# Patient Record
Sex: MALE | Race: WHITE | NOT HISPANIC OR LATINO | Employment: OTHER | URBAN - METROPOLITAN AREA
[De-identification: names, ages, dates, MRNs, and addresses within clinical notes are randomized per-mention and may not be internally consistent; named-entity substitution may affect disease eponyms.]

---

## 2017-01-03 ENCOUNTER — OFFICE VISIT (OUTPATIENT)
Dept: NEPHROLOGY | Facility: CLINIC | Age: 57
End: 2017-01-03
Payer: MEDICAID

## 2017-01-03 ENCOUNTER — OFFICE VISIT (OUTPATIENT)
Dept: DIABETES | Facility: CLINIC | Age: 57
End: 2017-01-03
Payer: MEDICAID

## 2017-01-03 VITALS
WEIGHT: 252.19 LBS | SYSTOLIC BLOOD PRESSURE: 110 MMHG | DIASTOLIC BLOOD PRESSURE: 86 MMHG | HEIGHT: 71 IN | BODY MASS INDEX: 35.31 KG/M2

## 2017-01-03 VITALS
HEART RATE: 71 BPM | SYSTOLIC BLOOD PRESSURE: 100 MMHG | BODY MASS INDEX: 35.52 KG/M2 | HEIGHT: 71 IN | WEIGHT: 253.75 LBS | DIASTOLIC BLOOD PRESSURE: 70 MMHG

## 2017-01-03 DIAGNOSIS — N18.30 CHRONIC KIDNEY DISEASE (CKD), STAGE III (MODERATE): Primary | ICD-10-CM

## 2017-01-03 DIAGNOSIS — E11.22 TYPE 2 DIABETES MELLITUS WITH STAGE 3 CHRONIC KIDNEY DISEASE, WITH LONG-TERM CURRENT USE OF INSULIN: ICD-10-CM

## 2017-01-03 DIAGNOSIS — I15.2 HYPERTENSION ASSOCIATED WITH DIABETES: ICD-10-CM

## 2017-01-03 DIAGNOSIS — E66.9 OBESITY (BMI 30-39.9): ICD-10-CM

## 2017-01-03 DIAGNOSIS — E11.65 TYPE 2 DIABETES MELLITUS WITH HYPERGLYCEMIA, WITH LONG-TERM CURRENT USE OF INSULIN: ICD-10-CM

## 2017-01-03 DIAGNOSIS — E78.5 HYPERLIPIDEMIA ASSOCIATED WITH TYPE 2 DIABETES MELLITUS: ICD-10-CM

## 2017-01-03 DIAGNOSIS — Z79.4 TYPE 2 DIABETES MELLITUS WITH STAGE 3 CHRONIC KIDNEY DISEASE, WITH LONG-TERM CURRENT USE OF INSULIN: ICD-10-CM

## 2017-01-03 DIAGNOSIS — Z79.4 TYPE 2 DIABETES MELLITUS WITH DIABETIC NEUROPATHY, WITH LONG-TERM CURRENT USE OF INSULIN: Primary | ICD-10-CM

## 2017-01-03 DIAGNOSIS — Z79.4 TYPE 2 DIABETES MELLITUS WITH HYPERGLYCEMIA, WITH LONG-TERM CURRENT USE OF INSULIN: ICD-10-CM

## 2017-01-03 DIAGNOSIS — N18.30 TYPE 2 DIABETES MELLITUS WITH STAGE 3 CHRONIC KIDNEY DISEASE, WITH LONG-TERM CURRENT USE OF INSULIN: ICD-10-CM

## 2017-01-03 DIAGNOSIS — E11.69 HYPERLIPIDEMIA ASSOCIATED WITH TYPE 2 DIABETES MELLITUS: ICD-10-CM

## 2017-01-03 DIAGNOSIS — E11.40 TYPE 2 DIABETES MELLITUS WITH DIABETIC NEUROPATHY, WITH LONG-TERM CURRENT USE OF INSULIN: Primary | ICD-10-CM

## 2017-01-03 DIAGNOSIS — E11.59 HYPERTENSION ASSOCIATED WITH DIABETES: ICD-10-CM

## 2017-01-03 LAB — GLUCOSE SERPL-MCNC: 102 MG/DL (ref 70–110)

## 2017-01-03 PROCEDURE — 99214 OFFICE O/P EST MOD 30 MIN: CPT | Mod: S$PBB,,, | Performed by: NURSE PRACTITIONER

## 2017-01-03 PROCEDURE — 99999 PR PBB SHADOW E&M-EST. PATIENT-LVL II: CPT | Mod: PBBFAC,,, | Performed by: NURSE PRACTITIONER

## 2017-01-03 PROCEDURE — 99214 OFFICE O/P EST MOD 30 MIN: CPT | Mod: S$PBB,,, | Performed by: INTERNAL MEDICINE

## 2017-01-03 PROCEDURE — 99999 PR PBB SHADOW E&M-EST. PATIENT-LVL III: CPT | Mod: PBBFAC,,, | Performed by: INTERNAL MEDICINE

## 2017-01-03 PROCEDURE — 99212 OFFICE O/P EST SF 10 MIN: CPT | Mod: PBBFAC,27,PO | Performed by: NURSE PRACTITIONER

## 2017-01-03 PROCEDURE — 82948 REAGENT STRIP/BLOOD GLUCOSE: CPT | Mod: PBBFAC,PO | Performed by: NURSE PRACTITIONER

## 2017-01-03 RX ORDER — METFORMIN HYDROCHLORIDE 500 MG/1
500 TABLET ORAL 2 TIMES DAILY WITH MEALS
Qty: 60 TABLET | Refills: 6 | Status: SHIPPED | OUTPATIENT
Start: 2017-01-03 | End: 2017-09-27 | Stop reason: SDUPTHER

## 2017-01-03 NOTE — PROGRESS NOTES
"PCP: Farhana Burnham MD    Subjective:     HISTORY OF PRESENT ILLNESS: 56 year old  male patient is in clinic today for diabetes.  Patient has had Type II diabetes since 2009.  He has been seen by dietitian, TONY Shin, in the past.  Most recent A1C is 7.7, ADA recommends less than 7.0.  He reports prior use of glimepiride but was told to discontinue.  Blood glucose testing is performed 1 - 2 x a day. No records today.  Per recall, fasting blood glucoses are mostly 90 - 120. Not monitoring other times.  Since his last visit, he has lost 2 pounds.     Patient denies polyuria, polyphagia, or blurred vision.  He complains of polydipsia, cotton mouth.  Also denies nausea, vomiting, diarrhea or hypoglycemic symptoms.     Height: 5 ' 9.5", Weight: 252 pounds, BMI 35.18  Blood Glucose reading this AM: Not Taken  Blood Glucose reading in clinic: 102 mg/dl at 1:15 pm    DM MEDICATIONS:  Metformin 500 mg BID  Lantus 45 units daily  Novolog 20 units TID ac  Trulicity 0.75 mg weekly    Labs Reviewed.    REVIEW OF SYSTEMS:  General: Denies fever, chills, change in appetite.  HEENT: Denies impaired hearing, dysphagia.  Respiratory: Denies shortness of breath, cough or wheezing.  Cardiovascular: Denies chest pain, palpitations.  GI: Denies hematochezia.  : Denies hematuria, dysuria or frequency.  MS:  Normal gait. Denies difficulty with mobility, muscle or joint pain.  SKIN: Denies rashes and lesions.  Neuro: Has numbness or tingling in the hands or feet.   PSYCH: Denies depression or anxiety. No suicidal ideations.  ENDO: See HPI.    STANDARDS OF CARE:  Eye doctor: Dr. Caruso, Last exam 7 / 2016  Dental exam: Recommend regular exams; denies gums bleeding.  Podiatry doctor: Dr. Sampson, last exam 3 / 2015    ACTIVITY LEVEL: No routine exercise.   MEAL PLANNING: Number of meals per day - 3. Number of snacks per day - 2.  Per dietary recall, patient is not limiting carbohydrates, saturated fats and sodium.     BLOOD " GLUCOSE TESTING: Self-monitoring with, ONE TOUCH verio  SOCIAL HISTORY: Disabled. Smokes 1/2 pack per day.     Objective:     PHYSICAL EXAMINATION:  GENERAL: WDWN  male in no acute distress, ambulatory, responds appropriately. AAO X 3.   NECK: Supple, no thyromegaly, no cervical or supraclavicular lymphadenopathy, no carotid bruit.  HEART: Regular rate and rhythm. No rubs, murmurs or gallops.  LUNGS: CTA bilaterally. Unlabored breathing, no use of accessory muscles.  MUSCULOSKELETAL: Normal gait and muscle strength.  ABDOMEN: Active bowel sounds X 4, no masses or tenderness.   SKIN: Warm, dry skin. No lesions or abrasions. Clean, dry well-healed injection sites.   NEUROLOGIC: Cranial nerves II-XII grossly intact.   FOOT EXAMINATION: Protective Sensation (w/ 10 gram monofilament): Right: Decreased.  Left: Intact.  Visual Inspection: Normal -  Bilateral, except onychomycosis to toenails, significant calluses to lateral sides of great toes.   Pedal Pulses:   Right: Diminshed  Left: Diminshed    Assessment:     EDUCATIONAL ASSESSMENT:  1. Impediments in learning class environment - NONE.  2. Needs improvement in self-care management skills.    (1.) Diabetes Type II, stage III CKD, neuropathy (on gabapentin) -  improving control on metformin, Lantus, novolog, Trulicity, A1C 7.7  (2.) Hypertension - controlled on norvasc, catapres, hydralazine, lisinopril-HCT  (3.) Obesity, BMI 35.18  (4.) Hyperlipidemia - controlled on Pravastatin    Plan:     1.) Patient was instructed to monitor blood glucose 3 x daily, fasting and ac dinner and at bedtime. Discussed ADA goal for fasting blood sugar, 80 - 130 mg/dL; pp blood sugars below 180 mg/dl. Also, discussed prevention of hypoglycemia and the need to adjust goals to higher levels if persistent hypoglycemia.  R Reminded to bring blood glucose records or meter to each visit for review.  2.) Reviewed pathophysiology of Type 2 diabetes, complications related to the disease,  importance of annual dilated eye exam and daily foot examination.  3.) Continue metformin 500 mg BID.  Continue Lantus 45 units daily. Continue Novolog 20 units TID ac.  Increase Trulicity 1.5 mg weekly.  He voices that Trulicity helped to cut appetite and decrease weight.  Continue neuropathy cream as needed.   4.) Reviewed carb counting, portion control, importance of spacing meals throughout the day to prevent post prandial elevations. Recommended low saturated fat, low sodium diet to aid in control of hypertension and cholesterol.  5.) Discussed activity, benefits, methods, and precautions. Recommended patient start some form of exercise and increase as tolerated to 30 minutes per day to facilitate weight loss and aid in control of BGs.  6.) He is scheduled for repeat labs tomorrow: A1C, micral.    7.) Return to clinic in 3 months for follow up.  Advised patient to call clinic with any questions or concerns.    Ladan Dowd, NP-C, CDE
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
## 2017-01-03 NOTE — MR AVS SNAPSHOT
[facility]  [address] [facility] [address]  Phone: [phone]  Fax: [phone]                  Shukri Rosales   1/3/2017 10:30 AM   Office Visit    Description:  Male : 1960   Provider:  Maxi Oh MD   Department:  [facility]           Diagnoses this Visit        Comments    Chronic kidney disease (CKD), stage III (moderate)    -  Primary            To Do List           Future Appointments        Provider Department Dept Phone    1/3/2017 1:30 PM Ladan Dowd, SHAWNA-C, CDE [facility] [phone]    2017 8:30 AM [facility], [facility]     2017 10:00 AM Farhana Burnham MD [facility] [phone]    2017 10:40 AM [facility], [facility]     2017 10:45 AM LAURENCE Caruso OD [facility] - Ophthalmology [phone]      Goals (5 Years of Data)     None      Follow-Up and Disposition     Return in about 6 months (around 7/3/2017).    Follow-up and Disposition History      [facility]     [facility] -  Assistance  Registered nurses in the [facility] provide clinical advisement, health education, appointment booking, and other advisory services.  Call for this free service at [phone].             Medications           Message regarding Medications     Verify the changes and/or additions to your medication regime listed below are the same as discussed with your clinician today.  If any of these changes or additions are incorrect, please notify your healthcare provider.             Verify that the below list of medications is an accurate representation of the medications you are currently taking.  If none reported, the list may be blank. If incorrect, please contact your healthcare provider. Carry this list with you in case of emergency.           Current Medications     amlodipine  "(NORVASC) 10 MG tablet Take 1 tablet (10 mg total) by mouth once daily.    blood sugar diagnostic Strp 1 each by Misc.(Non-Drug; Combo Route) route 3 (three) times daily before meals. For One Touch Verio    blood-glucose meter kit One Touch Verio Meter    clonazePAM (KLONOPIN) 0.5 MG tablet TAKE ONE TABLET BY MOUTH TWICE DAILY AS NEEDED MUST LAST 30 DAYS    cloNIDine (CATAPRES) 0.3 MG tablet Take 1 tablet (0.3 mg total) by mouth 3 (three) times daily.    cyclobenzaprine (FLEXERIL) 10 MG tablet Take 1 tablet (10 mg total) by mouth 3 (three) times daily as needed (Muscle pain/soreness).    dulaglutide (TRULICITY) 0.75 mg/0.5 mL PnIj Inject 0.75 mg into the skin once a week.    gabapentin (NEURONTIN) 300 MG capsule Take 1 capsule (300 mg total) by mouth 3 (three) times daily.    insulin aspart (NOVOLOG) 100 unit/mL injection Inject 20 Units into the skin 3 (three) times daily before meals.    insulin glargine (LANTUS) 100 unit/mL injection Inject 45 Units into the skin every evening.    insulin syringe-needle U-100 1 mL 31 gauge x 5/16 Syrg 1 Syringe by Misc.(Non-Drug; Combo Route) route once daily.    isosorbide mononitrate (IMDUR) 60 MG 24 hr tablet TAKE 1 TABLET(60 MG) BY MOUTH EVERY DAY    lancets (ONETOUCH DELICA LANCETS) 33 gauge Misc 1 lancet by Misc.(Non-Drug; Combo Route) route 3 (three) times daily.    lancets 33 gauge Misc 1 lancet by Misc.(Non-Drug; Combo Route) route 2 (two) times daily. Freestyle freedom lite    lisinopril-hydrochlorothiazide (PRINZIDE,ZESTORETIC) 20-25 mg Tab TAKE 1 TABLET BY MOUTH EVERY MORNING    lurasidone (LATUDA) 60 mg Tab tablet Take 60 mg by mouth once daily.    metformin (GLUCOPHAGE) 500 MG tablet Take 1 tablet (500 mg total) by mouth 2 (two) times daily with meals.    metoprolol succinate (TOPROL-XL) 50 MG 24 hr tablet TAKE 1 TABLET(50 MG) BY MOUTH EVERY DAY    pen needle, diabetic 32 gauge x 5/32" Ndle 1 each by Misc.(Non-Drug; Combo Route) route once daily.    pravastatin " "(PRAVACHOL) 20 MG tablet Take 1 tablet (20 mg total) by mouth every evening.    quetiapine (SEROQUEL) 200 MG Tab Take 200 mg by mouth once daily.     quetiapine (SEROQUEL) 300 MG Tab Take 1 tablet (300 mg total) by mouth once daily.    sertraline (ZOLOFT) 100 MG tablet Take 2 tablets (200 mg total) by mouth once daily.    trazodone (DESYREL) 150 MG tablet Take 2 tabs po every bedtime    trazodone (DESYREL) 300 MG tablet TK 1 T PO  QHS    aspirin 81 MG Chew Take 1 tablet (81 mg total) by mouth once daily.    buPROPion (WELLBUTRIN SR) 150 MG TBSR 12 hr tablet TK 1 T PO  BID    triamcinolone acetonide 0.1% (KENALOG) 0.1 % cream Apply topically 2 (two) times daily.           Clinical Reference Information           Vital Signs - Last Recorded  Most recent update: 1/3/2017 11:34 AM by Maxi Oh MD    BP Pulse Ht Wt BMI    100/70 71 5' 11" (1.803 m) 115.1 kg (253 lb 12 oz) 35.39 kg/m2      Blood Pressure          Most Recent Value    BP  100/70      Allergies as of 1/3/2017     No Known Allergies      Immunizations Administered on Date of Encounter - 1/3/2017     None      Orders Placed During Today's Visit     Future Labs/Procedures Expected by Expires    CBC auto differential  1/3/2017 3/4/2018    PTH, intact  1/3/2017 3/4/2018    Renal function panel  1/3/2017 3/4/2018    Renal function panel  1/3/2017 3/4/2018    Uric acid  1/3/2017 3/4/2018    Vitamin D  1/3/2017 3/4/2018      Instructions    Avoid NSAID pain medications such as advil, aleve, motrin, ibuprofen, naprosyn, meloxicam, diclofenac, mobic.     Reduce Norvasc to 5 mg daily              Smoking Cessation     If you would like to quit smoking:   You may be eligible for free services if you are a [place] resident and started smoking cigarettes before September 1, 1988.  Call the [employer]oking [employer]essation [employer] (S[employer]) toll free at ([phone] [phone] or ([phone] 526-0748.   Call 1-800-QUIT-NOW if you do not meet the above criteria.            "

## 2017-01-03 NOTE — PATIENT INSTRUCTIONS
Avoid NSAID pain medications such as advil, aleve, motrin, ibuprofen, naprosyn, meloxicam, diclofenac, mobic.     Reduce Norvasc to 5 mg daily

## 2017-01-04 ENCOUNTER — OFFICE VISIT (OUTPATIENT)
Dept: FAMILY MEDICINE | Facility: CLINIC | Age: 57
End: 2017-01-04
Payer: MEDICAID

## 2017-01-04 ENCOUNTER — LAB VISIT (OUTPATIENT)
Dept: LAB | Facility: HOSPITAL | Age: 57
End: 2017-01-04
Attending: INTERNAL MEDICINE
Payer: MEDICAID

## 2017-01-04 VITALS
SYSTOLIC BLOOD PRESSURE: 138 MMHG | HEIGHT: 71 IN | OXYGEN SATURATION: 96 % | BODY MASS INDEX: 35.32 KG/M2 | RESPIRATION RATE: 14 BRPM | DIASTOLIC BLOOD PRESSURE: 82 MMHG | TEMPERATURE: 98 F | WEIGHT: 252.31 LBS | HEART RATE: 73 BPM

## 2017-01-04 DIAGNOSIS — E66.01 SEVERE OBESITY (BMI 35.0-35.9 WITH COMORBIDITY): ICD-10-CM

## 2017-01-04 DIAGNOSIS — K59.00 CONSTIPATION, UNSPECIFIED CONSTIPATION TYPE: ICD-10-CM

## 2017-01-04 DIAGNOSIS — N18.30 CHRONIC KIDNEY DISEASE (CKD), STAGE III (MODERATE): ICD-10-CM

## 2017-01-04 DIAGNOSIS — Z72.0 TOBACCO ABUSE: ICD-10-CM

## 2017-01-04 DIAGNOSIS — E55.9 VITAMIN D DEFICIENCY: ICD-10-CM

## 2017-01-04 DIAGNOSIS — Z23 NEED FOR TDAP VACCINATION: ICD-10-CM

## 2017-01-04 DIAGNOSIS — E11.9 TYPE 2 DIABETES MELLITUS WITHOUT COMPLICATION: ICD-10-CM

## 2017-01-04 DIAGNOSIS — E78.5 HYPERLIPIDEMIA, UNSPECIFIED HYPERLIPIDEMIA TYPE: ICD-10-CM

## 2017-01-04 DIAGNOSIS — G47.33 OSA ON CPAP: ICD-10-CM

## 2017-01-04 DIAGNOSIS — R63.5 ABNORMAL WEIGHT GAIN: ICD-10-CM

## 2017-01-04 DIAGNOSIS — I10 ESSENTIAL HYPERTENSION: Primary | ICD-10-CM

## 2017-01-04 LAB
25(OH)D3+25(OH)D2 SERPL-MCNC: 19 NG/ML
ALBUMIN SERPL BCP-MCNC: 3.3 G/DL
ANION GAP SERPL CALC-SCNC: 12 MMOL/L
BASOPHILS # BLD AUTO: 0.06 K/UL
BASOPHILS NFR BLD: 0.8 %
BUN SERPL-MCNC: 16 MG/DL
CALCIUM SERPL-MCNC: 9.7 MG/DL
CHLORIDE SERPL-SCNC: 96 MMOL/L
CO2 SERPL-SCNC: 27 MMOL/L
CREAT SERPL-MCNC: 1.3 MG/DL
DIFFERENTIAL METHOD: NORMAL
EOSINOPHIL # BLD AUTO: 0.2 K/UL
EOSINOPHIL NFR BLD: 2.6 %
ERYTHROCYTE [DISTWIDTH] IN BLOOD BY AUTOMATED COUNT: 12.8 %
EST. GFR  (AFRICAN AMERICAN): >60 ML/MIN/1.73 M^2
EST. GFR  (NON AFRICAN AMERICAN): >60 ML/MIN/1.73 M^2
GLUCOSE SERPL-MCNC: 157 MG/DL
HCT VFR BLD AUTO: 41.7 %
HGB BLD-MCNC: 14.6 G/DL
LYMPHOCYTES # BLD AUTO: 2.5 K/UL
LYMPHOCYTES NFR BLD: 31.8 %
MCH RBC QN AUTO: 30.6 PG
MCHC RBC AUTO-ENTMCNC: 35 %
MCV RBC AUTO: 87 FL
MONOCYTES # BLD AUTO: 0.6 K/UL
MONOCYTES NFR BLD: 7.1 %
NEUTROPHILS # BLD AUTO: 4.3 K/UL
NEUTROPHILS NFR BLD: 57.7 %
PHOSPHATE SERPL-MCNC: 3.3 MG/DL
PLATELET # BLD AUTO: 319 K/UL
PMV BLD AUTO: 9.5 FL
POTASSIUM SERPL-SCNC: 4.2 MMOL/L
RBC # BLD AUTO: 4.77 M/UL
SODIUM SERPL-SCNC: 135 MMOL/L
URATE SERPL-MCNC: 5.5 MG/DL
WBC # BLD AUTO: 7.7 K/UL

## 2017-01-04 PROCEDURE — 90472 IMMUNIZATION ADMIN EACH ADD: CPT | Mod: PBBFAC,PO | Performed by: FAMILY MEDICINE

## 2017-01-04 PROCEDURE — 99999 PR PBB SHADOW E&M-EST. PATIENT-LVL III: CPT | Mod: PBBFAC,,, | Performed by: FAMILY MEDICINE

## 2017-01-04 PROCEDURE — 90686 IIV4 VACC NO PRSV 0.5 ML IM: CPT | Mod: PBBFAC,PO | Performed by: FAMILY MEDICINE

## 2017-01-04 PROCEDURE — 99213 OFFICE O/P EST LOW 20 MIN: CPT | Mod: PBBFAC,PO | Performed by: FAMILY MEDICINE

## 2017-01-04 PROCEDURE — 99214 OFFICE O/P EST MOD 30 MIN: CPT | Mod: S$PBB,,, | Performed by: FAMILY MEDICINE

## 2017-01-04 RX ORDER — AMLODIPINE BESYLATE 10 MG/1
10 TABLET ORAL DAILY
Qty: 30 TABLET | Refills: 5 | Status: CANCELLED | OUTPATIENT
Start: 2017-01-04

## 2017-01-04 RX ORDER — PRAVASTATIN SODIUM 20 MG/1
20 TABLET ORAL NIGHTLY
Qty: 30 TABLET | Refills: 3 | Status: SHIPPED | OUTPATIENT
Start: 2017-01-04 | End: 2017-07-06 | Stop reason: SDUPTHER

## 2017-01-04 RX ORDER — LISINOPRIL AND HYDROCHLOROTHIAZIDE 20; 25 MG/1; MG/1
TABLET ORAL
Qty: 30 TABLET | Refills: 5 | Status: SHIPPED | OUTPATIENT
Start: 2017-01-04 | End: 2017-09-05 | Stop reason: SDUPTHER

## 2017-01-04 RX ORDER — AMLODIPINE BESYLATE 5 MG/1
5 TABLET ORAL DAILY
Qty: 30 TABLET | Refills: 5 | Status: SHIPPED | OUTPATIENT
Start: 2017-01-04 | End: 2017-08-02 | Stop reason: SDUPTHER

## 2017-01-04 RX ORDER — POLYETHYLENE GLYCOL 3350 17 G/17G
17 POWDER, FOR SOLUTION ORAL DAILY
Qty: 1 BOTTLE | Refills: 1 | Status: SHIPPED | OUTPATIENT
Start: 2017-01-04 | End: 2020-12-03 | Stop reason: SDUPTHER

## 2017-01-04 RX ORDER — CLONIDINE HYDROCHLORIDE 0.3 MG/1
0.3 TABLET ORAL 3 TIMES DAILY
Qty: 90 TABLET | Refills: 5 | Status: SHIPPED | OUTPATIENT
Start: 2017-01-04 | End: 2017-10-04 | Stop reason: SDUPTHER

## 2017-01-04 NOTE — MR AVS SNAPSHOT
[facility]  [address]  [facility] [address]  Phone: [phone]  Fax: [phone]                  Shukri Rosales   2017 10:00 AM   Office Visit    Description:  Male : 1960   Provider:  Farhana Burnham MD   Department:  [facility]           Reason for Visit     Chronic Care           Diagnoses this Visit        Comments    Essential hypertension    -  Primary     Hyperlipidemia, unspecified hyperlipidemia type         EMMANUEL on CPAP         Abnormal weight gain         Constipation, unspecified constipation type         Tobacco abuse         Severe obesity (BMI 35.0-35.9 with comorbidity)         Need for Tdap vaccination                To Do List           Future Appointments        Provider Department Dept Phone    2017 10:30 AM SPE[facility]IMEN, [facility] [phone]    2017 10:40 AM LABORATORY, [facility]     2017 1:30 PM CHINA Ulloa, [place] - Diabetes [phone]    2017 8:00 AM LABORATORY, [facility]     2017 10:45 AM [place]atalya Caruso,  [place] - Ophthalmology [phone]      Goals (5 Years of Data)     None       These Medications        Disp Refills Start End    pravastatin (PRAVACHOL) 20 MG tablet 30 tablet 3 2017    Take 1 tablet (20 mg total) by mouth every evening. - Oral    Pharmacy: [facility] [address] [place] [address] [address] AT [facility] [email or web address] Ph #: [phone]       Notes to Pharmacy: Medically necessary 272.4    lisinopril-hydrochlorothiazide (PRINZIDE,ZESTORETIC) 20-25 mg Tab 30 tablet 5 2017     TAKE 1 TABLET BY MOUTH EVERY MORNING    Pharmacy: [facility] [place] [address] [place], [place] - [address] AT [facility] [address] Ph #: [phone]       amlodipine (NORVASC) 5 MG tablet 30 tablet 5  1/4/2017 1/4/2018    Take 1 tablet (5 mg total) by mouth once daily. - Oral    Pharmacy: [facility] [address] [place] [address] [address] AT [facility] [address] Ph #: [phone]       cloNIDine (CATAPRES) 0.3 MG tablet 90 tablet 5 1/4/2017     Take 1 tablet (0.3 mg total) by mouth 3 (three) times daily. - Oral    Pharmacy: [facility] [address] [place] [address] [address] AT [facility] [address] Ph #: [phone]       polyethylene glycol (GLYCOLAX) 17 gram/dose powder 1 Bottle 1 1/4/2017     Take 17 g by mouth once daily. - Oral    Pharmacy: [facility] [address] AT [facility] [address] Ph #: [phone]         [facility]     [facility] - 24/7 Assistance  Registered nurses in the [facility] provide clinical advisement, health education, appointment booking, and other advisory services.  Call for this free service at [phone].             Medications           Message regarding Medications     Verify the changes and/or additions to your medication regime listed below are the same as discussed with your clinician today.  If any of these changes or additions are incorrect, please notify your healthcare provider.        START taking these NEW medications        Refills    amlodipine (NORVASC) 5 MG tablet 5    Sig: Take 1 tablet (5 mg total) by mouth once daily.    Class: Normal    Route: Oral    polyethylene glycol (GLYCOLAX) 17 gram/dose powder 1    Sig: Take 17 g by mouth once daily.    Class: Normal    Route: Oral           Verify that the below list of medications is an accurate representation of the medications you are currently taking.  If none reported, the list may be blank. If incorrect, please contact your healthcare provider. Carry this list with you in case of emergency.           Current Medications     blood sugar diagnostic Strp 1 each by  "Misc.(Non-Drug; Combo Route) route 3 (three) times daily before meals. For One Touch Verio    blood-glucose meter kit One Touch Verio Meter    buPROPion (WELLBUTRIN SR) 150 MG TBSR 12 hr tablet TK 1 T PO  BID    clonazePAM (KLONOPIN) 0.5 MG tablet TAKE ONE TABLET BY MOUTH TWICE DAILY AS NEEDED MUST LAST 30 DAYS    cloNIDine (CATAPRES) 0.3 MG tablet Take 1 tablet (0.3 mg total) by mouth 3 (three) times daily.    cyclobenzaprine (FLEXERIL) 10 MG tablet Take 1 tablet (10 mg total) by mouth 3 (three) times daily as needed (Muscle pain/soreness).    dulaglutide (TRULICITY) 1.5 mg/0.5 mL PnIj Inject 1.5 mg into the skin every 7 days.    gabapentin (NEURONTIN) 300 MG capsule Take 1 capsule (300 mg total) by mouth 3 (three) times daily.    insulin aspart (NOVOLOG) 100 unit/mL injection Inject 20 Units into the skin 3 (three) times daily before meals.    insulin glargine (LANTUS) 100 unit/mL injection Inject 45 Units into the skin every evening.    insulin syringe-needle U-100 1 mL 31 gauge x 5/16 Syrg 1 Syringe by Misc.(Non-Drug; Combo Route) route once daily.    isosorbide mononitrate (IMDUR) 60 MG 24 hr tablet TAKE 1 TABLET(60 MG) BY MOUTH EVERY DAY    lancets (ONETOUCH DELICA LANCETS) 33 gauge Misc 1 lancet by Misc.(Non-Drug; Combo Route) route 3 (three) times daily.    lancets 33 gauge Misc 1 lancet by Misc.(Non-Drug; Combo Route) route 2 (two) times daily. Freestyle freedom lite    lisinopril-hydrochlorothiazide (PRINZIDE,ZESTORETIC) 20-25 mg Tab TAKE 1 TABLET BY MOUTH EVERY MORNING    lurasidone (LATUDA) 60 mg Tab tablet Take 60 mg by mouth once daily.    metformin (GLUCOPHAGE) 500 MG tablet Take 1 tablet (500 mg total) by mouth 2 (two) times daily with meals.    metoprolol succinate (TOPROL-XL) 50 MG 24 hr tablet TAKE 1 TABLET(50 MG) BY MOUTH EVERY DAY    pen needle, diabetic 32 gauge x 5/32" Ndle 1 each by Misc.(Non-Drug; Combo Route) route once daily.    pravastatin (PRAVACHOL) 20 MG tablet Take 1 tablet (20 mg " "total) by mouth every evening.    quetiapine (SEROQUEL) 200 MG Tab Take 200 mg by mouth once daily.     quetiapine (SEROQUEL) 300 MG Tab Take 1 tablet (300 mg total) by mouth once daily.    sertraline (ZOLOFT) 100 MG tablet Take 2 tablets (200 mg total) by mouth once daily.    trazodone (DESYREL) 150 MG tablet Take 2 tabs po every bedtime    trazodone (DESYREL) 300 MG tablet TK 1 T PO  QHS    amlodipine (NORVASC) 5 MG tablet Take 1 tablet (5 mg total) by mouth once daily.    aspirin 81 MG Chew Take 1 tablet (81 mg total) by mouth once daily.    polyethylene glycol (GLYCOLAX) 17 gram/dose powder Take 17 g by mouth once daily.    triamcinolone acetonide 0.1% (KENALOG) 0.1 % cream Apply topically 2 (two) times daily.           Clinical Reference Information           Vital Signs - Last Recorded  Most recent update: 1/4/2017  9:35 AM by Kyra Miner MA    BP Pulse Temp Resp Ht Wt    138/82 73 97.7 °F (36.5 °C) (Temporal) 14 5' 11" (1.803 m) 114.4 kg (252 lb 5.1 oz)    SpO2 BMI             96% 35.19 kg/m2         Blood Pressure          Most Recent Value    BP  138/82      Allergies as of 1/4/2017     No Known Allergies      Immunizations Administered on Date of Encounter - 1/4/2017     Name Date Dose VIS Date Route    TDAP  Incomplete 0.5 mL 2/24/2015 Intramuscular      Orders Placed During Today's Visit      Normal Orders This Visit    Tdap Vaccine (Adult)     Future Labs/Procedures Expected by Expires    Lipid panel  1/4/2017 3/5/2018      Smoking Cessation     If you would like to quit smoking:   You may be eligible for free services if you are a [place] resident and started smoking cigarettes before September 1, 1988.  Call the [employer]oking [employer]essation [employer] ([employer]) toll free at [phone] or [phone].   Call 3-367-QUIT-NOW if you do not meet the above criteria.            "
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
## 2017-01-05 LAB
ESTIMATED AVG GLUCOSE: 206 MG/DL
HBA1C MFR BLD HPLC: 8.8 %

## 2017-01-16 ENCOUNTER — TELEPHONE (OUTPATIENT)
Dept: FAMILY MEDICINE | Facility: CLINIC | Age: 57
End: 2017-01-16

## 2017-01-16 RX ORDER — ERGOCALCIFEROL 1.25 MG/1
50000 CAPSULE ORAL WEEKLY
Qty: 8 CAPSULE | Refills: 0 | Status: SHIPPED | OUTPATIENT
Start: 2017-01-16 | End: 2017-02-15

## 2017-01-16 NOTE — TELEPHONE ENCOUNTER
----- Message from Shanon Tee MA sent at 1/16/2017  2:16 PM CST -----  Spoke with patient and informed him that his vitamin d level is lower, pt stated that he has not taken the replacement dosage. Please re-escribe the medication to [place].

## 2017-01-30 DIAGNOSIS — R06.09 DOE (DYSPNEA ON EXERTION): Chronic | ICD-10-CM

## 2017-01-30 DIAGNOSIS — I10 ESSENTIAL HYPERTENSION: ICD-10-CM

## 2017-01-30 RX ORDER — ISOSORBIDE MONONITRATE 60 MG/1
TABLET, EXTENDED RELEASE ORAL
Qty: 30 TABLET | Refills: 0 | Status: SHIPPED | OUTPATIENT
Start: 2017-01-30 | End: 2017-03-08 | Stop reason: SDUPTHER

## 2017-02-03 DIAGNOSIS — E11.40 DIABETIC NEUROPATHY, TYPE II DIABETES MELLITUS: ICD-10-CM

## 2017-02-03 RX ORDER — GABAPENTIN 300 MG/1
CAPSULE ORAL
Qty: 90 CAPSULE | Refills: 0 | Status: SHIPPED | OUTPATIENT
Start: 2017-02-03 | End: 2017-02-27 | Stop reason: SDUPTHER

## 2017-02-21 ENCOUNTER — TELEPHONE (OUTPATIENT)
Dept: PULMONOLOGY | Facility: CLINIC | Age: 57
End: 2017-02-21

## 2017-02-27 DIAGNOSIS — E11.40 DIABETIC NEUROPATHY, TYPE II DIABETES MELLITUS: ICD-10-CM

## 2017-02-27 RX ORDER — GABAPENTIN 300 MG/1
CAPSULE ORAL
Qty: 90 CAPSULE | Refills: 0 | Status: SHIPPED | OUTPATIENT
Start: 2017-02-27 | End: 2017-04-02 | Stop reason: SDUPTHER

## 2017-03-08 DIAGNOSIS — I10 ESSENTIAL HYPERTENSION: ICD-10-CM

## 2017-03-08 DIAGNOSIS — R06.09 DOE (DYSPNEA ON EXERTION): Chronic | ICD-10-CM

## 2017-03-09 RX ORDER — ISOSORBIDE MONONITRATE 60 MG/1
TABLET, EXTENDED RELEASE ORAL
Qty: 30 TABLET | Refills: 0 | Status: SHIPPED | OUTPATIENT
Start: 2017-03-09 | End: 2017-04-09 | Stop reason: SDUPTHER

## 2017-03-23 ENCOUNTER — HOSPITAL ENCOUNTER (EMERGENCY)
Facility: HOSPITAL | Age: 57
Discharge: HOME | End: 2017-03-24
Attending: EMERGENCY MEDICINE
Payer: MEDICAID

## 2017-03-23 VITALS
SYSTOLIC BLOOD PRESSURE: 192 MMHG | TEMPERATURE: 97 F | OXYGEN SATURATION: 96 % | BODY MASS INDEX: 34.58 KG/M2 | HEIGHT: 71 IN | WEIGHT: 247 LBS | HEART RATE: 90 BPM | DIASTOLIC BLOOD PRESSURE: 126 MMHG | RESPIRATION RATE: 20 BRPM

## 2017-03-23 DIAGNOSIS — S59.901A ELBOW INJURY, RIGHT, INITIAL ENCOUNTER: ICD-10-CM

## 2017-03-23 DIAGNOSIS — S51.031A PUNCTURE WOUND OF RIGHT ELBOW, INITIAL ENCOUNTER: Primary | ICD-10-CM

## 2017-03-23 PROCEDURE — 99283 EMERGENCY DEPT VISIT LOW MDM: CPT

## 2017-03-23 RX ORDER — CIPROFLOXACIN 500 MG/1
500 TABLET ORAL 2 TIMES DAILY
Qty: 14 TABLET | Refills: 0 | Status: SHIPPED | OUTPATIENT
Start: 2017-03-23 | End: 2017-05-10

## 2017-03-23 RX ORDER — HYDROCODONE BITARTRATE AND ACETAMINOPHEN 5; 325 MG/1; MG/1
1 TABLET ORAL EVERY 4 HOURS PRN
Qty: 12 TABLET | Refills: 0 | Status: SHIPPED | OUTPATIENT
Start: 2017-03-23 | End: 2017-04-02

## 2017-03-23 NOTE — ED AVS SNAPSHOT
[facility] -   [address] [facility] [address]               Shukri Rosales   3/23/2017 11:28 PM   ED    Description:  Male : 1960   Department:  [facility] -            Your Care was Coordinated By:     Provider Role From To    Ryan Lino MD Attending Provider [id number] --      Reason for Visit     Puncture Wound           Diagnoses this Visit        Comments    Puncture wound of right elbow, initial encounter    -  Primary     Elbow injury, right, initial encounter           ED Disposition     ED Disposition Condition Comment    Discharge             To Do List            These Medications        Disp Refills Start End    ciprofloxacin HCl (CIPRO) 500 MG tablet 14 tablet 0 3/23/2017     Take 1 tablet (500 mg total) by mouth 2 (two) times daily. - Oral    Pharmacy: [facility] [id number] [facility] [address] AT [facility] OF [employer] [address] [facility]IG[facility] [employer] [facility]  Ph #: [phone]       hydrocodone-acetaminophen 5-325mg (NORCO) 5-325 mg per tablet 12 tablet 0 3/23/2017 2017    Take 1 tablet by mouth every 4 (four) hours as needed for Pain. - Oral    [facility] [id number] [facility] [address] AT [facility] OF [employer] [address] Ph #: [phone]         [facility]     [facility] -  Assistance  Registered nurses in the [facility] provide clinical advisement, health education, appointment booking, and other advisory services.  Call for this free service at [phone].             Medications           Message regarding Medications     Verify the changes and/or additions to your medication regime listed below are the same as discussed with your clinician today.  If any of these changes or additions are incorrect, please notify your healthcare provider.        START taking these NEW medications        Refills    ciprofloxacin HCl (CIPRO) 500 MG  tablet 0    Sig: Take 1 tablet (500 mg total) by mouth 2 (two) times daily.    Class: Print    Route: Oral    hydrocodone-acetaminophen 5-325mg (NORCO) 5-325 mg per tablet 0    Sig: Take 1 tablet by mouth every 4 (four) hours as needed for Pain.    Class: Print    Route: Oral           Verify that the below list of medications is an accurate representation of the medications you are currently taking.  If none reported, the list may be blank. If incorrect, please contact your healthcare provider. Carry this list with you in case of emergency.           Current Medications     amlodipine (NORVASC) 5 MG tablet Take 1 tablet (5 mg total) by mouth once daily.    aspirin 81 MG Chew Take 1 tablet (81 mg total) by mouth once daily.    blood sugar diagnostic Strp 1 each by Misc.(Non-Drug; Combo Route) route 3 (three) times daily before meals. For One Touch Verio    blood-glucose meter kit One Touch Verio Meter    buPROPion (WELLBUTRIN SR) 150 MG TBSR 12 hr tablet TK 1 T PO  BID    ciprofloxacin HCl (CIPRO) 500 MG tablet Take 1 tablet (500 mg total) by mouth 2 (two) times daily.    clonazePAM (KLONOPIN) 0.5 MG tablet TAKE ONE TABLET BY MOUTH TWICE DAILY AS NEEDED MUST LAST 30 DAYS    cloNIDine (CATAPRES) 0.3 MG tablet Take 1 tablet (0.3 mg total) by mouth 3 (three) times daily.    cyclobenzaprine (FLEXERIL) 10 MG tablet Take 1 tablet (10 mg total) by mouth 3 (three) times daily as needed (Muscle pain/soreness).    dulaglutide (TRULICITY) 1.5 mg/0.5 mL PnIj Inject 1.5 mg into the skin every 7 days.    gabapentin (NEURONTIN) 300 MG capsule TAKE 1 CAPSULE BY MOUTH THREE TIMES DAILY    hydrocodone-acetaminophen 5-325mg (NORCO) 5-325 mg per tablet Take 1 tablet by mouth every 4 (four) hours as needed for Pain.    insulin aspart (NOVOLOG) 100 unit/mL injection Inject 20 Units into the skin 3 (three) times daily before meals.    insulin glargine (LANTUS) 100 unit/mL injection Inject 45 Units into the skin every evening.    insulin  "syringe-needle U-100 1 mL 31 gauge x 5/16 Syrg 1 Syringe by Misc.(Non-Drug; Combo Route) route once daily.    isosorbide mononitrate (IMDUR) 60 MG 24 hr tablet TAKE 1 TABLET BY MOUTH EVERY DAY    lancets (ONETOUCH DELICA LANCETS) 33 gauge Misc 1 lancet by Misc.(Non-Drug; Combo Route) route 3 (three) times daily.    lancets 33 gauge Misc 1 lancet by Misc.(Non-Drug; Combo Route) route 2 (two) times daily. Freestyle freedom lite    lisinopril-hydrochlorothiazide (PRINZIDE,ZESTORETIC) 20-25 mg Tab TAKE 1 TABLET BY MOUTH EVERY MORNING    lurasidone (LATUDA) 60 mg Tab tablet Take 60 mg by mouth once daily.    metformin (GLUCOPHAGE) 500 MG tablet Take 1 tablet (500 mg total) by mouth 2 (two) times daily with meals.    metoprolol succinate (TOPROL-XL) 50 MG 24 hr tablet TAKE 1 TABLET(50 MG) BY MOUTH EVERY DAY    pen needle, diabetic 32 gauge x 5/32" Ndle 1 each by Misc.(Non-Drug; Combo Route) route once daily.    polyethylene glycol (GLYCOLAX) 17 gram/dose powder Take 17 g by mouth once daily.    pravastatin (PRAVACHOL) 20 MG tablet Take 1 tablet (20 mg total) by mouth every evening.    quetiapine (SEROQUEL) 200 MG Tab Take 200 mg by mouth once daily.     quetiapine (SEROQUEL) 300 MG Tab Take 1 tablet (300 mg total) by mouth once daily.    sertraline (ZOLOFT) 100 MG tablet Take 2 tablets (200 mg total) by mouth once daily.    trazodone (DESYREL) 150 MG tablet Take 2 tabs po every bedtime    trazodone (DESYREL) 300 MG tablet TK 1 T PO  QHS    triamcinolone acetonide 0.1% (KENALOG) 0.1 % cream Apply topically 2 (two) times daily.           Clinical Reference Information           Your Vitals Were     BP Pulse Temp Resp Height Weight    192/126 (BP Location: Right arm, Patient Position: Sitting) 90 97.2 °F (36.2 °C) (Tympanic) 20 5' 11" (1.803 m) 112 kg (247 lb)    SpO2 BMI             96% 34.45 kg/m2         Allergies as of 3/24/2017     No Known Allergies      Immunizations Administered on Date of Encounter - 3/24/2017     " None      ED Micro, Lab, POCT     None      ED Imaging Orders     Start Ordered       Status Ordering Provider    03/23/17 2252 03/23/17 2251  X-Ray Elbow Complete Right  1 time imaging      Final result         Discharge Instructions         Puncture Wound       A puncture wound occurs when a pointed object pushes into the skin. It may go into the tissues below the skin, including fat and muscle. This type of wound is narrow and deep and can be hard to clean. Because of this, puncture wounds are at high risk for becoming infected.  X-rays may be done to check the wound for objects stuck under the skin. Your may also need a tetanus shot. This is given if you are not up to date on this vaccination and the object that caused the wound may lead to tetanus.  Home care  · Your healthcare provider may prescribe an antibiotic. This is to help prevent infection. Follow all instructions for taking this medicine. Take the medicine every day until it is gone or you are told to stop. You should not have any left over.  · The healthcare provider may prescribe medicines for pain. Follow instructions for taking them.  · You can take acetaminophen or ibuprofen for pain, unless you were given a different pain medicine to use.   · Follow the healthcare providers instructions on how to care for the wound.  · Keep the wound clean and dry. Do not get the wound wet until you are told it is okay to do so. If the area gets wet, gently pat it dry with a clean cloth. Replace the wet bandage with a dry one.  · If a bandage was applied and it becomes wet or dirty, replace it. Otherwise, leave it in place for the first 24 hours.  · Once you can get the wound wet, you may shower as usual but do not soak the wound in water (no tub baths or swimming)  · Even with proper treatment, a puncture wound may become infected. Check the wound daily for signs of infection listed below.  Follow-up care  Follow up with your healthcare provider as  advised.   When to seek medical advice  Call your healthcare provider right away if any of these occur:  · Signs of infection, including:  ¨ Increasing redness or swelling around the wound  ¨ Increased warmth of the wound  ¨ Worsening pain  ¨ Red streaking lines away from the wound  ¨ Draining pus  · Fever of 100.4°F (38.ºC) or higher or as directed by your healthcare provider  · Wound changes colors  · Numbness around the wound  · Decreased movement around the injured area  Date Last Reviewed: 8/24/2015 © 2000-2016 [employer]. [address] [place] [address]. All rights reserved. This information is not intended as a substitute for professional medical care. Always follow your healthcare professional's instructions.          Your Scheduled Appointments     Apr 04, 2017  1:30 PM CDT   Established Patient Visit with CHINA Ulloa, AUDREY   [facility]    [address] [facility] [id number] [phone]            Apr 18, 2017  8:00 AM CDT   Fasting Lab with [facility]ORATORY, [facility]    [address] [facility] [address]               Aug 04, 2017 10:45 AM CDT   Established Patient Visit with HUSSEIN Farmer - Ophthalmology ([facility])    [address] [place] [address] [phone]              Smoking Cessation     If you would like to quit smoking:   You may be eligible for free services if you are a [place] resident and started smoking cigarettes before September 1, 1988.  Call the Smoking Cessation Trust (SCT) toll free at (809) 840-1637 or (827) 395-3081.   Call 4-800-QUIT-NOW if you do not meet the above criteria.             [facility] complies with applicable Federal civil rights laws and does not discriminate on the basis of race, color, national origin, age, disability, or sex.        Language  Assistance Services     ATTENTION: Language assistance services are available, free of charge. Please call [phone].      ATENCIÓN: Si habla español, tiene a brown disposición servicios gratuitos de asistencia lingüística. Llame al [phone].     CHÚ Ý: N?u b?n nói Ti?ng Vi?t, có các d?ch v? h? tr? ngôn ng? mi?n phí dành cho b?n. G?i s? [phone].

## 2017-03-24 NOTE — ED PROVIDER NOTES
SCRIBE #1 NOTE: I, Saba Bloom, am scribing for, and in the presence of, Ryan Lino MD. I have scribed the entire note.      History      Chief Complaint   Patient presents with    Puncture Wound     Pt reports nail in R elbow approx 1200. Pt reports pain to area and decreased mobility.       Review of patient's allergies indicates:  No Known Allergies     HPI   HPI    3/23/2017, 11:39 PM   History obtained from the patient      History of Present Illness: Shukri Rosales is a 56 y.o. male patient who presents to the Emergency Department for pain in R elbow which onset suddenly today around 12pm. Symptoms are constant and moderate in severity. Pt reports falling on nail which punctured his elbow. No mitigating or exacerbating factors reported. No associated sxs at this time. Patient denies any CP, SOB, N/V/D, fever, chills, and all other sxs at this time. No prior Tx. No further complaints or concerns at this time.         Arrival mode: Personal vehicle      PCP: Farhana Burnham MD       Past Medical History:  Past Medical History:   Diagnosis Date    Adrenal cortical adenoma     Anxiety     Arthritis     CKD (chronic kidney disease) stage 3, GFR 30-59 ml/min     Depression     Diabetes mellitus type II 2011    does not take    GERD (gastroesophageal reflux disease) 7/9/2013    Hypertension     Migraine headache 7/22/2013    Severe obesity with body mass index of 36.0 to 36.9        Past Surgical History:  Past Surgical History:   Procedure Laterality Date    BACK SURGERY      left arm exfix      NASAL SEPTUM SURGERY Bilateral     TONSILLECTOMY      URETEROSCOPY           Family History:  Family History   Problem Relation Age of Onset    Hypertension Mother     Diabetes Mother     Hypertension Sister     Diabetes Sister     Hypertension Brother     Diabetes Brother     Cancer Paternal Grandmother     Cancer Paternal Grandfather        Social History:  Social History      Social History Main Topics    Smoking status: Current Every Day Smoker     Packs/day: 1.00     Years: 48.00     Types: Cigarettes    Smokeless tobacco: Never Used      Comment: instructed not to smoke after midnight after midnight prior to surgery    Alcohol use No      Comment: Has cut back smoking    Drug use: No    Sexual activity: No       ROS   Review of Systems   Constitutional: Negative for chills and fever.   HENT: Negative for sore throat.    Respiratory: Negative for shortness of breath.    Cardiovascular: Negative for chest pain.   Gastrointestinal: Negative for diarrhea, nausea and vomiting.   Genitourinary: Negative for dysuria.   Musculoskeletal: Negative for back pain.        (+) R elbow pain   Skin: Negative for rash.        (+) R elbow puncture wound   Neurological: Negative for weakness.   Hematological: Does not bruise/bleed easily.   All other systems reviewed and are negative.      Physical Exam    Initial Vitals   BP Pulse Resp Temp SpO2   03/23/17 2250 03/23/17 2250 03/23/17 2250 03/23/17 2250 03/23/17 2250   192/126 90 20 97.2 °F (36.2 °C) 96 %      Physical Exam  Nursing Notes and Vital Signs Reviewed.  Constitutional: Patient is in no acute distress. Awake and alert. Well-developed and well-nourished.  Head: Atraumatic. Normocephalic.  Eyes: PERRL. EOM intact. Conjunctivae are not pale. No scleral icterus.  ENT: Mucous membranes are moist. Oropharynx is clear and symmetric.    Neck: Supple. Full ROM. No lymphadenopathy.  Cardiovascular: Regular rate. Regular rhythm. No murmurs, rubs, or gallops. Distal pulses are 2+ and symmetric.  Pulmonary/Chest: No respiratory distress. Clear to auscultation bilaterally. No wheezing, rales, or rhonchi.  Abdominal: Soft and non-distended.  There is no tenderness.  No rebound, guarding, or rigidity. Good bowel sounds.  Genitourinary: No CVA tenderness  RUE: has no evident deformity. negative for swelling. Positive for tenderness. ROM is normal.  "Cap refill distally is <2 seconds. Radial pulses are equal and 2+ bilaterally. No motor deficit. No distal sensory deficit.  Skin: Warm and dry. Wound to R elbow.  Neurological:  Alert, awake, and appropriate.  Normal speech.  No acute focal neurological deficits are appreciated.  Psychiatric: Normal affect. Good eye contact. Appropriate in content.    ED Course    Procedures  ED Vital Signs:  Vitals:    03/23/17 2250   BP: (!) 192/126   Pulse: 90   Resp: 20   Temp: 97.2 °F (36.2 °C)   TempSrc: Tympanic   SpO2: 96%   Weight: 112 kg (247 lb)   Height: 5' 11" (1.803 m)       Imaging Results:  Imaging Results         X-Ray Elbow Complete Right (Final result) Result time:  03/23/17 23:48:17    Final result by Herbert Huang MD (03/23/17 23:48:17)    Impression:         Negative.        Electronically signed by: HERBERT HUANG MD  Date:     03/23/17  Time:    23:48     Narrative:    Exam: XR ELBOW COMPLETE 3 VIEW RIGHT    Clinical History:    Acute right elbow pain. Initial encounter.    Findings:      No osseous, articular, or soft tissue abnormality demonstrated.                    The Emergency Provider reviewed the vital signs and test results, which are outlined above.    ED Discussion     12:03 AM: Reassessed pt at this time. Discussed with pt all pertinent ED information and results. Discussed pt dx and plan of tx. Gave pt all f/u and return to the ED instructions. All questions and concerns were addressed at this time. Pt expresses understanding of information and instructions, and is comfortable with plan to discharge. Pt is stable for discharge.    I discussed wound care precautions with patient and/or family/caretaker; specifically that all wounds have risk of infection despite efforts to cleanse and debride the wound; and there is a risk of an occult foreign body (and thus increased risk of infection) despite a negative examination.  I discussed with patient need to return for any signs of infection, " specifically redness, increased pain, fever, drainage of pus, or any concern, immediately.      ED Medication(s):  Medications - No data to display    Discharge Medication List as of 3/24/2017 12:00 AM      START taking these medications    Details   ciprofloxacin HCl (CIPRO) 500 MG tablet Take 1 tablet (500 mg total) by mouth 2 (two) times daily., Starting 3/23/2017, Until Discontinued, Print      hydrocodone-acetaminophen 5-325mg (NORCO) 5-325 mg per tablet Take 1 tablet by mouth every 4 (four) hours as needed for Pain., Starting 3/23/2017, Until Sun 4/2/17, Print                   Medical Decision Making    Medical Decision Making:   Clinical Tests:   Radiological Study: Ordered and Reviewed           Scribe Attestation:   Scribe #1: I performed the above scribed service and the documentation accurately describes the services I performed. I attest to the accuracy of the note.    Attending:   Physician Attestation Statement for Scribe #1: I, Ryan Lino MD, personally performed the services described in this documentation, as scribed by Saba Bloom, in my presence, and it is both accurate and complete.          Clinical Impression       ICD-10-CM ICD-9-CM   1. Puncture wound of right elbow, initial encounter S51.031A 881.01   2. Elbow injury, right, initial encounter S59.901A 959.3       Disposition:   Disposition: Discharged  Condition: Stable         Ryan Lino MD  03/24/17 0558

## 2017-03-24 NOTE — DISCHARGE INSTRUCTIONS
Puncture Wound       A puncture wound occurs when a pointed object pushes into the skin. It may go into the tissues below the skin, including fat and muscle. This type of wound is narrow and deep and can be hard to clean. Because of this, puncture wounds are at high risk for becoming infected.  X-rays may be done to check the wound for objects stuck under the skin. Your may also need a tetanus shot. This is given if you are not up to date on this vaccination and the object that caused the wound may lead to tetanus.  Home care  · Your healthcare provider may prescribe an antibiotic. This is to help prevent infection. Follow all instructions for taking this medicine. Take the medicine every day until it is gone or you are told to stop. You should not have any left over.  · The healthcare provider may prescribe medicines for pain. Follow instructions for taking them.  · You can take acetaminophen or ibuprofen for pain, unless you were given a different pain medicine to use.   · Follow the healthcare providers instructions on how to care for the wound.  · Keep the wound clean and dry. Do not get the wound wet until you are told it is okay to do so. If the area gets wet, gently pat it dry with a clean cloth. Replace the wet bandage with a dry one.  · If a bandage was applied and it becomes wet or dirty, replace it. Otherwise, leave it in place for the first 24 hours.  · Once you can get the wound wet, you may shower as usual but do not soak the wound in water (no tub baths or swimming)  · Even with proper treatment, a puncture wound may become infected. Check the wound daily for signs of infection listed below.  Follow-up care  Follow up with your healthcare provider as advised.   When to seek medical advice  Call your healthcare provider right away if any of these occur:  · Signs of infection, including:  ¨ Increasing redness or swelling around the wound  ¨ Increased warmth of the wound  ¨ Worsening pain  ¨ Red  streaking lines away from the wound  ¨ Draining pus  · Fever of 100.4°F (38.ºC) or higher or as directed by your healthcare provider  · Wound changes colors  · Numbness around the wound  · Decreased movement around the injured area  Date Last Reviewed: 8/24/2015  © 4647-9777 [employer]. [address] [place] [address], [place] [address]. All rights reserved. This information is not intended as a substitute for professional medical care. Always follow your healthcare professional's instructions.

## 2017-04-02 DIAGNOSIS — E11.40 DIABETIC NEUROPATHY, TYPE II DIABETES MELLITUS: ICD-10-CM

## 2017-04-03 RX ORDER — GABAPENTIN 300 MG/1
CAPSULE ORAL
Qty: 90 CAPSULE | Refills: 0 | Status: SHIPPED | OUTPATIENT
Start: 2017-04-03 | End: 2017-05-04 | Stop reason: SDUPTHER

## 2017-04-09 DIAGNOSIS — R06.09 DOE (DYSPNEA ON EXERTION): Chronic | ICD-10-CM

## 2017-04-09 DIAGNOSIS — I10 ESSENTIAL HYPERTENSION: ICD-10-CM

## 2017-04-09 RX ORDER — ISOSORBIDE MONONITRATE 60 MG/1
TABLET, EXTENDED RELEASE ORAL
Qty: 30 TABLET | Refills: 0 | Status: SHIPPED | OUTPATIENT
Start: 2017-04-09 | End: 2017-05-07 | Stop reason: SDUPTHER

## 2017-04-27 ENCOUNTER — TELEPHONE (OUTPATIENT)
Dept: INTERNAL MEDICINE | Facility: CLINIC | Age: 57
End: 2017-04-27

## 2017-04-27 NOTE — TELEPHONE ENCOUNTER
----- Message from Ruth Juarez sent at 4/27/2017  4:50 PM CDT -----  Contact: pt  Call pt regarding getting a referral to see a gastro doctor.   ..[phone]

## 2017-05-01 ENCOUNTER — TELEPHONE (OUTPATIENT)
Dept: FAMILY MEDICINE | Facility: CLINIC | Age: 57
End: 2017-05-01

## 2017-05-01 NOTE — TELEPHONE ENCOUNTER
----- Message from Ladan Tobin sent at 5/1/2017 10:33 AM CDT -----  Contact: pt  Pt returning nurse's call.

## 2017-05-01 NOTE — TELEPHONE ENCOUNTER
Pt would like to see a gastro dr for heartburn, pt stated he has tried otc medication as well as taking baking soda 3 times a day.

## 2017-05-04 ENCOUNTER — OFFICE VISIT (OUTPATIENT)
Dept: FAMILY MEDICINE | Facility: CLINIC | Age: 57
End: 2017-05-04
Payer: MEDICAID

## 2017-05-04 ENCOUNTER — HOSPITAL ENCOUNTER (OUTPATIENT)
Dept: RADIOLOGY | Facility: HOSPITAL | Age: 57
Discharge: HOME | End: 2017-05-04
Attending: FAMILY MEDICINE
Payer: MEDICAID

## 2017-05-04 VITALS
RESPIRATION RATE: 14 BRPM | SYSTOLIC BLOOD PRESSURE: 130 MMHG | DIASTOLIC BLOOD PRESSURE: 80 MMHG | BODY MASS INDEX: 33.98 KG/M2 | OXYGEN SATURATION: 96 % | HEIGHT: 71 IN | HEART RATE: 64 BPM | WEIGHT: 242.75 LBS | TEMPERATURE: 98 F

## 2017-05-04 DIAGNOSIS — R19.5 LOOSE STOOLS: ICD-10-CM

## 2017-05-04 DIAGNOSIS — E66.9 OBESITY (BMI 30-39.9): ICD-10-CM

## 2017-05-04 DIAGNOSIS — Z72.0 TOBACCO ABUSE: ICD-10-CM

## 2017-05-04 DIAGNOSIS — K21.9 GASTROESOPHAGEAL REFLUX DISEASE, ESOPHAGITIS PRESENCE NOT SPECIFIED: Primary | ICD-10-CM

## 2017-05-04 DIAGNOSIS — R14.0 ABDOMINAL DISTENTION: ICD-10-CM

## 2017-05-04 DIAGNOSIS — K57.30 DIVERTICULOSIS OF LARGE INTESTINE WITHOUT HEMORRHAGE: ICD-10-CM

## 2017-05-04 DIAGNOSIS — E11.40 TYPE 2 DIABETES MELLITUS WITH DIABETIC NEUROPATHY, UNSPECIFIED LONG TERM INSULIN USE STATUS: ICD-10-CM

## 2017-05-04 PROCEDURE — 99999 PR PBB SHADOW E&M-EST. PATIENT-LVL III: CPT | Mod: PBBFAC,,, | Performed by: FAMILY MEDICINE

## 2017-05-04 PROCEDURE — 74000 XR ABDOMEN AP 1 VIEW: CPT | Mod: 26,,, | Performed by: RADIOLOGY

## 2017-05-04 PROCEDURE — 99213 OFFICE O/P EST LOW 20 MIN: CPT | Mod: PBBFAC,25,PO | Performed by: FAMILY MEDICINE

## 2017-05-04 PROCEDURE — 74000 XR ABDOMEN AP 1 VIEW: CPT | Mod: TC,PO

## 2017-05-04 PROCEDURE — 99214 OFFICE O/P EST MOD 30 MIN: CPT | Mod: S$PBB,,, | Performed by: FAMILY MEDICINE

## 2017-05-04 RX ORDER — PANTOPRAZOLE SODIUM 40 MG/1
40 TABLET, DELAYED RELEASE ORAL DAILY
Qty: 30 TABLET | Refills: 0 | Status: SHIPPED | OUTPATIENT
Start: 2017-05-04 | End: 2017-05-24 | Stop reason: SDUPTHER

## 2017-05-04 RX ORDER — GABAPENTIN 300 MG/1
300 CAPSULE ORAL 3 TIMES DAILY
Qty: 90 CAPSULE | Refills: 2 | Status: SHIPPED | OUTPATIENT
Start: 2017-05-04 | End: 2017-08-07 | Stop reason: SDUPTHER

## 2017-05-04 NOTE — MR AVS SNAPSHOT
[facility]  [address]  [facility] [address]  Phone: [phone]  Fax: [phone]                  Shukri Rosales   2017 10:20 AM   Office Visit    Description:  Male : 1960   Provider:  Farhana Burnham MD   Department:  [facility]           Reason for Visit     Heartburn     Diabetes           Diagnoses this Visit        Comments    Gastroesophageal reflux disease, esophagitis presence not specified    -  Primary     Loose stools         Abdominal distention         Type 2 diabetes mellitus with diabetic neuropathy, unspecified long term insulin use status         Obesity (BMI 30-39.9)         Tobacco abuse                To Do List           Future Appointments        Provider Department Dept Phone    2017 11:00 AM DSSH XR1 [facility] [phone]    2017 11:20 AM LABORATORY, [facility] [facility]     2017 10:45 AM MNatalya Caruso, HUSSEIN [facility] - Ophthalmology [phone]      Goals (5 Years of Data)     None       These Medications        Disp Refills Start End    gabapentin (NEURONTIN) 300 MG capsule 90 capsule 2 2017     Take 1 capsule (300 mg total) by mouth 3 (three) times daily. - Oral    [facility] [id number] [facility] [address] AT [facility] [address] Ph #: [phone]       pantoprazole (PROTONIX) 40 MG tablet 30 tablet 0 2017    Take 1 tablet (40 mg total) by mouth once daily. - Oral    [facility] [id number] [place], [place] - [address] AT [facility] OF [employer] [address] Ph #: [phone]         [facility]     [facility] -  Assistance  Unless otherwise directed by your provider, please contact [facility], our nurse care line that is available for / assistance.     Registered nurses in the [facility] provide: appointment  scheduling, clinical advisement, health education, and other advisory services.  Call: [phone] (toll free)               Medications           Message regarding Medications     Verify the changes and/or additions to your medication regime listed below are the same as discussed with your clinician today.  If any of these changes or additions are incorrect, please notify your healthcare provider.        START taking these NEW medications        Refills    pantoprazole (PROTONIX) 40 MG tablet 0    Sig: Take 1 tablet (40 mg total) by mouth once daily.    Class: Normal    Route: Oral      CHANGE how you are taking these medications     Start Taking Instead of    gabapentin (NEURONTIN) 300 MG capsule gabapentin (NEURONTIN) 300 MG capsule    Dosage:  Take 1 capsule (300 mg total) by mouth 3 (three) times daily. Dosage:  TAKE 1 CAPSULE BY MOUTH THREE TIMES DAILY    Reason for Change:  Reorder            Verify that the below list of medications is an accurate representation of the medications you are currently taking.  If none reported, the list may be blank. If incorrect, please contact your healthcare provider. Carry this list with you in case of emergency.           Current Medications     amlodipine (NORVASC) 5 MG tablet Take 1 tablet (5 mg total) by mouth once daily.    blood sugar diagnostic Strp 1 each by Misc.(Non-Drug; Combo Route) route 3 (three) times daily before meals. For One Touch Verio    blood-glucose meter kit One Touch Verio Meter    clonazePAM (KLONOPIN) 0.5 MG tablet TAKE ONE TABLET BY MOUTH TWICE DAILY AS NEEDED MUST LAST 30 DAYS    cloNIDine (CATAPRES) 0.3 MG tablet Take 1 tablet (0.3 mg total) by mouth 3 (three) times daily.    cyclobenzaprine (FLEXERIL) 10 MG tablet Take 1 tablet (10 mg total) by mouth 3 (three) times daily as needed (Muscle pain/soreness).    dulaglutide (TRULICITY) 1.5 mg/0.5 mL PnIj Inject 1.5 mg into the skin every 7 days.    gabapentin (NEURONTIN) 300 MG capsule Take 1  "capsule (300 mg total) by mouth 3 (three) times daily.    insulin glargine (LANTUS) 100 unit/mL injection Inject 45 Units into the skin every evening.    insulin syringe-needle U-100 1 mL 31 gauge x 5/16 Syrg 1 Syringe by Misc.(Non-Drug; Combo Route) route once daily.    isosorbide mononitrate (IMDUR) 60 MG 24 hr tablet TAKE 1 TABLET BY MOUTH EVERY DAY    lancets (ONETOUCH DELICA LANCETS) 33 gauge Misc 1 lancet by Misc.(Non-Drug; Combo Route) route 3 (three) times daily.    lancets 33 gauge Misc 1 lancet by Misc.(Non-Drug; Combo Route) route 2 (two) times daily. Freestyle freedom lite    lisinopril-hydrochlorothiazide (PRINZIDE,ZESTORETIC) 20-25 mg Tab TAKE 1 TABLET BY MOUTH EVERY MORNING    lurasidone (LATUDA) 60 mg Tab tablet Take 60 mg by mouth once daily.    metformin (GLUCOPHAGE) 500 MG tablet Take 1 tablet (500 mg total) by mouth 2 (two) times daily with meals.    metoprolol succinate (TOPROL-XL) 50 MG 24 hr tablet TAKE 1 TABLET(50 MG) BY MOUTH EVERY DAY    pen needle, diabetic 32 gauge x 5/32" Ndle 1 each by Misc.(Non-Drug; Combo Route) route once daily.    polyethylene glycol (GLYCOLAX) 17 gram/dose powder Take 17 g by mouth once daily.    pravastatin (PRAVACHOL) 20 MG tablet Take 1 tablet (20 mg total) by mouth every evening.    quetiapine (SEROQUEL) 200 MG Tab Take 200 mg by mouth once daily.     quetiapine (SEROQUEL) 300 MG Tab Take 1 tablet (300 mg total) by mouth once daily.    sertraline (ZOLOFT) 100 MG tablet Take 2 tablets (200 mg total) by mouth once daily.    trazodone (DESYREL) 150 MG tablet Take 2 tabs po every bedtime    trazodone (DESYREL) 300 MG tablet TK 1 T PO  QHS    aspirin 81 MG Chew Take 1 tablet (81 mg total) by mouth once daily.    buPROPion (WELLBUTRIN SR) 150 MG TBSR 12 hr tablet TK 1 T PO  BID    ciprofloxacin HCl (CIPRO) 500 MG tablet Take 1 tablet (500 mg total) by mouth 2 (two) times daily.    insulin aspart (NOVOLOG) 100 unit/mL injection Inject 20 Units into the skin 3 (three) " "times daily before meals.    pantoprazole (PROTONIX) 40 MG tablet Take 1 tablet (40 mg total) by mouth once daily.    triamcinolone acetonide 0.1% (KENALOG) 0.1 % cream Apply topically 2 (two) times daily.           Clinical Reference Information           Your Vitals Were     BP Pulse Temp Resp Height Weight    130/80 64 97.8 °F (36.6 °C) (Temporal) 14 5' 11" (1.803 m) 110.1 kg (242 lb 12.3 oz)    SpO2 BMI             96% 33.86 kg/m2         Blood Pressure          Most Recent Value    BP  130/80      Allergies as of 5/4/2017     No Known Allergies      Immunizations Administered on Date of Encounter - 5/4/2017     None      Orders Placed During Today's Visit     Future Labs/Procedures Expected by Expires    Amylase  5/4/2017 7/3/2018    Basic metabolic panel  5/4/2017 7/3/2018    Comprehensive metabolic panel  5/4/2017 7/3/2018    H. PYLORI ANTIBODY, IGG  5/4/2017 7/3/2018    Hemoglobin A1c  5/4/2017 7/3/2018    Lipase  5/4/2017 7/3/2018    X-Ray Abdomen AP 1 View  5/4/2017 5/4/2018      Smoking Cessation     If you would like to quit smoking:   You may be eligible for free services if you are a [place] resident and started smoking cigarettes before September 1, 1988.  Call the Smoking Cessation Trust ([facility]) toll free at ([phone] or [phone] [phone].   Call 1-800-QUIT-NOW if you do not meet the above criteria.   Contact us via email: [email or web address]   View our website for more information: [email or web address]        Language Assistance Services     ATTENTION: Language assistance services are available, free of charge. Please call [phone].      ATENCIÓN: Si habla español, tiene a brown disposición servicios gratuitos de asistencia lingüística. Llame al [phone].     KALI Ý: N?u b?n nói Ti?ng Vi?t, có các d?ch v? h? tr? ngôn ng? mi?n phí dành cho b?n. G?i s? [phone].         [facility] complies with applicable Federal civil rights laws and does not " discriminate on the basis of race, color, national origin, age, disability, or sex.

## 2017-05-04 NOTE — PROGRESS NOTES
Subjective:       Patient ID: Shukri Rosales is a 56 y.o. male.    Chief Complaint: Heartburn and Diabetes    Heartburn   He complains of abdominal pain, belching and heartburn. He reports no choking, no dysphagia, no hoarse voice, no nausea or no sore throat. This is a chronic problem. The current episode started more than 1 year ago. The problem occurs constantly. The problem has been gradually worsening. The symptoms are aggravated by certain foods and smoking (overeating). Pertinent negatives include no fatigue. Risk factors include smoking/tobacco exposure and obesity. He has tried a PPI for the symptoms. Past procedures do not include an EGD.   Patient notes increased symptoms since his PPI was discontinued. He is taking baking soda 4 times daily on average for relief. No NSAID use is reported. He has appreciated watery stools of late. No rectal bleeding or melena.    Diabetes  Patient has been followed by diabetes management with poor glycemic control. Most recent A1c monitoring in January 2017 was measured at 8.8. He admits to dietary indiscretion. Current regimen consists of Metformin 500mg BID, Lantus 45 units daily and Novolog 20 units TID. No home glucose log is available for review at this time. AM fasting glucose is reported from 100-115. He often checks glucose levels following lunch time (within 30 minutes) and levels are typically 250. He does check his glucose prior to bedtime on occasion which has ranged anywhere from 120-200. No symptoms of hyper or hypoglycemia are reported. He has never seen Endocrinology.     Review of Systems   Constitutional: Negative for appetite change, chills, fatigue and fever.   HENT: Negative for hoarse voice, sore throat and trouble swallowing.    Eyes: Negative for visual disturbance.   Respiratory: Negative for choking and shortness of breath.    Cardiovascular: Negative for palpitations and leg swelling.   Gastrointestinal: Positive for abdominal pain,  "diarrhea and heartburn. Negative for blood in stool, constipation, dysphagia, nausea and vomiting.   Endocrine: Negative for polydipsia, polyphagia and polyuria.   Genitourinary: Negative for decreased urine volume and difficulty urinating.   Musculoskeletal: Positive for arthralgias. Negative for myalgias.   Skin: Negative for rash.   Neurological: Negative for dizziness, weakness and headaches.   Psychiatric/Behavioral: Negative for sleep disturbance. The patient is not nervous/anxious.        Objective:   /80  Pulse 64  Temp 97.8 °F (36.6 °C) (Temporal)   Resp 14  Ht 5' 11" (1.803 m)  Wt 110.1 kg (242 lb 12.3 oz)  SpO2 96%  BMI 33.86 kg/m2  Physical Exam   Constitutional: He is oriented to person, place, and time. He appears well-developed. No distress.   Obese, non-toxic   HENT:   Head: Normocephalic and atraumatic.   Right Ear: External ear normal.   Left Ear: External ear normal.   Nose: Nose normal.   Mouth/Throat: Oropharynx is clear and moist.   Eyes: Conjunctivae and EOM are normal. Pupils are equal, round, and reactive to light.   Neck: Normal range of motion. Neck supple.   Cardiovascular: Normal rate, regular rhythm and normal heart sounds.    Pulmonary/Chest: Effort normal and breath sounds normal.   Abdominal: Bowel sounds are normal. He exhibits distension. He exhibits no fluid wave. There is generalized tenderness.   Musculoskeletal: He exhibits no edema.   Neurological: He is alert and oriented to person, place, and time.   Skin: Skin is warm and dry. No rash noted. He is not diaphoretic. No pallor.   Psychiatric: He has a normal mood and affect. His behavior is normal.       Assessment:       1. Gastroesophageal reflux disease, esophagitis presence not specified    2. Loose stools    3. Abdominal distention    4. Diverticulosis of large intestine without hemorrhage    5. Type 2 diabetes mellitus with diabetic neuropathy, unspecified long term insulin use status    6. Obesity (BMI " 30-39.9)    7. Tobacco abuse        Plan:   Gastroesophageal reflux disease, esophagitis presence not specified  -     H. PYLORI ANTIBODY, IGG; Future; Expected date: 5/4/17  -     Comprehensive metabolic panel; Future; Expected date: 5/4/17  -     Amylase; Future; Expected date: 5/4/17  -     Lipase; Future; Expected date: 5/4/17  -     pantoprazole (PROTONIX) 40 MG tablet; Take 1 tablet (40 mg total) by mouth once daily.  Dispense: 30 tablet; Refill: 0    Loose stools  -     X-Ray Abdomen AP 1 View; Future; Expected date: 5/4/17    Abdominal distention  -     X-Ray Abdomen AP 1 View; Future; Expected date: 5/4/17    Diverticulosis of large intestine without hemorrhage  -     CBC auto differential; Future; Expected date: 5/5/17    Type 2 diabetes mellitus with diabetic neuropathy, unspecified long term insulin use status  -     gabapentin (NEURONTIN) 300 MG capsule; Take 1 capsule (300 mg total) by mouth 3 (three) times daily.  Dispense: 90 capsule; Refill: 2  -     Hemoglobin A1c; Future; Expected date: 5/4/17  -     Basic metabolic panel; Future; Expected date: 5/4/17    Obesity (BMI 30-39.9)  Weight loss efforts remain encouraged.    Tobacco abuse  Smoking cessation is advised.    Will assess labs and imaging as above. Low threshold for Endocrine evaluation due to poor glycemic control and issues with compliance both to medications and with regard to diet.  Proceed to ER for acute worsening of abdominal symptoms.

## 2017-05-05 DIAGNOSIS — E11.40 TYPE 2 DIABETES MELLITUS WITH DIABETIC NEUROPATHY, UNSPECIFIED LONG TERM INSULIN USE STATUS: ICD-10-CM

## 2017-05-05 DIAGNOSIS — K57.32 DIVERTICULITIS OF LARGE INTESTINE WITHOUT PERFORATION OR ABSCESS WITHOUT BLEEDING: Primary | ICD-10-CM

## 2017-05-05 RX ORDER — AMOXICILLIN AND CLAVULANATE POTASSIUM 875; 125 MG/1; MG/1
1 TABLET, FILM COATED ORAL EVERY 12 HOURS
Qty: 20 TABLET | Refills: 0 | Status: SHIPPED | OUTPATIENT
Start: 2017-05-05 | End: 2017-05-24 | Stop reason: ALTCHOICE

## 2017-05-07 DIAGNOSIS — I10 ESSENTIAL HYPERTENSION: ICD-10-CM

## 2017-05-07 DIAGNOSIS — R06.09 DOE (DYSPNEA ON EXERTION): Chronic | ICD-10-CM

## 2017-05-08 ENCOUNTER — HOSPITAL ENCOUNTER (EMERGENCY)
Facility: HOSPITAL | Age: 57
Discharge: HOME | End: 2017-05-09
Payer: MEDICAID

## 2017-05-08 DIAGNOSIS — M25.569 KNEE PAIN, ACUTE: ICD-10-CM

## 2017-05-08 DIAGNOSIS — S89.91XA KNEE INJURY, RIGHT, INITIAL ENCOUNTER: ICD-10-CM

## 2017-05-08 DIAGNOSIS — S83.91XA SPRAIN OF RIGHT KNEE, UNSPECIFIED LIGAMENT, INITIAL ENCOUNTER: Primary | ICD-10-CM

## 2017-05-08 PROCEDURE — 99283 EMERGENCY DEPT VISIT LOW MDM: CPT

## 2017-05-08 RX ORDER — HYDROCODONE BITARTRATE AND ACETAMINOPHEN 10; 325 MG/1; MG/1
1 TABLET ORAL
Status: COMPLETED | OUTPATIENT
Start: 2017-05-09 | End: 2017-05-09

## 2017-05-08 RX ORDER — ISOSORBIDE MONONITRATE 60 MG/1
TABLET, EXTENDED RELEASE ORAL
Qty: 30 TABLET | Refills: 0 | Status: SHIPPED | OUTPATIENT
Start: 2017-05-08 | End: 2017-07-12 | Stop reason: SDUPTHER

## 2017-05-08 NOTE — ED AVS SNAPSHOT
[facility]  [address] [facility] [address]               Shukri Rosales   2017 11:48 PM   ED    Description:  Male : 1960   Department:  [facility]            Your Care was Coordinated By:     Provider Role From To    VITALY Marks Physician Assistant 17 2251 --      Reason for Visit     Fall           Diagnoses this Visit        Comments    Sprain of right knee, unspecified ligament, initial encounter    -  Primary     Knee pain, acute         Knee injury, right, initial encounter           ED Disposition     None           To Do List           Follow-up Information     Follow up with Farhana Burnham MD In 2 days.    Specialty:  Family Medicine    Why:  As needed, If symptoms worsen return to ED     Contact information:    [address]  [facility] [address] [phone]         These Medications        Disp Refills Start End    tramadol (ULTRAM) 50 mg tablet 15 tablet 0 2017    Take 1 tablet (50 mg total) by mouth every 6 (six) hours as needed. - Oral    Pharmacy: [id number] - [facility] [address] AT [facility] [address] Ph #: [phone]         [facility]     [facility] - 24 Assistance  Unless otherwise directed by your provider, please contact [facility], our nurse care line that is available for / assistance.     Registered nurses in the [facility] provide: appointment scheduling, clinical advisement, health education, and other advisory services.  Call: [phone] (toll free)               Medications           Message regarding Medications     Verify the changes and/or additions to your medication regime listed below are the same as discussed with your clinician today.  If any of these changes or additions are incorrect, please notify your healthcare provider.        START  taking these NEW medications        Refills    tramadol (ULTRAM) 50 mg tablet 0    Sig: Take 1 tablet (50 mg total) by mouth every 6 (six) hours as needed.    Class: Print    Route: Oral      These medications were administered today        Dose Freq    hydrocodone-acetaminophen 10-325mg per tablet 1 tablet 1 tablet ED 1 Time    Starting on: 5/9/2017    Sig: Take 1 tablet by mouth ED 1 Time.    Class: Normal    Route: Oral    Cosign for Ordering: Required by Rakan Pham Jr., MD           Verify that the below list of medications is an accurate representation of the medications you are currently taking.  If none reported, the list may be blank. If incorrect, please contact your healthcare provider. Carry this list with you in case of emergency.           Current Medications     amlodipine (NORVASC) 5 MG tablet Take 1 tablet (5 mg total) by mouth once daily.    amoxicillin-clavulanate 875-125mg (AUGMENTIN) 875-125 mg per tablet Take 1 tablet by mouth every 12 (twelve) hours.    aspirin 81 MG Chew Take 1 tablet (81 mg total) by mouth once daily.    blood sugar diagnostic Strp 1 each by Misc.(Non-Drug; Combo Route) route 3 (three) times daily before meals. For One Touch Verio    blood-glucose meter kit One Touch Verio Meter    buPROPion (WELLBUTRIN SR) 150 MG TBSR 12 hr tablet TK 1 T PO  BID    ciprofloxacin HCl (CIPRO) 500 MG tablet Take 1 tablet (500 mg total) by mouth 2 (two) times daily.    clonazePAM (KLONOPIN) 0.5 MG tablet TAKE ONE TABLET BY MOUTH TWICE DAILY AS NEEDED MUST LAST 30 DAYS    cloNIDine (CATAPRES) 0.3 MG tablet Take 1 tablet (0.3 mg total) by mouth 3 (three) times daily.    cyclobenzaprine (FLEXERIL) 10 MG tablet Take 1 tablet (10 mg total) by mouth 3 (three) times daily as needed (Muscle pain/soreness).    dulaglutide (TRULICITY) 1.5 mg/0.5 mL PnIj Inject 1.5 mg into the skin every 7 days.    gabapentin (NEURONTIN) 300 MG capsule Take 1 capsule (300 mg total) by mouth 3 (three) times daily.     "hydrocodone-acetaminophen 10-325mg per tablet 1 tablet Take 1 tablet by mouth ED 1 Time.    insulin aspart (NOVOLOG) 100 unit/mL injection Inject 20 Units into the skin 3 (three) times daily before meals.    insulin glargine (LANTUS) 100 unit/mL injection Inject 45 Units into the skin every evening.    insulin syringe-needle U-100 1 mL 31 gauge x 5/16 Syrg 1 Syringe by Misc.(Non-Drug; Combo Route) route once daily.    isosorbide mononitrate (IMDUR) 60 MG 24 hr tablet TAKE 1 TABLET BY MOUTH EVERY DAY    lancets (ONETOUCH DELICA LANCETS) 33 gauge Misc 1 lancet by Misc.(Non-Drug; Combo Route) route 3 (three) times daily.    lancets 33 gauge Misc 1 lancet by Misc.(Non-Drug; Combo Route) route 2 (two) times daily. Freestyle freedom lite    lisinopril-hydrochlorothiazide (PRINZIDE,ZESTORETIC) 20-25 mg Tab TAKE 1 TABLET BY MOUTH EVERY MORNING    lurasidone (LATUDA) 60 mg Tab tablet Take 60 mg by mouth once daily.    metformin (GLUCOPHAGE) 500 MG tablet Take 1 tablet (500 mg total) by mouth 2 (two) times daily with meals.    metoprolol succinate (TOPROL-XL) 50 MG 24 hr tablet TAKE 1 TABLET(50 MG) BY MOUTH EVERY DAY    pantoprazole (PROTONIX) 40 MG tablet Take 1 tablet (40 mg total) by mouth once daily.    pen needle, diabetic 32 gauge x 5/32" Ndle 1 each by Misc.(Non-Drug; Combo Route) route once daily.    polyethylene glycol (GLYCOLAX) 17 gram/dose powder Take 17 g by mouth once daily.    pravastatin (PRAVACHOL) 20 MG tablet Take 1 tablet (20 mg total) by mouth every evening.    quetiapine (SEROQUEL) 200 MG Tab Take 200 mg by mouth once daily.     quetiapine (SEROQUEL) 300 MG Tab Take 1 tablet (300 mg total) by mouth once daily.    sertraline (ZOLOFT) 100 MG tablet Take 2 tablets (200 mg total) by mouth once daily.    tramadol (ULTRAM) 50 mg tablet Take 1 tablet (50 mg total) by mouth every 6 (six) hours as needed.    trazodone (DESYREL) 150 MG tablet Take 2 tabs po every bedtime    trazodone (DESYREL) 300 MG tablet TK 1 T " "PO  QHS    triamcinolone acetonide 0.1% (KENALOG) 0.1 % cream Apply topically 2 (two) times daily.           Clinical Reference Information           Your Vitals Were     BP Pulse Temp Resp Height Weight    171/99 (BP Location: Right arm, Patient Position: Sitting) 99 99.5 °F (37.5 °C) (Oral) 20 5' 11" (1.803 m) 111.6 kg (246 lb)    SpO2 BMI             94% 34.31 kg/m2         Allergies as of 5/9/2017     No Known Allergies      Immunizations Administered on Date of Encounter - 5/9/2017     None      ED Micro, Lab, POCT     None      ED Imaging Orders     Start Ordered       Status Ordering Provider    05/08/17 5742 05/08/17 2471  X-Ray Knee 3 View Right  1 time imaging      In process         Discharge Instructions         Knee Sprain    A sprain is an injury to the ligaments or capsule that holds a joint together. There are no broken bones. Most sprains take 3 to 6 weeks to heal. If it a severe sprain where the ligament is completely torn, it can take months to recover.  Most knee sprains are treated with a splint, knee immobilizer brace, or elastic wrap for support. Severe sprains may require surgery.  Home care  · Stay off the injured leg as much as possible until you can walk on it without pain. If you have a lot of pain with walking, crutches or a walker may be prescribed. (These can be rented or purchased at many pharmacies and surgical or orthopedic supply stores). Follow your healthcare provider's advice about when to begin putting weight on that leg.  · Keep your leg elevated to reduce pain and swelling. When sleeping, place a pillow under the injured leg. When sitting, support the injured leg so it is level with your waist. This is very important during the first 48 hours.  · Apply an ice pack over the injured area for 15 to 20 minutes every 3 to 6 hours. You should do this for the first 24 to 48 hours. You can make an ice pack by filling a plastic bag that seals at the top with ice cubes and then " wrapping it with a thin towel. Continue to use ice packs for relief of pain and swelling as needed. As the ice melts, be careful to avoid getting your wrap, splint, or cast wet. After 48 hours, apply heat (warm shower or warm bath) for 15 to 20 minutes several times a day, or alternate ice and heat. You can place the ice pack directly over the splint. If you have to wear a hook-and-loop knee brace, you can open it to apply the ice pack, or heat, directly to the knee. Never put ice directly on the skin. Always wrap the ice in a towel or other type of cloth.  · You may use over-the-counter pain medicine to control pain, unless another pain medicine was prescribed.If you have chronic liver or kidney disease or ever had a stomach ulcer or GI bleeding, talk with your healthcare provider before using these medicines.  · If you were given a splint, keep it completely dry at all times. Bathe with your splint out of the water, protected with 2 large plastic bags, rubber-banded at the top end. If a fiberglass splint gets wet, you can dry it with a hair dryer. If you have a hook-and-loop knee brace, you can remove this to bathe, unless told otherwise.  Follow-up care  Follow up with your doctor as advised. Any X-rays you had today dont show any broken bones, breaks, or fractures. Sometimes fractures dont show up on the first X-ray. Bruises and sprains can sometimes hurt as much as a fracture. These injuries can take time to heal completely. If your symptoms dont improve or they get worse, talk with your doctor. You may need a repeat X-ray. If X-rays were taken, you will be told of any new findings that may affect your care.  Call 911  Call 911 if you have:  ·  Shortness of breath  ·  Chest pain  When to seek medical advice  Call your healthcare provider right away if any of these occur:  · The splint or knee immobilizer brace becomes wet or soft  · The fiberglass cast or splint remains wet for more than 24 hours  · Pain or  swelling increases  · The injured leg or toes become cold, blue, numb, or tingly  Date Last Reviewed: 11/20/2015  © 6366-4288 [employer]. [address] [place] [address], [place] [address]. All rights reserved. This information is not intended as a substitute for professional medical care. Always follow your healthcare professional's instructions.          Your Scheduled Appointments     May 10, 2017  8:00 AM CDT   Established Patient Visit with Farhana Burnham MD   [facility] ([facility])    [address] [facility] [address] [phone]            May 10, 2017  9:30 AM CDT   Non-Fasting Lab with [facility], [facility] ([facility])    [address] [facility] [address]               May 10, 2017 10:00 AM CDT   CONSULT with Tasha Zuniga MD   [facility] - Endocrinology ([facility])    [address] [place] [address].  4th Floor  [facility] [address] [phone]            Aug 04, 2017 10:45 AM CDT   Established Patient Visit with HUSSEIN Farmer - Ophthalmology ([facility])    [address] [facility] [address] [phone]              Smoking Cessation     If you would like to quit smoking:   You may be eligible for free services if you are a [place] resident and started smoking cigarettes before September 1, 1988.  Call the Smoking Cessation Trust (SCT) toll free at (918) 701-7932 or (577) 678-1823.   Call 2-800-QUIT-NOW if you do not meet the above criteria.   Contact us via email: [email or web address]   View our website for more information: [email or web address]         [facility] complies with applicable Federal civil rights laws and does not discriminate on the basis of race, color, national origin, age, disability, or sex.        Language Assistance Services     ATTENTION: Language assistance services are available,  free of charge. Please call [phone].      ATENCIÓN: Si habla español, tiene a brown disposición servicios gratuitos de asistencia lingüística. Llame al [phone].     CHÚ Ý: N?u b?n nói Ti?ng Vi?t, có các d?ch v? h? tr? ngôn ng? mi?n phí dành cho b?n. G?i s? [phone].

## 2017-05-09 VITALS
WEIGHT: 246 LBS | TEMPERATURE: 100 F | SYSTOLIC BLOOD PRESSURE: 149 MMHG | OXYGEN SATURATION: 100 % | DIASTOLIC BLOOD PRESSURE: 95 MMHG | HEIGHT: 71 IN | RESPIRATION RATE: 20 BRPM | BODY MASS INDEX: 34.44 KG/M2 | HEART RATE: 73 BPM

## 2017-05-09 PROCEDURE — 25000003 PHARM REV CODE 250: Performed by: PHYSICIAN ASSISTANT

## 2017-05-09 RX ORDER — TRAMADOL HYDROCHLORIDE 50 MG/1
50 TABLET ORAL EVERY 6 HOURS PRN
Qty: 15 TABLET | Refills: 0 | Status: SHIPPED | OUTPATIENT
Start: 2017-05-09 | End: 2017-05-19

## 2017-05-09 RX ADMIN — HYDROCODONE BITARTRATE AND ACETAMINOPHEN 1 TABLET: 10; 325 TABLET ORAL at 01:05

## 2017-05-09 NOTE — ED PROVIDER NOTES
Encounter Date: 5/8/2017       History     Chief Complaint   Patient presents with    Fall     pt reports he fall off a ladder yesterday and now R leg is swollen and hurting     Review of patient's allergies indicates:  No Known Allergies  HPI Comments: Mr. Rosales is a 56 year old male that reports to the ED for right knee pain after falling off a ladder yesterday. Pt says ladder was a 6 foot ladder and he was only half way up, but lost his balance causing him to twist his knee. Pt reports having knee surgery for an ACL repair to the affected knee one year ago.  Pt said he had pain immediately following, but sxs quickly improved. Pt said he had no pain the rest of the evening and all of today until about 6 pm.  Pt says pain has been constant since 6 pm with no alleviating factors including ibuprofen. Pain is exacerbated with palpation and movement of affected knee.  Pt denies any head injuries, LOC, color change, decrease sensation, or any other associated sxs.     The history is provided by the patient.     Past Medical History:   Diagnosis Date    Adrenal cortical adenoma     Anxiety     Arthritis     CKD (chronic kidney disease) stage 3, GFR 30-59 ml/min     Depression     Diabetes mellitus type II 2011    does not take    GERD (gastroesophageal reflux disease) 7/9/2013    Hypertension     Migraine headache 7/22/2013    Severe obesity with body mass index of 36.0 to 36.9      Past Surgical History:   Procedure Laterality Date    BACK SURGERY      left arm exfix      NASAL SEPTUM SURGERY Bilateral     TONSILLECTOMY      URETEROSCOPY       Family History   Problem Relation Age of Onset    Hypertension Mother     Diabetes Mother     Hypertension Sister     Diabetes Sister     Hypertension Brother     Diabetes Brother     Cancer Paternal Grandmother     Cancer Paternal Grandfather      Social History   Substance Use Topics    Smoking status: Current Every Day Smoker     Packs/day: 1.00      Years: 48.00     Types: Cigarettes    Smokeless tobacco: Never Used      Comment: instructed not to smoke after midnight after midnight prior to surgery    Alcohol use No      Comment: Has cut back smoking     Review of Systems   Constitutional: Negative for fever.   HENT: Negative for sore throat.    Respiratory: Negative for shortness of breath.    Cardiovascular: Negative for chest pain.   Gastrointestinal: Negative for nausea.   Genitourinary: Negative for dysuria.   Musculoskeletal: Positive for arthralgias (right knee pain) and gait problem. Negative for back pain.   Skin: Negative for color change and rash.   Neurological: Negative for weakness.   Hematological: Does not bruise/bleed easily.   All other systems reviewed and are negative.      Physical Exam   Initial Vitals   BP Pulse Resp Temp SpO2   05/08/17 2227 05/08/17 2227 05/08/17 2227 05/08/17 2227 05/08/17 2227   171/99 99 20 99.5 °F (37.5 °C) 94 %     Physical Exam    Nursing note and vitals reviewed.  Constitutional: He appears well-developed and well-nourished.   HENT:   Head: Normocephalic and atraumatic.   Eyes: Conjunctivae and EOM are normal. Pupils are equal, round, and reactive to light.   Neck: Normal range of motion. Neck supple.   Cardiovascular: Normal rate and regular rhythm.   Pulmonary/Chest: Breath sounds normal.   Abdominal: Soft. Bowel sounds are normal.   Musculoskeletal: Normal range of motion. He exhibits edema and tenderness (to right knee).   Neurological: He is alert and oriented to person, place, and time. He has normal strength and normal reflexes. No sensory deficit.         ED Course   Procedures  Labs Reviewed - No data to display           Imaging Results         X-Ray Knee 3 View Right (Final result) Result time:  05/09/17 07:34:02    Final result by Shukri Lambert MD (05/09/17 07:34:02)    Impression:     No acute findings.  No change from 1/14/16.      Electronically signed by: SHUKRI LAMBERT MD  Date:      05/09/17  Time:    07:34     Narrative:    History: Right knee pain    Postoperative changes of prior ACL repair.  The knee joint is intact.  No bony abnormalities seen.            Imaging Results         X-Ray Knee 3 View Right (Final result) Result time:  05/09/17 07:34:02    Final result by Shukri Lambert MD (05/09/17 07:34:02)    Impression:     No acute findings.  No change from 1/14/16.      Electronically signed by: SHUKRI LAMBERT MD  Date:     05/09/17  Time:    07:34     Narrative:    History: Right knee pain    Postoperative changes of prior ACL repair.  The knee joint is intact.  No bony abnormalities seen.                                  ED Course     Clinical Impression:   The primary encounter diagnosis was Sprain of right knee, unspecified ligament, initial encounter. Diagnoses of Knee pain, acute and Knee injury, right, initial encounter were also pertinent to this visit.          VITALY Marks  05/09/17 1526

## 2017-05-09 NOTE — DISCHARGE INSTRUCTIONS
Knee Sprain    A sprain is an injury to the ligaments or capsule that holds a joint together. There are no broken bones. Most sprains take 3 to 6 weeks to heal. If it a severe sprain where the ligament is completely torn, it can take months to recover.  Most knee sprains are treated with a splint, knee immobilizer brace, or elastic wrap for support. Severe sprains may require surgery.  Home care  · Stay off the injured leg as much as possible until you can walk on it without pain. If you have a lot of pain with walking, crutches or a walker may be prescribed. (These can be rented or purchased at many pharmacies and surgical or orthopedic supply stores). Follow your healthcare provider's advice about when to begin putting weight on that leg.  · Keep your leg elevated to reduce pain and swelling. When sleeping, place a pillow under the injured leg. When sitting, support the injured leg so it is level with your waist. This is very important during the first 48 hours.  · Apply an ice pack over the injured area for 15 to 20 minutes every 3 to 6 hours. You should do this for the first 24 to 48 hours. You can make an ice pack by filling a plastic bag that seals at the top with ice cubes and then wrapping it with a thin towel. Continue to use ice packs for relief of pain and swelling as needed. As the ice melts, be careful to avoid getting your wrap, splint, or cast wet. After 48 hours, apply heat (warm shower or warm bath) for 15 to 20 minutes several times a day, or alternate ice and heat. You can place the ice pack directly over the splint. If you have to wear a hook-and-loop knee brace, you can open it to apply the ice pack, or heat, directly to the knee. Never put ice directly on the skin. Always wrap the ice in a towel or other type of cloth.  · You may use over-the-counter pain medicine to control pain, unless another pain medicine was prescribed.If you have chronic liver or kidney disease or ever had a stomach  ulcer or GI bleeding, talk with your healthcare provider before using these medicines.  · If you were given a splint, keep it completely dry at all times. Bathe with your splint out of the water, protected with 2 large plastic bags, rubber-banded at the top end. If a fiberglass splint gets wet, you can dry it with a hair dryer. If you have a hook-and-loop knee brace, you can remove this to bathe, unless told otherwise.  Follow-up care  Follow up with your doctor as advised. Any X-rays you had today dont show any broken bones, breaks, or fractures. Sometimes fractures dont show up on the first X-ray. Bruises and sprains can sometimes hurt as much as a fracture. These injuries can take time to heal completely. If your symptoms dont improve or they get worse, talk with your doctor. You may need a repeat X-ray. If X-rays were taken, you will be told of any new findings that may affect your care.  Call 911  Call 911 if you have:  ·  Shortness of breath  ·  Chest pain  When to seek medical advice  Call your healthcare provider right away if any of these occur:  · The splint or knee immobilizer brace becomes wet or soft  · The fiberglass cast or splint remains wet for more than 24 hours  · Pain or swelling increases  · The injured leg or toes become cold, blue, numb, or tingly  Date Last Reviewed: 11/20/2015  © 8931-7430 "[employer] [address] [place] [address], [place] [address]. All rights reserved. This information is not intended as a substitute for professional medical care. Always follow your healthcare professional's instructions.

## 2017-05-10 ENCOUNTER — OFFICE VISIT (OUTPATIENT)
Dept: FAMILY MEDICINE | Facility: CLINIC | Age: 57
End: 2017-05-10
Payer: MEDICAID

## 2017-05-10 VITALS
SYSTOLIC BLOOD PRESSURE: 106 MMHG | WEIGHT: 246 LBS | HEART RATE: 99 BPM | BODY MASS INDEX: 35.22 KG/M2 | HEIGHT: 70 IN | OXYGEN SATURATION: 96 % | DIASTOLIC BLOOD PRESSURE: 62 MMHG | RESPIRATION RATE: 14 BRPM | TEMPERATURE: 98 F

## 2017-05-10 DIAGNOSIS — Z72.0 TOBACCO ABUSE: ICD-10-CM

## 2017-05-10 DIAGNOSIS — E66.01 SEVERE OBESITY (BMI 35.0-39.9) WITH COMORBIDITY: ICD-10-CM

## 2017-05-10 DIAGNOSIS — K57.32 DIVERTICULITIS OF LARGE INTESTINE WITHOUT PERFORATION OR ABSCESS WITHOUT BLEEDING: ICD-10-CM

## 2017-05-10 DIAGNOSIS — S83.91XA SPRAIN OF RIGHT KNEE/LEG, INITIAL ENCOUNTER: ICD-10-CM

## 2017-05-10 DIAGNOSIS — R19.8 ALTERNATING CONSTIPATION AND DIARRHEA: Primary | ICD-10-CM

## 2017-05-10 DIAGNOSIS — R14.0 ABDOMINAL DISTENTION: ICD-10-CM

## 2017-05-10 DIAGNOSIS — K21.9 GASTROESOPHAGEAL REFLUX DISEASE, ESOPHAGITIS PRESENCE NOT SPECIFIED: ICD-10-CM

## 2017-05-10 PROCEDURE — 99213 OFFICE O/P EST LOW 20 MIN: CPT | Mod: PBBFAC,PO | Performed by: FAMILY MEDICINE

## 2017-05-10 PROCEDURE — 99999 PR PBB SHADOW E&M-EST. PATIENT-LVL III: CPT | Mod: PBBFAC,,, | Performed by: FAMILY MEDICINE

## 2017-05-10 PROCEDURE — 99214 OFFICE O/P EST MOD 30 MIN: CPT | Mod: S$PBB,,, | Performed by: FAMILY MEDICINE

## 2017-05-10 NOTE — PROGRESS NOTES
Subjective:       Patient ID: Shukri Rosales is a 56 y.o. male.    Chief Complaint: Follow-up    HPI   Follow-up  57yo male presents today for follow-up. He was seen last week on 5/4/17 with report of heartburn and loose stools. His abdomen was significantly distended and he notes that he has not has a bowel movement within the past week. He is taking Augmentin given concerns for diverticulitis. He has not obtained recommended CBC and no further imaging has been obtained beyond KUB. He notes minimal passage of flatus. No melena or rectal bleeding is reported. Heartburn has significantly improved with PPI use. He is no longer consuming baking soda for relief. He will see Endocrinology later today for assessment of diabetes. Glycemic control has continued to worsen with A1c levels now increased to 9.1. He did not check fasting glucose this AM. Yesterday he was evaluated in the ER after a fall from a 6 foot ladder. He notes landing with his knee turned inward. Imaging was negative for acute findings. He is able to bear weight. He notes a diagnosis of a right knee sprain. Pain is rated at 5/10 presently. He has been taking Tramadol for pain relief.    Review of Systems   Constitutional: Negative for chills, fatigue and fever.        One episode of night sweats two nights ago     HENT: Negative for sore throat and trouble swallowing.    Respiratory: Negative for cough and shortness of breath.    Cardiovascular: Negative for chest pain, palpitations and leg swelling.   Gastrointestinal: Positive for abdominal distention and constipation. Negative for abdominal pain, blood in stool, diarrhea, nausea and vomiting.   Genitourinary: Negative for decreased urine volume and difficulty urinating.   Musculoskeletal: Positive for arthralgias and back pain.   Skin: Negative for color change and rash.   Neurological: Positive for numbness. Negative for weakness.   Psychiatric/Behavioral: Negative for sleep disturbance. The  "patient is not nervous/anxious.        Objective:   /62  Pulse 99  Temp 98.3 °F (36.8 °C) (Temporal)   Resp 14  Ht 5' 10" (1.778 m)  Wt 111.6 kg (246 lb)  SpO2 96%  BMI 35.3 kg/m2  Physical Exam   Constitutional: He is oriented to person, place, and time. He appears well-developed. No distress.   Obese, non-toxic   HENT:   Head: Normocephalic and atraumatic.   Right Ear: External ear normal.   Left Ear: External ear normal.   Nose: Nose normal.   Mouth/Throat: Mucous membranes are dry. No posterior oropharyngeal erythema.   Eyes: Conjunctivae and EOM are normal. Pupils are equal, round, and reactive to light.   Neck: Normal range of motion. Neck supple.   Cardiovascular: Normal rate, regular rhythm and normal heart sounds.    Pulmonary/Chest: Effort normal and breath sounds normal.   Abdominal: Soft. Bowel sounds are normal. He exhibits distension. There is no tenderness. There is no guarding.   Musculoskeletal: He exhibits no edema.        Right knee: He exhibits normal range of motion. Tenderness found. No medial joint line and no lateral joint line tenderness noted.   Neurological: He is alert and oriented to person, place, and time.   Skin: Skin is warm and dry. He is not diaphoretic.   Psychiatric: He has a normal mood and affect. His behavior is normal.       Assessment:       1. Alternating constipation and diarrhea    2. Gastroesophageal reflux disease, esophagitis presence not specified    3. Diverticulitis of large intestine without perforation or abscess without bleeding    4. Abdominal distention    5. Uncontrolled type 2 diabetes with neuropathy    6. Sprain of right knee/leg, initial encounter    7. Severe obesity (BMI 35.0-39.9) with comorbidity    8. Tobacco abuse        Plan:   Alternating constipation and diarrhea  Recommend proceeding with repeat KUB today and anticipate CT of the abdomen/pelvis pending results. Will also obtain CBC and BMP. Patient will complete prescribed course of " Augmentin. Additional recommendations pending imaging.  -     X-Ray Abdomen AP 1 View; Future; Expected date: 5/10/17  -     Basic metabolic panel; Future; Expected date: 5/10/17    Gastroesophageal reflux disease, esophagitis presence not specified  Improvement in reflux symptoms with PPI use. Will continue at present though he may need GI evaluation in the near future.     Diverticulitis of large intestine without perforation or abscess without bleeding  Suspected. As per #1 anticipate additional imaging pending updated labs and KUB as above.    Abdominal distention  As per #1.    Uncontrolled type 2 diabetes with neuropathy  Current regimen consists of Lantus 45 units, Novolog 20 units TID and Metformin 500mg BID but patient admits readily to dietary non-compliance. We have reviewed current recommendations. Emphasized consistent timing of meals and snacks, eliminating ice cream and candy bars, etc. Proceed with Endocrine evaluation later today as scheduled.    Sprain of right knee/leg, initial encounter  ER records reviewed. Knee films on 5/8/17 show no acute findings but are consistent with previous ACL repair.     Severe obesity (BMI 35.0-39.9) with comorbidity  Weight loss efforts remain encouraged through diet and lifestyle measures.    Tobacco abuse  Smoking cessation is advised.

## 2017-05-10 NOTE — MR AVS SNAPSHOT
[facility]  [address]  [facility] [address]  Phone: [phone]  Fax: [phone]                  Shukri Rosales   5/10/2017 8:00 AM   Office Visit    Description:  Male : 1960   Provider:  Farhana Burnham MD   Department:  [facility]           Reason for Visit     Follow-up           Diagnoses this Visit        Comments    Alternating constipation and diarrhea    -  Primary     Gastroesophageal reflux disease, esophagitis presence not specified         Diverticulitis of large intestine without perforation or abscess without bleeding         Abdominal distention         Uncontrolled type 2 diabetes with neuropathy         Sprain of right knee/leg, initial encounter         Severe obesity (BMI 35.0-39.9) with comorbidity         Tobacco abuse                To Do List           Future Appointments        Provider Department Dept Phone    5/10/2017 9:30 AM LABORATORY, [place] [facility]     5/10/2017 10:00 AM Tasha Zuniga MD [facility] Endocrinology [phone]    5/10/2017 11:15 AM [facility] [phone]    5/10/2017 11:55 AM LABORATORY, [facility] [facility] [phone]    2017 10:45 AM LAURENCE Caruso OD [facility] - Ophthalmology [phone]      Goals (5 Years of Data)     None      [facility]     [facility] Nurse Care Line - 24/7 Assistance  Unless otherwise directed by your provider, please contact [facility], our nurse care line that is available for 24/7 assistance.     Registered nurses in the [facility] provide: appointment scheduling, clinical advisement, health education, and other advisory services.  Call: [phone] (toll free)               Medications           Message regarding Medications     Verify the changes and/or additions to your medication regime listed below are the same as discussed with your clinician today.   If any of these changes or additions are incorrect, please notify your healthcare provider.        STOP taking these medications     buPROPion (WELLBUTRIN SR) 150 MG TBSR 12 hr tablet TK 1 T PO  BID    ciprofloxacin HCl (CIPRO) 500 MG tablet Take 1 tablet (500 mg total) by mouth 2 (two) times daily.           Verify that the below list of medications is an accurate representation of the medications you are currently taking.  If none reported, the list may be blank. If incorrect, please contact your healthcare provider. Carry this list with you in case of emergency.           Current Medications     amlodipine (NORVASC) 5 MG tablet Take 1 tablet (5 mg total) by mouth once daily.    amoxicillin-clavulanate 875-125mg (AUGMENTIN) 875-125 mg per tablet Take 1 tablet by mouth every 12 (twelve) hours.    aspirin 81 MG Chew Take 1 tablet (81 mg total) by mouth once daily.    blood sugar diagnostic Strp 1 each by Misc.(Non-Drug; Combo Route) route 3 (three) times daily before meals. For One Touch Verio    blood-glucose meter kit One Touch Verio Meter    clonazePAM (KLONOPIN) 0.5 MG tablet TAKE ONE TABLET BY MOUTH TWICE DAILY AS NEEDED MUST LAST 30 DAYS    cloNIDine (CATAPRES) 0.3 MG tablet Take 1 tablet (0.3 mg total) by mouth 3 (three) times daily.    cyclobenzaprine (FLEXERIL) 10 MG tablet Take 1 tablet (10 mg total) by mouth 3 (three) times daily as needed (Muscle pain/soreness).    dulaglutide (TRULICITY) 1.5 mg/0.5 mL PnIj Inject 1.5 mg into the skin every 7 days.    gabapentin (NEURONTIN) 300 MG capsule Take 1 capsule (300 mg total) by mouth 3 (three) times daily.    insulin aspart (NOVOLOG) 100 unit/mL injection Inject 20 Units into the skin 3 (three) times daily before meals.    insulin glargine (LANTUS) 100 unit/mL injection Inject 45 Units into the skin every evening.    insulin syringe-needle U-100 1 mL 31 gauge x 5/16 Syrg 1 Syringe by Misc.(Non-Drug; Combo Route) route once daily.    isosorbide mononitrate  "(IMDUR) 60 MG 24 hr tablet TAKE 1 TABLET BY MOUTH EVERY DAY    lancets (ONETOUCH DELICA LANCETS) 33 gauge Misc 1 lancet by Misc.(Non-Drug; Combo Route) route 3 (three) times daily.    lancets 33 gauge Misc 1 lancet by Misc.(Non-Drug; Combo Route) route 2 (two) times daily. Freestyle freedom lite    lisinopril-hydrochlorothiazide (PRINZIDE,ZESTORETIC) 20-25 mg Tab TAKE 1 TABLET BY MOUTH EVERY MORNING    lurasidone (LATUDA) 60 mg Tab tablet Take 60 mg by mouth once daily.    metformin (GLUCOPHAGE) 500 MG tablet Take 1 tablet (500 mg total) by mouth 2 (two) times daily with meals.    metoprolol succinate (TOPROL-XL) 50 MG 24 hr tablet TAKE 1 TABLET(50 MG) BY MOUTH EVERY DAY    pantoprazole (PROTONIX) 40 MG tablet Take 1 tablet (40 mg total) by mouth once daily.    pen needle, diabetic 32 gauge x 5/32" Ndle 1 each by Misc.(Non-Drug; Combo Route) route once daily.    polyethylene glycol (GLYCOLAX) 17 gram/dose powder Take 17 g by mouth once daily.    pravastatin (PRAVACHOL) 20 MG tablet Take 1 tablet (20 mg total) by mouth every evening.    quetiapine (SEROQUEL) 200 MG Tab Take 200 mg by mouth once daily.     quetiapine (SEROQUEL) 300 MG Tab Take 1 tablet (300 mg total) by mouth once daily.    sertraline (ZOLOFT) 100 MG tablet Take 2 tablets (200 mg total) by mouth once daily.    tramadol (ULTRAM) 50 mg tablet Take 1 tablet (50 mg total) by mouth every 6 (six) hours as needed.    trazodone (DESYREL) 150 MG tablet Take 2 tabs po every bedtime    trazodone (DESYREL) 300 MG tablet TK 1 T PO  QHS    triamcinolone acetonide 0.1% (KENALOG) 0.1 % cream Apply topically 2 (two) times daily.           Clinical Reference Information           Your Vitals Were     BP Pulse Temp Resp Height Weight    106/62 99 98.3 °F (36.8 °C) (Temporal) 14 5' 10" (1.778 m) 111.6 kg (246 lb)    SpO2 BMI             96% 35.3 kg/m2         Blood Pressure          Most Recent Value    BP  106/62      Allergies as of 5/10/2017     No Known Allergies    "   Immunizations Administered on Date of Encounter - 5/10/2017     None      Orders Placed During Today's Visit     Future Labs/Procedures Expected by Expires    Basic metabolic panel  5/10/2017 7/9/2018    X-Ray Abdomen AP 1 View  5/10/2017 5/10/2018      Smoking Cessation     If you would like to quit smoking:   You may be eligible for free services if you are a [place] resident and started smoking cigarettes before September 1, 1988.  Call the [employer]oking [employer]ess[employer] ([facility]) toll free at [phone] or [phone].   Call 1-800-QUIT-NOW if you do not meet the above criteria.   Contact us via email: [email or web address]   View our website for more information: [email or web address]        Language Assistance Services     ATTENTION: Language assistance services are available, free of charge. Please call [phone].      ATENCIÓN: Si habla español, tiene a brown disposición servicios gratuitos de asistencia lingüística. Llame al [phone].     CHÚ Ý: N?u b?n nói Ti?ng Vi?t, có các d?ch v? h? tr? ngôn ng? mi?n phí dành cho b?n. G?i s? [phone].         [facility] complies with applicable Federal civil rights laws and does not discriminate on the basis of race, color, national origin, age, disability, or sex.

## 2017-05-12 ENCOUNTER — TELEPHONE (OUTPATIENT)
Dept: RESEARCH | Facility: HOSPITAL | Age: 57
End: 2017-05-12

## 2017-05-15 ENCOUNTER — HOSPITAL ENCOUNTER (OUTPATIENT)
Dept: RADIOLOGY | Facility: HOSPITAL | Age: 57
Discharge: HOME | End: 2017-05-15
Attending: FAMILY MEDICINE
Payer: MEDICAID

## 2017-05-15 DIAGNOSIS — R19.8 ALTERNATING CONSTIPATION AND DIARRHEA: ICD-10-CM

## 2017-05-15 PROCEDURE — 74000 XR ABDOMEN AP 1 VIEW: CPT | Mod: 26,,, | Performed by: RADIOLOGY

## 2017-05-16 ENCOUNTER — HOSPITAL ENCOUNTER (EMERGENCY)
Facility: HOSPITAL | Age: 57
Discharge: HOME | End: 2017-05-16
Attending: EMERGENCY MEDICINE
Payer: MEDICAID

## 2017-05-16 VITALS
HEIGHT: 71 IN | SYSTOLIC BLOOD PRESSURE: 191 MMHG | HEART RATE: 86 BPM | RESPIRATION RATE: 20 BRPM | BODY MASS INDEX: 34.44 KG/M2 | DIASTOLIC BLOOD PRESSURE: 117 MMHG | OXYGEN SATURATION: 95 % | TEMPERATURE: 99 F | WEIGHT: 246 LBS

## 2017-05-16 DIAGNOSIS — S81.811A LACERATION OF LEG, RIGHT, INITIAL ENCOUNTER: ICD-10-CM
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
## 2017-05-16 DIAGNOSIS — S81.801A AVULSION OF SKIN OF RIGHT LOWER LEG, INITIAL ENCOUNTER: ICD-10-CM

## 2017-05-16 PROCEDURE — 25000003 PHARM REV CODE 250: Performed by: NURSE PRACTITIONER

## 2017-05-16 PROCEDURE — 99283 EMERGENCY DEPT VISIT LOW MDM: CPT

## 2017-05-16 RX ORDER — CEPHALEXIN 500 MG/1
500 CAPSULE ORAL
Status: COMPLETED | OUTPATIENT
Start: 2017-05-16 | End: 2017-05-16

## 2017-05-16 RX ORDER — MUPIROCIN 20 MG/G
OINTMENT TOPICAL 3 TIMES DAILY
Qty: 1 TUBE | Refills: 0 | Status: SHIPPED | OUTPATIENT
Start: 2017-05-16 | End: 2017-05-26

## 2017-05-16 RX ORDER — HYDROCODONE BITARTRATE AND ACETAMINOPHEN 5; 325 MG/1; MG/1
1 TABLET ORAL
Status: COMPLETED | OUTPATIENT
Start: 2017-05-16 | End: 2017-05-16

## 2017-05-16 RX ORDER — CEPHALEXIN 500 MG/1
500 CAPSULE ORAL 4 TIMES DAILY
Qty: 20 CAPSULE | Refills: 0 | Status: SHIPPED | OUTPATIENT
Start: 2017-05-16 | End: 2017-05-21

## 2017-05-16 RX ADMIN — HYDROCODONE BITARTRATE AND ACETAMINOPHEN 1 TABLET: 5; 325 TABLET ORAL at 08:05

## 2017-05-16 RX ADMIN — CEPHALEXIN 500 MG: 500 CAPSULE ORAL at 08:05

## 2017-05-16 RX ADMIN — BACITRACIN, NEOMYCIN, POLYMYXIN B 1 EACH: 400; 3.5; 5 OINTMENT TOPICAL at 08:05

## 2017-05-16 NOTE — ED AVS SNAPSHOT
[facility] -   [address] [address] [facility] [address]               Shukri Rosales   2017  7:35 PM   ED    Description:  Male : 1960   Department:  [facility] -            Your Care was Coordinated By:     Provider Role From To    Henrique Morocho NP Nurse Practitioner 17 --      Reason for Visit     Laceration           Diagnoses this Visit        Comments    Fall at home, initial encounter    -  Primary     Laceration of leg, right, initial encounter         Avulsion of skin of right lower leg, initial encounter           ED Disposition     None           To Do List           Follow-up Information     Follow up with Farhana Burnham MD In 2 days.    Specialty:  Family Medicine    Why:  For wound re-check    Contact information:    [address] [address] [address]  [phone]         These Medications        Disp Refills Start End    cephALEXin (KEFLEX) 500 MG capsule 20 capsule 0 2017    Take 1 capsule (500 mg total) by mouth 4 (four) times daily. - Oral    Pharmacy: [facility] [employer] [address] [facility] [address] AT [facility] [address] Ph #: [phone]       mupirocin (BACTROBAN) 2 % ointment 1 Tube 0 2017    Apply topically 3 (three) times daily. - Topical (Top)    Pharmacy: [facility] [employer] [address] [place], [place] - [address] AT [facility] [address] Ph #: [phone]         [facility]     [facility] Nurse Care [facility] -  Assistance  Unless otherwise directed by your provider, please contact [facility], our nurse care line that is available for  assistance.     Registered nurses in the [facility] provide: appointment scheduling, clinical advisement, health education, and other advisory services.  Call: [phone] (toll free)               Medications           Message regarding  Medications     Verify the changes and/or additions to your medication regime listed below are the same as discussed with your clinician today.  If any of these changes or additions are incorrect, please notify your healthcare provider.        START taking these NEW medications        Refills    cephALEXin (KEFLEX) 500 MG capsule 0    Sig: Take 1 capsule (500 mg total) by mouth 4 (four) times daily.    Class: Print    Route: Oral    mupirocin (BACTROBAN) 2 % ointment 0    Sig: Apply topically 3 (three) times daily.    Class: Print    Route: Topical (Top)      These medications were administered today        Dose Freq    neomycin-bacitracnZn-polymyxnB packet  ED 1 Time    Sig: Apply topically ED 1 Time.    Class: Normal    Route: Topical (Top)    hydrocodone-acetaminophen 5-325mg per tablet 1 tablet 1 tablet ED 1 Time    Sig: Take 1 tablet by mouth ED 1 Time.    Class: Normal    Route: Oral    Cosign for Ordering: Required by Rakan Pham Jr., MD    cephALEXin capsule 500 mg 500 mg ED 1 Time    Sig: Take 1 capsule (500 mg total) by mouth ED 1 Time.    Class: Normal    Route: Oral           Verify that the below list of medications is an accurate representation of the medications you are currently taking.  If none reported, the list may be blank. If incorrect, please contact your healthcare provider. Carry this list with you in case of emergency.           Current Medications     amlodipine (NORVASC) 5 MG tablet Take 1 tablet (5 mg total) by mouth once daily.    amoxicillin-clavulanate 875-125mg (AUGMENTIN) 875-125 mg per tablet Take 1 tablet by mouth every 12 (twelve) hours.    aspirin 81 MG Chew Take 1 tablet (81 mg total) by mouth once daily.    blood sugar diagnostic Strp 1 each by Misc.(Non-Drug; Combo Route) route 3 (three) times daily before meals. For One Touch Verio    blood-glucose meter kit One Touch Verio Meter    cephALEXin (KEFLEX) 500 MG capsule Take 1 capsule (500 mg total) by mouth 4 (four) times  daily.    cephALEXin capsule 500 mg Take 1 capsule (500 mg total) by mouth ED 1 Time.    clonazePAM (KLONOPIN) 0.5 MG tablet TAKE ONE TABLET BY MOUTH TWICE DAILY AS NEEDED MUST LAST 30 DAYS    cloNIDine (CATAPRES) 0.3 MG tablet Take 1 tablet (0.3 mg total) by mouth 3 (three) times daily.    cyclobenzaprine (FLEXERIL) 10 MG tablet Take 1 tablet (10 mg total) by mouth 3 (three) times daily as needed (Muscle pain/soreness).    dulaglutide (TRULICITY) 1.5 mg/0.5 mL PnIj Inject 1.5 mg into the skin every 7 days.    gabapentin (NEURONTIN) 300 MG capsule Take 1 capsule (300 mg total) by mouth 3 (three) times daily.    hydrocodone-acetaminophen 5-325mg per tablet 1 tablet Take 1 tablet by mouth ED 1 Time.    hylan g-f 20 48 mg/6 mL injection 48 mg Inject 48 mg into the articular space one time.    hylan g-f 20 48 mg/6 mL injection 48 mg Inject 48 mg into the articular space one time.    hylan g-f 20 48 mg/6 mL injection 48 mg Inject 48 mg into the articular space one time.    insulin aspart (NOVOLOG) 100 unit/mL injection Inject 20 Units into the skin 3 (three) times daily before meals.    insulin syringe-needle U-100 1 mL 31 gauge x 5/16 Syrg 1 Syringe by Misc.(Non-Drug; Combo Route) route once daily.    isosorbide mononitrate (IMDUR) 60 MG 24 hr tablet TAKE 1 TABLET BY MOUTH EVERY DAY    lancets (ONETOUCH DELICA LANCETS) 33 gauge Misc 1 lancet by Misc.(Non-Drug; Combo Route) route 3 (three) times daily.    lancets 33 gauge Misc 1 lancet by Misc.(Non-Drug; Combo Route) route 2 (two) times daily. Freestyle freedom lite    lisinopril-hydrochlorothiazide (PRINZIDE,ZESTORETIC) 20-25 mg Tab TAKE 1 TABLET BY MOUTH EVERY MORNING    lurasidone (LATUDA) 60 mg Tab tablet Take 60 mg by mouth once daily.    metformin (GLUCOPHAGE) 500 MG tablet Take 1 tablet (500 mg total) by mouth 2 (two) times daily with meals.    metoprolol succinate (TOPROL-XL) 50 MG 24 hr tablet TAKE 1 TABLET(50 MG) BY MOUTH EVERY DAY    mupirocin (BACTROBAN) 2 %  "ointment Apply topically 3 (three) times daily.    neomycin-bacitracnZn-polymyxnB packet Apply topically ED 1 Time.    pantoprazole (PROTONIX) 40 MG tablet Take 1 tablet (40 mg total) by mouth once daily.    pen needle, diabetic 32 gauge x 5/32" Ndle 1 each by Misc.(Non-Drug; Combo Route) route once daily.    polyethylene glycol (GLYCOLAX) 17 gram/dose powder Take 17 g by mouth once daily.    pravastatin (PRAVACHOL) 20 MG tablet Take 1 tablet (20 mg total) by mouth every evening.    quetiapine (SEROQUEL) 200 MG Tab Take 200 mg by mouth once daily.     quetiapine (SEROQUEL) 300 MG Tab Take 1 tablet (300 mg total) by mouth once daily.    sertraline (ZOLOFT) 100 MG tablet Take 2 tablets (200 mg total) by mouth once daily.    tramadol (ULTRAM) 50 mg tablet Take 1 tablet (50 mg total) by mouth every 6 (six) hours as needed.    trazodone (DESYREL) 300 MG tablet TK 1 T PO  QHS    triamcinolone acetonide 0.1% (KENALOG) 0.1 % cream Apply topically 2 (two) times daily.    TRULICITY 0.75 mg/0.5 mL PnIj INJECT CONTENTS OF 1 SYRINGE UNDER THE SKIN ONCE WEEKLY           Clinical Reference Information           Your Vitals Were     BP Pulse Temp Resp Height Weight    191/117 (BP Location: Right arm, Patient Position: Sitting) 86 98.5 °F (36.9 °C) (Oral) 20 5' 11" (1.803 m) 111.6 kg (246 lb)    SpO2 BMI             95% 34.31 kg/m2         Allergies as of 5/16/2017     No Known Allergies      Immunizations Administered on Date of Encounter - 5/16/2017     None      ED Micro, Lab, POCT     None      ED Imaging Orders     None        Discharge Instructions         Small or Superficial Laceration: Not Sutured  A laceration is a cut through the skin. A laceration requires stitches or staples if it is deep or spread open. A small laceration often doesn't require stitches.   You may need a tetanus shot. This may be given if you have no record of this vaccination and the object that caused the cut may lead to tetanus  Home care  · Your " healthcare provider may prescribe an antibiotic. This is to help prevent infection. Follow all instructions for taking this medicine. Take the medicine every day until it is gone or you are told to stop. You should not have any left over.  · The healthcare provider may prescribe medicines for pain. Follow instructions for taking them.  · Follow the healthcare providers instructions on how to care for the cut.  · Wash your hands with soap and warm water before and after caring for cut. This helps prevent infection.  · Keep the wound clean and dry. If a bandage was applied and it becomes wet or dirty, replace it. Otherwise, leave it in place for the first 24 hours, then change it once a day or as directed.  · Clean the wound daily:  ¨ After removing any bandage, wash the area with soap and water. Use a wet cotton swab to loosen and remove any blood or crust that forms.  ¨ After cleaning, keep the wound clean and dry. Talk with your doctor before applying any antibiotic ointment to the wound. Reapply a fresh bandage.  · You may remove the bandage to shower as usual after the first 24 hours, but do not soak the area in water (no tub baths or swimming) for the next 5 days.  · If the area gets wet, gently pat it dry with a clean cloth. Replace the wet bandage with a dry one.  · Avoid activities that may reinjure your wound.  · Do not scratch, rub, or pick at the area.  · Check the wound daily for signs of infection listed below.  Follow-up care  Follow up with your healthcare provider as advised.  When to seek medical advice  Call your healthcare provider right away if any of these occur:  · Wound bleeding not controlled by direct pressure  · Signs of infection, including increasing pain in the wound, increasing wound redness or swelling, or pus or bad odor coming from the wound  · Fever of 100.4°F (38ºC) or higher or as directed by your healthcare provider  · Stitches or staples come apart or fall out or surgical tape  falls off before 7 days  · Wound edges re-open  · Wound changes colors  · Numbness around the wound   · Decreased movement around the injured area  Date Last Reviewed: 6/14/2015 © 2000-2016 [employer]. [address] [place] [address], [place] [address]. All rights reserved. This information is not intended as a substitute for professional medical care. Always follow your healthcare professional's instructions.          Skin Tear (Skin Avulsion)  A skin avulsion is a tearing of the top layer of skin. This commonly happens after a fall or other injury. It also tends to be more common in older people, or those taking blood thinners or steroids for long periods of time.  Home care  These guidelines will help you care for your wound at home:  · Keep the wound clean and dry for the first 24 to 48 hours, or as your healthcare provider advises.  · If there is a dressing or bandage, change it when it gets wet or dirty. Otherwise, leave it on for the first 24 hours, then change it once a day or as often as the doctor says.  · If stitches or staples were used, check the wound every day.  · After taking off the dressing, wash the area gently with soap and water. Clean as close to the stitches as you can. Avoid washing or rubbing the stitches directly.  · After 3 days you can keep the bandages off the wound, unless told otherwise, or there is continued drainage.  Allow the wound to be open to the air.  · Keep a thin layer of antibiotic ointment on the cut. This will keep the wound clean, make it easier to remove the stitches, and reduce scarring.  · If your wound is oozing, you can put a nonstick dressing over it. Then, reapply the bandage or dressing as you were told.  · You can shower as usual after the first 24 hours, but don't soak the area in water (no baths or swimming) until the stitches or staples are taken out.  · If surgical tape was used, keep the area clean and dry. If it becomes wet, blot it dry with a clean  towel.  · If skin glue was used, don't put any creams, lotions, or antibiotic ointments on it. These can dissolve the glue. Usually the glue will flake off in about 5 to 10 days by itself. Try to resist picking it off before that so the wound doesn't open up. When it gets wet, pat it dry.  Here is some information about medicine:  · You may use over-the-counter medicine such as acetaminophen or ibuprofen to control pain, unless another pain medicine was given. If you have chronic liver or kidney disease or ever had a stomach ulcer or gastrointestinal bleeding, talk with your doctor before using these medicines.  · If you were given antibiotics, take them until they are all used up. It is important to finish the antibiotics even if the wound looks better. This will ensure that the infection has cleared.  Follow-up care  Follow up with your healthcare provider, or as advised.  · Watch for any signs of infection, such as increasing redness, swelling, or pus coming out. If this happens, don't wait for your scheduled visit. Instead, see a doctor sooner.  · Stitches or staples are usually taken out within 5 to 14 days. This varies depending on what part of your body they are on, and the type of wound. The doctor will tell you how long stitches should be left in.   · If surgical tape was used, it is usually left on for 7 to 10 days. You can remove surgical tape after that unless you were told otherwise. If you try to remove it, and it is too hard, soaking can help. Surgical tape strips will eventually fall off on their own. If the edges of the cut pull apart, stop removing the tape or strips and follow up with your doctor  · As mentioned above, skin glue will flake off by itself in 5 to 10 days, so you don't need to pull it off.  If any X-rays were done, you will be notified of any changes that may affect your care.  When to seek medical advice  Call your healthcare provider right away if any of these occur:  · Increasing  pain in the wound  · Redness, swelling, or pus coming from the wound  · Fever of 100.4ºF (38ºC) or higher, or as directed by your healthcare provider  · Sutures or staples come apart or fall out before your next appointment and the wound edges look as if they will re-open  · Surgical tape closures fall off before 7 days, and the wound edges look as if they will re-open  · Bleeding not controlled by direct pressure  Date Last Reviewed: 9/1/2016  © 5210-8490 [employer]. [address] [place] [address]. All rights reserved. This information is not intended as a substitute for professional medical care. Always follow your healthcare professional's instructions.          Your Scheduled Appointments     Aug 04, 2017 10:45 AM CDT   Established Patient Visit with HUSSEIN Farmer - Ophthalmology ([facility])    [address] [place] [address] [phone]              Smoking Cessation     If you would like to quit smoking:   You may be eligible for free services if you are a [place] resident and started smoking cigarettes before September 1, 1988.  Call the Smoking Cessation Trust (SCT) toll free at (865) 189-1996 or (780) 006-5873.   Call 1-800-QUIT-NOW if you do not meet the above criteria.   Contact us via email: [email or web address]   View our website for more information: [email or web address]         [facility] -  complies with applicable Federal civil rights laws and does not discriminate on the basis of race, color, national origin, age, disability, or sex.        Language Assistance Services     ATTENTION: Language assistance services are available, free of charge. Please call [phone].      ATENCIÓN: Si habla español, tiene a brown disposición servicios gratuitos de asistencia lingüística. Llame al [phone].     CHÚ Ý: N?u b?n nói Ti?ng Vi?t, có các d?ch v? h? tr? ngôn ng? mi?n phí dành cho b?n. G?i s?  [phone].

## 2017-05-17 NOTE — ED PROVIDER NOTES
SCRIBE #1 NOTE: I, Blanca Pham, am scribing for, and in the presence of, Henrique Morocho NP. I have scribed the entire note.      History      Chief Complaint   Patient presents with    Laceration     pt reports he tripped and fell on a weed eater; laceration to R lower extremity       Review of patient's allergies indicates:  No Known Allergies     HPI   HPI    5/16/2017, 7:47 PM   History obtained from the patient      History of Present Illness: Shukri Rosales is a 56 y.o. male patient who presents to the Emergency Department for a RLE laceration which onset suddenly PTA after he tripped over construction material and fell. He states that he was trying to rush and feed his dogs before the dogs would try to enter his home. Symptoms are constant and moderate in severity. No modifying factors noted. No associated sxs reported. Patient denies any possibility of foreign bodies, decreased ROM, extremity weakness/numbness, paresthesias, uncontrollable bleeding, gait problem, and all other sxs at this time. Tetanus is UTD. No further complaints or concerns at this time.       Arrival mode: Personal vehicle    PCP: Farhana Burnham MD       Past Medical History:  Past Medical History:   Diagnosis Date    Adrenal cortical adenoma     Anxiety     Arthritis     CKD (chronic kidney disease) stage 3, GFR 30-59 ml/min     Depression     Diabetes mellitus type II 2011    does not take    GERD (gastroesophageal reflux disease) 7/9/2013    Hypertension     Migraine headache 7/22/2013    Severe obesity with body mass index of 36.0 to 36.9        Past Surgical History:  Past Surgical History:   Procedure Laterality Date    BACK SURGERY      left arm exfix      NASAL SEPTUM SURGERY Bilateral     TONSILLECTOMY      URETEROSCOPY           Family History:  Family History   Problem Relation Age of Onset    Hypertension Mother     Diabetes Mother     Hypertension Sister     Diabetes Sister      Hypertension Brother     Diabetes Brother     Cancer Paternal Grandmother     Cancer Paternal Grandfather        Social History:  Social History     Social History Main Topics    Smoking status: Current Every Day Smoker     Packs/day: 1.00     Years: 48.00     Types: Cigarettes    Smokeless tobacco: Never Used      Comment: instructed not to smoke after midnight after midnight prior to surgery    Alcohol use No      Comment: Has cut back smoking    Drug use: No    Sexual activity: No       ROS   Review of Systems   Constitutional: Negative for fever.   HENT: Negative for sore throat.    Respiratory: Negative for shortness of breath.    Cardiovascular: Negative for chest pain.   Gastrointestinal: Negative for nausea.   Genitourinary: Negative for dysuria.   Musculoskeletal: Negative for back pain, gait problem and joint swelling.        (+) LLE pain, (-) decreased ROM   Skin: Positive for wound. Negative for rash.        (-) uncontrollable bleeding from wound, (-) foreign bodies   Neurological: Negative for weakness and numbness.        (-) paresthesias   Hematological: Does not bruise/bleed easily.   All other systems reviewed and are negative.      Physical Exam    Initial Vitals   BP Pulse Resp Temp SpO2   05/16/17 1905 05/16/17 1905 05/16/17 1905 05/16/17 1905 05/16/17 1905   191/117 86 20 98.5 °F (36.9 °C) 95 %      Physical Exam  Nursing Notes and Vital Signs Reviewed.  Constitutional: Patient is in no acute distress. Awake and alert. Well-developed and well-nourished.  Head: Atraumatic. Normocephalic.  Eyes: PERRL. EOM intact. Conjunctivae are not pale. No scleral icterus.  ENT: Mucous membranes are moist. Oropharynx is clear and symmetric.    Neck: Supple. Full ROM.   Cardiovascular: Regular rate. Regular rhythm. No murmurs, rubs, or gallops. Distal pulses are 2+ and symmetric.  Pulmonary/Chest: No respiratory distress. Clear to auscultation bilaterally. No wheezing, rales, or rhonchi.  Abdominal:   "Non-distended.  Musculoskeletal: Moves all extremities. No obvious deformities. No edema. Full ROM of RLE.  Skin: Warm and dry. Superficial lac to R thigh. 5 cm superificial lac to RLE. Triangular shaped skin avulsion to RLE. Bleeding is controlled. No foreign bodies, bruising, or swelling.  Neurological:  Alert, awake, and appropriate.  Normal speech.  No acute focal neurological deficits are appreciated.  Psychiatric: Normal affect. Good eye contact. Appropriate in content.    ED Course    Procedures  ED Vital Signs:  Vitals:    05/16/17 1905   BP: (!) 191/117   Pulse: 86   Resp: 20   Temp: 98.5 °F (36.9 °C)   TempSrc: Oral   SpO2: 95%   Weight: 111.6 kg (246 lb)   Height: 5' 11" (1.803 m)     The Emergency Provider reviewed the vital signs and test results, which are outlined above.    ED Discussion     7:58 PM:Discussed pt dx and plan of tx. Informed pt to follow up with PCP. All questions and concerns were addressed at this time. Pt expresses understanding of information and instructions, and is comfortable with plan to discharge. Pt is stable for discharge.    I discussed wound care precautions with patient and/or family/caretaker; specifically that all wounds have risk of infection despite efforts to cleanse and debride the wound; and there is a risk of an occult foreign body (and thus increased risk of infection) despite a negative examination.  I discussed with patient need to return for any signs of infection, specifically redness, increased pain, fever, drainage of pus, or any concern, immediately.    ED Medication(s):  Medications   neomycin-bacitracnZn-polymyxnB packet (1 each Topical (Top) Given 5/16/17 2025)   hydrocodone-acetaminophen 5-325mg per tablet 1 tablet (1 tablet Oral Given 5/16/17 2025)   cephALEXin capsule 500 mg (500 mg Oral Given 5/16/17 2025)       Discharge Medication List as of 5/16/2017  7:57 PM      START taking these medications    Details   cephALEXin (KEFLEX) 500 MG capsule Take " 1 capsule (500 mg total) by mouth 4 (four) times daily., Starting 5/16/2017, Until Sun 5/21/17, Print      mupirocin (BACTROBAN) 2 % ointment Apply topically 3 (three) times daily., Starting 5/16/2017, Until Fri 5/26/17, Print           Pre-hypertension/Hypertension: The pt has been informed that they may have pre-hypertension or hypertension based on a blood pressure reading in the ED. I recommend that the pt call the PCP listed on their discharge instructions or a physician of their choice this week to arrange f/u for further evaluation of possible pre-hypertension or hypertension.     Follow-up Information     Follow up with Farhana Burnham MD In 2 days.    Specialty:  Family Medicine    Why:  For wound re-check    Contact information:    [address] [address] [address]  [phone]             Medical Decision Making              Scribe Attestation:   Scribe #1: I performed the above scribed service and the documentation accurately describes the services I performed. I attest to the accuracy of the note.    Attending:   Physician Attestation Statement for Scribe #1: I, Henrique Morocho NP, personally performed the services described in this documentation, as scribed by Blanca Pham, in my presence, and it is both accurate and complete.          Clinical Impression       ICD-10-CM ICD-9-CM   1. Fall at home, initial encounter W19.XXXA E888.9    Y92.099 E849.0   2. Laceration of leg, right, initial encounter S81.811A 894.0   3. Avulsion of skin of right lower leg, initial encounter S81.801A 891.0       Disposition:   Disposition: Discharged  Condition: Stable           Henrique Morocho NP  05/17/17 0101

## 2017-05-17 NOTE — DISCHARGE INSTRUCTIONS
Small or Superficial Laceration: Not Sutured  A laceration is a cut through the skin. A laceration requires stitches or staples if it is deep or spread open. A small laceration often doesn't require stitches.   You may need a tetanus shot. This may be given if you have no record of this vaccination and the object that caused the cut may lead to tetanus  Home care  · Your healthcare provider may prescribe an antibiotic. This is to help prevent infection. Follow all instructions for taking this medicine. Take the medicine every day until it is gone or you are told to stop. You should not have any left over.  · The healthcare provider may prescribe medicines for pain. Follow instructions for taking them.  · Follow the healthcare providers instructions on how to care for the cut.  · Wash your hands with soap and warm water before and after caring for cut. This helps prevent infection.  · Keep the wound clean and dry. If a bandage was applied and it becomes wet or dirty, replace it. Otherwise, leave it in place for the first 24 hours, then change it once a day or as directed.  · Clean the wound daily:  ¨ After removing any bandage, wash the area with soap and water. Use a wet cotton swab to loosen and remove any blood or crust that forms.  ¨ After cleaning, keep the wound clean and dry. Talk with your doctor before applying any antibiotic ointment to the wound. Reapply a fresh bandage.  · You may remove the bandage to shower as usual after the first 24 hours, but do not soak the area in water (no tub baths or swimming) for the next 5 days.  · If the area gets wet, gently pat it dry with a clean cloth. Replace the wet bandage with a dry one.  · Avoid activities that may reinjure your wound.  · Do not scratch, rub, or pick at the area.  · Check the wound daily for signs of infection listed below.  Follow-up care  Follow up with your healthcare provider as advised.  When to seek medical advice  Call your healthcare  provider right away if any of these occur:  · Wound bleeding not controlled by direct pressure  · Signs of infection, including increasing pain in the wound, increasing wound redness or swelling, or pus or bad odor coming from the wound  · Fever of 100.4°F (38ºC) or higher or as directed by your healthcare provider  · Stitches or staples come apart or fall out or surgical tape falls off before 7 days  · Wound edges re-open  · Wound changes colors  · Numbness around the wound   · Decreased movement around the injured area  Date Last Reviewed: 6/14/2015 © 2000-2016 [employer]. [address] [place] [address]. All rights reserved. This information is not intended as a substitute for professional medical care. Always follow your healthcare professional's instructions.          Skin Tear (Skin Avulsion)  A skin avulsion is a tearing of the top layer of skin. This commonly happens after a fall or other injury. It also tends to be more common in older people, or those taking blood thinners or steroids for long periods of time.  Home care  These guidelines will help you care for your wound at home:  · Keep the wound clean and dry for the first 24 to 48 hours, or as your healthcare provider advises.  · If there is a dressing or bandage, change it when it gets wet or dirty. Otherwise, leave it on for the first 24 hours, then change it once a day or as often as the doctor says.  · If stitches or staples were used, check the wound every day.  · After taking off the dressing, wash the area gently with soap and water. Clean as close to the stitches as you can. Avoid washing or rubbing the stitches directly.  · After 3 days you can keep the bandages off the wound, unless told otherwise, or there is continued drainage.  Allow the wound to be open to the air.  · Keep a thin layer of antibiotic ointment on the cut. This will keep the wound clean, make it easier to remove the stitches, and reduce  scarring.  · If your wound is oozing, you can put a nonstick dressing over it. Then, reapply the bandage or dressing as you were told.  · You can shower as usual after the first 24 hours, but don't soak the area in water (no baths or swimming) until the stitches or staples are taken out.  · If surgical tape was used, keep the area clean and dry. If it becomes wet, blot it dry with a clean towel.  · If skin glue was used, don't put any creams, lotions, or antibiotic ointments on it. These can dissolve the glue. Usually the glue will flake off in about 5 to 10 days by itself. Try to resist picking it off before that so the wound doesn't open up. When it gets wet, pat it dry.  Here is some information about medicine:  · You may use over-the-counter medicine such as acetaminophen or ibuprofen to control pain, unless another pain medicine was given. If you have chronic liver or kidney disease or ever had a stomach ulcer or gastrointestinal bleeding, talk with your doctor before using these medicines.  · If you were given antibiotics, take them until they are all used up. It is important to finish the antibiotics even if the wound looks better. This will ensure that the infection has cleared.  Follow-up care  Follow up with your healthcare provider, or as advised.  · Watch for any signs of infection, such as increasing redness, swelling, or pus coming out. If this happens, don't wait for your scheduled visit. Instead, see a doctor sooner.  · Stitches or staples are usually taken out within 5 to 14 days. This varies depending on what part of your body they are on, and the type of wound. The doctor will tell you how long stitches should be left in.   · If surgical tape was used, it is usually left on for 7 to 10 days. You can remove surgical tape after that unless you were told otherwise. If you try to remove it, and it is too hard, soaking can help. Surgical tape strips will eventually fall off on their own. If the edges  of the cut pull apart, stop removing the tape or strips and follow up with your doctor  · As mentioned above, skin glue will flake off by itself in 5 to 10 days, so you don't need to pull it off.  If any X-rays were done, you will be notified of any changes that may affect your care.  When to seek medical advice  Call your healthcare provider right away if any of these occur:  · Increasing pain in the wound  · Redness, swelling, or pus coming from the wound  · Fever of 100.4ºF (38ºC) or higher, or as directed by your healthcare provider  · Sutures or staples come apart or fall out before your next appointment and the wound edges look as if they will re-open  · Surgical tape closures fall off before 7 days, and the wound edges look as if they will re-open  · Bleeding not controlled by direct pressure  Date Last Reviewed: 9/1/2016  © 4964-0146 [employer]. [address] [place] [address], [place] [address]. All rights reserved. This information is not intended as a substitute for professional medical care. Always follow your healthcare professional's instructions.

## 2017-05-24 ENCOUNTER — OFFICE VISIT (OUTPATIENT)
Dept: FAMILY MEDICINE | Facility: CLINIC | Age: 57
End: 2017-05-24
Payer: MEDICAID

## 2017-05-24 VITALS
BODY MASS INDEX: 36.88 KG/M2 | HEART RATE: 80 BPM | DIASTOLIC BLOOD PRESSURE: 90 MMHG | WEIGHT: 249 LBS | SYSTOLIC BLOOD PRESSURE: 128 MMHG | HEIGHT: 69 IN | TEMPERATURE: 99 F | OXYGEN SATURATION: 95 %

## 2017-05-24 DIAGNOSIS — E66.01 SEVERE OBESITY (BMI 35.0-39.9) WITH COMORBIDITY: ICD-10-CM

## 2017-05-24 DIAGNOSIS — K21.9 GASTROESOPHAGEAL REFLUX DISEASE, ESOPHAGITIS PRESENCE NOT SPECIFIED: ICD-10-CM

## 2017-05-24 DIAGNOSIS — K59.00 CONSTIPATION, UNSPECIFIED CONSTIPATION TYPE: Primary | ICD-10-CM

## 2017-05-24 DIAGNOSIS — Z72.0 TOBACCO ABUSE: ICD-10-CM

## 2017-05-24 DIAGNOSIS — R19.4 CHANGE IN BOWEL HABIT: ICD-10-CM
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
## 2017-05-24 DIAGNOSIS — S81.811A LACERATION OF LEG, RIGHT, INITIAL ENCOUNTER: ICD-10-CM

## 2017-05-24 DIAGNOSIS — A04.8 POSITIVE H. PYLORI TEST: ICD-10-CM

## 2017-05-24 DIAGNOSIS — R79.89 ABNORMAL CBC: ICD-10-CM

## 2017-05-24 DIAGNOSIS — M25.561 RIGHT KNEE PAIN, UNSPECIFIED CHRONICITY: ICD-10-CM

## 2017-05-24 PROCEDURE — 99999 PR PBB SHADOW E&M-EST. PATIENT-LVL III: CPT | Mod: PBBFAC,,, | Performed by: FAMILY MEDICINE

## 2017-05-24 PROCEDURE — 99213 OFFICE O/P EST LOW 20 MIN: CPT | Mod: PBBFAC,PO | Performed by: FAMILY MEDICINE

## 2017-05-24 PROCEDURE — 99214 OFFICE O/P EST MOD 30 MIN: CPT | Mod: S$PBB,,, | Performed by: FAMILY MEDICINE

## 2017-05-24 RX ORDER — PANTOPRAZOLE SODIUM 40 MG/1
40 TABLET, DELAYED RELEASE ORAL DAILY
Qty: 30 TABLET | Refills: 0 | Status: SHIPPED | OUTPATIENT
Start: 2017-05-24 | End: 2017-07-01 | Stop reason: SDUPTHER

## 2017-05-24 NOTE — PROGRESS NOTES
Subjective:       Patient ID: Shukri Rosales is a 56 y.o. male.    Chief Complaint: Follow-up    HPI   Follow-up  55yo male presents today for follow-up. He continues to report issues with constipation and states that he has not been taking Miralax that was recommended. KUB on 5/15 demonstrated findings of moderate air and feces through the colon and rectum. He has not had a bowel movement in 1 week. Passage of flatus is reported. No significant abdominal discomfort is reported but he notes distention. He continues to report reflux and states that even with PPI use he has intermittent breakthrough symptoms. He does admit to poor diet intake. Sometimes he consumes baking soda for relief. Lab testing is positive for H pylori. He recalls a past EGD but is uncertain as to whether he has been treated in the past for infection. He was evaluated in the ER again since his last visit for a fall. He tripped and sustained two lacerations to the right lower extremity in the process. He is taking Keflex presently and is reporting considerable pain to the right knee- this is the knee he previously underwent ACL repair. No limping is reported. No weakness. His pain is worse with ROM activity. He has been assessed by Ortho post-operatively with reported findings of arthritis.     Review of Systems   Constitutional: Negative for appetite change, fatigue and unexpected weight change.   HENT: Negative for sore throat and trouble swallowing.    Eyes: Negative for pain and visual disturbance.   Respiratory: Negative for cough and shortness of breath.    Cardiovascular: Negative for chest pain and palpitations.   Gastrointestinal: Positive for constipation. Negative for abdominal pain, diarrhea, nausea and vomiting.        +reflux   Endocrine: Negative for polydipsia, polyphagia and polyuria.   Genitourinary: Negative for decreased urine volume and difficulty urinating.   Musculoskeletal: Positive for arthralgias and joint  "swelling. Negative for gait problem.   Skin: Positive for wound. Negative for color change and rash.   Neurological: Positive for numbness. Negative for dizziness, weakness and light-headedness.   Psychiatric/Behavioral: Negative for sleep disturbance. The patient is not nervous/anxious.        Objective:   BP (!) 128/90   Pulse 80   Temp 98.9 °F (37.2 °C) (Oral)   Ht 5' 9" (1.753 m)   Wt 112.9 kg (249 lb)   SpO2 95%   BMI 36.77 kg/m²   Physical Exam   Constitutional: He is oriented to person, place, and time. He appears well-developed. No distress.   Obese, non-toxic   HENT:   Head: Normocephalic and atraumatic.   Right Ear: External ear normal.   Left Ear: External ear normal.   Nose: Nose normal.   Mouth/Throat: Oropharynx is clear and moist.   Eyes: Conjunctivae and EOM are normal. Pupils are equal, round, and reactive to light.   Neck: Normal range of motion. Neck supple.   Cardiovascular: Normal rate, regular rhythm and normal heart sounds.    Pulmonary/Chest: Effort normal and breath sounds normal.   Abdominal: Soft. Bowel sounds are normal. He exhibits no distension.   Musculoskeletal: He exhibits no edema.        Right knee: He exhibits normal range of motion. Tenderness found. Medial joint line tenderness noted. No lateral joint line tenderness noted.   Neurological: He is alert and oriented to person, place, and time.   Skin: Skin is warm and dry. He is not diaphoretic.        Psychiatric: He has a normal mood and affect. His behavior is normal.       Assessment:       1. Constipation, unspecified constipation type    2. Change in bowel habit    3. Positive H. pylori test    4. Gastroesophageal reflux disease, esophagitis presence not specified    5. Uncontrolled type 2 diabetes with neuropathy    6. Abnormal CBC    7. Right knee pain, unspecified chronicity    8. Laceration of leg, right, initial encounter    9. Severe obesity (BMI 35.0-39.9) with comorbidity    10. Fall, initial encounter    11. " Tobacco abuse        Plan:   Constipation, unspecified constipation type  Reviewed use of Miralax and have advised dietary fiber and water intake. Recommend further assessment per GI.  -     Ambulatory Referral to Gastroenterology    Change in bowel habit  As per #1.  -     Ambulatory Referral to Gastroenterology    Positive H. pylori test  Recommend continued use of PPI. Reviewed testing for H pylori. Will defer quadruple therapy at present and have advised proceeding with GI consultation.  -     Ambulatory Referral to Gastroenterology  -     pantoprazole (PROTONIX) 40 MG tablet; Take 1 tablet (40 mg total) by mouth once daily.  Dispense: 30 tablet; Refill: 0    Gastroesophageal reflux disease, esophagitis presence not specified  -     pantoprazole (PROTONIX) 40 MG tablet; Take 1 tablet (40 mg total) by mouth once daily.  Dispense: 30 tablet; Refill: 0    Uncontrolled type 2 diabetes with neuropathy  Patient reports waiting 2 hours for his Endocrine appointment and then leaving without rescheduling. Recommend returning to see Dr. Zuniga in light of poor diabetic control with his current regimen. Will plan on updated labs in August 2017.  -     Hemoglobin A1c; Future; Expected date: 05/24/2017    Abnormal CBC  -     CBC auto differential; Future; Expected date: 05/24/2017    Right knee pain, unspecified chronicity  Recommend a visit with Ortho for reassessment.    Laceration of leg, right, initial encounter  Healing. Reviewed skin care and use of Bactroban TID rather than once daily. Tetanus immunization is UTD. Completion of Keflex use is advised.    Severe obesity (BMI 35.0-39.9) with comorbidity  Weight loss efforts remain encouraged through diet and lifestyle measures.    Fall, initial encounter  As per #8.    Tobacco abuse  Smoking cessation remains advised.    RTC in August with labs prior to the visit, sooner if needed.

## 2017-05-27 DIAGNOSIS — E11.65 TYPE 2 DIABETES MELLITUS WITH HYPERGLYCEMIA: ICD-10-CM

## 2017-05-27 DIAGNOSIS — E11.40 TYPE 2 DIABETES MELLITUS WITH DIABETIC NEUROPATHY: ICD-10-CM

## 2017-05-29 ENCOUNTER — INITIAL CONSULT (OUTPATIENT)
Dept: GASTROENTEROLOGY | Facility: CLINIC | Age: 57
End: 2017-05-29
Payer: MEDICAID

## 2017-05-29 VITALS
HEIGHT: 70 IN | BODY MASS INDEX: 35.92 KG/M2 | SYSTOLIC BLOOD PRESSURE: 140 MMHG | HEART RATE: 76 BPM | WEIGHT: 250.88 LBS | DIASTOLIC BLOOD PRESSURE: 90 MMHG

## 2017-05-29 DIAGNOSIS — R10.13 EPIGASTRIC PAIN: ICD-10-CM

## 2017-05-29 DIAGNOSIS — K21.9 GASTROESOPHAGEAL REFLUX DISEASE, ESOPHAGITIS PRESENCE NOT SPECIFIED: Primary | ICD-10-CM

## 2017-05-29 DIAGNOSIS — K59.00 CONSTIPATION, UNSPECIFIED CONSTIPATION TYPE: ICD-10-CM

## 2017-05-29 DIAGNOSIS — A04.8 POSITIVE H. PYLORI TEST: ICD-10-CM

## 2017-05-29 PROCEDURE — 99999 PR PBB SHADOW E&M-EST. PATIENT-LVL III: CPT | Mod: PBBFAC,,, | Performed by: NURSE PRACTITIONER

## 2017-05-29 PROCEDURE — 99213 OFFICE O/P EST LOW 20 MIN: CPT | Mod: PBBFAC | Performed by: NURSE PRACTITIONER

## 2017-05-29 PROCEDURE — 99204 OFFICE O/P NEW MOD 45 MIN: CPT | Mod: S$PBB,,, | Performed by: NURSE PRACTITIONER

## 2017-05-29 RX ORDER — INSULIN GLARGINE 100 [IU]/ML
INJECTION, SOLUTION SUBCUTANEOUS
Qty: 20 ML | Refills: 0 | Status: SHIPPED | OUTPATIENT
Start: 2017-05-29 | End: 2017-06-07 | Stop reason: ALTCHOICE

## 2017-05-29 RX ORDER — INSULIN ASPART 100 [IU]/ML
INJECTION, SOLUTION INTRAVENOUS; SUBCUTANEOUS
Qty: 20 ML | Refills: 0 | Status: SHIPPED | OUTPATIENT
Start: 2017-05-29 | End: 2017-07-12 | Stop reason: ALTCHOICE

## 2017-05-29 NOTE — LETTER
May 29, 2017      Farhana Burnham MD  [address] [address] [address]           [facility]  [address] [place] [id number]  Phone: [phone]  Fax: [phone]          Patient: Shukri Rosales   MR Number: [id number]   YOB: 1960   Date of Visit: 5/29/2017       Dear Dr. Farhana Burnham:    Thank you for referring Shukri Rosales to me for evaluation. Attached you will find relevant portions of my assessment and plan of care.    If you have questions, please do not hesitate to call me. I look forward to following Shukri Rosales along with you.    Sincerely,    Leeann Farias, NP    Enclosure  CC:  No Recipients    If you would like to receive this communication electronically, please contact [email or web address] or [phone] to request more information on [employer] access.    For providers and/or their staff who would like to refer a patient to [facility], please contact us through our one-stop-shop provider referral line, [facility], at [phone].    If you feel you have received this communication in error or would no longer like to receive these types of communications, please e-mail [email or web address]

## 2017-05-29 NOTE — PROGRESS NOTES
Clinic Consult:  [facility] Consultation Note    Reason for Consult:  The primary encounter diagnosis was Gastroesophageal reflux disease, esophagitis presence not specified. Diagnoses of Epigastric pain, Positive H. pylori test, and Constipation, unspecified constipation type were also pertinent to this visit.    PCP: Farhana Burnham   [address] [address] / [facility] [address]    HPI:  This is a 56 y.o. male here for evaluation of the above. Patient was referred to me by Dr. Burnham. Patient reports having increase in GERD, epigastric pain, and occasional nausea/vomiting. symptoms worsen after eating certain foods, rajendra red sauces. Patient has a history of PUD and has been on Nexium in the past. Patient was started on Protonix about 1 month ago. Patient reports his symptoms have improved some but is still having breakthrough episodes about every 3 days. He recently had a positive H. Pylori IgG test but is unsure of past infection with PUD. He does admit to recent Naproxen use. He takes Naproxen about every 3-4 days. He denies any hematochezia or melena. He also reports constipation. Onset of this was over 5 years ago. He admits to sometimes going a couple of weeks in between bowel movements.    Review of Systems   Constitutional: Negative for fever, malaise/fatigue and weight loss.   HENT: Negative for sore throat.    Respiratory: Negative for cough and wheezing.    Cardiovascular: Negative for chest pain and palpitations.   Gastrointestinal: Positive for abdominal pain (epigastric), constipation, heartburn, nausea and vomiting. Negative for blood in stool, diarrhea and melena.   Genitourinary: Negative for dysuria and frequency.   Musculoskeletal: Negative for back pain, joint pain, myalgias and neck pain.   Skin: Negative for itching and rash.   Neurological: Negative for dizziness, speech change, seizures, loss of consciousness and headaches.   Psychiatric/Behavioral: Negative for  depression and substance abuse. The patient is not nervous/anxious.        Medical History:  has a past medical history of Adrenal cortical adenoma; Anxiety; Arthritis; CKD (chronic kidney disease) stage 3, GFR 30-59 ml/min; Depression; Diabetes mellitus type II (2011); GERD (gastroesophageal reflux disease) (7/9/2013); Hypertension; Migraine headache (7/22/2013); and Severe obesity with body mass index of 36.0 to 36.9.    Surgical History:  has a past surgical history that includes Back surgery; Tonsillectomy; Nasal septum surgery (Bilateral); left arm exfix; and Ureteroscopy.    Family History: family history includes Cancer in his paternal grandfather and paternal grandmother; Diabetes in his brother, mother, and sister; Hypertension in his brother, mother, and sister..     Social History:  reports that he has been smoking Cigarettes.  He has a 48.00 pack-year smoking history. He has never used smokeless tobacco. He reports that he does not drink alcohol or use drugs.    Allergies: Reviewed    Home Medications:   Current Outpatient Prescriptions on File Prior to Visit   Medication Sig Dispense Refill    amlodipine (NORVASC) 5 MG tablet Take 1 tablet (5 mg total) by mouth once daily. 30 tablet 5    blood sugar diagnostic Strp 1 each by Misc.(Non-Drug; Combo Route) route 3 (three) times daily before meals. For One Touch Verio 100 each 11    blood-glucose meter kit One Touch Verio Meter 1 each 0    clonazePAM (KLONOPIN) 0.5 MG tablet TAKE ONE TABLET BY MOUTH TWICE DAILY AS NEEDED MUST LAST 30 DAYS 30 tablet 0    cloNIDine (CATAPRES) 0.3 MG tablet Take 1 tablet (0.3 mg total) by mouth 3 (three) times daily. 90 tablet 5    dulaglutide (TRULICITY) 1.5 mg/0.5 mL PnIj Inject 1.5 mg into the skin every 7 days. 4 Syringe 6    gabapentin (NEURONTIN) 300 MG capsule Take 1 capsule (300 mg total) by mouth 3 (three) times daily. 90 capsule 2    insulin syringe-needle U-100 1 mL 31 gauge x 5/16 Syrg 1 Syringe by  "Misc.(Non-Drug; Combo Route) route once daily. 200 each 11    isosorbide mononitrate (IMDUR) 60 MG 24 hr tablet TAKE 1 TABLET BY MOUTH EVERY DAY 30 tablet 0    lancets (ONETOUCH DELICA LANCETS) 33 gauge Misc 1 lancet by Misc.(Non-Drug; Combo Route) route 3 (three) times daily. 100 each 11    lancets 33 gauge Misc 1 lancet by Misc.(Non-Drug; Combo Route) route 2 (two) times daily. Freestyle freedom lite 100 each 11    LANTUS 100 unit/mL injection ADMINISTER 45 UNITS UNDER THE SKIN EVERY EVENING 20 mL 0    lisinopril-hydrochlorothiazide (PRINZIDE,ZESTORETIC) 20-25 mg Tab TAKE 1 TABLET BY MOUTH EVERY MORNING 30 tablet 5    lurasidone (LATUDA) 60 mg Tab tablet Take 60 mg by mouth once daily.      metformin (GLUCOPHAGE) 500 MG tablet Take 1 tablet (500 mg total) by mouth 2 (two) times daily with meals. 60 tablet 6    metoprolol succinate (TOPROL-XL) 50 MG 24 hr tablet TAKE 1 TABLET(50 MG) BY MOUTH EVERY DAY 30 tablet 11    NOVOLOG 100 unit/mL injection INJECT 20 UNITS INTO THE SKIN THREE TIMES DAILY BEFORE MEALS 20 mL 0    pantoprazole (PROTONIX) 40 MG tablet Take 1 tablet (40 mg total) by mouth once daily. 30 tablet 0    pen needle, diabetic 32 gauge x 5/32" Ndle 1 each by Misc.(Non-Drug; Combo Route) route once daily. 100 each 11    polyethylene glycol (GLYCOLAX) 17 gram/dose powder Take 17 g by mouth once daily. 1 Bottle 1    pravastatin (PRAVACHOL) 20 MG tablet Take 1 tablet (20 mg total) by mouth every evening. 30 tablet 3    quetiapine (SEROQUEL) 200 MG Tab Take 200 mg by mouth once daily.   1    quetiapine (SEROQUEL) 300 MG Tab Take 1 tablet (300 mg total) by mouth once daily. (Patient taking differently: Take 200 mg by mouth once daily. ) 30 tablet 0    sertraline (ZOLOFT) 100 MG tablet Take 2 tablets (200 mg total) by mouth once daily. 30 tablet 0    trazodone (DESYREL) 300 MG tablet TK 1 T PO  QHS  3    TRULICITY 0.75 mg/0.5 mL PnIj INJECT CONTENTS OF 1 SYRINGE UNDER THE SKIN ONCE WEEKLY  5 " "   aspirin 81 MG Chew Take 1 tablet (81 mg total) by mouth once daily. 30 tablet 0    cyclobenzaprine (FLEXERIL) 10 MG tablet Take 1 tablet (10 mg total) by mouth 3 (three) times daily as needed (Muscle pain/soreness). 12 tablet 0    triamcinolone acetonide 0.1% (KENALOG) 0.1 % cream Apply topically 2 (two) times daily. 45 g 0     Current Facility-Administered Medications on File Prior to Visit   Medication Dose Route Frequency Provider Last Rate Last Dose    hylan g-f 20 48 mg/6 mL injection 48 mg  48 mg Intra-articular [facility]        hylan g-f 20 48 mg/6 mL injection 48 mg  48 mg Intra-articular 1 [facility]        hylan g-f 20 48 mg/6 mL injection 48 mg  48 mg Intra-articular 1 time in Clinic/H[facility] Maldonado MD           Physical Exam:  Vital Signs:  BP (!) 140/90   Pulse 76   Ht 5' 10" (1.778 m)   Wt 113.8 kg (250 lb 14.1 oz)   BMI 36.00 kg/m²   Body mass index is 36 kg/m².  Physical Exam   Constitutional: He is oriented to person, place, and time and well-developed, well-nourished, and in no distress. No distress.   HENT:   Head: Normocephalic.   Eyes: Conjunctivae are normal. Pupils are equal, round, and reactive to light.   Cardiovascular: Normal rate, regular rhythm and normal heart sounds.    Pulmonary/Chest: Effort normal and breath sounds normal. No respiratory distress.   Abdominal: Soft. Bowel sounds are normal. He exhibits no distension. There is no tenderness.   Neurological: He is alert and oriented to person, place, and time. No cranial nerve deficit.   Skin: Skin is warm and dry. No rash noted.   Psychiatric: Mood and affect normal.   Vitals reviewed.      Labs: Pertinent labs reviewed.    Endoscopy:  EGD about 15-20 yrs ago. Unknown results.     CRC Screening: Up to date    Assessment:  1. Gastroesophageal reflux disease, esophagitis presence not specified    2. Epigastric pain    3. Positive H. pylori test    4. " Constipation, unspecified constipation type           Recommendations:  Gastroesophageal reflux disease, esophagitis presence not specified  Epigastric pain  Positive H. pylori test  - Patient has several risk factors for PUD with possible infection with h. Pylori, NSAID use, smoker.   - Will schedule EGD for further evaluation  - With patient's history of diabetes, if EGD unrevealing with order GES.   -     Case request GI: ESOPHAGOGASTRODUODENOSCOPY (EGD)    Constipation, unspecified constipation type  - Start Miralax once to twice a day.     Return in about 6 weeks (around 7/10/2017).      Thank you so much for allowing me to participate in the care of Shukri MitchellMOISES Rhoades

## 2017-05-31 ENCOUNTER — OFFICE VISIT (OUTPATIENT)
Dept: ENDOCRINOLOGY | Facility: CLINIC | Age: 57
End: 2017-05-31
Payer: MEDICAID

## 2017-05-31 VITALS
BODY MASS INDEX: 36.1 KG/M2 | HEART RATE: 88 BPM | HEIGHT: 70 IN | DIASTOLIC BLOOD PRESSURE: 90 MMHG | SYSTOLIC BLOOD PRESSURE: 130 MMHG | WEIGHT: 252.19 LBS

## 2017-05-31 DIAGNOSIS — R06.09 DYSPNEA ON EXERTION: ICD-10-CM

## 2017-05-31 DIAGNOSIS — E11.59 HYPERTENSION ASSOCIATED WITH DIABETES: ICD-10-CM

## 2017-05-31 DIAGNOSIS — E11.22 TYPE 2 DIABETES MELLITUS WITH STAGE 4 CHRONIC KIDNEY DISEASE, WITH LONG-TERM CURRENT USE OF INSULIN: Primary | ICD-10-CM

## 2017-05-31 DIAGNOSIS — Z79.4 TYPE 2 DIABETES MELLITUS WITH STAGE 4 CHRONIC KIDNEY DISEASE, WITH LONG-TERM CURRENT USE OF INSULIN: Primary | ICD-10-CM

## 2017-05-31 DIAGNOSIS — R41.0 EPISODE OF CONFUSION: ICD-10-CM

## 2017-05-31 DIAGNOSIS — N18.4 TYPE 2 DIABETES MELLITUS WITH STAGE 4 CHRONIC KIDNEY DISEASE, WITH LONG-TERM CURRENT USE OF INSULIN: Primary | ICD-10-CM

## 2017-05-31 DIAGNOSIS — E78.2 MIXED HYPERLIPIDEMIA: ICD-10-CM

## 2017-05-31 DIAGNOSIS — I15.2 HYPERTENSION ASSOCIATED WITH DIABETES: ICD-10-CM

## 2017-05-31 PROCEDURE — 99999 PR PBB SHADOW E&M-EST. PATIENT-LVL III: CPT | Mod: PBBFAC,,, | Performed by: INTERNAL MEDICINE

## 2017-05-31 PROCEDURE — 99204 OFFICE O/P NEW MOD 45 MIN: CPT | Mod: S$PBB,,, | Performed by: INTERNAL MEDICINE

## 2017-05-31 PROCEDURE — 99213 OFFICE O/P EST LOW 20 MIN: CPT | Mod: PBBFAC,PN | Performed by: INTERNAL MEDICINE

## 2017-05-31 NOTE — LETTER
May 31, 2017      Farhana Burnham MD  [address] [address] [address]           [facility]  [address]  [address]  [facility] [address]  Phone: [phone]  Fax: [phone]          Patient: Shukri Rosales   MR Number: [id number]   YOB: 1960   Date of Visit: 5/31/2017       Dear Dr. Farhana Burnham:    Thank you for referring Shukri Rosales to me for evaluation. Attached you will find relevant portions of my assessment and plan of care.    If you have questions, please do not hesitate to call me. I look forward to following Shukri Rosales along with you.    Sincerely,    Tasha Zuniga MD    Enclosure  CC:  No Recipients    If you would like to receive this communication electronically, please contact [email or web address] or [phone] to request more information on [employer] Link access.    For providers and/or their staff who would like to refer a patient to [facility], please contact us through our one-stop-shop provider referral line, [facility], at [phone].    If you feel you have received this communication in error or would no longer like to receive these types of communications, please e-mail [email or web address]

## 2017-05-31 NOTE — PROGRESS NOTES
"Subjective:       Patient ID: Shukri Rosales is a 56 y.o. male.      Chief Complaint: Diabetes Mellitus    HPI   Shukri Rosales is here for initial evaluation of type 2 Diabetes Mellitus    Consultation requested by Dr. Farhana Glasgow*    PCP: Farhana Burnham MD      Diagnosed: 2009    At the time of diagnosis patient reported initially on oral meds    Has seen Ladan Dowd NP  Lab Results   Component Value Date    HGBA1C 9.1 (H) 05/04/2017    HGBA1C 8.8 (H) 01/04/2017    HGBA1C 7.7 (H) 07/25/2016       Diabetes medications include: Lantus  45  Novolog 20 TID AC   Metformin 500mg BID Trulicity 1.5mg weekly    Admits he "just started back on insulin"  Lantus takes at 8:30 Pm and was taking last Novolog dose at bedtime instead of dinner- didn't take any novolog yesterday    Concerned about gaining weight    Yesterday at 2pm had episode of confusion where he couldn't speak for about an hour- did not eat- symptoms improved spontaneously- did not eat during episode; did not check sugar      Diabetes Complications:nephropathy- sees Dr Natalya Tellez His GFR has worsened from 50 to 60 range to 40 range on recent tests    .car    Side effects of meds: NO  Hypoglycemia: NO    Current symptoms: see ROS      Meal Planning: BF coffee with sugar, lunch is sandwich sometimes two  Dinner is blancas meal-    Diabetes education: YES: 2015       SMBG: Tests BG 2 times a day    Log or meter available: no    Home values if available:  FBG:  Pre-lunch:  Pre-dinner:  Bedtime:  Post Breakfast:  Post Lunch  Post Dinner  Ranges:    Exercise: No walks 100ft and gets back pain, also left knee pain and ESPINAL, seen by Dr Reeves in cardiology last year- never followed up, takes Imdur which was started for ESPINAL  Chemical stress test in 2016 showed no ischemia but he had low EF at 39%     STANDARDS of CARE:        ACE inhibitor or angiotensin II receptor blocker:  Yes        Statin drug:  Yes        Eye exam within " last year: Yes        Influenza vaccine up to date: No        Pneumonia vaccine: Yes - 2012              Review of Systems   Constitutional: Positive for fatigue. Negative for appetite change, diaphoresis, fever and unexpected weight change.   HENT: Negative for sore throat and trouble swallowing.    Eyes: Positive for visual disturbance.   Respiratory: Positive for shortness of breath. Negative for wheezing.         ESPINAL   Cardiovascular: Positive for leg swelling. Negative for chest pain and palpitations.   Gastrointestinal: Negative for abdominal pain, diarrhea, nausea and vomiting.   Endocrine: Positive for polydipsia and polyphagia. Negative for cold intolerance, heat intolerance and polyuria.   Genitourinary: Negative for difficulty urinating and dysuria.   Musculoskeletal: Positive for arthralgias and back pain. Negative for joint swelling.   Skin: Negative for color change and rash.   Neurological: Positive for numbness. Negative for dizziness, tremors and headaches.        Right leg   Hematological: Negative for adenopathy.   Psychiatric/Behavioral: Negative for confusion, dysphoric mood and sleep disturbance. The patient is not nervous/anxious.        Objective:      Physical Exam   Constitutional: He is oriented to person, place, and time. No distress.   HENT:   Head: Normocephalic and atraumatic.   Mouth/Throat: No oropharyngeal exudate.   Eyes: Conjunctivae and EOM are normal. Pupils are equal, round, and reactive to light. No scleral icterus.   Neck: No tracheal deviation present. No thyromegaly present.   Cardiovascular: Normal rate, regular rhythm, normal heart sounds and intact distal pulses.  Exam reveals no gallop and no friction rub.    No murmur heard.  Pulmonary/Chest: Effort normal and breath sounds normal. No respiratory distress. He has no wheezes. He has no rales.   Abdominal: Soft. Bowel sounds are normal. He exhibits no distension and no mass. There is no tenderness. There is no rebound  and no guarding. No hernia.   Musculoskeletal: He exhibits no edema or deformity.   Lymphadenopathy:     He has no cervical adenopathy.   Neurological: He is alert and oriented to person, place, and time. He has normal reflexes. No cranial nerve deficit.   Skin: Skin is warm and dry. No rash noted. He is not diaphoretic. No erythema.   Healing skin lesions right lower leg with scab   Psychiatric: He has a normal mood and affect. His behavior is normal.   Vitals reviewed.    Protective Sensation (w/ 10 gram monofilament):  Right: Intact  Left: Intact    Visual Inspection:  Callus -  Bilateral, Dry Skin -  Bilateral and Onychomycosis -  Bilateral    Pedal Pulses:   Right: Present  Left: Present    Posterior tibialis:   Right:Present  Left: Present        Lab Review:     No components found for: AGBA1C  Lab Results   Component Value Date    CHOL 137 05/04/2017    TRIG 164 (H) 05/04/2017    HDL 29 (L) 05/04/2017    CHOLHDL 21.2 05/04/2017    TOTALCHOLEST 4.7 05/04/2017    NONHDLCHOL 108 05/04/2017     BMP  Lab Results   Component Value Date     (L) 05/04/2017     (L) 05/04/2017    K 4.0 05/04/2017    K 4.0 05/04/2017    CL 98 05/04/2017    CL 98 05/04/2017    CO2 27 05/04/2017    CO2 27 05/04/2017    BUN 19 05/04/2017    BUN 19 05/04/2017    CREATININE 1.6 (H) 05/04/2017    CREATININE 1.6 (H) 05/04/2017    CALCIUM 8.7 05/04/2017    CALCIUM 8.7 05/04/2017    ANIONGAP 10 05/04/2017    ANIONGAP 10 05/04/2017    ESTGFRAFRICA 54.1 (A) 05/04/2017    ESTGFRAFRICA 54.1 (A) 05/04/2017    EGFRNONAA 46.8 (A) 05/04/2017    EGFRNONAA 46.8 (A) 05/04/2017     Lab Results   Component Value Date    WBC 9.69 05/15/2017    HGB 14.2 05/15/2017    HCT 42.2 05/15/2017    MCV 88 05/15/2017     (H) 05/15/2017     Lab Results   Component Value Date    CREATRANDUR 178.0 01/04/2017    MICALBCREAT 18.0 01/04/2017           Assessment:     1. Type 2 diabetes mellitus with stage 4 chronic kidney disease, with long-term current use  of insulin      likely worsened due to noncompliance with insulin- encouraged compliance, discussed mechanism of insulin, Novolog in AC, work on portions, balanced meals   2. Hypertension associated with diabetes      on ARB and other meds that have been managed by nephrology and PCP   3. Mixed hyperlipidemia     4. Episode of confusion      unclear etiology- recommended seeking emergency treatment if reoccurs as may be stoke symtom, on aspirin, multiple risk factors, check sugar during episode also   5. Dyspnea on exertion      On imdur started by cardiology, had EF 39%, overdue for f/u with cardiology        Discussed the pathophysiology, complications, and management of diabetes and the importance of adhering to treatment goals and plans  Plan:   Type 2 diabetes mellitus with stage 4 chronic kidney disease, with long-term current use of insulin  Comments:  likely worsened due to noncompliance with insulin- encouraged compliance, discussed mechanism of insulin, Novolog in AC, work on portions, balanced meals    Hypertension associated with diabetes  Comments:  on ARB and other meds that have been managed by nephrology and PCP    Mixed hyperlipidemia    Episode of confusion  Comments:  unclear etiology- recommended seeking emergency treatment if reoccurs as may be stoke symtom, on aspirin, multiple risk factors, check sugar during episode also    Dyspnea on exertion  Comments:  On imdur started by cardiology, had EF 39%, overdue for f/u with cardiology        1.  Rx changes: none  See above- pt was noncompliant with insulin doses  2.  Education: Reviewed ABCs of diabetes management (respective goals in parentheses):  A1C (<7), blood pressure (<130/80), and cholesterol (LDL <100).  3.  Compliance at present is estimated to be inadequate. Efforts to improve compliance (if necessary) will be directed at dietary modifications: more balanced meals, watch portions, plate method and regular blood sugar monitorin  times daily.    Return in about 4 weeks (around 6/28/2017). Bring log and meter  This note is unavailable for viewing online. Certain specialties, including Behavioral Health and Pain Management, are currently not included in the open progress note program. For notes unavailable online, you can request a copy of your medical record.         Thank you to Dr. Farhana Echeverria for this consultation

## 2017-06-01 ENCOUNTER — ANESTHESIA EVENT (OUTPATIENT)
Dept: ENDOSCOPY | Facility: HOSPITAL | Age: 57
End: 2017-06-01
Payer: MEDICAID

## 2017-06-01 ENCOUNTER — SURGERY (OUTPATIENT)
Age: 57
End: 2017-06-01

## 2017-06-01 ENCOUNTER — ANESTHESIA (OUTPATIENT)
Dept: ENDOSCOPY | Facility: HOSPITAL | Age: 57
End: 2017-06-01
Payer: MEDICAID

## 2017-06-01 ENCOUNTER — HOSPITAL ENCOUNTER (OUTPATIENT)
Facility: HOSPITAL | Age: 57
Discharge: HOME | End: 2017-06-01
Attending: INTERNAL MEDICINE | Admitting: INTERNAL MEDICINE
Payer: MEDICAID

## 2017-06-01 VITALS
OXYGEN SATURATION: 99 % | HEART RATE: 68 BPM | SYSTOLIC BLOOD PRESSURE: 120 MMHG | DIASTOLIC BLOOD PRESSURE: 82 MMHG | RESPIRATION RATE: 18 BRPM | TEMPERATURE: 97 F | HEIGHT: 70 IN | WEIGHT: 250 LBS | BODY MASS INDEX: 35.79 KG/M2

## 2017-06-01 DIAGNOSIS — R10.13 EPIGASTRIC PAIN: ICD-10-CM

## 2017-06-01 LAB — POCT GLUCOSE: 147 MG/DL (ref 70–110)

## 2017-06-01 PROCEDURE — 43239 EGD BIOPSY SINGLE/MULTIPLE: CPT | Mod: ,,, | Performed by: INTERNAL MEDICINE

## 2017-06-01 PROCEDURE — 88305 TISSUE EXAM BY PATHOLOGIST: CPT | Performed by: PATHOLOGY

## 2017-06-01 PROCEDURE — 25000003 PHARM REV CODE 250: Performed by: NURSE ANESTHETIST, CERTIFIED REGISTERED

## 2017-06-01 PROCEDURE — 43239 EGD BIOPSY SINGLE/MULTIPLE: CPT | Performed by: INTERNAL MEDICINE

## 2017-06-01 PROCEDURE — 82962 GLUCOSE BLOOD TEST: CPT | Performed by: INTERNAL MEDICINE

## 2017-06-01 PROCEDURE — 37000009 HC ANESTHESIA EA ADD 15 MINS: Performed by: INTERNAL MEDICINE

## 2017-06-01 PROCEDURE — 63600175 PHARM REV CODE 636 W HCPCS: Performed by: NURSE ANESTHETIST, CERTIFIED REGISTERED

## 2017-06-01 PROCEDURE — 25000003 PHARM REV CODE 250: Performed by: INTERNAL MEDICINE

## 2017-06-01 PROCEDURE — 37000008 HC ANESTHESIA 1ST 15 MINUTES: Performed by: INTERNAL MEDICINE

## 2017-06-01 PROCEDURE — 88305 TISSUE EXAM BY PATHOLOGIST: CPT | Mod: 26,,, | Performed by: PATHOLOGY

## 2017-06-01 PROCEDURE — 27201012 HC FORCEPS, HOT/COLD, DISP: Performed by: INTERNAL MEDICINE

## 2017-06-01 RX ORDER — LIDOCAINE HYDROCHLORIDE 20 MG/ML
INJECTION, SOLUTION EPIDURAL; INFILTRATION; INTRACAUDAL; PERINEURAL
Status: DISCONTINUED | OUTPATIENT
Start: 2017-06-01 | End: 2017-06-01

## 2017-06-01 RX ORDER — PROPOFOL 10 MG/ML
VIAL (ML) INTRAVENOUS
Status: DISCONTINUED | OUTPATIENT
Start: 2017-06-01 | End: 2017-06-01

## 2017-06-01 RX ORDER — GLYCOPYRROLATE 0.2 MG/ML
INJECTION INTRAMUSCULAR; INTRAVENOUS
Status: DISCONTINUED | OUTPATIENT
Start: 2017-06-01 | End: 2017-06-01

## 2017-06-01 RX ORDER — SODIUM CHLORIDE, SODIUM LACTATE, POTASSIUM CHLORIDE, CALCIUM CHLORIDE 600; 310; 30; 20 MG/100ML; MG/100ML; MG/100ML; MG/100ML
INJECTION, SOLUTION INTRAVENOUS CONTINUOUS
Status: DISCONTINUED | OUTPATIENT
Start: 2017-06-01 | End: 2017-06-01 | Stop reason: HOSPADM

## 2017-06-01 RX ADMIN — GLYCOPYRROLATE 0.2 MG: 0.2 INJECTION INTRAMUSCULAR; INTRAVENOUS at 07:06

## 2017-06-01 RX ADMIN — PROPOFOL 20 MG: 10 INJECTION, EMULSION INTRAVENOUS at 07:06

## 2017-06-01 RX ADMIN — PROPOFOL 50 MG: 10 INJECTION, EMULSION INTRAVENOUS at 07:06

## 2017-06-01 RX ADMIN — LIDOCAINE HYDROCHLORIDE 100 MG: 20 INJECTION, SOLUTION EPIDURAL; INFILTRATION; INTRACAUDAL; PERINEURAL at 07:06

## 2017-06-01 RX ADMIN — PROPOFOL 100 MG: 10 INJECTION, EMULSION INTRAVENOUS at 07:06

## 2017-06-01 NOTE — TRANSFER OF CARE
"Anesthesia Transfer of Care Note    Patient: Shukri Rosales    Procedure(s) Performed: Procedure(s) (LRB):  ESOPHAGOGASTRODUODENOSCOPY (EGD) (N/A)    Patient location: PACU    Anesthesia Type: MAC    Transport from OR: Transported from OR on room air with adequate spontaneous ventilation    Post pain: adequate analgesia    Post assessment: no apparent anesthetic complications and tolerated procedure well    Post vital signs: stable    Level of consciousness: awake    Nausea/Vomiting: no nausea/vomiting    Complications: none    Transfer of care protocol was followed      Last vitals:   Visit Vitals  BP (!) 134/98   Pulse 63   Temp 36 °C (96.8 °F)   Resp 14   Ht 5' 10" (1.778 m)   Wt 113.4 kg (250 lb)   SpO2 (!) 93%   BMI 35.87 kg/m²     "

## 2017-06-01 NOTE — ANESTHESIA POSTPROCEDURE EVALUATION
"Anesthesia Post Evaluation    Patient: Shukri Rosales    Procedure(s) Performed: Procedure(s) (LRB):  ESOPHAGOGASTRODUODENOSCOPY (EGD) (N/A)    Final Anesthesia Type: MAC  Patient location during evaluation: PACU  Patient participation: Yes- Able to Participate  Level of consciousness: awake and alert and oriented  Post-procedure vital signs: reviewed and stable  Pain management: adequate  Airway patency: patent  PONV status at discharge: No PONV  Anesthetic complications: no      Cardiovascular status: blood pressure returned to baseline, hemodynamically stable and stable  Respiratory status: unassisted, spontaneous ventilation and room air  Hydration status: euvolemic  Follow-up not needed.        Visit Vitals  BP (!) 134/98   Pulse 63   Temp 36 °C (96.8 °F)   Resp 14   Ht 5' 10" (1.778 m)   Wt 113.4 kg (250 lb)   SpO2 (!) 93%   BMI 35.87 kg/m²       Pain/Reginald Score: Pain Assessment Performed: Yes (6/1/2017  6:43 AM)  Presence of Pain: complains of pain/discomfort (6/1/2017  6:43 AM)  Reginald Score: 9 (6/1/2017  7:20 AM)      "

## 2017-06-01 NOTE — H&P (VIEW-ONLY)
Clinic Consult:  [facility] Consultation Note    Reason for Consult:  The primary encounter diagnosis was Gastroesophageal reflux disease, esophagitis presence not specified. Diagnoses of Epigastric pain, Positive H. pylori test, and Constipation, unspecified constipation type were also pertinent to this visit.    PCP: Farhana Burnham   [id number] [id number] / [facility] [id number]    HPI:  This is a 56 y.o. male here for evaluation of the above. Patient was referred to me by Dr. Burnham. Patient reports having increase in GERD, epigastric pain, and occasional nausea/vomiting. symptoms worsen after eating certain foods, rajendra red sauces. Patient has a history of PUD and has been on Nexium in the past. Patient was started on Protonix about 1 month ago. Patient reports his symptoms have improved some but is still having breakthrough episodes about every 3 days. He recently had a positive H. Pylori IgG test but is unsure of past infection with PUD. He does admit to recent Naproxen use. He takes Naproxen about every 3-4 days. He denies any hematochezia or melena. He also reports constipation. Onset of this was over 5 years ago. He admits to sometimes going a couple of weeks in between bowel movements.    Review of Systems   Constitutional: Negative for fever, malaise/fatigue and weight loss.   HENT: Negative for sore throat.    Respiratory: Negative for cough and wheezing.    Cardiovascular: Negative for chest pain and palpitations.   Gastrointestinal: Positive for abdominal pain (epigastric), constipation, heartburn, nausea and vomiting. Negative for blood in stool, diarrhea and melena.   Genitourinary: Negative for dysuria and frequency.   Musculoskeletal: Negative for back pain, joint pain, myalgias and neck pain.   Skin: Negative for itching and rash.   Neurological: Negative for dizziness, speech change, seizures, loss of consciousness and headaches.   Psychiatric/Behavioral: Negative for  depression and substance abuse. The patient is not nervous/anxious.        Medical History:  has a past medical history of Adrenal cortical adenoma; Anxiety; Arthritis; CKD (chronic kidney disease) stage 3, GFR 30-59 ml/min; Depression; Diabetes mellitus type II (2011); GERD (gastroesophageal reflux disease) (7/9/2013); Hypertension; Migraine headache (7/22/2013); and Severe obesity with body mass index of 36.0 to 36.9.    Surgical History:  has a past surgical history that includes Back surgery; Tonsillectomy; Nasal septum surgery (Bilateral); left arm exfix; and Ureteroscopy.    Family History: family history includes Cancer in his paternal grandfather and paternal grandmother; Diabetes in his brother, mother, and sister; Hypertension in his brother, mother, and sister..     Social History:  reports that he has been smoking Cigarettes.  He has a 48.00 pack-year smoking history. He has never used smokeless tobacco. He reports that he does not drink alcohol or use drugs.    Allergies: Reviewed    Home Medications:   Current Outpatient Prescriptions on File Prior to Visit   Medication Sig Dispense Refill    amlodipine (NORVASC) 5 MG tablet Take 1 tablet (5 mg total) by mouth once daily. 30 tablet 5    blood sugar diagnostic Strp 1 each by Misc.(Non-Drug; Combo Route) route 3 (three) times daily before meals. For One Touch Verio 100 each 11    blood-glucose meter kit One Touch Verio Meter 1 each 0    clonazePAM (KLONOPIN) 0.5 MG tablet TAKE ONE TABLET BY MOUTH TWICE DAILY AS NEEDED MUST LAST 30 DAYS 30 tablet 0    cloNIDine (CATAPRES) 0.3 MG tablet Take 1 tablet (0.3 mg total) by mouth 3 (three) times daily. 90 tablet 5    dulaglutide (TRULICITY) 1.5 mg/0.5 mL PnIj Inject 1.5 mg into the skin every 7 days. 4 Syringe 6    gabapentin (NEURONTIN) 300 MG capsule Take 1 capsule (300 mg total) by mouth 3 (three) times daily. 90 capsule 2    insulin syringe-needle U-100 1 mL 31 gauge x 5/16 Syrg 1 Syringe by  "Misc.(Non-Drug; Combo Route) route once daily. 200 each 11    isosorbide mononitrate (IMDUR) 60 MG 24 hr tablet TAKE 1 TABLET BY MOUTH EVERY DAY 30 tablet 0    lancets (ONETOUCH DELICA LANCETS) 33 gauge Misc 1 lancet by Misc.(Non-Drug; Combo Route) route 3 (three) times daily. 100 each 11    lancets 33 gauge Misc 1 lancet by Misc.(Non-Drug; Combo Route) route 2 (two) times daily. Freestyle freedom lite 100 each 11    LANTUS 100 unit/mL injection ADMINISTER 45 UNITS UNDER THE SKIN EVERY EVENING 20 mL 0    lisinopril-hydrochlorothiazide (PRINZIDE,ZESTORETIC) 20-25 mg Tab TAKE 1 TABLET BY MOUTH EVERY MORNING 30 tablet 5    lurasidone (LATUDA) 60 mg Tab tablet Take 60 mg by mouth once daily.      metformin (GLUCOPHAGE) 500 MG tablet Take 1 tablet (500 mg total) by mouth 2 (two) times daily with meals. 60 tablet 6    metoprolol succinate (TOPROL-XL) 50 MG 24 hr tablet TAKE 1 TABLET(50 MG) BY MOUTH EVERY DAY 30 tablet 11    NOVOLOG 100 unit/mL injection INJECT 20 UNITS INTO THE SKIN THREE TIMES DAILY BEFORE MEALS 20 mL 0    pantoprazole (PROTONIX) 40 MG tablet Take 1 tablet (40 mg total) by mouth once daily. 30 tablet 0    pen needle, diabetic 32 gauge x 5/32" Ndle 1 each by Misc.(Non-Drug; Combo Route) route once daily. 100 each 11    polyethylene glycol (GLYCOLAX) 17 gram/dose powder Take 17 g by mouth once daily. 1 Bottle 1    pravastatin (PRAVACHOL) 20 MG tablet Take 1 tablet (20 mg total) by mouth every evening. 30 tablet 3    quetiapine (SEROQUEL) 200 MG Tab Take 200 mg by mouth once daily.   1    quetiapine (SEROQUEL) 300 MG Tab Take 1 tablet (300 mg total) by mouth once daily. (Patient taking differently: Take 200 mg by mouth once daily. ) 30 tablet 0    sertraline (ZOLOFT) 100 MG tablet Take 2 tablets (200 mg total) by mouth once daily. 30 tablet 0    trazodone (DESYREL) 300 MG tablet TK 1 T PO  QHS  3    TRULICITY 0.75 mg/0.5 mL PnIj INJECT CONTENTS OF 1 SYRINGE UNDER THE SKIN ONCE WEEKLY  5 " "   aspirin 81 MG Chew Take 1 tablet (81 mg total) by mouth once daily. 30 tablet 0    cyclobenzaprine (FLEXERIL) 10 MG tablet Take 1 tablet (10 mg total) by mouth 3 (three) times daily as needed (Muscle pain/soreness). 12 tablet 0    triamcinolone acetonide 0.1% (KENALOG) 0.1 % cream Apply topically 2 (two) times daily. 45 g 0     Current Facility-Administered Medications on File Prior to Visit   Medication Dose Route Frequency Provider Last Rate Last Dose    hylan g-f 20 48 mg/6 mL injection 48 mg  48 mg Intra-articular [facility]        hylan g-f 20 48 mg/6 mL injection 48 mg  48 mg Intra-articular 1 [facility]        hylan g-f 20 48 mg/6 mL injection 48 mg  48 mg Intra-articular 1 time in Clinic/HOD [facility] Maldonado MD           Physical Exam:  Vital Signs:  BP (!) 140/90   Pulse 76   Ht 5' 10" (1.778 m)   Wt 113.8 kg (250 lb 14.1 oz)   BMI 36.00 kg/m²   Body mass index is 36 kg/m².  Physical Exam   Constitutional: He is oriented to person, place, and time and well-developed, well-nourished, and in no distress. No distress.   HENT:   Head: Normocephalic.   Eyes: Conjunctivae are normal. Pupils are equal, round, and reactive to light.   Cardiovascular: Normal rate, regular rhythm and normal heart sounds.    Pulmonary/Chest: Effort normal and breath sounds normal. No respiratory distress.   Abdominal: Soft. Bowel sounds are normal. He exhibits no distension. There is no tenderness.   Neurological: He is alert and oriented to person, place, and time. No cranial nerve deficit.   Skin: Skin is warm and dry. No rash noted.   Psychiatric: Mood and affect normal.   Vitals reviewed.      Labs: Pertinent labs reviewed.    Endoscopy:  EGD about 15-20 yrs ago. Unknown results.     CRC Screening: Up to date    Assessment:  1. Gastroesophageal reflux disease, esophagitis presence not specified    2. Epigastric pain    3. Positive H. pylori test    4. " Constipation, unspecified constipation type           Recommendations:  Gastroesophageal reflux disease, esophagitis presence not specified  Epigastric pain  Positive H. pylori test  - Patient has several risk factors for PUD with possible infection with h. Pylori, NSAID use, smoker.   - Will schedule EGD for further evaluation  - With patient's history of diabetes, if EGD unrevealing with order GES.   -     Case request GI: ESOPHAGOGASTRODUODENOSCOPY (EGD)    Constipation, unspecified constipation type  - Start Miralax once to twice a day.     Return in about 6 weeks (around 7/10/2017).      Thank you so much for allowing me to participate in the care of Shukri MitchellMOISES Rhoades

## 2017-06-01 NOTE — ANESTHESIA RELEASE NOTE
"Anesthesia Release from PACU Note    Patient: Shukri Rosales    Procedure(s) Performed: Procedure(s) (LRB):  ESOPHAGOGASTRODUODENOSCOPY (EGD) (N/A)    Anesthesia type: MAC    Post pain: Adequate analgesia    Post assessment: no apparent anesthetic complications, tolerated procedure well and no evidence of recall    Last Vitals:   Visit Vitals  BP (!) 134/98   Pulse 63   Temp 36 °C (96.8 °F)   Resp 14   Ht 5' 10" (1.778 m)   Wt 113.4 kg (250 lb)   SpO2 (!) 93%   BMI 35.87 kg/m²       Post vital signs: stable    Level of consciousness: awake, alert  and oriented    Nausea/Vomiting: no nausea/no vomiting    Complications: none    Airway Patency: patent    Respiratory: unassisted, spontaneous ventilation, room air    Cardiovascular: stable and blood pressure at baseline    Hydration: euvolemic  "

## 2017-06-01 NOTE — DISCHARGE INSTRUCTIONS
What Is Schafer Esophagus?          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 This means that there have been changes to the lining of the esophagus near the stomach. The changes may have been caused by the acid reflux that happens with GERD (gastroesophageal reflux disease). The changed lining is not cancerous, but may increase your chances of developing cancer later on.      When you have GERD  The esophagus is the tube that carries food and liquid from the mouth to the stomach. Your lower esophageal sphincter (LES) is a one-way valve at the top of the stomach. It keeps food and stomach acid from flowing backward. If the LES is weakened, food and stomach acid flow back (reflux) into your esophagus. If this happens often, the condition is called GERD.  Changes in the lining  The stomach is kept safe from its own acid by a special lining. The esophagus isnt meant to contact stomach acid. With GERD, acid flows back into the esophagus often. This damages the esophagus. In response to the damage, new tissue forms that is not normal. This is Schafer esophagus. The new tissue may keep changing. This is why it is more likely to become cancer in the future.  Preventing further damage  Your healthcare provider may suggest regular tests to keep track of changes in the esophagus. This usually includes an endoscopy, when a flexible lighted scope is placed through the mouth into the esophagus. Biopsies (tissue samples) can be taken of the abnormal areas. You are usually sedated with an IV medicine for comfort. He or she may also suggest ways for you to control GERD. This includes lifestyle changes, medicine, or even surgery. This should help keep your Schafer esophagus from getting worse.  Symptoms of GERD  Symptoms include the following:  · Heartburn  · Sour-tasting fluid backing up into your mouth  · Frequent burping or belching  · Symptoms that get worse after you eat, bend over, or lie down  · Coughing repeatedly to clear your  throat  · Hoarseness   Date Last Reviewed: 6/1/2016  © 7869-1329 [employer]. [address] [place] [address], [place] [address]. All rights reserved. This information is not intended as a substitute for professional medical care. Always follow your healthcare professional's instructions.        Duodenitis    The duodenum is the first part of the small intestine, just past the stomach. Duodenitis is inflammation of the lining of the duodenum. This sheet tells you more about the condition.  Causes of duodenitis  The most common cause of duodenitis is infection by Helicobacter pylori (H. pylori) bacteria. Another common cause is long-term use of NSAIDs (such as aspirin and ibuprofen). Celiac disease, an allergy to gluten, causes a particular type of inflammation in the duodenum along with other changes. Less commonly, duodenitis happens along with another health problem, such as Crohn's disease. Drinking alcohol, smoking, or taking certain medicines also may make duodenitis more likely to happen.   Symptoms of duodenitis  The condition may cause no symptoms. If symptoms do happen, they can include:  · Burning, cramping, or hunger-like pain in your stomach  · Gas or a bloated feeling  · Nausea and vomiting  · Feeling full soon after starting a meal  Diagnosing duodenitis  Here is what will be done to diagnose duodenitis:  · If duodenitis is suspected, an upper endoscopy with biopsy will be done to confirm it. During this test, a thin, flexible tube with a light and camera on the end (endoscope) is used. The scope is moved down your throat to the stomach and into the duodenum. The scope sends images of the duodenum to a video screen. Small samples (biopsy) of the lining of the duodenum may be taken. These samples may be sent to a lab for testing for H. pylori.  · To check for H. pylori or other pathogens, a blood, stool, gastric biopsy, or breath test may be done. Samples of blood or stool are taken and tested in a  lab. For a breath test, you swallow a harmless compound. If H. pylori bacteria are present, extra carbon dioxide gas can then be detected in your breath.  · To check for celiac disease, blood tests may be done. If positive, an upper endoscopy with biopsy is usually done to confirm the diagnosis.   · In rare cases, an upper gastrointestinal (GI) series is done. This gives more information about the digestive tract. This procedure takes X-rays of the upper GI tract from the mouth to the small intestine.  Treating duodenitis  Duodenitis is treated using one or more of the following:  · Antibiotic medicines to kill H. pylori  · Medicines to reduce the amount of acid the stomach makes  · Stopping NSAIDs such as aspirin and ibuprofen. However, if you take aspirin for a medical condition, such as heart disease or stroke, do not stop until you check with your prescribing healthcare provider. If you take NSAIDs for arthritis or pain, check with your healthcare provider about alternatives.  · Adopting a gluten-free diet if celiac disease is the cause.  · Avoiding alcohol  · Stopping smoking  Your healthcare provider can tell you more about what treatments are needed.  Recovery and follow-up  With treatment, most cases of duodenitis clear up completely. In rare cases, duodenitis can be an ongoing (chronic) problem or can develop into a duodenal ulcer. If your symptoms do not improve or if they go away and come back, let your healthcare provider know. In such cases, regular healthcare provider visits and treatments are needed to manage your condition.   When to call the healthcare provider  Call your healthcare provider right away if you have any of the following:  · Fever of 100.4°F (38.0°C) or higher, or as advised by your healthcare provider  · Nausea or vomiting (vomit may be bloody or look like coffee grounds)  · Dark, tarry, or bloody stools  · Sudden or severe stomach pain  · Pain that does not improve with  treatment  · Rapid weight loss   Date Last Reviewed: 7/1/2016  © 5296-5078 [employer]. [address] [place] [address], [place] [address]. All rights reserved. This information is not intended as a substitute for professional medical care. Always follow your healthcare professional's instructions.        Gastritis (Adult)    Gastritis is inflammation and irritation of the stomach lining. It can be present for a short time (acute) or be long lasting (chronic). Gastritis is often caused by infection with bacteria called H pylori. More than a third of people in the  have this bacteria in their bodies. In many cases, H pylori causes no problems or symptoms. In some people, though, the infection irritates the stomach lining and causes gastritis. Other causes of stomach irritation include drinking alcohol or taking pain-relieving medicines called NSAIDs (such as aspirin or ibuprofen).   Symptoms of gastritis can include:  · Abdominal pain or bloating  · Loss of appetite  · Nausea or vomiting  · Vomiting blood or having black stools  · Feeling more tired than usual  An inflamed and irritated stomach lining is more likely to develop a sore called an ulcer. To help prevent this, gastritis should be treated.  Home care  If needed, medicines may be prescribed. If you have H pylori infection, treating it will likely relieve your symptoms. Other changes can help reduce stomach irritation and help it heal.  · If you have been prescribed medicines for H pylori infection, take them as directed. Take all of the medicine until it is finished or your healthcare provider tells you to stop, even if you feel better.  · Your healthcare provider may recommend avoiding NSAIDs. If you take daily aspirin for your heart or other medical reasons, do not stop without talking to your healthcare provider first.  · Avoid drinking alcohol.  · Stop smoking. Smoking can irritate the stomach and delay healing. As much as possible, stay away  from second hand smoke.  Follow-up care  Follow up with your healthcare provider, or as advised by our staff. Testing may be needed to check for inflammation or an ulcer.  When to seek medical advice  Call your healthcare provider for any of the following:  · Stomach pain that gets worse or moves to the lower right abdomen (appendix area)  · Chest pain that appears or gets worse, or spreads to the back, neck, shoulder, or arm  · Frequent vomiting (cant keep down liquids)  · Blood in the stool or vomit (red or black in color)  · Feeling weak or dizzy  · Fever of 100.4ºF (38ºC) or higher, or as directed by your healthcare provider  Date Last Reviewed: 6/22/2015  © 2988-3259 [employer]. [address] [place] [address], [place] [address]. All rights reserved. This information is not intended as a substitute for professional medical care. Always follow your healthcare professional's instructions.

## 2017-06-01 NOTE — ANESTHESIA PREPROCEDURE EVALUATION
06/01/2017  Shukri Rosales is a 56 y.o., male.    Pre-op Assessment    I have reviewed the Patient Summary Reports.     I have reviewed the Nursing Notes.   I have reviewed the Medications.     Review of Systems  Anesthesia Hx:  No problems with previous Anesthesia  History of prior surgery of interest to airway management or planning: Denies Family Hx of Anesthesia complications.   Denies Personal Hx of Anesthesia complications.   Social:  Smoker, No Alcohol Use 1 ppd X 45 years   Cardiovascular:   Exercise tolerance: good Hypertension ESPINAL    Pulmonary:   Sleep Apnea, CPAP    Renal/:   Chronic Renal Disease, CRI    Hepatic/GI:   GERD, poorly controlled Denies Liver Disease.    Neurological:   Headaches    Endocrine:   Diabetes, well controlled, type 2    Psych:   anxiety          Physical Exam  General:  Obesity      Dental:  Dental Findings: Edentulous   Chest/Lungs:  Chest/Lungs Findings: Clear to auscultation     Heart/Vascular:  Heart Findings: Rate: Normal  Rhythm: Regular Rhythm        Mental Status:  Mental Status Findings:  Cooperative         Anesthesia Plan  Type of Anesthesia, risks & benefits discussed:  Anesthesia Type:  MAC  Patient's Preference:   Intra-op Monitoring Plan:   Intra-op Monitoring Plan Comments:   Post Op Pain Control Plan:   Post Op Pain Control Plan Comments:   Induction:   IV  Beta Blocker:         Informed Consent: Patient understands risks and agrees with Anesthesia plan.  Questions answered. Anesthesia consent signed with patient.  ASA Score: 3     Day of Surgery Review of History & Physical: I have interviewed and examined the patient. I have reviewed the patient's H&P dated:  There are no significant changes.          Ready For Surgery From Anesthesia Perspective.

## 2017-06-01 NOTE — PLAN OF CARE
Dr Yanez came to bedside and discussed findings. NO N/V,  no abdominal pain, no GI bleeding, and vitals stable.  Pt discharged from unit.

## 2017-06-01 NOTE — DISCHARGE SUMMARY
[facility]  Brief Operative Note     SUMMARY     Surgery Date: 6/1/2017     Surgeon(s) and Role:     * Brandan Yanez MD - Primary    Assisting Surgeon: None    Pre-op Diagnosis:  Epigastric pain [R10.13]  Positive H. pylori test [A04.8]  Gastroesophageal reflux disease, esophagitis presence not specified [K21.9]    Post-op Diagnosis:  Post-Op Diagnosis Codes:     * Epigastric pain [R10.13]     * Positive H. pylori test [A04.8]     * Gastroesophageal reflux disease, esophagitis presence not specified [K21.9]    Procedure(s) (LRB):  ESOPHAGOGASTRODUODENOSCOPY (EGD) (N/A)    Anesthesia: Monitor Anesthesia Care    Description of the findings of the procedure: Procedure completed. See Procedure note for details.     Findings/Key Components: Procedure completed. See Procedure note for details.     Prosthesis/Implants: None    Estimated Blood Loss: less than 10         Specimens:   Specimen (12h ago through future)    Start     Ordered    06/01/17 0712  Specimen to Pathology - Surgery  Once     Comments:  1. Gastric biopsies for gastritis R/O H Pylori2. Esophageal biopsies at 45 cm R/O Schafer's 3. Esophageal biopsies at 38 cm R/O Schafer's      06/01/17 0714          Discharge Note    SUMMARY     Admit Date: 6/1/2017    Discharge Date and Time:  06/01/2017 7:16 AM    Hospital Course (synopsis of major diagnoses, care, treatment, and services provided during the course of the hospital stay): Procedure completed. See Procedure note for details.      Final Diagnosis: Post-Op Diagnosis Codes:     * Epigastric pain [R10.13]     * Positive H. pylori test [A04.8]     * Gastroesophageal reflux disease, esophagitis presence not specified [K21.9]    Disposition: Home or Self Care    Follow Up/Patient Instructions:     Medications:  Reconciled Home Medications:   Current Discharge Medication List      CONTINUE these medications which have NOT CHANGED    Details   amlodipine (NORVASC) 5 MG tablet Take 1 tablet  (5 mg total) by mouth once daily.  Qty: 30 tablet, Refills: 5    Associated Diagnoses: Essential hypertension      blood sugar diagnostic Strp 1 each by Misc.(Non-Drug; Combo Route) route 3 (three) times daily before meals. For One Touch Verio  Qty: 100 each, Refills: 11      blood-glucose meter kit One Touch Verio Meter  Qty: 1 each, Refills: 0      clonazePAM (KLONOPIN) 0.5 MG tablet TAKE ONE TABLET BY MOUTH TWICE DAILY AS NEEDED MUST LAST 30 DAYS  Qty: 30 tablet, Refills: 0      cloNIDine (CATAPRES) 0.3 MG tablet Take 1 tablet (0.3 mg total) by mouth 3 (three) times daily.  Qty: 90 tablet, Refills: 5    Associated Diagnoses: Essential hypertension      dulaglutide (TRULICITY) 1.5 mg/0.5 mL PnIj Inject 1.5 mg into the skin every 7 days.  Qty: 4 Syringe, Refills: 6    Associated Diagnoses: Type 2 diabetes mellitus with diabetic neuropathy, with long-term current use of insulin      gabapentin (NEURONTIN) 300 MG capsule Take 1 capsule (300 mg total) by mouth 3 (three) times daily.  Qty: 90 capsule, Refills: 2    Associated Diagnoses: Type 2 diabetes mellitus with diabetic neuropathy, unspecified long term insulin use status      insulin syringe-needle U-100 1 mL 31 gauge x 5/16 Syrg 1 Syringe by Misc.(Non-Drug; Combo Route) route once daily.  Qty: 200 each, Refills: 11    Associated Diagnoses: Type 2 diabetes mellitus with hyperglycemia      isosorbide mononitrate (IMDUR) 60 MG 24 hr tablet TAKE 1 TABLET BY MOUTH EVERY DAY  Qty: 30 tablet, Refills: 0    Associated Diagnoses: ESPINAL (dyspnea on exertion); Essential hypertension      !! lancets (ONETOUCH DELICA LANCETS) 33 gauge Misc 1 lancet by Misc.(Non-Drug; Combo Route) route 3 (three) times daily.  Qty: 100 each, Refills: 11      !! lancets 33 gauge Misc 1 lancet by Misc.(Non-Drug; Combo Route) route 2 (two) times daily. Freestyle freedom lite  Qty: 100 each, Refills: 11      LANTUS 100 unit/mL injection ADMINISTER 45 UNITS UNDER THE SKIN EVERY EVENING  Qty: 20 mL,  "Refills: 0    Associated Diagnoses: Type 2 diabetes mellitus with hyperglycemia      lisinopril-hydrochlorothiazide (PRINZIDE,ZESTORETIC) 20-25 mg Tab TAKE 1 TABLET BY MOUTH EVERY MORNING  Qty: 30 tablet, Refills: 5    Associated Diagnoses: Essential hypertension      lurasidone (LATUDA) 60 mg Tab tablet Take 60 mg by mouth once daily.      metformin (GLUCOPHAGE) 500 MG tablet Take 1 tablet (500 mg total) by mouth 2 (two) times daily with meals.  Qty: 60 tablet, Refills: 6    Associated Diagnoses: Type 2 diabetes mellitus with hyperglycemia, with long-term current use of insulin      metoprolol succinate (TOPROL-XL) 50 MG 24 hr tablet TAKE 1 TABLET(50 MG) BY MOUTH EVERY DAY  Qty: 30 tablet, Refills: 11    Associated Diagnoses: Essential hypertension      NOVOLOG 100 unit/mL injection INJECT 20 UNITS INTO THE SKIN THREE TIMES DAILY BEFORE MEALS  Qty: 20 mL, Refills: 0    Associated Diagnoses: Type 2 diabetes mellitus with diabetic neuropathy; Type 2 diabetes mellitus with hyperglycemia      pantoprazole (PROTONIX) 40 MG tablet Take 1 tablet (40 mg total) by mouth once daily.  Qty: 30 tablet, Refills: 0    Associated Diagnoses: Gastroesophageal reflux disease, esophagitis presence not specified; Positive H. pylori test      pen needle, diabetic 32 gauge x 5/32" Ndle 1 each by Misc.(Non-Drug; Combo Route) route once daily.  Qty: 100 each, Refills: 11    Associated Diagnoses: Type 2 diabetes mellitus with diabetic chronic kidney disease      polyethylene glycol (GLYCOLAX) 17 gram/dose powder Take 17 g by mouth once daily.  Qty: 1 Bottle, Refills: 1    Associated Diagnoses: Constipation, unspecified constipation type      pravastatin (PRAVACHOL) 20 MG tablet Take 1 tablet (20 mg total) by mouth every evening.  Qty: 30 tablet, Refills: 3    Comments: Medically necessary 272.4  Associated Diagnoses: Hyperlipidemia, unspecified hyperlipidemia type      !! quetiapine (SEROQUEL) 200 MG Tab Take 200 mg by mouth once daily. "   Refills: 1      !! quetiapine (SEROQUEL) 300 MG Tab Take 1 tablet (300 mg total) by mouth once daily.  Qty: 30 tablet, Refills: 0      sertraline (ZOLOFT) 100 MG tablet Take 2 tablets (200 mg total) by mouth once daily.  Qty: 30 tablet, Refills: 0    Associated Diagnoses: Bipolar disorder      trazodone (DESYREL) 300 MG tablet TK 1 T PO  QHS  Refills: 3      TRULICITY 0.75 mg/0.5 mL PnIj INJECT CONTENTS OF 1 SYRINGE UNDER THE SKIN ONCE WEEKLY  Refills: 5      aspirin 81 MG Chew Take 1 tablet (81 mg total) by mouth once daily.  Qty: 30 tablet, Refills: 0      cyclobenzaprine (FLEXERIL) 10 MG tablet Take 1 tablet (10 mg total) by mouth 3 (three) times daily as needed (Muscle pain/soreness).  Qty: 12 tablet, Refills: 0      triamcinolone acetonide 0.1% (KENALOG) 0.1 % cream Apply topically 2 (two) times daily.  Qty: 45 g, Refills: 0    Associated Diagnoses: Insect bite       !! - Potential duplicate medications found. Please discuss with provider.          Discharge Procedure Orders  Diet general     Activity as tolerated       Follow-up Information     Farhana Burnham MD.    Specialty:  Family Medicine  Contact information:  [id number] [address] [address] [phone]

## 2017-06-05 ENCOUNTER — TELEPHONE (OUTPATIENT)
Dept: DIABETES | Facility: CLINIC | Age: 57
End: 2017-06-05

## 2017-06-05 NOTE — TELEPHONE ENCOUNTER
Spoke with pt's pharmacy. Rep wanted to confirm syringe size. Informed her that I  no longer had paperwork for testing supplies. Rep said she will refax order today.

## 2017-06-05 NOTE — TELEPHONE ENCOUNTER
----- Message from Nathalie Ashby sent at 6/5/2017 11:07 AM CDT -----  Contact: [facility]  pharmacy calling regarding script for syringe, need to clarify dosage, please call [phone].

## 2017-06-07 ENCOUNTER — HOSPITAL ENCOUNTER (EMERGENCY)
Facility: HOSPITAL | Age: 57
Discharge: HOME | End: 2017-06-07
Attending: EMERGENCY MEDICINE
Payer: MEDICAID

## 2017-06-07 ENCOUNTER — OFFICE VISIT (OUTPATIENT)
Dept: DIABETES | Facility: CLINIC | Age: 57
End: 2017-06-07
Payer: MEDICAID

## 2017-06-07 VITALS
DIASTOLIC BLOOD PRESSURE: 98 MMHG | BODY MASS INDEX: 35.19 KG/M2 | HEIGHT: 70 IN | SYSTOLIC BLOOD PRESSURE: 142 MMHG | WEIGHT: 245.81 LBS

## 2017-06-07 VITALS
SYSTOLIC BLOOD PRESSURE: 188 MMHG | DIASTOLIC BLOOD PRESSURE: 107 MMHG | OXYGEN SATURATION: 98 % | WEIGHT: 244 LBS | RESPIRATION RATE: 20 BRPM | HEIGHT: 70 IN | TEMPERATURE: 98 F | HEART RATE: 80 BPM | BODY MASS INDEX: 34.93 KG/M2

## 2017-06-07 DIAGNOSIS — E66.9 OBESITY (BMI 30-39.9): ICD-10-CM

## 2017-06-07 DIAGNOSIS — E11.69 HYPERLIPIDEMIA ASSOCIATED WITH TYPE 2 DIABETES MELLITUS: ICD-10-CM

## 2017-06-07 DIAGNOSIS — E11.59 HYPERTENSION ASSOCIATED WITH DIABETES: ICD-10-CM

## 2017-06-07 DIAGNOSIS — I10 ESSENTIAL HYPERTENSION: ICD-10-CM

## 2017-06-07 DIAGNOSIS — Z79.4 TYPE 2 DIABETES MELLITUS WITH DIABETIC NEUROPATHY, WITH LONG-TERM CURRENT USE OF INSULIN: Primary | ICD-10-CM

## 2017-06-07 DIAGNOSIS — F10.920: ICD-10-CM

## 2017-06-07 DIAGNOSIS — Z79.4 TYPE 2 DIABETES MELLITUS WITH HYPERGLYCEMIA, WITH LONG-TERM CURRENT USE OF INSULIN: ICD-10-CM

## 2017-06-07 DIAGNOSIS — E11.40 TYPE 2 DIABETES MELLITUS WITH DIABETIC NEUROPATHY, WITH LONG-TERM CURRENT USE OF INSULIN: Primary | ICD-10-CM

## 2017-06-07 DIAGNOSIS — E78.5 HYPERLIPIDEMIA ASSOCIATED WITH TYPE 2 DIABETES MELLITUS: ICD-10-CM

## 2017-06-07 DIAGNOSIS — F17.200 SMOKING: ICD-10-CM

## 2017-06-07 DIAGNOSIS — E11.65 TYPE 2 DIABETES MELLITUS WITH HYPERGLYCEMIA, WITH LONG-TERM CURRENT USE OF INSULIN: ICD-10-CM

## 2017-06-07 DIAGNOSIS — I15.2 HYPERTENSION ASSOCIATED WITH DIABETES: ICD-10-CM

## 2017-06-07 DIAGNOSIS — M25.561 ACUTE PAIN OF RIGHT KNEE: Primary | ICD-10-CM
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
## 2017-06-07 LAB — GLUCOSE SERPL-MCNC: 127 MG/DL (ref 70–110)

## 2017-06-07 PROCEDURE — 99283 EMERGENCY DEPT VISIT LOW MDM: CPT | Mod: 25,27

## 2017-06-07 PROCEDURE — 99214 OFFICE O/P EST MOD 30 MIN: CPT | Mod: S$PBB,,, | Performed by: NURSE PRACTITIONER

## 2017-06-07 PROCEDURE — 3066F NEPHROPATHY DOC TX: CPT | Mod: ,,, | Performed by: NURSE PRACTITIONER

## 2017-06-07 PROCEDURE — 99999 PR PBB SHADOW E&M-EST. PATIENT-LVL V: CPT | Mod: PBBFAC,,, | Performed by: NURSE PRACTITIONER

## 2017-06-07 PROCEDURE — 29505 APPLICATION LONG LEG SPLINT: CPT | Mod: RT

## 2017-06-07 PROCEDURE — 3046F HEMOGLOBIN A1C LEVEL >9.0%: CPT | Mod: ,,, | Performed by: NURSE PRACTITIONER

## 2017-06-07 RX ORDER — SYRINGE,SAFETY WITH NEEDLE,1ML 25GX1"
1 SYRINGE (EA) MISCELLANEOUS 3 TIMES DAILY
Qty: 100 EACH | Refills: 11 | Status: SHIPPED | OUTPATIENT
Start: 2017-06-07 | End: 2022-01-19

## 2017-06-07 NOTE — PROGRESS NOTES
"PCP: Farhana Burnham MD    Subjective:     HISTORY OF PRESENT ILLNESS: 56 year old  male patient is in clinic today for diabetes.  Patient has had Type II diabetes since 2009.  He has been seen by dietitian in the past.  Most recent A1C is 9.1, ADA recommends less than 7.0.  He reports prior use of glimepiride but was told to discontinue.       He has not been monitoring blood sugars for the past several months.  Since his last visit, he has lost 7 pounds.     Patient denies polyuria, polyphagia, or blurred vision.  He complains of polydipsia, cotton mouth.  Also denies nausea, vomiting, diarrhea or hypoglycemic symptoms.  He has been taking Novolog sporadically, but does admit to taking Lantus every night.  Patient reports he is tired of taking so many injections.  He also stopped taking his psych medications and reports he feels better.     Height: 5 ' 9.5", Weight: 245 pounds, BMI 35.01  Blood Glucose reading this AM: 149 mg/ dl fasting   Blood Glucose reading in clinic: 127 mg/dl at 8:49 am    DM MEDICATIONS:  Metformin 500 mg BID  Lantus 45 units at bedtime  Novolog 20 units TID ac  ( has not been taking routinely )  Trulicity 1.5 mg weekly     Labs Reviewed.    REVIEW OF SYSTEMS:  General: Denies fever, chills, change in appetite.  HEENT: Denies impaired hearing, dysphagia.  Respiratory: Denies shortness of breath, cough or wheezing.  Cardiovascular: Denies chest pain, palpitations.  GI: Denies hematochezia.  : Denies hematuria, dysuria or frequency.  MS:  Normal gait. Denies difficulty with mobility, muscle or joint pain.  SKIN: Denies rashes and lesions.  Neuro: Has numbness or tingling in the hands or feet.   PSYCH: Denies depression or anxiety. No suicidal ideations.  ENDO: See HPI.    STANDARDS OF CARE:  Eye doctor: Dr. Caruso, Last exam 7 / 2016  Dental exam: Recommend regular exams; denies gums bleeding.  Podiatry doctor: Dr. Sampson, last exam 3 / 2015    ACTIVITY LEVEL: No routine " exercise.   MEAL PLANNING: Number of meals per day - 3. Number of snacks per day - 2.  Per dietary recall, patient is not limiting carbohydrates, saturated fats and sodium.     BLOOD GLUCOSE TESTING: Self-monitoring with, ONE TOUCH verio  SOCIAL HISTORY: Disabled. Smokes 1/2 pack per day.     Objective:     PHYSICAL EXAMINATION:  GENERAL: WDWN  male in no acute distress, ambulatory, responds appropriately. AAO X 3.   NECK: Supple, no thyromegaly, no cervical or supraclavicular lymphadenopathy, no carotid bruit.  HEART: Regular rate and rhythm. No rubs, murmurs or gallops.  LUNGS: CTA bilaterally. Unlabored breathing, no use of accessory muscles.  MUSCULOSKELETAL: Normal gait and muscle strength.  ABDOMEN: Active bowel sounds X 4, no masses or tenderness.   SKIN: Warm, dry skin. No lesions or abrasions. Clean, dry well-healed injection sites.   NEUROLOGIC: Cranial nerves II-XII grossly intact.   EXTREMITIES: Multiple healing abrasions to chins of both LE.  FOOT EXAMINATION: Protective Sensation (w/ 10 gram monofilament):  Right: Decreased  Left: Decreased  //  Visual Inspection: Onychomycosis -  Bilateral toes.  Significant calluses to lateral sides of great toes.  // Pedal Pulses:  Right: Diminshed  Left: Diminshed    Assessment:     EDUCATIONAL ASSESSMENT:  1. Impediments in learning class environment - NONE.  2. Needs improvement in self-care management skills.    (1.) Diabetes Type II, stage III CKD, neuropathy ( on gabapentin ) -  uncontrolled on metformin, Lantus, Novolog ( not taking routinely ), Trulicity, A1C 9.1  (2.) Hypertension - suboptimal control on norvasc, catapres, hydralazine, lisinopril-HCT  (3.) Obesity, BMI 35.01  (4.) Hyperlipidemia - continue on Pravastatin  (5.) Tobacco Abuse    Plan:     1.) Patient was instructed to monitor blood glucose 2 x daily, fasting and ac dinner.  Discussed ADA goal for fasting blood sugar, 80 - 130 mg/dL; pp blood sugars below 180 mg/dl. Also, discussed  prevention of hypoglycemia and the need to adjust goals to higher levels if persistent hypoglycemia.  Reminded to bring blood glucose records or meter to each visit for review.  2.) Reviewed pathophysiology of Type 2 diabetes, complications related to the disease, importance of annual dilated eye exam and daily foot examination.  3.) Continue metformin 500 mg BID.  Continue Trulicity 1.5 mg weekly.  He has been taking Novolog sporadically.  Patient reports he is tired of taking so many injections.  We discussed maybe switching to mixed insulin.  He would like to try this option.  Stop Lantus and plain Novolog.  He will start taking Humalog 50 / 50, 40 units before breakfast and 40 units before dinner.  I explained that he should eat consistent meals throughout the day and have a bedtime snack.  He voiced understanding.  He agrees to start monitoring blood sugars.  4.) Meal planning teaching: Carbohydrate definition - one serving is 15 gms. Carbohydrate spacing - carbohydrates should be spaced into approximately 3 meals with 2 snacks ( of one carbohydrate ) between meals or at bedtime. Increase vegetable intake to 2 or more cups of vegetables per day as well as 2 fruit servings. Recommended low saturated fat, low sodium diet to aid in control of hypertension and cholesterol.  5.) Discussed activity, benefits, methods, and precautions. Recommended patient start some form of exercise and increase as tolerated to 30 minutes per day to facilitate weight loss and aid in control of BGs.  6.) Return to clinic in 1 month for follow up. I strongly encouraged patient to follow up with psychiatry.  Advised patient to call clinic with any questions or concerns.    Ladan Dowd, SHAWNA-C, CDE

## 2017-06-07 NOTE — PATIENT INSTRUCTIONS
Continue metformin.    Continue Trulicity 1.5 mg weekly.    Stop Lantus. Stop Novolog.    Start Humalog 50/50 -  40 units before breakfast and 40 units before dinner.

## 2017-06-08 NOTE — ED PROVIDER NOTES
"SCRIBE #1 NOTE: I, Blanca Pham, am scribing for, and in the presence of, VITALY Rollins. I have scribed the entire note.      History      Chief Complaint   Patient presents with    Leg Pain     Pain to right knee for 3 days./ fell 3 weeks ago       Review of patient's allergies indicates:  No Known Allergies     HPI   HPI    6/7/2017, 8:04 PM   History obtained from the patient      History of Present Illness: Shukri Rosales is a 56 y.o. male patient who presents to the Emergency Department for R knee pain which onset suddenly today. He states that he was sitting with his legs propped in a chair and when he went to lower his leg to the floor, he felt a "pop" in his knee. He reports that he has been hearing "grinding" in his knee. The pain has been constant, and moderate in severity, and feels like someone is "driving a nail" in his knee.  Pt also admits to "drinking a few beers" tonight. No mitigating or exacerbating factors reported. No associated sxs reported. Patient denies extremity weakness/numbness, joint swelling, decreased ROM, paresthesias, ecchymosis, and all other sxs at this time. Pt reports that he has an appt with Dr. Calderon (Orthopedist) later this month. No further complaints or concerns at this time.       Arrival mode: Personal vehicle    PCP: Farhana Burhnam MD       Past Medical History:  Past Medical History:   Diagnosis Date    Adrenal cortical adenoma     Anxiety     Arthritis     CKD (chronic kidney disease) stage 3, GFR 30-59 ml/min     Depression     Diabetes mellitus type II 2011    does not take    GERD (gastroesophageal reflux disease) 7/9/2013    Hypertension     Migraine headache 7/22/2013    Severe obesity with body mass index of 36.0 to 36.9        Past Surgical History:  Past Surgical History:   Procedure Laterality Date    BACK SURGERY      HERNIA REPAIR      UMBILICAL    left arm exfix      NASAL SEPTUM SURGERY Bilateral     TONSILLECTOMY   "    URETEROSCOPY           Family History:  Family History   Problem Relation Age of Onset    Hypertension Mother     Diabetes Mother     Hypertension Sister     Diabetes Sister     Hypertension Brother     Diabetes Brother     Cancer Paternal Grandmother     Cancer Paternal Grandfather        Social History:  Social History     Social History Main Topics    Smoking status: Current Every Day Smoker     Packs/day: 1.00     Years: 48.00     Types: Cigarettes    Smokeless tobacco: Never Used      Comment: instructed not to smoke after midnight after midnight prior to surgery    Alcohol use Yes    Drug use: No    Sexual activity: No       ROS   Review of Systems   Constitutional: Negative for fever.   HENT: Negative for sore throat.    Respiratory: Negative for shortness of breath.    Cardiovascular: Negative for chest pain.   Gastrointestinal: Negative for nausea.   Genitourinary: Negative for dysuria.   Musculoskeletal: Positive for arthralgias (R knee pain). Negative for back pain and joint swelling.   Skin: Negative for color change, rash and wound.   Neurological: Negative for weakness and numbness.   Hematological: Does not bruise/bleed easily.   All other systems reviewed and are negative.      Physical Exam      Initial Vitals [06/07/17 1930]   BP Pulse Resp Temp SpO2   (!) 188/107 80 20 98 °F (36.7 °C) 98 %      Physical Exam  Nursing Notes and Vital Signs Reviewed.  Constitutional: Patient is in no acute distress. Awake and alert. Well-developed and well-nourished.  Head: Atraumatic. Normocephalic.  Eyes: PERRL. EOM intact. Conjunctivae are not pale. No scleral icterus.  ENT: Mucous membranes are moist. Oropharynx is clear and symmetric.    Neck: Supple. Full ROM.   Cardiovascular: Regular rate. Regular rhythm. No murmurs, rubs, or gallops. Distal pulses are 2+ and symmetric.  Pulmonary/Chest: No respiratory distress. Clear to auscultation bilaterally. No wheezing, rales, or rhonchi.  Abdominal:  "Soft and non-distended.  There is no tenderness.     Musculoskeletal: Moves all extremities. No obvious deformities.   Right knee:  No obvious deformity. There is no effusion. There is no tenderness.  No increased warmth, erythema, induration or fluctuance.  No ligament laxity. Stable meniscus.  DP and PT pulses are 2+.  Normal capillary refill.  Distal sensation is intact.  Skin: Warm and dry.  Neurological:  Alert, awake, and appropriate.  Normal speech.  No acute focal neurological deficits are appreciated.  Psychiatric: Normal affect. Good eye contact. Appropriate in content.    ED Course    Orthopedic Injury  Date/Time: 6/7/2017 8:23 PM  Authorized by: LENNOX CLEARY   Performed by: MITRA CORTÉS  Consent Done: Not Needed  Injury location: knee  Location details: right knee  Injury type: soft tissue  Pre-procedure distal perfusion: normal  Pre-procedure neurological function: normal  Pre-procedure neurovascular assessment: neurovascularly intact  Local anesthesia used: no    Anesthesia:  Local anesthesia used: no  Sedation:  Patient sedated: no    Immobilization: crutches (and knee immobilizer)  Complications: No  Specimens: No  Implants: No  Post-procedure neurovascular assessment: post-procedure neurovascularly intact  Post-procedure distal perfusion: normal  Post-procedure neurological function: normal  Patient tolerance: Patient tolerated the procedure well with no immediate complications        ED Vital Signs:  Vitals:    06/07/17 1930   BP: (!) 188/107   Pulse: 80   Resp: 20   Temp: 98 °F (36.7 °C)   TempSrc: Oral   SpO2: 98%   Weight: 110.7 kg (244 lb)   Height: 5' 10" (1.778 m)       Imaging Results:  Imaging Results          X-Ray Knee Complete 4 Or More Views Right (Final result)  Result time 06/07/17 20:34:27    Final result by Ryan Del Rio MD (06/07/17 20:34:27)                 Impression:     No acute findings.      Electronically signed by: RYAN DEL RIO MD  Date: "     06/07/17  Time:    20:34              Narrative:    Complete four view right knee xray    History: Injury of right knee.    Comparison is made with the previous study of 05/09/2017    No acute fracture or dislocation is demonstrated.  A metallic pin is present in the distal femur which is unchanged appearance of interval changes from previous ACL repair.  Mild arthritic change of the medial compartment is present with joint space narrowing and spurring.                               The Emergency Provider reviewed the vital signs and test results, which are outlined above.    ED Discussion     8:36 PM: Discussed with pt imaging results. Will give patient crutches and knee immobilizer to use at home. Discussed pt dx and plan of tx. Informed pt to keep appt with Dr. Calderon. All questions and concerns were addressed at this time. Pt expresses understanding of information and instructions, and is comfortable with plan to discharge. Pt is stable for discharge. His sister is driving.     I discussed with patient and/or family/caretaker that evaluation in the ED does not suggest any emergent or life threatening medical conditions requiring immediate intervention beyond what was provided in the ED, and I believe patient is safe for discharge.  Regardless, an unremarkable evaluation in the ED does not preclude the development or presence of a serious of life threatening condition. As such, patient was instructed to return immediately for any worsening or change in current symptoms.    ED Medication(s):  Medications - No data to display    New Prescriptions    No medications on file            Medical Decision Making    Medical Decision Making:   Clinical Tests:   Radiological Study: Ordered and Reviewed           Scribe Attestation:   Scribe #1: I performed the above scribed service and the documentation accurately describes the services I performed. I attest to the accuracy of the note.    Attending:   Physician  Attestation Statement for Scribe #1: I, VITALY Rollins, personally performed the services described in this documentation, as scribed by Blanca Pham, in my presence, and it is both accurate and complete.          Clinical Impression       ICD-10-CM ICD-9-CM   1. Acute pain of right knee M25.561 719.46   2. Fall, accidental, initial encounter W19.XXXA E888.9   3. Essential hypertension I10 401.9   4. Alcoholic intoxication, uncomplicated F10.120 305.00       Disposition:   Disposition: Discharged  Condition: Stable           Blake Jones PA-C  06/07/17 2038       Blake Jones PA-C  06/07/17 2040

## 2017-06-19 ENCOUNTER — TELEPHONE (OUTPATIENT)
Dept: FAMILY MEDICINE | Facility: CLINIC | Age: 57
End: 2017-06-19

## 2017-06-19 NOTE — TELEPHONE ENCOUNTER
----- Message from Lauren Garcia sent at 6/19/2017  9:56 AM CDT -----  Contact: [phone]  Pt would like to consult with nurse about torn/pulled hamstring on left leg. Please call back at [phone]. x. LJ

## 2017-06-22 ENCOUNTER — OFFICE VISIT (OUTPATIENT)
Dept: ORTHOPEDICS | Facility: CLINIC | Age: 57
End: 2017-06-22
Payer: MEDICAID

## 2017-06-22 VITALS
BODY MASS INDEX: 34.47 KG/M2 | WEIGHT: 240.75 LBS | SYSTOLIC BLOOD PRESSURE: 135 MMHG | RESPIRATION RATE: 18 BRPM | HEIGHT: 70 IN | HEART RATE: 82 BPM | DIASTOLIC BLOOD PRESSURE: 92 MMHG

## 2017-06-22 DIAGNOSIS — M67.361 TRANSIENT SYNOVITIS, RIGHT KNEE: ICD-10-CM

## 2017-06-22 DIAGNOSIS — M25.461 EFFUSION OF BURSA OF RIGHT KNEE: ICD-10-CM

## 2017-06-22 DIAGNOSIS — M94.261 CHONDROMALACIA OF RIGHT KNEE: Primary | ICD-10-CM

## 2017-06-22 PROCEDURE — 99999 PR PBB SHADOW E&M-EST. PATIENT-LVL III: CPT | Mod: PBBFAC,,, | Performed by: ORTHOPAEDIC SURGERY

## 2017-06-22 PROCEDURE — 99213 OFFICE O/P EST LOW 20 MIN: CPT | Mod: S$PBB,,, | Performed by: ORTHOPAEDIC SURGERY

## 2017-06-22 PROCEDURE — 99213 OFFICE O/P EST LOW 20 MIN: CPT | Mod: PBBFAC,PO | Performed by: ORTHOPAEDIC SURGERY

## 2017-06-22 NOTE — PROGRESS NOTES
SUBJECTIVE:  Patient is status post Right ACL reconstruciton.  The patient states that he was doing well until his dog jumped on his right knee.  He states that the dog destroyed his right knee brace.  Patient complains of  5/ 10 pain that is better with the  pain meds and aggravated with movement.  The patient states that he fell a couple of times since his surgery.  He states that the dog continues to jump on his knee and injured his knee.  Past Medical History:   Diagnosis Date    Adrenal cortical adenoma     Anxiety     Arthritis     CKD (chronic kidney disease) stage 3, GFR 30-59 ml/min     Depression     Diabetes mellitus type II 2011    does not take    GERD (gastroesophageal reflux disease) 7/9/2013    Hypertension     Migraine headache 7/22/2013    Severe obesity with body mass index of 36.0 to 36.9      Past Surgical History:   Procedure Laterality Date    BACK SURGERY      HERNIA REPAIR      UMBILICAL    left arm exfix      NASAL SEPTUM SURGERY Bilateral     TONSILLECTOMY      URETEROSCOPY       No Known Allergies  Current Outpatient Prescriptions on File Prior to Visit   Medication Sig Dispense Refill    amlodipine (NORVASC) 5 MG tablet Take 1 tablet (5 mg total) by mouth once daily. 30 tablet 5    blood sugar diagnostic Strp 1 each by Misc.(Non-Drug; Combo Route) route 3 (three) times daily before meals. For One Touch Verio 100 each 11    blood-glucose meter kit One Touch Verio Meter 1 each 0    clonazePAM (KLONOPIN) 0.5 MG tablet TAKE ONE TABLET BY MOUTH TWICE DAILY AS NEEDED MUST LAST 30 DAYS 30 tablet 0    cloNIDine (CATAPRES) 0.3 MG tablet Take 1 tablet (0.3 mg total) by mouth 3 (three) times daily. 90 tablet 5    cyclobenzaprine (FLEXERIL) 10 MG tablet Take 1 tablet (10 mg total) by mouth 3 (three) times daily as needed (Muscle pain/soreness). 12 tablet 0    dulaglutide (TRULICITY) 1.5 mg/0.5 mL PnIj Inject 1.5 mg into the skin every 7 days. 4 Syringe 6    gabapentin  "(NEURONTIN) 300 MG capsule Take 1 capsule (300 mg total) by mouth 3 (three) times daily. 90 capsule 2    insulin lispro protamin-lispro (HUMALOG MIX 50-50) 100 unit/mL (50-50) Susp Inject 40 Units into the skin 2 (two) times daily. 3 vial 3    insulin syringe-needle U-100 1 mL 31 gauge x 5/16 Syrg 1 Syringe by Misc.(Non-Drug; Combo Route) route 3 (three) times daily. 100 each 11    isosorbide mononitrate (IMDUR) 60 MG 24 hr tablet TAKE 1 TABLET BY MOUTH EVERY DAY 30 tablet 0    lancets (ONETOUCH DELICA LANCETS) 33 gauge Misc 1 lancet by Misc.(Non-Drug; Combo Route) route 3 (three) times daily. 100 each 11    lancets 33 gauge Misc 1 lancet by Misc.(Non-Drug; Combo Route) route 2 (two) times daily. Freestyle freedom lite 100 each 11    lisinopril-hydrochlorothiazide (PRINZIDE,ZESTORETIC) 20-25 mg Tab TAKE 1 TABLET BY MOUTH EVERY MORNING 30 tablet 5    lurasidone (LATUDA) 60 mg Tab tablet Take 60 mg by mouth once daily.      metformin (GLUCOPHAGE) 500 MG tablet Take 1 tablet (500 mg total) by mouth 2 (two) times daily with meals. 60 tablet 6    metoprolol succinate (TOPROL-XL) 50 MG 24 hr tablet TAKE 1 TABLET(50 MG) BY MOUTH EVERY DAY 30 tablet 11    NOVOLOG 100 unit/mL injection INJECT 20 UNITS INTO THE SKIN THREE TIMES DAILY BEFORE MEALS 20 mL 0    pantoprazole (PROTONIX) 40 MG tablet Take 1 tablet (40 mg total) by mouth once daily. 30 tablet 0    pen needle, diabetic 32 gauge x 5/32" Ndle 1 each by Misc.(Non-Drug; Combo Route) route once daily. 100 each 11    polyethylene glycol (GLYCOLAX) 17 gram/dose powder Take 17 g by mouth once daily. 1 Bottle 1    pravastatin (PRAVACHOL) 20 MG tablet Take 1 tablet (20 mg total) by mouth every evening. 30 tablet 3    quetiapine (SEROQUEL) 200 MG Tab Take 200 mg by mouth once daily.   1    quetiapine (SEROQUEL) 300 MG Tab Take 1 tablet (300 mg total) by mouth once daily. (Patient taking differently: Take 200 mg by mouth once daily. ) 30 tablet 0    sertraline " "(ZOLOFT) 100 MG tablet Take 2 tablets (200 mg total) by mouth once daily. 30 tablet 0    trazodone (DESYREL) 300 MG tablet TK 1 T PO  QHS  3    TRULICITY 0.75 mg/0.5 mL PnIj INJECT CONTENTS OF 1 SYRINGE UNDER THE SKIN ONCE WEEKLY  5    aspirin 81 MG Chew Take 1 tablet (81 mg total) by mouth once daily. 30 tablet 0    triamcinolone acetonide 0.1% (KENALOG) 0.1 % cream Apply topically 2 (two) times daily. 45 g 0     Current Facility-Administered Medications on File Prior to Visit   Medication Dose Route Frequency Provider Last Rate Last Dose    hylan g-f 20 48 mg/6 mL injection 48 mg  48 mg Intra-articular 1 time in Clinic/[facility] Chris Calderon Sr., MD        hylan g-f 20 48 mg/6 mL injection 48 mg  48 mg Intra-articular 1 time in Clinic/HOD Chris Calderon Sr., MD        hylan g-f 20 48 mg/6 mL injection 48 mg  48 mg Intra-articular 1 time in Clinic/HOD Chris Calderon Sr., MD         BP (!) 135/92   Pulse 82   Resp 18   Ht 5' 10" (1.778 m)   Wt 109.2 kg (240 lb 11.9 oz)   BMI 34.54 kg/m²    ROS:  GENERAL: No fever, chills, fatigability or weight loss.  SKIN: No rashes, itching or changes in color or texture of skin.  HEAD: No headaches or recent head trauma.  EYES: Visual acuity fine. No photophobia, ocular pain or diplopia.  EARS: Denies ear pain, discharge or vertigo.  NOSE: No loss of smell, no epistaxis or postnasal drip.  MOUTH & THROAT: No hoarseness or change in voice. No excessive gum bleeding.  NODES: Denies swollen glands.  CHEST: Denies ESPINAL, cyanosis, wheezing, cough and sputum production.  CARDIOVASCULAR: Denies chest pain, PND, orthopnea or reduced exercise tolerance.  ABDOMEN: Appetite fine. No weight loss. Denies diarrhea, abdominal pain, hematemesis or blood in stool.  URINARY: No flank pain, dysuria or hematuria.  PERIPHERAL VASCULAR: No claudication or cyanosis.  NEUROLOGIC: No history of seizures, paralysis, alteration of gait or coordination.  MUSCULOSKELETAL: See HPI    PE:  APPEARANCE: " Well nourished, well developed, in no acute distress.   HEAD: Normocephalic, atraumatic.  EYES: PERRL. EOMI.   EARS: TM's intact. Light reflex normal. No retraction or perforation.   NOSE: Mucosa pink. Airway clear.  MOUTH & THROAT: No tonsillar enlargement. No pharyngeal erythema or exudate. No stridor.  NECK: Supple.   NODES: No cervical, axillary or inguinal lymph node enlargement.  CHEST: Lungs clear to auscultation.  CARDIOVASCULAR: Normal S1, S2. No rubs, murmurs or gallops.  ABDOMEN: Bowel sounds normal. Not distended. Soft. No tenderness or masses.  NEUROLOGIC: Cranial Nerves: II-XII grossly intact, also see MUSCULOSKELETAL  MUSCULOSKELETAL:          Right Knee     - 2 plus dorsalis pedis and posterior tibial artery pulses, light touch intact Right lower extremity.  All digits are warm. No erythema, no warmth, no drainage, no swelling, tenderness over the medial femoral condyle..  Less than 2 seconds capillary refill all digits.  Incision well-healed.  The knee is stable and the range of motion is 0-110°.      ASSESSMENT:     Diagnosis:     1. Chondromalacia right knee  2. Right knee synovitis  3. Right knee effusion    PLAN:  Activities as tolerated   Follow-up in 3 months   Anti-inflammatory   Right brace right knee  Continue physical therapy

## 2017-06-22 NOTE — PATIENT INSTRUCTIONS
Water on the Knee    Water on the knee is also known as knee effusion. The knee joint normally has less than 1 ounce of fluid. Injury or inflammation of the knee joint causes extra fluid to collect there. When this happens, the knee joint looks swollen and is usually painful. It may be hard to fully bend the knee.  The most common cause of water on the knee is osteoarthritis due to wear and tear on the joint cartilage. Other causes include injury to the cartilage, inflammatory arthritis such as gout or rheumatoid arthritis, and infection of the joint.  You may need a needle aspiration, if the cause of your water on the knee is not certain. This procedure removes a sample of joint fluid from the knee for testing. This is done with a local anesthetic. Removing excess fluid may also relieve swelling and pain.  Home care  · Limit your activities. Stay off the injured leg as much as possible until pain improves.  · Keep your leg elevated to reduce pain and swelling. When sleeping, place a pillow under the injured leg. When sitting, support the injured leg so it is level with your waist. This is very important during the first 48 hours.  · Apply an ice pack over the injured area for 15 to 20 minutes every 3 to 6 hours. You should do this for the first 24 to 48 hours. You can make an ice pack by filling a plastic bag that seals at the top with ice cubes and then wrapping it with a thin towel. Continue to use ice packs for relief of pain and swelling as needed. As the ice melts, be careful to avoid getting your wrap, splint, or cast wet. After 48 hours, apply heat(warm shower or warm bath) for 15 to 20 minutes several times a day, or alternate ice and heat. If you have to wear a hook-and-loop knee brace, you can open it to apply the ice pack, or heat, directly to the knee. Never put ice directly on the skin. Always wrap the ice in a towel or other type of cloth.  · You may use over-the-counter pain medicine to control  pain, unless another pain medicine was prescribed. If you have chronic liver or kidney disease or have ever had a stomach ulcer or GI bleeding, talk with your healthcare provider before using these medicines.  · If crutches or a walker have been recommended, do not put weight on the injured leg until you can do so without pain. Check with your healthcare provider before returning to sports or full work duties.  · If you have a hook-and-loop knee brace, you can remove it to bathe and sleep, unless told otherwise.  Follow-up care  Follow up with your healthcare provider as advised.  If you are overweight, talk to your healthcare provider about a weight loss program. The excess weight puts extra strain on your knees.  When to seek medical advice  Call your healthcare provider right away if any of these occur:  · Increasing pain, redness, or swelling of the knee  · Fever of 100.4°F (38°C) or above lasting for 24 to 48 hours  Date Last Reviewed: 11/23/2015  © 8066-7484 [employer] [address] [place] [address], [place] [address]. All rights reserved. This information is not intended as a substitute for professional medical care. Always follow your healthcare professional's instructions.

## 2017-06-29 ENCOUNTER — CLINICAL SUPPORT (OUTPATIENT)
Dept: SMOKING CESSATION | Facility: CLINIC | Age: 57
End: 2017-06-29
Payer: COMMERCIAL

## 2017-06-29 DIAGNOSIS — F17.210 MODERATE SMOKER (20 OR LESS PER DAY): Primary | ICD-10-CM

## 2017-06-29 PROCEDURE — 99999 PR PBB SHADOW E&M-EST. PATIENT-LVL I: CPT | Mod: PBBFAC,,,

## 2017-06-29 PROCEDURE — 99404 PREV MED CNSL INDIV APPRX 60: CPT | Mod: S$GLB,,,

## 2017-06-29 RX ORDER — IBUPROFEN 200 MG
1 TABLET ORAL DAILY
Qty: 14 PATCH | Refills: 0 | Status: SHIPPED | OUTPATIENT
Start: 2017-06-29 | End: 2017-07-12 | Stop reason: SDUPTHER

## 2017-06-29 RX ORDER — VARENICLINE TARTRATE 0.5 (11)-1
KIT ORAL
Qty: 1 PACKAGE | Refills: 0 | Status: SHIPPED | OUTPATIENT
Start: 2017-06-29 | End: 2017-07-26 | Stop reason: DRUGHIGH

## 2017-07-01 DIAGNOSIS — K21.9 GASTROESOPHAGEAL REFLUX DISEASE, ESOPHAGITIS PRESENCE NOT SPECIFIED: ICD-10-CM

## 2017-07-01 DIAGNOSIS — A04.8 POSITIVE H. PYLORI TEST: ICD-10-CM

## 2017-07-02 RX ORDER — PANTOPRAZOLE SODIUM 40 MG/1
TABLET, DELAYED RELEASE ORAL
Qty: 30 TABLET | Refills: 0 | Status: SHIPPED | OUTPATIENT
Start: 2017-07-02 | End: 2017-08-02 | Stop reason: SDUPTHER

## 2017-07-03 RX ORDER — MUPIROCIN 20 MG/G
OINTMENT TOPICAL 3 TIMES DAILY
Qty: 1 TUBE | Refills: 0 | OUTPATIENT
Start: 2017-07-03 | End: 2017-07-13

## 2017-07-06 DIAGNOSIS — E78.5 HYPERLIPIDEMIA, UNSPECIFIED HYPERLIPIDEMIA TYPE: ICD-10-CM

## 2017-07-06 RX ORDER — PRAVASTATIN SODIUM 20 MG/1
TABLET ORAL
Qty: 30 TABLET | Refills: 10 | Status: SHIPPED | OUTPATIENT
Start: 2017-07-06 | End: 2017-10-04 | Stop reason: SDUPTHER

## 2017-07-10 ENCOUNTER — TELEPHONE (OUTPATIENT)
Dept: SMOKING CESSATION | Facility: CLINIC | Age: 57
End: 2017-07-10

## 2017-07-10 ENCOUNTER — OFFICE VISIT (OUTPATIENT)
Dept: GASTROENTEROLOGY | Facility: CLINIC | Age: 57
End: 2017-07-10
Payer: MEDICAID

## 2017-07-10 VITALS
BODY MASS INDEX: 36.36 KG/M2 | HEART RATE: 88 BPM | WEIGHT: 254 LBS | HEIGHT: 70 IN | DIASTOLIC BLOOD PRESSURE: 92 MMHG | SYSTOLIC BLOOD PRESSURE: 146 MMHG

## 2017-07-10 DIAGNOSIS — K21.00 GASTROESOPHAGEAL REFLUX DISEASE WITH ESOPHAGITIS: Primary | ICD-10-CM

## 2017-07-10 DIAGNOSIS — F17.210 CIGARETTE SMOKER: Chronic | ICD-10-CM

## 2017-07-10 PROCEDURE — 99213 OFFICE O/P EST LOW 20 MIN: CPT | Mod: S$PBB,,, | Performed by: NURSE PRACTITIONER

## 2017-07-10 PROCEDURE — 99999 PR PBB SHADOW E&M-EST. PATIENT-LVL III: CPT | Mod: PBBFAC,,, | Performed by: NURSE PRACTITIONER

## 2017-07-10 PROCEDURE — 99213 OFFICE O/P EST LOW 20 MIN: CPT | Mod: PBBFAC | Performed by: NURSE PRACTITIONER

## 2017-07-10 NOTE — PROGRESS NOTES
Clinic Follow Up:  [facility] Follow Up Note    Reason for Follow Up:  The primary encounter diagnosis was Gastroesophageal reflux disease with esophagitis. A diagnosis of Cigarette smoker was also pertinent to this visit.    PCP: Farhana Burnham       HPI:  This is a 57 y.o. male here for follow up of the above. He reports improvement in symptoms. He recently had an EGD on 6/1/17 for GERD, epigastric pain, and positive H. Pylori IgG. He remembers going to the hospital for a procedure but did not know he had an EGD done. Verified patient identifiers. EGD showed patches of salmon-colored mucosa that was concerning of Schafer's Esophagus, however pathology of biopsies taken showed inflammation secondary to reflux. No Schafer's esophagus. EGD also revealed gastritis and duodenitis. No infection with H. Pylori. He has been taking daily PPI and reports resolution of symptoms since starting medication.     Review of Systems   Constitutional: Negative for activity change and appetite change.        As per interval history above   Respiratory: Negative for cough and shortness of breath.    Cardiovascular: Negative for chest pain.   Gastrointestinal: Negative for abdominal distention, abdominal pain, anal bleeding, blood in stool, constipation, diarrhea, nausea, rectal pain and vomiting.   Skin: Negative for color change and rash.       Medical History:  Past Medical History:   Diagnosis Date    Adrenal cortical adenoma     Anxiety     Arthritis     CKD (chronic kidney disease) stage 3, GFR 30-59 ml/min     Depression     Diabetes mellitus type II 2011    does not take    GERD (gastroesophageal reflux disease) 7/9/2013    Hypertension     Migraine headache 7/22/2013    Severe obesity with body mass index of 36.0 to 36.9        Surgical History:   Past Surgical History:   Procedure Laterality Date    BACK SURGERY      HERNIA REPAIR      UMBILICAL    left arm exfix      NASAL SEPTUM  SURGERY Bilateral     TONSILLECTOMY      URETEROSCOPY         Family History:   Family History   Problem Relation Age of Onset    Hypertension Mother     Diabetes Mother     Hypertension Sister     Diabetes Sister     Hypertension Brother     Diabetes Brother     Cancer Paternal Grandmother     Cancer Paternal Grandfather        Social History:   Social History   Substance Use Topics    Smoking status: Current Every Day Smoker     Packs/day: 1.00     Years: 44.00     Types: Cigarettes    Smokeless tobacco: Never Used      Comment: instructed not to smoke after midnight after midnight prior to surgery    Alcohol use Yes       Allergies: Review of patient's allergies indicates:  No Known Allergies    Home Medications:  Current Outpatient Prescriptions on File Prior to Visit   Medication Sig Dispense Refill    amlodipine (NORVASC) 5 MG tablet Take 1 tablet (5 mg total) by mouth once daily. 30 tablet 5    blood sugar diagnostic Strp 1 each by Misc.(Non-Drug; Combo Route) route 3 (three) times daily before meals. For One Touch Verio 100 each 11    blood-glucose meter kit One Touch Verio Meter 1 each 0    clonazePAM (KLONOPIN) 0.5 MG tablet TAKE ONE TABLET BY MOUTH TWICE DAILY AS NEEDED MUST LAST 30 DAYS 30 tablet 0    cloNIDine (CATAPRES) 0.3 MG tablet Take 1 tablet (0.3 mg total) by mouth 3 (three) times daily. 90 tablet 5    cyclobenzaprine (FLEXERIL) 10 MG tablet Take 1 tablet (10 mg total) by mouth 3 (three) times daily as needed (Muscle pain/soreness). 12 tablet 0    dulaglutide (TRULICITY) 1.5 mg/0.5 mL PnIj Inject 1.5 mg into the skin every 7 days. 4 Syringe 6    gabapentin (NEURONTIN) 300 MG capsule Take 1 capsule (300 mg total) by mouth 3 (three) times daily. 90 capsule 2    insulin lispro protamin-lispro (HUMALOG MIX 50-50) 100 unit/mL (50-50) Susp Inject 40 Units into the skin 2 (two) times daily. 3 vial 3    insulin syringe-needle U-100 1 mL 31 gauge x 5/16 Syrg 1 Syringe by  "Misc.(Non-Drug; Combo Route) route 3 (three) times daily. 100 each 11    isosorbide mononitrate (IMDUR) 60 MG 24 hr tablet TAKE 1 TABLET BY MOUTH EVERY DAY 30 tablet 0    lancets (ONETOUCH DELICA LANCETS) 33 gauge Misc 1 lancet by Misc.(Non-Drug; Combo Route) route 3 (three) times daily. 100 each 11    lancets 33 gauge Misc 1 lancet by Misc.(Non-Drug; Combo Route) route 2 (two) times daily. Freestyle freedom lite 100 each 11    lisinopril-hydrochlorothiazide (PRINZIDE,ZESTORETIC) 20-25 mg Tab TAKE 1 TABLET BY MOUTH EVERY MORNING 30 tablet 5    lurasidone (LATUDA) 60 mg Tab tablet Take 60 mg by mouth once daily.      metformin (GLUCOPHAGE) 500 MG tablet Take 1 tablet (500 mg total) by mouth 2 (two) times daily with meals. 60 tablet 6    metoprolol succinate (TOPROL-XL) 50 MG 24 hr tablet TAKE 1 TABLET(50 MG) BY MOUTH EVERY DAY 30 tablet 11    nicotine (NICODERM CQ) 21 mg/24 hr Place 1 patch onto the skin once daily. 14 patch 0    NOVOLOG 100 unit/mL injection INJECT 20 UNITS INTO THE SKIN THREE TIMES DAILY BEFORE MEALS 20 mL 0    pantoprazole (PROTONIX) 40 MG tablet TAKE 1 TABLET(40 MG) BY MOUTH EVERY DAY 30 tablet 0    pen needle, diabetic 32 gauge x 5/32" Ndle 1 each by Misc.(Non-Drug; Combo Route) route once daily. 100 each 11    polyethylene glycol (GLYCOLAX) 17 gram/dose powder Take 17 g by mouth once daily. 1 Bottle 1    pravastatin (PRAVACHOL) 20 MG tablet TAKE 1 TABLET BY MOUTH EVERY EVENING 30 tablet 10    quetiapine (SEROQUEL) 200 MG Tab Take 200 mg by mouth once daily.   1    quetiapine (SEROQUEL) 300 MG Tab Take 1 tablet (300 mg total) by mouth once daily. (Patient taking differently: Take 200 mg by mouth once daily. ) 30 tablet 0    sertraline (ZOLOFT) 100 MG tablet Take 2 tablets (200 mg total) by mouth once daily. 30 tablet 0    trazodone (DESYREL) 300 MG tablet TK 1 T PO  QHS  3    TRULICITY 0.75 mg/0.5 mL PnIj INJECT CONTENTS OF 1 SYRINGE UNDER THE SKIN ONCE WEEKLY  5    " "varenicline (CHANTIX STARTING MONTH BOX) 0.5 mg (11)- 1 mg (42) tablet Take one 0.5mg tab by mouth once daily X3 days,then increase to one 0.5mg tab twice daily X4 days,then increase to one 1mg tab twice daily 1 Package 0    aspirin 81 MG Chew Take 1 tablet (81 mg total) by mouth once daily. 30 tablet 0    triamcinolone acetonide 0.1% (KENALOG) 0.1 % cream Apply topically 2 (two) times daily. 45 g 0     Current Facility-Administered Medications on File Prior to Visit   Medication Dose Route Frequency Provider Last Rate Last Dose    hylan g-f 20 48 mg/6 mL injection 48 mg  48 mg Intra-articular 1 time in Clinic/HOD Chris Calderon Sr., MD        hylan g-f 20 48 mg/6 mL injection 48 mg  48 mg Intra-articular 1 time in Clinic/HOD Chris Calderon Sr., MD        hylan g-f 20 48 mg/6 mL injection 48 mg  48 mg Intra-articular 1 time in Clinic/HOD Chris Calderon Sr., MD           Physical Exam:  Vital Signs:  BP (!) 146/92   Pulse 88   Ht 5' 10" (1.778 m)   Wt 115.2 kg (253 lb 15.5 oz)   BMI 36.44 kg/m²   Body mass index is 36.44 kg/m².  Physical Exam   Constitutional: He is oriented to person, place, and time and well-developed, well-nourished, and in no distress. No distress.   HENT:   Head: Normocephalic.   Eyes: Conjunctivae are normal. Pupils are equal, round, and reactive to light.   Cardiovascular: Normal rate, regular rhythm and normal heart sounds.    Pulmonary/Chest: Effort normal and breath sounds normal. No respiratory distress.   Abdominal: Soft. Bowel sounds are normal. He exhibits no distension. There is no tenderness.   Neurological: He is alert and oriented to person, place, and time. No cranial nerve deficit.   Skin: Skin is warm and dry. No rash noted.   Psychiatric: Mood and affect normal.       Labs: Pertinent labs reviewed.    Endoscopy:  See HPI    CRC Screening: Up to date    Assessment:  1. Gastroesophageal reflux disease with esophagitis    2. Cigarette smoker  "       Recommendations:  Discussed smoking cessation as it can worsen GERD. Patient is currently using low dose patches and is trying to stop. Continue daily PPI. May take Zantac if needed at times.    Return to Clinic:  Return in about 1 year (around 7/10/2018), or if symptoms worsen or fail to improve.    Thank you for the opportunity to participate in the care of this patient.  MOISES Bacon

## 2017-07-10 NOTE — TELEPHONE ENCOUNTER
Return previous message from patient stating that he lost his benefits card and would like to request a new copy. He confirmed his group appointment on 7-.

## 2017-07-12 ENCOUNTER — CLINICAL SUPPORT (OUTPATIENT)
Dept: SMOKING CESSATION | Facility: CLINIC | Age: 57
End: 2017-07-12
Payer: COMMERCIAL

## 2017-07-12 ENCOUNTER — OFFICE VISIT (OUTPATIENT)
Dept: DIABETES | Facility: CLINIC | Age: 57
End: 2017-07-12
Payer: MEDICAID

## 2017-07-12 VITALS
WEIGHT: 249.13 LBS | HEIGHT: 70 IN | BODY MASS INDEX: 35.66 KG/M2 | DIASTOLIC BLOOD PRESSURE: 92 MMHG | SYSTOLIC BLOOD PRESSURE: 130 MMHG

## 2017-07-12 DIAGNOSIS — I10 ESSENTIAL HYPERTENSION: Chronic | ICD-10-CM

## 2017-07-12 DIAGNOSIS — E78.5 HYPERLIPIDEMIA ASSOCIATED WITH TYPE 2 DIABETES MELLITUS: ICD-10-CM

## 2017-07-12 DIAGNOSIS — E11.22 TYPE 2 DIABETES MELLITUS WITH STAGE 4 CHRONIC KIDNEY DISEASE, WITH LONG-TERM CURRENT USE OF INSULIN: ICD-10-CM

## 2017-07-12 DIAGNOSIS — F17.210 MODERATE SMOKER (20 OR LESS PER DAY): Primary | ICD-10-CM

## 2017-07-12 DIAGNOSIS — Z79.4 TYPE 2 DIABETES MELLITUS WITH DIABETIC NEUROPATHY, WITH LONG-TERM CURRENT USE OF INSULIN: Primary | ICD-10-CM

## 2017-07-12 DIAGNOSIS — E11.40 TYPE 2 DIABETES MELLITUS WITH DIABETIC NEUROPATHY, WITH LONG-TERM CURRENT USE OF INSULIN: Primary | ICD-10-CM

## 2017-07-12 DIAGNOSIS — E11.69 HYPERLIPIDEMIA ASSOCIATED WITH TYPE 2 DIABETES MELLITUS: ICD-10-CM

## 2017-07-12 DIAGNOSIS — Z79.4 TYPE 2 DIABETES MELLITUS WITH STAGE 4 CHRONIC KIDNEY DISEASE, WITH LONG-TERM CURRENT USE OF INSULIN: ICD-10-CM

## 2017-07-12 DIAGNOSIS — E66.9 OBESITY (BMI 30-39.9): ICD-10-CM

## 2017-07-12 DIAGNOSIS — I10 ESSENTIAL HYPERTENSION: ICD-10-CM

## 2017-07-12 DIAGNOSIS — N18.4 TYPE 2 DIABETES MELLITUS WITH STAGE 4 CHRONIC KIDNEY DISEASE, WITH LONG-TERM CURRENT USE OF INSULIN: ICD-10-CM

## 2017-07-12 DIAGNOSIS — R06.09 DOE (DYSPNEA ON EXERTION): Chronic | ICD-10-CM

## 2017-07-12 LAB — GLUCOSE SERPL-MCNC: 242 MG/DL (ref 70–110)

## 2017-07-12 PROCEDURE — 99999 PR PBB SHADOW E&M-EST. PATIENT-LVL IV: CPT | Mod: PBBFAC,,, | Performed by: NURSE PRACTITIONER

## 2017-07-12 PROCEDURE — 99999 PR PBB SHADOW E&M-EST. PATIENT-LVL I: CPT | Mod: PBBFAC,,,

## 2017-07-12 PROCEDURE — 3046F HEMOGLOBIN A1C LEVEL >9.0%: CPT | Mod: ,,, | Performed by: NURSE PRACTITIONER

## 2017-07-12 PROCEDURE — 99214 OFFICE O/P EST MOD 30 MIN: CPT | Mod: PBBFAC,PO | Performed by: NURSE PRACTITIONER

## 2017-07-12 PROCEDURE — 90853 GROUP PSYCHOTHERAPY: CPT | Mod: S$GLB,,,

## 2017-07-12 PROCEDURE — 82948 REAGENT STRIP/BLOOD GLUCOSE: CPT | Mod: PBBFAC,PO | Performed by: NURSE PRACTITIONER

## 2017-07-12 PROCEDURE — 3066F NEPHROPATHY DOC TX: CPT | Mod: ,,, | Performed by: NURSE PRACTITIONER

## 2017-07-12 PROCEDURE — 99214 OFFICE O/P EST MOD 30 MIN: CPT | Mod: S$PBB,,, | Performed by: NURSE PRACTITIONER

## 2017-07-12 RX ORDER — ISOSORBIDE MONONITRATE 60 MG/1
TABLET, EXTENDED RELEASE ORAL
Qty: 30 TABLET | Refills: 0 | Status: SHIPPED | OUTPATIENT
Start: 2017-07-12 | End: 2017-08-07 | Stop reason: SDUPTHER

## 2017-07-12 RX ORDER — IBUPROFEN 200 MG
1 TABLET ORAL DAILY
Qty: 28 PATCH | Refills: 0 | Status: SHIPPED | OUTPATIENT
Start: 2017-07-12 | End: 2017-08-02 | Stop reason: SDUPTHER

## 2017-07-12 NOTE — PROGRESS NOTES
"PCP: Farhana Burnham MD    Subjective:     HISTORY OF PRESENT ILLNESS: 57 year old  male patient is in clinic today for diabetes.  Patient has had Type II diabetes since 2009.  He has been seen by dietitian in the past.  Most recent A1C is 9.1, ADA recommends less than 7.0.  He reports prior use of glimepiride but was told to discontinue.   Per recall, fasting  - 298.  He is not monitoring other times.   Since his last visit, he gained 4 pounds.  He is currently in enrolled in smoking cessation.     Patient denies polyuria, polyphagia, or blurred vision.  He complains of polydipsia, cotton mouth.  Also denies nausea, vomiting, diarrhea or hypoglycemia. Poor dietary compliance.  He is drinking regular coke in clinic today.    Height: 5 ' 9.5", Weight: 249 pounds, BMI 35.74  Blood Glucose reading this AM: Not Taken  Blood Glucose reading in clinic: 242 mg/dl at 9:47 am    DM MEDICATIONS:  Metformin 500 mg BID  Lantus 45 units at bedtime  Humalog 50 / 50 -  40 units BID ac  Trulicity 1.5 mg weekly     Labs Reviewed.    REVIEW OF SYSTEMS:  General: Denies fever, chills, change in appetite.  HEENT: Denies impaired hearing, dysphagia.  Respiratory: Denies shortness of breath, cough or wheezing.  Cardiovascular: Denies chest pain, palpitations.  GI: Denies hematochezia.  : Denies hematuria, dysuria or frequency.  MS:  Normal gait. Denies difficulty with mobility, muscle or joint pain.  SKIN: Denies rashes and lesions.  Neuro: Has numbness or tingling in the hands or feet.   PSYCH: Denies depression or anxiety. No suicidal ideations.  ENDO: See HPI.    STANDARDS OF CARE:  Eye doctor: Dr. Caruso, Last exam 7 / 2016  Dental exam: Recommend regular exams; denies gums bleeding.  Podiatry doctor: Dr. Sampson, last exam 3 / 2015    ACTIVITY LEVEL: No routine exercise.   MEAL PLANNING: Number of meals per day - 3. Number of snacks per day - 2.  Per dietary recall, patient is not limiting carbohydrates, " saturated fats and sodium.     BLOOD GLUCOSE TESTING: Self-monitoring with, ONE TOUCH verio  SOCIAL HISTORY: Disabled. Smokes 1/2 pack per day - enrolled in smoking cessation    Objective:     PHYSICAL EXAMINATION:  GENERAL: WDWN  male in no acute distress, ambulatory, responds appropriately. AAO X 3.   NECK: Supple, no thyromegaly, no cervical or supraclavicular lymphadenopathy, no carotid bruit.  HEART: Regular rate and rhythm. No rubs, murmurs or gallops.  LUNGS: CTA bilaterally. Unlabored breathing, no use of accessory muscles.  MUSCULOSKELETAL: Normal gait and muscle strength.  ABDOMEN: Active bowel sounds X 4, no masses or tenderness.   SKIN: Warm, dry skin. No lesions or abrasions. Clean, dry well-healed injection sites.   NEUROLOGIC: Cranial nerves II-XII grossly intact.   EXTREMITIES: Multiple healing abrasions to chins of both LE.  FOOT EXAMINATION: Protective Sensation (w/ 10 gram monofilament):  Right: Decreased  Left: Decreased  //  Visual Inspection: Onychomycosis -  Bilateral toes.  Significant calluses to lateral sides of great toes.  // Pedal Pulses:  Right: Diminshed  Left: Diminshed    Assessment:     EDUCATIONAL ASSESSMENT:  1. Impediments in learning class environment - NONE.  2. Needs improvement in self-care management skills.    (1.) Diabetes Type II, stage III CKD, neuropathy ( on gabapentin ) -  uncontrolled on metformin, Humalog 50 / 50, Trulicity, A1C 9.1  (2.) Hypertension - suboptimal control on norvasc, catapres, hydralazine, lisinopril-HCT  (3.) Obesity, BMI 35.01  (4.) Hyperlipidemia - continue on Pravastatin  (5.) Tobacco Abuse - enrolled in smoking cessation    Plan:     1.) Patient was instructed to monitor blood glucose 2 x daily, fasting and ac dinner.  Discussed ADA goal for fasting blood sugar, 80 - 130 mg/dL; pp blood sugars below 180 mg/dl. Also, discussed prevention of hypoglycemia and the need to adjust goals to higher levels if persistent hypoglycemia.  Reminded  to bring blood glucose records or meter to each visit for review.  2.) Reviewed pathophysiology of Type 2 diabetes, complications related to the disease, importance of annual dilated eye exam and daily foot examination.  3.) Continue metformin 500 mg BID.  Continue Trulicity 1.5 mg weekly.  We are going to increase Humalog to TDD of 100 units and give 2 / 3 in am and 1 / 3 in the pm.  He agrees to take Humalog 50 / 50, 66 units in am and 33 units before dinner.   He did not bring meter to clinic today, so I am unable to assess BG readings.  He has poor dietary compliance.  He is drinking a regular coke in clinic today.  Phone review of blood sugar readings in one week with adjustment of medications as needed.  4.) Meal planning teaching: Carbohydrate definition - one serving is 15 gms. Carbohydrate spacing - carbohydrates should be spaced into approximately 3 meals with 2 snacks ( of one carbohydrate ) between meals or at bedtime. Increase vegetable intake to 2 or more cups of vegetables per day as well as 2 fruit servings. Recommended low saturated fat, low sodium diet to aid in control of hypertension and cholesterol.  5.) Discussed activity, benefits, methods, and precautions. Recommended patient start some form of exercise and increase as tolerated to 30 minutes per day to facilitate weight loss and aid in control of BGs.  6.) Return to clinic in 1 month for follow up. Advised patient to call clinic with any questions or concerns.    Ladan Dowd, NP-C, CDE

## 2017-07-12 NOTE — PROGRESS NOTES
Site: [place]  Date:  7/12/2017  Clinical Status of Patient: Outpatient   Length of Service and Code: 90 minutes - 73680   Number in Attendance: 2  Group Activities/Focus of Group: The patient will continue with group therapy sessions and medication monitoring by CTTS. Prescribed medication management will be by physician. Orientation, client introductions, completion of TCRS (Tobacco Cessation Rating Scale) learned addiction model, cues/triggers, personal reasons for quitting, medications, goals and quit date.    Target symptoms:  withdrawal and medication side effects             The following were rated moderate (3) to severe (4) on TCRS:       Moderate 3: weight gain. We discussed making healthy food choices and moderate exercise.     Severe 4:   none  Patient's Response to Intervention: Patient reports decreasing cigarette smoking from 1.5 packs of cigarettes per day to 1 pack per day. He has set a quit date of 8/8/17.  The patient remains on the prescribed tobacco cessation medication regimen of 1 mg Chantix BID with a 21 mg nicotine patch QD without any negative side effects at this time.     Progress Toward Goals and Other Mental Status Changes: The patient denies any abnormal behavioral or mental changes at this time.     Plan: The patient will continue with group therapy sessions and medication monitoring by CTTS. Prescribed medication management will be by physician.     Return to Clinic: 1 week

## 2017-07-12 NOTE — Clinical Note
Patient reports decreasing cigarette smoking from 1.5 packs of cigarettes per day to 1 pack per day. He has set a quit date of 8/8/17.  The patient remains on the prescribed tobacco cessation medication regimen of 1 mg Chantix BID with a 21 mg nicotine patch QD without any negative side effects at this time.

## 2017-07-19 ENCOUNTER — CLINICAL SUPPORT (OUTPATIENT)
Dept: SMOKING CESSATION | Facility: CLINIC | Age: 57
End: 2017-07-19
Payer: COMMERCIAL

## 2017-07-19 DIAGNOSIS — F17.210 MODERATE SMOKER (20 OR LESS PER DAY): Primary | ICD-10-CM

## 2017-07-19 PROCEDURE — 90853 GROUP PSYCHOTHERAPY: CPT | Mod: S$GLB,,,

## 2017-07-19 PROCEDURE — 99999 PR PBB SHADOW E&M-EST. PATIENT-LVL I: CPT | Mod: PBBFAC,,,

## 2017-07-19 NOTE — Clinical Note
Patient reports decreasing cigarette smoking from 1 pack per day to a 1/2 pack per day. He has set a quit date of 8/9/17. The patient remains on the prescribed tobacco cessation medication regimen of 1 mg Chantix BID with a 21 mg nicotine patch QD without any negative side effects at this time.

## 2017-07-19 NOTE — PROGRESS NOTES
Smoking Cessation Group Session #2    Site: [place]  Date:  7/19/2017  Clinical Status of Patient: Outpatient   Length of Service and Code: 90 minutes - 05907   Number in Attendance: 2  Group Activities/Focus of Group:  completion of TCRS (Tobacco Cessation Rating Scale) reviewed strategies, cues, and triggers. Introduced the negative impact of tobacco on health, the health advantages of discontinuing the use of tobacco, time line improved health changes after a quit, withdrawal issues to expect from nicotine and habit, and ways to achieve the goal of a quit.    Target symptoms:  withdrawal and medication side effects             The following were rated moderate (3) to severe (4) on TCRS:       Moderate 3: none     Severe 4:   none  Patient's Response to Intervention: Patient reports decreasing cigarette smoking from 1 pack per day to a 1/2 pack per day. He has set a quit date of 8/9/17. The patient remains on the prescribed tobacco cessation medication regimen of 1 mg Chantix BID with a 21 mg nicotine patch QD without any negative side effects at this time.     Progress Toward Goals and Other Mental Status Changes: The patient denies any abnormal behavioral or mental changes at this time.     Plan: The patient will continue with group therapy sessions and medication monitoring by CTTS. Prescribed medication management will be by physician.     Return to Clinic: 1 week    Quit Date: none   Planned Quit Date: 8/9/17

## 2017-07-26 ENCOUNTER — TELEPHONE (OUTPATIENT)
Dept: FAMILY MEDICINE | Facility: CLINIC | Age: 57
End: 2017-07-26

## 2017-07-26 ENCOUNTER — CLINICAL SUPPORT (OUTPATIENT)
Dept: SMOKING CESSATION | Facility: CLINIC | Age: 57
End: 2017-07-26
Payer: COMMERCIAL

## 2017-07-26 DIAGNOSIS — F17.210 MODERATE SMOKER (20 OR LESS PER DAY): ICD-10-CM

## 2017-07-26 DIAGNOSIS — F17.210 MODERATE CIGARETTE SMOKER (10-19 PER DAY): Primary | ICD-10-CM

## 2017-07-26 PROCEDURE — 99999 PR PBB SHADOW E&M-EST. PATIENT-LVL I: CPT | Mod: PBBFAC,,,

## 2017-07-26 PROCEDURE — 90853 GROUP PSYCHOTHERAPY: CPT | Mod: S$GLB,,,

## 2017-07-26 RX ORDER — VARENICLINE TARTRATE 1 MG/1
1 TABLET, FILM COATED ORAL 2 TIMES DAILY
Qty: 56 TABLET | Refills: 0 | Status: SHIPPED | OUTPATIENT
Start: 2017-07-26 | End: 2017-10-04 | Stop reason: SDUPTHER

## 2017-07-26 NOTE — TELEPHONE ENCOUNTER
----- Message from Julio C Esquivel sent at 7/26/2017  1:31 PM CDT -----  Contact: pt  He's calling in regards to his thyroid problem, please advise, [phone] (home)

## 2017-07-26 NOTE — Clinical Note
Patient reports decreasing cigarette smoking from 1.5 packs per day to 10 cigarettes per day. He has set a quit date of 8/9/17. The patient remains on the prescribed tobacco cessation medication regimen of 1 mg Chantix BID with a 21 mg nicotine patch QD without any negative side effects at this time.

## 2017-07-26 NOTE — TELEPHONE ENCOUNTER
Patient stated he was seen in smoking cessation today  And was told by the instructor that he had an enlarged thyroid ..Pt  wants to know if he needs to be seen ?

## 2017-07-26 NOTE — PROGRESS NOTES
Smoking Cessation Group Session #3    Site: [place]  Date:  7/26/2017  Clinical Status of Patient: Outpatient   Length of Service and Code: 90 minutes - 03179   Number in Attendance: 3  Group Activities/Focus of Group:  completion of TCRS (Tobacco Cessation Rating Scale) reviewed strategies, controlling environment, cues, triggers, new goals set. Introduced high risk situations with preparation interventions, caffeine similarities with withdrawal issues of habit and nicotine, Alcohol, Understanding urges, cravings, stress and relaxation. Open discussion with intervention discussion.    Target symptoms:  withdrawal and medication side effects             The following were rated moderate (3) to severe (4) on TCRS:       Moderate 3: none     Severe 4:   none  Patient's Response to Intervention: Patient reports decreasing cigarette smoking from 1.5 packs per day to 10 cigarettes per day. He has set a quit date of 8/9/17. The patient remains on the prescribed tobacco cessation medication regimen of 1 mg Chantix BID with a 21 mg nicotine patch QD without any negative side effects at this time.     Progress Toward Goals and Other Mental Status Changes: The patient denies any abnormal behavioral or mental changes at this time.     Plan: The patient will continue with group therapy sessions and medication monitoring by CTTS. Prescribed medication management will be by physician.     Return to Clinic: 1 week    Quit Date: none   Planned Quit Date: 8/9/17

## 2017-08-02 ENCOUNTER — LAB VISIT (OUTPATIENT)
Dept: LAB | Facility: HOSPITAL | Age: 57
End: 2017-08-02
Attending: FAMILY MEDICINE
Payer: MEDICAID

## 2017-08-02 ENCOUNTER — CLINICAL SUPPORT (OUTPATIENT)
Dept: SMOKING CESSATION | Facility: CLINIC | Age: 57
End: 2017-08-02
Payer: COMMERCIAL

## 2017-08-02 DIAGNOSIS — F17.210 LIGHT CIGARETTE SMOKER (1-9 CIGS/DAY): Primary | ICD-10-CM

## 2017-08-02 DIAGNOSIS — A04.8 POSITIVE H. PYLORI TEST: ICD-10-CM

## 2017-08-02 DIAGNOSIS — F17.210 MODERATE SMOKER (20 OR LESS PER DAY): ICD-10-CM

## 2017-08-02 DIAGNOSIS — R19.8 ALTERNATING CONSTIPATION AND DIARRHEA: ICD-10-CM

## 2017-08-02 DIAGNOSIS — K21.9 GASTROESOPHAGEAL REFLUX DISEASE, ESOPHAGITIS PRESENCE NOT SPECIFIED: ICD-10-CM

## 2017-08-02 DIAGNOSIS — I10 ESSENTIAL HYPERTENSION: ICD-10-CM

## 2017-08-02 DIAGNOSIS — R79.89 ABNORMAL CBC: ICD-10-CM

## 2017-08-02 LAB
ANION GAP SERPL CALC-SCNC: 12 MMOL/L
BASOPHILS # BLD AUTO: 0.04 K/UL
BASOPHILS NFR BLD: 0.4 %
BUN SERPL-MCNC: 19 MG/DL
CALCIUM SERPL-MCNC: 9.9 MG/DL
CHLORIDE SERPL-SCNC: 97 MMOL/L
CO2 SERPL-SCNC: 25 MMOL/L
CREAT SERPL-MCNC: 1.5 MG/DL
DIFFERENTIAL METHOD: NORMAL
EOSINOPHIL # BLD AUTO: 0.2 K/UL
EOSINOPHIL NFR BLD: 1.5 %
ERYTHROCYTE [DISTWIDTH] IN BLOOD BY AUTOMATED COUNT: 13.8 %
EST. GFR  (AFRICAN AMERICAN): 58.1 ML/MIN/1.73 M^2
EST. GFR  (NON AFRICAN AMERICAN): 50.2 ML/MIN/1.73 M^2
GLUCOSE SERPL-MCNC: 131 MG/DL
HCT VFR BLD AUTO: 42.4 %
HGB BLD-MCNC: 14.8 G/DL
LYMPHOCYTES # BLD AUTO: 2.4 K/UL
LYMPHOCYTES NFR BLD: 23.8 %
MCH RBC QN AUTO: 29.8 PG
MCHC RBC AUTO-ENTMCNC: 34.9 G/DL
MCV RBC AUTO: 85 FL
MONOCYTES # BLD AUTO: 0.7 K/UL
MONOCYTES NFR BLD: 7.2 %
NEUTROPHILS # BLD AUTO: 6.5 K/UL
NEUTROPHILS NFR BLD: 66.1 %
PLATELET # BLD AUTO: 322 K/UL
PMV BLD AUTO: 9.3 FL
POTASSIUM SERPL-SCNC: 4.5 MMOL/L
RBC # BLD AUTO: 4.97 M/UL
SODIUM SERPL-SCNC: 134 MMOL/L
WBC # BLD AUTO: 9.86 K/UL

## 2017-08-02 PROCEDURE — 83036 HEMOGLOBIN GLYCOSYLATED A1C: CPT

## 2017-08-02 PROCEDURE — 99999 PR PBB SHADOW E&M-EST. PATIENT-LVL I: CPT | Mod: PBBFAC,,,

## 2017-08-02 PROCEDURE — 85025 COMPLETE CBC W/AUTO DIFF WBC: CPT

## 2017-08-02 PROCEDURE — 90853 GROUP PSYCHOTHERAPY: CPT | Mod: S$GLB,,,

## 2017-08-02 PROCEDURE — 36415 COLL VENOUS BLD VENIPUNCTURE: CPT | Mod: PO

## 2017-08-02 PROCEDURE — 80048 BASIC METABOLIC PNL TOTAL CA: CPT

## 2017-08-02 RX ORDER — IBUPROFEN 200 MG
1 TABLET ORAL DAILY
Qty: 28 PATCH | Refills: 0 | Status: SHIPPED | OUTPATIENT
Start: 2017-08-02 | End: 2017-10-04 | Stop reason: SDUPTHER

## 2017-08-02 NOTE — PROGRESS NOTES
Smoking Cessation Group Session #4    Site: [place]  Date:  8/2/2017  Clinical Status of Patient: Outpatient   Length of Service and Code: 90 minutes - 45298   Number in Attendance: 2  Group Activities/Focus of Group: completion of TCRS (Tobacco Cessation Rating Scale) reviewed strategies, habitual behavior, stress, and high risk situations. Introduced stress with addition interventions, SOLVE, relaxation with interventions, nutrition, exercise, weight gain, and the importance of rewarding oneself for accomplishments toward becoming tobacco free. Open discussion of all items with interventions.      Target symptoms:  withdrawal and medication side effects             The following were rated moderate (3) to severe (4) on TCRS:       Moderate 3: none     Severe 4:   none  Patient's Response to Intervention: Patient reports decreasing cigarette smoking from 1.5 packs per day to 4-5 cigarettes per day. He has set a quit date of 8/17/17. The patient remains on the prescribed tobacco cessation medication regimen of 1 mg Chantix BID with a 21 mg nicotine patch QD without any negative side effects at this time.     Progress Toward Goals and Other Mental Status Changes: The patient denies any abnormal behavioral or mental changes at this time.     Plan: The patient will continue with group therapy sessions and medication monitoring by CTTS. Prescribed medication management will be by physician.     Return to Clinic: 1 week    Quit Date: none   Planned Quit Date: 8/17/17

## 2017-08-02 NOTE — Clinical Note
Patient reports decreasing cigarette smoking from 1.5 packs per day to 4-5 cigarettes per day. He has set a quit date of 8/17/17. The patient remains on the prescribed tobacco cessation medication regimen of 1 mg Chantix BID with a 21 mg nicotine patch QD without any negative side effects at this time.

## 2017-08-03 LAB
ESTIMATED AVG GLUCOSE: 180 MG/DL
HBA1C MFR BLD HPLC: 7.9 %

## 2017-08-04 RX ORDER — AMLODIPINE BESYLATE 5 MG/1
TABLET ORAL
Qty: 30 TABLET | Refills: 0 | Status: SHIPPED | OUTPATIENT
Start: 2017-08-04 | End: 2017-09-05 | Stop reason: SDUPTHER

## 2017-08-04 RX ORDER — PANTOPRAZOLE SODIUM 40 MG/1
TABLET, DELAYED RELEASE ORAL
Qty: 30 TABLET | Refills: 0 | Status: SHIPPED | OUTPATIENT
Start: 2017-08-04 | End: 2017-09-05 | Stop reason: SDUPTHER

## 2017-08-07 DIAGNOSIS — I10 ESSENTIAL HYPERTENSION: ICD-10-CM

## 2017-08-07 DIAGNOSIS — E11.40 TYPE 2 DIABETES MELLITUS WITH DIABETIC NEUROPATHY, UNSPECIFIED LONG TERM INSULIN USE STATUS: ICD-10-CM

## 2017-08-07 DIAGNOSIS — R06.09 DOE (DYSPNEA ON EXERTION): Chronic | ICD-10-CM

## 2017-08-07 RX ORDER — GABAPENTIN 300 MG/1
CAPSULE ORAL
Qty: 90 CAPSULE | Refills: 0 | Status: SHIPPED | OUTPATIENT
Start: 2017-08-07 | End: 2017-09-08 | Stop reason: SDUPTHER

## 2017-08-07 RX ORDER — ISOSORBIDE MONONITRATE 60 MG/1
TABLET, EXTENDED RELEASE ORAL
Qty: 30 TABLET | Refills: 0 | Status: SHIPPED | OUTPATIENT
Start: 2017-08-07 | End: 2018-01-10

## 2017-08-08 ENCOUNTER — CLINICAL SUPPORT (OUTPATIENT)
Dept: SMOKING CESSATION | Facility: CLINIC | Age: 57
End: 2017-08-08
Payer: COMMERCIAL

## 2017-08-08 ENCOUNTER — TELEPHONE (OUTPATIENT)
Dept: DIABETES | Facility: CLINIC | Age: 57
End: 2017-08-08

## 2017-08-08 DIAGNOSIS — F17.210 LIGHT CIGARETTE SMOKER (1-9 CIGS/DAY): Primary | ICD-10-CM

## 2017-08-08 PROCEDURE — 99407 BEHAV CHNG SMOKING > 10 MIN: CPT | Mod: S$GLB,,,

## 2017-08-08 NOTE — TELEPHONE ENCOUNTER
----- Message from Nathalie Ashby sent at 8/7/2017 11:41 AM CDT -----  Contact: pt  Please call pt @ [phone], pt states both feet are in pain, pt need to know if medication could be change.

## 2017-08-08 NOTE — TELEPHONE ENCOUNTER
Spoke with patient. Said the Gabapentin is not working for his neuropathy, and he would like to try Lyrica. Informed patient TONY Dowd NP does not prescribe any pain medications. Advised patient to f/u with PCP or pain management regarding issue, and he voiced understanding.

## 2017-08-16 ENCOUNTER — CLINICAL SUPPORT (OUTPATIENT)
Dept: SMOKING CESSATION | Facility: CLINIC | Age: 57
End: 2017-08-16
Payer: COMMERCIAL

## 2017-08-16 DIAGNOSIS — F17.210 CIGARETTE NICOTINE DEPENDENCE, UNCOMPLICATED: Primary | ICD-10-CM

## 2017-08-16 PROCEDURE — 99407 BEHAV CHNG SMOKING > 10 MIN: CPT | Mod: S$GLB,,,

## 2017-08-20 DIAGNOSIS — E11.40 TYPE 2 DIABETES MELLITUS WITH DIABETIC NEUROPATHY: ICD-10-CM

## 2017-08-20 RX ORDER — DULAGLUTIDE 0.75 MG/.5ML
INJECTION, SOLUTION SUBCUTANEOUS
Qty: 2 ML | Refills: 0 | OUTPATIENT
Start: 2017-08-20

## 2017-08-23 ENCOUNTER — HOSPITAL ENCOUNTER (EMERGENCY)
Facility: HOSPITAL | Age: 57
Discharge: HOME | End: 2017-08-23
Attending: EMERGENCY MEDICINE
Payer: MEDICAID

## 2017-08-23 ENCOUNTER — TELEPHONE (OUTPATIENT)
Dept: FAMILY MEDICINE | Facility: CLINIC | Age: 57
End: 2017-08-23

## 2017-08-23 ENCOUNTER — TELEPHONE (OUTPATIENT)
Dept: ORTHOPEDICS | Facility: CLINIC | Age: 57
End: 2017-08-23

## 2017-08-23 VITALS
OXYGEN SATURATION: 99 % | BODY MASS INDEX: 35.65 KG/M2 | RESPIRATION RATE: 20 BRPM | HEIGHT: 70 IN | HEART RATE: 78 BPM | TEMPERATURE: 98 F | DIASTOLIC BLOOD PRESSURE: 101 MMHG | SYSTOLIC BLOOD PRESSURE: 186 MMHG | WEIGHT: 249 LBS

## 2017-08-23 DIAGNOSIS — M25.561 CHRONIC PAIN OF RIGHT KNEE: Primary | ICD-10-CM

## 2017-08-23 DIAGNOSIS — G89.29 CHRONIC PAIN OF RIGHT KNEE: Primary | ICD-10-CM

## 2017-08-23 PROCEDURE — 25000003 PHARM REV CODE 250: Performed by: EMERGENCY MEDICINE

## 2017-08-23 PROCEDURE — 99283 EMERGENCY DEPT VISIT LOW MDM: CPT

## 2017-08-23 RX ORDER — HYDROCODONE BITARTRATE AND ACETAMINOPHEN 5; 325 MG/1; MG/1
1 TABLET ORAL EVERY 4 HOURS PRN
Qty: 12 TABLET | Refills: 0 | Status: SHIPPED | OUTPATIENT
Start: 2017-08-23 | End: 2017-09-02

## 2017-08-23 RX ORDER — HYDROCODONE BITARTRATE AND ACETAMINOPHEN 10; 325 MG/1; MG/1
1 TABLET ORAL
Status: COMPLETED | OUTPATIENT
Start: 2017-08-23 | End: 2017-08-23

## 2017-08-23 RX ADMIN — HYDROCODONE BITARTRATE AND ACETAMINOPHEN 1 TABLET: 10; 325 TABLET ORAL at 06:08

## 2017-08-23 NOTE — TELEPHONE ENCOUNTER
I spoke with patient he is complaining of right knee pain.  Pt will call Dr. Calderon to make an appointment. Encouraged pt . To go to [facility] or ER if he does not get an appointment soon with Dr. Calderon. For right knee evaluation. Dr. Alvarez is aware

## 2017-08-23 NOTE — ED PROVIDER NOTES
SCRIBE #1 NOTE: I, Corinne Mack, am scribing for, and in the presence of, Aramis Prado Jr., MD. I have scribed the entire note.      History      Chief Complaint   Patient presents with    Knee Pain     pt c/o R knee pain x2 days, pmhx R MCL repair       Review of patient's allergies indicates:  No Known Allergies     HPI   HPI    8/23/2017, 5:41 PM   History obtained from the patient      History of Present Illness: Shukri Rosales is a 57 y.o. male patient who presents to the Emergency Department for R knee pain which onset gradually 2 days ago and has been worsening. Symptoms are constant and moderate in severity. Movement exacerbates sxs. No mitigating factors reported. No associated sxs reported. Patient denies any fever, CP, SOB, N/V, back pain, HA, dizziness, and all other sxs at this time. No prior Tx reported. Pt has Hx of R MCL repair, HTN, DM, and CKD. No further complaints or concerns at this time.       Arrival mode: Personal vehicle      PCP: Farhana Burnham MD       Past Medical History:  Past Medical History:   Diagnosis Date    Adrenal cortical adenoma     Anxiety     Arthritis     CKD (chronic kidney disease) stage 3, GFR 30-59 ml/min     Depression     Diabetes mellitus type II 2011    does not take    GERD (gastroesophageal reflux disease) 7/9/2013    Hypertension     Migraine headache 7/22/2013    Severe obesity with body mass index of 36.0 to 36.9        Past Surgical History:  Past Surgical History:   Procedure Laterality Date    BACK SURGERY      HERNIA REPAIR      UMBILICAL    left arm exfix      NASAL SEPTUM SURGERY Bilateral     TONSILLECTOMY      URETEROSCOPY           Family History:  Family History   Problem Relation Age of Onset    Hypertension Mother     Diabetes Mother     Hypertension Sister     Diabetes Sister     Hypertension Brother     Diabetes Brother     Cancer Paternal Grandmother     Cancer Paternal Grandfather        Social  History:  Social History     Social History Main Topics    Smoking status: Current Every Day Smoker     Packs/day: 1.00     Years: 44.00     Types: Cigarettes    Smokeless tobacco: Never Used      Comment: instructed not to smoke after midnight after midnight prior to surgery    Alcohol use Yes    Drug use: No    Sexual activity: No       ROS   Review of Systems   Constitutional: Negative for chills and fever.   Respiratory: Negative for cough and shortness of breath.    Cardiovascular: Negative for chest pain and leg swelling.   Gastrointestinal: Negative for abdominal pain, diarrhea, nausea and vomiting.   Musculoskeletal: Positive for arthralgias (R knee). Negative for back pain, neck pain and neck stiffness.   Skin: Negative for rash and wound.   Neurological: Negative for dizziness, light-headedness, numbness and headaches.   All other systems reviewed and are negative.      Physical Exam      Initial Vitals [08/23/17 1733]   BP Pulse Resp Temp SpO2   (!) 186/101 78 20 97.8 °F (36.6 °C) 99 %      MAP       129.33          Physical Exam  Nursing Notes and Vital Signs Reviewed.  Constitutional: Patient is in no apparent distress. Well-developed and well-nourished.  Head: Atraumatic. Normocephalic.  Eyes: PERRL. EOM intact. Conjunctivae are not pale. No scleral icterus.  ENT: Mucous membranes are moist. Oropharynx is clear and symmetric.    Neck: Supple. Full ROM. No lymphadenopathy.  Cardiovascular: Regular rate. Regular rhythm. No murmurs, rubs, or gallops. Distal pulses are 2+ and symmetric.  Pulmonary/Chest: No respiratory distress. Clear to auscultation bilaterally. No wheezing, rales, or rhonchi.  Abdominal: Soft and non-distended.  There is no tenderness.    Musculoskeletal: Moves all extremities. No obvious deformities. No edema. No calf tenderness.  Right Knee:  No obvious deformity. There is no swelling.  There is diffuse tenderness.  No increased warmth, erythema, induration or fluctuance.  No  "ligament laxity. DP and PT pulses are 2+.  Normal capillary refill.  Distal sensation is intact.  Skin: Warm and dry. Midline scar to R knee.  Neurological:  Alert, awake, and appropriate.  Normal speech.  No acute focal neurological deficits are appreciated.  Psychiatric: Normal affect. Good eye contact. Appropriate in content.    ED Course    Procedures  ED Vital Signs:  Vitals:    08/23/17 1733   BP: (!) 186/101   Pulse: 78   Resp: 20   Temp: 97.8 °F (36.6 °C)   TempSrc: Oral   SpO2: 99%   Weight: 112.9 kg (249 lb)   Height: 5' 10" (1.778 m)       Abnormal Lab Results:  Labs Reviewed - No data to display     All Lab Results:  None    Imaging Results:  Imaging Results    None                 The Emergency Provider reviewed the vital signs and test results, which are outlined above.    ED Discussion     5:59 PM: Pt is awake, alert, and in no distress.Discussed pt dx and plan of tx. Gave pt all f/u and return to the ED instructions. All questions and concerns were addressed at this time. Pt expresses understanding of information and instructions, and is comfortable with plan to discharge. Pt is stable for discharge.    I discussed with patient and/or family/caretaker that evaluation in the ED does not suggest any emergent or life threatening medical conditions requiring immediate intervention beyond what was provided in the ED, and I believe patient is safe for discharge.  Regardless, an unremarkable evaluation in the ED does not preclude the development or presence of a serious of life threatening condition. As such, patient was instructed to return immediately for any worsening or change in current symptoms.      ED Medication(s):  Medications   hydrocodone-acetaminophen 10-325mg per tablet 1 tablet (1 tablet Oral Given 8/23/17 1805)       Discharge Medication List as of 8/23/2017  5:59 PM      START taking these medications    Details   hydrocodone-acetaminophen 5-325mg (NORCO) 5-325 mg per tablet Take 1 tablet " by mouth every 4 (four) hours as needed., Starting Wed 8/23/2017, Until Sat 9/2/2017, Print             Follow-up Information     Fahrana Burnham MD. Call in 2 days.    Specialty:  Family Medicine  Contact information:  [address] [address] [address]  [phone]                     Medical Decision Making              Scribe Attestation:   Scribe #1: I performed the above scribed service and the documentation accurately describes the services I performed. I attest to the accuracy of the note.    Attending:   Physician Attestation Statement for Scribe #1: I, Aramis Prado Jr., MD, personally performed the services described in this documentation, as scribed by Corinne Mack, in my presence, and it is both accurate and complete.          Clinical Impression       ICD-10-CM ICD-9-CM   1. Chronic pain of right knee M25.561 719.46    G89.29 338.29       Disposition:   Disposition: Discharged  Condition: Stable         Aramis Prado Jr., MD  08/23/17 1940

## 2017-08-23 NOTE — TELEPHONE ENCOUNTER
Pt was contacted concerning R knee pain. Pt was given next available appointment on 9/28/2017 at 9:20. Pt stated that his pain could necessitate a visit to emergency department.

## 2017-08-23 NOTE — TELEPHONE ENCOUNTER
----- Message from Ruth Juarez sent at 8/23/2017  3:25 PM CDT -----  Contact: pt   Call pt regarding his right knee is hurting really bad.     ...[phone]

## 2017-08-30 ENCOUNTER — OFFICE VISIT (OUTPATIENT)
Dept: DIABETES | Facility: CLINIC | Age: 57
End: 2017-08-30
Payer: MEDICAID

## 2017-08-30 ENCOUNTER — LAB VISIT (OUTPATIENT)
Dept: LAB | Facility: HOSPITAL | Age: 57
End: 2017-08-30
Attending: NURSE PRACTITIONER
Payer: MEDICAID

## 2017-08-30 VITALS
SYSTOLIC BLOOD PRESSURE: 136 MMHG | BODY MASS INDEX: 37.26 KG/M2 | DIASTOLIC BLOOD PRESSURE: 88 MMHG | WEIGHT: 260.25 LBS | HEIGHT: 70 IN

## 2017-08-30 DIAGNOSIS — E11.22 TYPE 2 DIABETES MELLITUS WITH STAGE 3 CHRONIC KIDNEY DISEASE, WITH LONG-TERM CURRENT USE OF INSULIN: ICD-10-CM

## 2017-08-30 DIAGNOSIS — N18.30 TYPE 2 DIABETES MELLITUS WITH STAGE 3 CHRONIC KIDNEY DISEASE, WITH LONG-TERM CURRENT USE OF INSULIN: ICD-10-CM

## 2017-08-30 DIAGNOSIS — E66.9 OBESITY (BMI 30-39.9): ICD-10-CM

## 2017-08-30 DIAGNOSIS — F17.210 CIGARETTE SMOKER: Chronic | ICD-10-CM

## 2017-08-30 DIAGNOSIS — Z79.4 TYPE 2 DIABETES MELLITUS WITH STAGE 3 CHRONIC KIDNEY DISEASE, WITH LONG-TERM CURRENT USE OF INSULIN: ICD-10-CM

## 2017-08-30 DIAGNOSIS — E11.40 TYPE 2 DIABETES MELLITUS WITH DIABETIC NEUROPATHY, WITH LONG-TERM CURRENT USE OF INSULIN: Primary | ICD-10-CM

## 2017-08-30 DIAGNOSIS — Z79.4 TYPE 2 DIABETES MELLITUS WITH DIABETIC NEUROPATHY, WITH LONG-TERM CURRENT USE OF INSULIN: Primary | ICD-10-CM

## 2017-08-30 LAB — GLUCOSE SERPL-MCNC: 216 MG/DL (ref 70–110)

## 2017-08-30 PROCEDURE — 99214 OFFICE O/P EST MOD 30 MIN: CPT | Mod: PBBFAC,PO | Performed by: NURSE PRACTITIONER

## 2017-08-30 PROCEDURE — 3008F BODY MASS INDEX DOCD: CPT | Mod: ,,, | Performed by: NURSE PRACTITIONER

## 2017-08-30 PROCEDURE — 3045F PR MOST RECENT HEMOGLOBIN A1C LEVEL 7.0-9.0%: CPT | Mod: ,,, | Performed by: NURSE PRACTITIONER

## 2017-08-30 PROCEDURE — 3079F DIAST BP 80-89 MM HG: CPT | Mod: ,,, | Performed by: NURSE PRACTITIONER

## 2017-08-30 PROCEDURE — 82948 REAGENT STRIP/BLOOD GLUCOSE: CPT | Mod: PBBFAC,PO | Performed by: NURSE PRACTITIONER

## 2017-08-30 PROCEDURE — 36415 COLL VENOUS BLD VENIPUNCTURE: CPT | Mod: PO

## 2017-08-30 PROCEDURE — 99999 PR PBB SHADOW E&M-EST. PATIENT-LVL IV: CPT | Mod: PBBFAC,,, | Performed by: NURSE PRACTITIONER

## 2017-08-30 PROCEDURE — 99214 OFFICE O/P EST MOD 30 MIN: CPT | Mod: S$PBB,,, | Performed by: NURSE PRACTITIONER

## 2017-08-30 PROCEDURE — 84443 ASSAY THYROID STIM HORMONE: CPT

## 2017-08-30 PROCEDURE — 3066F NEPHROPATHY DOC TX: CPT | Mod: ,,, | Performed by: NURSE PRACTITIONER

## 2017-08-30 PROCEDURE — 3075F SYST BP GE 130 - 139MM HG: CPT | Mod: ,,, | Performed by: NURSE PRACTITIONER

## 2017-08-30 NOTE — PROGRESS NOTES
"PCP: Farhana Burnham MD    Subjective:     HISTORY OF PRESENT ILLNESS: 57 year old  male patient is in clinic today for diabetes.  Patient has had Type II diabetes since 2009.  He has been seen by dietitian in the past.  Most recent A1C is 7.9, ADA recommends less than 7.0.  He reports prior use of glimepiride but was told to discontinue.  Per records, pp bfast  - 294; pp dinner 165 - 375.  Since his last visit, he gained 11 pounds.  Currently enrolled in smoking cessation.     Patient denies polyuria, polyphagia, or blurred vision.  He complains of polydipsia, cotton mouth.   Also denies nausea, vomiting, diarrhea or hypoglycemia. Poor dietary compliance.  He adds regular table sugar to his coffee.    Height: 5 ' 9.5 ", Weight: 260 pounds, BMI 37.34  Blood Glucose reading this AM: 196 mg/dl fasting  Blood Glucose reading in clinic: 216 mg/dl at 9:55 am    DM MEDICATIONS:  Metformin 500 mg BID  Humalog 50 / 50 -  66 units before breakfast and 33 units before dinner  Trulicity 1.5 mg weekly     Labs Reviewed.    REVIEW OF SYSTEMS:  General: Denies fever, chills, change in appetite.  HEENT: Denies impaired hearing, dysphagia.  Respiratory: Denies shortness of breath, cough or wheezing.  Cardiovascular: Denies chest pain, palpitations.  GI: Denies hematochezia.  : Denies hematuria, dysuria or frequency.  MS:  Normal gait. Denies difficulty with mobility, muscle or joint pain.  SKIN: Denies rashes and lesions.  Neuro: Has numbness or tingling in the hands or feet.   PSYCH: Denies depression or anxiety. No suicidal ideations.  ENDO: See HPI.    STANDARDS OF CARE:  Eye doctor: Dr. Caruso, Last exam 7 / 2016.  Needs an appointment scheduled.   Dental exam: Recommend regular exams; denies gums bleeding.  Podiatry doctor: Dr. Sampson, Last exam 3 / 2015    ACTIVITY LEVEL: No routine exercise.   MEAL PLANNING: Number of meals per day - 3. Number of snacks per day - 2.  Per dietary recall, patient is not " limiting carbohydrates, saturated fats and sodium.     BLOOD GLUCOSE TESTING: Self-monitoring with, ONE TOUCH verio  SOCIAL HISTORY: Disabled. Smokes 2 cigarettes a day - enrolled in smoking cessation    Objective:     PHYSICAL EXAMINATION:  GENERAL: WDWN  male in no acute distress, ambulatory, responds appropriately. AAO X 3.   NECK: Supple, no thyromegaly, no cervical or supraclavicular lymphadenopathy, no carotid bruit.  HEART: Regular rate and rhythm. No rubs, murmurs or gallops.  LUNGS: CTA bilaterally. Unlabored breathing, no use of accessory muscles.  MUSCULOSKELETAL: Normal gait and muscle strength.  ABDOMEN: Active bowel sounds X 4, no masses or tenderness.   SKIN: Warm, dry skin. No lesions or abrasions. Clean, dry well-healed injection sites.   NEUROLOGIC: Cranial nerves II-XII grossly intact.   FOOT EXAMINATION: Protective Sensation (w/ 10 gram monofilament):  Right: Decreased  Left: Decreased  //  Visual Inspection: Onychomycosis -  Bilateral toes.  Significant calluses to lateral sides of great toes.  // Pedal Pulses:  Right: Diminshed  Left: Diminshed    Assessment:     (1.) Diabetes Type II, stage III CKD, neuropathy ( on gabapentin ) - improving control on metformin, Humalog 50 / 50, Trulicity, A1C 7.9  (2.) Hypertension - suboptimal control on norvasc, catapres, hydralazine, lisinopril-HCT  (3.) Obesity, BMI 37.34  (4.) Hyperlipidemia - continue on Pravastatin  (5.) Tobacco Abuse - enrolled in smoking cessation    Plan:     1.) Patient was instructed to monitor blood glucose 2 x daily, fasting and ac dinner.  Discussed ADA goal for fasting blood sugar, 80 - 130 mg/dL; pp blood sugars below 180 mg/dl. Also, discussed prevention of hypoglycemia and the need to adjust goals to higher levels if persistent hypoglycemia.  Reminded to bring blood glucose records or meter to each visit for review.  2.) Reviewed pathophysiology of Type 2 diabetes, complications related to the disease, importance of  annual dilated eye exam and daily foot examination.  3.) Continue metformin 500 mg BID.  Continue Trulicity 1.5 mg weekly.  We discussed possibly switching to another daily GLP-1 that may assist with better appetite suppression and blood sugar control.  Increase Humalog 50 / 50, 76 units before breakfast and 40 units before dinner. He has poor dietary compliance, which consists of regular table sugar with his coffee, noodles, rice, and other carbs.   4.) Meal planning teaching: Carbohydrate definition - one serving is 15 gms. Carbohydrate spacing - carbohydrates should be spaced into approximately 3 meals with 2 snacks ( of one carbohydrate ) between meals or at bedtime. Increase vegetable intake to 2 or more cups of vegetables per day as well as 2 fruit servings.  Recommended low saturated fat, low sodium diet to aid in control of hypertension and cholesterol.  5.) Discussed activity, benefits, methods, and precautions. Recommended patient start some form of exercise and increase as tolerated to 30 minutes per day to facilitate weight loss and aid in control of BGs.  6.) Return to clinic in 2 month for follow up.  Labs Today: TSH.   Referral to pain management for neuropathy - he has seen someone previously, but would like to reestablish care.  Eye appointment scheduled.  Advised patient to call clinic with any questions or concerns.    Ladan Dowd, SHAWNA-C, CDE

## 2017-08-31 LAB — TSH SERPL DL<=0.005 MIU/L-ACNC: 1.52 UIU/ML

## 2017-09-05 DIAGNOSIS — I10 ESSENTIAL HYPERTENSION: ICD-10-CM

## 2017-09-05 DIAGNOSIS — K21.9 GASTROESOPHAGEAL REFLUX DISEASE, ESOPHAGITIS PRESENCE NOT SPECIFIED: ICD-10-CM

## 2017-09-05 DIAGNOSIS — A04.8 POSITIVE H. PYLORI TEST: ICD-10-CM

## 2017-09-06 RX ORDER — LISINOPRIL AND HYDROCHLOROTHIAZIDE 20; 25 MG/1; MG/1
TABLET ORAL
Qty: 30 TABLET | Refills: 0 | Status: SHIPPED | OUTPATIENT
Start: 2017-09-06 | End: 2017-10-04 | Stop reason: SDUPTHER

## 2017-09-06 RX ORDER — AMLODIPINE BESYLATE 5 MG/1
TABLET ORAL
Qty: 30 TABLET | Refills: 0 | Status: SHIPPED | OUTPATIENT
Start: 2017-09-06 | End: 2017-10-04 | Stop reason: SDUPTHER

## 2017-09-06 RX ORDER — PANTOPRAZOLE SODIUM 40 MG/1
TABLET, DELAYED RELEASE ORAL
Qty: 30 TABLET | Refills: 0 | Status: SHIPPED | OUTPATIENT
Start: 2017-09-06 | End: 2017-10-04 | Stop reason: SDUPTHER

## 2017-09-07 DIAGNOSIS — E11.40 TYPE 2 DIABETES MELLITUS WITH DIABETIC NEUROPATHY, UNSPECIFIED LONG TERM INSULIN USE STATUS: ICD-10-CM

## 2017-09-08 RX ORDER — GABAPENTIN 300 MG/1
CAPSULE ORAL
Qty: 90 CAPSULE | Refills: 0 | Status: SHIPPED | OUTPATIENT
Start: 2017-09-08 | End: 2017-10-04 | Stop reason: SDUPTHER

## 2017-09-08 RX ORDER — GABAPENTIN 300 MG/1
CAPSULE ORAL
Qty: 90 CAPSULE | Refills: 0 | OUTPATIENT
Start: 2017-09-08

## 2017-09-08 NOTE — TELEPHONE ENCOUNTER
RX request for Gabapentin is declined. Patient needs a visit scheduled. He was a no-show for last visit.

## 2017-09-10 DIAGNOSIS — E11.40 TYPE 2 DIABETES MELLITUS WITH DIABETIC NEUROPATHY, UNSPECIFIED LONG TERM INSULIN USE STATUS: ICD-10-CM

## 2017-09-11 ENCOUNTER — TELEPHONE (OUTPATIENT)
Dept: SMOKING CESSATION | Facility: CLINIC | Age: 57
End: 2017-09-11

## 2017-09-11 RX ORDER — GABAPENTIN 300 MG/1
CAPSULE ORAL
Qty: 90 CAPSULE | Refills: 0 | OUTPATIENT
Start: 2017-09-11

## 2017-09-11 NOTE — TELEPHONE ENCOUNTER
Attempted to contact patient to follow up with smoking cessation. I had to leave a detailed message with my contact information, Nathalie Martin, [phone]. I was on vacation last week and received this message this morning. Thanks, Nathalie.    ----- Message from Joanne Sellers sent at 9/5/2017  2:36 PM CDT -----  Contact: Patient  Patient called to speak with you. Please call him at [phone].    Thanks,  Joanne

## 2017-09-21 ENCOUNTER — OFFICE VISIT (OUTPATIENT)
Dept: OPHTHALMOLOGY | Facility: CLINIC | Age: 57
End: 2017-09-21
Payer: MEDICAID

## 2017-09-21 ENCOUNTER — TELEPHONE (OUTPATIENT)
Dept: FAMILY MEDICINE | Facility: CLINIC | Age: 57
End: 2017-09-21

## 2017-09-21 DIAGNOSIS — E11.9 DIABETES MELLITUS TYPE 2 WITHOUT RETINOPATHY: Primary | ICD-10-CM

## 2017-09-21 DIAGNOSIS — H52.7 REFRACTIVE ERROR: ICD-10-CM

## 2017-09-21 PROCEDURE — 92012 INTRM OPH EXAM EST PATIENT: CPT | Mod: S$PBB,,, | Performed by: OPTOMETRIST

## 2017-09-21 PROCEDURE — 99999 PR PBB SHADOW E&M-EST. PATIENT-LVL I: CPT | Mod: PBBFAC,,, | Performed by: OPTOMETRIST

## 2017-09-21 PROCEDURE — 99211 OFF/OP EST MAY X REQ PHY/QHP: CPT | Mod: PBBFAC | Performed by: OPTOMETRIST

## 2017-09-21 PROCEDURE — 92015 DETERMINE REFRACTIVE STATE: CPT | Mod: ,,, | Performed by: OPTOMETRIST

## 2017-09-21 NOTE — TELEPHONE ENCOUNTER
----- Message from Edouard Carreon sent at 9/21/2017 11:42 AM CDT -----  Contact: Pt   States he's calling to get a referral to a weight loss clinic and can be reached at [phone]//[email or web address]

## 2017-09-21 NOTE — PROGRESS NOTES
HPI     IDDM exam. Decrease near visual acuity. Hard to read small print. Last   eye exam 07/29/2016 [facility]. Last visit with [facility] 03/06/2015. Update glasses   RX.    Last edited by Mildred Duke on 9/21/2017  9:50 AM. (History)            Assessment /Plan     For exam results, see Encounter Report.    Diabetes mellitus type 2 without retinopathy    Nuclear cataract, bilateral    Refractive error      No Background Diabetic Retinopathy    Minimal cataracts OU, not surgical    Dispense Final Rx for glasses.  RTC 1 year

## 2017-09-27 DIAGNOSIS — E11.65 TYPE 2 DIABETES MELLITUS WITH HYPERGLYCEMIA, WITH LONG-TERM CURRENT USE OF INSULIN: ICD-10-CM

## 2017-09-27 DIAGNOSIS — Z79.4 TYPE 2 DIABETES MELLITUS WITH HYPERGLYCEMIA, WITH LONG-TERM CURRENT USE OF INSULIN: ICD-10-CM

## 2017-09-27 RX ORDER — METFORMIN HYDROCHLORIDE 500 MG/1
TABLET ORAL
Qty: 60 TABLET | Refills: 3 | Status: ON HOLD | OUTPATIENT
Start: 2017-09-27 | End: 2018-02-13 | Stop reason: HOSPADM

## 2017-09-28 ENCOUNTER — OFFICE VISIT (OUTPATIENT)
Dept: ORTHOPEDICS | Facility: CLINIC | Age: 57
End: 2017-09-28
Payer: COMMERCIAL

## 2017-09-28 ENCOUNTER — CLINICAL SUPPORT (OUTPATIENT)
Dept: SMOKING CESSATION | Facility: CLINIC | Age: 57
End: 2017-09-28
Payer: COMMERCIAL

## 2017-09-28 VITALS
HEART RATE: 64 BPM | WEIGHT: 260.38 LBS | HEIGHT: 70 IN | BODY MASS INDEX: 37.28 KG/M2 | DIASTOLIC BLOOD PRESSURE: 99 MMHG | SYSTOLIC BLOOD PRESSURE: 151 MMHG

## 2017-09-28 DIAGNOSIS — F17.210 LIGHT CIGARETTE SMOKER (1-9 CIGS/DAY): Primary | ICD-10-CM

## 2017-09-28 DIAGNOSIS — M94.261 CHONDROMALACIA OF RIGHT KNEE: Primary | ICD-10-CM

## 2017-09-28 PROCEDURE — 3080F DIAST BP >= 90 MM HG: CPT | Mod: S$GLB,,, | Performed by: PHYSICIAN ASSISTANT

## 2017-09-28 PROCEDURE — 3008F BODY MASS INDEX DOCD: CPT | Mod: S$GLB,,, | Performed by: PHYSICIAN ASSISTANT

## 2017-09-28 PROCEDURE — 3077F SYST BP >= 140 MM HG: CPT | Mod: S$GLB,,, | Performed by: PHYSICIAN ASSISTANT

## 2017-09-28 PROCEDURE — 99999 PR PBB SHADOW E&M-EST. PATIENT-LVL V: CPT | Mod: PBBFAC,,, | Performed by: PHYSICIAN ASSISTANT

## 2017-09-28 PROCEDURE — 99407 BEHAV CHNG SMOKING > 10 MIN: CPT | Mod: S$GLB,,,

## 2017-09-28 PROCEDURE — 99213 OFFICE O/P EST LOW 20 MIN: CPT | Mod: S$GLB,,, | Performed by: PHYSICIAN ASSISTANT

## 2017-09-28 RX ORDER — DICLOFENAC SODIUM 10 MG/G
2 GEL TOPICAL 4 TIMES DAILY
Qty: 1 TUBE | Refills: 2 | Status: ON HOLD | OUTPATIENT
Start: 2017-09-28 | End: 2017-12-24 | Stop reason: HOSPADM

## 2017-09-28 NOTE — LETTER
September 29, 2017      Farhana Burnham MD  [address] [address] [address]           [facility]  [address] [facility]  [place] [address]  Phone: [phone]  Fax: [phone]          Patient: Shukri Rosales   MR Number: [id number]   YOB: 1960   Date of Visit: 9/28/2017       Dear Dr. Farhana Burnham:    Thank you for referring Shukri Rosales to me for evaluation. Attached you will find relevant portions of my assessment and plan of care.    If you have questions, please do not hesitate to call me. I look forward to following Shukri Rosales along with you.    Sincerely,    Kaushik Cain PA-C    Enclosure  CC:  No Recipients    If you would like to receive this communication electronically, please contact [email or web address] or [phone] to request more information on [employer] access.    For providers and/or their staff who would like to refer a patient to [facility], please contact us through our one-stop-shop provider referral line, [facility], at [phone].    If you feel you have received this communication in error or would no longer like to receive these types of communications, please e-mail [email or web address]

## 2017-09-28 NOTE — PATIENT INSTRUCTIONS
Hylan G-F 20 intra-articular injection  What is this medicine?  HYLAN G-F 20 (HI ananda G F 20) is used to treat osteoarthritis of the knee. It lubricates and cushions the joint, reducing pain in the knee.  How should I use this medicine?  This medicine is for injection into the knee joint. It is given by a health care professional in a hospital or clinic setting.  Talk to your pediatrician regarding the use of this medicine in children. This medicine is not approved for use in children.  What side effects may I notice from receiving this medicine?  Side effects that you should report to your doctor or health care professional as soon as possible:  · allergic reactions like skin rash, itching or hives, swelling of the face, lips, or tongue  · difficulty breathing  · fever or chills  · severe joint pain or swelling  · unusual bleeding or bruising  Side effects that usually do not require medical attention (Report these to your doctor or health care professional if they continue or are bothersome.):  · dizziness  · flushing  · general ill feeling or flu-like symptoms  · headache  · minor joint pain or swelling  · muscle pain or cramps  · pain, redness, irritation or bruising at site of injection  What may interact with this medicine?  Do not take this medicine with any of the following medications:  · other injections for the joint like steroids or anesthetics  · certain skin disinfectants like benzalkonium chloride  What if I miss a dose?  Keep appointments for follow-up doses as directed. For Synvisc, you will need weekly injections for 3 doses. It is important not to miss your dose. If you will receive Synvisc-One, then only 1 injection will be needed. Call your doctor or health care professional if you are unable to keep an appointment.  Where should I keep my medicine?  This drug is given in a hospital or clinic and will not be stored at home.  What should I tell my health care provider before I take this  medicine?  They need to know if you have any of these conditions:  · severe knee inflammation  · skin conditions or sensitivity  · skin or joint infection  · venous stasis  · an unusual or allergic reaction to hylan G-F 20, hyaluronan (sodium hyaluronate), eggs, other medicines, foods, dyes, or preservatives  · pregnant or trying to get pregnant  · breast-feeding  What should I watch for while using this medicine?  Tell your doctor or healthcare professional if your symptoms do not start to get better or if they get worse. Your condition will be monitored carefully while you are receiving this medicine. Most persons get pain relief for up to 6 months after treatment.  Avoid strenuous activities (high-impact sports, jogging) or major weight-bearing activities for 48 hours after the injection.  NOTE:This sheet is a summary. It may not cover all possible information. If you have questions about this medicine, talk to your doctor, pharmacist, or health care provider. Copyright© 2017 [employer]

## 2017-09-28 NOTE — PROGRESS NOTES
CC: 58 y/o male right knee pain    Date of surgery: 5/04/2015- Right knee ACL tear with right medial and lateral   meniscal tear.  The patient also had synovitis and chondromalacia of the medial   femoral condyle.    HPI: Long hx of knee for past few months.  Also incurred a recent fall and injury to his knee with increased pain.  He was seen in emergency room 2 weeks ago fall, where x-rays taken.  Pain still persists.    PMH:    Past Medical History:   Diagnosis Date    Adrenal cortical adenoma     Anxiety     Arthritis     CKD (chronic kidney disease) stage 3, GFR 30-59 ml/min     Depression     Diabetes mellitus type II 2011    does not take    DM (diabetes mellitus) 2011     am 09/21/2017 Insulin x 1 1/2 year    GERD (gastroesophageal reflux disease) 7/9/2013    Hypertension     Migraine headache 7/22/2013    Severe obesity with body mass index of 36.0 to 36.9        PSH:    Past Surgical History:   Procedure Laterality Date    BACK SURGERY      HERNIA REPAIR      UMBILICAL    left arm exfix      NASAL SEPTUM SURGERY Bilateral     TONSILLECTOMY      URETEROSCOPY         Family Hx:    Family History   Problem Relation Age of Onset    Hypertension Mother     Diabetes Mother     Cataracts Mother     Hypertension Sister     Diabetes Sister     Hypertension Brother     Diabetes Brother     Cancer Paternal Grandmother     Cancer Paternal Grandfather        Allergy:  Review of patient's allergies indicates:  No Known Allergies    Medication:    Current Outpatient Prescriptions:     amlodipine (NORVASC) 5 MG tablet, TAKE 1 TABLET(5 MG) BY MOUTH EVERY DAY, Disp: 30 tablet, Rfl: 0    blood sugar diagnostic Strp, 1 each by Misc.(Non-Drug; Combo Route) route 3 (three) times daily before meals. For One Touch Verio, Disp: 100 each, Rfl: 11    blood-glucose meter kit, One Touch Verio Meter, Disp: 1 each, Rfl: 0    clonazePAM (KLONOPIN) 0.5 MG tablet, TAKE ONE TABLET BY MOUTH TWICE DAILY AS  "NEEDED MUST LAST 30 DAYS, Disp: 30 tablet, Rfl: 0    cloNIDine (CATAPRES) 0.3 MG tablet, Take 1 tablet (0.3 mg total) by mouth 3 (three) times daily., Disp: 90 tablet, Rfl: 5    dulaglutide (TRULICITY) 1.5 mg/0.5 mL PnIj, Inject 1.5 mg into the skin every 7 days., Disp: 4 Syringe, Rfl: 3    gabapentin (NEURONTIN) 300 MG capsule, TAKE 1 CAPSULE(300 MG) BY MOUTH THREE TIMES DAILY, Disp: 90 capsule, Rfl: 0    insulin lispro protamin-lispro (HUMALOG MIX 50-50) 100 unit/mL (50-50) Susp, Inject 76 units before breakfast and 40 units before dinner, Disp: 4 vial, Rfl: 3    insulin syringe-needle U-100 1 mL 31 gauge x 5/16 Syrg, 1 Syringe by Misc.(Non-Drug; Combo Route) route 3 (three) times daily., Disp: 100 each, Rfl: 11    isosorbide mononitrate (IMDUR) 60 MG 24 hr tablet, TAKE 1 TABLET BY MOUTH EVERY DAY, Disp: 30 tablet, Rfl: 0    lancets (ONETOUCH DELICA LANCETS) 33 gauge Misc, 1 lancet by Misc.(Non-Drug; Combo Route) route 3 (three) times daily., Disp: 100 each, Rfl: 11    lancets 33 gauge Misc, 1 lancet by Misc.(Non-Drug; Combo Route) route 2 (two) times daily. Freestyle freedom lite, Disp: 100 each, Rfl: 11    lisinopril-hydrochlorothiazide (PRINZIDE,ZESTORETIC) 20-25 mg Tab, TAKE 1 TABLET BY MOUTH EVERY MORNING, Disp: 30 tablet, Rfl: 0    lurasidone (LATUDA) 60 mg Tab tablet, Take 60 mg by mouth once daily., Disp: , Rfl:     metformin (GLUCOPHAGE) 500 MG tablet, TAKE 1 TABLET BY MOUTH TWICE DAILY WITH MEALS, Disp: 60 tablet, Rfl: 3    metoprolol succinate (TOPROL-XL) 50 MG 24 hr tablet, TAKE 1 TABLET(50 MG) BY MOUTH EVERY DAY, Disp: 30 tablet, Rfl: 11    nicotine (NICODERM CQ) 21 mg/24 hr, Place 1 patch onto the skin once daily., Disp: 28 patch, Rfl: 0    pantoprazole (PROTONIX) 40 MG tablet, TAKE 1 TABLET(40 MG) BY MOUTH EVERY DAY, Disp: 30 tablet, Rfl: 0    pen needle, diabetic 32 gauge x 5/32" Ndle, 1 each by Misc.(Non-Drug; Combo Route) route once daily., Disp: 100 each, Rfl: 11    polyethylene " "glycol (GLYCOLAX) 17 gram/dose powder, Take 17 g by mouth once daily., Disp: 1 Bottle, Rfl: 1    pravastatin (PRAVACHOL) 20 MG tablet, TAKE 1 TABLET BY MOUTH EVERY EVENING, Disp: 30 tablet, Rfl: 10    quetiapine (SEROQUEL) 200 MG Tab, Take 200 mg by mouth once daily. , Disp: , Rfl: 1    quetiapine (SEROQUEL) 300 MG Tab, Take 1 tablet (300 mg total) by mouth once daily. (Patient taking differently: Take 200 mg by mouth once daily. ), Disp: 30 tablet, Rfl: 0    sertraline (ZOLOFT) 100 MG tablet, Take 2 tablets (200 mg total) by mouth once daily., Disp: 30 tablet, Rfl: 0    trazodone (DESYREL) 300 MG tablet, TK 1 T PO  QHS, Disp: , Rfl: 3    varenicline (CHANTIX) 1 mg Tab, Take 1 tablet (1 mg total) by mouth 2 (two) times daily., Disp: 56 tablet, Rfl: 0    aspirin 81 MG Chew, Take 1 tablet (81 mg total) by mouth once daily., Disp: 30 tablet, Rfl: 0    Social History:    Social History     Social History    Marital status:      Spouse name: N/A    Number of children: N/A    Years of education: N/A     Occupational History     IT/disabled      Social History Main Topics    Smoking status: Current Every Day Smoker     Packs/day: 1.00     Years: 44.00     Types: Cigarettes    Smokeless tobacco: Never Used      Comment: instructed not to smoke after midnight after midnight prior to surgery    Alcohol use Yes      Comment: social    Drug use: No    Sexual activity: No     Other Topics Concern    Not on file     Social History Narrative    . Planning to live with sisters. Disabled .         Vitals:   BP (!) 151/99 (BP Location: Right arm, Patient Position: Sitting, BP Method: Medium (Automatic))   Pulse 64   Ht 5' 10" (1.778 m)   Wt 118.1 kg (260 lb 5.8 oz)   BMI 37.36 kg/m²      ROS:  GENERAL: No fever, chills, fatigability or weight loss.  SKIN: No rashes, itching or changes in color or texture of skin.  HEAD: No headaches or recent head trauma.  EYES: Visual acuity fine. No " photophobia, ocular pain or diplopia.  EARS: Denies ear pain, discharge or vertigo.  NOSE: No loss of smell, no epistaxis or postnasal drip.  MOUTH & THROAT: No hoarseness or change in voice. No excessive gum bleeding.  NODES: Denies swollen glands.  CHEST: Denies ESPINAL, cyanosis, wheezing, cough and sputum production.  CARDIOVASCULAR: Denies chest pain, PND, orthopnea or reduced exercise tolerance.  ABDOMEN: Appetite fine. No weight loss. Denies diarrhea, abdominal pain, hematemesis or blood in stool.  URINARY: No flank pain, dysuria or hematuria.  PERIPHERAL VASCULAR: No claudication or cyanosis.  NEUROLOGIC: No history of seizures, paralysis, alteration of gait or coordination.  MUSCULOSKELETAL: See HPI    PE:  APPEARANCE: Well nourished, well developed, in no acute distress.   HEAD: Normocephalic, atraumatic.  NEUROLOGIC: Cranial Nerves: II-XII grossly intact, also see MUSCULOSKELETAL  MUSCULOSKELETAL: right Knee Exam-abnormal    Gait-abnormal  Muscle Appearance:abnormal  Spine Alignment-normal  Muscle Atrophy-Negative  Deformities-Negative  Tenderness-Positive  Paresthesias-Negative  Range of Motion         Ext-abnormal, 0 degrees         Flex-abnormal  Muscle Strength-abnormal  Sensation-abnormal  Reflexes-normal  Crepitus-Positive                                Swelling-Negative  Effusion- Negative                                Edema-Negative  Lachman-Negative                                Erythema-Negative  Ivan's-Negative                              Apley Grind-Positive  Patellar Comp-Positive                         Alignment-normal/symmetric  Patellar Apprehension-Positive              Synovial fullness-Negative  Passive Patellar Tilt-normal  Patellar Tracking-normal   Patellar Glide-normal  Q-Angle at 90 degrees-normal  Patellar Grind-abnormal  [id number]-Lhoqrtok  Fatigue-Negative                                     HS Tightness-Positive  Tests on Exam, No ligamentous laxity  Neurovascular  Status-normal+2 DP and PT artery pulses  Skin-normal    Assessment:           Diagnosis:              1.Right knee Chondromalacia                   Diagnostic Studies  MRI-Yes to assess possible internal derangement status post anterior cruciate ligament repair with recent trauma.    X-Ray-Yes with the findings ofNo acute fracture or dislocation is demonstrated.  A metallic pin is present in the distal femur which is unchanged appearance of interval changes from previous ACL repair.  Mild arthritic change of the medial compartment is present with joint space narrowing and spurring.  EMG/NCV-No  Arthrogram-No  Bone Scan-No  CT Scan-No  Doppler-No  ESR-No  CRP-No  CBC with Diff-No   Rheumatoid/Arthritis Panel-No      Plan:                                                 1. PT-no                                                 2.OT-no                                          3.NSAID-no                                        4. Narcotics-no                                     5. Wound care-N/A                                 6. Rest-no                                           7. Surgery-no                                         8. EDVIN Hose-no                                    9. Anticoagulation therapy-no               10. Elevation-no                                     11. Crutches-no                                    12. Walker-no             13. Cane no                        14. Referral-no                                     15.Injection-no                            16. Splint   /    Cast   /   Cast Shoe-No              17. RICE            18. Follow up-  after MRI

## 2017-10-03 ENCOUNTER — TELEPHONE (OUTPATIENT)
Dept: FAMILY MEDICINE | Facility: CLINIC | Age: 57
End: 2017-10-03

## 2017-10-03 NOTE — TELEPHONE ENCOUNTER
----- Message from Yasmeen Snider sent at 10/3/2017  1:16 PM CDT -----  States he received a letter for another colonoscopy and he has one last year. please call back at [phone]. thanks

## 2017-10-04 ENCOUNTER — TELEPHONE (OUTPATIENT)
Dept: SMOKING CESSATION | Facility: CLINIC | Age: 57
End: 2017-10-04

## 2017-10-04 ENCOUNTER — OFFICE VISIT (OUTPATIENT)
Dept: FAMILY MEDICINE | Facility: CLINIC | Age: 57
End: 2017-10-04
Payer: MEDICAID

## 2017-10-04 ENCOUNTER — CLINICAL SUPPORT (OUTPATIENT)
Dept: SMOKING CESSATION | Facility: CLINIC | Age: 57
End: 2017-10-04
Payer: COMMERCIAL

## 2017-10-04 VITALS
OXYGEN SATURATION: 96 % | HEART RATE: 82 BPM | RESPIRATION RATE: 15 BRPM | TEMPERATURE: 97 F | BODY MASS INDEX: 37.44 KG/M2 | SYSTOLIC BLOOD PRESSURE: 110 MMHG | HEIGHT: 70 IN | WEIGHT: 261.56 LBS | DIASTOLIC BLOOD PRESSURE: 70 MMHG

## 2017-10-04 DIAGNOSIS — F17.210 LIGHT CIGARETTE SMOKER (1-9 CIGS/DAY): ICD-10-CM

## 2017-10-04 DIAGNOSIS — E78.5 HYPERLIPIDEMIA, UNSPECIFIED HYPERLIPIDEMIA TYPE: ICD-10-CM

## 2017-10-04 DIAGNOSIS — I10 ESSENTIAL HYPERTENSION: ICD-10-CM

## 2017-10-04 DIAGNOSIS — E66.01 SEVERE OBESITY (BMI 35.0-39.9) WITH COMORBIDITY: ICD-10-CM

## 2017-10-04 DIAGNOSIS — K21.9 GASTROESOPHAGEAL REFLUX DISEASE, ESOPHAGITIS PRESENCE NOT SPECIFIED: ICD-10-CM

## 2017-10-04 DIAGNOSIS — E11.40 TYPE 2 DIABETES MELLITUS WITH DIABETIC NEUROPATHY, UNSPECIFIED LONG TERM INSULIN USE STATUS: Primary | ICD-10-CM

## 2017-10-04 DIAGNOSIS — G47.33 OSA ON CPAP: ICD-10-CM

## 2017-10-04 DIAGNOSIS — F17.210 MODERATE SMOKER (20 OR LESS PER DAY): Primary | ICD-10-CM

## 2017-10-04 DIAGNOSIS — M79.89 MASS OF SOFT TISSUE: ICD-10-CM

## 2017-10-04 DIAGNOSIS — F17.210 MODERATE CIGARETTE SMOKER (10-19 PER DAY): ICD-10-CM

## 2017-10-04 PROCEDURE — 99999 PR PBB SHADOW E&M-EST. PATIENT-LVL I: CPT | Mod: PBBFAC,,,

## 2017-10-04 PROCEDURE — 99214 OFFICE O/P EST MOD 30 MIN: CPT | Mod: PBBFAC,PO | Performed by: FAMILY MEDICINE

## 2017-10-04 PROCEDURE — 99214 OFFICE O/P EST MOD 30 MIN: CPT | Mod: S$PBB,,, | Performed by: FAMILY MEDICINE

## 2017-10-04 PROCEDURE — 90853 GROUP PSYCHOTHERAPY: CPT | Mod: S$GLB,,,

## 2017-10-04 PROCEDURE — 99999 PR PBB SHADOW E&M-EST. PATIENT-LVL IV: CPT | Mod: PBBFAC,,, | Performed by: FAMILY MEDICINE

## 2017-10-04 PROCEDURE — 90686 IIV4 VACC NO PRSV 0.5 ML IM: CPT | Mod: PBBFAC,PO

## 2017-10-04 RX ORDER — VARENICLINE TARTRATE 1 MG/1
1 TABLET, FILM COATED ORAL 2 TIMES DAILY
Qty: 56 TABLET | Refills: 0 | Status: SHIPPED | OUTPATIENT
Start: 2017-10-04 | End: 2017-10-30 | Stop reason: SDUPTHER

## 2017-10-04 RX ORDER — CLONIDINE HYDROCHLORIDE 0.3 MG/1
0.3 TABLET ORAL 3 TIMES DAILY
Qty: 90 TABLET | Refills: 3 | Status: SHIPPED | OUTPATIENT
Start: 2017-10-04 | End: 2018-04-24 | Stop reason: SDUPTHER

## 2017-10-04 RX ORDER — IBUPROFEN 200 MG
1 TABLET ORAL DAILY
Qty: 28 PATCH | Refills: 0 | Status: SHIPPED | OUTPATIENT
Start: 2017-10-04 | End: 2017-10-30 | Stop reason: SDUPTHER

## 2017-10-04 RX ORDER — LISINOPRIL AND HYDROCHLOROTHIAZIDE 20; 25 MG/1; MG/1
TABLET ORAL
Qty: 30 TABLET | Refills: 3 | Status: ON HOLD | OUTPATIENT
Start: 2017-10-04 | End: 2018-02-13 | Stop reason: HOSPADM

## 2017-10-04 RX ORDER — PANTOPRAZOLE SODIUM 40 MG/1
TABLET, DELAYED RELEASE ORAL
Qty: 30 TABLET | Refills: 3 | Status: SHIPPED | OUTPATIENT
Start: 2017-10-04 | End: 2018-04-24 | Stop reason: SDUPTHER

## 2017-10-04 RX ORDER — GABAPENTIN 300 MG/1
CAPSULE ORAL
Qty: 90 CAPSULE | Refills: 0 | Status: SHIPPED | OUTPATIENT
Start: 2017-10-04 | End: 2017-11-07 | Stop reason: SDUPTHER

## 2017-10-04 RX ORDER — PRAVASTATIN SODIUM 20 MG/1
20 TABLET ORAL NIGHTLY
Qty: 30 TABLET | Refills: 5 | Status: SHIPPED | OUTPATIENT
Start: 2017-10-04 | End: 2018-06-28 | Stop reason: SDUPTHER

## 2017-10-04 RX ORDER — METOPROLOL SUCCINATE 50 MG/1
TABLET, EXTENDED RELEASE ORAL
Qty: 30 TABLET | Refills: 3 | Status: ON HOLD | OUTPATIENT
Start: 2017-10-04 | End: 2017-12-24 | Stop reason: HOSPADM

## 2017-10-04 RX ORDER — AMLODIPINE BESYLATE 5 MG/1
TABLET ORAL
Qty: 30 TABLET | Refills: 3 | Status: ON HOLD | OUTPATIENT
Start: 2017-10-04 | End: 2018-02-13 | Stop reason: HOSPADM

## 2017-10-04 NOTE — Clinical Note
Patient reports smoking 1 pack of cigarettes per day. He has set a quit date of 10/18/17. The patient remains on the prescribed tobacco cessation medication regimen of 1 mg Chantix BID with a 21 mg nicotine patch QD without any negative side effects at this time.

## 2017-10-04 NOTE — PROGRESS NOTES
Smoking Cessation Group Session #4    Site: [facility]  Date:  10/4/2017  Clinical Status of Patient: Outpatient   Length of Service and Code: 90 minutes - 96074   Number in Attendance: 2  Group Activities/Focus of Group: completion of TCRS (Tobacco Cessation Rating Scale) reviewed strategies, habitual behavior, stress, and high risk situations. Introduced stress with addition interventions, SOLVE, relaxation with interventions, nutrition, exercise, weight gain, and the importance of rewarding oneself for accomplishments toward becoming tobacco free. Open discussion of all items with interventions.     Target symptoms:  withdrawal and medication side effects             The following were rated moderate (3) to severe (4) on TCRS:       Moderate 3: none     Severe 4:   none  Patient's Response to Intervention: Patient reports smoking 1 pack of cigarettes per day. He has set a quit date of 10/18/17. The patient remains on the prescribed tobacco cessation medication regimen of 1 mg Chantix BID with a 21 mg nicotine patch QD without any negative side effects at this time.     Progress Toward Goals and Other Mental Status Changes: The patient denies any abnormal behavioral or mental changes at this time.     Plan:The patient will continue with group therapy sessions and medication monitoring by CTTS. Prescribed medication management will be by physician.     Return to Clinic: 1 week    Quit Date: none   Planned Quit Date: 10/18/17

## 2017-10-04 NOTE — PROGRESS NOTES
Subjective:       Patient ID: Shukri Rosales is a 57 y.o. male.    Chief Complaint: Chronic Care    HPI   Chronic Care  58yo male presents today for chronic care assessment. He had last lab assessment in August 2017 with stable A1c at 7.9- this has improved since last measurement. He is taking a combination of Metformin, Humalog and Trulicity and is followed by diabetes management. Diabetic eye and foot examinations are up to date. There are no symptoms of hyper or hypoglycemia. He notes persistent issues with neuropathy in spite of Gabapentin use and is awaiting evaluation per Pain Management. BP control has been stable. There is no report of HA, CP or dizziness. Compliance with statin use is reported without adverse effects. He continues with PPI use and has been evaluated by GI with recommendations for reassessment in July 2018. Patient reports concern for a mass to the anterior neck. He has no known thyroid disease. No trouble with swallowing and no foreign body sensation. He is participating in the smoking cessation program within [facility].     Review of Systems   Constitutional: Negative for appetite change, fatigue and unexpected weight change.   HENT: Positive for postnasal drip. Negative for congestion, ear pain and sinus pain.    Eyes: Negative for visual disturbance.   Respiratory: Negative for cough and shortness of breath.    Cardiovascular: Negative for chest pain, palpitations and leg swelling.   Gastrointestinal: Negative for abdominal pain, constipation and diarrhea.   Endocrine: Negative for cold intolerance, heat intolerance, polydipsia, polyphagia and polyuria.   Genitourinary: Negative for decreased urine volume and difficulty urinating.   Musculoskeletal: Positive for arthralgias. Negative for myalgias.   Skin: Negative for color change and rash.   Allergic/Immunologic: Negative for environmental allergies.   Neurological: Positive for numbness. Negative for dizziness and weakness.  "  Psychiatric/Behavioral: Negative for sleep disturbance. The patient is not nervous/anxious.        Objective:   /70   Pulse 82   Temp 97.4 °F (36.3 °C) (Temporal)   Resp 15   Ht 5' 10" (1.778 m)   Wt 118.6 kg (261 lb 9.2 oz)   SpO2 96%   BMI 37.53 kg/m²   Physical Exam   Constitutional: He is oriented to person, place, and time. He appears well-developed. No distress.   Obese, non-toxic   HENT:   Head: Normocephalic and atraumatic.   Right Ear: Tympanic membrane, external ear and ear canal normal.   Left Ear: Tympanic membrane, external ear and ear canal normal.   Nose: Nose normal.   Mouth/Throat: Oropharynx is clear and moist.   Eyes: Conjunctivae and EOM are normal. Pupils are equal, round, and reactive to light.   Neck: Normal range of motion. Neck supple.       Cardiovascular: Normal rate, regular rhythm and normal heart sounds.    Pulmonary/Chest: Effort normal and breath sounds normal.   Abdominal: Soft. Bowel sounds are normal. There is no tenderness.   Musculoskeletal: He exhibits no edema.   Lymphadenopathy:     He has no cervical adenopathy.   Neurological: He is alert and oriented to person, place, and time.   Skin: Skin is warm and dry. He is not diaphoretic.   Psychiatric: He has a normal mood and affect. His behavior is normal.       Assessment:       1. Type 2 diabetes mellitus with diabetic neuropathy, unspecified long term insulin use status    2. Essential hypertension    3. Gastroesophageal reflux disease, esophagitis presence not specified    4. Hyperlipidemia, unspecified hyperlipidemia type    5. Mass of soft tissue    6. EMMANUEL on CPAP    7. Severe obesity (BMI 35.0-39.9) with comorbidity        Plan:   Type 2 diabetes mellitus with diabetic neuropathy, unspecified long term insulin use status  Uncontrolled but levels have improved. Recommend continuation of current measures as per diabetes management. Reviewed diet in detail as he continues to report indiscretion. Will renew " Gabapentin as he awaits evaluation per Pain Management. Diabetic eye and foot examinations remain advised annually.  -     gabapentin (NEURONTIN) 300 MG capsule; TAKE 1 CAPSULE(300 MG) BY MOUTH THREE TIMES DAILY  Dispense: 90 capsule; Refill: 0  -     Hemoglobin A1c; Future; Expected date: 10/04/2017    Essential hypertension  Stable. Will continue with current treatment. Heart healthy diet is also advised. Target BP <140/90.  -     amlodipine (NORVASC) 5 MG tablet; TAKE 1 TABLET(5 MG) BY MOUTH EVERY DAY  Dispense: 30 tablet; Refill: 3  -     lisinopril-hydrochlorothiazide (PRINZIDE,ZESTORETIC) 20-25 mg Tab; TAKE 1 TABLET BY MOUTH EVERY MORNING  Dispense: 30 tablet; Refill: 3  -     metoprolol succinate (TOPROL-XL) 50 MG 24 hr tablet; TAKE 1 TABLET(50 MG) BY MOUTH EVERY DAY  Dispense: 30 tablet; Refill: 3  -     Comprehensive metabolic panel; Future; Expected date: 10/04/2017  -     cloNIDine (CATAPRES) 0.3 MG tablet; Take 1 tablet (0.3 mg total) by mouth 3 (three) times daily.  Dispense: 90 tablet; Refill: 3    Gastroesophageal reflux disease, esophagitis presence not specified  -     pantoprazole (PROTONIX) 40 MG tablet; TAKE 1 TABLET(40 MG) BY MOUTH EVERY DAY  Dispense: 30 tablet; Refill: 3    Hyperlipidemia, unspecified hyperlipidemia type  -     pravastatin (PRAVACHOL) 20 MG tablet; Take 1 tablet (20 mg total) by mouth every evening.  Dispense: 30 tablet; Refill: 5  -     Comprehensive metabolic panel; Future; Expected date: 10/04/2017    Mass of soft tissue  -     US Soft Tissue Head Neck Thyroid; Future; Expected date: 10/04/2017    EMMANUEL on CPAP  Compliance with CPAP use is advised.    Severe obesity (BMI 35.0-39.9) with comorbidity  Weight loss efforts remain encouraged through diet and lifestyle measures.    Other orders  -     Influenza - Quadrivalent (3 years & older) (PF)

## 2017-10-06 ENCOUNTER — TELEPHONE (OUTPATIENT)
Dept: RADIOLOGY | Facility: HOSPITAL | Age: 57
End: 2017-10-06

## 2017-10-09 ENCOUNTER — HOSPITAL ENCOUNTER (OUTPATIENT)
Dept: RADIOLOGY | Facility: HOSPITAL | Age: 57
Discharge: HOME | End: 2017-10-09
Attending: PHYSICIAN ASSISTANT
Payer: MEDICAID

## 2017-10-09 DIAGNOSIS — M94.261 CHONDROMALACIA OF RIGHT KNEE: ICD-10-CM

## 2017-10-09 PROCEDURE — 73721 MRI JNT OF LWR EXTRE W/O DYE: CPT | Mod: 26,RT,, | Performed by: RADIOLOGY

## 2017-10-09 PROCEDURE — 73721 MRI JNT OF LWR EXTRE W/O DYE: CPT | Mod: TC,PO,RT

## 2017-10-11 ENCOUNTER — CLINICAL SUPPORT (OUTPATIENT)
Dept: SMOKING CESSATION | Facility: CLINIC | Age: 57
End: 2017-10-11
Payer: COMMERCIAL

## 2017-10-11 ENCOUNTER — OFFICE VISIT (OUTPATIENT)
Dept: CARDIOLOGY | Facility: CLINIC | Age: 57
End: 2017-10-11
Payer: MEDICAID

## 2017-10-11 VITALS
BODY MASS INDEX: 37.56 KG/M2 | OXYGEN SATURATION: 97 % | DIASTOLIC BLOOD PRESSURE: 88 MMHG | HEIGHT: 70 IN | HEART RATE: 82 BPM | SYSTOLIC BLOOD PRESSURE: 132 MMHG | WEIGHT: 262.38 LBS

## 2017-10-11 DIAGNOSIS — N18.30 TYPE 2 DIABETES MELLITUS WITH STAGE 3 CHRONIC KIDNEY DISEASE, WITH LONG-TERM CURRENT USE OF INSULIN: ICD-10-CM

## 2017-10-11 DIAGNOSIS — E11.59 HYPERTENSION ASSOCIATED WITH DIABETES: Primary | ICD-10-CM

## 2017-10-11 DIAGNOSIS — Z79.4 TYPE 2 DIABETES MELLITUS WITH STAGE 3 CHRONIC KIDNEY DISEASE, WITH LONG-TERM CURRENT USE OF INSULIN: ICD-10-CM

## 2017-10-11 DIAGNOSIS — E11.22 TYPE 2 DIABETES MELLITUS WITH STAGE 3 CHRONIC KIDNEY DISEASE, WITH LONG-TERM CURRENT USE OF INSULIN: ICD-10-CM

## 2017-10-11 DIAGNOSIS — E11.69 HYPERLIPIDEMIA ASSOCIATED WITH TYPE 2 DIABETES MELLITUS: ICD-10-CM

## 2017-10-11 DIAGNOSIS — I15.2 HYPERTENSION ASSOCIATED WITH DIABETES: Primary | ICD-10-CM

## 2017-10-11 DIAGNOSIS — F17.210 MODERATE CIGARETTE SMOKER (10-19 PER DAY): Primary | ICD-10-CM

## 2017-10-11 DIAGNOSIS — E78.5 HYPERLIPIDEMIA ASSOCIATED WITH TYPE 2 DIABETES MELLITUS: ICD-10-CM

## 2017-10-11 DIAGNOSIS — G47.33 OSA ON CPAP: ICD-10-CM

## 2017-10-11 DIAGNOSIS — F31.81 BIPOLAR 2 DISORDER: ICD-10-CM

## 2017-10-11 PROCEDURE — 99213 OFFICE O/P EST LOW 20 MIN: CPT | Mod: S$PBB,,, | Performed by: INTERNAL MEDICINE

## 2017-10-11 PROCEDURE — 90853 GROUP PSYCHOTHERAPY: CPT | Mod: S$GLB,,,

## 2017-10-11 PROCEDURE — 99999 PR PBB SHADOW E&M-EST. PATIENT-LVL III: CPT | Mod: PBBFAC,,, | Performed by: INTERNAL MEDICINE

## 2017-10-11 PROCEDURE — 99999 PR PBB SHADOW E&M-EST. PATIENT-LVL I: CPT | Mod: PBBFAC,,,

## 2017-10-11 PROCEDURE — 99213 OFFICE O/P EST LOW 20 MIN: CPT | Mod: PBBFAC,PO | Performed by: INTERNAL MEDICINE

## 2017-10-11 NOTE — PROGRESS NOTES
Subjective:   Patient ID:  Shukri Rosales is a 57 y.o. male who presents for follow up of Hypertension      54 yo, male, came in for chest pain, disabled due to lower back injury.  PMH DM II x 5yrs, HTN, HLD, COPD, EMMANUEL on CPAP, smoker 1/2 PPD, and bipolar disorder. Had knee replacement recently.  Ten yrs ago, had cath, showing minimal blockage, done at [facility]. No chest pain, palpitation and dizziness.  Cut down the smoking. Smoking 1/2 ppd now, no drink.  ECHO in  normal function and structure  Nuclear stress test in  no ischemia.  A1C 7.9        Past Medical History:   Diagnosis Date    Adrenal cortical adenoma     Anxiety     Arthritis     CKD (chronic kidney disease) stage 3, GFR 30-59 ml/min     Depression     Diabetes mellitus type II 2011    does not take    DM (diabetes mellitus) 2011     am 09/21/2017 Insulin x 1 1/2 year    GERD (gastroesophageal reflux disease) 7/9/2013    Hypertension     Migraine headache 7/22/2013    Severe obesity with body mass index of 36.0 to 36.9        Past Surgical History:   Procedure Laterality Date    BACK SURGERY      HERNIA REPAIR      UMBILICAL    left arm exfix      NASAL SEPTUM SURGERY Bilateral     TONSILLECTOMY      URETEROSCOPY         Social History   Substance Use Topics    Smoking status: Current Every Day Smoker     Packs/day: 0.50     Years: 44.00     Types: Cigarettes    Smokeless tobacco: Never Used      Comment: instructed not to smoke after midnight after midnight prior to surgery    Alcohol use Yes      Comment: social       Family History   Problem Relation Age of Onset    Hypertension Mother     Diabetes Mother     Cataracts Mother     Hypertension Sister     Diabetes Sister     Hypertension Brother     Diabetes Brother     Cancer Paternal Grandmother     Cancer Paternal Grandfather          Review of Systems   Constitution: Negative for decreased appetite, diaphoresis, fever, weakness,  malaise/fatigue and night sweats.   HENT: Negative for nosebleeds.    Eyes: Negative for blurred vision and double vision.   Cardiovascular: Positive for dyspnea on exertion. Negative for claudication, irregular heartbeat, leg swelling, near-syncope, orthopnea, palpitations, paroxysmal nocturnal dyspnea and syncope.   Respiratory: Negative for cough, shortness of breath, sleep disturbances due to breathing, snoring, sputum production and wheezing.    Endocrine: Negative for cold intolerance and polyuria.   Hematologic/Lymphatic: Does not bruise/bleed easily.   Skin: Negative for rash.   Musculoskeletal: Positive for back pain. Negative for falls, joint pain, joint swelling and neck pain.   Gastrointestinal: Negative for abdominal pain, heartburn, nausea and vomiting.   Genitourinary: Negative for dysuria, frequency and hematuria.   Neurological: Negative for difficulty with concentration, dizziness, focal weakness, headaches, light-headedness, numbness and seizures.   Psychiatric/Behavioral: Negative for depression, memory loss and substance abuse. The patient does not have insomnia.    Allergic/Immunologic: Negative for HIV exposure and hives.       Objective:   Physical Exam   Constitutional: He is oriented to person, place, and time. He appears well-nourished.   HENT:   Head: Normocephalic.   Eyes: Pupils are equal, round, and reactive to light.   Neck: Normal carotid pulses and no JVD present. Carotid bruit is not present. No thyromegaly present.   Cardiovascular: Normal rate, regular rhythm, normal heart sounds and normal pulses.   No extrasystoles are present. PMI is not displaced.  Exam reveals no gallop and no S3.    No murmur heard.  Pulmonary/Chest: Breath sounds normal. No stridor. No respiratory distress.   Abdominal: Soft. Bowel sounds are normal. There is no tenderness. There is no rebound.   Musculoskeletal: Normal range of motion.   Neurological: He is alert and oriented to person, place, and time.    Skin: Skin is intact. No rash noted.   Psychiatric: His behavior is normal.       Lab Results   Component Value Date    CHOL 137 05/04/2017    CHOL 168 04/21/2016    CHOL 169 03/02/2015     Lab Results   Component Value Date    HDL 29 (L) 05/04/2017    HDL 53 04/21/2016    HDL 57 03/02/2015     Lab Results   Component Value Date    LDLCALC 75.2 05/04/2017    LDLCALC 88.0 04/21/2016    LDLCALC 74.8 03/02/2015     Lab Results   Component Value Date    TRIG 164 (H) 05/04/2017    TRIG 135 04/21/2016    TRIG 186 (H) 03/02/2015     Lab Results   Component Value Date    CHOLHDL 21.2 05/04/2017    CHOLHDL 31.5 04/21/2016    CHOLHDL 33.7 03/02/2015       Chemistry        Component Value Date/Time     (L) 08/02/2017 1225    K 4.5 08/02/2017 1225    CL 97 08/02/2017 1225    CO2 25 08/02/2017 1225    BUN 19 08/02/2017 1225    CREATININE 1.5 (H) 08/02/2017 1225     (H) 08/02/2017 1225        Component Value Date/Time    CALCIUM 9.9 08/02/2017 1225    ALKPHOS 75 05/04/2017 1113    AST 46 (H) 05/04/2017 1113    ALT 42 05/04/2017 1113    BILITOT 0.3 05/04/2017 1113    ESTGFRAFRICA 58.1 (A) 08/02/2017 1225    EGFRNONAA 50.2 (A) 08/02/2017 1225          Lab Results   Component Value Date    TSH 1.522 08/30/2017     Lab Results   Component Value Date    INR 1.0 10/08/2014    INR 1.0 08/26/2014    INR 1.0 03/10/2014     Lab Results   Component Value Date    WBC 9.86 08/02/2017    HGB 14.8 08/02/2017    HCT 42.4 08/02/2017    MCV 85 08/02/2017     08/02/2017     BMP  Sodium   Date Value Ref Range Status   08/02/2017 134 (L) 136 - 145 mmol/L Final     Potassium   Date Value Ref Range Status   08/02/2017 4.5 3.5 - 5.1 mmol/L Final     Chloride   Date Value Ref Range Status   08/02/2017 97 95 - 110 mmol/L Final     CO2   Date Value Ref Range Status   08/02/2017 25 23 - 29 mmol/L Final     BUN, Bld   Date Value Ref Range Status   08/02/2017 19 6 - 20 mg/dL Final     Creatinine   Date Value Ref Range Status   08/02/2017  1.5 (H) 0.5 - 1.4 mg/dL Final     Calcium   Date Value Ref Range Status   08/02/2017 9.9 8.7 - 10.5 mg/dL Final     Anion Gap   Date Value Ref Range Status   08/02/2017 12 8 - 16 mmol/L Final     eGFR if    Date Value Ref Range Status   08/02/2017 58.1 (A) >60 mL/min/1.73 m^2 Final     eGFR if non    Date Value Ref Range Status   08/02/2017 50.2 (A) >60 mL/min/1.73 m^2 Final     Comment:     Calculation used to obtain the estimated glomerular filtration  rate (eGFR) is the CKD-EPI equation. Since race is unknown   in our information system, the eGFR values for   -American and Non--American patients are given   for each creatinine result.       CrCl cannot be calculated (Patient's most recent lab result is older than the maximum 7 days allowed.).     Assessment:      1. Hypertension associated with diabetes    2. Hyperlipidemia associated with type 2 diabetes mellitus    3. EMMANUEL on CPAP    4. Bipolar 2 disorder    5. Type 2 diabetes mellitus with stage 3 chronic kidney disease, with long-term current use of insulin      BP and LDL wnl  A1c 7.9  Plan:   Continue current meds.  Recommend heart-healthy diet, weight control and regular exercise.  Afua. Risk modification.   RTC in 1 yr    I have reviewed all pertinent labs and cardiac studies. Plans and recommendations have been formulated under my direct supervision. All questions answered and patient voiced understanding. Patient to continue current medications.

## 2017-10-11 NOTE — Clinical Note
Patient seen for smoking cessation group session 5.  Patient reports decreasing cigarette smoking from 1.5 packs per day to 10-12 cigarettes per day. Patient voices he wants to quit smoking but is difficult as his sister smokes and he lives with her. He has set a new quit date of 10/28/17.  The patient remains on the prescribed tobacco cessation medication regimen of 1 mg Chantix BID with a 21 mg nicotine patch QD without any negative side effects at this time.

## 2017-10-12 ENCOUNTER — APPOINTMENT (OUTPATIENT)
Dept: RADIOLOGY | Facility: HOSPITAL | Age: 57
End: 2017-10-12
Attending: FAMILY MEDICINE
Payer: MEDICAID

## 2017-10-12 DIAGNOSIS — M79.89 MASS OF SOFT TISSUE: ICD-10-CM

## 2017-10-12 PROCEDURE — 76536 US EXAM OF HEAD AND NECK: CPT | Mod: 26,,, | Performed by: RADIOLOGY

## 2017-10-12 PROCEDURE — 76536 US EXAM OF HEAD AND NECK: CPT | Mod: TC,PO

## 2017-10-12 NOTE — PROGRESS NOTES
Smoking Cessation Group Session #5    Site: [place]  Date:  10/12/2017  Clinical Status of Patient: Outpatient   Length of Service and Code: 90 minutes - 39023   Number in Attendance: 6  Group Activities/Focus of Group: completion of TCRS (Tobacco Cessation Rating Scale) reviewed strategies, habitual behavior, high risks situations, understanding urges and cravings, stress and relaxation with open discussion and additional interventions, Introduced lapses, relapses, understanding them and analyzing the situation of a lapse, conflict issues that may be linked to a lapse.      Target symptoms:  withdrawal and medication side effects             The following were rated moderate (3) to severe (4) on TCRS:       Moderate 3: none     Severe 4:   none  Patient's Response to Intervention: Patient seen for smoking cessation group session 5.  Patient reports decreasing cigarette smoking from 1.5 packs per day to 10-12 cigarettes per day. Patient voices he wants to quit smoking but is difficult as his sister smokes and he lives with her. He has set a new quit date of 10/28/17.  The patient remains on the prescribed tobacco cessation medication regimen of 1 mg Chantix BID with a 21 mg nicotine patch QD without any negative side effects at this time.     Progress Toward Goals and Other Mental Status Changes: The patient denies any abnormal behavioral or mental changes at this time.     Plan: The patient will continue with group therapy sessions and medication monitoring by CTTS. Prescribed medication management will be by physician.     Return to Clinic: 1 week    Quit Date: none   Planned Quit Date: 10/28/17

## 2017-10-16 DIAGNOSIS — M79.89 SOFT TISSUE MASS: Primary | ICD-10-CM

## 2017-10-18 ENCOUNTER — CLINICAL SUPPORT (OUTPATIENT)
Dept: SMOKING CESSATION | Facility: CLINIC | Age: 57
End: 2017-10-18
Payer: COMMERCIAL

## 2017-10-18 DIAGNOSIS — F17.210 LIGHT CIGARETTE SMOKER (1-9 CIGS/DAY): Primary | ICD-10-CM

## 2017-10-18 PROCEDURE — 99999 PR PBB SHADOW E&M-EST. PATIENT-LVL I: CPT | Mod: PBBFAC,,,

## 2017-10-18 PROCEDURE — 90853 GROUP PSYCHOTHERAPY: CPT | Mod: S$GLB,,,

## 2017-10-18 NOTE — PROGRESS NOTES
Smoking Cessation Group Session #6    Site: [place]  Date:  10/18/2017  Clinical Status of Patient: Outpatient   Length of Service and Code: 90 minutes - 71478   Number in Attendance: 3  Group Activities/Focus of Group: completion of TCRS (Tobacco Cessation Rating Scale) reviewed strategies, cues, triggers, high risk situations, lapses, relapses, diet, exercise, stress, relaxation, sleep, habitual behavior, and life style changes.    Target symptoms:  withdrawal and medication side effects             The following were rated moderate (3) to severe (4) on TCRS:       Moderate 3: none     Severe 4:   none  Patient's Response to Intervention: Patient reports decreasing cigarette smoking from 1 pack per day to 4-5 cigarettes per day. He has set a new quit date of 10/31/17.  The patient remains on the prescribed tobacco cessation medication regimen of 1 mg Chantix BID with a 21 mg nicotine patch QD without any negative side effects at this time. Patient is aware he may attend more group sessions to finish his quit.     Progress Toward Goals and Other Mental Status Changes: The patient denies any abnormal behavioral or mental changes at this time.     Plan: The patient will continue with group therapy sessions and medication monitoring by CTTS. Prescribed medication management will be by physician.     Return to Clinic: 1 week    Quit Date: none   Planned Quit Date: 10/31/17

## 2017-10-18 NOTE — Clinical Note
Patient reports decreasing cigarette smoking from 1 pack per day to 4-5 cigarettes per day. He has set a new quit date of 10/31/17.  The patient remains on the prescribed tobacco cessation medication regimen of 1 mg Chantix BID with a 21 mg nicotine patch QD without any negative side effects at this time. Patient is aware he may attend more group sessions to finish his quit.

## 2017-10-25 ENCOUNTER — TELEPHONE (OUTPATIENT)
Dept: RADIOLOGY | Facility: HOSPITAL | Age: 57
End: 2017-10-25

## 2017-10-26 ENCOUNTER — APPOINTMENT (OUTPATIENT)
Dept: RADIOLOGY | Facility: HOSPITAL | Age: 57
End: 2017-10-26
Attending: FAMILY MEDICINE
Payer: MEDICAID

## 2017-10-26 ENCOUNTER — CLINICAL SUPPORT (OUTPATIENT)
Dept: SMOKING CESSATION | Facility: CLINIC | Age: 57
End: 2017-10-26
Payer: COMMERCIAL

## 2017-10-26 DIAGNOSIS — F17.210 MODERATE CIGARETTE SMOKER (10-19 PER DAY): Primary | ICD-10-CM

## 2017-10-26 DIAGNOSIS — M79.89 SOFT TISSUE MASS: ICD-10-CM

## 2017-10-26 DIAGNOSIS — F17.210 MODERATE SMOKER (20 OR LESS PER DAY): ICD-10-CM

## 2017-10-26 PROCEDURE — 99999 PR PBB SHADOW E&M-EST. PATIENT-LVL I: CPT | Mod: PBBFAC,,,

## 2017-10-26 PROCEDURE — 76999 ECHO EXAMINATION PROCEDURE: CPT | Mod: TC,PO

## 2017-10-26 PROCEDURE — 90853 GROUP PSYCHOTHERAPY: CPT | Mod: S$GLB,,,

## 2017-10-26 PROCEDURE — 76536 US EXAM OF HEAD AND NECK: CPT | Mod: 26,,, | Performed by: RADIOLOGY

## 2017-10-26 NOTE — Clinical Note
Patient reports decreasing cigarette smoking from 1 pack per day to less than a half pack per day.  The patient remains on the prescribed tobacco cessation medication regimen of 1 mg Chantix BID with a 21 mg nicotine patch QD without any negative side effects at this time.

## 2017-10-30 ENCOUNTER — CLINICAL SUPPORT (OUTPATIENT)
Dept: SMOKING CESSATION | Facility: CLINIC | Age: 57
End: 2017-10-30
Payer: COMMERCIAL

## 2017-10-30 ENCOUNTER — TELEPHONE (OUTPATIENT)
Dept: SMOKING CESSATION | Facility: CLINIC | Age: 57
End: 2017-10-30

## 2017-10-30 DIAGNOSIS — F17.210 LIGHT CIGARETTE SMOKER (1-9 CIGS/DAY): Primary | ICD-10-CM

## 2017-10-30 PROCEDURE — 99407 BEHAV CHNG SMOKING > 10 MIN: CPT | Mod: S$GLB,,,

## 2017-10-30 RX ORDER — VARENICLINE TARTRATE 1 MG/1
1 TABLET, FILM COATED ORAL 2 TIMES DAILY
Qty: 56 TABLET | Refills: 0 | Status: ON HOLD | OUTPATIENT
Start: 2017-10-30 | End: 2017-12-24 | Stop reason: HOSPADM

## 2017-10-30 RX ORDER — IBUPROFEN 200 MG
1 TABLET ORAL DAILY
Qty: 28 PATCH | Refills: 0 | Status: SHIPPED | OUTPATIENT
Start: 2017-10-30 | End: 2017-11-21 | Stop reason: SDUPTHER

## 2017-10-30 NOTE — PROGRESS NOTES
Smoking Cessation Group Session #2    Site: [place]  Date:  10/26/17  Clinical Status of Patient: Outpatient   Length of Service and Code: 90 minutes - 98157   Number in Attendance: 5  Group Activities/Focus of Group: completion of TCRS (Tobacco Cessation Rating Scale) reviewed strategies, cues, and triggers. Introduced the negative impact of tobacco on health, the health advantages of discontinuing the use of tobacco, time line improved health changes after a quit, withdrawal issues to expect from nicotine and habit, and ways to achieve the goal of a quit.    Target symptoms:  withdrawal and medication side effects             The following were rated moderate (3) to severe (4) on TCRS:       Moderate 3: none     Severe 4:   none  Patient's Response to Intervention: Patient reports decreasing cigarette smoking from 1 pack per day to less than a half pack per day.  The patient remains on the prescribed tobacco cessation medication regimen of 1 mg Chantix BID with a 21 mg nicotine patch QD without any negative side effects at this time.     Progress Toward Goals and Other Mental Status Changes: The patient denies any abnormal behavioral or mental changes at this time.       Plan: The patient will continue with group therapy sessions and medication monitoring by CTTS. Prescribed medication management will be by physician.     Return to Clinic: 1 week    Quit Date: none   Planned Quit Date: 11/13/17

## 2017-11-01 ENCOUNTER — LAB VISIT (OUTPATIENT)
Dept: LAB | Facility: HOSPITAL | Age: 57
End: 2017-11-01
Attending: NURSE PRACTITIONER
Payer: MEDICAID

## 2017-11-01 ENCOUNTER — CLINICAL SUPPORT (OUTPATIENT)
Dept: SMOKING CESSATION | Facility: CLINIC | Age: 57
End: 2017-11-01
Payer: COMMERCIAL

## 2017-11-01 ENCOUNTER — OFFICE VISIT (OUTPATIENT)
Dept: DIABETES | Facility: CLINIC | Age: 57
End: 2017-11-01
Payer: MEDICAID

## 2017-11-01 VITALS
SYSTOLIC BLOOD PRESSURE: 146 MMHG | HEIGHT: 71 IN | WEIGHT: 261.25 LBS | BODY MASS INDEX: 36.57 KG/M2 | DIASTOLIC BLOOD PRESSURE: 96 MMHG

## 2017-11-01 DIAGNOSIS — Z79.4 TYPE 2 DIABETES MELLITUS WITH STAGE 3 CHRONIC KIDNEY DISEASE, WITH LONG-TERM CURRENT USE OF INSULIN: ICD-10-CM

## 2017-11-01 DIAGNOSIS — I15.2 HYPERTENSION ASSOCIATED WITH DIABETES: ICD-10-CM

## 2017-11-01 DIAGNOSIS — E66.9 OBESITY (BMI 30-39.9): ICD-10-CM

## 2017-11-01 DIAGNOSIS — N18.30 TYPE 2 DIABETES MELLITUS WITH STAGE 3 CHRONIC KIDNEY DISEASE, WITH LONG-TERM CURRENT USE OF INSULIN: Primary | ICD-10-CM

## 2017-11-01 DIAGNOSIS — Z79.4 TYPE 2 DIABETES MELLITUS WITH DIABETIC NEUROPATHY, WITH LONG-TERM CURRENT USE OF INSULIN: ICD-10-CM

## 2017-11-01 DIAGNOSIS — E11.22 TYPE 2 DIABETES MELLITUS WITH STAGE 3 CHRONIC KIDNEY DISEASE, WITH LONG-TERM CURRENT USE OF INSULIN: ICD-10-CM

## 2017-11-01 DIAGNOSIS — E11.22 TYPE 2 DIABETES MELLITUS WITH STAGE 3 CHRONIC KIDNEY DISEASE, WITH LONG-TERM CURRENT USE OF INSULIN: Primary | ICD-10-CM

## 2017-11-01 DIAGNOSIS — E11.69 HYPERLIPIDEMIA ASSOCIATED WITH TYPE 2 DIABETES MELLITUS: ICD-10-CM

## 2017-11-01 DIAGNOSIS — E11.40 TYPE 2 DIABETES MELLITUS WITH DIABETIC NEUROPATHY, WITH LONG-TERM CURRENT USE OF INSULIN: ICD-10-CM

## 2017-11-01 DIAGNOSIS — E11.59 HYPERTENSION ASSOCIATED WITH DIABETES: ICD-10-CM

## 2017-11-01 DIAGNOSIS — E78.5 HYPERLIPIDEMIA ASSOCIATED WITH TYPE 2 DIABETES MELLITUS: ICD-10-CM

## 2017-11-01 DIAGNOSIS — F17.210 LIGHT CIGARETTE SMOKER (1-9 CIGS/DAY): Primary | ICD-10-CM

## 2017-11-01 DIAGNOSIS — Z79.4 TYPE 2 DIABETES MELLITUS WITH STAGE 3 CHRONIC KIDNEY DISEASE, WITH LONG-TERM CURRENT USE OF INSULIN: Primary | ICD-10-CM

## 2017-11-01 DIAGNOSIS — N18.30 TYPE 2 DIABETES MELLITUS WITH STAGE 3 CHRONIC KIDNEY DISEASE, WITH LONG-TERM CURRENT USE OF INSULIN: ICD-10-CM

## 2017-11-01 LAB
ALBUMIN SERPL BCP-MCNC: 3.4 G/DL
ALP SERPL-CCNC: 89 U/L
ALT SERPL W/O P-5'-P-CCNC: 36 U/L
ANION GAP SERPL CALC-SCNC: 11 MMOL/L
AST SERPL-CCNC: 25 U/L
BILIRUB SERPL-MCNC: 0.3 MG/DL
BUN SERPL-MCNC: 14 MG/DL
CALCIUM SERPL-MCNC: 10 MG/DL
CHLORIDE SERPL-SCNC: 99 MMOL/L
CO2 SERPL-SCNC: 27 MMOL/L
CREAT SERPL-MCNC: 1.4 MG/DL
EST. GFR  (AFRICAN AMERICAN): >60 ML/MIN/1.73 M^2
EST. GFR  (NON AFRICAN AMERICAN): 54.6 ML/MIN/1.73 M^2
ESTIMATED AVG GLUCOSE: 166 MG/DL
GLUCOSE SERPL-MCNC: 129 MG/DL
GLUCOSE SERPL-MCNC: 235 MG/DL (ref 70–110)
HBA1C MFR BLD HPLC: 7.4 %
POTASSIUM SERPL-SCNC: 3.6 MMOL/L
PROT SERPL-MCNC: 7.2 G/DL
SODIUM SERPL-SCNC: 137 MMOL/L

## 2017-11-01 PROCEDURE — 99213 OFFICE O/P EST LOW 20 MIN: CPT | Mod: PBBFAC,PO | Performed by: NURSE PRACTITIONER

## 2017-11-01 PROCEDURE — 99999 PR PBB SHADOW E&M-EST. PATIENT-LVL III: CPT | Mod: PBBFAC,,, | Performed by: NURSE PRACTITIONER

## 2017-11-01 PROCEDURE — 99404 PREV MED CNSL INDIV APPRX 60: CPT | Mod: S$GLB,,,

## 2017-11-01 PROCEDURE — 80053 COMPREHEN METABOLIC PANEL: CPT

## 2017-11-01 PROCEDURE — 99214 OFFICE O/P EST MOD 30 MIN: CPT | Mod: S$PBB,,, | Performed by: NURSE PRACTITIONER

## 2017-11-01 PROCEDURE — 36415 COLL VENOUS BLD VENIPUNCTURE: CPT | Mod: PO

## 2017-11-01 PROCEDURE — 99999 PR PBB SHADOW E&M-EST. PATIENT-LVL I: CPT | Mod: PBBFAC,,,

## 2017-11-01 PROCEDURE — 82948 REAGENT STRIP/BLOOD GLUCOSE: CPT | Mod: PBBFAC,PO | Performed by: NURSE PRACTITIONER

## 2017-11-01 PROCEDURE — 83036 HEMOGLOBIN GLYCOSYLATED A1C: CPT

## 2017-11-01 RX ORDER — LANCETS 33 GAUGE
1 EACH MISCELLANEOUS 3 TIMES DAILY
Qty: 100 EACH | Refills: 11 | Status: SHIPPED | OUTPATIENT
Start: 2017-11-01 | End: 2022-01-19

## 2017-11-01 NOTE — Clinical Note
Patient seen for smoking cessation. Patient reports smoking his last cigarette last night and intends not to purchase any more cigarettes. We discussed lapse prevention, preplanning and having a strategy for high risk situations. We talked about what makes a high risk situation, discussing when, where, and who. Completion of TCRS (Tobacco Cessation Rating Scale) reviewed strategies, cues, triggers, high risk situations, lapses, relapses, diet, exercise, stress, relaxation, sleep, habitual behavior, and life style changes.  Patient remains on a regimen of 1 mg Chantix BID with a 21 mg nicotine patch QD without any negative side effects at this time.

## 2017-11-01 NOTE — PROGRESS NOTES
Individual Follow-Up Form    11/1/2017    Quit Date: 10/31/17    Clinical Status of Patient: Outpatient    Length of Service: 60 minutes    Continuing Medication: yes  Chantix or Patches    Other Medications: none     Target Symptoms: Withdrawal and medication side effects. The following were rated moderate (3) to severe (4) on TCRS:  · Moderate (3): none  · Severe (4): none    Comments: Patient seen for smoking cessation. Patient reports smoking his last cigarette last night and intends not to purchase any more cigarettes. We discussed lapse prevention, preplanning and having a strategy for high risk situations. We talked about what makes a high risk situation, discussing when, where, and who. Completion of TCRS (Tobacco Cessation Rating Scale) reviewed strategies, cues, triggers, high risk situations, lapses, relapses, diet, exercise, stress, relaxation, sleep, habitual behavior, and life style changes.  Patient remains on a regimen of 1 mg Chantix BID with a 21 mg nicotine patch QD without any negative side effects at this time.     Diagnosis: F17.210    Next Visit: 1 week

## 2017-11-01 NOTE — PROGRESS NOTES
"PCP: Farhana Burnham MD    Subjective:     HISTORY OF PRESENT ILLNESS: 57 year old  male patient is in clinic today for diabetes.  Patient has had Type II diabetes since 2009.  He has been seen by dietitian in the past.  Most recent A1C is 7.9, ADA recommends less than 7.0.  He reports prior use of glimepiride but was told to discontinue.  He has not monitored BG in the past month.  He reports that meter has malfunctioned and is requesting a new one today.  Since his last visit, he gained 1 pound.       Patient denies polyuria, polyphagia, or blurred vision.  He complains of polydipsia, cotton mouth.   Also denies nausea, vomiting, diarrhea or hypoglycemia.  Poor dietary compliance.  He adds regular table sugar to his coffee.    Height: 5 ' 9.5 ", Weight:  261 pounds, BMI 36.44  Blood Glucose reading this AM: Not Taken  Blood Glucose reading in clinic: 111 mg/dl at 10:41 am    DM MEDICATIONS:  Metformin 500 mg BID  Humalog 50 / 50 -  76 units before breakfast and 34 units before dinner  Trulicity 1.5 mg weekly     Labs Reviewed.    REVIEW OF SYSTEMS:  General: Denies fever, chills, change in appetite.  HEENT: Denies impaired hearing, dysphagia.  Respiratory: Denies shortness of breath, cough or wheezing.  Cardiovascular: Denies chest pain, palpitations.  GI: Denies hematochezia.  : Denies hematuria, dysuria or frequency.  MS:  Normal gait. Denies difficulty with mobility, muscle or joint pain.  SKIN: Denies rashes and lesions.  Neuro: Has numbness or tingling in the hands or feet.   PSYCH: Denies depression or anxiety. No suicidal ideations.  ENDO: See HPI.    STANDARDS OF CARE:  Eye doctor: Dr. Caruso, Last exam 09 / 2017  Dental exam: Recommend regular exams; denies gums bleeding.  Podiatry doctor: Dr. Sampson, Last exam 3 / 2015    ACTIVITY LEVEL: No routine exercise.   MEAL PLANNING: Number of meals per day - 3. Number of snacks per day - 2.  Per dietary recall, patient is not limiting " carbohydrates, saturated fats and sodium.     BLOOD GLUCOSE TESTING: Self-monitoring with, ONE TOUCH verio  SOCIAL HISTORY: Disabled. Smokes 2 cigarettes a day - enrolled in smoking cessation    Objective:     PHYSICAL EXAMINATION:  GENERAL: WDWN  male in no acute distress, ambulatory, responds appropriately. AAO X 3.   NECK: Supple, no thyromegaly, no cervical or supraclavicular lymphadenopathy, no carotid bruit.  HEART: Regular rate and rhythm. No rubs, murmurs or gallops.  LUNGS: CTA bilaterally. Unlabored breathing, no use of accessory muscles.  MUSCULOSKELETAL: Normal gait and muscle strength.  ABDOMEN: Active bowel sounds X 4, no masses or tenderness.   SKIN: Warm, dry skin. No lesions or abrasions. Clean, dry well-healed injection sites.   NEUROLOGIC: Cranial nerves II-XII grossly intact.   FOOT EXAMINATION: Protective Sensation (w/ 10 gram monofilament):  Right: Decreased  Left: Decreased  //  Visual Inspection: Onychomycosis -  Bilateral toes.  Significant calluses to lateral sides of great toes and heels.  Dry skin.  // Pedal Pulses:  Right: Diminshed Left: Diminshed     Assessment:     (1.) Diabetes Type II, stage III CKD, neuropathy ( on gabapentin ) - suboptimal control on metformin, Humalog 50 / 50, Trulicity, A1C 7.9  (2.) Hypertension - suboptimal control on norvasc, catapres, hydralazine, lisinopril-HCT  (3.) Obesity, BMI 36.44  (4.) Hyperlipidemia - continue on Pravastatin  (5.) Tobacco Abuse - enrolled in smoking cessation    Plan:     1.) Provided patient with a new ONE TOUCH Verio meter.  Patient was instructed to monitor blood glucose 2 x daily, fasting and ac dinner.  Discussed ADA goal for fasting blood sugar, 80 - 130 mg/dL; pp blood sugars below 180 mg/dl. Also, discussed prevention of hypoglycemia and the need to adjust goals to higher levels if persistent hypoglycemia.  Reminded to bring blood glucose records or meter to each visit for review.  2.) Reviewed pathophysiology of  Type 2 diabetes, complications related to the disease, importance of annual dilated eye exam and daily foot examination.  3.) Continue metformin 500 mg BID.   Continue Humalog 50 / 50 -  76 units before breakfast and 34 units before dinner.  He has been forgetting to take his Trulicity once a week, so we are going to try a once daily GLP-1. Start Victoza 0.6 mg SQ daily, same time each day x 1 week, then increase to 1.2 mg SQ daily.  Will increase to 1.8 mg SQ daily if needed.  Discussed importance of rotating sites.  Reviewed possible GI side effects.   He has poor dietary compliance, which consists of regular table sugar with his coffee, noodles, rice, and other carbs.   4.) Meal planning teaching: Carbohydrate definition - one serving is 15 gms. Carbohydrate spacing - carbohydrates should be spaced into approximately 3 meals with 2 snacks ( of one carbohydrate ) between meals or at bedtime. Increase vegetable intake to 2 or more cups of vegetables per day as well as 2 fruit servings.  Recommended low saturated fat, low sodium diet to aid in control of hypertension and cholesterol.  5.) Discussed activity, benefits, methods, and precautions. Recommended patient start some form of exercise and increase as tolerated to 30 minutes per day to facilitate weight loss and aid in control of BGs.  6.) Labs Today: A1C, and Comprehensive Metabolic Panel  7.) Return to clinic in 2 month for follow up. Advised patient to call clinic with any questions or concerns.    Ladan Dowd, SHAWNA-C, CDE

## 2017-11-07 DIAGNOSIS — E11.40 TYPE 2 DIABETES MELLITUS WITH DIABETIC NEUROPATHY, UNSPECIFIED LONG TERM INSULIN USE STATUS: ICD-10-CM

## 2017-11-07 RX ORDER — GABAPENTIN 300 MG/1
CAPSULE ORAL
Qty: 90 CAPSULE | Refills: 0 | Status: SHIPPED | OUTPATIENT
Start: 2017-11-07 | End: 2017-12-17 | Stop reason: SDUPTHER

## 2017-11-20 ENCOUNTER — TELEPHONE (OUTPATIENT)
Dept: SMOKING CESSATION | Facility: CLINIC | Age: 57
End: 2017-11-20

## 2017-11-20 NOTE — TELEPHONE ENCOUNTER
Attempted to contact patient to follow up tobacco cessation. I was able to leave a detailed message with the following information: Diane Miller, [facility] Tobacco Cessation program and my phone number [phone]. I requested a return call.

## 2017-11-21 ENCOUNTER — TELEPHONE (OUTPATIENT)
Dept: SMOKING CESSATION | Facility: CLINIC | Age: 57
End: 2017-11-21

## 2017-11-21 DIAGNOSIS — F17.210 MODERATE CIGARETTE SMOKER (10-19 PER DAY): ICD-10-CM

## 2017-11-21 RX ORDER — IBUPROFEN 200 MG
1 TABLET ORAL DAILY
Qty: 28 PATCH | Refills: 0 | Status: SHIPPED | OUTPATIENT
Start: 2017-11-21 | End: 2018-01-19

## 2017-11-21 NOTE — TELEPHONE ENCOUNTER
Patient returned my call to let me know he has an ongoing problem with public transportation as to why he is missing group sessions. He has made a ride arrangements for his 11/30/17 group session.

## 2017-11-30 DIAGNOSIS — N18.2 TYPE 2 DIABETES MELLITUS WITH STAGE 2 CHRONIC KIDNEY DISEASE, WITH LONG-TERM CURRENT USE OF INSULIN: ICD-10-CM

## 2017-11-30 DIAGNOSIS — Z79.4 TYPE 2 DIABETES MELLITUS WITH STAGE 2 CHRONIC KIDNEY DISEASE, WITH LONG-TERM CURRENT USE OF INSULIN: ICD-10-CM

## 2017-11-30 DIAGNOSIS — E11.22 TYPE 2 DIABETES MELLITUS WITH STAGE 2 CHRONIC KIDNEY DISEASE, WITH LONG-TERM CURRENT USE OF INSULIN: ICD-10-CM

## 2017-11-30 RX ORDER — PEN NEEDLE, DIABETIC 30 GX3/16"
1 NEEDLE, DISPOSABLE MISCELLANEOUS DAILY
Qty: 100 EACH | Refills: 11 | Status: SHIPPED | OUTPATIENT
Start: 2017-11-30 | End: 2018-03-26 | Stop reason: SDUPTHER

## 2017-11-30 NOTE — TELEPHONE ENCOUNTER
----- Message from Rafael Scruggs sent at 11/30/2017  2:47 PM CST -----  Contact: Pt   Pt called in regards to need his needles orders for his insulin pins..[phone] (home)           ..  [facility] [address] [place], [place] - [address] AT [facility] [address]   [address]  [facility] [address]  Phone: [phone] Fax: [phone]

## 2017-12-07 ENCOUNTER — TELEPHONE (OUTPATIENT)
Dept: FAMILY MEDICINE | Facility: CLINIC | Age: 57
End: 2017-12-07

## 2017-12-07 NOTE — TELEPHONE ENCOUNTER
----- Message from Leonor Alvarado sent at 12/7/2017  2:40 PM CST -----  Pt is requesting a call from nurse to discuss the pain in his left leg.        Please call pt back at [phone]

## 2017-12-07 NOTE — TELEPHONE ENCOUNTER
Patient complaining of sciatic nerve pain  Patient can not come in for UC  He has to arrange transportation /patient states he can come in next monday

## 2017-12-08 NOTE — TELEPHONE ENCOUNTER
Left message for patient to call / patient would like an appointment to be seen for sciatic pain. Patient needs to make arrangements for transportation

## 2017-12-12 ENCOUNTER — TELEPHONE (OUTPATIENT)
Dept: SMOKING CESSATION | Facility: CLINIC | Age: 57
End: 2017-12-12

## 2017-12-12 NOTE — TELEPHONE ENCOUNTER
Called patient to follow up with no show appointments for smoking cessation. I was able to leave a detailed message with my contact information, Nathalie Martin, [phone]. Offered to schedule him for a follow up visit. I requested a return call.

## 2017-12-17 DIAGNOSIS — E11.40 TYPE 2 DIABETES MELLITUS WITH DIABETIC NEUROPATHY, UNSPECIFIED LONG TERM INSULIN USE STATUS: ICD-10-CM

## 2017-12-18 RX ORDER — GABAPENTIN 300 MG/1
CAPSULE ORAL
Qty: 90 CAPSULE | Refills: 0 | Status: SHIPPED | OUTPATIENT
Start: 2017-12-18 | End: 2018-01-12 | Stop reason: DRUGHIGH

## 2017-12-22 ENCOUNTER — HOSPITAL ENCOUNTER (OUTPATIENT)
Facility: HOSPITAL | Age: 57
LOS: 1 days | Discharge: HOME | End: 2017-12-24
Attending: EMERGENCY MEDICINE | Admitting: FAMILY MEDICINE
Payer: MEDICAID

## 2017-12-22 DIAGNOSIS — Z72.0 TOBACCO ABUSE DISORDER: ICD-10-CM

## 2017-12-22 DIAGNOSIS — Z79.4 TYPE 2 DIABETES MELLITUS WITH HYPERGLYCEMIA, WITH LONG-TERM CURRENT USE OF INSULIN: ICD-10-CM

## 2017-12-22 DIAGNOSIS — R45.1 PSYCHOMOTOR AGITATION: ICD-10-CM

## 2017-12-22 DIAGNOSIS — F19.10 SUBSTANCE ABUSE: ICD-10-CM

## 2017-12-22 DIAGNOSIS — R07.9 CHEST PAIN: ICD-10-CM

## 2017-12-22 DIAGNOSIS — G93.40 ACUTE ENCEPHALOPATHY: ICD-10-CM

## 2017-12-22 DIAGNOSIS — E86.0 DEHYDRATION: ICD-10-CM

## 2017-12-22 DIAGNOSIS — I67.4 HYPERTENSIVE ENCEPHALOPATHY: Primary | ICD-10-CM

## 2017-12-22 DIAGNOSIS — E11.65 TYPE 2 DIABETES MELLITUS WITH HYPERGLYCEMIA, WITH LONG-TERM CURRENT USE OF INSULIN: ICD-10-CM

## 2017-12-22 DIAGNOSIS — Z79.899 POLYPHARMACY: ICD-10-CM

## 2017-12-22 PROBLEM — E87.20 METABOLIC ACIDOSIS: Status: ACTIVE | Noted: 2017-12-22

## 2017-12-22 PROBLEM — E86.1 FLUID VOLUME DEFICIT: Status: ACTIVE | Noted: 2017-12-22

## 2017-12-22 PROBLEM — F19.20 DRUG DEPENDENCE: Status: ACTIVE | Noted: 2017-12-22

## 2017-12-22 LAB
ALBUMIN SERPL BCP-MCNC: 3.4 G/DL
ALLENS TEST: ABNORMAL
ALP SERPL-CCNC: 101 U/L
ALT SERPL W/O P-5'-P-CCNC: 24 U/L
AMMONIA PLAS-SCNC: 28 UMOL/L
AMORPH CRY URNS QL MICRO: ABNORMAL
AMPHET+METHAMPHET UR QL: NEGATIVE
ANION GAP SERPL CALC-SCNC: 20 MMOL/L
APTT BLDCRRT: 28 SEC
AST SERPL-CCNC: 24 U/L
B-OH-BUTYR BLD STRIP-SCNC: 1.4 MMOL/L
BACTERIA #/AREA URNS HPF: ABNORMAL /HPF
BARBITURATES UR QL SCN>200 NG/ML: NEGATIVE
BASOPHILS # BLD AUTO: 0.01 K/UL
BASOPHILS NFR BLD: 0.1 %
BENZODIAZ UR QL SCN>200 NG/ML: NORMAL
BILIRUB SERPL-MCNC: 0.5 MG/DL
BILIRUB UR QL STRIP: ABNORMAL
BNP SERPL-MCNC: 75 PG/ML
BUN SERPL-MCNC: 18 MG/DL
BZE UR QL SCN: NEGATIVE
CALCIUM SERPL-MCNC: 10.6 MG/DL
CANNABINOIDS UR QL SCN: NORMAL
CHLORIDE SERPL-SCNC: 93 MMOL/L
CK SERPL-CCNC: 79 U/L
CLARITY UR: CLEAR
CO2 SERPL-SCNC: 23 MMOL/L
COLOR UR: YELLOW
CREAT SERPL-MCNC: 1.7 MG/DL
CREAT UR-MCNC: 139.7 MG/DL
DELSYS: ABNORMAL
DIFFERENTIAL METHOD: ABNORMAL
EOSINOPHIL # BLD AUTO: 0 K/UL
EOSINOPHIL NFR BLD: 0 %
ERYTHROCYTE [DISTWIDTH] IN BLOOD BY AUTOMATED COUNT: 13.6 %
EST. GFR  (AFRICAN AMERICAN): 50 ML/MIN/1.73 M^2
EST. GFR  (NON AFRICAN AMERICAN): 43 ML/MIN/1.73 M^2
ETHANOL SERPL-MCNC: <10 MG/DL
FIO2: 21
GLUCOSE SERPL-MCNC: 319 MG/DL
GLUCOSE UR QL STRIP: ABNORMAL
HCO3 UR-SCNC: 24.9 MMOL/L (ref 24–28)
HCT VFR BLD AUTO: 42.3 %
HGB BLD-MCNC: 14.9 G/DL
HGB UR QL STRIP: ABNORMAL
HYALINE CASTS #/AREA URNS LPF: 0 /LPF
INR PPP: 1
KETONES UR QL STRIP: ABNORMAL
LEUKOCYTE ESTERASE UR QL STRIP: NEGATIVE
LYMPHOCYTES # BLD AUTO: 1 K/UL
LYMPHOCYTES NFR BLD: 9.7 %
MAGNESIUM SERPL-MCNC: 1.9 MG/DL
MCH RBC QN AUTO: 29.8 PG
MCHC RBC AUTO-ENTMCNC: 35.2 G/DL
MCV RBC AUTO: 85 FL
METHADONE UR QL SCN>300 NG/ML: NEGATIVE
MICROSCOPIC COMMENT: ABNORMAL
MODE: ABNORMAL
MONOCYTES # BLD AUTO: 0.5 K/UL
MONOCYTES NFR BLD: 4.3 %
NEUTROPHILS # BLD AUTO: 8.9 K/UL
NEUTROPHILS NFR BLD: 85.9 %
NITRITE UR QL STRIP: NEGATIVE
OPIATES UR QL SCN: NORMAL
PCO2 BLDA: 29.4 MMHG (ref 35–45)
PCP UR QL SCN>25 NG/ML: NEGATIVE
PH SMN: 7.54 [PH] (ref 7.35–7.45)
PH UR STRIP: 6 [PH] (ref 5–8)
PHOSPHATE SERPL-MCNC: 1.5 MG/DL
PLATELET # BLD AUTO: 339 K/UL
PMV BLD AUTO: 9.1 FL
PO2 BLDA: 89 MMHG (ref 80–100)
POC BE: 2 MMOL/L
POC SATURATED O2: 98 % (ref 95–100)
POCT GLUCOSE: 312 MG/DL (ref 70–110)
POCT GLUCOSE: 330 MG/DL (ref 70–110)
POTASSIUM SERPL-SCNC: 4.7 MMOL/L
PROT SERPL-MCNC: 9.1 G/DL
PROT UR QL STRIP: ABNORMAL
PROTHROMBIN TIME: 10.7 SEC
RBC # BLD AUTO: 5 M/UL
RBC #/AREA URNS HPF: 1 /HPF (ref 0–4)
SAMPLE: ABNORMAL
SITE: ABNORMAL
SODIUM SERPL-SCNC: 136 MMOL/L
SP GR UR STRIP: >=1.03 (ref 1–1.03)
TOXICOLOGY INFORMATION: NORMAL
TROPONIN I SERPL DL<=0.01 NG/ML-MCNC: 0.01 NG/ML
TSH SERPL DL<=0.005 MIU/L-ACNC: 1.24 UIU/ML
URN SPEC COLLECT METH UR: ABNORMAL
UROBILINOGEN UR STRIP-ACNC: 1 EU/DL
WBC # BLD AUTO: 10.41 K/UL
WBC #/AREA URNS HPF: 0 /HPF (ref 0–5)

## 2017-12-22 PROCEDURE — 96374 THER/PROPH/DIAG INJ IV PUSH: CPT

## 2017-12-22 PROCEDURE — 80307 DRUG TEST PRSMV CHEM ANLYZR: CPT

## 2017-12-22 PROCEDURE — 84100 ASSAY OF PHOSPHORUS: CPT

## 2017-12-22 PROCEDURE — 25000003 PHARM REV CODE 250: Performed by: FAMILY MEDICINE

## 2017-12-22 PROCEDURE — 36600 WITHDRAWAL OF ARTERIAL BLOOD: CPT

## 2017-12-22 PROCEDURE — 82550 ASSAY OF CK (CPK): CPT

## 2017-12-22 PROCEDURE — 80053 COMPREHEN METABOLIC PANEL: CPT

## 2017-12-22 PROCEDURE — 93010 ELECTROCARDIOGRAM REPORT: CPT | Mod: ,,, | Performed by: INTERNAL MEDICINE

## 2017-12-22 PROCEDURE — 85730 THROMBOPLASTIN TIME PARTIAL: CPT

## 2017-12-22 PROCEDURE — 96375 TX/PRO/DX INJ NEW DRUG ADDON: CPT

## 2017-12-22 PROCEDURE — 82010 KETONE BODYS QUAN: CPT

## 2017-12-22 PROCEDURE — 82803 BLOOD GASES ANY COMBINATION: CPT

## 2017-12-22 PROCEDURE — 80320 DRUG SCREEN QUANTALCOHOLS: CPT

## 2017-12-22 PROCEDURE — 83735 ASSAY OF MAGNESIUM: CPT

## 2017-12-22 PROCEDURE — 93005 ELECTROCARDIOGRAM TRACING: CPT

## 2017-12-22 PROCEDURE — 25000003 PHARM REV CODE 250: Performed by: EMERGENCY MEDICINE

## 2017-12-22 PROCEDURE — 84484 ASSAY OF TROPONIN QUANT: CPT

## 2017-12-22 PROCEDURE — 99900035 HC TECH TIME PER 15 MIN (STAT)

## 2017-12-22 PROCEDURE — 99285 EMERGENCY DEPT VISIT HI MDM: CPT | Mod: 25

## 2017-12-22 PROCEDURE — P9612 CATHETERIZE FOR URINE SPEC: HCPCS

## 2017-12-22 PROCEDURE — 83880 ASSAY OF NATRIURETIC PEPTIDE: CPT

## 2017-12-22 PROCEDURE — G0378 HOSPITAL OBSERVATION PER HR: HCPCS

## 2017-12-22 PROCEDURE — 36415 COLL VENOUS BLD VENIPUNCTURE: CPT

## 2017-12-22 PROCEDURE — 63600175 PHARM REV CODE 636 W HCPCS: Performed by: EMERGENCY MEDICINE

## 2017-12-22 PROCEDURE — 85025 COMPLETE CBC W/AUTO DIFF WBC: CPT

## 2017-12-22 PROCEDURE — 84443 ASSAY THYROID STIM HORMONE: CPT

## 2017-12-22 PROCEDURE — 85610 PROTHROMBIN TIME: CPT

## 2017-12-22 PROCEDURE — 63600175 PHARM REV CODE 636 W HCPCS: Performed by: FAMILY MEDICINE

## 2017-12-22 PROCEDURE — 81000 URINALYSIS NONAUTO W/SCOPE: CPT

## 2017-12-22 PROCEDURE — 82140 ASSAY OF AMMONIA: CPT

## 2017-12-22 PROCEDURE — 82962 GLUCOSE BLOOD TEST: CPT

## 2017-12-22 PROCEDURE — 96361 HYDRATE IV INFUSION ADD-ON: CPT

## 2017-12-22 RX ORDER — SODIUM CHLORIDE 0.9 % (FLUSH) 0.9 %
5 SYRINGE (ML) INJECTION
Status: DISCONTINUED | OUTPATIENT
Start: 2017-12-22 | End: 2017-12-24 | Stop reason: HOSPADM

## 2017-12-22 RX ORDER — LABETALOL HYDROCHLORIDE 5 MG/ML
20 INJECTION, SOLUTION INTRAVENOUS
Status: DISCONTINUED | OUTPATIENT
Start: 2017-12-22 | End: 2017-12-22

## 2017-12-22 RX ORDER — HYDRALAZINE HYDROCHLORIDE 20 MG/ML
10 INJECTION INTRAMUSCULAR; INTRAVENOUS EVERY 6 HOURS PRN
Status: DISCONTINUED | OUTPATIENT
Start: 2017-12-22 | End: 2017-12-24 | Stop reason: HOSPADM

## 2017-12-22 RX ORDER — INSULIN ASPART 100 [IU]/ML
1-10 INJECTION, SOLUTION INTRAVENOUS; SUBCUTANEOUS EVERY 6 HOURS PRN
Status: DISCONTINUED | OUTPATIENT
Start: 2017-12-22 | End: 2017-12-24 | Stop reason: HOSPADM

## 2017-12-22 RX ORDER — HYDRALAZINE HYDROCHLORIDE 20 MG/ML
20 INJECTION INTRAMUSCULAR; INTRAVENOUS
Status: COMPLETED | OUTPATIENT
Start: 2017-12-22 | End: 2017-12-22

## 2017-12-22 RX ORDER — SODIUM CHLORIDE 9 MG/ML
INJECTION, SOLUTION INTRAVENOUS CONTINUOUS
Status: DISCONTINUED | OUTPATIENT
Start: 2017-12-22 | End: 2017-12-24 | Stop reason: HOSPADM

## 2017-12-22 RX ORDER — SODIUM CHLORIDE 9 MG/ML
1000 INJECTION, SOLUTION INTRAVENOUS
Status: COMPLETED | OUTPATIENT
Start: 2017-12-22 | End: 2017-12-22

## 2017-12-22 RX ORDER — ACETAMINOPHEN 325 MG/1
650 TABLET ORAL EVERY 4 HOURS PRN
Status: DISCONTINUED | OUTPATIENT
Start: 2017-12-22 | End: 2017-12-24 | Stop reason: HOSPADM

## 2017-12-22 RX ORDER — GLUCAGON 1 MG
1 KIT INJECTION
Status: DISCONTINUED | OUTPATIENT
Start: 2017-12-22 | End: 2017-12-24 | Stop reason: HOSPADM

## 2017-12-22 RX ADMIN — INSULIN HUMAN 10 UNITS: 100 INJECTION, SOLUTION PARENTERAL at 09:12

## 2017-12-22 RX ADMIN — SODIUM CHLORIDE 1000 ML: 0.9 INJECTION, SOLUTION INTRAVENOUS at 09:12

## 2017-12-22 RX ADMIN — HYDRALAZINE HYDROCHLORIDE 20 MG: 20 INJECTION INTRAMUSCULAR; INTRAVENOUS at 08:12

## 2017-12-22 RX ADMIN — LORAZEPAM 1 MG: 2 INJECTION, SOLUTION INTRAMUSCULAR; INTRAVENOUS at 10:12

## 2017-12-22 RX ADMIN — SODIUM CHLORIDE: 0.9 INJECTION, SOLUTION INTRAVENOUS at 10:12

## 2017-12-23 PROBLEM — E83.39 HYPOPHOSPHATEMIA: Status: ACTIVE | Noted: 2017-12-23

## 2017-12-23 PROBLEM — E87.4 MIXED ACID BASE BALANCE DISORDER: Status: ACTIVE | Noted: 2017-12-22

## 2017-12-23 LAB
ALBUMIN SERPL BCP-MCNC: 3.2 G/DL
ALBUMIN SERPL BCP-MCNC: 3.3 G/DL
ALP SERPL-CCNC: 102 U/L
ALP SERPL-CCNC: 99 U/L
ALT SERPL W/O P-5'-P-CCNC: 22 U/L
ALT SERPL W/O P-5'-P-CCNC: 23 U/L
ANION GAP SERPL CALC-SCNC: 19 MMOL/L
ANION GAP SERPL CALC-SCNC: 19 MMOL/L
ANION GAP SERPL CALC-SCNC: 20 MMOL/L
AST SERPL-CCNC: 22 U/L
AST SERPL-CCNC: 23 U/L
BASOPHILS # BLD AUTO: 0.01 K/UL
BASOPHILS # BLD AUTO: 0.02 K/UL
BASOPHILS NFR BLD: 0.1 %
BASOPHILS NFR BLD: 0.1 %
BILIRUB SERPL-MCNC: 0.4 MG/DL
BILIRUB SERPL-MCNC: 0.5 MG/DL
BUN SERPL-MCNC: 17 MG/DL
BUN SERPL-MCNC: 17 MG/DL
BUN SERPL-MCNC: 18 MG/DL
CALCIUM SERPL-MCNC: 10 MG/DL
CALCIUM SERPL-MCNC: 10 MG/DL
CALCIUM SERPL-MCNC: 10.4 MG/DL
CHLORIDE SERPL-SCNC: 98 MMOL/L
CHLORIDE SERPL-SCNC: 99 MMOL/L
CHLORIDE SERPL-SCNC: 99 MMOL/L
CO2 SERPL-SCNC: 20 MMOL/L
CO2 SERPL-SCNC: 23 MMOL/L
CO2 SERPL-SCNC: 23 MMOL/L
CREAT SERPL-MCNC: 1.4 MG/DL
CREAT SERPL-MCNC: 1.5 MG/DL
CREAT SERPL-MCNC: 1.5 MG/DL
DIFFERENTIAL METHOD: ABNORMAL
DIFFERENTIAL METHOD: ABNORMAL
EOSINOPHIL # BLD AUTO: 0 K/UL
EOSINOPHIL # BLD AUTO: 0 K/UL
EOSINOPHIL NFR BLD: 0 %
EOSINOPHIL NFR BLD: 0.1 %
ERYTHROCYTE [DISTWIDTH] IN BLOOD BY AUTOMATED COUNT: 13.6 %
ERYTHROCYTE [DISTWIDTH] IN BLOOD BY AUTOMATED COUNT: 13.6 %
EST. GFR  (AFRICAN AMERICAN): 58 ML/MIN/1.73 M^2
EST. GFR  (AFRICAN AMERICAN): 58 ML/MIN/1.73 M^2
EST. GFR  (AFRICAN AMERICAN): >60 ML/MIN/1.73 M^2
EST. GFR  (NON AFRICAN AMERICAN): 50 ML/MIN/1.73 M^2
EST. GFR  (NON AFRICAN AMERICAN): 50 ML/MIN/1.73 M^2
EST. GFR  (NON AFRICAN AMERICAN): 55 ML/MIN/1.73 M^2
ESTIMATED AVG GLUCOSE: 174 MG/DL
GLUCOSE SERPL-MCNC: 242 MG/DL
GLUCOSE SERPL-MCNC: 268 MG/DL
GLUCOSE SERPL-MCNC: 268 MG/DL
HBA1C MFR BLD HPLC: 7.7 %
HCT VFR BLD AUTO: 40.4 %
HCT VFR BLD AUTO: 41.3 %
HGB BLD-MCNC: 14.6 G/DL
HGB BLD-MCNC: 14.7 G/DL
LYMPHOCYTES # BLD AUTO: 1.5 K/UL
LYMPHOCYTES # BLD AUTO: 2 K/UL
LYMPHOCYTES NFR BLD: 10.1 %
LYMPHOCYTES NFR BLD: 12.3 %
MAGNESIUM SERPL-MCNC: 1.5 MG/DL
MAGNESIUM SERPL-MCNC: 1.6 MG/DL
MCH RBC QN AUTO: 29.8 PG
MCH RBC QN AUTO: 30.3 PG
MCHC RBC AUTO-ENTMCNC: 35.6 G/DL
MCHC RBC AUTO-ENTMCNC: 36.1 G/DL
MCV RBC AUTO: 84 FL
MCV RBC AUTO: 84 FL
MONOCYTES # BLD AUTO: 1.6 K/UL
MONOCYTES # BLD AUTO: 1.9 K/UL
MONOCYTES NFR BLD: 10.7 %
MONOCYTES NFR BLD: 11.8 %
NEUTROPHILS # BLD AUTO: 11.5 K/UL
NEUTROPHILS # BLD AUTO: 12.4 K/UL
NEUTROPHILS NFR BLD: 75.7 %
NEUTROPHILS NFR BLD: 79.1 %
PHOSPHATE SERPL-MCNC: 2.9 MG/DL
PHOSPHATE SERPL-MCNC: 3.3 MG/DL
PLATELET # BLD AUTO: 358 K/UL
PLATELET # BLD AUTO: 427 K/UL
PMV BLD AUTO: 8.8 FL
PMV BLD AUTO: 9.6 FL
POCT GLUCOSE: 229 MG/DL (ref 70–110)
POTASSIUM SERPL-SCNC: 3.2 MMOL/L
POTASSIUM SERPL-SCNC: 3.2 MMOL/L
POTASSIUM SERPL-SCNC: 3.3 MMOL/L
PROT SERPL-MCNC: 8.2 G/DL
PROT SERPL-MCNC: 8.3 G/DL
RBC # BLD AUTO: 4.82 M/UL
RBC # BLD AUTO: 4.94 M/UL
SODIUM SERPL-SCNC: 138 MMOL/L
SODIUM SERPL-SCNC: 141 MMOL/L
SODIUM SERPL-SCNC: 141 MMOL/L
TROPONIN I SERPL DL<=0.01 NG/ML-MCNC: 0.01 NG/ML
TROPONIN I SERPL DL<=0.01 NG/ML-MCNC: 0.02 NG/ML
TROPONIN I SERPL DL<=0.01 NG/ML-MCNC: 0.02 NG/ML
WBC # BLD AUTO: 14.58 K/UL
WBC # BLD AUTO: 16.32 K/UL

## 2017-12-23 PROCEDURE — 63600175 PHARM REV CODE 636 W HCPCS: Performed by: NURSE PRACTITIONER

## 2017-12-23 PROCEDURE — 80053 COMPREHEN METABOLIC PANEL: CPT

## 2017-12-23 PROCEDURE — 85025 COMPLETE CBC W/AUTO DIFF WBC: CPT | Mod: 91

## 2017-12-23 PROCEDURE — 63600175 PHARM REV CODE 636 W HCPCS: Performed by: EMERGENCY MEDICINE

## 2017-12-23 PROCEDURE — 83735 ASSAY OF MAGNESIUM: CPT

## 2017-12-23 PROCEDURE — 36415 COLL VENOUS BLD VENIPUNCTURE: CPT

## 2017-12-23 PROCEDURE — 83036 HEMOGLOBIN GLYCOSYLATED A1C: CPT

## 2017-12-23 PROCEDURE — 84484 ASSAY OF TROPONIN QUANT: CPT | Mod: 91

## 2017-12-23 PROCEDURE — 25000003 PHARM REV CODE 250: Performed by: NURSE PRACTITIONER

## 2017-12-23 PROCEDURE — 63600175 PHARM REV CODE 636 W HCPCS: Performed by: FAMILY MEDICINE

## 2017-12-23 PROCEDURE — 83735 ASSAY OF MAGNESIUM: CPT | Mod: 91

## 2017-12-23 PROCEDURE — 25000003 PHARM REV CODE 250: Performed by: INTERNAL MEDICINE

## 2017-12-23 PROCEDURE — 80053 COMPREHEN METABOLIC PANEL: CPT | Mod: 91

## 2017-12-23 PROCEDURE — S0028 INJECTION, FAMOTIDINE, 20 MG: HCPCS | Performed by: NURSE PRACTITIONER

## 2017-12-23 PROCEDURE — 84100 ASSAY OF PHOSPHORUS: CPT

## 2017-12-23 PROCEDURE — 84100 ASSAY OF PHOSPHORUS: CPT | Mod: 91

## 2017-12-23 PROCEDURE — G0378 HOSPITAL OBSERVATION PER HR: HCPCS

## 2017-12-23 PROCEDURE — 83930 ASSAY OF BLOOD OSMOLALITY: CPT

## 2017-12-23 RX ORDER — METOPROLOL TARTRATE 50 MG/1
50 TABLET ORAL 2 TIMES DAILY
Status: DISCONTINUED | OUTPATIENT
Start: 2017-12-23 | End: 2017-12-24 | Stop reason: HOSPADM

## 2017-12-23 RX ORDER — AMLODIPINE BESYLATE 10 MG/1
10 TABLET ORAL DAILY
Status: DISCONTINUED | OUTPATIENT
Start: 2017-12-23 | End: 2017-12-24 | Stop reason: HOSPADM

## 2017-12-23 RX ORDER — ZIPRASIDONE MESYLATE 20 MG/ML
20 INJECTION, POWDER, LYOPHILIZED, FOR SOLUTION INTRAMUSCULAR ONCE
Status: COMPLETED | OUTPATIENT
Start: 2017-12-23 | End: 2017-12-23

## 2017-12-23 RX ORDER — HALOPERIDOL 5 MG/ML
5 INJECTION INTRAMUSCULAR ONCE
Status: DISCONTINUED | OUTPATIENT
Start: 2017-12-23 | End: 2017-12-23

## 2017-12-23 RX ORDER — POTASSIUM CHLORIDE 20 MEQ/1
40 TABLET, EXTENDED RELEASE ORAL EVERY 4 HOURS
Status: COMPLETED | OUTPATIENT
Start: 2017-12-23 | End: 2017-12-24

## 2017-12-23 RX ORDER — MAGNESIUM SULFATE 1 G/100ML
1 INJECTION INTRAVENOUS ONCE
Status: COMPLETED | OUTPATIENT
Start: 2017-12-23 | End: 2017-12-23

## 2017-12-23 RX ORDER — HALOPERIDOL 5 MG/ML
5 INJECTION INTRAMUSCULAR ONCE
Status: COMPLETED | OUTPATIENT
Start: 2017-12-23 | End: 2017-12-23

## 2017-12-23 RX ORDER — ENOXAPARIN SODIUM 100 MG/ML
40 INJECTION SUBCUTANEOUS EVERY 24 HOURS
Status: DISCONTINUED | OUTPATIENT
Start: 2017-12-23 | End: 2017-12-24 | Stop reason: HOSPADM

## 2017-12-23 RX ORDER — ISOSORBIDE MONONITRATE 60 MG/1
60 TABLET, EXTENDED RELEASE ORAL DAILY
Status: DISCONTINUED | OUTPATIENT
Start: 2017-12-24 | End: 2017-12-24 | Stop reason: HOSPADM

## 2017-12-23 RX ORDER — CLONIDINE HYDROCHLORIDE 0.2 MG/1
0.2 TABLET ORAL ONCE
Status: DISCONTINUED | OUTPATIENT
Start: 2017-12-23 | End: 2017-12-23

## 2017-12-23 RX ORDER — CLONIDINE HYDROCHLORIDE 0.3 MG/1
0.3 TABLET ORAL 3 TIMES DAILY
Status: DISCONTINUED | OUTPATIENT
Start: 2017-12-23 | End: 2017-12-24 | Stop reason: HOSPADM

## 2017-12-23 RX ORDER — QUETIAPINE FUMARATE 100 MG/1
200 TABLET, FILM COATED ORAL ONCE
Status: COMPLETED | OUTPATIENT
Start: 2017-12-23 | End: 2017-12-23

## 2017-12-23 RX ORDER — FAMOTIDINE 20 MG/50ML
20 INJECTION, SOLUTION INTRAVENOUS 2 TIMES DAILY
Status: DISCONTINUED | OUTPATIENT
Start: 2017-12-23 | End: 2017-12-24 | Stop reason: HOSPADM

## 2017-12-23 RX ORDER — METOPROLOL TARTRATE 25 MG/1
25 TABLET, FILM COATED ORAL 2 TIMES DAILY
Status: DISCONTINUED | OUTPATIENT
Start: 2017-12-23 | End: 2017-12-23

## 2017-12-23 RX ADMIN — CLONIDINE HYDROCHLORIDE 0.3 MG: 0.3 TABLET ORAL at 02:12

## 2017-12-23 RX ADMIN — INSULIN ASPART 6 UNITS: 100 INJECTION, SOLUTION INTRAVENOUS; SUBCUTANEOUS at 01:12

## 2017-12-23 RX ADMIN — CLONIDINE HYDROCHLORIDE 0.3 MG: 0.3 TABLET ORAL at 04:12

## 2017-12-23 RX ADMIN — FAMOTIDINE 20 MG: 20 INJECTION, SOLUTION INTRAVENOUS at 08:12

## 2017-12-23 RX ADMIN — FAMOTIDINE 20 MG: 20 INJECTION, SOLUTION INTRAVENOUS at 11:12

## 2017-12-23 RX ADMIN — ZIPRASIDONE MESYLATE 20 MG: 20 INJECTION, POWDER, LYOPHILIZED, FOR SOLUTION INTRAMUSCULAR at 08:12

## 2017-12-23 RX ADMIN — CLONIDINE HYDROCHLORIDE 0.3 MG: 0.3 TABLET ORAL at 11:12

## 2017-12-23 RX ADMIN — METOPROLOL TARTRATE 50 MG: 50 TABLET ORAL at 11:12

## 2017-12-23 RX ADMIN — MAGNESIUM SULFATE IN DEXTROSE 1 G: 10 INJECTION, SOLUTION INTRAVENOUS at 05:12

## 2017-12-23 RX ADMIN — LORAZEPAM 1 MG: 2 INJECTION, SOLUTION INTRAMUSCULAR; INTRAVENOUS at 05:12

## 2017-12-23 RX ADMIN — INSULIN ASPART 4 UNITS: 100 INJECTION, SOLUTION INTRAVENOUS; SUBCUTANEOUS at 05:12

## 2017-12-23 RX ADMIN — LORAZEPAM 2 MG: 2 INJECTION, SOLUTION INTRAMUSCULAR; INTRAVENOUS at 01:12

## 2017-12-23 RX ADMIN — SODIUM CHLORIDE: 0.9 INJECTION, SOLUTION INTRAVENOUS at 01:12

## 2017-12-23 RX ADMIN — QUETIAPINE FUMARATE 200 MG: 100 TABLET, FILM COATED ORAL at 12:12

## 2017-12-23 RX ADMIN — ENOXAPARIN SODIUM 40 MG: 100 INJECTION SUBCUTANEOUS at 04:12

## 2017-12-23 RX ADMIN — POTASSIUM CHLORIDE 40 MEQ: 1500 TABLET, EXTENDED RELEASE ORAL at 05:12

## 2017-12-23 RX ADMIN — SODIUM PHOSPHATE, MONOBASIC, MONOHYDRATE 30 MMOL: 276; 142 INJECTION, SOLUTION INTRAVENOUS at 01:12

## 2017-12-23 RX ADMIN — POTASSIUM CHLORIDE 40 MEQ: 1500 TABLET, EXTENDED RELEASE ORAL at 11:12

## 2017-12-23 RX ADMIN — METOPROLOL TARTRATE 25 MG: 25 TABLET ORAL at 02:12

## 2017-12-23 RX ADMIN — HALOPERIDOL LACTATE 5 MG: 5 INJECTION, SOLUTION INTRAMUSCULAR at 06:12

## 2017-12-23 RX ADMIN — LORAZEPAM 1 MG: 2 INJECTION, SOLUTION INTRAMUSCULAR; INTRAVENOUS at 10:12

## 2017-12-23 RX ADMIN — AMLODIPINE BESYLATE 10 MG: 10 TABLET ORAL at 02:12

## 2017-12-23 RX ADMIN — HYDRALAZINE HYDROCHLORIDE 10 MG: 20 INJECTION INTRAMUSCULAR; INTRAVENOUS at 04:12

## 2017-12-23 RX ADMIN — INSULIN ASPART 4 UNITS: 100 INJECTION, SOLUTION INTRAVENOUS; SUBCUTANEOUS at 06:12

## 2017-12-23 NOTE — NURSING
After redirecting patient, he continues to try to get out of bed and still disoriented. SHAWNA Wise ordered 2mg ativan one time dose. Will administer and monitor.

## 2017-12-23 NOTE — NURSING
Patient anxious, disoriented and trying to get out of bed. Administered 1mg Ativan IV. Will continue to monitor.

## 2017-12-23 NOTE — ASSESSMENT & PLAN NOTE
+hypertensive, +drug induced  Treat BP if over 180  consider repeating head Ct or MRI  Treat PRN with Ativan for confusion/behavioral issues   May need Geodon, given psych history.

## 2017-12-23 NOTE — ASSESSMENT & PLAN NOTE
At this time manage sugar at this time with SSI. Do not feel acidosis is due to DKA at this time but rather to hydration status and drug use  Iv hydration  Labs in AM

## 2017-12-23 NOTE — SUBJECTIVE & OBJECTIVE
Interval History: Patient still remains confused. Follows simple commands only. Unable to answer questions appropriately. Sitter at bedside. Will follow labs closely.   Review of Systems   Unable to perform ROS: Mental status change     Objective:     Vital Signs (Most Recent):  Temp: 98.2 °F (36.8 °C) (12/23/17 1553)  Pulse: 99 (12/23/17 1553)  Resp: 18 (12/23/17 1553)  BP: (!) 187/117 (12/23/17 1553)  SpO2: 96 % (12/23/17 1553) Vital Signs (24h Range):  Temp:  [97.7 °F (36.5 °C)-99.5 °F (37.5 °C)] 98.2 °F (36.8 °C)  Pulse:  [] 99  Resp:  [14-26] 18  SpO2:  [87 %-100 %] 96 %  BP: (118-231)/() 187/117     Weight: 102.3 kg (225 lb 8.5 oz)  Body mass index is 31.46 kg/m².  No intake or output data in the 24 hours ending 12/23/17 1659   Physical Exam   Constitutional: He appears well-developed. No distress.   Obese, unkempt  Drowsy   HENT:   Head: Normocephalic and atraumatic.   Nose: Nose normal.   Eyes: Conjunctivae and EOM are normal. Pupils are equal, round, and reactive to light. No scleral icterus.   Neck: Normal range of motion. Neck supple. No tracheal deviation present.   Cardiovascular: Regular rhythm, normal heart sounds and intact distal pulses.  Tachycardia present.    No murmur heard.  Pulmonary/Chest: Effort normal and breath sounds normal. No stridor. No respiratory distress. He has no wheezes. He has no rales.   Abdominal: Soft. Bowel sounds are normal. He exhibits no distension. There is no tenderness. There is no guarding.   Musculoskeletal: Normal range of motion. He exhibits no edema or deformity.   Neurological: He is disoriented (oriented to self only ). No cranial nerve deficit.   Unable to perform due to AMS      Skin: Skin is warm and dry. Capillary refill takes less than 2 seconds. No rash noted. He is not diaphoretic.   Psychiatric:        Nursing note and vitals reviewed.      Significant Labs:   BMP:   Recent Labs  Lab 12/23/17  1045   *  268*     141   K 3.2*   3.2*   CL 99  99   CO2 23  23   BUN 17  17   CREATININE 1.5*  1.5*   CALCIUM 10.0  10.0   MG 1.5*     CBC:   Recent Labs  Lab 12/22/17 2015 12/23/17  0500 12/23/17  1045   WBC 10.41 16.32* 14.58*   HGB 14.9 14.7 14.6   HCT 42.3 41.3 40.4    427* 358*     CMP:   Recent Labs  Lab 12/22/17 2015 12/23/17  0500 12/23/17  1045    138 141  141   K 4.7 3.3* 3.2*  3.2*   CL 93* 98 99  99   CO2 23 20* 23  23   * 242* 268*  268*   BUN 18 18 17  17   CREATININE 1.7* 1.4 1.5*  1.5*   CALCIUM 10.6* 10.4 10.0  10.0   PROT 9.1* 8.3 8.2   ALBUMIN 3.4* 3.2* 3.3*   BILITOT 0.5 0.5 0.4   ALKPHOS 101 102 99   AST 24 23 22   ALT 24 23 22   ANIONGAP 20* 20* 19*  19*   EGFRNONAA 43* 55* 50*  50*     Urine Studies:   Recent Labs  Lab 12/22/17  2043   COLORU Yellow   APPEARANCEUA Clear   PHUR 6.0   SPECGRAV >=1.030*   PROTEINUA 2+*   GLUCUA 2+*   KETONESU 1+*   BILIRUBINUA 1+*   OCCULTUA 2+*   NITRITE Negative   UROBILINOGEN 1.0   LEUKOCYTESUR Negative   RBCUA 1   WBCUA 0   BACTERIA Rare   HYALINECASTS 0     All pertinent labs within the past 24 hours have been reviewed.    Significant Imaging:   Imaging Results          X-Ray Chest AP Portable (Final result)  Result time 12/22/17 21:27:31    Final result by Alka Tsai MD (12/22/17 21:27:31)                 Impression:     No acute cardiopulmonary disease.            Electronically signed by: ALKA TSAI MD  Date:     12/22/17  Time:    21:27              Narrative:    EXAM:   [id number] CHEST AP PORTABLE    CLINICAL HISTORY:  Chest Pain    COMPARISON: 11/29/2016    Findings:     The lungs are clear. The cardiac silhouette is within normal limits.                             CT Head Without Contrast (Final result)  Result time 12/22/17 20:45:39    Final result by Alka Tsai MD (12/22/17 20:45:39)                 Impression:         No CT evidence of acute intracranial abnormality.    All CT scans at this facility use  dose modulation, iterative reconstruction, and/or weight based dosing when appropriate to reduce radiation dose to as low as reasonably achievable.      Electronically signed by: JAMES MOBLEY MD  Date:     12/22/17  Time:    20:45              Narrative:    EXAM:   [id number] HEAD WITHOUT CONTRAST    CLINICAL HISTORY:  altered mental status    COMPARISON: No relevant priors    TECHNIQUE:  Axial images were performed through the head without intravenous contrast.     FINDINGS:     No hydrocephalus, midline shift, mass effect, or acute intracranial hemorrhage.The cortical sulcal pattern is normal. Gray-white matter differentiation is within normal limits.No extra-axial fluid or hemorrhage.Posterior fossa is unremarkable.The skull and orbits are intact.  There is mucosal thickening of all paranasal sinuses.

## 2017-12-23 NOTE — PROGRESS NOTES
[facility] Hospital Medicine  Progress Note    Patient Name: Shukri Rosales  MRN: [id number]  Patient Class: OP- Observation   Admission Date: 12/22/2017  Length of Stay: 1 days  Attending Physician: Natty Russell MD  Primary Care Provider: Farhana Burnham MD        Subjective:     Principal Problem:Acute encephalopathy    HPI:               History of Present Illness obtained from ER note, unable to obtain from patient due to mental status, no family present with patient at time of exam. Shukri Rosales is a 57 y.o. male patient who presents to the Emergency Department for AMS which onset gradually PTA. Pt was last seen normal last PM, allegedly had taken 2 packets of energy pills last night. [facility] reports family said pt appeared confused this AM but was ambulatory. When the family returned tonight, pt was found naked wrapped in a sheet in bed surrounded by vomit. Symptoms are constant and moderate in severity. No mitigating or exacerbating factors reported. Unknown head trauma. [facility] denies facial droop and extremity weakness. Per [facility], CBG was 331 en route. No further complaints or concerns at this time.        Hospital Course:  57 year male admitted for acute encephalopathy. ED evaluation revealed anion gap 20, Creatinine 1.9, hyperglycemia, phosphorus 1.5, Beta-hydroxybutyrate 1.4, UDS +benzos, opiate, and THC. Acute encephalopathy most likely drug induced. CT head showed no acute findings. Patient was given IV insulin and SC insulin in the ED, along with IV bolus. Will continue IV hydration. Overnight patient became more agitated and confused, requiring a 24 hour sitter at bedside. This AM, WBC's 14.58, K+ 3.2, Anion gap 19, Creatinine improving 1.5, glucose improving. Patient still remains confused.     Interval History: Patient still remains confused. Follows simple commands only. Unable to answer questions appropriately. Sitter at bedside. Will follow labs  closely.   Review of Systems   Unable to perform ROS: Mental status change     Objective:     Vital Signs (Most Recent):  Temp: 98.2 °F (36.8 °C) (12/23/17 1553)  Pulse: 99 (12/23/17 1553)  Resp: 18 (12/23/17 1553)  BP: (!) 187/117 (12/23/17 1553)  SpO2: 96 % (12/23/17 1553) Vital Signs (24h Range):  Temp:  [97.7 °F (36.5 °C)-99.5 °F (37.5 °C)] 98.2 °F (36.8 °C)  Pulse:  [] 99  Resp:  [14-26] 18  SpO2:  [87 %-100 %] 96 %  BP: (118-231)/() 187/117     Weight: 102.3 kg (225 lb 8.5 oz)  Body mass index is 31.46 kg/m².  No intake or output data in the 24 hours ending 12/23/17 1659   Physical Exam   Constitutional: He appears well-developed. No distress.   Obese, unkempt  Drowsy   HENT:   Head: Normocephalic and atraumatic.   Nose: Nose normal.   Eyes: Conjunctivae and EOM are normal. Pupils are equal, round, and reactive to light. No scleral icterus.   Neck: Normal range of motion. Neck supple. No tracheal deviation present.   Cardiovascular: Regular rhythm, normal heart sounds and intact distal pulses.  Tachycardia present.    No murmur heard.  Pulmonary/Chest: Effort normal and breath sounds normal. No stridor. No respiratory distress. He has no wheezes. He has no rales.   Abdominal: Soft. Bowel sounds are normal. He exhibits no distension. There is no tenderness. There is no guarding.   Musculoskeletal: Normal range of motion. He exhibits no edema or deformity.   Neurological: He is disoriented (oriented to self only ). No cranial nerve deficit.   Unable to perform due to AMS      Skin: Skin is warm and dry. Capillary refill takes less than 2 seconds. No rash noted. He is not diaphoretic.   Psychiatric:        Nursing note and vitals reviewed.      Significant Labs:   BMP:   Recent Labs  Lab 12/23/17  1045   *  268*     141   K 3.2*  3.2*   CL 99  99   CO2 23  23   BUN 17  17   CREATININE 1.5*  1.5*   CALCIUM 10.0  10.0   MG 1.5*     CBC:   Recent Labs  Lab 12/22/17 2015  12/23/17  0500 12/23/17  1045   WBC 10.41 16.32* 14.58*   HGB 14.9 14.7 14.6   HCT 42.3 41.3 40.4    427* 358*     CMP:   Recent Labs  Lab 12/22/17 2015 12/23/17  0500 12/23/17  1045    138 141  141   K 4.7 3.3* 3.2*  3.2*   CL 93* 98 99  99   CO2 23 20* 23  23   * 242* 268*  268*   BUN 18 18 17  17   CREATININE 1.7* 1.4 1.5*  1.5*   CALCIUM 10.6* 10.4 10.0  10.0   PROT 9.1* 8.3 8.2   ALBUMIN 3.4* 3.2* 3.3*   BILITOT 0.5 0.5 0.4   ALKPHOS 101 102 99   AST 24 23 22   ALT 24 23 22   ANIONGAP 20* 20* 19*  19*   EGFRNONAA 43* 55* 50*  50*     Urine Studies:   Recent Labs  Lab 12/22/17 2043   COLORU Yellow   APPEARANCEUA Clear   PHUR 6.0   SPECGRAV >=1.030*   PROTEINUA 2+*   GLUCUA 2+*   KETONESU 1+*   BILIRUBINUA 1+*   OCCULTUA 2+*   NITRITE Negative   UROBILINOGEN 1.0   LEUKOCYTESUR Negative   RBCUA 1   WBCUA 0   BACTERIA Rare   HYALINECASTS 0     All pertinent labs within the past 24 hours have been reviewed.    Significant Imaging:   Imaging Results          X-Ray Chest AP Portable (Final result)  Result time 12/22/17 21:27:31    Final result by Alka Tsai MD (12/22/17 21:27:31)                 Impression:     No acute cardiopulmonary disease.            Electronically signed by: ALKA TSAI MD  Date:     12/22/17  Time:    21:27              Narrative:    EXAM:   [id number] CHEST AP PORTABLE    CLINICAL HISTORY:  Chest Pain    COMPARISON: 11/29/2016    Findings:     The lungs are clear. The cardiac silhouette is within normal limits.                             CT Head Without Contrast (Final result)  Result time 12/22/17 20:45:39    Final result by Alka Tsai MD (12/22/17 20:45:39)                 Impression:         No CT evidence of acute intracranial abnormality.    All CT scans at this facility use dose modulation, iterative reconstruction, and/or weight based dosing when appropriate to reduce radiation dose to as low as reasonably  achievable.      Electronically signed by: JAMES MOBLEY MD  Date:     12/22/17  Time:    20:45              Narrative:    EXAM:   [id number] HEAD WITHOUT CONTRAST    CLINICAL HISTORY:  altered mental status    COMPARISON: No relevant priors    TECHNIQUE:  Axial images were performed through the head without intravenous contrast.     FINDINGS:     No hydrocephalus, midline shift, mass effect, or acute intracranial hemorrhage.The cortical sulcal pattern is normal. Gray-white matter differentiation is within normal limits.No extra-axial fluid or hemorrhage.Posterior fossa is unremarkable.The skull and orbits are intact.  There is mucosal thickening of all paranasal sinuses.                            Assessment/Plan:      * Acute encephalopathy    Suspect related to +hypertensive or +drug induced  UA and CXR negative   BP resumed   ACEI held due to AKF   CT head negative for acute findings   Treat PRN with Ativan for confusion/behavioral issues   Will resume antipsychotic medications when appropriate   Neuro checks q4   Sitter at bedside         Hypophosphatemia    Replete   Monitor         Mixed acid base balance disorder    At this time manage sugar at this time with SSI. Do not feel acidosis is due to DKA at this time but rather to hydration status and drug use  Continue Iv hydration  Anion gap improving 19        Fluid volume deficit    Received IV bolus in ER  Continue IV hydration  Monitor.           Drug dependence    Encourage to quit   consult.   Monitor for withdrawals          Type 2 diabetes mellitus with diabetic chronic kidney disease    Monitor  Check A1C  SSI  IV fluids           Essential hypertension    Resume home meds  ACEI held due to renal function   Hydralazine PRN             VTE Risk Mitigation         Ordered     enoxaparin injection 40 mg  Daily     Route:  Subcutaneous        12/23/17 0414     Medium Risk of VTE  Once      12/23/17 0530     Place sequential compression  device  Until discontinued      12/23/17 4752              Lesia Garcia NP  Department of Hospital Medicine   [facility]

## 2017-12-23 NOTE — ED PROVIDER NOTES
SCRIBE #1 NOTE: I, Kerri Morrow, am scribing for, and in the presence of, Maddie Middleton MD. I have scribed the entire note.      History      Chief Complaint   Patient presents with    Altered Mental Status     Family reports that pt took 2 pks of OTC enegry pills yesterday. Seen at [place] last night for groin pain. [facility] reports confusion this morning, found in bed with emesis around him. Last seen normal yesterday.        Review of patient's allergies indicates:  No Known Allergies     HPI   HPI    12/22/2017, 8:10 PM   History obtained from the [facility]  HPI limited secondary to AMS      History of Present Illness: Shukri Rosales is a 57 y.o. male patient who presents to the Emergency Department for AMS which onset gradually PTA. Pt was last seen normal last PM, allegedly had taken 2 packets of energy pills last night. [facility] reports family said pt appeared confused this AM but was ambulatory. When the family returned tonight, pt was found naked wrapped in a sheet in bed surrounded by vomit. Symptoms are constant and moderate in severity. No mitigating or exacerbating factors reported. Unknown head trauma. [facility] denies facial droop and extremity weakness. Per [facility], CBG was 331 en route. No further complaints or concerns at this time.       Arrival mode: Personal vehicle      PCP: Farhana Burnham MD       Past Medical History:  Past Medical History:   Diagnosis Date    Adrenal cortical adenoma     Anxiety     Arthritis     CKD (chronic kidney disease) stage 3, GFR 30-59 ml/min     Depression     Diabetes mellitus type II 2011    does not take    DM (diabetes mellitus) 2011     am 09/21/2017 Insulin x 1 1/2 year    GERD (gastroesophageal reflux disease) 7/9/2013    Hypertension     Migraine headache 7/22/2013    Severe obesity with body mass index of 36.0 to 36.9        Past Surgical History:  Past Surgical History:   Procedure Laterality Date    BACK SURGERY      HERNIA  REPAIR      UMBILICAL    left arm exfix      NASAL SEPTUM SURGERY Bilateral     TONSILLECTOMY      URETEROSCOPY           Family History:  Family History   Problem Relation Age of Onset    Hypertension Mother     Diabetes Mother     Cataracts Mother     Hypertension Sister     Diabetes Sister     Hypertension Brother     Diabetes Brother     Cancer Paternal Grandmother     Cancer Paternal Grandfather        Social History:  Social History     Social History Main Topics    Smoking status: Current Every Day Smoker     Packs/day: 0.50     Years: 44.00     Types: Cigarettes    Smokeless tobacco: Never Used      Comment: instructed not to smoke after midnight after midnight prior to surgery    Alcohol use Yes      Comment: social    Drug use: No    Sexual activity: No       ROS   Review of Systems   Unable to perform ROS: Mental status change       Physical Exam      Initial Vitals   BP Pulse Resp Temp SpO2   -- -- -- -- --      MAP       --          Physical Exam  Nursing Notes and Vital Signs Reviewed.  Constitutional: Patient is in no acute distress. Well-developed and well-nourished. Disheveled. Unkempt. Obese.  Head: Atraumatic. Normocephalic.  Eyes: PERRL. EOM intact. Conjunctivae are not pale. No scleral icterus.  ENT: Mucous membranes are dry. Oropharynx is clear and symmetric.    Neck: Supple. Full ROM. No lymphadenopathy.  Cardiovascular: Regular rate. Regular rhythm. No murmurs, rubs, or gallops. Distal pulses are 2+ and symmetric.  Pulmonary/Chest: No respiratory distress. Clear to auscultation bilaterally. No wheezing or rales.  Abdominal: Soft and non-distended.  There is no tenderness.  No rebound, guarding, or rigidity.   Musculoskeletal: Moves all extremities. No obvious deformities. No edema.   Skin: Warm and dry.  Neurological: Patient is alert and oriented to person, but not place or time. Pt only answers with his name when asked. No facial droop. No slurred speech. Appears  disoriented. Pupils ERRL and EOM normal. Cranial nerves II-XII are intact. Strength is full bilaterally; it is equal and 5/5 in bilateral upper and lower extremities.  No acute focal neurological deficits noted.      ED Course    Critical Care  Date/Time: 12/22/2017 9:45 PM  Performed by: BRIONNA BECKER  Authorized by: BRIONNA BECKER   Direct patient critical care time: 10 minutes  Additional history critical care time: 5 minutes  Ordering / reviewing critical care time: 5 minutes  Documentation critical care time: 5 minutes  Consulting other physicians critical care time: 5 minutes  Consult with family critical care time: 5 minutes  Total critical care time (exclusive of procedural time) : 35 minutes  Critical care time was exclusive of separately billable procedures and treating other patients and teaching time.  Critical care was necessary to treat or prevent imminent or life-threatening deterioration of the following conditions: AMS, Hypertensive crisis.  Critical care was time spent personally by me on the following activities: blood draw for specimens, development of treatment plan with patient or surrogate, discussions with consultants, interpretation of cardiac output measurements, evaluation of patient's response to treatment, obtaining history from patient or surrogate, examination of patient, ordering and performing treatments and interventions, ordering and review of laboratory studies, ordering and review of radiographic studies, re-evaluation of patient's condition, pulse oximetry and review of old charts.  Subsequent provider of critical care: I assumed direction of critical care for this patient from another provider of my specialty.        ED Vital Signs:  Vitals:    12/22/17 2015 12/22/17 2032 12/22/17 2035 12/22/17 2044   BP: (!) 231/102   (!) 198/103   Pulse: 63      Resp: 14      Temp:  98.4 °F (36.9 °C) 98.4 °F (36.9 °C)    TempSrc:  Oral Oral    SpO2: (!) 87%      Weight:         12/22/17 2057 12/22/17 2101   BP:  (!) 174/107   Pulse:  96   Resp:  (!) 26   Temp:     TempSrc:     SpO2:  99%   Weight: 107.6 kg (237 lb 4 oz)        Abnormal Lab Results:  Labs Reviewed   CBC W/ AUTO DIFFERENTIAL - Abnormal; Notable for the following:        Result Value    MPV 9.1 (*)     Gran # 8.9 (*)     Gran% 85.9 (*)     Lymph% 9.7 (*)     All other components within normal limits   COMPREHENSIVE METABOLIC PANEL - Abnormal; Notable for the following:     Chloride 93 (*)     Glucose 319 (*)     Creatinine 1.7 (*)     Calcium 10.6 (*)     Total Protein 9.1 (*)     Albumin 3.4 (*)     Anion Gap 20 (*)     eGFR if  50 (*)     eGFR if non  43 (*)     All other components within normal limits   URINALYSIS - Abnormal; Notable for the following:     Specific Gravity, UA >=1.030 (*)     Protein, UA 2+ (*)     Glucose, UA 2+ (*)     Ketones, UA 1+ (*)     Bilirubin (UA) 1+ (*)     Occult Blood UA 2+ (*)     All other components within normal limits   BETA - HYDROXYBUTYRATE, SERUM - Abnormal; Notable for the following:     Beta-Hydroxybutyrate 1.4 (*)     All other components within normal limits   PHOSPHORUS - Abnormal; Notable for the following:     Phosphorus 1.5 (*)     All other components within normal limits   URINALYSIS MICROSCOPIC - Abnormal; Notable for the following:     Amorphous, UA Many (*)     All other components within normal limits   POCT GLUCOSE - Abnormal; Notable for the following:     POCT Glucose 312 (*)     All other components within normal limits   ISTAT PROCEDURE - Abnormal; Notable for the following:     POC PH 7.537 (*)     POC PCO2 29.4 (*)     All other components within normal limits   POCT GLUCOSE - Abnormal; Notable for the following:     POCT Glucose 330 (*)     All other components within normal limits   TROPONIN I   B-TYPE NATRIURETIC PEPTIDE   TSH   PROTIME-INR   APTT   DRUG SCREEN PANEL, URINE EMERGENCY   MAGNESIUM   AMMONIA   ALCOHOL,MEDICAL  (ETHANOL)   CK   ALCOHOL,MEDICAL (ETHANOL)   CK   TROPONIN I   HEMOGLOBIN A1C   POCT GLUCOSE MONITORING CONTINUOUS   POCT GLUCOSE MONITORING CONTINUOUS        All Lab Results:  Results for orders placed or performed during the hospital encounter of 12/22/17   CBC auto differential   Result Value Ref Range    WBC 10.41 3.90 - 12.70 K/uL    RBC 5.00 4.60 - 6.20 M/uL    Hemoglobin 14.9 14.0 - 18.0 g/dL    Hematocrit 42.3 40.0 - 54.0 %    MCV 85 82 - 98 fL    MCH 29.8 27.0 - 31.0 pg    MCHC 35.2 32.0 - 36.0 g/dL    RDW 13.6 11.5 - 14.5 %    Platelets 339 150 - 350 K/uL    MPV 9.1 (L) 9.2 - 12.9 fL    Gran # 8.9 (H) 1.8 - 7.7 K/uL    Lymph # 1.0 1.0 - 4.8 K/uL    Mono # 0.5 0.3 - 1.0 K/uL    Eos # 0.0 0.0 - 0.5 K/uL    Baso # 0.01 0.00 - 0.20 K/uL    Gran% 85.9 (H) 38.0 - 73.0 %    Lymph% 9.7 (L) 18.0 - 48.0 %    Mono% 4.3 4.0 - 15.0 %    Eosinophil% 0.0 0.0 - 8.0 %    Basophil% 0.1 0.0 - 1.9 %    Differential Method Automated    Comprehensive metabolic panel   Result Value Ref Range    Sodium 136 136 - 145 mmol/L    Potassium 4.7 3.5 - 5.1 mmol/L    Chloride 93 (L) 95 - 110 mmol/L    CO2 23 23 - 29 mmol/L    Glucose 319 (H) 70 - 110 mg/dL    BUN, Bld 18 6 - 20 mg/dL    Creatinine 1.7 (H) 0.5 - 1.4 mg/dL    Calcium 10.6 (H) 8.7 - 10.5 mg/dL    Total Protein 9.1 (H) 6.0 - 8.4 g/dL    Albumin 3.4 (L) 3.5 - 5.2 g/dL    Total Bilirubin 0.5 0.1 - 1.0 mg/dL    Alkaline Phosphatase 101 55 - 135 U/L    AST 24 10 - 40 U/L    ALT 24 10 - 44 U/L    Anion Gap 20 (H) 8 - 16 mmol/L    eGFR if African American 50 (A) >60 mL/min/1.73 m^2    eGFR if non African American 43 (A) >60 mL/min/1.73 m^2   Troponin I #1   Result Value Ref Range    Troponin I 0.007 0.000 - 0.026 ng/mL   B-Type natriuretic peptide (BNP)   Result Value Ref Range    BNP 75 0 - 99 pg/mL   TSH   Result Value Ref Range    TSH 1.240 0.400 - 4.000 uIU/mL   Protime-INR   Result Value Ref Range    Prothrombin Time 10.7 9.0 - 12.5 sec    INR 1.0 0.8 - 1.2   APTT   Result  Value Ref Range    aPTT 28.0 21.0 - 32.0 sec   Urinalysis Catheterized   Result Value Ref Range    Specimen UA Urine, Catheterized     Color, UA Yellow Yellow, Straw, Esha    Appearance, UA Clear Clear    pH, UA 6.0 5.0 - 8.0    Specific Gravity, UA >=1.030 (A) 1.005 - 1.030    Protein, UA 2+ (A) Negative    Glucose, UA 2+ (A) Negative    Ketones, UA 1+ (A) Negative    Bilirubin (UA) 1+ (A) Negative    Occult Blood UA 2+ (A) Negative    Nitrite, UA Negative Negative    Urobilinogen, UA 1.0 <2.0 EU/dL    Leukocytes, UA Negative Negative   Drug screen panel, emergency   Result Value Ref Range    Benzodiazepines Presumptive Positive     Methadone metabolites Negative     Cocaine (Metab.) Negative     Opiate Scrn, Ur Presumptive Positive     Barbiturate Screen, Ur Negative     Amphetamine Screen, Ur Negative     THC Presumptive Positive     Phencyclidine Negative     Creatinine, Random Ur 139.7 23.0 - 375.0 mg/dL    Toxicology Information SEE COMMENT    Beta - Hydroxybutyrate, Serum   Result Value Ref Range    Beta-Hydroxybutyrate 1.4 (H) 0.0 - 0.5 mmol/L   Magnesium   Result Value Ref Range    Magnesium 1.9 1.6 - 2.6 mg/dL   Phosphorus   Result Value Ref Range    Phosphorus 1.5 (L) 2.7 - 4.5 mg/dL   Urinalysis Microscopic   Result Value Ref Range    RBC, UA 1 0 - 4 /hpf    WBC, UA 0 0 - 5 /hpf    Bacteria, UA Rare None-Occ /hpf    Hyaline Casts, UA 0 0-1/lpf /lpf    Amorphous, UA Many (A) None-Moderate    Microscopic Comment SEE COMMENT    Ammonia   Result Value Ref Range    Ammonia 28 10 - 50 umol/L   Ethanol   Result Value Ref Range    Alcohol, Medical, Serum <10 <10 mg/dL   CK   Result Value Ref Range    CPK 79 20 - 200 U/L   POCT glucose   Result Value Ref Range    POCT Glucose 312 (H) 70 - 110 mg/dL   ISTAT PROCEDURE   Result Value Ref Range    POC PH 7.537 (H) 7.35 - 7.45    POC PCO2 29.4 (L) 35 - 45 mmHg    POC PO2 89 80 - 100 mmHg    POC HCO3 24.9 24 - 28 mmol/L    POC BE 2 -2 to 2 mmol/L    POC SATURATED O2  98 95 - 100 %    Sample ARTERIAL     Site LR     Allens Test Pass     DelSys Room Air     Mode SPONT     FiO2 21    POCT glucose   Result Value Ref Range    POCT Glucose 330 (H) 70 - 110 mg/dL       Imaging Results:  Imaging Results          X-Ray Chest AP Portable (Final result)  Result time 12/22/17 21:27:31    Final result by Alka Tsai MD (12/22/17 21:27:31)                 Impression:     No acute cardiopulmonary disease.            Electronically signed by: ALKA TSAI MD  Date:     12/22/17  Time:    21:27              Narrative:    EXAM:   [id number] CHEST AP PORTABLE    CLINICAL HISTORY:  Chest Pain    COMPARISON: 11/29/2016    Findings:     The lungs are clear. The cardiac silhouette is within normal limits.                             CT Head Without Contrast (Final result)  Result time 12/22/17 20:45:39    Final result by Alka Tsai MD (12/22/17 20:45:39)                 Impression:         No CT evidence of acute intracranial abnormality.    All CT scans at this facility use dose modulation, iterative reconstruction, and/or weight based dosing when appropriate to reduce radiation dose to as low as reasonably achievable.      Electronically signed by: ALKA TSAI MD  Date:     12/22/17  Time:    20:45              Narrative:    EXAM:   [id number] HEAD WITHOUT CONTRAST    CLINICAL HISTORY:  altered mental status    COMPARISON: No relevant priors    TECHNIQUE:  Axial images were performed through the head without intravenous contrast.     FINDINGS:     No hydrocephalus, midline shift, mass effect, or acute intracranial hemorrhage.The cortical sulcal pattern is normal. Gray-white matter differentiation is within normal limits.No extra-axial fluid or hemorrhage.Posterior fossa is unremarkable.The skull and orbits are intact.  There is mucosal thickening of all paranasal sinuses.                             The EKG was ordered, reviewed, and independently interpreted  by the ED provider.  Interpretation time: 20:16  Rate: 63 BPM  Rhythm: sinus rhythm with marked sinus arrythmia  Interpretation: Nonspecific ST and T wave abnormality. No STEMI.         The Emergency Provider reviewed the vital signs and test results, which are outlined above.    ED Discussion     9:31 PM: Re-evaluated pt. Pt's sister, Jordyn, is at bedside. Pt is still awake and alert. Pt is able to state his sisters name but is still disoriented to place and time. Pt's sister is unsure whether pt is compliant with his medications. I have discussed test results, shared treatment plan, and the need for admission with pt and pt's sister. Sister expresses understanding at this time and agrees with all information. All questions answered. Sister has no further questions or concerns at this time. Pt is ready for admit.    9:38 PM: Discussed case with Shaheed Barrientos NP (Hospital Medicine). Dr. Medley agrees with current care and management of pt and accepts admission.   Admitting Service: Hospital medicine   Admitting Physician: Dr. Medley  Admit to: Telemetry (Observation)      ED Medication(s):  Medications   hydrALAZINE injection 10 mg (not administered)   sodium chloride 0.9% flush 5 mL (not administered)   acetaminophen tablet 650 mg (not administered)   0.9%  NaCl infusion (not administered)   lorazepam (ATIVAN) injection 1 mg (not administered)   dextrose 50% injection 12.5 g (not administered)   glucagon (human recombinant) injection 1 mg (not administered)   insulin aspart pen 1-10 Units (not administered)   hydrALAZINE injection 20 mg (20 mg Intravenous Given 12/22/17 2044)   insulin regular injection 10 Units (10 Units Intravenous Given 12/22/17 2143)   0.9%  NaCl infusion (1,000 mLs Intravenous New Bag 12/22/17 2142)       New Prescriptions    No medications on file             Medical Decision Making    Medical Decision Making:   Clinical Tests:   Lab Tests: Ordered and Reviewed  Radiological Study: Reviewed and  Ordered  Medical Tests: Reviewed and Ordered     Additional MDM:   Smoking Cessation: The patient is a smoker. The patient was counseled on smoking cessation for: 3 minutes. The patient was counseled on tobacco related  health complications.        Scribe Attestation:   Scribe #1: I performed the above scribed service and the documentation accurately describes the services I performed. I attest to the accuracy of the note.    Attending:   Physician Attestation Statement for Scribe #1: I, Maddie Middleton MD, personally performed the services described in this documentation, as scribed by Kerri Morrow, in my presence, and it is both accurate and complete.          Clinical Impression       ICD-10-CM ICD-9-CM   1. Hypertensive encephalopathy I67.4 437.2   2. Chest pain R07.9 786.50   3. Substance abuse F19.10 305.90   4. Polypharmacy Z79.899 V58.69   5. Dehydration E86.0 276.51   6. Psychomotor agitation R45.1 799.29   7. Type 2 diabetes mellitus with hyperglycemia, with long-term current use of insulin E11.65 250.00    Z79.4 790.29     V58.67   8. Tobacco abuse disorder Z72.0 305.1       Disposition:   Disposition: Placed in Observation  Condition: Fair         Maddie Middleton MD  12/22/17 1592

## 2017-12-23 NOTE — NURSING
Patient received from ER via stretcher. Bedside report received from [facility] Joseph. Telemetry monitoring initiated. Patient oriented to self only. Unable to obtain answers for admit questions. Bed low and locked, alarm on. Please see flow sheet for further interventions.

## 2017-12-23 NOTE — ASSESSMENT & PLAN NOTE
At this time manage sugar at this time with SSI. Do not feel acidosis is due to DKA at this time but rather to hydration status and drug use  Continue Iv hydration  Anion gap improving 19

## 2017-12-23 NOTE — NURSING
Orders were placed for restraints. Sitter present in room. Once restraints arrived. Pt appeared to be cooperative. With the sitter present we were able to care for pt in a safe manner without ever starting the restraints.

## 2017-12-23 NOTE — H&P
[facility] Hospital Medicine  History & Physical    Patient Name: Shukri Rosales  MRN: [id number]  Admission Date: 12/22/2017  Attending Physician: Alka Medley MD   Primary Care Provider: Farhana Burnham MD         Patient information was obtained from patient, past medical records and ER records.     Subjective:     Principal Problem:Acute encephalopathy    Chief Complaint:   Chief Complaint   Patient presents with    Altered Mental Status     Family reports that pt took 2 pks of OTC enegry pills yesterday. Seen at [place] last night for groin pain. [facility] reports confusion this morning, found in bed with emesis around him. Last seen normal yesterday.         HPI:              History of Present Illness obtained from ER note, unable to obtain from patient due to mental status, no family present with patient at time of exam. Shukri Rosales is a 57 y.o. male patient who presents to the Emergency Department for AMS which onset gradually PTA. Pt was last seen normal last PM, allegedly had taken 2 packets of energy pills last night. [facility] reports family said pt appeared confused this AM but was ambulatory. When the family returned tonight, pt was found naked wrapped in a sheet in bed surrounded by vomit. Symptoms are constant and moderate in severity. No mitigating or exacerbating factors reported. Unknown head trauma. [facility]I denies facial droop and extremity weakness. Per [facility], CBG was 331 en route. No further complaints or concerns at this time.        Past Medical History:   Diagnosis Date    Adrenal cortical adenoma     Anxiety     Arthritis     CKD (chronic kidney disease) stage 3, GFR 30-59 ml/min     Depression     Diabetes mellitus type II 2011    does not take    DM (diabetes mellitus) 2011     am 09/21/2017 Insulin x 1 1/2 year    GERD (gastroesophageal reflux disease) 7/9/2013    Hypertension     Migraine headache 7/22/2013    Severe obesity with  body mass index of 36.0 to 36.9        Past Surgical History:   Procedure Laterality Date    BACK SURGERY      HERNIA REPAIR      UMBILICAL    left arm exfix      NASAL SEPTUM SURGERY Bilateral     TONSILLECTOMY      URETEROSCOPY         Review of patient's allergies indicates:  No Known Allergies    No current facility-administered medications on file prior to encounter.      Current Outpatient Prescriptions on File Prior to Encounter   Medication Sig    amlodipine (NORVASC) 5 MG tablet TAKE 1 TABLET(5 MG) BY MOUTH EVERY DAY    clonazePAM (KLONOPIN) 0.5 MG tablet TAKE ONE TABLET BY MOUTH TWICE DAILY AS NEEDED MUST LAST 30 DAYS    cloNIDine (CATAPRES) 0.3 MG tablet Take 1 tablet (0.3 mg total) by mouth 3 (three) times daily.    liraglutide 0.6 mg/0.1 mL, 18 mg/3 mL, subq PNIJ (VICTOZA 3-NEGRITA) 0.6 mg/0.1 mL (18 mg/3 mL) PnIj Inject 1.8 mg into the skin Daily.    lisinopril-hydrochlorothiazide (PRINZIDE,ZESTORETIC) 20-25 mg Tab TAKE 1 TABLET BY MOUTH EVERY MORNING    lurasidone (LATUDA) 60 mg Tab tablet Take 60 mg by mouth once daily.    metformin (GLUCOPHAGE) 500 MG tablet TAKE 1 TABLET BY MOUTH TWICE DAILY WITH MEALS    metoprolol succinate (TOPROL-XL) 50 MG 24 hr tablet TAKE 1 TABLET(50 MG) BY MOUTH EVERY DAY    pantoprazole (PROTONIX) 40 MG tablet TAKE 1 TABLET(40 MG) BY MOUTH EVERY DAY    pravastatin (PRAVACHOL) 20 MG tablet Take 1 tablet (20 mg total) by mouth every evening.    quetiapine (SEROQUEL) 200 MG Tab Take 200 mg by mouth once daily.     sertraline (ZOLOFT) 100 MG tablet Take 2 tablets (200 mg total) by mouth once daily.    trazodone (DESYREL) 300 MG tablet TK 1 T PO  QHS    aspirin 81 MG Chew Take 1 tablet (81 mg total) by mouth once daily.    blood sugar diagnostic Strp 1 each by Misc.(Non-Drug; Combo Route) route 3 (three) times daily before meals. For One Touch Verio flex    blood-glucose meter kit One Touch Verio Meter    diclofenac sodium 1 % Gel Apply 2 g topically 4 (four)  "times daily.    gabapentin (NEURONTIN) 300 MG capsule TAKE 1 CAPSULE(300 MG) BY MOUTH THREE TIMES DAILY    insulin lispro protamin-lispro (HUMALOG MIX 50-50) 100 unit/mL (50-50) Susp Inject 76 units before breakfast and 40 units before dinner    insulin syringe-needle U-100 1 mL 31 gauge x 5/16 Syrg 1 Syringe by Misc.(Non-Drug; Combo Route) route 3 (three) times daily.    isosorbide mononitrate (IMDUR) 60 MG 24 hr tablet TAKE 1 TABLET BY MOUTH EVERY DAY    lancets (ONETOUCH DELICA LANCETS) 33 gauge Misc 1 lancet by Misc.(Non-Drug; Combo Route) route 3 (three) times daily.    nicotine (NICODERM CQ) 21 mg/24 hr Place 1 patch onto the skin once daily.    pen needle, diabetic 32 gauge x 5/32" Ndle 1 each by Misc.(Non-Drug; Combo Route) route once daily.    polyethylene glycol (GLYCOLAX) 17 gram/dose powder Take 17 g by mouth once daily.    quetiapine (SEROQUEL) 300 MG Tab Take 1 tablet (300 mg total) by mouth once daily. (Patient taking differently: Take 200 mg by mouth once daily. )    varenicline (CHANTIX) 1 mg Tab Take 1 tablet (1 mg total) by mouth 2 (two) times daily.     Family History     Problem Relation (Age of Onset)    Cancer Paternal Grandmother, Paternal Grandfather    Cataracts Mother    Diabetes Mother, Sister, Brother    Hypertension Mother, Sister, Brother        Social History Main Topics    Smoking status: Current Every Day Smoker     Packs/day: 0.50     Years: 44.00     Types: Cigarettes    Smokeless tobacco: Never Used      Comment: instructed not to smoke after midnight after midnight prior to surgery    Alcohol use Yes      Comment: social    Drug use: No    Sexual activity: No     Review of Systems   Unable to perform ROS: Mental status change     Objective:     Vital Signs (Most Recent):  Temp: 98.4 °F (36.9 °C) (12/22/17 2035)  Pulse: (!) 126 (12/22/17 2202)  Resp: (!) 26 (12/22/17 2101)  BP: (!) 185/90 (12/22/17 2202)  SpO2: 100 % (12/22/17 2202) Vital Signs (24h Range):  Temp:  " [98.4 °F (36.9 °C)] 98.4 °F (36.9 °C)  Pulse:  [] 126  Resp:  [14-26] 26  SpO2:  [87 %-100 %] 100 %  BP: (174-231)/() 185/90     Weight: 107.6 kg (237 lb 4 oz)  Body mass index is 33.09 kg/m².    Physical Exam   Constitutional: He is oriented to person, place, and time. He appears well-developed. No distress.   Obese, anxious   Disheveled, unkempt    HENT:   Head: Normocephalic and atraumatic.   Nose: Nose normal.   Eyes: Conjunctivae and EOM are normal. Pupils are equal, round, and reactive to light. No scleral icterus.   Neck: Normal range of motion. Neck supple. No tracheal deviation present.   Cardiovascular: Normal rate, regular rhythm, normal heart sounds and intact distal pulses.    No murmur heard.  Pulmonary/Chest: Effort normal and breath sounds normal. No stridor. No respiratory distress. He has no wheezes. He has no rales.   Abdominal: Soft. Bowel sounds are normal. He exhibits no distension. There is no tenderness. There is no guarding.   Genitourinary:   Genitourinary Comments: See urine    Musculoskeletal: Normal range of motion. He exhibits no edema or deformity.   Neurological: He is alert and oriented to person, place, and time. No cranial nerve deficit.   No unilateral neuro deficits appreciated  Confused, slow to respond.   Answers some simple questions  Is oriented to self only     Skin: Skin is warm and dry. Capillary refill takes less than 2 seconds. No rash noted. He is not diaphoretic.   Psychiatric:   Anxious     Nursing note and vitals reviewed.        CRANIAL NERVES     CN III, IV, VI   Pupils are equal, round, and reactive to light.  Extraocular motions are normal.        Significant Labs: All pertinent labs within the past 24 hours have been reviewed.  Results for orders placed or performed during the hospital encounter of 12/22/17   CBC auto differential   Result Value Ref Range    WBC 10.41 3.90 - 12.70 K/uL    RBC 5.00 4.60 - 6.20 M/uL    Hemoglobin 14.9 14.0 - 18.0 g/dL     Hematocrit 42.3 40.0 - 54.0 %    MCV 85 82 - 98 fL    MCH 29.8 27.0 - 31.0 pg    MCHC 35.2 32.0 - 36.0 g/dL    RDW 13.6 11.5 - 14.5 %    Platelets 339 150 - 350 K/uL    MPV 9.1 (L) 9.2 - 12.9 fL    Gran # 8.9 (H) 1.8 - 7.7 K/uL    Lymph # 1.0 1.0 - 4.8 K/uL    Mono # 0.5 0.3 - 1.0 K/uL    Eos # 0.0 0.0 - 0.5 K/uL    Baso # 0.01 0.00 - 0.20 K/uL    Gran% 85.9 (H) 38.0 - 73.0 %    Lymph% 9.7 (L) 18.0 - 48.0 %    Mono% 4.3 4.0 - 15.0 %    Eosinophil% 0.0 0.0 - 8.0 %    Basophil% 0.1 0.0 - 1.9 %    Differential Method Automated    Comprehensive metabolic panel   Result Value Ref Range    Sodium 136 136 - 145 mmol/L    Potassium 4.7 3.5 - 5.1 mmol/L    Chloride 93 (L) 95 - 110 mmol/L    CO2 23 23 - 29 mmol/L    Glucose 319 (H) 70 - 110 mg/dL    BUN, Bld 18 6 - 20 mg/dL    Creatinine 1.7 (H) 0.5 - 1.4 mg/dL    Calcium 10.6 (H) 8.7 - 10.5 mg/dL    Total Protein 9.1 (H) 6.0 - 8.4 g/dL    Albumin 3.4 (L) 3.5 - 5.2 g/dL    Total Bilirubin 0.5 0.1 - 1.0 mg/dL    Alkaline Phosphatase 101 55 - 135 U/L    AST 24 10 - 40 U/L    ALT 24 10 - 44 U/L    Anion Gap 20 (H) 8 - 16 mmol/L    eGFR if African American 50 (A) >60 mL/min/1.73 m^2    eGFR if non African American 43 (A) >60 mL/min/1.73 m^2   Troponin I #1   Result Value Ref Range    Troponin I 0.007 0.000 - 0.026 ng/mL   B-Type natriuretic peptide (BNP)   Result Value Ref Range    BNP 75 0 - 99 pg/mL   TSH   Result Value Ref Range    TSH 1.240 0.400 - 4.000 uIU/mL   Protime-INR   Result Value Ref Range    Prothrombin Time 10.7 9.0 - 12.5 sec    INR 1.0 0.8 - 1.2   APTT   Result Value Ref Range    aPTT 28.0 21.0 - 32.0 sec   Urinalysis Catheterized   Result Value Ref Range    Specimen UA Urine, Catheterized     Color, UA Yellow Yellow, Straw, Esha    Appearance, UA Clear Clear    pH, UA 6.0 5.0 - 8.0    Specific Gravity, UA >=1.030 (A) 1.005 - 1.030    Protein, UA 2+ (A) Negative    Glucose, UA 2+ (A) Negative    Ketones, UA 1+ (A) Negative    Bilirubin (UA) 1+ (A) Negative     Occult Blood UA 2+ (A) Negative    Nitrite, UA Negative Negative    Urobilinogen, UA 1.0 <2.0 EU/dL    Leukocytes, UA Negative Negative   Drug screen panel, emergency   Result Value Ref Range    Benzodiazepines Presumptive Positive     Methadone metabolites Negative     Cocaine (Metab.) Negative     Opiate Scrn, Ur Presumptive Positive     Barbiturate Screen, Ur Negative     Amphetamine Screen, Ur Negative     THC Presumptive Positive     Phencyclidine Negative     Creatinine, Random Ur 139.7 23.0 - 375.0 mg/dL    Toxicology Information SEE COMMENT    Beta - Hydroxybutyrate, Serum   Result Value Ref Range    Beta-Hydroxybutyrate 1.4 (H) 0.0 - 0.5 mmol/L   Magnesium   Result Value Ref Range    Magnesium 1.9 1.6 - 2.6 mg/dL   Phosphorus   Result Value Ref Range    Phosphorus 1.5 (L) 2.7 - 4.5 mg/dL   Urinalysis Microscopic   Result Value Ref Range    RBC, UA 1 0 - 4 /hpf    WBC, UA 0 0 - 5 /hpf    Bacteria, UA Rare None-Occ /hpf    Hyaline Casts, UA 0 0-1/lpf /lpf    Amorphous, UA Many (A) None-Moderate    Microscopic Comment SEE COMMENT    Ammonia   Result Value Ref Range    Ammonia 28 10 - 50 umol/L   Ethanol   Result Value Ref Range    Alcohol, Medical, Serum <10 <10 mg/dL   CK   Result Value Ref Range    CPK 79 20 - 200 U/L   POCT glucose   Result Value Ref Range    POCT Glucose 312 (H) 70 - 110 mg/dL   ISTAT PROCEDURE   Result Value Ref Range    POC PH 7.537 (H) 7.35 - 7.45    POC PCO2 29.4 (L) 35 - 45 mmHg    POC PO2 89 80 - 100 mmHg    POC HCO3 24.9 24 - 28 mmol/L    POC BE 2 -2 to 2 mmol/L    POC SATURATED O2 98 95 - 100 %    Sample ARTERIAL     Site LR     Allens Test Pass     DelSys Room Air     Mode SPONT     FiO2 21    POCT glucose   Result Value Ref Range    POCT Glucose 330 (H) 70 - 110 mg/dL       Significant Imaging: I have reviewed all pertinent imaging results/findings within the past 24 hours.   Imaging Results          X-Ray Chest AP Portable (Final result)  Result time 12/22/17 21:27:31     Final result by Alka Tsai MD (12/22/17 21:27:31)                 Impression:     No acute cardiopulmonary disease.            Electronically signed by: ALKA TSAI MD  Date:     12/22/17  Time:    21:27              Narrative:    EXAM:   [id number] CHEST AP PORTABLE    CLINICAL HISTORY:  Chest Pain    COMPARISON: 11/29/2016    Findings:     The lungs are clear. The cardiac silhouette is within normal limits.                             CT Head Without Contrast (Final result)  Result time 12/22/17 20:45:39    Final result by Alka Tsai MD (12/22/17 20:45:39)                 Impression:         No CT evidence of acute intracranial abnormality.    All CT scans at this facility use dose modulation, iterative reconstruction, and/or weight based dosing when appropriate to reduce radiation dose to as low as reasonably achievable.      Electronically signed by: ALKA TSAI MD  Date:     12/22/17  Time:    20:45              Narrative:    EXAM:   [id number] HEAD WITHOUT CONTRAST    CLINICAL HISTORY:  altered mental status    COMPARISON: No relevant priors    TECHNIQUE:  Axial images were performed through the head without intravenous contrast.     FINDINGS:     No hydrocephalus, midline shift, mass effect, or acute intracranial hemorrhage.The cortical sulcal pattern is normal. Gray-white matter differentiation is within normal limits.No extra-axial fluid or hemorrhage.Posterior fossa is unremarkable.The skull and orbits are intact.  There is mucosal thickening of all paranasal sinuses.                              I have personally reviewed the patients labs, imaging, ekg and discussed the patient case in detail with the Er provider    Assessment/Plan:     * Acute encephalopathy    +hypertensive, +drug induced  Treat BP if over 180  consider repeating head Ct or MRI  Treat PRN with Ativan for confusion/behavioral issues   May need Geodon, given psych history.         Metabolic  acidosis    At this time manage sugar at this time with SSI. Do not feel acidosis is due to DKA at this time but rather to hydration status and drug use  Iv hydration  Labs in AM        Fluid volume deficit    Received IV bolus in ER  Continue IV hydration  Monitor.           Drug dependence    Encourage to quit   consult.   Monitor for withdrawals          Hypophosphatemia    Correct         Type 2 diabetes mellitus with diabetic chronic kidney disease    Monitor  Check A1C  SSI            VTE Risk Mitigation     Pharmacological: lovenox sq  Non pharmacological: scds             Frandy Barahona NP  Department of Hospital Medicine   [facility]

## 2017-12-23 NOTE — PLAN OF CARE
Problem: Patient Care Overview  Goal: Plan of Care Review  Outcome: Ongoing (interventions implemented as appropriate)  Patient stable. Sinus rhythm. Blood glucose monitored. Bed alarm on. Will continue to monitor.

## 2017-12-23 NOTE — SUBJECTIVE & OBJECTIVE
Past Medical History:   Diagnosis Date    Adrenal cortical adenoma     Anxiety     Arthritis     CKD (chronic kidney disease) stage 3, GFR 30-59 ml/min     Depression     Diabetes mellitus type II 2011    does not take    DM (diabetes mellitus) 2011     am 09/21/2017 Insulin x 1 1/2 year    GERD (gastroesophageal reflux disease) 7/9/2013    Hypertension     Migraine headache 7/22/2013    Severe obesity with body mass index of 36.0 to 36.9        Past Surgical History:   Procedure Laterality Date    BACK SURGERY      HERNIA REPAIR      UMBILICAL    left arm exfix      NASAL SEPTUM SURGERY Bilateral     TONSILLECTOMY      URETEROSCOPY         Review of patient's allergies indicates:  No Known Allergies    No current facility-administered medications on file prior to encounter.      Current Outpatient Prescriptions on File Prior to Encounter   Medication Sig    amlodipine (NORVASC) 5 MG tablet TAKE 1 TABLET(5 MG) BY MOUTH EVERY DAY    clonazePAM (KLONOPIN) 0.5 MG tablet TAKE ONE TABLET BY MOUTH TWICE DAILY AS NEEDED MUST LAST 30 DAYS    cloNIDine (CATAPRES) 0.3 MG tablet Take 1 tablet (0.3 mg total) by mouth 3 (three) times daily.    liraglutide 0.6 mg/0.1 mL, 18 mg/3 mL, subq PNIJ (VICTOZA 3-NEGRITA) 0.6 mg/0.1 mL (18 mg/3 mL) PnIj Inject 1.8 mg into the skin Daily.    lisinopril-hydrochlorothiazide (PRINZIDE,ZESTORETIC) 20-25 mg Tab TAKE 1 TABLET BY MOUTH EVERY MORNING    lurasidone (LATUDA) 60 mg Tab tablet Take 60 mg by mouth once daily.    metformin (GLUCOPHAGE) 500 MG tablet TAKE 1 TABLET BY MOUTH TWICE DAILY WITH MEALS    metoprolol succinate (TOPROL-XL) 50 MG 24 hr tablet TAKE 1 TABLET(50 MG) BY MOUTH EVERY DAY    pantoprazole (PROTONIX) 40 MG tablet TAKE 1 TABLET(40 MG) BY MOUTH EVERY DAY    pravastatin (PRAVACHOL) 20 MG tablet Take 1 tablet (20 mg total) by mouth every evening.    quetiapine (SEROQUEL) 200 MG Tab Take 200 mg by mouth once daily.     sertraline (ZOLOFT) 100 MG  "tablet Take 2 tablets (200 mg total) by mouth once daily.    trazodone (DESYREL) 300 MG tablet TK 1 T PO  QHS    aspirin 81 MG Chew Take 1 tablet (81 mg total) by mouth once daily.    blood sugar diagnostic Strp 1 each by Misc.(Non-Drug; Combo Route) route 3 (three) times daily before meals. For One Touch Verio flex    blood-glucose meter kit One Touch Verio Meter    diclofenac sodium 1 % Gel Apply 2 g topically 4 (four) times daily.    gabapentin (NEURONTIN) 300 MG capsule TAKE 1 CAPSULE(300 MG) BY MOUTH THREE TIMES DAILY    insulin lispro protamin-lispro (HUMALOG MIX 50-50) 100 unit/mL (50-50) Susp Inject 76 units before breakfast and 40 units before dinner    insulin syringe-needle U-100 1 mL 31 gauge x 5/16 Syrg 1 Syringe by Misc.(Non-Drug; Combo Route) route 3 (three) times daily.    isosorbide mononitrate (IMDUR) 60 MG 24 hr tablet TAKE 1 TABLET BY MOUTH EVERY DAY    lancets (ONETOUCH DELICA LANCETS) 33 gauge Misc 1 lancet by Misc.(Non-Drug; Combo Route) route 3 (three) times daily.    nicotine (NICODERM CQ) 21 mg/24 hr Place 1 patch onto the skin once daily.    pen needle, diabetic 32 gauge x 5/32" Ndle 1 each by Misc.(Non-Drug; Combo Route) route once daily.    polyethylene glycol (GLYCOLAX) 17 gram/dose powder Take 17 g by mouth once daily.    quetiapine (SEROQUEL) 300 MG Tab Take 1 tablet (300 mg total) by mouth once daily. (Patient taking differently: Take 200 mg by mouth once daily. )    varenicline (CHANTIX) 1 mg Tab Take 1 tablet (1 mg total) by mouth 2 (two) times daily.     Family History     Problem Relation (Age of Onset)    Cancer Paternal Grandmother, Paternal Grandfather    Cataracts Mother    Diabetes Mother, Sister, Brother    Hypertension Mother, Sister, Brother        Social History Main Topics    Smoking status: Current Every Day Smoker     Packs/day: 0.50     Years: 44.00     Types: Cigarettes    Smokeless tobacco: Never Used      Comment: instructed not to smoke after " midnight after midnight prior to surgery    Alcohol use Yes      Comment: social    Drug use: No    Sexual activity: No     Review of Systems   Unable to perform ROS: Mental status change     Objective:     Vital Signs (Most Recent):  Temp: 98.4 °F (36.9 °C) (12/22/17 2035)  Pulse: (!) 126 (12/22/17 2202)  Resp: (!) 26 (12/22/17 2101)  BP: (!) 185/90 (12/22/17 2202)  SpO2: 100 % (12/22/17 2202) Vital Signs (24h Range):  Temp:  [98.4 °F (36.9 °C)] 98.4 °F (36.9 °C)  Pulse:  [] 126  Resp:  [14-26] 26  SpO2:  [87 %-100 %] 100 %  BP: (174-231)/() 185/90     Weight: 107.6 kg (237 lb 4 oz)  Body mass index is 33.09 kg/m².    Physical Exam   Constitutional: He is oriented to person, place, and time. He appears well-developed. No distress.   Obese, anxious   Disheveled, unkempt    HENT:   Head: Normocephalic and atraumatic.   Nose: Nose normal.   Eyes: Conjunctivae and EOM are normal. Pupils are equal, round, and reactive to light. No scleral icterus.   Neck: Normal range of motion. Neck supple. No tracheal deviation present.   Cardiovascular: Normal rate, regular rhythm, normal heart sounds and intact distal pulses.    No murmur heard.  Pulmonary/Chest: Effort normal and breath sounds normal. No stridor. No respiratory distress. He has no wheezes. He has no rales.   Abdominal: Soft. Bowel sounds are normal. He exhibits no distension. There is no tenderness. There is no guarding.   Genitourinary:   Genitourinary Comments: See urine    Musculoskeletal: Normal range of motion. He exhibits no edema or deformity.   Neurological: He is alert and oriented to person, place, and time. No cranial nerve deficit.   No unilateral neuro deficits appreciated  Confused, slow to respond.   Answers some simple questions  Is oriented to self only     Skin: Skin is warm and dry. Capillary refill takes less than 2 seconds. No rash noted. He is not diaphoretic.   Psychiatric:   Anxious     Nursing note and vitals  reviewed.        CRANIAL NERVES     CN III, IV, VI   Pupils are equal, round, and reactive to light.  Extraocular motions are normal.        Significant Labs: All pertinent labs within the past 24 hours have been reviewed.  Results for orders placed or performed during the hospital encounter of 12/22/17   CBC auto differential   Result Value Ref Range    WBC 10.41 3.90 - 12.70 K/uL    RBC 5.00 4.60 - 6.20 M/uL    Hemoglobin 14.9 14.0 - 18.0 g/dL    Hematocrit 42.3 40.0 - 54.0 %    MCV 85 82 - 98 fL    MCH 29.8 27.0 - 31.0 pg    MCHC 35.2 32.0 - 36.0 g/dL    RDW 13.6 11.5 - 14.5 %    Platelets 339 150 - 350 K/uL    MPV 9.1 (L) 9.2 - 12.9 fL    Gran # 8.9 (H) 1.8 - 7.7 K/uL    Lymph # 1.0 1.0 - 4.8 K/uL    Mono # 0.5 0.3 - 1.0 K/uL    Eos # 0.0 0.0 - 0.5 K/uL    Baso # 0.01 0.00 - 0.20 K/uL    Gran% 85.9 (H) 38.0 - 73.0 %    Lymph% 9.7 (L) 18.0 - 48.0 %    Mono% 4.3 4.0 - 15.0 %    Eosinophil% 0.0 0.0 - 8.0 %    Basophil% 0.1 0.0 - 1.9 %    Differential Method Automated    Comprehensive metabolic panel   Result Value Ref Range    Sodium 136 136 - 145 mmol/L    Potassium 4.7 3.5 - 5.1 mmol/L    Chloride 93 (L) 95 - 110 mmol/L    CO2 23 23 - 29 mmol/L    Glucose 319 (H) 70 - 110 mg/dL    BUN, Bld 18 6 - 20 mg/dL    Creatinine 1.7 (H) 0.5 - 1.4 mg/dL    Calcium 10.6 (H) 8.7 - 10.5 mg/dL    Total Protein 9.1 (H) 6.0 - 8.4 g/dL    Albumin 3.4 (L) 3.5 - 5.2 g/dL    Total Bilirubin 0.5 0.1 - 1.0 mg/dL    Alkaline Phosphatase 101 55 - 135 U/L    AST 24 10 - 40 U/L    ALT 24 10 - 44 U/L    Anion Gap 20 (H) 8 - 16 mmol/L    eGFR if African American 50 (A) >60 mL/min/1.73 m^2    eGFR if non African American 43 (A) >60 mL/min/1.73 m^2   Troponin I #1   Result Value Ref Range    Troponin I 0.007 0.000 - 0.026 ng/mL   B-Type natriuretic peptide (BNP)   Result Value Ref Range    BNP 75 0 - 99 pg/mL   TSH   Result Value Ref Range    TSH 1.240 0.400 - 4.000 uIU/mL   Protime-INR   Result Value Ref Range    Prothrombin Time 10.7 9.0 -  12.5 sec    INR 1.0 0.8 - 1.2   APTT   Result Value Ref Range    aPTT 28.0 21.0 - 32.0 sec   Urinalysis Catheterized   Result Value Ref Range    Specimen UA Urine, Catheterized     Color, UA Yellow Yellow, Straw, Esha    Appearance, UA Clear Clear    pH, UA 6.0 5.0 - 8.0    Specific Gravity, UA >=1.030 (A) 1.005 - 1.030    Protein, UA 2+ (A) Negative    Glucose, UA 2+ (A) Negative    Ketones, UA 1+ (A) Negative    Bilirubin (UA) 1+ (A) Negative    Occult Blood UA 2+ (A) Negative    Nitrite, UA Negative Negative    Urobilinogen, UA 1.0 <2.0 EU/dL    Leukocytes, UA Negative Negative   Drug screen panel, emergency   Result Value Ref Range    Benzodiazepines Presumptive Positive     Methadone metabolites Negative     Cocaine (Metab.) Negative     Opiate Scrn, Ur Presumptive Positive     Barbiturate Screen, Ur Negative     Amphetamine Screen, Ur Negative     THC Presumptive Positive     Phencyclidine Negative     Creatinine, Random Ur 139.7 23.0 - 375.0 mg/dL    Toxicology Information SEE COMMENT    Beta - Hydroxybutyrate, Serum   Result Value Ref Range    Beta-Hydroxybutyrate 1.4 (H) 0.0 - 0.5 mmol/L   Magnesium   Result Value Ref Range    Magnesium 1.9 1.6 - 2.6 mg/dL   Phosphorus   Result Value Ref Range    Phosphorus 1.5 (L) 2.7 - 4.5 mg/dL   Urinalysis Microscopic   Result Value Ref Range    RBC, UA 1 0 - 4 /hpf    WBC, UA 0 0 - 5 /hpf    Bacteria, UA Rare None-Occ /hpf    Hyaline Casts, UA 0 0-1/lpf /lpf    Amorphous, UA Many (A) None-Moderate    Microscopic Comment SEE COMMENT    Ammonia   Result Value Ref Range    Ammonia 28 10 - 50 umol/L   Ethanol   Result Value Ref Range    Alcohol, Medical, Serum <10 <10 mg/dL   CK   Result Value Ref Range    CPK 79 20 - 200 U/L   POCT glucose   Result Value Ref Range    POCT Glucose 312 (H) 70 - 110 mg/dL   ISTAT PROCEDURE   Result Value Ref Range    POC PH 7.537 (H) 7.35 - 7.45    POC PCO2 29.4 (L) 35 - 45 mmHg    POC PO2 89 80 - 100 mmHg    POC HCO3 24.9 24 - 28 mmol/L     POC BE 2 -2 to 2 mmol/L    POC SATURATED O2 98 95 - 100 %    Sample ARTERIAL     Site LR     Allens Test Pass     DelSys Room Air     Mode SPONT     FiO2 21    POCT glucose   Result Value Ref Range    POCT Glucose 330 (H) 70 - 110 mg/dL       Significant Imaging: I have reviewed all pertinent imaging results/findings within the past 24 hours.   Imaging Results          X-Ray Chest AP Portable (Final result)  Result time 12/22/17 21:27:31    Final result by Alka Tsai MD (12/22/17 21:27:31)                 Impression:     No acute cardiopulmonary disease.            Electronically signed by: ALKA TSAI MD  Date:     12/22/17  Time:    21:27              Narrative:    EXAM:   [id number] CHEST AP PORTABLE    CLINICAL HISTORY:  Chest Pain    COMPARISON: 11/29/2016    Findings:     The lungs are clear. The cardiac silhouette is within normal limits.                             CT Head Without Contrast (Final result)  Result time 12/22/17 20:45:39    Final result by Alka Tsai MD (12/22/17 20:45:39)                 Impression:         No CT evidence of acute intracranial abnormality.    All CT scans at this facility use dose modulation, iterative reconstruction, and/or weight based dosing when appropriate to reduce radiation dose to as low as reasonably achievable.      Electronically signed by: ALKA TSAI MD  Date:     12/22/17  Time:    20:45              Narrative:    EXAM:   [id number] HEAD WITHOUT CONTRAST    CLINICAL HISTORY:  altered mental status    COMPARISON: No relevant priors    TECHNIQUE:  Axial images were performed through the head without intravenous contrast.     FINDINGS:     No hydrocephalus, midline shift, mass effect, or acute intracranial hemorrhage.The cortical sulcal pattern is normal. Gray-white matter differentiation is within normal limits.No extra-axial fluid or hemorrhage.Posterior fossa is unremarkable.The skull and orbits are intact.  There is  mucosal thickening of all paranasal sinuses.

## 2017-12-23 NOTE — ED NOTES
MD Middleton notified of BP and prn hydralazine orders. MD states to withhold from administration at this time until hospital medicine evaluates pt. Will continue with admission at this time.

## 2017-12-23 NOTE — HPI
History of Present Illness obtained from ER note, unable to obtain from patient due to mental status, no family present with patient at time of exam. Shukri Rosales is a 57 y.o. male patient who presents to the Emergency Department for AMS which onset gradually PTA. Pt was last seen normal last PM, allegedly had taken 2 packets of energy pills last night. [facility] reports family said pt appeared confused this AM but was ambulatory. When the family returned tonight, pt was found naked wrapped in a sheet in bed surrounded by vomit. Symptoms are constant and moderate in severity. No mitigating or exacerbating factors reported. Unknown head trauma. AASI denies facial droop and extremity weakness. Per [facility], CBG was 331 en route. No further complaints or concerns at this time.

## 2017-12-23 NOTE — ED NOTES
Pt unable to remain still in bed. Pt sitting on the edge of the bed without his gown. Pt escorted back into bed. Pt orietned to family at bedside. NAD noted. RR e/u, airway open and patent. Bed in locked and low position, side rails up x 2, call light within reach.

## 2017-12-23 NOTE — ASSESSMENT & PLAN NOTE
Suspect related to +hypertensive or +drug induced  UA and CXR negative   BP resumed   ACEI held due to AKF   CT head negative for acute findings   Treat PRN with Ativan for confusion/behavioral issues   Will resume antipsychotic medications when appropriate   Neuro checks q4   Sitter at bedside

## 2017-12-24 VITALS
HEART RATE: 84 BPM | DIASTOLIC BLOOD PRESSURE: 85 MMHG | WEIGHT: 225.5 LBS | BODY MASS INDEX: 31.46 KG/M2 | SYSTOLIC BLOOD PRESSURE: 146 MMHG | OXYGEN SATURATION: 98 % | RESPIRATION RATE: 18 BRPM | TEMPERATURE: 98 F

## 2017-12-24 LAB
ALBUMIN SERPL BCP-MCNC: 3 G/DL
ALP SERPL-CCNC: 88 U/L
ALT SERPL W/O P-5'-P-CCNC: 23 U/L
ANION GAP SERPL CALC-SCNC: 13 MMOL/L
AST SERPL-CCNC: 21 U/L
BASOPHILS # BLD AUTO: 0.03 K/UL
BASOPHILS NFR BLD: 0.3 %
BILIRUB SERPL-MCNC: 0.5 MG/DL
BUN SERPL-MCNC: 16 MG/DL
CALCIUM SERPL-MCNC: 10.2 MG/DL
CHLORIDE SERPL-SCNC: 106 MMOL/L
CO2 SERPL-SCNC: 25 MMOL/L
CREAT SERPL-MCNC: 1.5 MG/DL
DIFFERENTIAL METHOD: ABNORMAL
EOSINOPHIL # BLD AUTO: 0.1 K/UL
EOSINOPHIL NFR BLD: 0.4 %
ERYTHROCYTE [DISTWIDTH] IN BLOOD BY AUTOMATED COUNT: 14.1 %
EST. GFR  (AFRICAN AMERICAN): 58 ML/MIN/1.73 M^2
EST. GFR  (NON AFRICAN AMERICAN): 50 ML/MIN/1.73 M^2
GLUCOSE SERPL-MCNC: 192 MG/DL
HCT VFR BLD AUTO: 39.2 %
HGB BLD-MCNC: 13.6 G/DL
LYMPHOCYTES # BLD AUTO: 2.8 K/UL
LYMPHOCYTES NFR BLD: 25 %
MCH RBC QN AUTO: 30.1 PG
MCHC RBC AUTO-ENTMCNC: 34.7 G/DL
MCV RBC AUTO: 87 FL
MONOCYTES # BLD AUTO: 1.3 K/UL
MONOCYTES NFR BLD: 11.5 %
NEUTROPHILS # BLD AUTO: 7.2 K/UL
NEUTROPHILS NFR BLD: 62.8 %
OSMOLALITY SERPL: 303 MOSM/KG
PLATELET # BLD AUTO: 360 K/UL
PMV BLD AUTO: 8.8 FL
POCT GLUCOSE: 177 MG/DL (ref 70–110)
POCT GLUCOSE: 196 MG/DL (ref 70–110)
POCT GLUCOSE: 221 MG/DL (ref 70–110)
POCT GLUCOSE: 255 MG/DL (ref 70–110)
POTASSIUM SERPL-SCNC: 4 MMOL/L
PROT SERPL-MCNC: 7.7 G/DL
RBC # BLD AUTO: 4.52 M/UL
SODIUM SERPL-SCNC: 144 MMOL/L
WBC # BLD AUTO: 11.38 K/UL

## 2017-12-24 PROCEDURE — 96374 THER/PROPH/DIAG INJ IV PUSH: CPT

## 2017-12-24 PROCEDURE — 25000003 PHARM REV CODE 250: Performed by: INTERNAL MEDICINE

## 2017-12-24 PROCEDURE — 80053 COMPREHEN METABOLIC PANEL: CPT

## 2017-12-24 PROCEDURE — 25000003 PHARM REV CODE 250: Performed by: NURSE PRACTITIONER

## 2017-12-24 PROCEDURE — G0378 HOSPITAL OBSERVATION PER HR: HCPCS

## 2017-12-24 PROCEDURE — 36415 COLL VENOUS BLD VENIPUNCTURE: CPT

## 2017-12-24 PROCEDURE — 96375 TX/PRO/DX INJ NEW DRUG ADDON: CPT

## 2017-12-24 PROCEDURE — 96376 TX/PRO/DX INJ SAME DRUG ADON: CPT

## 2017-12-24 PROCEDURE — S0028 INJECTION, FAMOTIDINE, 20 MG: HCPCS | Performed by: NURSE PRACTITIONER

## 2017-12-24 PROCEDURE — 85025 COMPLETE CBC W/AUTO DIFF WBC: CPT

## 2017-12-24 RX ORDER — GABAPENTIN 100 MG/1
100 CAPSULE ORAL 3 TIMES DAILY
Status: DISCONTINUED | OUTPATIENT
Start: 2017-12-24 | End: 2017-12-24 | Stop reason: HOSPADM

## 2017-12-24 RX ORDER — SERTRALINE HYDROCHLORIDE 50 MG/1
100 TABLET, FILM COATED ORAL DAILY
Status: DISCONTINUED | OUTPATIENT
Start: 2017-12-24 | End: 2017-12-24 | Stop reason: HOSPADM

## 2017-12-24 RX ORDER — METOPROLOL TARTRATE 50 MG/1
50 TABLET ORAL 2 TIMES DAILY
Qty: 60 TABLET | Refills: 0 | Status: ON HOLD | OUTPATIENT
Start: 2017-12-24 | End: 2018-02-13 | Stop reason: HOSPADM

## 2017-12-24 RX ORDER — QUETIAPINE FUMARATE 100 MG/1
200 TABLET, FILM COATED ORAL DAILY
Status: DISCONTINUED | OUTPATIENT
Start: 2017-12-24 | End: 2017-12-24 | Stop reason: HOSPADM

## 2017-12-24 RX ORDER — HYDROCODONE BITARTRATE AND ACETAMINOPHEN 7.5; 325 MG/1; MG/1
1 TABLET ORAL ONCE
Status: COMPLETED | OUTPATIENT
Start: 2017-12-24 | End: 2017-12-24

## 2017-12-24 RX ADMIN — INSULIN ASPART 2 UNITS: 100 INJECTION, SOLUTION INTRAVENOUS; SUBCUTANEOUS at 06:12

## 2017-12-24 RX ADMIN — FAMOTIDINE 20 MG: 20 INJECTION, SOLUTION INTRAVENOUS at 07:12

## 2017-12-24 RX ADMIN — CLONIDINE HYDROCHLORIDE 0.3 MG: 0.3 TABLET ORAL at 05:12

## 2017-12-24 RX ADMIN — METOPROLOL TARTRATE 50 MG: 50 TABLET ORAL at 07:12

## 2017-12-24 RX ADMIN — QUETIAPINE FUMARATE 200 MG: 100 TABLET, FILM COATED ORAL at 07:12

## 2017-12-24 RX ADMIN — HYDROCODONE BITARTRATE AND ACETAMINOPHEN 1 TABLET: 7.5; 325 TABLET ORAL at 07:12

## 2017-12-24 RX ADMIN — AMLODIPINE BESYLATE 10 MG: 10 TABLET ORAL at 07:12

## 2017-12-24 RX ADMIN — SODIUM CHLORIDE: 0.9 INJECTION, SOLUTION INTRAVENOUS at 07:12

## 2017-12-24 RX ADMIN — ISOSORBIDE MONONITRATE 60 MG: 60 TABLET, EXTENDED RELEASE ORAL at 07:12

## 2017-12-24 RX ADMIN — POTASSIUM CHLORIDE 40 MEQ: 1500 TABLET, EXTENDED RELEASE ORAL at 03:12

## 2017-12-24 RX ADMIN — SERTRALINE HYDROCHLORIDE 100 MG: 50 TABLET ORAL at 07:12

## 2017-12-24 NOTE — PROGRESS NOTES
At this time rounded on pt. Pt in bed eyes closed and resting comfortably. Bed alarm activated. Sitter at bedside

## 2017-12-24 NOTE — PLAN OF CARE
Problem: Patient Care Overview  Goal: Individualization & Mutuality  Outcome: Ongoing (interventions implemented as appropriate)  VSS. Pt remained free of injury this shift. Bed in low position w/ wheels locked.

## 2017-12-24 NOTE — PROGRESS NOTES
pts sister arrived at this time. AVS reviewed with cristopher who verbalizes understanding of instructions. She denies having further questions at this time. Pt was wheeled down to waiting car at this time. No acute distress noted to pt.

## 2017-12-24 NOTE — PLAN OF CARE
12/24/17 1529   Final Note   Assessment Type Final Discharge Note   Discharge Disposition Home   Right Care Referral Info   Post Acute Recommendation No Care

## 2017-12-24 NOTE — NURSING
Notified SHAWNA Wise that pt refused to wear tele monitor. Attempts at educating.redirecting pt unsuccessful. No orders at this time.

## 2017-12-24 NOTE — PROGRESS NOTES
Pt bathed and cleaned. PIV placed to RFA at this time. Continuous IVF replaced to PIV. Tele monitor replaced at this time. Pt SR HR 85. Fall precautions in place, sitter at bedside

## 2017-12-24 NOTE — PROGRESS NOTES
At this time Pt is AAO. AVS reviewed. Pt was able to verbalize medication regimen to me and then re verbalize the medications he should stop and the new on that he should start. pts medication bag at this time up to date. pts sister is picking up his new RX and some clothing for him at this time and will be on her way shortly to  the patient. PIV removed, catheter remained in tact. Tele monitor removed as well. Fall precautions still in place.

## 2017-12-24 NOTE — DISCHARGE SUMMARY
[facility] Hospital Medicine  Discharge Summary      Patient Name: Shukri Rosales  MRN: [id number]  Admission Date: 12/22/2017  Hospital Length of Stay: 1 days  Discharge Date and Time:  12/24/2017 10:33 AM  Attending Physician: Natty Russell MD   Discharging Provider: Lesia Garcia NP  Primary Care Provider: Farhana Burnham MD      HPI:                History of Present Illness obtained from ER note, unable to obtain from patient due to mental status, no family present with patient at time of exam. Shukri Rosales is a 57 y.o. male patient who presents to the Emergency Department for AMS which onset gradually PTA. Pt was last seen normal last PM, allegedly had taken 2 packets of energy pills last night. [facility] reports family said pt appeared confused this AM but was ambulatory. When the family returned tonight, pt was found naked wrapped in a sheet in bed surrounded by vomit. Symptoms are constant and moderate in severity. No mitigating or exacerbating factors reported. Unknown head trauma. AASI denies facial droop and extremity weakness. Per [facility], CBG was 331 en route. No further complaints or concerns at this time.        * No surgery found *      Hospital Course:   57 year male admitted for acute encephalopathy. ED evaluation revealed anion gap 20, Creatinine 1.9, hyperglycemia, phosphorus 1.5, Beta-hydroxybutyrate 1.4, UDS +benzos, opiate, and THC. Acute encephalopathy most likely drug induced. CT head showed no acute findings. Patient was given IV insulin and SC insulin in the ED, along with IV bolus. Will continue IV hydration. Overnight patient became more agitated and confused, requiring a 24 hour sitter at bedside. On 12/23/17, WBC's 14.58, K+ 3.2, Anion gap 19, Creatinine improving 1.5, glucose improving. Today patient alert and oriented x3, c/o scrotal pain. US of Scrotum/testicles and kidneys show no acute findings. Symptoms resolved. Case discussed with  Dr. Russell, ok to discharge home. Home meds reconciled. Patient counseled on drug cessation, pt. Verbalized understanding. Patient to follow-up with PCP in 3-5 days after discharge for hospital follow-up. Patient seen and examined on th date of discharge and found to be suitable for discharge.      Consults:     No new Assessment & Plan notes have been filed under this hospital service since the last note was generated.  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    Specialty:  Family Medicine  Why:  hospital follow-up in 3-5 days   Contact information:  [address] [address] [id number] [phone]                 Patient Instructions:     Activity as tolerated     Notify your health care provider if you experience any of the following:  temperature >100.4     Notify your health care provider if you experience any of the following:  difficulty breathing or increased cough     Notify your health care provider if you experience any of the following:  severe persistent headache     Notify your health care provider if you experience any of the following:  persistent dizziness, light-headedness, or visual disturbances     Notify your health care provider if you experience any of the following:  increased confusion or weakness          Significant Diagnostic Studies: Labs:   BMP:   Recent Labs  Lab 12/22/17 2015 12/23/17  0500 12/23/17  1045 12/24/17  0726   * 242* 268*  268* 192*    138 141  141 144   K 4.7 3.3* 3.2*  3.2* 4.0   CL 93* 98 99  99 106   CO2 23 20* 23  23 25   BUN 18 18 17  17 16   CREATININE 1.7* 1.4 1.5*  1.5* 1.5*   CALCIUM 10.6* 10.4 10.0  10.0 10.2   MG 1.9 1.6 1.5*  --    , CMP   Recent Labs  Lab 12/23/17  0500 12/23/17  1045 12/24/17  0726    141  141 144   K 3.3* 3.2*  3.2* 4.0   CL 98 99  99 106   CO2 20* 23  23 25   * 268*  268* 192*   BUN 18 17  17 16   CREATININE 1.4 1.5*  1.5* 1.5*   CALCIUM 10.4 10.0  10.0 10.2   PROT 8.3 8.2 7.7   ALBUMIN 3.2* 3.3* 3.0*   BILITOT 0.5 0.4 0.5   ALKPHOS 102 99 88   AST 23 22 21   ALT 23 22 23   ANIONGAP 20* 19*  19* 13   ESTGFRAFRICA >60 58*  58* 58*   EGFRNONAA 55* 50*  50* 50*   , CBC   Recent Labs  Lab 12/23/17  0500 12/23/17  1045 12/24/17  0726   WBC 16.32* 14.58* 11.38   HGB 14.7 14.6 13.6*   HCT 41.3 40.4 39.2*   * 358* 360*    and All labs within the past 24 hours have been reviewed    Pending Diagnostic Studies:     Procedure Component Value Units Date/Time    Osmolality [[id number]] Collected:  12/23/17 1351    Order Status:  Sent Lab Status:  In process Updated:  12/23/17 1354    Specimen:  Blood from Blood          Medications:  Reconciled Home Medications:   Current Discharge Medication List      START taking these medications    Details   metoprolol tartrate (LOPRESSOR) 50 MG tablet Take 1 tablet (50 mg total) by mouth 2 (two) times daily.  Qty: 60 tablet, Refills: 0         CONTINUE these medications which have NOT CHANGED    Details   amlodipine (NORVASC) 5 MG tablet TAKE 1 TABLET(5 MG) BY MOUTH EVERY DAY  Qty: 30 tablet, Refills: 3    Associated Diagnoses: Essential hypertension      clonazePAM (KLONOPIN) 0.5 MG tablet TAKE ONE TABLET BY MOUTH TWICE DAILY AS NEEDED MUST LAST 30 DAYS  Qty: 30 tablet, Refills: 0       cloNIDine (CATAPRES) 0.3 MG tablet Take 1 tablet (0.3 mg total) by mouth 3 (three) times daily.  Qty: 90 tablet, Refills: 3    Associated Diagnoses: Essential hypertension      liraglutide 0.6 mg/0.1 mL, 18 mg/3 mL, subq PNIJ (VICTOZA 3-NEGRITA) 0.6 mg/0.1 mL (18 mg/3 mL) PnIj Inject 1.8 mg into the skin Daily.  Qty: 9 mL, Refills: 3    Associated Diagnoses: Type 2 diabetes mellitus with stage 3 chronic kidney disease, with long-term current use of insulin      lisinopril-hydrochlorothiazide (PRINZIDE,ZESTORETIC) 20-25 mg Tab TAKE 1 TABLET BY MOUTH EVERY MORNING  Qty: 30 tablet, Refills: 3    Associated Diagnoses: Essential hypertension      lurasidone (LATUDA) 60 mg Tab tablet Take 60 mg by mouth once daily.      metformin (GLUCOPHAGE) 500 MG tablet TAKE 1 TABLET BY MOUTH TWICE DAILY WITH MEALS  Qty: 60 tablet, Refills: 3    Associated Diagnoses: Type 2 diabetes mellitus with hyperglycemia, with long-term current use of insulin      pantoprazole (PROTONIX) 40 MG tablet TAKE 1 TABLET(40 MG) BY MOUTH EVERY DAY  Qty: 30 tablet, Refills: 3    Associated Diagnoses: Gastroesophageal reflux disease, esophagitis presence not specified      pravastatin (PRAVACHOL) 20 MG tablet Take 1 tablet (20 mg total) by mouth every evening.  Qty: 30 tablet, Refills: 5    Associated Diagnoses: Hyperlipidemia, unspecified hyperlipidemia type      !! quetiapine (SEROQUEL) 200 MG Tab Take 200 mg by mouth once daily.   Refills: 1      sertraline (ZOLOFT) 100 MG tablet Take 2 tablets (200 mg total) by mouth once daily.  Qty: 30 tablet, Refills: 0    Associated Diagnoses: Bipolar disorder      trazodone (DESYREL) 300 MG tablet TK 1 T PO  QHS  Refills: 3      aspirin 81 MG Chew Take 1 tablet (81 mg total) by mouth once daily.  Qty: 30 tablet, Refills: 0      blood sugar diagnostic Strp 1 each by Misc.(Non-Drug; Combo Route) route 3 (three) times daily before meals. For One Touch Verio flex  Qty: 100 each, Refills: 11      blood-glucose meter  "kit One Touch Verio Meter  Qty: 1 each, Refills: 0      gabapentin (NEURONTIN) 300 MG capsule TAKE 1 CAPSULE(300 MG) BY MOUTH THREE TIMES DAILY  Qty: 90 capsule, Refills: 0    Associated Diagnoses: Type 2 diabetes mellitus with diabetic neuropathy, unspecified long term insulin use status      insulin lispro protamin-lispro (HUMALOG MIX 50-50) 100 unit/mL (50-50) Susp Inject 76 units before breakfast and 40 units before dinner  Qty: 4 vial, Refills: 3    Associated Diagnoses: Type 2 diabetes mellitus with diabetic neuropathy, with long-term current use of insulin      insulin syringe-needle U-100 1 mL 31 gauge x 5/16 Syrg 1 Syringe by Misc.(Non-Drug; Combo Route) route 3 (three) times daily.  Qty: 100 each, Refills: 11    Associated Diagnoses: Type 2 diabetes mellitus with hyperglycemia, with long-term current use of insulin; Type 2 diabetes mellitus with diabetic neuropathy, with long-term current use of insulin      isosorbide mononitrate (IMDUR) 60 MG 24 hr tablet TAKE 1 TABLET BY MOUTH EVERY DAY  Qty: 30 tablet, Refills: 0    Associated Diagnoses: ESPINAL (dyspnea on exertion); Essential hypertension      lancets (ONETOUCH DELICA LANCETS) 33 gauge Misc 1 lancet by Misc.(Non-Drug; Combo Route) route 3 (three) times daily.  Qty: 100 each, Refills: 11      nicotine (NICODERM CQ) 21 mg/24 hr Place 1 patch onto the skin once daily.  Qty: 28 patch, Refills: 0    Comments: **SMOKING CESSATION [facility] - 0.00 CO-PAY**  Associated Diagnoses: Moderate cigarette smoker (10-19 per day)      pen needle, diabetic 32 gauge x 5/32" Ndle 1 each by Misc.(Non-Drug; Combo Route) route once daily.  Qty: 100 each, Refills: 11    Associated Diagnoses: Type 2 diabetes mellitus with stage 2 chronic kidney disease, with long-term current use of insulin      polyethylene glycol (GLYCOLAX) 17 gram/dose powder Take 17 g by mouth once daily.  Qty: 1 Bottle, Refills: 1    Associated Diagnoses: Constipation, unspecified " constipation type      !! quetiapine (SEROQUEL) 300 MG Tab Take 1 tablet (300 mg total) by mouth once daily.  Qty: 30 tablet, Refills: 0       !! - Potential duplicate medications found. Please discuss with provider.      STOP taking these medications       metoprolol succinate (TOPROL-XL) 50 MG 24 hr tablet Comments:   Reason for Stopping:         diclofenac sodium 1 % Gel Comments:   Reason for Stopping:         varenicline (CHANTIX) 1 mg Tab Comments:   Reason for Stopping:               Indwelling Lines/Drains at time of discharge:   Lines/Drains/Airways     Airway                 Airway - Non-Surgical 06/01/17 0711 Nasal Cannula 206 days                Time spent on the discharge of patient: 35 minutes  Patient was seen and examined on the date of discharge and determined to be suitable for discharge.         Lesia Garcia NP  Department of Hospital Medicine  [facility]

## 2017-12-24 NOTE — NURSING
Pt currently sleeping for the first time today without thrashing back and forth. Oriented to self. Restraints were never initiated. Sitter present. Bed low, bed alarm on and call light within reach.

## 2017-12-24 NOTE — PROGRESS NOTES
At this time call was placed to pts sister, Breanna. I indicated that the pt has been discharged. She states ok. And I also indicated he had one medication called into the [facility]. She states she will pick it up. At this time she states she will be able to come and get the patient and AVS will be reviewed with her as well at that time

## 2018-01-10 ENCOUNTER — OFFICE VISIT (OUTPATIENT)
Dept: URGENT CARE | Facility: CLINIC | Age: 58
End: 2018-01-10
Payer: MEDICAID

## 2018-01-10 VITALS
DIASTOLIC BLOOD PRESSURE: 90 MMHG | HEIGHT: 70 IN | WEIGHT: 254 LBS | SYSTOLIC BLOOD PRESSURE: 132 MMHG | OXYGEN SATURATION: 95 % | TEMPERATURE: 98 F | BODY MASS INDEX: 36.36 KG/M2 | HEART RATE: 90 BPM | RESPIRATION RATE: 20 BRPM

## 2018-01-10 DIAGNOSIS — S30.22XA CONTUSION OF TESTIS, INITIAL ENCOUNTER: Primary | ICD-10-CM

## 2018-01-10 PROCEDURE — 99214 OFFICE O/P EST MOD 30 MIN: CPT | Mod: S$PBB,,, | Performed by: NURSE PRACTITIONER

## 2018-01-10 PROCEDURE — 99214 OFFICE O/P EST MOD 30 MIN: CPT | Mod: PBBFAC,PO | Performed by: NURSE PRACTITIONER

## 2018-01-10 PROCEDURE — 99999 PR PBB SHADOW E&M-EST. PATIENT-LVL IV: CPT | Mod: PBBFAC,,, | Performed by: NURSE PRACTITIONER

## 2018-01-10 RX ORDER — ACETAMINOPHEN AND CODEINE PHOSPHATE 300; 30 MG/1; MG/1
1 TABLET ORAL 2 TIMES DAILY PRN
Qty: 14 TABLET | Refills: 0 | Status: ON HOLD | OUTPATIENT
Start: 2018-01-10 | End: 2018-02-13 | Stop reason: HOSPADM

## 2018-01-10 NOTE — PATIENT INSTRUCTIONS
Contusion, Testicles or Scrotum  A contusion is another word for a bruise. It happens when small blood vessels break open and leak blood into the nearby area. A testicle or scrotum contusion can result from a bump, hit, or fall. Symptoms include skin discoloration and swelling. The area may be very painful. You may have trouble walking. You may also feel nauseous.  An imaging test such as ultrasound may be done to check for damage to the testicles and the injured area. If a large amount of fluid has collected, it may be drained.  Pain may restrict your ability to walk for a few days. Rest and limit activities that are painful until your symptoms improve. Swelling should go down in a few days. Bruising may take a few weeks to go away. As it heals, the bruise will change color. It will likely turn from red, purple, blue, or greenish coloring to a light yellow. This is normal.   Home Care  ·      Wrap the cold source in a thin towel before using it.     You may be prescribed medicines for pain and swelling. Be sure to take them exactly as directed.  · To help relieve pain and swelling, you can also try the following measures:  ¨ Lie on your back and pull your knees to your chest if you can. Hold this position for as long as you feel comfortable.  ¨ Apply a cold pack to the site. (Use a bag of ice or frozen vegetables wrapped in a towel.) Do this for 10 minutes every hour, or as directed by your healthcare provider.  ¨ Keep the scrotum raised (elevated) on a rolled towel when possible.   Follow-up care  Follow up with your healthcare provider, or as advised.  When to seek medical advice  Call your healthcare provider right away if any of these occur:  · Fever of 100.4°F (38°C) or higher  · Bruise gets larger or doesnt get any smaller  · Swelling doesnt improve or gets worse  · Pain is not relieved with pain medicines  · Inability to urinate  · Discharge (pus) from the penis  Date Last Reviewed: 5/31/2015  ©  2230-1817 [employer]. [address] [place] [address], [place] [address]. All rights reserved. This information is not intended as a substitute for professional medical care. Always follow your healthcare professional's instructions.

## 2018-01-10 NOTE — PROGRESS NOTES
Subjective:       Patient ID: Shukri Rosales is a 57 y.o. male.    Chief Complaint: Groin Swelling    Patient hit himself in the testicle area with a bag of cans 1 week ago. He stated he went to [facility] to be evaluated, US performed and was negative. Pain medication and toradol given. He is still having pain       Groin Pain   The patient's primary symptoms include a genital injury and testicular pain. The patient's pertinent negatives include no genital itching, genital lesions, pelvic pain, penile discharge, penile pain, priapism or scrotal swelling. This is a new problem. Pertinent negatives include no discolored urine, frequency, hematuria, hesitancy, urgency or urinary retention. There is a testicular injury. The problem affects the left side.     Review of Systems   Genitourinary: Positive for testicular pain. Negative for decreased urine volume, difficulty urinating, discharge, frequency, hematuria, hesitancy, pelvic pain, penile pain, penile swelling, scrotal swelling and urgency.       Objective:      Physical Exam   Constitutional: He is oriented to person, place, and time. He appears well-developed and well-nourished. No distress.   Genitourinary: Penis normal. Right testis shows no mass, no swelling and no tenderness. Right testis is descended. Cremasteric reflex is not absent on the right side. Left testis shows tenderness. Left testis shows no mass and no swelling. Left testis is descended. Cremasteric reflex is not absent on the left side.   Genitourinary Comments: No discoloration   Neurological: He is alert and oriented to person, place, and time.   Skin: Skin is warm and dry. He is not diaphoretic.       Assessment:       1. Contusion of testis, initial encounter        Plan:   Shukri was seen today for groin swelling.    Diagnoses and all orders for this visit:    Contusion of testis, initial encounter  -     acetaminophen-codeine 300-30mg (TYLENOL #3) 300-30 mg Tab; Take 1 tablet by  mouth 2 (two) times daily as needed.        -     Diagnosis and treatment discussed, AVS provided  -     Ice advised; offered repeat imaging, due to transportation he declined;   -     Follow up with PCP (he has an appt next week with Dr. Alvarez) or ER immediately for worsening, new or no improvement of symptoms.   -     Patient understands and agrees with plan

## 2018-01-12 ENCOUNTER — OFFICE VISIT (OUTPATIENT)
Dept: PAIN MEDICINE | Facility: CLINIC | Age: 58
End: 2018-01-12
Payer: MEDICAID

## 2018-01-12 VITALS
HEART RATE: 84 BPM | HEIGHT: 70 IN | BODY MASS INDEX: 36.22 KG/M2 | RESPIRATION RATE: 16 BRPM | SYSTOLIC BLOOD PRESSURE: 141 MMHG | WEIGHT: 253 LBS | DIASTOLIC BLOOD PRESSURE: 95 MMHG

## 2018-01-12 DIAGNOSIS — E11.40 TYPE 2 DIABETES MELLITUS WITH DIABETIC NEUROPATHY, UNSPECIFIED LONG TERM INSULIN USE STATUS: ICD-10-CM

## 2018-01-12 DIAGNOSIS — M51.36 DDD (DEGENERATIVE DISC DISEASE), LUMBAR: Primary | ICD-10-CM

## 2018-01-12 DIAGNOSIS — G89.4 CHRONIC PAIN DISORDER: ICD-10-CM

## 2018-01-12 DIAGNOSIS — M96.1 POSTLAMINECTOMY SYNDROME OF LUMBAR REGION: ICD-10-CM

## 2018-01-12 DIAGNOSIS — M47.817 LUMBOSACRAL SPONDYLOSIS WITHOUT MYELOPATHY: ICD-10-CM

## 2018-01-12 DIAGNOSIS — M96.1 FAILED BACK SYNDROME OF LUMBAR SPINE: ICD-10-CM

## 2018-01-12 PROCEDURE — 99214 OFFICE O/P EST MOD 30 MIN: CPT | Mod: PBBFAC | Performed by: PHYSICIAN ASSISTANT

## 2018-01-12 PROCEDURE — 99214 OFFICE O/P EST MOD 30 MIN: CPT | Mod: S$PBB,,, | Performed by: PHYSICIAN ASSISTANT

## 2018-01-12 PROCEDURE — 99999 PR PBB SHADOW E&M-EST. PATIENT-LVL IV: CPT | Mod: PBBFAC,,, | Performed by: PHYSICIAN ASSISTANT

## 2018-01-12 RX ORDER — GABAPENTIN 400 MG/1
400 CAPSULE ORAL 3 TIMES DAILY
Qty: 90 CAPSULE | Refills: 2 | Status: ON HOLD | OUTPATIENT
Start: 2018-01-12 | End: 2018-02-13 | Stop reason: HOSPADM

## 2018-01-12 NOTE — PROGRESS NOTES
Chief Pain Complaint:  Lower back, bilateral leg pain    History of Present Illness:   This patient is a 57 y.o. male who presents today complaining of the above noted pain/s. The patient describes the pain as follows.    - duration of pain: since 1999  - timing: constant   - character: aching, sharp, throbbing  - radiating, dermatomal: extends into bilateral lower extremities, worse on right, along right L5 pattern  - antecedent trauma, prior spinal surgery: patient reports prior trauma (fell from deer stand), prior lumbar surgery x 3 (lami + fusion at L4/5 and L5/S1, most recent May 2000), right knee surgery in April 2015 via Dr. Calderon  - pertinent negatives: No fever, No chills, No weight loss, No bladder dysfunction, No bowel dysfunction, No saddle anesthesia  - pertinent positives: generalized nonspecific Lower Extremity weakness bilaterally    - medications, other therapies tried (physical therapy, injections):     >> Tylenol, gabapentin, norco, percocet, oxycodone 30 mg, methadone 40 mg, lyrica, morphine    >> Has NOT previously undergone Physical Therapy    >> Has previously undergone spinal injection/s which weren't helpful         IMAGING (reviewed on 1/12/2018):     10-13-15 XR Lumbar:  Stable appearance to osseous structures. Postoperative findings from laminectomy and fusion at L4-5 and L5-S1 unchanged.     3-2-15 XR Lumbar:  Findings: The vertebral bodies demonstrate a normal height and alignment. There is severe disk space narrowing noted at the L3-4 level where there is some spondylosis that are both anteriorly and posteriorly at this level. There are pedicle screws and   fixation rods noted bilaterally from the L4 through S1 levels. There is evidence for prior laminectomy at L4 and L5 levels. At intradiscal space or is thought present at the L5-S1 level.     1-5-11 CT Cervical:  Findings: There is a moderate cervicothoracic scoliosis. Cervical spine alignment is normal without subluxation. No acute  cervical spine fracture or prevertebral soft tissue swelling is seen. There is moderate multilevel cervical spondylosis with disc height reduction and   circumferential osteophytes from C3-C4 through C7-T1. There is moderate stenosis of the right C3-C4, left C5-C6 bilateral C6-C7 and bilateral C7-T1 neural foramina.     12-18-15 CT LUMBAR:  Findings: The vertebral bodies demonstrate a normal height. There appears be minimal levoscoliosis of the lumbar spine. Some of this appearance may then be positional. Pedicle screws and fixation rods are noted bilaterally from the L4 through S1 levels. A single interconnecting keith is noted at other levels and for endplate of L4. There is attempted bony fusion bilaterally. There is evidence for prior laminectomy from the L4 to S1 levels. An intradiscal spacer is noted at the L5-S1 level. There's no evidence to suggest acute fracture. A there is some vacuum phenomenon associated with the superior aspect of either SI joint. Vacuum disk phenomenon is also present at the L2-3 and L3-4 levels.  At the L2-3 level, there is a minimal diffuse disk bulge resulting in minimal effacement of ventral thecal sac. There is mild narrowing of the inferior recess of the left neural foraminal canal.  At the L3-4 level, there is prominent osseous hypertrophic changes in the region of the posterior elements. There is also some spondylosis noted posteriorly at this level along with a mild diffuse disk bulge resulting in at least mild narrowing of the central canal and fairly prominent narrowing of the left lateral recess.  At the L4-5 level, there is no significant canal narrowing. No significant neural foraminal canal narrowing is suggested.  At the L5-S1 level there is some minimal posterior spondylotic ridging. No significant central canal narrowing is identified. There is mild narrowing of either neural foraminal canal.        Review of Systems:  CONSTITUTIONAL: No fever, chills, weight  "loss  SKIN: No rash or itching  RESPIRATORY: No shortness of breath  GASTROINTESTINAL: No diarrhea, yes  constipation  GENITOURINARY: No urinary incontinence  MUSCULOSKELETAL:  - lumbar pain  - right leg pain  NEUROLOGICAL:   - Pain as above  - Strength in lower extremities is dec on the right side  - Sensation in lower extremities is abnormal, left arm numbness  - No bowel or bladder incontinence  PSYCHIATRIC: history of depression and anxiety     Physical Exam:  BP (!) 141/95 (BP Location: Right arm, Patient Position: Sitting, BP Method: Medium (Automatic))   Pulse 84   Resp 16   Ht 5' 10" (1.778 m)   Wt 114.8 kg (253 lb)   BMI 36.30 kg/m²   (reviewed on 1/12/2018)    General: alert and oriented, slightly slurred speech   Gait: normal gait  Skin: No rashes, No discoloration, No obvious lesions  HEENT: EOMI  Respiratory: respirations nonlabored    Musculoskeletal:  - Any pain on flexion, extension, rotation:     >> pain on ext and rotation of lumbar spine    >> limited spinal ROM  - Straight Leg Raise:    >> positive on RIGHT    >> negative on LEFT  - Any tenderness to palpation across lumbar paraspinal muscles, Sacroiliac joint/s, greater trochanteric bursa/s:    >>  tenderness along lumbar paraspinals    Neuro:  - Lower extremities:    >> strength slightly decreased on the left with knee extension and dorsiflexion, otherwise intact, LE tremor with movement  - Reflexes:     >> hyporeflexive  - Sensory:     >>sensation to light touch intact on left    >> decreased on the right          Assessment:  - Failed back surgery syndrome  - Lumbar Radiculopathy  - Lumbar Spondylosis  - Lumbar DDD        Plan:  Patient presents for follow-up complaining of chronic low back pain.  He fell from a deer stand years ago.  He has undergone 3 lumbar surgeries, most recent in 2000, with chronic pain. He has laminectomy and fusion of L4/5 and L5/S1.  He has seen several pain providers in the past who treated via what seems " primarily medication management, which appears his primary interest.  He has tried PT and spinal injections, which he reports were not effective. We have previously requested records, but we have not received any records, mostly because he has difficulty remembering who he has seen in the past.    - Will increase gabapentin 300mg TID to 400mg TID. He reports Lyrica was effective for him in the past, but insurance coverage will be a concern. We can further increase gabapentin if needed.   - He inquires about pain medication, which I inform him will not be our choice of treatment. He has been on several medications in the past, and I do not think that this would be a great benefit or have him stable with less pain.  - I offer PT, but he is not interested.  - I discussed the risks, benefits, and alternatives to potential treatment options. All questions and concerns were fully addressed today in clinic. Dr. Landeros was consulted regarding the patient plan and agrees.            >> Pain Disability Index:  1/12/2018 :: 52

## 2018-01-19 ENCOUNTER — OFFICE VISIT (OUTPATIENT)
Dept: FAMILY MEDICINE | Facility: CLINIC | Age: 58
End: 2018-01-19
Payer: MEDICAID

## 2018-01-19 ENCOUNTER — TELEPHONE (OUTPATIENT)
Dept: FAMILY MEDICINE | Facility: CLINIC | Age: 58
End: 2018-01-19

## 2018-01-19 ENCOUNTER — TELEPHONE (OUTPATIENT)
Dept: INTERNAL MEDICINE | Facility: CLINIC | Age: 58
End: 2018-01-19

## 2018-01-19 ENCOUNTER — LAB VISIT (OUTPATIENT)
Dept: LAB | Facility: HOSPITAL | Age: 58
End: 2018-01-19
Attending: FAMILY MEDICINE
Payer: MEDICAID

## 2018-01-19 VITALS
RESPIRATION RATE: 18 BRPM | HEART RATE: 93 BPM | HEIGHT: 70 IN | SYSTOLIC BLOOD PRESSURE: 132 MMHG | TEMPERATURE: 99 F | OXYGEN SATURATION: 99 % | WEIGHT: 257.06 LBS | DIASTOLIC BLOOD PRESSURE: 80 MMHG | BODY MASS INDEX: 36.8 KG/M2

## 2018-01-19 DIAGNOSIS — F19.20 DRUG DEPENDENCE: ICD-10-CM

## 2018-01-19 DIAGNOSIS — E66.01 SEVERE OBESITY (BMI 35.0-39.9) WITH COMORBIDITY: ICD-10-CM

## 2018-01-19 DIAGNOSIS — R29.6 FREQUENT FALLS: ICD-10-CM

## 2018-01-19 DIAGNOSIS — R42 DIZZINESS: ICD-10-CM

## 2018-01-19 DIAGNOSIS — G47.33 OSA ON CPAP: ICD-10-CM

## 2018-01-19 DIAGNOSIS — E87.4 MIXED ACID BASE BALANCE DISORDER: ICD-10-CM

## 2018-01-19 DIAGNOSIS — Z72.0 TOBACCO ABUSE: ICD-10-CM

## 2018-01-19 DIAGNOSIS — R29.6 FREQUENT FALLS: Primary | ICD-10-CM

## 2018-01-19 DIAGNOSIS — J01.00 ACUTE MAXILLARY SINUSITIS, RECURRENCE NOT SPECIFIED: ICD-10-CM

## 2018-01-19 LAB
ALBUMIN SERPL BCP-MCNC: 3.2 G/DL
ALP SERPL-CCNC: 97 U/L
ALT SERPL W/O P-5'-P-CCNC: 22 U/L
ANION GAP SERPL CALC-SCNC: 9 MMOL/L
AST SERPL-CCNC: 18 U/L
BASOPHILS # BLD AUTO: 0.09 K/UL
BASOPHILS NFR BLD: 1 %
BILIRUB SERPL-MCNC: 0.2 MG/DL
BUN SERPL-MCNC: 15 MG/DL
CALCIUM SERPL-MCNC: 10.3 MG/DL
CHLORIDE SERPL-SCNC: 100 MMOL/L
CO2 SERPL-SCNC: 31 MMOL/L
CREAT SERPL-MCNC: 1.4 MG/DL
DIFFERENTIAL METHOD: ABNORMAL
EOSINOPHIL # BLD AUTO: 0.3 K/UL
EOSINOPHIL NFR BLD: 2.9 %
ERYTHROCYTE [DISTWIDTH] IN BLOOD BY AUTOMATED COUNT: 13.2 %
EST. GFR  (AFRICAN AMERICAN): >60 ML/MIN/1.73 M^2
EST. GFR  (NON AFRICAN AMERICAN): 54.6 ML/MIN/1.73 M^2
FOLATE SERPL-MCNC: 5.7 NG/ML
GLUCOSE SERPL-MCNC: 47 MG/DL
HCT VFR BLD AUTO: 39.2 %
HGB BLD-MCNC: 13.4 G/DL
IMM GRANULOCYTES # BLD AUTO: 0.18 K/UL
IMM GRANULOCYTES NFR BLD AUTO: 2 %
LYMPHOCYTES # BLD AUTO: 3.3 K/UL
LYMPHOCYTES NFR BLD: 36.1 %
MAGNESIUM SERPL-MCNC: 1.5 MG/DL
MCH RBC QN AUTO: 29.6 PG
MCHC RBC AUTO-ENTMCNC: 34.2 G/DL
MCV RBC AUTO: 87 FL
MONOCYTES # BLD AUTO: 0.7 K/UL
MONOCYTES NFR BLD: 7.6 %
NEUTROPHILS # BLD AUTO: 4.6 K/UL
NEUTROPHILS NFR BLD: 50.4 %
NRBC BLD-RTO: 0 /100 WBC
PHOSPHATE SERPL-MCNC: 2.6 MG/DL
PLATELET # BLD AUTO: 317 K/UL
PMV BLD AUTO: 10 FL
POTASSIUM SERPL-SCNC: 3.8 MMOL/L
PROT SERPL-MCNC: 7.4 G/DL
RBC # BLD AUTO: 4.53 M/UL
SODIUM SERPL-SCNC: 140 MMOL/L
WBC # BLD AUTO: 9.03 K/UL

## 2018-01-19 PROCEDURE — 83735 ASSAY OF MAGNESIUM: CPT

## 2018-01-19 PROCEDURE — 82746 ASSAY OF FOLIC ACID SERUM: CPT

## 2018-01-19 PROCEDURE — 82607 VITAMIN B-12: CPT

## 2018-01-19 PROCEDURE — 36415 COLL VENOUS BLD VENIPUNCTURE: CPT | Mod: PO

## 2018-01-19 PROCEDURE — 84100 ASSAY OF PHOSPHORUS: CPT

## 2018-01-19 PROCEDURE — 99214 OFFICE O/P EST MOD 30 MIN: CPT | Mod: S$PBB,,, | Performed by: FAMILY MEDICINE

## 2018-01-19 PROCEDURE — 99999 PR PBB SHADOW E&M-EST. PATIENT-LVL V: CPT | Mod: PBBFAC,,, | Performed by: FAMILY MEDICINE

## 2018-01-19 PROCEDURE — 85025 COMPLETE CBC W/AUTO DIFF WBC: CPT

## 2018-01-19 PROCEDURE — 99215 OFFICE O/P EST HI 40 MIN: CPT | Mod: PBBFAC,PO | Performed by: FAMILY MEDICINE

## 2018-01-19 PROCEDURE — 86592 SYPHILIS TEST NON-TREP QUAL: CPT

## 2018-01-19 PROCEDURE — 80053 COMPREHEN METABOLIC PANEL: CPT

## 2018-01-19 RX ORDER — AMOXICILLIN AND CLAVULANATE POTASSIUM 875; 125 MG/1; MG/1
1 TABLET, FILM COATED ORAL EVERY 12 HOURS
Qty: 20 TABLET | Refills: 0 | Status: ON HOLD | OUTPATIENT
Start: 2018-01-19 | End: 2018-02-13 | Stop reason: HOSPADM

## 2018-01-19 NOTE — PROGRESS NOTES
"Subjective:       Patient ID: Shukri Rosales is a 57 y.o. male.    Chief Complaint: Hospital Follow Up    HPI   Hospital Follow Up  56yo male presents today for follow-up after recent [facility] admission for acute encephalopathy. Patient notes he had been outdoors cutting fire wood with his nephew in the middle of the night. He was tired so his nephew gave him a caffeine pill which caused him to "start jerking". He was later found urinating in his nephews bedroom. EMS was called to the home for transport. In the ER CT of the brain showed no acute abnormality. USD was positive for benzos, opiates and THC. Symptoms were attributed to drug induced encephalopathy. While admitted he complained of scrotal pain and US was performed without acute findings. He notes that he has been seen at [facility] for these same symptoms as well. He admits that he has not been taking his medications as prescribed. Some of his medications have been changed. He notes that he has not been monitoring his blood glucose levels since the admission. He has been taking 76 units in the AM of Humalog mix 50/50 and 34 units in the PM. He is also on Victoza and Metformin. He is followed by non[facility] and has not had a recent evaluation- he believes his last visit was 6 months ago. He is currently living at his sister's home. He is interested in Nursing Home Placement and has paperwork today for [facility]. He has also recently applied for independent living at low income housing with  for medication management. Frequent falls and dizziness are reported.     Review of Systems   Constitutional: Positive for activity change. Negative for appetite change, fatigue and unexpected weight change.   HENT: Positive for congestion, sinus pain and sinus pressure. Negative for ear pain and sore throat.    Eyes: Negative for redness, itching and visual disturbance.   Respiratory: Negative for cough and shortness of breath.  " "  Cardiovascular: Negative for chest pain and palpitations.   Gastrointestinal: Negative for abdominal pain, constipation, diarrhea and nausea.   Endocrine: Negative for polydipsia and polyuria.   Genitourinary: Negative for decreased urine volume and difficulty urinating.   Musculoskeletal: Positive for arthralgias. Negative for myalgias.   Skin: Negative for color change and rash.   Neurological: Positive for dizziness. Negative for weakness, light-headedness and headaches.   Psychiatric/Behavioral: Positive for dysphoric mood. Negative for sleep disturbance. The patient is not nervous/anxious.        Objective:   /80 Comment: standing  Pulse 93   Temp 98.7 °F (37.1 °C) (Temporal)   Resp 18   Ht 5' 10" (1.778 m)   Wt 116.6 kg (257 lb 0.9 oz)   SpO2 99%   BMI 36.88 kg/m²   Physical Exam   Constitutional: He is oriented to person, place, and time. He appears well-developed. No distress.   Obese, non-toxic   HENT:   Head: Normocephalic and atraumatic.   Right Ear: Tympanic membrane, external ear and ear canal normal.   Left Ear: Tympanic membrane, external ear and ear canal normal.   Nose: Mucosal edema and rhinorrhea present. Right sinus exhibits maxillary sinus tenderness. Left sinus exhibits maxillary sinus tenderness.   Mouth/Throat: Oropharynx is clear and moist.   Eyes: Conjunctivae and EOM are normal. Pupils are equal, round, and reactive to light.   Neck: Normal range of motion. Neck supple.   Cardiovascular: Normal rate, regular rhythm and normal heart sounds.    Pulmonary/Chest: Effort normal and breath sounds normal.   Abdominal: Soft. Bowel sounds are normal.   Musculoskeletal: He exhibits no edema.   Neurological: He is alert and oriented to person, place, and time. No cranial nerve deficit.   Skin: Skin is warm and dry. He is not diaphoretic.   Psychiatric: His speech is normal and behavior is normal. He exhibits a depressed mood.       Assessment:       1. Frequent falls    2. Dizziness  "   3. Acute maxillary sinusitis, recurrence not specified    4. Uncontrolled type 2 diabetes with neuropathy    5. Mixed acid base balance disorder    6. Drug dependence    7. EMMANUEL on CPAP    8. Severe obesity (BMI 35.0-39.9) with comorbidity    9. Tobacco abuse        Plan:      Frequent falls  Discussed symptoms and have advised proceeding with additional workup as well as consultation with Neurology.  -     MRI Brain W WO Contrast; Future; Expected date: 01/19/2018  -     Comprehensive metabolic panel; Future; Expected date: 01/19/2018  -     Magnesium; Future; Expected date: 01/19/2018  -     PHOSPHORUS; Future; Expected date: 01/19/2018  -     Urinalysis; Future; Expected date: 01/19/2018  -     Vitamin B12; Future; Expected date: 01/19/2018  -     Folate; Future; Expected date: 01/19/2018  -     RPR; Future; Expected date: 01/19/2018  -     Ambulatory Referral to Neurology    Dizziness  Negative orthostatic vitals in office. Proceed with ABX for sinusitis. Imaging and labs as above.  -     MRI Brain W WO Contrast; Future; Expected date: 01/19/2018  -     CBC auto differential; Future; Expected date: 01/19/2018    Acute maxillary sinusitis, recurrence not specified  -     amoxicillin-clavulanate 875-125mg (AUGMENTIN) 875-125 mg per tablet; Take 1 tablet by mouth every 12 (twelve) hours.  Dispense: 20 tablet; Refill: 0    Uncontrolled type 2 diabetes with neuropathy  Reviewed his current regimen. Have advised need for consistent dietary intake as well as medication administration. See NP Noemí for follow-up.    Mixed acid base balance disorder  Labs as above per #1.    Drug dependence  Discussed results of UDS at his request.    EMMANUEL on CPAP  Compliance with CPAP is advised.    Severe obesity (BMI 35.0-39.9) with comorbidity  Weight loss efforts have been encouraged through diet and lifestyle measures.    Tobacco abuse  Smoking cessation is advised.

## 2018-01-20 LAB
RPR SER QL: NORMAL
VIT B12 SERPL-MCNC: 365 PG/ML

## 2018-01-20 NOTE — TELEPHONE ENCOUNTER
Patient is scheduled for MRI of the brain with and without contrast. He has known CKD. What level does radiology use for creatinine/GFR to provide contrast? I may need to order non-contrast.

## 2018-01-20 NOTE — TELEPHONE ENCOUNTER
Guillermina contacted me with critical glucose of 47 drawn earlier today. Chart reviewed. Attempted to contact patient, but he did not answer his phone. Contacted his sister, Breanna Aguila (listed emergency contact). She reports he has eaten at least twice since having labs drawn this morning and is doing fine. Patient lives with her at this time. Advised that he monitor blood sugar closely and seek medical attention for persistent low blood sugar. Reviewed with sister how to treat low blood sugar. She expressed understanding.

## 2018-01-23 DIAGNOSIS — I10 ESSENTIAL HYPERTENSION: Primary | Chronic | ICD-10-CM

## 2018-01-23 NOTE — TELEPHONE ENCOUNTER
Unable to reach pt, left voice mail.  Message left stated he will need labs before MRI on 1/25/18. Orders are in epic and this test in non fasting. Please come in for lab draw. Number given [phone]

## 2018-01-24 NOTE — TELEPHONE ENCOUNTER
----- Message from Rafael Scruggs sent at 1/24/2018  3:28 PM CST -----  Contact: Pt   Pt called and requested a callback in regards to an appointment to come in to have paperwork completed to get into a nursing home pt declined times offered which was 2- pt states he need to be seen sooner and declined other providers paperwork can only be completed by pt PCP...[phone]

## 2018-01-25 ENCOUNTER — HOSPITAL ENCOUNTER (OUTPATIENT)
Dept: RADIOLOGY | Facility: HOSPITAL | Age: 58
Discharge: HOME | End: 2018-01-25
Attending: FAMILY MEDICINE
Payer: MEDICAID

## 2018-01-25 DIAGNOSIS — R29.6 FREQUENT FALLS: ICD-10-CM

## 2018-01-25 DIAGNOSIS — R42 DIZZINESS: ICD-10-CM

## 2018-01-25 PROCEDURE — 25500020 PHARM REV CODE 255: Performed by: FAMILY MEDICINE

## 2018-01-25 PROCEDURE — A9585 GADOBUTROL INJECTION: HCPCS | Performed by: FAMILY MEDICINE

## 2018-01-25 PROCEDURE — 70553 MRI BRAIN STEM W/O & W/DYE: CPT | Mod: TC

## 2018-01-25 RX ORDER — GADOBUTROL 604.72 MG/ML
10 INJECTION INTRAVENOUS
Status: COMPLETED | OUTPATIENT
Start: 2018-01-25 | End: 2018-01-25

## 2018-01-25 RX ADMIN — GADOBUTROL 10 ML: 604.72 INJECTION INTRAVENOUS at 02:01

## 2018-02-07 ENCOUNTER — TELEPHONE (OUTPATIENT)
Dept: FAMILY MEDICINE | Facility: CLINIC | Age: 58
End: 2018-02-07

## 2018-02-07 NOTE — TELEPHONE ENCOUNTER
----- Message from Ruth Juarez sent at 2/7/2018  2:37 PM CST -----  Contact: Emanuel/jackson brother   Call caller regarding getting pt fitted in to see the doctor. Caller states that pt is out on the street and need some forms filled out to get in a nursing home. Caller can be reached at [phone]

## 2018-02-08 ENCOUNTER — HOSPITAL ENCOUNTER (INPATIENT)
Facility: HOSPITAL | Age: 58
LOS: 5 days | Discharge: PSYCHIATRIC OR SUBSTANCE USE FACILITY | DRG: 917 | End: 2018-02-13
Attending: EMERGENCY MEDICINE | Admitting: INTERNAL MEDICINE
Payer: MEDICAID

## 2018-02-08 DIAGNOSIS — Z45.2 ENCOUNTER FOR CENTRAL LINE PLACEMENT: ICD-10-CM

## 2018-02-08 DIAGNOSIS — T50.902A SUICIDE ATTEMPT BY DRUG INGESTION, INITIAL ENCOUNTER: ICD-10-CM

## 2018-02-08 DIAGNOSIS — Z79.4 TYPE 2 DIABETES MELLITUS WITH STAGE 3 CHRONIC KIDNEY DISEASE, WITH LONG-TERM CURRENT USE OF INSULIN: ICD-10-CM

## 2018-02-08 DIAGNOSIS — E16.2 HYPOGLYCEMIA: Primary | ICD-10-CM

## 2018-02-08 DIAGNOSIS — G93.40 ACUTE ENCEPHALOPATHY: ICD-10-CM

## 2018-02-08 DIAGNOSIS — N18.30 TYPE 2 DIABETES MELLITUS WITH STAGE 3 CHRONIC KIDNEY DISEASE, WITH LONG-TERM CURRENT USE OF INSULIN: ICD-10-CM

## 2018-02-08 DIAGNOSIS — T68.XXXA HYPOTHERMIA: ICD-10-CM

## 2018-02-08 DIAGNOSIS — R41.82 ALTERED MENTAL STATUS: ICD-10-CM

## 2018-02-08 DIAGNOSIS — R78.81 BACTEREMIA: ICD-10-CM

## 2018-02-08 DIAGNOSIS — F60.3 BORDERLINE PERSONALITY DISORDER: ICD-10-CM

## 2018-02-08 DIAGNOSIS — R57.9 SHOCK CIRCULATORY: ICD-10-CM

## 2018-02-08 DIAGNOSIS — N18.3 ACUTE RENAL FAILURE SUPERIMPOSED ON STAGE 3 CHRONIC KIDNEY DISEASE, UNSPECIFIED ACUTE RENAL FAILURE TYPE: ICD-10-CM

## 2018-02-08 DIAGNOSIS — T50.914A: ICD-10-CM

## 2018-02-08 DIAGNOSIS — E87.8 ELECTROLYTE ABNORMALITY: ICD-10-CM

## 2018-02-08 DIAGNOSIS — T50.902A INTENTIONAL DRUG OVERDOSE: ICD-10-CM

## 2018-02-08 DIAGNOSIS — E11.22 TYPE 2 DIABETES MELLITUS WITH STAGE 3 CHRONIC KIDNEY DISEASE, WITH LONG-TERM CURRENT USE OF INSULIN: ICD-10-CM

## 2018-02-08 DIAGNOSIS — N17.9 ACUTE RENAL FAILURE SUPERIMPOSED ON STAGE 3 CHRONIC KIDNEY DISEASE, UNSPECIFIED ACUTE RENAL FAILURE TYPE: ICD-10-CM

## 2018-02-08 PROBLEM — I95.9 HYPOTENSION: Status: ACTIVE | Noted: 2018-02-08

## 2018-02-08 LAB
ALBUMIN SERPL BCP-MCNC: 3.2 G/DL
ALLENS TEST: ABNORMAL
ALP SERPL-CCNC: 61 U/L
ALT SERPL W/O P-5'-P-CCNC: 23 U/L
AMPHET+METHAMPHET UR QL: NORMAL
ANION GAP SERPL CALC-SCNC: 11 MMOL/L
ANION GAP SERPL CALC-SCNC: 16 MMOL/L
APTT BLDCRRT: 22.2 SEC
AST SERPL-CCNC: 24 U/L
BARBITURATES UR QL SCN>200 NG/ML: NEGATIVE
BASOPHILS # BLD AUTO: 0 K/UL
BASOPHILS # BLD AUTO: 0.01 K/UL
BASOPHILS NFR BLD: 0 %
BASOPHILS NFR BLD: 0.1 %
BENZODIAZ UR QL SCN>200 NG/ML: NORMAL
BILIRUB SERPL-MCNC: 0.4 MG/DL
BILIRUB UR QL STRIP: NEGATIVE
BNP SERPL-MCNC: 35 PG/ML
BUN SERPL-MCNC: 29 MG/DL
BUN SERPL-MCNC: 30 MG/DL
BZE UR QL SCN: NEGATIVE
CALCIUM SERPL-MCNC: 8.3 MG/DL
CALCIUM SERPL-MCNC: 9.3 MG/DL
CANNABINOIDS UR QL SCN: NEGATIVE
CHLORIDE SERPL-SCNC: 106 MMOL/L
CHLORIDE SERPL-SCNC: 110 MMOL/L
CK SERPL-CCNC: 260 U/L
CLARITY UR: CLEAR
CO2 SERPL-SCNC: 21 MMOL/L
CO2 SERPL-SCNC: 22 MMOL/L
COLOR UR: YELLOW
CORTIS SERPL-MCNC: 66.3 UG/DL
CREAT SERPL-MCNC: 2.1 MG/DL
CREAT SERPL-MCNC: 2.3 MG/DL
CREAT UR-MCNC: 143.7 MG/DL
DELSYS: ABNORMAL
DIFFERENTIAL METHOD: ABNORMAL
DIFFERENTIAL METHOD: ABNORMAL
EOSINOPHIL # BLD AUTO: 0 K/UL
EOSINOPHIL # BLD AUTO: 0.1 K/UL
EOSINOPHIL NFR BLD: 0.3 %
EOSINOPHIL NFR BLD: 0.7 %
ERYTHROCYTE [DISTWIDTH] IN BLOOD BY AUTOMATED COUNT: 14.5 %
ERYTHROCYTE [DISTWIDTH] IN BLOOD BY AUTOMATED COUNT: 14.6 %
ERYTHROCYTE [SEDIMENTATION RATE] IN BLOOD BY WESTERGREN METHOD: 14 MM/H
EST. GFR  (AFRICAN AMERICAN): 35 ML/MIN/1.73 M^2
EST. GFR  (AFRICAN AMERICAN): 39 ML/MIN/1.73 M^2
EST. GFR  (NON AFRICAN AMERICAN): 30 ML/MIN/1.73 M^2
EST. GFR  (NON AFRICAN AMERICAN): 33 ML/MIN/1.73 M^2
ETHANOL SERPL-MCNC: <10 MG/DL
FIO2: 100
FLUAV AG SPEC QL IA: NEGATIVE
FLUBV AG SPEC QL IA: NEGATIVE
GLUCOSE SERPL-MCNC: 103 MG/DL
GLUCOSE SERPL-MCNC: 57 MG/DL
GLUCOSE UR QL STRIP: NEGATIVE
HCO3 UR-SCNC: 22.9 MMOL/L (ref 24–28)
HCT VFR BLD AUTO: 32.9 %
HCT VFR BLD AUTO: 36.3 %
HGB BLD-MCNC: 10.6 G/DL
HGB BLD-MCNC: 11.8 G/DL
HGB UR QL STRIP: NEGATIVE
INR PPP: 1
KETONES UR QL STRIP: NEGATIVE
LACTATE SERPL-SCNC: 1.7 MMOL/L
LACTATE SERPL-SCNC: 2.5 MMOL/L
LACTATE SERPL-SCNC: 2.5 MMOL/L
LEUKOCYTE ESTERASE UR QL STRIP: ABNORMAL
LIPASE SERPL-CCNC: 26 U/L
LYMPHOCYTES # BLD AUTO: 0.9 K/UL
LYMPHOCYTES # BLD AUTO: 1.2 K/UL
LYMPHOCYTES NFR BLD: 10.7 %
LYMPHOCYTES NFR BLD: 9.4 %
MAGNESIUM SERPL-MCNC: 1.8 MG/DL
MAGNESIUM SERPL-MCNC: 2 MG/DL
MCH RBC QN AUTO: 29.2 PG
MCH RBC QN AUTO: 29.6 PG
MCHC RBC AUTO-ENTMCNC: 32.2 G/DL
MCHC RBC AUTO-ENTMCNC: 32.5 G/DL
MCV RBC AUTO: 91 FL
MCV RBC AUTO: 91 FL
METHADONE UR QL SCN>300 NG/ML: NEGATIVE
MICROSCOPIC COMMENT: ABNORMAL
MODE: ABNORMAL
MONOCYTES # BLD AUTO: 0.7 K/UL
MONOCYTES # BLD AUTO: 0.8 K/UL
MONOCYTES NFR BLD: 6.7 %
MONOCYTES NFR BLD: 8.8 %
NEUTROPHILS # BLD AUTO: 7.5 K/UL
NEUTROPHILS # BLD AUTO: 9 K/UL
NEUTROPHILS NFR BLD: 81.5 %
NEUTROPHILS NFR BLD: 81.8 %
NITRITE UR QL STRIP: NEGATIVE
OPIATES UR QL SCN: NEGATIVE
PCO2 BLDA: 45 MMHG (ref 35–45)
PCP UR QL SCN>25 NG/ML: NEGATIVE
PEEP: 5
PH SMN: 7.31 [PH] (ref 7.35–7.45)
PH UR STRIP: 6 [PH] (ref 5–8)
PHOSPHATE SERPL-MCNC: 3.9 MG/DL
PHOSPHATE SERPL-MCNC: 3.9 MG/DL
PLATELET # BLD AUTO: 273 K/UL
PLATELET # BLD AUTO: 309 K/UL
PMV BLD AUTO: 8.9 FL
PMV BLD AUTO: 9.1 FL
PO2 BLDA: 144 MMHG (ref 80–100)
POC BE: -3 MMOL/L
POC SATURATED O2: 99 % (ref 95–100)
POCT GLUCOSE: 105 MG/DL (ref 70–110)
POCT GLUCOSE: 109 MG/DL (ref 70–110)
POCT GLUCOSE: 109 MG/DL (ref 70–110)
POCT GLUCOSE: 121 MG/DL (ref 70–110)
POCT GLUCOSE: 138 MG/DL (ref 70–110)
POCT GLUCOSE: 193 MG/DL (ref 70–110)
POCT GLUCOSE: 200 MG/DL (ref 70–110)
POCT GLUCOSE: 32 MG/DL (ref 70–110)
POCT GLUCOSE: 76 MG/DL (ref 70–110)
POCT GLUCOSE: 79 MG/DL (ref 70–110)
POCT GLUCOSE: 82 MG/DL (ref 70–110)
POCT GLUCOSE: 90 MG/DL (ref 70–110)
POCT GLUCOSE: 98 MG/DL (ref 70–110)
POCT GLUCOSE: <20 MG/DL (ref 70–110)
POTASSIUM SERPL-SCNC: 3 MMOL/L
POTASSIUM SERPL-SCNC: 3.2 MMOL/L
PROCALCITONIN SERPL IA-MCNC: 0.18 NG/ML
PROT SERPL-MCNC: 6.8 G/DL
PROT UR QL STRIP: ABNORMAL
PROTHROMBIN TIME: 10.8 SEC
RBC # BLD AUTO: 3.63 M/UL
RBC # BLD AUTO: 3.98 M/UL
SAMPLE: ABNORMAL
SITE: ABNORMAL
SODIUM SERPL-SCNC: 143 MMOL/L
SODIUM SERPL-SCNC: 143 MMOL/L
SP GR UR STRIP: >=1.03 (ref 1–1.03)
SPECIMEN SOURCE: NORMAL
TOXICOLOGY INFORMATION: NORMAL
TROPONIN I SERPL DL<=0.01 NG/ML-MCNC: 0.02 NG/ML
TSH SERPL DL<=0.005 MIU/L-ACNC: 0.57 UIU/ML
URN SPEC COLLECT METH UR: ABNORMAL
UROBILINOGEN UR STRIP-ACNC: NEGATIVE EU/DL
VT: 450
WBC # BLD AUTO: 10.98 K/UL
WBC # BLD AUTO: 9.25 K/UL
WBC #/AREA URNS HPF: 10 /HPF (ref 0–5)

## 2018-02-08 PROCEDURE — 84484 ASSAY OF TROPONIN QUANT: CPT

## 2018-02-08 PROCEDURE — 25000003 PHARM REV CODE 250: Performed by: INTERNAL MEDICINE

## 2018-02-08 PROCEDURE — 99291 CRITICAL CARE FIRST HOUR: CPT | Mod: ,,, | Performed by: INTERNAL MEDICINE

## 2018-02-08 PROCEDURE — 87040 BLOOD CULTURE FOR BACTERIA: CPT | Mod: 59

## 2018-02-08 PROCEDURE — 36556 INSERT NON-TUNNEL CV CATH: CPT

## 2018-02-08 PROCEDURE — 63600175 PHARM REV CODE 636 W HCPCS: Performed by: EMERGENCY MEDICINE

## 2018-02-08 PROCEDURE — 85610 PROTHROMBIN TIME: CPT

## 2018-02-08 PROCEDURE — 87186 SC STD MICRODIL/AGAR DIL: CPT

## 2018-02-08 PROCEDURE — 94002 VENT MGMT INPAT INIT DAY: CPT

## 2018-02-08 PROCEDURE — 31500 INSERT EMERGENCY AIRWAY: CPT

## 2018-02-08 PROCEDURE — 84100 ASSAY OF PHOSPHORUS: CPT | Mod: 91

## 2018-02-08 PROCEDURE — 99900035 HC TECH TIME PER 15 MIN (STAT)

## 2018-02-08 PROCEDURE — 85025 COMPLETE CBC W/AUTO DIFF WBC: CPT | Mod: 91

## 2018-02-08 PROCEDURE — 82803 BLOOD GASES ANY COMBINATION: CPT

## 2018-02-08 PROCEDURE — 63600175 PHARM REV CODE 636 W HCPCS: Performed by: INTERNAL MEDICINE

## 2018-02-08 PROCEDURE — 99291 CRITICAL CARE FIRST HOUR: CPT | Mod: ,,, | Performed by: PSYCHIATRY & NEUROLOGY

## 2018-02-08 PROCEDURE — 25000003 PHARM REV CODE 250: Performed by: EMERGENCY MEDICINE

## 2018-02-08 PROCEDURE — 83690 ASSAY OF LIPASE: CPT

## 2018-02-08 PROCEDURE — 82533 TOTAL CORTISOL: CPT

## 2018-02-08 PROCEDURE — 02HV33Z INSERTION OF INFUSION DEVICE INTO SUPERIOR VENA CAVA, PERCUTANEOUS APPROACH: ICD-10-PCS | Performed by: EMERGENCY MEDICINE

## 2018-02-08 PROCEDURE — 80320 DRUG SCREEN QUANTALCOHOLS: CPT

## 2018-02-08 PROCEDURE — 82550 ASSAY OF CK (CPK): CPT

## 2018-02-08 PROCEDURE — 83735 ASSAY OF MAGNESIUM: CPT | Mod: 91

## 2018-02-08 PROCEDURE — 84100 ASSAY OF PHOSPHORUS: CPT

## 2018-02-08 PROCEDURE — 63600175 PHARM REV CODE 636 W HCPCS

## 2018-02-08 PROCEDURE — 5A1945Z RESPIRATORY VENTILATION, 24-96 CONSECUTIVE HOURS: ICD-10-PCS | Performed by: EMERGENCY MEDICINE

## 2018-02-08 PROCEDURE — 25000003 PHARM REV CODE 250

## 2018-02-08 PROCEDURE — 80048 BASIC METABOLIC PNL TOTAL CA: CPT

## 2018-02-08 PROCEDURE — 81000 URINALYSIS NONAUTO W/SCOPE: CPT

## 2018-02-08 PROCEDURE — 99900029 HC O2 SETUP (STAT)

## 2018-02-08 PROCEDURE — 84145 PROCALCITONIN (PCT): CPT

## 2018-02-08 PROCEDURE — 87086 URINE CULTURE/COLONY COUNT: CPT

## 2018-02-08 PROCEDURE — 36600 WITHDRAWAL OF ARTERIAL BLOOD: CPT

## 2018-02-08 PROCEDURE — 20000000 HC ICU ROOM

## 2018-02-08 PROCEDURE — 80307 DRUG TEST PRSMV CHEM ANLYZR: CPT

## 2018-02-08 PROCEDURE — 83605 ASSAY OF LACTIC ACID: CPT

## 2018-02-08 PROCEDURE — 99292 CRITICAL CARE ADDL 30 MIN: CPT

## 2018-02-08 PROCEDURE — 87400 INFLUENZA A/B EACH AG IA: CPT | Mod: 59

## 2018-02-08 PROCEDURE — 99292 CRITICAL CARE ADDL 30 MIN: CPT | Mod: ,,, | Performed by: INTERNAL MEDICINE

## 2018-02-08 PROCEDURE — 84443 ASSAY THYROID STIM HORMONE: CPT

## 2018-02-08 PROCEDURE — S0028 INJECTION, FAMOTIDINE, 20 MG: HCPCS | Performed by: INTERNAL MEDICINE

## 2018-02-08 PROCEDURE — 83735 ASSAY OF MAGNESIUM: CPT

## 2018-02-08 PROCEDURE — 36415 COLL VENOUS BLD VENIPUNCTURE: CPT

## 2018-02-08 PROCEDURE — 85730 THROMBOPLASTIN TIME PARTIAL: CPT

## 2018-02-08 PROCEDURE — 93010 ELECTROCARDIOGRAM REPORT: CPT | Mod: ,,, | Performed by: INTERNAL MEDICINE

## 2018-02-08 PROCEDURE — 80053 COMPREHEN METABOLIC PANEL: CPT

## 2018-02-08 PROCEDURE — 99291 CRITICAL CARE FIRST HOUR: CPT | Mod: 25

## 2018-02-08 PROCEDURE — 83880 ASSAY OF NATRIURETIC PEPTIDE: CPT

## 2018-02-08 PROCEDURE — 99900031 HC PATIENT EDUCATION (STAT)

## 2018-02-08 PROCEDURE — 97802 MEDICAL NUTRITION INDIV IN: CPT

## 2018-02-08 PROCEDURE — 0BH18EZ INSERTION OF ENDOTRACHEAL AIRWAY INTO TRACHEA, VIA NATURAL OR ARTIFICIAL OPENING ENDOSCOPIC: ICD-10-PCS | Performed by: EMERGENCY MEDICINE

## 2018-02-08 PROCEDURE — 87077 CULTURE AEROBIC IDENTIFY: CPT | Mod: 59

## 2018-02-08 RX ORDER — FAMOTIDINE 10 MG/ML
20 INJECTION INTRAVENOUS DAILY
Status: DISCONTINUED | OUTPATIENT
Start: 2018-02-08 | End: 2018-02-12

## 2018-02-08 RX ORDER — SUCCINYLCHOLINE CHLORIDE 20 MG/ML
100 INJECTION INTRAMUSCULAR; INTRAVENOUS
Status: COMPLETED | OUTPATIENT
Start: 2018-02-08 | End: 2018-02-08

## 2018-02-08 RX ORDER — PROPOFOL 10 MG/ML
5 INJECTION, EMULSION INTRAVENOUS CONTINUOUS
Status: DISCONTINUED | OUTPATIENT
Start: 2018-02-08 | End: 2018-02-09

## 2018-02-08 RX ORDER — DEXTROSE MONOHYDRATE AND SODIUM CHLORIDE 5; .9 G/100ML; G/100ML
INJECTION, SOLUTION INTRAVENOUS CONTINUOUS
Status: DISCONTINUED | OUTPATIENT
Start: 2018-02-08 | End: 2018-02-09

## 2018-02-08 RX ORDER — ETOMIDATE 2 MG/ML
20 INJECTION INTRAVENOUS
Status: COMPLETED | OUTPATIENT
Start: 2018-02-08 | End: 2018-02-08

## 2018-02-08 RX ORDER — DEXTROSE 50 % IN WATER (D50W) INTRAVENOUS SYRINGE
Status: DISPENSED
Start: 2018-02-08 | End: 2018-02-08

## 2018-02-08 RX ORDER — POTASSIUM CHLORIDE 14.9 MG/ML
20 INJECTION INTRAVENOUS EVERY 4 HOURS
Status: COMPLETED | OUTPATIENT
Start: 2018-02-08 | End: 2018-02-08

## 2018-02-08 RX ORDER — POTASSIUM CHLORIDE 20 MEQ/15ML
40 SOLUTION ORAL ONCE
Status: COMPLETED | OUTPATIENT
Start: 2018-02-08 | End: 2018-02-08

## 2018-02-08 RX ORDER — DEXTROSE 50 % IN WATER (D50W) INTRAVENOUS SYRINGE
25
Status: COMPLETED | OUTPATIENT
Start: 2018-02-08 | End: 2018-02-08

## 2018-02-08 RX ORDER — MICONAZOLE NITRATE 2 %
POWDER (GRAM) TOPICAL 2 TIMES DAILY
Status: DISCONTINUED | OUTPATIENT
Start: 2018-02-08 | End: 2018-02-14 | Stop reason: HOSPADM

## 2018-02-08 RX ORDER — NOREPINEPHRINE BITARTRATE/D5W 4MG/250ML
0.05 PLASTIC BAG, INJECTION (ML) INTRAVENOUS CONTINUOUS
Status: DISCONTINUED | OUTPATIENT
Start: 2018-02-08 | End: 2018-02-09

## 2018-02-08 RX ORDER — DEXTROSE MONOHYDRATE AND SODIUM CHLORIDE 5; .9 G/100ML; G/100ML
INJECTION, SOLUTION INTRAVENOUS CONTINUOUS
Status: DISCONTINUED | OUTPATIENT
Start: 2018-02-08 | End: 2018-02-08

## 2018-02-08 RX ORDER — PROPOFOL 10 MG/ML
20 INJECTION, EMULSION INTRAVENOUS
Status: DISCONTINUED | OUTPATIENT
Start: 2018-02-08 | End: 2018-02-08

## 2018-02-08 RX ADMIN — POTASSIUM CHLORIDE 20 MEQ: 14.9 INJECTION, SOLUTION INTRAVENOUS at 03:02

## 2018-02-08 RX ADMIN — PROPOFOL 10 MCG/KG/MIN: 10 INJECTION, EMULSION INTRAVENOUS at 05:02

## 2018-02-08 RX ADMIN — SODIUM CHLORIDE 3477 ML: 0.9 INJECTION, SOLUTION INTRAVENOUS at 01:02

## 2018-02-08 RX ADMIN — PIPERACILLIN AND TAZOBACTAM 4.5 G: 4; .5 INJECTION, POWDER, LYOPHILIZED, FOR SOLUTION INTRAVENOUS; PARENTERAL at 07:02

## 2018-02-08 RX ADMIN — ETOMIDATE 20 MG: 20 INJECTION, SOLUTION INTRAVENOUS at 01:02

## 2018-02-08 RX ADMIN — DEXTROSE MONOHYDRATE 25 G: 500 INJECTION PARENTERAL at 03:02

## 2018-02-08 RX ADMIN — ANTI-FUNGAL POWDER MICONAZOLE NITRATE TALC FREE: 1.42 POWDER TOPICAL at 09:02

## 2018-02-08 RX ADMIN — FAMOTIDINE 20 MG: 10 INJECTION, SOLUTION INTRAVENOUS at 09:02

## 2018-02-08 RX ADMIN — PROPOFOL 5 MCG/KG/MIN: 10 INJECTION, EMULSION INTRAVENOUS at 01:02

## 2018-02-08 RX ADMIN — DEXTROSE MONOHYDRATE AND SODIUM CHLORIDE: 5; .9 INJECTION, SOLUTION INTRAVENOUS at 09:02

## 2018-02-08 RX ADMIN — PROPOFOL 5 MCG/KG/MIN: 10 INJECTION, EMULSION INTRAVENOUS at 05:02

## 2018-02-08 RX ADMIN — Medication 0.05 MCG/KG/MIN: at 03:02

## 2018-02-08 RX ADMIN — VANCOMYCIN HYDROCHLORIDE 1500 MG: 1 INJECTION, POWDER, LYOPHILIZED, FOR SOLUTION INTRAVENOUS at 08:02

## 2018-02-08 RX ADMIN — POTASSIUM CHLORIDE 20 MEQ: 14.9 INJECTION, SOLUTION INTRAVENOUS at 05:02

## 2018-02-08 RX ADMIN — DEXTROSE MONOHYDRATE AND SODIUM CHLORIDE: 5; .9 INJECTION, SOLUTION INTRAVENOUS at 02:02

## 2018-02-08 RX ADMIN — SUCCINYLCHOLINE CHLORIDE 100 MG: 20 INJECTION, SOLUTION INTRAMUSCULAR; INTRAVENOUS at 01:02

## 2018-02-08 RX ADMIN — DEXTROSE MONOHYDRATE AND SODIUM CHLORIDE: 5; .9 INJECTION, SOLUTION INTRAVENOUS at 07:02

## 2018-02-08 RX ADMIN — POTASSIUM CHLORIDE 40 MEQ: 20 SOLUTION ORAL at 04:02

## 2018-02-08 RX ADMIN — PIPERACILLIN AND TAZOBACTAM 4.5 G: 4; .5 INJECTION, POWDER, LYOPHILIZED, FOR SOLUTION INTRAVENOUS; PARENTERAL at 03:02

## 2018-02-08 RX ADMIN — PIPERACILLIN AND TAZOBACTAM 4.5 G: 4; .5 INJECTION, POWDER, LYOPHILIZED, FOR SOLUTION INTRAVENOUS; PARENTERAL at 11:02

## 2018-02-08 RX ADMIN — DEXTROSE MONOHYDRATE 25 G: 500 INJECTION PARENTERAL at 01:02

## 2018-02-08 NOTE — H&P
"[facility] Hospital Medicine  History & Physical    Patient Name: Shukri Rosales  MRN: [id number]  Admission Date: 2/8/2018  Attending Physician: Henry Gentile MD  Primary Care Provider: Farhana Burnham MD         Patient information was obtained from past medical records and ER records.     Subjective:     Principal Problem:Acute encephalopathy    Chief Complaint:   Chief Complaint   Patient presents with    Altered Mental Status     patient found outside by sister with AMS        HPI: Mr. Rosales is a 58 y/o  male with h/o T2DM, HTN, psychiatric problems, unable to take care of himself at home (per brother), was in the process of seeking NH placement, was brought to the ED twice in the last 2 days.    Patient was brought to the ED on 2/7/18 for AMS. Patient was "acting confused" and sitting in a chair in the carport. He reported h/o amphetamine use, but denied use recently. Per yesterday's chart review, he missed 2-3 days of his regular medications, hence he reportedly then all at once. He was evaluated in the ED yesterday, was found to be altered initially, but later was able to ambulate, fully alert, oriented x3 and was discharged via taxi at around 10:30 AM yesterday.    After going home, family found him to be unresponsive again in the late hours of 2/7/18. Per chart review, patient lives with a "mentally challenged" sister, who reportedly told the first responders that she is no longer able to take care of the patient. Upon arrival, EMS arrival, patient's GCS was 3, initial glucose was 37. He was intervened in the field and brought to the ED. He was emergently intubated in ED. BP was initially high, precipitously dropped to systolic 60's. Central line placed and levophed started. Unclear etiology of hypotension. No reported fever.    Labs reviewed. Lactic acid 2.5. Creatinine 2.1. K 3.1 UDS + for amphetamines, benzos. Currently intubated on tiny dose of " propofol.    Past Medical History:   Diagnosis Date    Adrenal cortical adenoma     Anxiety     Arthritis     CKD (chronic kidney disease) stage 3, GFR 30-59 ml/min     Depression     Diabetes mellitus type II 2011    does not take    DM (diabetes mellitus) 2011     am 09/21/2017 Insulin x 1 1/2 year    GERD (gastroesophageal reflux disease) 7/9/2013    Hypertension     Migraine headache 7/22/2013    Schizophrenia     Severe obesity with body mass index of 36.0 to 36.9        Past Surgical History:   Procedure Laterality Date    BACK SURGERY      HERNIA REPAIR      UMBILICAL    left arm exfix      NASAL SEPTUM SURGERY Bilateral     TONSILLECTOMY      URETEROSCOPY         Review of patient's allergies indicates:  No Known Allergies    Current Facility-Administered Medications on File Prior to Encounter   Medication    [COMPLETED] 0.9%  NaCl infusion    [DISCONTINUED] naloxone injection 2 mg     Current Outpatient Prescriptions on File Prior to Encounter   Medication Sig    acetaminophen-codeine 300-30mg (TYLENOL #3) 300-30 mg Tab Take 1 tablet by mouth 2 (two) times daily as needed.    amlodipine (NORVASC) 5 MG tablet TAKE 1 TABLET(5 MG) BY MOUTH EVERY DAY    amoxicillin-clavulanate 875-125mg (AUGMENTIN) 875-125 mg per tablet Take 1 tablet by mouth every 12 (twelve) hours.    blood sugar diagnostic Strp 1 each by Misc.(Non-Drug; Combo Route) route 3 (three) times daily before meals. For One Touch Verio flex    blood-glucose meter kit One Touch Verio Meter    clonazePAM (KLONOPIN) 0.5 MG tablet TAKE ONE TABLET BY MOUTH TWICE DAILY AS NEEDED MUST LAST 30 DAYS    cloNIDine (CATAPRES) 0.3 MG tablet Take 1 tablet (0.3 mg total) by mouth 3 (three) times daily.    gabapentin (NEURONTIN) 400 MG capsule Take 1 capsule (400 mg total) by mouth 3 (three) times daily.    insulin lispro protamin-lispro (HUMALOG MIX 50-50) 100 unit/mL (50-50) Susp Inject 76 units before breakfast and 40 units  "before dinner    insulin syringe-needle U-100 1 mL 31 gauge x 5/16 Syrg 1 Syringe by Misc.(Non-Drug; Combo Route) route 3 (three) times daily.    lancets (ONETOUCH DELICA LANCETS) 33 gauge Misc 1 lancet by Misc.(Non-Drug; Combo Route) route 3 (three) times daily.    liraglutide 0.6 mg/0.1 mL, 18 mg/3 mL, subq PNIJ (VICTOZA 3-NEGRITA) 0.6 mg/0.1 mL (18 mg/3 mL) PnIj Inject 1.8 mg into the skin Daily.    lisinopril-hydrochlorothiazide (PRINZIDE,ZESTORETIC) 20-25 mg Tab TAKE 1 TABLET BY MOUTH EVERY MORNING    lurasidone (LATUDA) 60 mg Tab tablet Take 60 mg by mouth once daily.    metformin (GLUCOPHAGE) 500 MG tablet TAKE 1 TABLET BY MOUTH TWICE DAILY WITH MEALS    metoprolol tartrate (LOPRESSOR) 50 MG tablet Take 1 tablet (50 mg total) by mouth 2 (two) times daily.    pantoprazole (PROTONIX) 40 MG tablet TAKE 1 TABLET(40 MG) BY MOUTH EVERY DAY    pen needle, diabetic 32 gauge x 5/32" Ndle 1 each by Misc.(Non-Drug; Combo Route) route once daily.    polyethylene glycol (GLYCOLAX) 17 gram/dose powder Take 17 g by mouth once daily.    pravastatin (PRAVACHOL) 20 MG tablet Take 1 tablet (20 mg total) by mouth every evening.    quetiapine (SEROQUEL) 200 MG Tab Take 200 mg by mouth once daily.     quetiapine (SEROQUEL) 300 MG Tab Take 1 tablet (300 mg total) by mouth once daily. (Patient taking differently: Take 200 mg by mouth once daily. )    sertraline (ZOLOFT) 100 MG tablet Take 2 tablets (200 mg total) by mouth once daily.    trazodone (DESYREL) 300 MG tablet TK 1 T PO  QHS     Family History     Problem Relation (Age of Onset)    Cancer Paternal Grandmother, Paternal Grandfather    Cataracts Mother    Diabetes Mother, Sister, Brother    Hypertension Mother, Sister, Brother        Social History Main Topics    Smoking status: Current Every Day Smoker     Packs/day: 0.50     Years: 44.00     Types: Cigarettes    Smokeless tobacco: Never Used      Comment: instructed not to smoke after midnight after midnight " prior to surgery    Alcohol use Yes      Comment: social    Drug use: No    Sexual activity: No     Review of Systems   Unable to perform ROS: Intubated     Objective:     Vital Signs (Most Recent):  Temp: 97.3 °F (36.3 °C) (02/08/18 0441)  Pulse: 70 (02/08/18 0541)  Resp: 17 (02/08/18 0441)  BP: 121/69 (02/08/18 0441)  SpO2: 97 % (02/08/18 0541) Vital Signs (24h Range):  Temp:  [94 °F (34.4 °C)-97.3 °F (36.3 °C)] 97.3 °F (36.3 °C)  Pulse:  [43-83] 70  Resp:  [12-25] 17  SpO2:  [95 %-100 %] 97 %  BP: ()/() 121/69     Weight: 115.9 kg (255 lb 8 oz)  Body mass index is 36.66 kg/m².    Physical Exam   Constitutional: He appears well-developed and well-nourished. He is intubated.   Intubated, sedated on propofol   HENT:   Head: Normocephalic and atraumatic.   Eyes: Conjunctivae are normal. Pupils are equal, round, and reactive to light. No scleral icterus.   Neck: No thyromegaly present.   Cardiovascular: Normal rate, regular rhythm and intact distal pulses.    No murmur heard.  Pulmonary/Chest: He is intubated. He has no wheezes. He has no rhonchi. He has no rales.   Abdominal: Soft. He exhibits distension (obese). He exhibits no mass.   Musculoskeletal: He exhibits no edema or deformity.   Lymphadenopathy:     He has no cervical adenopathy.   Neurological:   Intubated, sedated on propofol, pupils small reactive   Skin: Skin is warm. No rash noted. No erythema.   Nursing note and vitals reviewed.        CRANIAL NERVES     CN III, IV, VI   Pupils are equal, round, and reactive to light.       Significant Labs:   A1C:   Recent Labs  Lab 11/01/17  1119 12/23/17  0508   HGBA1C 7.4* 7.7*     ABGs:   Recent Labs  Lab 02/08/18  0144   PH 7.314*   PCO2 45.0   HCO3 22.9*   POCSATURATED 99   BE -3     Bilirubin:   Recent Labs  Lab 01/19/18  1012 02/06/18  2242 02/08/18  0130   BILITOT 0.2 0.7 0.4     Blood Culture: No results for input(s): LABBLOO in the last 48 hours.  BMP:   Recent Labs  Lab 02/08/18  0130   GLU  57*      K 3.0*      CO2 21*   BUN 29*   CREATININE 2.1*   CALCIUM 9.3   MG 2.0     CBC:   Recent Labs  Lab 02/06/18 2242 02/08/18  0130   WBC 10.39 10.98   HGB 12.9* 11.8*   HCT 38.9* 36.3*    309     CMP:   Recent Labs  Lab 02/06/18 2242 02/08/18  0130    143   K 3.1* 3.0*    106   CO2 21* 21*   * 57*   BUN 33* 29*   CREATININE 2.2* 2.1*   CALCIUM 9.6 9.3   PROT 7.3 6.8   ALBUMIN 3.4* 3.2*   BILITOT 0.7 0.4   ALKPHOS 64 61   AST 26 24   ALT 26 23   ANIONGAP 16 16   EGFRNONAA 32* 33*     Cardiac Markers:   Recent Labs  Lab 02/08/18  0130   BNP 35     Coagulation:   Recent Labs  Lab 02/08/18 0130   INR 1.0   APTT 22.2     Lactic Acid:   Recent Labs  Lab 02/08/18  0245   LACTATE 2.5*     Lipid Panel: No results for input(s): CHOL, HDL, LDLCALC, TRIG, CHOLHDL in the last 48 hours.  Pathology Results  (Last 10 years)    None        Prealbumin: No results for input(s): PREALBUMIN in the last 48 hours.  Respiratory Culture: No results for input(s): GSRESP, RESPIRATORYC in the last 48 hours.  Troponin:   Recent Labs  Lab 02/06/18 2242 02/08/18  0130   TROPONINI 0.018 0.016     Urine Culture: No results for input(s): LABURIN in the last 48 hours.  Urine Studies:   Recent Labs  Lab 02/06/18  2349 02/08/18  0137   COLORU Yellow Yellow   APPEARANCEUA Hazy* Clear   PHUR 6.0 6.0   SPECGRAV >=1.030* >=1.030*   PROTEINUA 3+* Trace*   GLUCUA Trace* Negative   KETONESU Trace* Negative   BILIRUBINUA 1+* Negative   OCCULTUA Trace* Negative   NITRITE Negative Negative   UROBILINOGEN 1.0 Negative   LEUKOCYTESUR Negative 1+*   RBCUA 5*  --    WBCUA 10* 10*   BACTERIA Moderate*  --    HYALINECASTS 0  --      All pertinent labs within the past 24 hours have been reviewed.    Significant Imaging: I have reviewed and interpreted all pertinent imaging results/findings within the past 24 hours.     Imaging Results          CT Head Without Contrast (In process)                X-Ray Chest 1 View (In  "process)                X-Ray Chest AP Portable (Preliminary result)  Result time 02/08/18 02:07:16    ED Interpretation by Sánchez Rendon Jr., MD (02/08/18 02:07:16, [facility] , Emergency Medicine)    ET tube in position. No pneumothorax.                                I have independently reviewed and interpreted the EKG. My findings include NSR    I have independently reviewed all pertinent labs within the past 24 hours.    I have independently reviewed, visualized and interpreted all pertinent imaging results within the past 24 hours and discussed the findings with the ED physician, .            Assessment/Plan:     * Acute encephalopathy    Likely due to severe hypoglycemia vs drug overdose vs others.  Was found lethargic at home for many hours.  CT head unremarkable.  Consider neurology consult when patient wakes up.  Will empirically cover with Vanc, Zosyn.  Follow up on cultures.            Drug overdose, multiple drugs, undetermined intent, initial encounter    Apparently per chart review, patient was trying to "catchup on his 2-3 days of missed medications by taking all at once".  Unclear if intentional vs unintentional.  Yesterday's chart review reveals patient denies suicidal ideations.  Currently intubated, unable to determine intent or what medications he took.          Hypotension    Initial BP was OK upon arrival, dropped to systolci 60's.  Currently on levophed drip.  Titrate to maintain MAP > 65.  Continue IV fluids.  Pulmonary consult.          Acute renal failure superimposed on stage 3 chronic kidney disease    Creatinine 2.1 (baseline around 1.3-1.7).  Continue IV fluids.          Hypoglycemia    Initial BS 37.  Continue D5W drip as current, accuchecks every hour.  Likely etiology of AMS.          Type 2 diabetes mellitus with diabetic chronic kidney disease    Hold all diabetic agents due to hypoglycemia.          Hypothermia    Improved with warming blanket.        "   Borderline personality disorder    Per chart review, family unable to take care of him at home.  Family started NH placement.  Case management consul for discharge planning.            VTE Risk Mitigation         Ordered     Medium Risk of VTE  Once      02/08/18 0535     Place EDVIN hose  Until discontinued      02/08/18 0535     Place sequential compression device  Until discontinued      02/08/18 0535        Critical care time spent on the evaluation and treatment of severe organ dysfunction, review of pertinent labs and imaging studies, discussions with consulting providers and discussions with patient/family: 45 minutes.     Henry Gentile MD  Department of Hospital Medicine   [facility]

## 2018-02-08 NOTE — ASSESSMENT & PLAN NOTE
UDS positive for Amphetamines and Benzodiazepines. Close monitoring. Case management for counseling once he is off the ventilator.

## 2018-02-08 NOTE — HPI
"Mr. Louise is a 58 y/o  male with h/o T2DM, HTN, psychiatric problems, unable to take care of himself at home (per brother), was in the process of seeking NH placement, was brought to the ED twice in the last 2 days.    Patient was brought to the ED on 2/7/18 for AMS. Patient was "acting confused" and sitting in a chair in the carport. He reported h/o amphetamine use, but denied use recently. Per yesterday's chart review, he missed 2-3 days of his regular medications, hence he reportedly then all at once. He was evaluated in the ED yesterday, was found to be altered initially, but later was able to ambulate, fully alert, oriented x3 and was discharged via taxi at around 10:30 AM yesterday.    After going home, family found him to be unresponsive again in the late hours of 2/7/18. Per chart review, patient lives with a "mentally challenged" sister, who reportedly told the first responders that she is no longer able to take care of the patient. Upon arrival, EMS arrival, patient's GCS was 3, initial glucose was 37. He was intervened in the field and brought to the ED. He was emergently intubated in ED. BP was initially high, precipitously dropped to systolic 60's. Central line placed and levophed started. Unclear etiology of hypotension. No reported fever.    Labs reviewed. Lactic acid 2.5. Creatinine 2.1. K 3.1 UDS + for amphetamines, benzos. Currently intubated on tiny dose of propofol.  "

## 2018-02-08 NOTE — CONSULTS
Consult Note  Neurological ICU      Consult Requested By: Skylar Beltran MD  Reason for Consult: Coma, encephalopathy    SUBJECTIVE:     History of Present Illness:  The patient's medical records are reviewed for the consult as patient is intubated and sedated.  The records indicate that the patient was found down and unresponsive at his sister's home.  EMS was called and the patient was found to be hypoglycemic, hypotensive and in respiratory distress.  He was rapidly transported to the hospital where he was intubated in the ER.  He was to be positive on his drug screen for opiates, THC.    Additional records review indicates that the patient had been hospitalized about one month ago with a very similar history.  He has long standing known history of recreational drug abuse, alcoholism, non adherence to his diabetes and anti hypertensive medications.  He has also had an established history of bipolar disorder/schizophrenia, and had been known to take prescription drugs haphazardly, if at all.  The family has indicated that they can no longer provide care for him.    Review of patient's allergies indicates:  No Known Allergies    Past Medical History:   Diagnosis Date    Adrenal cortical adenoma     Anxiety     Arthritis     CKD (chronic kidney disease) stage 3, GFR 30-59 ml/min     Depression     Diabetes mellitus type II 2011    does not take    DM (diabetes mellitus) 2011     am 09/21/2017 Insulin x 1 1/2 year    GERD (gastroesophageal reflux disease) 7/9/2013    Hypertension     Migraine headache 7/22/2013    Schizophrenia     Severe obesity with body mass index of 36.0 to 36.9      Past Surgical History:   Procedure Laterality Date    BACK SURGERY      HERNIA REPAIR      UMBILICAL    left arm exfix      NASAL SEPTUM SURGERY Bilateral     TONSILLECTOMY      URETEROSCOPY       Family History   Problem Relation Age of Onset    Hypertension Mother     Diabetes Mother     Cataracts Mother      Hypertension Sister     Diabetes Sister     Hypertension Brother     Diabetes Brother     Cancer Paternal Grandmother     Cancer Paternal Grandfather      Social History   Substance Use Topics    Smoking status: Current Every Day Smoker     Packs/day: 0.50     Years: 44.00     Types: Cigarettes    Smokeless tobacco: Never Used      Comment: instructed not to smoke after midnight after midnight prior to surgery    Alcohol use Yes      Comment: social        Review of Systems:  Review of systems not obtained due to patient factors sedated, intubated..    OBJECTIVE:     Vital Signs (Most Recent)  Temp: 97.4 °F (36.3 °C) (02/08/18 1100)  Pulse: (!) 58 (02/08/18 1315)  Resp: 15 (02/08/18 1315)  BP: 125/73 (02/08/18 1300)  SpO2: 100 % (02/08/18 1315)    Vital Signs Range (Last 24H):  Temp:  [94 °F (34.4 °C)-97.5 °F (36.4 °C)]   Pulse:  [43-71]   Resp:  [13-22]   BP: ()/()   SpO2:  [97 %-100 %]   Ventilator Data (Last 24H):     Vent Mode: A/C  Oxygen Concentration (%):  [70] 70  Resp Rate Total:  [14 br/min-19 br/min] 14 br/min  Vt Set:  [450 mL] 450 mL  PEEP/CPAP:  [5 cmH20] 5 cmH20  Pressure Support:  [0 cmH20] 0 cmH20  Mean Airway Pressure:  [7.9 esY78-35 cmH20] 9 cmH20    Hemodynamic Parameters (Last 24H):       Physical Exam:  General: morbidly obese, sedated, intubated  Eyes: negative findings: corneas clear   Neck: supple, symmetrical, trachea midline, no JVD  Lungs:  rales bilaterally  Cardiovascular: Heart: regular rate and rhythm. Chest Wall: not examined. Extremities: no cyanosis or edema, or clubbing. Pulses: not examined.  Abdomen/Rectal: Abdomen: normal findings: soft, non-tender and abnormal findings:  hypoactive bowel sounds. Rectal: not examined  Neurologic: Mental status: alertness: comatose  Cranial nerves: II: pupils dilated bilaterally, III,IV,VI: extraocular muscles oculocephalic reflex intact, V,VII: corneal reflex present bilaterally, IX,X: gag reflex present  Sensory:  Sedation stopped, and patient had brief eye opening to pain stimulurs, but did not withdraw from pain otherwise.  Motor:Flaccid tone, both arms and legs.  No withdrawal from pain  Reflexes: Absent stretch reflexes.  Plantar stimulation is moot bilaterally    Lines/Drains:       Percutaneous Central Line Insertion/Assessment - triple lumen  02/08/18 0232 right internal jugular (Active)   Dressing biopatch in place;dressing dry and intact 2/8/2018  7:05 AM   Securement secured w/ sutures 2/8/2018  7:05 AM   Distal Patency/Care infusing 2/8/2018  7:05 AM   Medial Patency/Care normal saline locked 2/8/2018  7:05 AM   Proximal Patency/Care infusing 2/8/2018  7:05 AM   Dressing Change Due 02/13/18 2/8/2018  7:05 AM   Daily Line Review Performed 2/8/2018  7:05 AM   Number of days: 0            Peripheral IV - Single Lumen 02/08/18 Left Forearm (Active)   Site Assessment Clean;Dry;Intact;No redness;No swelling;No warmth;No drainage 2/8/2018  7:05 AM   Line Status Saline locked 2/8/2018  7:05 AM   Dressing Status Clean;Dry;Intact 2/8/2018  7:05 AM   Dressing Intervention New dressing 2/8/2018  6:00 AM   Dressing Change Due 02/12/18 2/8/2018  6:00 AM   Site Change Due 02/11/18 2/8/2018  6:00 AM   Reason Not Rotated Not due 2/8/2018  7:05 AM   Number of days: 0            Peripheral IV - Single Lumen 02/08/18 Right Antecubital (Active)   Site Assessment Clean;Dry;Intact;No redness;No swelling;No warmth;No drainage 2/8/2018  7:05 AM   Line Status Saline locked 2/8/2018  7:05 AM   Dressing Status Clean;Dry;Intact 2/8/2018  7:05 AM   Dressing Intervention New dressing 2/8/2018  6:00 AM   Dressing Change Due 02/11/18 2/8/2018  6:00 AM   Site Change Due 02/11/18 2/8/2018  6:00 AM   Reason Not Rotated Not due 2/8/2018  7:05 AM   Number of days: 0            NG/OG Tube 02/08/18 0125 orogastric 18 Fr. (Active)   Placement Check placement verified by aspirate pH 2/8/2018  7:05 AM   pH Aspirate Result 4 2/8/2018  7:05 AM   Securement  "taped to commercial device 2/8/2018  7:05 AM   Clamp Status/Tolerance clamped 2/8/2018  7:05 AM   Insertion Site Appearance no redness, warmth, tenderness, skin breakdown, drainage 2/8/2018  7:05 AM   Drainage Green;Cloudy 2/8/2018  7:05 AM   Number of days: 0            Urethral Catheter 02/08/18 0112 Double-lumen;Latex 16 Fr. (Active)   Site Assessment Clean;Intact 2/8/2018  7:05 AM   Collection Container Urimeter 2/8/2018  7:05 AM   Securement Method secured to top of thigh w/ adhesive device 2/8/2018  7:05 AM   Catheter Care Performed yes 2/8/2018  6:00 AM   Reason for Continuing Urinary Catheterization Critically ill in ICU requiring intensive monitoring 2/8/2018  7:05 AM   CAUTI Prevention Bundle StatLock in place w 1" slack;Intact seal between catheter & drainage tubing;Drainage bag off the floor;Green sheeting clip in use;No dependent loops or kinks;Drainage bag not overfilled (<2/3 full);Drainage bag below bladder 2/8/2018  7:05 AM   Output (mL) 10 mL 2/8/2018  1:00 PM   Number of days: 0       Laboratory:  CBC:   Recent Labs  Lab 02/08/18  0602   WBC 9.25   RBC 3.63*   HGB 10.6*   HCT 32.9*      MCV 91   MCH 29.2   MCHC 32.2     BMP:   Recent Labs  Lab 02/08/18  0602         K 3.2*      CO2 22*   BUN 30*   CREATININE 2.3*   CALCIUM 8.3*   MG 1.8     CMP:   Recent Labs  Lab 02/08/18  0130 02/08/18  0602   GLU 57* 103   CALCIUM 9.3 8.3*   ALBUMIN 3.2*  --    PROT 6.8  --     143   K 3.0* 3.2*   CO2 21* 22*    110   BUN 29* 30*   CREATININE 2.1* 2.3*   ALKPHOS 61  --    ALT 23  --    AST 24  --    BILITOT 0.4  --        Chest X-Ray: normal    Diagnostic Results:  No Further    ASSESSMENT/PLAN:     1.  Metabolic/toxic encephalopathy and possible hypoxia in patient with prior history of drug abuse, alcoholism  2.  Morbid obesity    Plan:  Continue active medical support.  When sedation is withdrawn, will need to examine again.  Prognosis cannot be accurately determined at " this time.    Critical Care Time greater than: 1 Hour

## 2018-02-08 NOTE — ASSESSMENT & PLAN NOTE
Initial BP was OK upon arrival, dropped to systolci 60's.  Currently on levophed drip.  Titrate to maintain MAP > 65.  Continue IV fluids.  Pulmonary consult.

## 2018-02-08 NOTE — PLAN OF CARE
Problem: Patient Care Overview  Goal: Plan of Care Review  Outcome: Ongoing (interventions implemented as appropriate)  Patient resting quietly in bed on vent, mild seadtion, responds at times to verbal stimuli. VSS. Family visited, update given, POC discussed.

## 2018-02-08 NOTE — ASSESSMENT & PLAN NOTE
Continue ventilator support. Ventilator settings reviewed and adjusted to optimize gas exchange. Daily wake up and breathe trials.  Titrate FIO2 to keep SAO2 > 92%. Bronchodilators per protocol.

## 2018-02-08 NOTE — SUBJECTIVE & OBJECTIVE
Past Medical History:   Diagnosis Date    Adrenal cortical adenoma     Anxiety     Arthritis     CKD (chronic kidney disease) stage 3, GFR 30-59 ml/min     Depression     Diabetes mellitus type II 2011    does not take    DM (diabetes mellitus) 2011     am 09/21/2017 Insulin x 1 1/2 year    GERD (gastroesophageal reflux disease) 7/9/2013    Hypertension     Migraine headache 7/22/2013    Schizophrenia     Severe obesity with body mass index of 36.0 to 36.9        Past Surgical History:   Procedure Laterality Date    BACK SURGERY      HERNIA REPAIR      UMBILICAL    left arm exfix      NASAL SEPTUM SURGERY Bilateral     TONSILLECTOMY      URETEROSCOPY         Review of patient's allergies indicates:  No Known Allergies    Scheduled Meds:   famotidine (PF)  20 mg Intravenous Daily    piperacillin-tazobactam (ZOSYN) IVPB  4.5 g Intravenous Q8H    [START ON 2/9/2018] vancomycin (VANCOCIN) IVPB  1,250 mg Intravenous Q48H     Continuous Infusions:   dextrose 5 % and 0.9 % NaCl 125 mL/hr at 02/08/18 1000    norepinephrine bitartrate-D5W Stopped (02/08/18 1000)    propofol 10 mcg/kg/min (02/08/18 1000)     PRN Meds:.    Family History     Problem Relation (Age of Onset)    Cancer Paternal Grandmother, Paternal Grandfather    Cataracts Mother    Diabetes Mother, Sister, Brother    Hypertension Mother, Sister, Brother        Social History Main Topics    Smoking status: Current Every Day Smoker     Packs/day: 0.50     Years: 44.00     Types: Cigarettes    Smokeless tobacco: Never Used      Comment: instructed not to smoke after midnight after midnight prior to surgery    Alcohol use Yes      Comment: social    Drug use: No    Sexual activity: No         Review of Systems   Unable to perform ROS: Intubated        Objective:     Vital Signs (Most Recent):  Temp: 97.5 °F (36.4 °C) (02/08/18 0700)  Pulse: 61 (02/08/18 1000)  Resp: 16 (02/08/18 1000)  BP: (!) 148/86 (02/08/18 1000)  SpO2: 100 %  (02/08/18 1000) Vital Signs (24h Range):  Temp:  [94 °F (34.4 °C)-97.5 °F (36.4 °C)] 97.5 °F (36.4 °C)  Pulse:  [43-71] 61  Resp:  [13-22] 16  SpO2:  [97 %-100 %] 100 %  BP: ()/() 148/86     Weight: 115.9 kg (255 lb 8 oz)  Body mass index is 36.66 kg/m².      Intake/Output Summary (Last 24 hours) at 02/08/18 1056  Last data filed at 02/08/18 1000   Gross per 24 hour   Intake          5087.21 ml   Output             1090 ml   Net          3997.21 ml       Physical Exam   Constitutional: He appears well-developed and well-nourished. He is intubated.   Intubated, sedated on propofol   HENT:   Head: Normocephalic and atraumatic.   Eyes: Conjunctivae are normal. Pupils are equal, round, and reactive to light. No scleral icterus.   Neck: No thyromegaly present.   Cardiovascular: Normal rate, regular rhythm and intact distal pulses.    No murmur heard.  Pulmonary/Chest: He is intubated. He has no wheezes. He has no rhonchi. He has no rales.   Abdominal: Soft. He exhibits distension (obese). He exhibits no mass.   Musculoskeletal: He exhibits no edema or deformity.   Lymphadenopathy:     He has no cervical adenopathy.   Neurological:   Intubated, sedated on propofol, pupils small reactive   Skin: Skin is warm. No rash noted. No erythema.   Nursing note and vitals reviewed.      Vents:  Vent Mode: A/C (02/08/18 0710)  Ventilator Initiated: Yes (02/08/18 0125)  Set Rate: 14 bmp (02/08/18 0710)  Vt Set: 450 mL (02/08/18 0710)  Pressure Support: 0 cmH20 (02/08/18 0710)  PEEP/CPAP: 5 cmH20 (02/08/18 0710)  Oxygen Concentration (%): 70 (02/08/18 0845)  Peak Airway Pressure: 20 cmH2O (02/08/18 0710)  Plateau Pressure: 0 cmH20 (02/08/18 0710)  Total Ve: 8.29 mL (02/08/18 0710)  F/VT Ratio<105 (RSBI): (!) 30.49 (02/08/18 0710)    Lines/Drains/Airways     Central Venous Catheter Line                 Percutaneous Central Line Insertion/Assessment - triple lumen  02/08/18 0232 right internal jugular less than 1 day           Drain                 NG/OG Tube 02/08/18 0125 orogastric 18 Fr. less than 1 day         Urethral Catheter 02/08/18 0112 Double-lumen;Latex 16 Fr. less than 1 day          Airway                 Airway - Non-Surgical 02/08/18 0115 Endotracheal Tube less than 1 day          Peripheral Intravenous Line                 Peripheral IV - Single Lumen 02/08/18 Left Forearm less than 1 day         Peripheral IV - Single Lumen 02/08/18 Right Antecubital less than 1 day                Significant Labs:    CBC/Anemia Profile:    Recent Labs  Lab 02/06/18 2242 02/08/18 0130 02/08/18  0602   WBC 10.39 10.98 9.25   HGB 12.9* 11.8* 10.6*   HCT 38.9* 36.3* 32.9*    309 273   MCV 89 91 91   RDW 14.6* 14.5 14.6*        Chemistries:    Recent Labs  Lab 02/06/18 2242 02/08/18 0130 02/08/18  0602    143 143   K 3.1* 3.0* 3.2*    106 110   CO2 21* 21* 22*   BUN 33* 29* 30*   CREATININE 2.2* 2.1* 2.3*   CALCIUM 9.6 9.3 8.3*   ALBUMIN 3.4* 3.2*  --    PROT 7.3 6.8  --    BILITOT 0.7 0.4  --    ALKPHOS 64 61  --    ALT 26 23  --    AST 26 24  --    MG 2.2 2.0 1.8   PHOS  --  3.9 3.9       ABGs:   Recent Labs  Lab 02/08/18  0144   PH 7.314*   PCO2 45.0   HCO3 22.9*   POCSATURATED 99   BE -3     Lactic Acid:   Recent Labs  Lab 02/08/18  0245 02/08/18  0542 02/08/18  0936   LACTATE 2.5* 2.5* 1.7     Troponin:   Recent Labs  Lab 02/06/18 2242 02/08/18  0130   TROPONINI 0.018 0.016     Urine Studies:   Recent Labs  Lab 02/06/18  2349 02/08/18  0137   COLORU Yellow Yellow   APPEARANCEUA Hazy* Clear   PHUR 6.0 6.0   SPECGRAV >=1.030* >=1.030*   PROTEINUA 3+* Trace*   GLUCUA Trace* Negative   KETONESU Trace* Negative   BILIRUBINUA 1+* Negative   OCCULTUA Trace* Negative   NITRITE Negative Negative   UROBILINOGEN 1.0 Negative   LEUKOCYTESUR Negative 1+*   RBCUA 5*  --    WBCUA 10* 10*   BACTERIA Moderate*  --    HYALINECASTS 0  --        Significant Imaging:     CXR: Tubes are stable. Right jugular central line tip overlies  the SVC in good position. No pneumothorax. In comparison to the prior study, there is no adverse interval changes.    CT Head: No intracranial hemorrhage, extra-axial fluid collection, midline shift, or mass effect.  No evidence of an acute cortical infarct or of an infarct in a major vascular territory.    There is mild prominence of the ventricles and sulci compatible with diffuse cerebral volume loss.  Patchy hypoattenuation in the periventricular white matter regions is nonspecific, but most commonly seen with 5 microvascular ischemic changes. Vascular calcifications noted.    The calvarium is unremarkable. Moderate mucosal thickening is seen throughout the maxillary and sphenoid sinus as well as ethmoid air cells with postoperative changes noted. Small amount of layering fluid is suspected within the right sphenoid sinus which could reflect acute sinusitis. Small amount of fluid is also seen within the inferior right mastoid air cells which reflect chronic mastoid air cell disease. Visualized orbits are unremarkable.

## 2018-02-08 NOTE — ED NOTES
"Pt brother Emanuel Rosales (Randy) at bedside. Brother states pt has been outside under the carport all day refusing to eat or come in the house. Pt brother states pt has hx of schizophrenia, bipolar and numerous medical problems. States he and his brother have been attempting placement at [facility] due to the pt inability to take care of himself.  Brother states his brother has been cleared with his psychiatrist for placement. [facility] will bring paperwork in am for  to assist with placement upon discharge.   Emanuel Garrett" contact number [phone]  "

## 2018-02-08 NOTE — ASSESSMENT & PLAN NOTE
"Apparently per chart review, patient was trying to "catchup on his 2-3 days of missed medications by taking all at once".  Unclear if intentional vs unintentional.  Yesterday's chart review reveals patient denies suicidal ideations.  Currently intubated, unable to determine intent or what medications he took.    "

## 2018-02-08 NOTE — PLAN OF CARE
"Patient intubated. Phoned Emanuel Rosales, brother, [phone]. Patient has been living with his two sisters, one sister works and the other sister, has "MR" per brother. He stated that the family is working on nursing home placement due to the family being unable to adequately assist with his medication and behavioral issues. He stated that he had been turned down due to age at [facility] and was referred to [facility]. He stated that he and the patient had visited [facility] and has the list of things that he needs to complete the referral process. He noted that patient has diagnoses of Bipolar disorder and schizophrenia and has a history of substance abuse. He stated that the patient's psychiatric records are with [facility].   Provided contact number for . Noted that  will send medical records to [facility] and will complete PASSAR and LOCET. Anticipate need for Level II screening.      02/08/18 1040   Discharge Assessment   Assessment Type Discharge Planning Assessment   Confirmed/corrected address and phone number on facesheet? Yes   Assessment information obtained from? Medical Record;Other  (Emanuel rosales, [phone])   Prior to hospitilization cognitive status: Alert/Oriented   Prior to hospitalization functional status: Needs Assistance   Current cognitive status: Coma/Sedated/Intubated   Current Functional Status: Completely Dependent   Facility Arrived From: (home)   Able to Return to Prior Arrangements no   Is patient able to care for self after discharge? No   Who are your caregiver(s) and their phone number(s)? (Breanna Aguila, sister, [phone])   Patient's perception of discharge disposition nursing home   Readmission Within The Last 30 Days no previous admission in last 30 days   Patient currently being followed by outpatient case management? No   Equipment Currently Used at Home none   Do you have any problems affording any of your prescribed medications? TBD "   Discharge Plan A New Nursing Home placement - detention care facility   Patient/Family In Agreement With Plan yes

## 2018-02-08 NOTE — SUBJECTIVE & OBJECTIVE
Past Medical History:   Diagnosis Date    Adrenal cortical adenoma     Anxiety     Arthritis     CKD (chronic kidney disease) stage 3, GFR 30-59 ml/min     Depression     Diabetes mellitus type II 2011    does not take    DM (diabetes mellitus) 2011     am 09/21/2017 Insulin x 1 1/2 year    GERD (gastroesophageal reflux disease) 7/9/2013    Hypertension     Migraine headache 7/22/2013    Schizophrenia     Severe obesity with body mass index of 36.0 to 36.9        Past Surgical History:   Procedure Laterality Date    BACK SURGERY      HERNIA REPAIR      UMBILICAL    left arm exfix      NASAL SEPTUM SURGERY Bilateral     TONSILLECTOMY      URETEROSCOPY         Review of patient's allergies indicates:  No Known Allergies    Current Facility-Administered Medications on File Prior to Encounter   Medication    [COMPLETED] 0.9%  NaCl infusion    [DISCONTINUED] naloxone injection 2 mg     Current Outpatient Prescriptions on File Prior to Encounter   Medication Sig    acetaminophen-codeine 300-30mg (TYLENOL #3) 300-30 mg Tab Take 1 tablet by mouth 2 (two) times daily as needed.    amlodipine (NORVASC) 5 MG tablet TAKE 1 TABLET(5 MG) BY MOUTH EVERY DAY    amoxicillin-clavulanate 875-125mg (AUGMENTIN) 875-125 mg per tablet Take 1 tablet by mouth every 12 (twelve) hours.    blood sugar diagnostic Strp 1 each by Misc.(Non-Drug; Combo Route) route 3 (three) times daily before meals. For One Touch Verio flex    blood-glucose meter kit One Touch Verio Meter    clonazePAM (KLONOPIN) 0.5 MG tablet TAKE ONE TABLET BY MOUTH TWICE DAILY AS NEEDED MUST LAST 30 DAYS    cloNIDine (CATAPRES) 0.3 MG tablet Take 1 tablet (0.3 mg total) by mouth 3 (three) times daily.    gabapentin (NEURONTIN) 400 MG capsule Take 1 capsule (400 mg total) by mouth 3 (three) times daily.    insulin lispro protamin-lispro (HUMALOG MIX 50-50) 100 unit/mL (50-50) Susp Inject 76 units before breakfast and 40 units before dinner     "insulin syringe-needle U-100 1 mL 31 gauge x 5/16 Syrg 1 Syringe by Misc.(Non-Drug; Combo Route) route 3 (three) times daily.    lancets (ONETOUCH DELICA LANCETS) 33 gauge Misc 1 lancet by Misc.(Non-Drug; Combo Route) route 3 (three) times daily.    liraglutide 0.6 mg/0.1 mL, 18 mg/3 mL, subq PNIJ (VICTOZA 3-NEGRITA) 0.6 mg/0.1 mL (18 mg/3 mL) PnIj Inject 1.8 mg into the skin Daily.    lisinopril-hydrochlorothiazide (PRINZIDE,ZESTORETIC) 20-25 mg Tab TAKE 1 TABLET BY MOUTH EVERY MORNING    lurasidone (LATUDA) 60 mg Tab tablet Take 60 mg by mouth once daily.    metformin (GLUCOPHAGE) 500 MG tablet TAKE 1 TABLET BY MOUTH TWICE DAILY WITH MEALS    metoprolol tartrate (LOPRESSOR) 50 MG tablet Take 1 tablet (50 mg total) by mouth 2 (two) times daily.    pantoprazole (PROTONIX) 40 MG tablet TAKE 1 TABLET(40 MG) BY MOUTH EVERY DAY    pen needle, diabetic 32 gauge x 5/32" Ndle 1 each by Misc.(Non-Drug; Combo Route) route once daily.    polyethylene glycol (GLYCOLAX) 17 gram/dose powder Take 17 g by mouth once daily.    pravastatin (PRAVACHOL) 20 MG tablet Take 1 tablet (20 mg total) by mouth every evening.    quetiapine (SEROQUEL) 200 MG Tab Take 200 mg by mouth once daily.     quetiapine (SEROQUEL) 300 MG Tab Take 1 tablet (300 mg total) by mouth once daily. (Patient taking differently: Take 200 mg by mouth once daily. )    sertraline (ZOLOFT) 100 MG tablet Take 2 tablets (200 mg total) by mouth once daily.    trazodone (DESYREL) 300 MG tablet TK 1 T PO  QHS     Family History     Problem Relation (Age of Onset)    Cancer Paternal Grandmother, Paternal Grandfather    Cataracts Mother    Diabetes Mother, Sister, Brother    Hypertension Mother, Sister, Brother        Social History Main Topics    Smoking status: Current Every Day Smoker     Packs/day: 0.50     Years: 44.00     Types: Cigarettes    Smokeless tobacco: Never Used      Comment: instructed not to smoke after midnight after midnight prior to surgery "    Alcohol use Yes      Comment: social    Drug use: No    Sexual activity: No     Review of Systems   Unable to perform ROS: Intubated     Objective:     Vital Signs (Most Recent):  Temp: 97.3 °F (36.3 °C) (02/08/18 0441)  Pulse: 70 (02/08/18 0541)  Resp: 17 (02/08/18 0441)  BP: 121/69 (02/08/18 0441)  SpO2: 97 % (02/08/18 0541) Vital Signs (24h Range):  Temp:  [94 °F (34.4 °C)-97.3 °F (36.3 °C)] 97.3 °F (36.3 °C)  Pulse:  [43-83] 70  Resp:  [12-25] 17  SpO2:  [95 %-100 %] 97 %  BP: ()/() 121/69     Weight: 115.9 kg (255 lb 8 oz)  Body mass index is 36.66 kg/m².    Physical Exam   Constitutional: He appears well-developed and well-nourished. He is intubated.   Intubated, sedated on propofol   HENT:   Head: Normocephalic and atraumatic.   Eyes: Conjunctivae are normal. Pupils are equal, round, and reactive to light. No scleral icterus.   Neck: No thyromegaly present.   Cardiovascular: Normal rate, regular rhythm and intact distal pulses.    No murmur heard.  Pulmonary/Chest: He is intubated. He has no wheezes. He has no rhonchi. He has no rales.   Abdominal: Soft. He exhibits distension (obese). He exhibits no mass.   Musculoskeletal: He exhibits no edema or deformity.   Lymphadenopathy:     He has no cervical adenopathy.   Neurological:   Intubated, sedated on propofol, pupils small reactive   Skin: Skin is warm. No rash noted. No erythema.   Nursing note and vitals reviewed.        CRANIAL NERVES     CN III, IV, VI   Pupils are equal, round, and reactive to light.       Significant Labs:   A1C:   Recent Labs  Lab 11/01/17  1119 12/23/17  0508   HGBA1C 7.4* 7.7*     ABGs:   Recent Labs  Lab 02/08/18  0144   PH 7.314*   PCO2 45.0   HCO3 22.9*   POCSATURATED 99   BE -3     Bilirubin:   Recent Labs  Lab 01/19/18  1012 02/06/18  2242 02/08/18  0130   BILITOT 0.2 0.7 0.4     Blood Culture: No results for input(s): LABBLOO in the last 48 hours.  BMP:   Recent Labs  Lab 02/08/18  0130   GLU 57*      K  3.0*      CO2 21*   BUN 29*   CREATININE 2.1*   CALCIUM 9.3   MG 2.0     CBC:   Recent Labs  Lab 02/06/18 2242 02/08/18  0130   WBC 10.39 10.98   HGB 12.9* 11.8*   HCT 38.9* 36.3*    309     CMP:   Recent Labs  Lab 02/06/18 2242 02/08/18  0130    143   K 3.1* 3.0*    106   CO2 21* 21*   * 57*   BUN 33* 29*   CREATININE 2.2* 2.1*   CALCIUM 9.6 9.3   PROT 7.3 6.8   ALBUMIN 3.4* 3.2*   BILITOT 0.7 0.4   ALKPHOS 64 61   AST 26 24   ALT 26 23   ANIONGAP 16 16   EGFRNONAA 32* 33*     Cardiac Markers:   Recent Labs  Lab 02/08/18  0130   BNP 35     Coagulation:   Recent Labs  Lab 02/08/18 0130   INR 1.0   APTT 22.2     Lactic Acid:   Recent Labs  Lab 02/08/18  0245   LACTATE 2.5*     Lipid Panel: No results for input(s): CHOL, HDL, LDLCALC, TRIG, CHOLHDL in the last 48 hours.  Pathology Results  (Last 10 years)    None        Prealbumin: No results for input(s): PREALBUMIN in the last 48 hours.  Respiratory Culture: No results for input(s): GSRESP, RESPIRATORYC in the last 48 hours.  Troponin:   Recent Labs  Lab 02/06/18 2242 02/08/18  0130   TROPONINI 0.018 0.016     Urine Culture: No results for input(s): LABURIN in the last 48 hours.  Urine Studies:   Recent Labs  Lab 02/06/18  2349 02/08/18  0137   COLORU Yellow Yellow   APPEARANCEUA Hazy* Clear   PHUR 6.0 6.0   SPECGRAV >=1.030* >=1.030*   PROTEINUA 3+* Trace*   GLUCUA Trace* Negative   KETONESU Trace* Negative   BILIRUBINUA 1+* Negative   OCCULTUA Trace* Negative   NITRITE Negative Negative   UROBILINOGEN 1.0 Negative   LEUKOCYTESUR Negative 1+*   RBCUA 5*  --    WBCUA 10* 10*   BACTERIA Moderate*  --    HYALINECASTS 0  --      All pertinent labs within the past 24 hours have been reviewed.    Significant Imaging: I have reviewed and interpreted all pertinent imaging results/findings within the past 24 hours.     Imaging Results          CT Head Without Contrast (In process)                X-Ray Chest 1 View (In process)                 X-Ray Chest AP Portable (Preliminary result)  Result time 02/08/18 02:07:16    ED Interpretation by Sánchez Rendon Jr., MD (02/08/18 02:07:16, [facility] - , Emergency Medicine)    ET tube in position. No pneumothorax.                                I have independently reviewed and interpreted the EKG. My findings include NSR    I have independently reviewed all pertinent labs within the past 24 hours.    I have independently reviewed, visualized and interpreted all pertinent imaging results within the past 24 hours and discussed the findings with the ED physician.

## 2018-02-08 NOTE — ASSESSMENT & PLAN NOTE
Per chart review, family unable to take care of him at home.  Family started NH placement.  Case management consul for discharge planning.

## 2018-02-08 NOTE — HPI
56 y/o male who  has a past medical history of Adrenal cortical adenoma; Anxiety; Arthritis; CKD (chronic kidney disease) stage 3, GFR 30-59 ml/min; Depression; Diabetes mellitus type II (2011); DM (diabetes mellitus) (2011); GERD (gastroesophageal reflux disease) (7/9/2013); Hypertension; Migraine headache (7/22/2013); Schizophrenia; and Severe obesity with body mass index of 36.0 to 36.9 admitted with acute metabolic encephalopathy, acute respiratory failure.

## 2018-02-08 NOTE — ASSESSMENT & PLAN NOTE
Likely due to severe hypoglycemia vs drug overdose vs others.  Was found lethargic at home for many hours.  CT head unremarkable.  Consider neurology consult when patient wakes up.  Will empirically cover with Vanc, Zosyn.  Follow up on cultures.

## 2018-02-08 NOTE — CONSULTS
[facility] -   Critical Care Medicine  Consult Note    Patient Name: Shukri Rosales  MRN: [id number]  Admission Date: 2/8/2018  Hospital Length of Stay: 0 days  Code Status: Full Code  Attending Physician: Skylar Beltran MD   Primary Care Provider: Farhana Burnham MD   Principal Problem: Acute encephalopathy      Subjective:     HPI:  58 y/o male who  has a past medical history of Adrenal cortical adenoma; Anxiety; Arthritis; CKD (chronic kidney disease) stage 3, GFR 30-59 ml/min; Depression; Diabetes mellitus type II (2011); DM (diabetes mellitus) (2011); GERD (gastroesophageal reflux disease) (7/9/2013); Hypertension; Migraine headache (7/22/2013); Schizophrenia; and Severe obesity with body mass index of 36.0 to 36.9 admitted with acute metabolic encephalopathy, acute respiratory failure .    Hospital/ICU Course:  Admitted to the MICU. Intubated, Sedated and on full mechanical ventilatory support.     Past Medical History:   Diagnosis Date    Adrenal cortical adenoma     Anxiety     Arthritis     CKD (chronic kidney disease) stage 3, GFR 30-59 ml/min     Depression     Diabetes mellitus type II 2011    does not take    DM (diabetes mellitus) 2011     am 09/21/2017 Insulin x 1 1/2 year    GERD (gastroesophageal reflux disease) 7/9/2013    Hypertension     Migraine headache 7/22/2013    Schizophrenia     Severe obesity with body mass index of 36.0 to 36.9        Past Surgical History:   Procedure Laterality Date    BACK SURGERY      HERNIA REPAIR      UMBILICAL    left arm exfix      NASAL SEPTUM SURGERY Bilateral     TONSILLECTOMY      URETEROSCOPY         Review of patient's allergies indicates:  No Known Allergies    Scheduled Meds:   famotidine (PF)  20 mg Intravenous Daily    piperacillin-tazobactam (ZOSYN) IVPB  4.5 g Intravenous Q8H    [START ON 2/9/2018] vancomycin (VANCOCIN) IVPB  1,250 mg Intravenous Q48H     Continuous Infusions:   dextrose 5 % and 0.9  % NaCl 125 mL/hr at 02/08/18 1000    norepinephrine bitartrate-D5W Stopped (02/08/18 1000)    propofol 10 mcg/kg/min (02/08/18 1000)     PRN Meds:.    Family History     Problem Relation (Age of Onset)    Cancer Paternal Grandmother, Paternal Grandfather    Cataracts Mother    Diabetes Mother, Sister, Brother    Hypertension Mother, Sister, Brother        Social History Main Topics    Smoking status: Current Every Day Smoker     Packs/day: 0.50     Years: 44.00     Types: Cigarettes    Smokeless tobacco: Never Used      Comment: instructed not to smoke after midnight after midnight prior to surgery    Alcohol use Yes      Comment: social    Drug use: No    Sexual activity: No         Review of Systems   Unable to perform ROS: Intubated        Objective:     Vital Signs (Most Recent):  Temp: 97.5 °F (36.4 °C) (02/08/18 0700)  Pulse: 61 (02/08/18 1000)  Resp: 16 (02/08/18 1000)  BP: (!) 148/86 (02/08/18 1000)  SpO2: 100 % (02/08/18 1000) Vital Signs (24h Range):  Temp:  [94 °F (34.4 °C)-97.5 °F (36.4 °C)] 97.5 °F (36.4 °C)  Pulse:  [43-71] 61  Resp:  [13-22] 16  SpO2:  [97 %-100 %] 100 %  BP: ()/() 148/86     Weight: 115.9 kg (255 lb 8 oz)  Body mass index is 36.66 kg/m².      Intake/Output Summary (Last 24 hours) at 02/08/18 1056  Last data filed at 02/08/18 1000   Gross per 24 hour   Intake          5087.21 ml   Output             1090 ml   Net          3997.21 ml       Physical Exam   Constitutional: He appears well-developed and well-nourished. He is intubated.   Intubated, sedated on propofol   HENT:   Head: Normocephalic and atraumatic.   Eyes: Conjunctivae are normal. Pupils are equal, round, and reactive to light. No scleral icterus.   Neck: No thyromegaly present.   Cardiovascular: Normal rate, regular rhythm and intact distal pulses.    No murmur heard.  Pulmonary/Chest: He is intubated. He has no wheezes. He has no rhonchi. He has no rales.   Abdominal: Soft. He exhibits distension  (obese). He exhibits no mass.   Musculoskeletal: He exhibits no edema or deformity.   Lymphadenopathy:     He has no cervical adenopathy.   Neurological:   Intubated, sedated on propofol, pupils small reactive   Skin: Skin is warm. No rash noted. No erythema.   Nursing note and vitals reviewed.      Vents:  Vent Mode: A/C (02/08/18 0710)  Ventilator Initiated: Yes (02/08/18 0125)  Set Rate: 14 bmp (02/08/18 0710)  Vt Set: 450 mL (02/08/18 0710)  Pressure Support: 0 cmH20 (02/08/18 0710)  PEEP/CPAP: 5 cmH20 (02/08/18 0710)  Oxygen Concentration (%): 70 (02/08/18 0845)  Peak Airway Pressure: 20 cmH2O (02/08/18 0710)  Plateau Pressure: 0 cmH20 (02/08/18 0710)  Total Ve: 8.29 mL (02/08/18 0710)  F/VT Ratio<105 (RSBI): (!) 30.49 (02/08/18 0710)    Lines/Drains/Airways     Central Venous Catheter Line                 Percutaneous Central Line Insertion/Assessment - triple lumen  02/08/18 0232 right internal jugular less than 1 day          Drain                 NG/OG Tube 02/08/18 0125 orogastric 18 Fr. less than 1 day         Urethral Catheter 02/08/18 0112 Double-lumen;Latex 16 Fr. less than 1 day          Airway                 Airway - Non-Surgical 02/08/18 0115 Endotracheal Tube less than 1 day          Peripheral Intravenous Line                 Peripheral IV - Single Lumen 02/08/18 Left Forearm less than 1 day         Peripheral IV - Single Lumen 02/08/18 Right Antecubital less than 1 day                Significant Labs:    CBC/Anemia Profile:    Recent Labs  Lab 02/06/18 2242 02/08/18 0130 02/08/18 0602   WBC 10.39 10.98 9.25   HGB 12.9* 11.8* 10.6*   HCT 38.9* 36.3* 32.9*    309 273   MCV 89 91 91   RDW 14.6* 14.5 14.6*        Chemistries:    Recent Labs  Lab 02/06/18 2242 02/08/18 0130 02/08/18 0602    143 143   K 3.1* 3.0* 3.2*    106 110   CO2 21* 21* 22*   BUN 33* 29* 30*   CREATININE 2.2* 2.1* 2.3*   CALCIUM 9.6 9.3 8.3*   ALBUMIN 3.4* 3.2*  --    PROT 7.3 6.8  --    BILITOT 0.7 0.4   --    ALKPHOS 64 61  --    ALT 26 23  --    AST 26 24  --    MG 2.2 2.0 1.8   PHOS  --  3.9 3.9       ABGs:   Recent Labs  Lab 02/08/18  0144   PH 7.314*   PCO2 45.0   HCO3 22.9*   POCSATURATED 99   BE -3     Lactic Acid:   Recent Labs  Lab 02/08/18  0245 02/08/18  0542 02/08/18  0936   LACTATE 2.5* 2.5* 1.7     Troponin:   Recent Labs  Lab 02/06/18  2242 02/08/18  0130   TROPONINI 0.018 0.016     Urine Studies:   Recent Labs  Lab 02/06/18  2349 02/08/18  0137   COLORU Yellow Yellow   APPEARANCEUA Hazy* Clear   PHUR 6.0 6.0   SPECGRAV >=1.030* >=1.030*   PROTEINUA 3+* Trace*   GLUCUA Trace* Negative   KETONESU Trace* Negative   BILIRUBINUA 1+* Negative   OCCULTUA Trace* Negative   NITRITE Negative Negative   UROBILINOGEN 1.0 Negative   LEUKOCYTESUR Negative 1+*   RBCUA 5*  --    WBCUA 10* 10*   BACTERIA Moderate*  --    HYALINECASTS 0  --        Significant Imaging:     CXR: Tubes are stable. Right jugular central line tip overlies the SVC in good position. No pneumothorax. In comparison to the prior study, there is no adverse interval changes.    CT Head: No intracranial hemorrhage, extra-axial fluid collection, midline shift, or mass effect.  No evidence of an acute cortical infarct or of an infarct in a major vascular territory.    There is mild prominence of the ventricles and sulci compatible with diffuse cerebral volume loss.  Patchy hypoattenuation in the periventricular white matter regions is nonspecific, but most commonly seen with 5 microvascular ischemic changes. Vascular calcifications noted.    The calvarium is unremarkable. Moderate mucosal thickening is seen throughout the maxillary and sphenoid sinus as well as ethmoid air cells with postoperative changes noted. Small amount of layering fluid is suspected within the right sphenoid sinus which could reflect acute sinusitis. Small amount of fluid is also seen within the inferior right mastoid air cells which reflect chronic mastoid air cell disease.  Visualized orbits are unremarkable.      Assessment/Plan:     I have reviewed all labs and imaging studies and compared to previous results. I have also discussed labs with all the teams in the medical care of the patient and my plan is outlined below       Neuro   * Acute encephalopathy    Suspected drug overdose. Currently sedated with IV Propofol for MV.RASS = -2. Neurochecks per routine. Neurology consulted.         Pulmonary    Continue ventilator support. Ventilator settings reviewed and adjusted to optimize gas exchange. Daily wake up and breathe trials.  Titrate FIO2 to keep SAO2 > 92%. Bronchodilators per protocol.        Cardiac/Vascular     Monitor hemodynamics. MAP goal of 65 mmHg.        Renal/    Monitor BUN / Cr. Montor urine output.        Electrolyte abnormality    Monitor and replete electrolytes PRN.        Acute renal failure superimposed on stage 3 chronic kidney disease    IV hydration.        ID      Monitor Fever curve. CULTURES NGTD. Panculture for temperature > 101 degrees. Source Control: VANCOMYCIN + ZOSYN        Hematology    Monitor hemogram. Transfuse as needed.        Endocrine   Hypoglycemia    SBGM.        GI    NPO for now.  Enteral nutrition per RD recommendation.   Stress ulcer prophylaxis.          Other   Shock circulatory    Multifactorial. Temporarily required IV NOREPINEPHRINE to maintain MAP of > = 65 mmhg  Empiric broad spectrum antibiotics started - VANCOMYCIN + ZOSYN. De escalate as appropriate.         Drug overdose, multiple drugs, undetermined intent, initial encounter    UDS positive for Amphetamines and Benzodiazepines. Close monitoring. Case management for counseling once he is off the ventilator.             Critical Care Daily Checklist:    A: Awake: RASS Goal/Actual Goal: RASS Goal: -2-->light sedation  Actual: Bhat Agitation Sedation Scale (RASS): Moderate sedation   B: Spontaneous Breathing Trial Performed?  n/a   C: SAT & SBT Coordinated?  n/a                     D: Delirium: CAM-ICU Overall CAM-ICU: Positive   E: Early Mobility Performed? Yes   F: Feeding Goal:    Status:     Current Diet Order   No orders of the defined types were placed in this encounter.      AS: Analgesia/Sedation IV PROPOFOL   T: Thromboembolic Prophylaxis SCD   H: HOB > 300 Yes   U: Stress Ulcer Prophylaxis (if needed) FAMOTIDINE   G: Glucose Control SBGM   B: Bowel Function Stool Occurrence: 0   I: Indwelling Catheter (Lines & [place]ina) Necessity YES   D: De-escalation of Antimicrobials/Pharmacotherapies YES    Plan for the day/ETD Continue current    Code Status:  Family/Goals of Care: Full Code  Home with family     Critical Care Time: 90 minutes    Critical secondary to Patient has a condition that poses threat to life and bodily function: ACUTE ENCEPHALOPATHY, ACUTE RESPIRATORY FAILURE, CIRCULATORY SHOCK     Critical care was time spent personally by me on the following activities: development of treatment plan with patient or surrogate and bedside caregivers, discussions with consultants, evaluation of patient's response to treatment, examination of patient, ordering and performing treatments and interventions, ordering and review of laboratory studies, ordering and review of radiographic studies, pulse oximetry, re-evaluation of patient's condition. This critical care time did not overlap with that of any other provider or involve time for any procedures.    Thank you for your consult. I will follow-up with patient. Please contact us if you have any additional questions.     Zack Martinez MD  Critical Care Medicine  [facility] -

## 2018-02-08 NOTE — PROGRESS NOTES
Completed LOCET, faxed completed PASSR to [facility]. Awaiting Form 142.   Phoned Yasmeen, zion, [facility]. She will review the referral sent through [employer]. She stated that she did not remember the patient and his brother touring the facility. Informed her that his brother is gathering referral information which was requested by their facility today.   Received Letter of Consideration from [facility], Information has been submitted to [facility] who will make a decision regarding admission. Faxed letter through [employer] to [facility]. Letter placed in blue folder.

## 2018-02-08 NOTE — ASSESSMENT & PLAN NOTE
Suspected drug overdose. Currently sedated with IV Propofol for MV.RASS = -2. Neurochecks per routine. Neurology consulted.

## 2018-02-08 NOTE — ASSESSMENT & PLAN NOTE
Monitor Fever curve. CULTURES NGTD. Panculture for temperature > 101 degrees. Source Control: VANCOMYCIN + ZOSYN

## 2018-02-08 NOTE — TELEPHONE ENCOUNTER
FYI:    Pt brother stated that pt is now in hospital, brother stated pt is confused and needs help. Pt is not taking medication correctly and is taking more than what he suppose to. Pt is in ICU at this time.  Sister is unable to take care of pt and brother also is unable. A  spoke with pt brother and they are trying to get pt committed in [facility] home. pt's brother stated that pt is going from hospital to [facility].     [facility]  Nursing home in [place], [place]  Address: [address] [place] [id number]  Phone: [phone]

## 2018-02-08 NOTE — PROGRESS NOTES
56 y.o. Male brought to ER via EMS found outside unresponsive, started ambu with 100% FIO2. After arrival pt intubated with #7.5 oral endo-tube and placed on ventilator. Please review flowsheets for current vent settings, will follow.

## 2018-02-08 NOTE — CONSULTS
[facility] -   Adult Nutrition  Consult Note    SUMMARY     Recommendations  Recommendation/Intervention: 1. Consider tubefeed for nutrition support until extubated Peptamen VHP (Bariatric) at 70ml/hr (1680 calories, 155g protein, 1411ml water)  Goals: Meet needs from nutrition support until extubated  Nutrition Goal Status: new    Reason for Assessment  Reason for Assessment: nurse/nurse practitioner consult         1. Hypoglycemia    2. Hypothermia    3. Altered mental status    4. Encounter for central line placement    5. Acute encephalopathy    6. Acute renal failure superimposed on stage 3 chronic kidney disease, unspecified acute renal failure type    7. Drug overdose, multiple drugs, undetermined intent, initial encounter    8. Electrolyte abnormality    9. Shock circulatory    10. Type 2 diabetes mellitus with stage 3 chronic kidney disease, with long-term current use of insulin      Past Medical History:   Diagnosis Date    Adrenal cortical adenoma     Anxiety     Arthritis     CKD (chronic kidney disease) stage 3, GFR 30-59 ml/min     Depression     Diabetes mellitus type II 2011    does not take    DM (diabetes mellitus) 2011     am 09/21/2017 Insulin x 1 1/2 year    GERD (gastroesophageal reflux disease) 7/9/2013    Hypertension     Migraine headache 7/22/2013    Schizophrenia     Severe obesity with body mass index of 36.0 to 36.9              General Information Comments: Patient is intubated and sedated    Nutrition Discharge Planning: too soon to determine    Nutrition Prescription Ordered  Current Diet Order: NPO                    Evaluation of Received Nutrients/Fluid Intake                                  Other Calories (kcal): 185 (propofol at 7ml/hr)                                                                  % Intake of Estimated Energy Needs: 0 - 25 %  % Meal Intake: NPO     Nutrition Risk Screen          Nutrition/Diet History          Food  "Preferences: RAKESH                         Labs/Tests/Procedures/Meds       Pertinent Labs Reviewed: reviewed      BMP  Lab Results   Component Value Date     02/08/2018    K 3.2 (L) 02/08/2018     02/08/2018    CO2 22 (L) 02/08/2018    BUN 30 (H) 02/08/2018    CREATININE 2.3 (H) 02/08/2018    CALCIUM 8.3 (L) 02/08/2018    ANIONGAP 11 02/08/2018    ESTGFRAFRICA 35 (A) 02/08/2018    EGFRNONAA 30 (A) 02/08/2018     Lab Results   Component Value Date    ALBUMIN 3.2 (L) 02/08/2018     Lab Results   Component Value Date    CALCIUM 8.3 (L) 02/08/2018    PHOS 3.9 02/08/2018     Lab Results   Component Value Date    HGBA1C 7.7 (H) 12/23/2017       Recent Labs  Lab 02/08/18  1217   POCTGLUCOSE 82       Pertinent Medications Reviewed: reviewed       Physical Findings    Overall Physical Appearance: on ventilator support     Oral/Mouth Cavity: tooth/teeth missing  Skin:  (Olvin 13)    Anthropometrics    Temp: 97.4 °F (36.3 °C)     Height: 5' 10" (177.8 cm)  Weight Method: Bed Scale  Weight: 116 kg (255 lb 11.7 oz)  Ideal Body Weight (IBW), Male: 166 lb     % Ideal Body Weight, Male (lb): 154.06 lb     BMI (Calculated): 36.8  BMI Grade: 35 - 39.9 - obesity - grade II                            Estimated/Assessed Needs    Weight Used For Calorie Calculations: 115.9 kg (255 lb 8.2 oz)      Energy Calorie Requirements (kcal): 1998 calories  Energy Need Method: [facility]       Weight Used For Protein Calculations: 75.3 kg (166 lb)     2.0 gm Protein (gm): 150.91              RDA Method (mL): 1998               Assessment and Plan  Nutrition Problem  suboptimal oral food and beverage intake    Related to (etiology):   Inability to consume oral diet    Signs and Symptoms (as evidenced by):   Intubated and sedated     Interventions/Recommendations (treatment strategy):  1. Enteral nutrition support to meet needs until extubated    Nutrition Diagnosis Status:   New        Monitor and Evaluation    Food and Nutrient Intake: " energy intake  Food and Nutrient Adminstration: enteral and parenteral nutrition administration, diet order     Physical Activity and Function: nutrition-related ADLs and IADLs  Anthropometric Measurements: weight  Biochemical Data, Medical Tests and Procedures: electrolyte and renal panel, glucose/endocrine profile         Nutrition Follow-Up    RD Follow-up?: Yes (2x weekly)

## 2018-02-08 NOTE — ED NOTES
Pt received lying on stretcher supine.  Pt unresponsive at this time. OETT in place @ 23cm at the lip; vent settings AC14  FIO2 100% Peep 5 - no spontaneous respirations at this time.  OG tube in place to intermittent suction with green drainage noted. TLCL in place to R IJ with ports clamped at present. On cardiac monitor with bradycardia noted. HL to RFA with D5NS at 125cc/hr.  HL to LAC clamped. [place] draining small amount of arely urine.   Barrett Jordan applied for rectal temp 94.5.  Dr Rendon aware.

## 2018-02-08 NOTE — PROGRESS NOTES
"Pt seen and examined in the ICU, chart, CT reviewed.    Mr. Rosales is a 58 y/o  male with h/o T2DM, HTN, psychiatric problems, unable to take care of himself at home (per brother), was in the process of seeking NH placement, was brought to the ED twice in the last 2 days. After going home, family found him to be unresponsive again in the late hours of 2/7/18. Per chart review, patient lives with a "mentally challenged" sister, who reportedly told the first responders that she is no longer able to take care of the patient. Upon arrival, EMS arrival, patient's GCS was 3, initial glucose was 37. He was intervened in the field and brought to the ED. He was emergently intubated in ED. BP was initially high, precipitously dropped to systolic 60's. Central line placed and levophed started. He was to be positive on his drug screen for opiates, THC.     Additional records review indicates that the patient had been hospitalized about one month ago with a very similar history. He has long standing known history of recreational drug abuse, alcoholism, non adherence to his diabetes and anti hypertensive medications. He has also had an established history of bipolar disorder/schizophrenia, and had been known to take prescription drugs haphazardly, if at all. The family has indicated that they can no longer provide care for him.    He is still intubated, sleepy but arousable, off Levophed and Diprivan, Lactic Acidosis resolved. Neurology and CC Med have seen and the pt and following him and he is hemodynamically stable, oxygenating well. Will leave over nite on vent.    Seen and d/w CC team.  "

## 2018-02-08 NOTE — HOSPITAL COURSE
Admitted to the MICU. Intubated, Sedated and on full mechanical ventilatory support.   2/9 - Remains intubated. Clinically and hemodynamically stable.  2/10 - Extubated successfully. Blood sugars controlled. Physicians emergency certificate executed.

## 2018-02-08 NOTE — ED NOTES
NS bolus infusing through fluid warmer. Dr. Rendon instructed to warm fluids and cover pt with blankets. Monitor temperature.

## 2018-02-08 NOTE — CONSULTS
Pharmacy Vancomycin Consult  WBC=9.25 Tmax=97.5  CrCl=44.1 Scr=2.3    UJI=427.9kg   DBW=90.2kg (used to dose)   IBW=73kg  Cultures: Pending  Dx: Opportunistic Infection Prophylaxis  Patient being pulse-dosed    Patient received a loading dose of 1500mg on 2/8 at 0856.  Will enter placeholder dose of 1250mg every 48 hours. Will order random level with morning labs for 2/9. Will receive next dose when random level is <18mcg/mL.    Sonu Francois  Pharm D Candidate, 2018    Thank you for allowing us to participate in this patient's care.  Zari Ny, Pharm D 2/8/2018 9:32 AM

## 2018-02-08 NOTE — PLAN OF CARE
Problem: Patient Care Overview  Goal: Plan of Care Review  Outcome: Ongoing (interventions implemented as appropriate)  Recommendation/Intervention: 1. Consider tubefeed for nutrition support until extubated Peptamen VHP (Bariatric) at 70ml/hr (1680 calories, 155g protein, 1411ml water)  Goals: Meet needs from nutrition support until extubated  Nutrition Goal Status: new

## 2018-02-08 NOTE — ASSESSMENT & PLAN NOTE
Multifactorial. Temporarily required IV NOREPINEPHRINE to maintain MAP of > = 65 mmhg  Empiric broad spectrum antibiotics started - VANCOMYCIN + ZOSYN. De escalate as appropriate.

## 2018-02-08 NOTE — ED PROVIDER NOTES
SCRIBE #1 NOTE: I, All Gaston, am scribing for, and in the presence of, Sánchez Rendon Jr., MD. I have scribed the entire note.      History      Chief Complaint   Patient presents with    Altered Mental Status     patient found outside by sister with AMS       Review of patient's allergies indicates:  No Known Allergies     HPI   HPI    2/8/2018, 1:20 AM   History obtained from the EMS and family      History of Present Illness: Shukri Rosales is a 57 y.o. male patient who presents to the Emergency Department for an evaluation of AMS which onset suddenly today. Pt was reportedly found with a GCS of 3 which prompted his family to call EMS. EN route pt was given x2 of narcan and D50 with no relief. Pt was unable to contribute to HPI due to his condition upon arrival.        Arrival mode: EMS     PCP: Farhana Burnham MD       Past Medical History:  Past Medical History:   Diagnosis Date    Adrenal cortical adenoma     Anxiety     Arthritis     CKD (chronic kidney disease) stage 3, GFR 30-59 ml/min     Depression     Diabetes mellitus type II 2011    does not take    DM (diabetes mellitus) 2011     am 09/21/2017 Insulin x 1 1/2 year    GERD (gastroesophageal reflux disease) 7/9/2013    Hypertension     Migraine headache 7/22/2013    Schizophrenia     Severe obesity with body mass index of 36.0 to 36.9        Past Surgical History:  Past Surgical History:   Procedure Laterality Date    BACK SURGERY      HERNIA REPAIR      UMBILICAL    left arm exfix      NASAL SEPTUM SURGERY Bilateral     TONSILLECTOMY      URETEROSCOPY           Family History:  Family History   Problem Relation Age of Onset    Hypertension Mother     Diabetes Mother     Cataracts Mother     Hypertension Sister     Diabetes Sister     Hypertension Brother     Diabetes Brother     Cancer Paternal Grandmother     Cancer Paternal Grandfather        Social History:  Social History     Social History Main  Topics    Smoking status: Current Every Day Smoker     Packs/day: 0.50     Years: 44.00     Types: Cigarettes    Smokeless tobacco: Never Used      Comment: instructed not to smoke after midnight after midnight prior to surgery    Alcohol use Yes      Comment: social    Drug use: No    Sexual activity: No       ROS   Review of Systems   Unable to perform ROS: Mental status change     Physical Exam      Initial Vitals [02/08/18 0113]   BP Pulse Resp Temp SpO2   (!) 175/115 63 (!) 22 -- 99 %      MAP       135          Physical Exam  Nursing Notes and Vital Signs Reviewed.  Physical exam is limited due to the patient's condition.   General: Patient is unresponsive to painful stimuli   Head: Atraumatic   Eyes: Pupils equal round and reactive to light   ENT: No gag reflex.   Cardiovascular: Heart sounds are present.  Respiratory: Shallow spontaneous respirations.   Abdominal: No distension   Musculoskeletal: No deformities. Extremities cold to touch.  Skin: Pale.   Neurological: Full neuro exam limited due to patient's condition        ED Course    Intubation  Date/Time: 2/8/2018 1:08 AM  Performed by: FELY KRAMER JR  Authorized by: FELY KRAMER JR   Indications: airway protection  Intubation method: direct  Patient status: sedated  Preoxygenation: BVM  Sedatives: etomidate (20)  Paralytic: succinylcholine (100)  Laryngoscope size: Mac 4  Tube size: 7.5 mm  Number of attempts: 1  Ventilation between attempts: BVM  Cricoid pressure: yes  Cords visualized: yes  Post-procedure assessment: chest rise,  ETCO2 monitor and CO2 detector  Breath sounds: clear  Cuff inflated: yes  ETT to lip: 23 cm  Tube secured with: ties  Chest x-ray interpreted by me.  Chest x-ray findings: endotracheal tube in appropriate position  Patient tolerance: Patient tolerated the procedure well with no immediate complications  Complications: No    Central Line  Date/Time: 2/8/2018 2:16 AM  Performed by: FELY KRAMER JR  Time out:  "Immediately prior to procedure a "time out" was called to verify the correct patient, procedure, equipment, support staff and site/side marked as required.  Indications: vascular access  Anesthesia: local infiltration  Preparation: skin prepped with betadine  Skin prep agent dried: skin prep agent completely dried prior to procedure  Sterile barriers: all five maximum sterile barriers used - cap, mask, sterile gown, sterile gloves, and large sterile sheet  Hand hygiene: hand hygiene performed prior to central venous catheter insertion  Location details: right internal jugular  Catheter type: triple lumen  Catheter size: 7.5 Fr  Ultrasound guidance: yes  Vessel Caliber: medium, patent, compressibility normal  Vascular Doppler: not done  Needle advanced into vessel with real time Ultrasound guidance.  Guidewire confirmed in vessel.  Image recorded and saved.  Sterile sheath used.  Manometry: Yes  Number of attempts: 1  Assessment: placement verified by x-ray  Complications: none  Post-procedure: line sutured and blood return through all ports  Complications: No    Critical Care  Date/Time: 2/8/2018 3:59 AM  Performed by: FELY KRAMER JR  Authorized by: FELY KRAMER JR   Direct patient critical care time: 25 minutes  Additional history critical care time: 15 minutes  Ordering / reviewing critical care time: 13 minutes  Documentation critical care time: 12 minutes  Consulting other physicians critical care time: 10 minutes  Total critical care time (exclusive of procedural time) : 75 minutes  Critical care was necessary to treat or prevent imminent or life-threatening deterioration of the following conditions: metabolic crisis and respiratory failure.  Critical care was time spent personally by me on the following activities: blood draw for specimens, development of treatment plan with patient or surrogate, discussions with consultants, gastric intubation, interpretation of cardiac output measurements, evaluation of " patient's response to treatment, examination of patient, obtaining history from patient or surrogate, ordering and performing treatments and interventions, ordering and review of laboratory studies, ordering and review of radiographic studies, pulse oximetry, re-evaluation of patient's condition, review of old charts and vascular access procedures.        ED Vital Signs:  Vitals:    02/09/18 1000 02/09/18 1030 02/09/18 1100 02/09/18 1130   BP: (!) 178/102 (!) 191/105 (!) 179/103 (!) 187/106   Pulse: (!) 59 60 60 64   Resp: 15 18 18 16   Temp:   97.9 °F (36.6 °C)    TempSrc:   Oral    SpO2: (!) 91% (!) 94% (!) 93% 95%   Weight:       Height:        02/09/18 1200 02/09/18 1300 02/09/18 1330 02/09/18 1400   BP: (!) 190/100 (!) 145/87 (!) 182/100 (!) 147/88   Pulse: 65 76 65 73   Resp: 15 16 20 18   Temp:       TempSrc:       SpO2: 95% (!) 93% 96% (!) 94%   Weight:       Height:        02/09/18 1430 02/09/18 1500 02/09/18 1530 02/09/18 1600   BP: (!) 177/87 (!) 173/113 (!) 177/95 (!) 177/94   Pulse: 65 65 63 74   Resp: 17 16 14 16   Temp:  98.4 °F (36.9 °C)     TempSrc:  Oral     SpO2: 96% 96% (!) 93% 95%   Weight:       Height:        02/09/18 1630 02/09/18 1700 02/09/18 1730   BP: (!) 183/108 (!) 178/104 138/87   Pulse: 69 72 81   Resp: 14 19 20   Temp:      TempSrc:      SpO2: 97% 95% 98%   Weight:      Height:          Abnormal Lab Results:  Labs Reviewed   CBC W/ AUTO DIFFERENTIAL - Abnormal; Notable for the following:        Result Value    RBC 3.98 (*)     Hemoglobin 11.8 (*)     Hematocrit 36.3 (*)     MPV 9.1 (*)     Gran # (ANC) 9.0 (*)     Gran% 81.8 (*)     Lymph% 10.7 (*)     All other components within normal limits   COMPREHENSIVE METABOLIC PANEL - Abnormal; Notable for the following:     Potassium 3.0 (*)     CO2 21 (*)     Glucose 57 (*)     BUN, Bld 29 (*)     Creatinine 2.1 (*)     Albumin 3.2 (*)     eGFR if  39 (*)     eGFR if non  33 (*)     All other components  within normal limits   LACTIC ACID, PLASMA - Abnormal; Notable for the following:     Lactate (Lactic Acid) 2.5 (*)     All other components within normal limits   URINALYSIS - Abnormal; Notable for the following:     Specific Gravity, UA >=1.030 (*)     Protein, UA Trace (*)     Leukocytes, UA 1+ (*)     All other components within normal limits   CK - Abnormal; Notable for the following:      (*)     All other components within normal limits   URINALYSIS MICROSCOPIC - Abnormal; Notable for the following:     WBC, UA 10 (*)     All other components within normal limits   ISTAT PROCEDURE - Abnormal; Notable for the following:     POC PH 7.314 (*)     POC PO2 144 (*)     POC HCO3 22.9 (*)     All other components within normal limits   POCT GLUCOSE - Abnormal; Notable for the following:     POCT Glucose 121 (*)     All other components within normal limits   POCT GLUCOSE - Abnormal; Notable for the following:     POCT Glucose 32 (*)     All other components within normal limits   POCT GLUCOSE - Abnormal; Notable for the following:     POCT Glucose 138 (*)     All other components within normal limits   APTT   B-TYPE NATRIURETIC PEPTIDE   LIPASE   MAGNESIUM   PHOSPHORUS   PROTIME-INR   TROPONIN I   TSH   INFLUENZA A AND B ANTIGEN   DRUG SCREEN PANEL, URINE EMERGENCY   ALCOHOL,MEDICAL (ETHANOL)   TSH   ALCOHOL,MEDICAL (ETHANOL)        All Lab Results:  Results for orders placed or performed during the hospital encounter of 02/08/18   Blood culture x two cultures. Draw prior to antibiotics.   Result Value Ref Range    Blood Culture, Routine       Gram stain ajit bottle: Gram positive cocci in clusters resembling Staph    Blood Culture, Routine       Results called to and read back by:  Milvia Zaldivar 02/09/2018  11:35   Blood culture x two cultures. Draw prior to antibiotics.   Result Value Ref Range    Blood Culture, Routine       Gram stain aer bottle: Gram positive cocci in clusters resembling Staph     Blood  Culture, Routine       Results called to and read back by: Jeannette Crews RN  02/09/2018  05:22   Urine culture   Result Value Ref Range    Urine Culture, Routine No growth    APTT   Result Value Ref Range    aPTT 22.2 21.0 - 32.0 sec   Brain natriuretic peptide   Result Value Ref Range    BNP 35 0 - 99 pg/mL   CBC auto differential   Result Value Ref Range    WBC 10.98 3.90 - 12.70 K/uL    RBC 3.98 (L) 4.60 - 6.20 M/uL    Hemoglobin 11.8 (L) 14.0 - 18.0 g/dL    Hematocrit 36.3 (L) 40.0 - 54.0 %    MCV 91 82 - 98 fL    MCH 29.6 27.0 - 31.0 pg    MCHC 32.5 32.0 - 36.0 g/dL    RDW 14.5 11.5 - 14.5 %    Platelets 309 150 - 350 K/uL    MPV 9.1 (L) 9.2 - 12.9 fL    Gran # (ANC) 9.0 (H) 1.8 - 7.7 K/uL    Lymph # 1.2 1.0 - 4.8 K/uL    Mono # 0.7 0.3 - 1.0 K/uL    Eos # 0.1 0.0 - 0.5 K/uL    Baso # 0.01 0.00 - 0.20 K/uL    Gran% 81.8 (H) 38.0 - 73.0 %    Lymph% 10.7 (L) 18.0 - 48.0 %    Mono% 6.7 4.0 - 15.0 %    Eosinophil% 0.7 0.0 - 8.0 %    Basophil% 0.1 0.0 - 1.9 %    Differential Method Automated    Comprehensive metabolic panel   Result Value Ref Range    Sodium 143 136 - 145 mmol/L    Potassium 3.0 (L) 3.5 - 5.1 mmol/L    Chloride 106 95 - 110 mmol/L    CO2 21 (L) 23 - 29 mmol/L    Glucose 57 (L) 70 - 110 mg/dL    BUN, Bld 29 (H) 6 - 20 mg/dL    Creatinine 2.1 (H) 0.5 - 1.4 mg/dL    Calcium 9.3 8.7 - 10.5 mg/dL    Total Protein 6.8 6.0 - 8.4 g/dL    Albumin 3.2 (L) 3.5 - 5.2 g/dL    Total Bilirubin 0.4 0.1 - 1.0 mg/dL    Alkaline Phosphatase 61 55 - 135 U/L    AST 24 10 - 40 U/L    ALT 23 10 - 44 U/L    Anion Gap 16 8 - 16 mmol/L    eGFR if African American 39 (A) >60 mL/min/1.73 m^2    eGFR if non African American 33 (A) >60 mL/min/1.73 m^2   Cortisol   Result Value Ref Range    Cortisol 66.3 ug/dL   Lactic acid, plasma #1   Result Value Ref Range    Lactate (Lactic Acid) 2.5 (H) 0.5 - 2.2 mmol/L   Lactic acid, plasma #2   Result Value Ref Range    Lactate (Lactic Acid) 2.5 (H) 0.5 - 2.2 mmol/L   Lipase   Result Value  Ref Range    Lipase 26 4 - 60 U/L   Magnesium   Result Value Ref Range    Magnesium 2.0 1.6 - 2.6 mg/dL   Phosphorus   Result Value Ref Range    Phosphorus 3.9 2.7 - 4.5 mg/dL   Protime-INR   Result Value Ref Range    Prothrombin Time 10.8 9.0 - 12.5 sec    INR 1.0 0.8 - 1.2   Procalcitonin   Result Value Ref Range    Procalcitonin 0.18 <0.25 ng/mL   Troponin I   Result Value Ref Range    Troponin I 0.016 0.000 - 0.026 ng/mL   TSH   Result Value Ref Range    TSH 0.575 0.400 - 4.000 uIU/mL   Urinalysis   Result Value Ref Range    Specimen UA Urine, Catheterized     Color, UA Yellow Yellow, Straw, Esha    Appearance, UA Clear Clear    pH, UA 6.0 5.0 - 8.0    Specific Gravity, UA >=1.030 (A) 1.005 - 1.030    Protein, UA Trace (A) Negative    Glucose, UA Negative Negative    Ketones, UA Negative Negative    Bilirubin (UA) Negative Negative    Occult Blood UA Negative Negative    Nitrite, UA Negative Negative    Urobilinogen, UA Negative <2.0 EU/dL    Leukocytes, UA 1+ (A) Negative   Influenza antigen Nasopharyngeal Swab   Result Value Ref Range    Influenza A Ag, EIA Negative Negative    Influenza B Ag, EIA Negative Negative    Flu A & B Source Nasopharyngeal Swab    CK   Result Value Ref Range     (H) 20 - 200 U/L   Drug screen panel, emergency   Result Value Ref Range    Benzodiazepines Presumptive Positive     Methadone metabolites Negative     Cocaine (Metab.) Negative     Opiate Scrn, Ur Negative     Barbiturate Screen, Ur Negative     Amphetamine Screen, Ur Presumptive Positive     THC Negative     Phencyclidine Negative     Creatinine, Random Ur 143.7 23.0 - 375.0 mg/dL    Toxicology Information SEE COMMENT    Urinalysis Microscopic   Result Value Ref Range    WBC, UA 10 (H) 0 - 5 /hpf    Microscopic Comment SEE COMMENT    Ethanol   Result Value Ref Range    Alcohol, Medical, Serum <10 <10 mg/dL   CBC auto differential   Result Value Ref Range    WBC 9.25 3.90 - 12.70 K/uL    RBC 3.63 (L) 4.60 - 6.20 M/uL     Hemoglobin 10.6 (L) 14.0 - 18.0 g/dL    Hematocrit 32.9 (L) 40.0 - 54.0 %    MCV 91 82 - 98 fL    MCH 29.2 27.0 - 31.0 pg    MCHC 32.2 32.0 - 36.0 g/dL    RDW 14.6 (H) 11.5 - 14.5 %    Platelets 273 150 - 350 K/uL    MPV 8.9 (L) 9.2 - 12.9 fL    Gran # (ANC) 7.5 1.8 - 7.7 K/uL    Lymph # 0.9 (L) 1.0 - 4.8 K/uL    Mono # 0.8 0.3 - 1.0 K/uL    Eos # 0.0 0.0 - 0.5 K/uL    Baso # 0.00 0.00 - 0.20 K/uL    Gran% 81.5 (H) 38.0 - 73.0 %    Lymph% 9.4 (L) 18.0 - 48.0 %    Mono% 8.8 4.0 - 15.0 %    Eosinophil% 0.3 0.0 - 8.0 %    Basophil% 0.0 0.0 - 1.9 %    Differential Method Automated    Basic metabolic panel   Result Value Ref Range    Sodium 143 136 - 145 mmol/L    Potassium 3.2 (L) 3.5 - 5.1 mmol/L    Chloride 110 95 - 110 mmol/L    CO2 22 (L) 23 - 29 mmol/L    Glucose 103 70 - 110 mg/dL    BUN, Bld 30 (H) 6 - 20 mg/dL    Creatinine 2.3 (H) 0.5 - 1.4 mg/dL    Calcium 8.3 (L) 8.7 - 10.5 mg/dL    Anion Gap 11 8 - 16 mmol/L    eGFR if African American 35 (A) >60 mL/min/1.73 m^2    eGFR if non African American 30 (A) >60 mL/min/1.73 m^2   Magnesium   Result Value Ref Range    Magnesium 1.8 1.6 - 2.6 mg/dL   Phosphorus   Result Value Ref Range    Phosphorus 3.9 2.7 - 4.5 mg/dL   Lactic acid, plasma #3   Result Value Ref Range    Lactate (Lactic Acid) 1.7 0.5 - 2.2 mmol/L   CBC auto differential   Result Value Ref Range    WBC 7.93 3.90 - 12.70 K/uL    RBC 3.67 (L) 4.60 - 6.20 M/uL    Hemoglobin 10.9 (L) 14.0 - 18.0 g/dL    Hematocrit 32.0 (L) 40.0 - 54.0 %    MCV 87 82 - 98 fL    MCH 29.7 27.0 - 31.0 pg    MCHC 34.1 32.0 - 36.0 g/dL    RDW 14.4 11.5 - 14.5 %    Platelets 256 150 - 350 K/uL    MPV 9.0 (L) 9.2 - 12.9 fL    Gran # (ANC) 5.7 1.8 - 7.7 K/uL    Lymph # 1.4 1.0 - 4.8 K/uL    Mono # 0.8 0.3 - 1.0 K/uL    Eos # 0.1 0.0 - 0.5 K/uL    Baso # 0.02 0.00 - 0.20 K/uL    Gran% 72.2 38.0 - 73.0 %    Lymph% 17.0 (L) 18.0 - 48.0 %    Mono% 9.5 4.0 - 15.0 %    Eosinophil% 1.0 0.0 - 8.0 %    Basophil% 0.3 0.0 - 1.9 %     Differential Method Automated    Magnesium   Result Value Ref Range    Magnesium 1.6 1.6 - 2.6 mg/dL   Phosphorus   Result Value Ref Range    Phosphorus 2.7 2.7 - 4.5 mg/dL   Comprehensive metabolic panel   Result Value Ref Range    Sodium 141 136 - 145 mmol/L    Potassium 4.1 3.5 - 5.1 mmol/L    Chloride 111 (H) 95 - 110 mmol/L    CO2 21 (L) 23 - 29 mmol/L    Glucose 225 (H) 70 - 110 mg/dL    BUN, Bld 29 (H) 6 - 20 mg/dL    Creatinine 2.2 (H) 0.5 - 1.4 mg/dL    Calcium 8.3 (L) 8.7 - 10.5 mg/dL    Total Protein 5.7 (L) 6.0 - 8.4 g/dL    Albumin 2.6 (L) 3.5 - 5.2 g/dL    Total Bilirubin 0.4 0.1 - 1.0 mg/dL    Alkaline Phosphatase 60 55 - 135 U/L     (H) 10 - 40 U/L    ALT 60 (H) 10 - 44 U/L    Anion Gap 9 8 - 16 mmol/L    eGFR if African American 37 (A) >60 mL/min/1.73 m^2    eGFR if non African American 32 (A) >60 mL/min/1.73 m^2   Vancomycin, random   Result Value Ref Range    Vancomycin, Random 13.4 Not established ug/mL   ISTAT PROCEDURE   Result Value Ref Range    POC PH 7.314 (L) 7.35 - 7.45    POC PCO2 45.0 35 - 45 mmHg    POC PO2 144 (H) 80 - 100 mmHg    POC HCO3 22.9 (L) 24 - 28 mmol/L    POC BE -3 -2 to 2 mmol/L    POC SATURATED O2 99 95 - 100 %    Rate 14     Sample ARTERIAL     Site RB     Allens Test N/A     DelSys Adult Vent     Mode AC/PRVC     Vt 450     PEEP 5     FiO2 100    POCT glucose   Result Value Ref Range    POCT Glucose 121 (H) 70 - 110 mg/dL   POCT glucose   Result Value Ref Range    POCT Glucose 32 (LL) 70 - 110 mg/dL   POCT glucose   Result Value Ref Range    POCT Glucose 138 (H) 70 - 110 mg/dL   POCT glucose   Result Value Ref Range    POCT Glucose 109 70 - 110 mg/dL   POCT glucose   Result Value Ref Range    POCT Glucose 105 70 - 110 mg/dL   POCT glucose   Result Value Ref Range    POCT Glucose 76 70 - 110 mg/dL   POCT glucose   Result Value Ref Range    POCT Glucose 79 70 - 110 mg/dL   POCT glucose   Result Value Ref Range    POCT Glucose 82 70 - 110 mg/dL   POCT glucose    Result Value Ref Range    POCT Glucose <20 (L) 70 - 110 mg/dL   POCT glucose   Result Value Ref Range    POCT Glucose 98 70 - 110 mg/dL   POCT glucose   Result Value Ref Range    POCT Glucose 90 70 - 110 mg/dL   POCT glucose   Result Value Ref Range    POCT Glucose 109 70 - 110 mg/dL   POCT glucose   Result Value Ref Range    POCT Glucose 193 (H) 70 - 110 mg/dL   POCT glucose   Result Value Ref Range    POCT Glucose 200 (H) 70 - 110 mg/dL   POCT glucose   Result Value Ref Range    POCT Glucose 203 (H) 70 - 110 mg/dL   POCT glucose   Result Value Ref Range    POCT Glucose 250 (H) 70 - 110 mg/dL   ISTAT PROCEDURE   Result Value Ref Range    POC PH 7.320 (L) 7.35 - 7.45    POC PCO2 43.6 35 - 45 mmHg    POC PO2 95 80 - 100 mmHg    POC HCO3 22.5 (L) 24 - 28 mmol/L    POC BE -4 -2 to 2 mmol/L    POC SATURATED O2 97 95 - 100 %    Rate 14     Sample ARTERIAL     Site RR     Allens Test Pass     DelSys Adult Vent     Mode AC/PRVC     Vt 450     PEEP 5     FiO2 60    POCT glucose   Result Value Ref Range    POCT Glucose 229 (H) 70 - 110 mg/dL   POCT glucose   Result Value Ref Range    POCT Glucose 230 (H) 70 - 110 mg/dL   ISTAT PROCEDURE   Result Value Ref Range    POC PH 7.374 7.35 - 7.45    POC PCO2 38.9 35 - 45 mmHg    POC PO2 66 (L) 80 - 100 mmHg    POC HCO3 22.7 (L) 24 - 28 mmol/L    POC BE -3 -2 to 2 mmol/L    POC SATURATED O2 92 (L) 95 - 100 %    Sample ARTERIAL     Site LR     Allens Test Pass     DelSys Adult Vent     Mode SPONT    POCT glucose   Result Value Ref Range    POCT Glucose 177 (H) 70 - 110 mg/dL   POCT glucose   Result Value Ref Range    POCT Glucose 203 (H) 70 - 110 mg/dL         Imaging Results:  Imaging Results          CT Head Without Contrast (Final result)  Result time 02/08/18 06:50:55    Final result by Frandy Bolton MD (02/08/18 06:50:55)                 Impression:         Chronic microvascular ischemic changes.     Small amount of layering fluid is suspected within the right sphenoid sinus  which could reflect acute sinusitis.     All CT scans at this facility use dose modulation, iterative reconstruction and/or weight based dosing when appropriate to reduce radiation dose to as low as reasonably achievable.        Electronically signed by: FRANDY MÉNDEZ MD  Date:     02/08/18  Time:    06:50              Narrative:    CT OF THE HEAD WITHOUT CONTRAST:     Technique: 5 mm axial images were obtained from the skullbase to the vertex, without administration of contrast.    Comparison: None.    History:  Altered    Findings:    No intracranial hemorrhage, extra-axial fluid collection, midline shift, or mass effect.  No evidence of an acute cortical infarct or of an infarct in a major vascular territory.    There is mild prominence of the ventricles and sulci compatible with diffuse cerebral volume loss.  Patchy hypoattenuation in the periventricular white matter regions is nonspecific, but most commonly seen with 5 microvascular ischemic changes. Vascular calcifications noted.    The calvarium is unremarkable. Moderate mucosal thickening is seen throughout the maxillary and sphenoid sinus as well as ethmoid air cells with postoperative changes noted. Small amount of layering fluid is suspected within the right sphenoid sinus which could reflect acute sinusitis. Small amount of fluid is also seen within the inferior right mastoid air cells which reflect chronic mastoid air cell disease. Visualized orbits are unremarkable.                             X-Ray Chest 1 View (Final result)  Result time 02/08/18 06:51:26    Final result by Frandy Méndez MD (02/08/18 06:51:26)                 Impression:     No adverse interval change.      Electronically signed by: FRANDY MÉNDEZ MD  Date:     02/08/18  Time:    06:51              Narrative:    History: Line placement    Comparison:  3/8/18    Result: Single view of the chest.      Tubes are stable. Right jugular central line tip overlies the SVC in good position.  No pneumothorax. In comparison to the prior study, there is no adverse interval changes.                             X-Ray Chest AP Portable (Final result)  Result time 02/08/18 06:52:45    Final result by Frandy Méndez MD (02/08/18 06:52:45)                 Impression:     No adverse interval change      Electronically signed by: FRANDY MÉNDEZ MD  Date:     02/08/18  Time:    06:52              Narrative:    History: Hypothermia    Comparison: 2/6/18    Result: Single view of the chest.  Endotracheal and nasogastric tubes appear in satisfactory position. Decreased lung volumes with mild atelectasis at the lung bases. No pneumothorax. Small amount of fluid or pleural thickening is seen along the left inferior hemithorax.     In comparison to the prior study, there is no adverse interval changes.                             RADIOLOGY REPORT (Final result)  Result time 02/09/18 14:29:18                 Per [facility], pt's CT results:  No acute intracranial process. No interval change.          The EKG was ordered, reviewed, and independently interpreted by the ED provider.  Interpretation time: 1:36  Rate: 48 BPM  Rhythm: sinus bradycardia  Interpretation: Prolonged QT. No STEMI.        The Emergency Provider reviewed the vital signs and test results, which are outlined above.    ED Discussion     3:59 AM: Discussed case with Dr. Gentile ([facility]). Dr. Gentile agrees with current care and management of pt and accepts admission.   Admitting Service: [facility]   Admitting Physician: Dr. Gentile  Admit to: ICU    3:59 AM: Re-evaluated pt. I have discussed test results, shared treatment plan, and the need for admission with patient . Pt expresses understanding at this time and agree with all information. All questions answered. Pt has no further questions or concerns at this time. Pt is ready for admit.      ED Medication(s):  Medications   famotidine (PF) injection 20 mg (20 mg Intravenous Given  2/9/18 0852)   piperacillin-tazobactam 4.5 g in sodium chloride 0.9% 100 mL IVPB (ready to mix system) (4.5 g Intravenous New Bag 2/9/18 1435)   vancomycin (VANCOCIN) 1,250 mg in sodium chloride 0.9% 250 mL IVPB (1,250 mg Intravenous Trough Due 30 minutes Before Dose 2/10/18 0430)   miconazole NITRATE 2 % top powder ( Topical (Top) Given 2/9/18 0853)   enoxaparin injection 40 mg (40 mg Subcutaneous Given 2/9/18 1646)   hydrALAZINE injection 10 mg (10 mg Intravenous Given 2/9/18 1338)   amLODIPine tablet 5 mg (5 mg Oral Given 2/9/18 1228)   insulin aspart pen 1-10 Units (4 Units Subcutaneous Given 2/9/18 1656)   glucose chewable tablet 16 g (not administered)   glucose chewable tablet 24 g (not administered)   dextrose 50% injection 12.5 g (not administered)   dextrose 50% injection 25 g (not administered)   glucagon (human recombinant) injection 1 mg (not administered)   cloNIDine tablet 0.3 mg (0.3 mg Oral Given 2/9/18 1702)   sodium chloride 0.9% bolus 3,477 mL (0 mL/kg × 115.9 kg Intravenous Stopped 2/8/18 0223)   etomidate injection 20 mg (20 mg Intravenous Given 2/8/18 0109)   succinylcholine injection 100 mg (100 mg Intravenous Given 2/8/18 0110)   dextrose 50 % in water (D50W) injection 25 g (0 g Intravenous Stopped 2/8/18 0154)   dextrose 50 % in water (D50W) injection 25 g (25 g Intravenous Given 2/8/18 0352)   vancomycin (VANCOCIN) 1,500 mg in sodium chloride 0.9% 250 mL IVPB (1,500 mg Intravenous New Bag 2/8/18 0856)   potassium chloride 20 mEq in 100 mL IVPB (FOR CENTRAL LINE ADMINISTRATION ONLY) (20 mEq Intravenous New Bag 2/8/18 1742)   potassium chloride 10% solution 40 mEq (40 mEq Oral Given 2/8/18 1636)   magnesium sulfate in dextrose IVPB (premix) 1 g (1 g Intravenous New Bag 2/9/18 0852)   vancomycin 1 g in 0.9% sodium chloride 250 mL IVPB (ready to mix system) (1,000 mg Intravenous New Bag 2/9/18 1227)   furosemide injection 20 mg (20 mg Intravenous Given 2/9/18 1227)       Current Discharge  Medication List                Medical Decision Making    Medical Decision Making:   Clinical Tests:   Lab Tests: Ordered and Reviewed  Radiological Study: Ordered and Reviewed  Medical Tests: Ordered and Reviewed           Scribe Attestation:   Scribe #1: I performed the above scribed service and the documentation accurately describes the services I performed. I attest to the accuracy of the note.    Attending:   Physician Attestation Statement for Scribe #1: I, Sánchez Rendon Jr., MD, personally performed the services described in this documentation, as scribed by All Gaston, in my presence, and it is both accurate and complete.          Clinical Impression       ICD-10-CM ICD-9-CM   1. Hypoglycemia E16.2 251.2   2. Hypothermia T68.XXXA 991.6   3. Altered mental status R41.82 780.97   4. Encounter for central line placement Z45.2 V58.81   5. Acute encephalopathy G93.40 348.30   6. Acute renal failure superimposed on stage 3 chronic kidney disease, unspecified acute renal failure type N17.9 584.9    N18.3 585.3   7. Drug overdose, multiple drugs, undetermined intent, initial encounter T50.904A 977.9     E980.5   8. Electrolyte abnormality E87.8 276.9   9. Shock circulatory R57.9 785.50   10. Type 2 diabetes mellitus with stage 3 chronic kidney disease, with long-term current use of insulin E11.22 250.40    N18.3 585.3    Z79.4 V58.67   11. Borderline personality disorder F60.3 301.83   12. Suicide attempt by drug ingestion, initial encounter T50.902A 977.9     E950.5       Disposition:   Disposition: Admitted  Condition: John Rendon Jr., MD  02/09/18 7923

## 2018-02-08 NOTE — PLAN OF CARE
Problem: Patient Care Overview  Goal: Plan of Care Review  Outcome: Ongoing (interventions implemented as appropriate)  Patient continues on ventilator with ETT @ 23cm @ lips, he continues on propofol drip that is being titrated for RASS-- 2, Levophed drip @ 0.025mcg/kg/min. siderails up x3 inhaled corticosteroids being observed.

## 2018-02-09 PROBLEM — E87.8 ELECTROLYTE ABNORMALITY: Status: RESOLVED | Noted: 2018-02-08 | Resolved: 2018-02-09

## 2018-02-09 PROBLEM — T68.XXXA HYPOTHERMIA: Status: RESOLVED | Noted: 2018-02-08 | Resolved: 2018-02-09

## 2018-02-09 PROBLEM — K72.00 SHOCK LIVER: Status: ACTIVE | Noted: 2018-02-09

## 2018-02-09 PROBLEM — I95.9 HYPOTENSION: Status: RESOLVED | Noted: 2018-02-08 | Resolved: 2018-02-09

## 2018-02-09 PROBLEM — N17.9 ACUTE RENAL FAILURE SUPERIMPOSED ON STAGE 3 CHRONIC KIDNEY DISEASE: Status: RESOLVED | Noted: 2018-02-08 | Resolved: 2018-02-09

## 2018-02-09 PROBLEM — T50.902A SUICIDE ATTEMPT BY DRUG INGESTION: Status: ACTIVE | Noted: 2018-02-08

## 2018-02-09 PROBLEM — R57.9 SHOCK CIRCULATORY: Status: RESOLVED | Noted: 2018-02-08 | Resolved: 2018-02-09

## 2018-02-09 PROBLEM — F25.0 SCHIZOAFFECTIVE DISORDER, BIPOLAR TYPE: Status: ACTIVE | Noted: 2018-02-09

## 2018-02-09 PROBLEM — R57.9 SHOCK CIRCULATORY: Status: RESOLVED | Noted: 2018-02-09 | Resolved: 2018-02-09

## 2018-02-09 PROBLEM — N18.30 ACUTE RENAL FAILURE SUPERIMPOSED ON STAGE 3 CHRONIC KIDNEY DISEASE: Status: RESOLVED | Noted: 2018-02-08 | Resolved: 2018-02-09

## 2018-02-09 PROBLEM — E16.2 HYPOGLYCEMIA: Status: RESOLVED | Noted: 2018-02-08 | Resolved: 2018-02-09

## 2018-02-09 PROBLEM — G93.40 ACUTE ENCEPHALOPATHY: Status: RESOLVED | Noted: 2017-12-22 | Resolved: 2018-02-09

## 2018-02-09 LAB
ALBUMIN SERPL BCP-MCNC: 2.6 G/DL
ALLENS TEST: ABNORMAL
ALLENS TEST: ABNORMAL
ALP SERPL-CCNC: 60 U/L
ALT SERPL W/O P-5'-P-CCNC: 60 U/L
ANION GAP SERPL CALC-SCNC: 9 MMOL/L
AST SERPL-CCNC: 236 U/L
BACTERIA UR CULT: NO GROWTH
BASOPHILS # BLD AUTO: 0.02 K/UL
BASOPHILS NFR BLD: 0.3 %
BILIRUB SERPL-MCNC: 0.4 MG/DL
BUN SERPL-MCNC: 29 MG/DL
CALCIUM SERPL-MCNC: 8.3 MG/DL
CHLORIDE SERPL-SCNC: 111 MMOL/L
CO2 SERPL-SCNC: 21 MMOL/L
CREAT SERPL-MCNC: 2.2 MG/DL
DELSYS: ABNORMAL
DELSYS: ABNORMAL
DIFFERENTIAL METHOD: ABNORMAL
EOSINOPHIL # BLD AUTO: 0.1 K/UL
EOSINOPHIL NFR BLD: 1 %
ERYTHROCYTE [DISTWIDTH] IN BLOOD BY AUTOMATED COUNT: 14.4 %
ERYTHROCYTE [SEDIMENTATION RATE] IN BLOOD BY WESTERGREN METHOD: 14 MM/H
EST. GFR  (AFRICAN AMERICAN): 37 ML/MIN/1.73 M^2
EST. GFR  (NON AFRICAN AMERICAN): 32 ML/MIN/1.73 M^2
ESTIMATED AVG GLUCOSE: 151 MG/DL
FIO2: 60
GLUCOSE SERPL-MCNC: 225 MG/DL
HBA1C MFR BLD HPLC: 6.9 %
HCO3 UR-SCNC: 22.5 MMOL/L (ref 24–28)
HCO3 UR-SCNC: 22.7 MMOL/L (ref 24–28)
HCT VFR BLD AUTO: 32 %
HGB BLD-MCNC: 10.9 G/DL
LYMPHOCYTES # BLD AUTO: 1.4 K/UL
LYMPHOCYTES NFR BLD: 17 %
MAGNESIUM SERPL-MCNC: 1.6 MG/DL
MCH RBC QN AUTO: 29.7 PG
MCHC RBC AUTO-ENTMCNC: 34.1 G/DL
MCV RBC AUTO: 87 FL
MODE: ABNORMAL
MODE: ABNORMAL
MONOCYTES # BLD AUTO: 0.8 K/UL
MONOCYTES NFR BLD: 9.5 %
NEUTROPHILS # BLD AUTO: 5.7 K/UL
NEUTROPHILS NFR BLD: 72.2 %
PCO2 BLDA: 38.9 MMHG (ref 35–45)
PCO2 BLDA: 43.6 MMHG (ref 35–45)
PEEP: 5
PH SMN: 7.32 [PH] (ref 7.35–7.45)
PH SMN: 7.37 [PH] (ref 7.35–7.45)
PHOSPHATE SERPL-MCNC: 2.7 MG/DL
PLATELET # BLD AUTO: 256 K/UL
PMV BLD AUTO: 9 FL
PO2 BLDA: 66 MMHG (ref 80–100)
PO2 BLDA: 95 MMHG (ref 80–100)
POC BE: -3 MMOL/L
POC BE: -4 MMOL/L
POC SATURATED O2: 92 % (ref 95–100)
POC SATURATED O2: 97 % (ref 95–100)
POCT GLUCOSE: 177 MG/DL (ref 70–110)
POCT GLUCOSE: 203 MG/DL (ref 70–110)
POCT GLUCOSE: 203 MG/DL (ref 70–110)
POCT GLUCOSE: 229 MG/DL (ref 70–110)
POCT GLUCOSE: 230 MG/DL (ref 70–110)
POCT GLUCOSE: 250 MG/DL (ref 70–110)
POCT GLUCOSE: 253 MG/DL (ref 70–110)
POTASSIUM SERPL-SCNC: 4.1 MMOL/L
PROT SERPL-MCNC: 5.7 G/DL
RBC # BLD AUTO: 3.67 M/UL
SAMPLE: ABNORMAL
SAMPLE: ABNORMAL
SITE: ABNORMAL
SITE: ABNORMAL
SODIUM SERPL-SCNC: 141 MMOL/L
VANCOMYCIN SERPL-MCNC: 13.4 UG/ML
VT: 450
WBC # BLD AUTO: 7.93 K/UL

## 2018-02-09 PROCEDURE — 99291 CRITICAL CARE FIRST HOUR: CPT | Mod: ,,, | Performed by: INTERNAL MEDICINE

## 2018-02-09 PROCEDURE — 63600175 PHARM REV CODE 636 W HCPCS: Performed by: EMERGENCY MEDICINE

## 2018-02-09 PROCEDURE — 63600175 PHARM REV CODE 636 W HCPCS: Performed by: INTERNAL MEDICINE

## 2018-02-09 PROCEDURE — 83036 HEMOGLOBIN GLYCOSYLATED A1C: CPT

## 2018-02-09 PROCEDURE — 87205 SMEAR GRAM STAIN: CPT

## 2018-02-09 PROCEDURE — 84100 ASSAY OF PHOSPHORUS: CPT

## 2018-02-09 PROCEDURE — 82803 BLOOD GASES ANY COMBINATION: CPT

## 2018-02-09 PROCEDURE — 83735 ASSAY OF MAGNESIUM: CPT

## 2018-02-09 PROCEDURE — 87077 CULTURE AEROBIC IDENTIFY: CPT

## 2018-02-09 PROCEDURE — 36600 WITHDRAWAL OF ARTERIAL BLOOD: CPT

## 2018-02-09 PROCEDURE — 27000221 HC OXYGEN, UP TO 24 HOURS

## 2018-02-09 PROCEDURE — 99900035 HC TECH TIME PER 15 MIN (STAT)

## 2018-02-09 PROCEDURE — 85025 COMPLETE CBC W/AUTO DIFF WBC: CPT

## 2018-02-09 PROCEDURE — 25000003 PHARM REV CODE 250: Performed by: INTERNAL MEDICINE

## 2018-02-09 PROCEDURE — 87186 SC STD MICRODIL/AGAR DIL: CPT

## 2018-02-09 PROCEDURE — 80053 COMPREHEN METABOLIC PANEL: CPT

## 2018-02-09 PROCEDURE — S0028 INJECTION, FAMOTIDINE, 20 MG: HCPCS | Performed by: INTERNAL MEDICINE

## 2018-02-09 PROCEDURE — 94003 VENT MGMT INPAT SUBQ DAY: CPT

## 2018-02-09 PROCEDURE — 80202 ASSAY OF VANCOMYCIN: CPT

## 2018-02-09 PROCEDURE — 87070 CULTURE OTHR SPECIMN AEROBIC: CPT

## 2018-02-09 PROCEDURE — 25000003 PHARM REV CODE 250: Performed by: NURSE PRACTITIONER

## 2018-02-09 PROCEDURE — 99292 CRITICAL CARE ADDL 30 MIN: CPT | Mod: ,,, | Performed by: INTERNAL MEDICINE

## 2018-02-09 PROCEDURE — 25000003 PHARM REV CODE 250: Performed by: EMERGENCY MEDICINE

## 2018-02-09 PROCEDURE — 20000000 HC ICU ROOM

## 2018-02-09 RX ORDER — HYDRALAZINE HYDROCHLORIDE 20 MG/ML
10 INJECTION INTRAMUSCULAR; INTRAVENOUS EVERY 6 HOURS PRN
Status: DISCONTINUED | OUTPATIENT
Start: 2018-02-09 | End: 2018-02-14 | Stop reason: HOSPADM

## 2018-02-09 RX ORDER — FUROSEMIDE 10 MG/ML
20 INJECTION INTRAMUSCULAR; INTRAVENOUS ONCE
Status: COMPLETED | OUTPATIENT
Start: 2018-02-09 | End: 2018-02-09

## 2018-02-09 RX ORDER — INSULIN ASPART 100 [IU]/ML
1-10 INJECTION, SOLUTION INTRAVENOUS; SUBCUTANEOUS
Status: DISCONTINUED | OUTPATIENT
Start: 2018-02-09 | End: 2018-02-14 | Stop reason: HOSPADM

## 2018-02-09 RX ORDER — MAGNESIUM SULFATE 1 G/100ML
1 INJECTION INTRAVENOUS ONCE
Status: COMPLETED | OUTPATIENT
Start: 2018-02-09 | End: 2018-02-09

## 2018-02-09 RX ORDER — IBUPROFEN 200 MG
16 TABLET ORAL
Status: DISCONTINUED | OUTPATIENT
Start: 2018-02-09 | End: 2018-02-14 | Stop reason: HOSPADM

## 2018-02-09 RX ORDER — GLUCAGON 1 MG
1 KIT INJECTION
Status: DISCONTINUED | OUTPATIENT
Start: 2018-02-09 | End: 2018-02-09

## 2018-02-09 RX ORDER — CLONIDINE HYDROCHLORIDE 0.2 MG/1
0.2 TABLET ORAL ONCE
Status: DISCONTINUED | OUTPATIENT
Start: 2018-02-09 | End: 2018-02-09

## 2018-02-09 RX ORDER — AMLODIPINE BESYLATE 5 MG/1
5 TABLET ORAL DAILY
Status: DISCONTINUED | OUTPATIENT
Start: 2018-02-09 | End: 2018-02-10

## 2018-02-09 RX ORDER — INSULIN ASPART 100 [IU]/ML
1-10 INJECTION, SOLUTION INTRAVENOUS; SUBCUTANEOUS EVERY 6 HOURS PRN
Status: DISCONTINUED | OUTPATIENT
Start: 2018-02-09 | End: 2018-02-09

## 2018-02-09 RX ORDER — CLONIDINE HYDROCHLORIDE 0.3 MG/1
0.3 TABLET ORAL 3 TIMES DAILY
Status: DISCONTINUED | OUTPATIENT
Start: 2018-02-09 | End: 2018-02-14 | Stop reason: HOSPADM

## 2018-02-09 RX ORDER — ENOXAPARIN SODIUM 100 MG/ML
40 INJECTION SUBCUTANEOUS EVERY 24 HOURS
Status: DISCONTINUED | OUTPATIENT
Start: 2018-02-09 | End: 2018-02-14 | Stop reason: HOSPADM

## 2018-02-09 RX ORDER — IBUPROFEN 200 MG
24 TABLET ORAL
Status: DISCONTINUED | OUTPATIENT
Start: 2018-02-09 | End: 2018-02-14 | Stop reason: HOSPADM

## 2018-02-09 RX ORDER — GLUCAGON 1 MG
1 KIT INJECTION
Status: DISCONTINUED | OUTPATIENT
Start: 2018-02-09 | End: 2018-02-14 | Stop reason: HOSPADM

## 2018-02-09 RX ADMIN — HYDRALAZINE HYDROCHLORIDE 10 MG: 20 INJECTION INTRAMUSCULAR; INTRAVENOUS at 01:02

## 2018-02-09 RX ADMIN — FUROSEMIDE 20 MG: 10 INJECTION, SOLUTION INTRAMUSCULAR; INTRAVENOUS at 12:02

## 2018-02-09 RX ADMIN — INSULIN ASPART 3 UNITS: 100 INJECTION, SOLUTION INTRAVENOUS; SUBCUTANEOUS at 09:02

## 2018-02-09 RX ADMIN — FAMOTIDINE 20 MG: 10 INJECTION, SOLUTION INTRAVENOUS at 08:02

## 2018-02-09 RX ADMIN — PIPERACILLIN AND TAZOBACTAM 4.5 G: 4; .5 INJECTION, POWDER, LYOPHILIZED, FOR SOLUTION INTRAVENOUS; PARENTERAL at 11:02

## 2018-02-09 RX ADMIN — ANTI-FUNGAL POWDER MICONAZOLE NITRATE TALC FREE: 1.42 POWDER TOPICAL at 08:02

## 2018-02-09 RX ADMIN — Medication 1000 MG: at 12:02

## 2018-02-09 RX ADMIN — CLONIDINE HYDROCHLORIDE 0.3 MG: 0.3 TABLET ORAL at 09:02

## 2018-02-09 RX ADMIN — CLONIDINE HYDROCHLORIDE 0.3 MG: 0.3 TABLET ORAL at 05:02

## 2018-02-09 RX ADMIN — DEXTROSE MONOHYDRATE AND SODIUM CHLORIDE: 5; .9 INJECTION, SOLUTION INTRAVENOUS at 04:02

## 2018-02-09 RX ADMIN — INSULIN ASPART 2 UNITS: 100 INJECTION, SOLUTION INTRAVENOUS; SUBCUTANEOUS at 12:02

## 2018-02-09 RX ADMIN — PIPERACILLIN AND TAZOBACTAM 4.5 G: 4; .5 INJECTION, POWDER, LYOPHILIZED, FOR SOLUTION INTRAVENOUS; PARENTERAL at 07:02

## 2018-02-09 RX ADMIN — PROPOFOL 20 MCG/KG/MIN: 10 INJECTION, EMULSION INTRAVENOUS at 03:02

## 2018-02-09 RX ADMIN — MAGNESIUM SULFATE IN DEXTROSE 1 G: 10 INJECTION, SOLUTION INTRAVENOUS at 08:02

## 2018-02-09 RX ADMIN — ENOXAPARIN SODIUM 40 MG: 100 INJECTION SUBCUTANEOUS at 04:02

## 2018-02-09 RX ADMIN — AMLODIPINE BESYLATE 5 MG: 5 TABLET ORAL at 12:02

## 2018-02-09 RX ADMIN — PIPERACILLIN AND TAZOBACTAM 4.5 G: 4; .5 INJECTION, POWDER, LYOPHILIZED, FOR SOLUTION INTRAVENOUS; PARENTERAL at 02:02

## 2018-02-09 RX ADMIN — INSULIN ASPART 4 UNITS: 100 INJECTION, SOLUTION INTRAVENOUS; SUBCUTANEOUS at 04:02

## 2018-02-09 NOTE — NURSING
Spoke with Azeb Nicole NP about patient's persistent elevated blodd pressure and minimal response to BP meds ordered earlier today. New blood pressure meds ordered.  At 17:45 patient's /87 after receiving clonidine

## 2018-02-09 NOTE — PLAN OF CARE
Problem: Patient Care Overview  Goal: Plan of Care Review  Outcome: Ongoing (interventions implemented as appropriate)  Patient remains on propofol and D5 NS this a.m.  Patient has had restless periods during the night.  See MAR for medication doses and times of administration.

## 2018-02-09 NOTE — NURSING
Olvin score is 14. Recently extubated. Sitting up in bed . Nurse reports skin intact . Had recently assessed. Olvin now much higher. Moving in bed. Coherent and cooperative. On pressure redistribution mattress. . Prevention measures in place. Will follow as needed.

## 2018-02-09 NOTE — ASSESSMENT & PLAN NOTE
Pt is non compliant w his meds   Will hold most of his home meds and let psych evaluate him first

## 2018-02-09 NOTE — PROGRESS NOTES
"[facility] Hospital Medicine  Progress Note    Patient Name: Shukri Rosales  MRN: [id number]  Patient Class: IP- Inpatient   Admission Date: 2/8/2018  Length of Stay: 1 days  Attending Physician: Skylar Beltran MD  Primary Care Provider: Farhana Burnham MD        Subjective:     Principal Problem:Acute encephalopathy    HPI:  Mr. Rosales is a 56 y/o  male with h/o T2DM, HTN, psychiatric problems, unable to take care of himself at home (per brother), was in the process of seeking NH placement, was brought to the ED twice in the last 2 days.    Patient was brought to the ED on 2/7/18 for AMS. Patient was "acting confused" and sitting in a chair in the carport. He reported h/o amphetamine use, but denied use recently. Per yesterday's chart review, he missed 2-3 days of his regular medications, hence he reportedly then all at once. He was evaluated in the ED yesterday, was found to be altered initially, but later was able to ambulate, fully alert, oriented x3 and was discharged via taxi at around 10:30 AM yesterday.    After going home, family found him to be unresponsive again in the late hours of 2/7/18. Per chart review, patient lives with a "mentally challenged" sister, who reportedly told the first responders that she is no longer able to take care of the patient. Upon arrival, EMS arrival, patient's GCS was 3, initial glucose was 37. He was intervened in the field and brought to the ED. He was emergently intubated in ED. BP was initially high, precipitously dropped to systolic 60's. Central line placed and levophed started. Unclear etiology of hypotension. No reported fever.    Labs reviewed. Lactic acid 2.5. Creatinine 2.1. K 3.1 UDS + for amphetamines, benzos. Currently intubated on tiny dose of propofol.    Hospital Course:  2/8- Mr. Rosales is a 56 y/o  male with h/o T2DM, HTN, psychiatric problems, unable to take care of himself at home (per brother), was in " "the process of seeking NH placement, was brought to the ED twice in the last 2 days. After going home, family found him to be unresponsive again in the late hours of 2/7/18. Per chart review, patient lives with a "mentally challenged" sister, who reportedly told the first responders that she is no longer able to take care of the patient. Upon arrival, EMS arrival, patient's GCS was 3, initial glucose was 37. He was intervened in the field and brought to the ED. He was emergently intubated in ED. BP was initially high, precipitously dropped to systolic 60's. Central line placed and levophed started. He was to be positive on his drug screen for opiates, THC.     Additional records review indicates that the patient had been hospitalized about one month ago with a very similar history. He has long standing known history of recreational drug abuse, alcoholism, non adherence to his diabetes and anti hypertensive medications. He has also had an established history of bipolar disorder/schizophrenia, and had been known to take prescription drugs haphazardly, if at all. The family has indicated that they can no longer provide care for him.     He is still intubated, sleepy but arousable, off Levophed and Diprivan, Lactic Acidosis resolved. Neurology and CC Med have seen and the pt and following him and he is hemodynamically stable, oxygenating well. Will leave over nite on vent.    2/9- pt extubated this am, now fully alert and awake and oriented, speech is clear, c/o mild throat irritation but he drank water without any difficulty. He is moving all 4 extremities. No visual or auditory hallucinations. He admitted before 2 RNs this am that he tried to commit suicide by intentionally taking all the meds and Insulin PTA. He however denies any suicidal thoughts or intentions now. Hemodynamically stable, oxygenating well. LFTs moderately elevated this am but Bun/Cr stable at 29/2.2-- probably has CKD 3. BP elevated at 191/105 post " extubation. One blood cx from central line is positive for GPC. He remains afebrile and is on Vanco/ Zosyn.    Interval History: looks and feels better, extubated.    Review of Systems   Constitutional: Positive for activity change and appetite change.   HENT: Positive for congestion and sore throat.    Eyes: Negative.    Respiratory: Negative.    Cardiovascular: Negative.    Gastrointestinal: Negative.    Endocrine: Negative.    Genitourinary: Negative.    Musculoskeletal: Negative.    Neurological: Negative.    Psychiatric/Behavioral: Positive for self-injury. Negative for confusion, decreased concentration, dysphoric mood, hallucinations and suicidal ideas. The patient is not nervous/anxious.      Objective:     Vital Signs (Most Recent):  Temp: 98.6 °F (37 °C) (02/09/18 0700)  Pulse: 60 (02/09/18 1030)  Resp: 18 (02/09/18 1030)  BP: (!) 191/105 (02/09/18 1030)  SpO2: (!) 94 % (02/09/18 1030) Vital Signs (24h Range):  Temp:  [97.4 °F (36.3 °C)-98.7 °F (37.1 °C)] 98.6 °F (37 °C)  Pulse:  [56-69] 60  Resp:  [14-23] 18  SpO2:  [90 %-100 %] 94 %  BP: (116-191)/() 191/105     Weight: 116 kg (255 lb 11.7 oz)  Body mass index is 36.69 kg/m².    Intake/Output Summary (Last 24 hours) at 02/09/18 1106  Last data filed at 02/09/18 1000   Gross per 24 hour   Intake          4200.21 ml   Output             1390 ml   Net          2810.21 ml      Physical Exam   Constitutional: He is oriented to person, place, and time. He appears well-developed and well-nourished. No distress.   HENT:   Head: Normocephalic and atraumatic.   Mouth/Throat: Oropharynx is clear and moist.   Eyes: Conjunctivae and EOM are normal. Pupils are equal, round, and reactive to light.   Neck: Normal range of motion. Neck supple. No JVD present. Tracheal deviation present.   Cardiovascular: Normal rate, regular rhythm, normal heart sounds and intact distal pulses.  Exam reveals no gallop and no friction rub.    No murmur heard.  Pulmonary/Chest:  Effort normal and breath sounds normal. No stridor. No respiratory distress. He has no wheezes. He has no rales.   Abdominal: Soft. Bowel sounds are normal. He exhibits no distension. There is no tenderness. There is no guarding.   Genitourinary:   Genitourinary Comments: deferred   Musculoskeletal: Normal range of motion. He exhibits no edema or deformity.   Neurological: He is oriented to person, place, and time. He displays normal reflexes. No sensory deficit. He exhibits normal muscle tone. Coordination normal.   Skin: Skin is warm and dry. Capillary refill takes less than 2 seconds. He is not diaphoretic. No pallor.   Psychiatric: He has a normal mood and affect. His behavior is normal.   Nursing note and vitals reviewed.      Significant Labs:   A1C:   Recent Labs  Lab 11/01/17  1119 12/23/17  0508   HGBA1C 7.4* 7.7*     ABGs:   Recent Labs  Lab 02/09/18  0825   PH 7.374   PCO2 38.9   HCO3 22.7*   POCSATURATED 92*   BE -3     Blood Culture:   Recent Labs  Lab 02/08/18  0200 02/08/18  0245   LABBLOO Gram stain aer bottle: Gram positive cocci in clusters resembling Staph   Results called to and read back by: Jeannette Crews RN  02/09/2018  05:22 No Growth to date     BMP:   Recent Labs  Lab 02/09/18  0323   *      K 4.1   *   CO2 21*   BUN 29*   CREATININE 2.2*   CALCIUM 8.3*   MG 1.6     CBC:   Recent Labs  Lab 02/08/18  0130 02/08/18  0602 02/09/18  0323   WBC 10.98 9.25 7.93   HGB 11.8* 10.6* 10.9*   HCT 36.3* 32.9* 32.0*    273 256     CMP:   Recent Labs  Lab 02/08/18  0130 02/08/18  0602 02/09/18  0323    143 141   K 3.0* 3.2* 4.1    110 111*   CO2 21* 22* 21*   GLU 57* 103 225*   BUN 29* 30* 29*   CREATININE 2.1* 2.3* 2.2*   CALCIUM 9.3 8.3* 8.3*   PROT 6.8  --  5.7*   ALBUMIN 3.2*  --  2.6*   BILITOT 0.4  --  0.4   ALKPHOS 61  --  60   AST 24  --  236*   ALT 23  --  60*   ANIONGAP 16 11 9   EGFRNONAA 33* 30* 32*     Coagulation:   Recent Labs  Lab 02/08/18  0130   INR  "1.0   APTT 22.2     Lactic Acid:   Recent Labs  Lab 02/08/18  0245 02/08/18  0542 02/08/18  0936   LACTATE 2.5* 2.5* 1.7     POCT Glucose:   Recent Labs  Lab 02/09/18  0401 02/09/18  0540 02/09/18  0718   POCTGLUCOSE 250* 229* 230*     Respiratory Culture:   Troponin:   Recent Labs  Lab 02/08/18  0130   TROPONINI 0.016     Urine Culture:   All pertinent labs within the past 24 hours have been reviewed.    Significant Imaging: I have reviewed all pertinent imaging results/findings within the past 24 hours.    Assessment/Plan:      Drug overdose, multiple drugs, undetermined intent, initial encounter    Apparently per chart review, patient was trying to "catchup on his 2-3 days of missed medications by taking all at once".  Unclear if intentional vs unintentional.  Yesterday's chart review reveals patient denies suicidal ideations.  Currently intubated, unable to determine intent or what medications he took.    2/9- pt admits it was intentional drug OD/ suicide attempt  He is PECed/ needs psych eval        Type 2 diabetes mellitus with stage 3 chronic kidney disease, with long-term current use of insulin    Hold all diabetic agents due to hypoglycemia.  Hypoglycemia sec to Insulin OD-- resolved  Await hga1c        Borderline personality disorder    Per chart review, family unable to take care of him at home.  Family started NH placement.  Case management consul for discharge planning.          Schizoaffective disorder, bipolar type    Pt is non compliant w his meds   Will hold most of his home meds and let psych evaluate him first          Shock liver    Sec to hypotension and shock-- expect rapid improvement  Will hold any Statins          Essential hypertension    He is on Norvasc, Lisinopril HCT  Will resume gradually            VTE Risk Mitigation         Ordered     enoxaparin injection 40 mg  Daily     Route:  Subcutaneous        02/09/18 0912     Medium Risk of VTE  Once      02/08/18 0535     Place EDVIN hose  " Until discontinued      02/08/18 0535     Place sequential compression device  Until discontinued      02/08/18 0535        Seen and d/w dr. Martinez and CC team    Critical care time spent on the evaluation and treatment of severe organ dysfunction, review of pertinent labs and imaging studies, discussions with consulting providers and discussions with patient/family: 43 minutes.    Skylar Beltran MD  Department of Hospital Medicine   [facility]

## 2018-02-09 NOTE — ASSESSMENT & PLAN NOTE
Creatinine 2.1 (baseline around 1.3-1.7).  Continue IV fluids.    Improved, Bun/Cr 29/2.2-- likely chronic and baseline.

## 2018-02-09 NOTE — ASSESSMENT & PLAN NOTE
AFEBRILE.  Monitor Fever curve. CULTURES => GPC. Panculture for temperature > 101 degrees. Source Control: VANCOMYCIN + ZOSYN

## 2018-02-09 NOTE — PROGRESS NOTES
"Patient successfully extubated to nasal canula oxygen    Patient reports that he tried to "kill' himself.   He reports that he self administered an unknown dose of insulin and ingested a "lot" of  His prescription medications as well as OTC medications.    He reports that he has had a psychiatry evaluation prior to this admission and psych placement at [facility] was being considered.     Care plan reviewed with patient who voiced understanding.     Dr. Beltran informed.     Assessment: Attempted Suicide    Plan:  Initiate PEC. Social work consult for psych placement.       TOTAL CRITICAL CARE TIME SPENT: 30 minutes.  "

## 2018-02-09 NOTE — ASSESSMENT & PLAN NOTE
Likely due to severe hypoglycemia vs drug overdose vs others.  Was found lethargic at home for many hours.  CT head unremarkable.  Consider neurology consult when patient wakes up.  Will empirically cover with Vanc, Zosyn.  Follow up on cultures.    Resolved and extubated this am

## 2018-02-09 NOTE — PROGRESS NOTES
Merna with [facility] notified of signed PEC via telephone as well as email.

## 2018-02-09 NOTE — SUBJECTIVE & OBJECTIVE
Interval History: Seen and examined at bedside. Hospital chart reviewed. No acute interval detrimental events noted.       Review of Systems   Unable to perform ROS: Intubated     Scheduled Meds:   famotidine (PF)  20 mg Intravenous Daily    magnesium sulfate IVPB  1 g Intravenous Once    miconazole NITRATE 2 %   Topical (Top) BID    piperacillin-tazobactam (ZOSYN) IVPB  4.5 g Intravenous Q8H    vancomycin (VANCOCIN) IVPB  1,250 mg Intravenous Q48H     Continuous Infusions:   norepinephrine bitartrate-D5W Stopped (02/08/18 1000)    propofol 25.022 mcg/kg/min (02/09/18 0700)     PRN Meds:.dextrose 50%, glucagon (human recombinant), insulin aspart       Objective:     Vital Signs (Most Recent):  Temp: 98.6 °F (37 °C) (02/09/18 0700)  Pulse: 63 (02/09/18 0824)  Resp: 16 (02/09/18 0824)  BP: (!) 163/92 (02/09/18 0700)  SpO2: 95 % (02/09/18 0824) Vital Signs (24h Range):  Temp:  [97.4 °F (36.3 °C)-98.7 °F (37.1 °C)] 98.6 °F (37 °C)  Pulse:  [56-63] 63  Resp:  [13-20] 16  SpO2:  [95 %-100 %] 95 %  BP: (116-181)/() 163/92     Weight: 116 kg (255 lb 11.7 oz)  Body mass index is 36.69 kg/m².      Intake/Output Summary (Last 24 hours) at 02/09/18 0826  Last data filed at 02/09/18 0701   Gross per 24 hour   Intake          3897.82 ml   Output             1135 ml   Net          2762.82 ml       Physical Exam   Constitutional: He appears well-developed and well-nourished. He is intubated.   Intubated, sedated on propofol   HENT:   Head: Normocephalic and atraumatic.   Eyes: Conjunctivae are normal. Pupils are equal, round, and reactive to light. No scleral icterus.   Neck: No thyromegaly present.   Cardiovascular: Normal rate, regular rhythm and intact distal pulses.    No murmur heard.  Pulmonary/Chest: He is intubated. He has no wheezes. He has no rhonchi. He has no rales.   Abdominal: Soft. He exhibits distension (obese). He exhibits no mass.   Musculoskeletal: He exhibits no edema or deformity.    Lymphadenopathy:     He has no cervical adenopathy.   Neurological:   Intubated, sedated on propofol, pupils small reactive   Skin: Skin is warm. No rash noted. No erythema.   Nursing note and vitals reviewed.      Vents:  Vent Mode: Spont (02/09/18 0824)  Ventilator Initiated: Yes (02/08/18 0125)  Set Rate: 0 bmp (02/09/18 0824)  Vt Set: 450 mL (02/09/18 0824)  Pressure Support: 10 cmH20 (02/09/18 0824)  PEEP/CPAP: 5 cmH20 (02/09/18 0824)  Oxygen Concentration (%): 45 (02/09/18 0824)  Peak Airway Pressure: 16 cmH2O (02/09/18 0824)  Plateau Pressure: 0 cmH20 (02/09/18 0824)  Total Ve: 10 mL (02/09/18 0824)  F/VT Ratio<105 (RSBI): (!) (P) 24.21 (02/09/18 0824)    Lines/Drains/Airways     Central Venous Catheter Line                 Percutaneous Central Line Insertion/Assessment - triple lumen  02/08/18 0232 right internal jugular 1 day          Drain                 NG/OG Tube 02/08/18 0125 orogastric 18 Fr. 1 day         Urethral Catheter 02/08/18 0112 Double-lumen;Latex 16 Fr. 1 day          Airway                 Airway - Non-Surgical 02/08/18 0115 Endotracheal Tube 1 day          Peripheral Intravenous Line                 Peripheral IV - Single Lumen 02/08/18 Left Forearm 1 day         Peripheral IV - Single Lumen 02/08/18 Right Antecubital 1 day                Significant Labs:    CBC/Anemia Profile:    Recent Labs  Lab 02/08/18  0130 02/08/18  0602 02/09/18  0323   WBC 10.98 9.25 7.93   HGB 11.8* 10.6* 10.9*   HCT 36.3* 32.9* 32.0*    273 256   MCV 91 91 87   RDW 14.5 14.6* 14.4        Chemistries:    Recent Labs  Lab 02/08/18  0130 02/08/18  0602 02/09/18  0323    143 141   K 3.0* 3.2* 4.1    110 111*   CO2 21* 22* 21*   BUN 29* 30* 29*   CREATININE 2.1* 2.3* 2.2*   CALCIUM 9.3 8.3* 8.3*   ALBUMIN 3.2*  --  2.6*   PROT 6.8  --  5.7*   BILITOT 0.4  --  0.4   ALKPHOS 61  --  60   ALT 23  --  60*   AST 24  --  236*   MG 2.0 1.8 1.6   PHOS 3.9 3.9 2.7       ABGs:   Recent Labs  Lab  02/09/18  0421   PH 7.320*   PCO2 43.6   HCO3 22.5*   POCSATURATED 97   BE -4     Blood Culture:   Recent Labs  Lab 02/08/18  0200 02/08/18  0245   LABBLOO Gram stain aer bottle: Gram positive cocci in clusters resembling Staph   Results called to and read back by: Jeannette Crews RN  02/09/2018  05:22 No Growth to date     Coagulation:   Recent Labs  Lab 02/08/18  0130   INR 1.0   APTT 22.2     Lactic Acid:   Recent Labs  Lab 02/08/18  0245 02/08/18  0542 02/08/18  0936   LACTATE 2.5* 2.5* 1.7     Troponin:   Recent Labs  Lab 02/08/18  0130   TROPONINI 0.016     Urine Studies:   Recent Labs  Lab 02/08/18  0137   COLORU Yellow   APPEARANCEUA Clear   PHUR 6.0   SPECGRAV >=1.030*   PROTEINUA Trace*   GLUCUA Negative   KETONESU Negative   BILIRUBINUA Negative   OCCULTUA Negative   NITRITE Negative   UROBILINOGEN Negative   LEUKOCYTESUR 1+*   WBCUA 10*       Significant Imaging:    CXR; Tubes are stable. Right jugular central line tip overlies the SVC in good position. No pneumothorax. In comparison to the prior study, there is no adverse interval changes.

## 2018-02-09 NOTE — ASSESSMENT & PLAN NOTE
"Apparently per chart review, patient was trying to "catchup on his 2-3 days of missed medications by taking all at once".  Unclear if intentional vs unintentional.  Yesterday's chart review reveals patient denies suicidal ideations.  Currently intubated, unable to determine intent or what medications he took.    2/9- pt admits it was intentional drug OD/ suicide attempt  He is PECed/ needs psych eval  "

## 2018-02-09 NOTE — PROGRESS NOTES
[facility] -   Critical Care Medicine  Progress Note    Patient Name: Shukri Rosales  MRN: [id number]  Admission Date: 2/8/2018  Hospital Length of Stay: 1 days  Code Status: Full Code  Attending Provider: Skylar Beltran MD  Primary Care Provider: Farhana Burnham MD   Principal Problem: Acute encephalopathy    Subjective:     HPI:  56 y/o male who  has a past medical history of Adrenal cortical adenoma; Anxiety; Arthritis; CKD (chronic kidney disease) stage 3, GFR 30-59 ml/min; Depression; Diabetes mellitus type II (2011); DM (diabetes mellitus) (2011); GERD (gastroesophageal reflux disease) (7/9/2013); Hypertension; Migraine headache (7/22/2013); Schizophrenia; and Severe obesity with body mass index of 36.0 to 36.9 admitted with acute metabolic encephalopathy, acute respiratory failure.    Hospital/ICU Course:  Admitted to the MICU. Intubated, Sedated and on full mechanical ventilatory support.   2/9 - Remains intubated. Clinically and hemodynamically stable.    Interval History: Seen and examined at bedside. Hospital chart reviewed. No acute interval detrimental events noted.       Review of Systems   Unable to perform ROS: Intubated     Scheduled Meds:   famotidine (PF)  20 mg Intravenous Daily    magnesium sulfate IVPB  1 g Intravenous Once    miconazole NITRATE 2 %   Topical (Top) BID    piperacillin-tazobactam (ZOSYN) IVPB  4.5 g Intravenous Q8H    vancomycin (VANCOCIN) IVPB  1,250 mg Intravenous Q48H     Continuous Infusions:   norepinephrine bitartrate-D5W Stopped (02/08/18 1000)    propofol 25.022 mcg/kg/min (02/09/18 0700)     PRN Meds:.dextrose 50%, glucagon (human recombinant), insulin aspart       Objective:     Vital Signs (Most Recent):  Temp: 98.6 °F (37 °C) (02/09/18 0700)  Pulse: 63 (02/09/18 0824)  Resp: 16 (02/09/18 0824)  BP: (!) 163/92 (02/09/18 0700)  SpO2: 95 % (02/09/18 0824) Vital Signs (24h Range):  Temp:  [97.4 °F (36.3 °C)-98.7 °F (37.1 °C)] 98.6 °F (37  °C)  Pulse:  [56-63] 63  Resp:  [13-20] 16  SpO2:  [95 %-100 %] 95 %  BP: (116-181)/() 163/92     Weight: 116 kg (255 lb 11.7 oz)  Body mass index is 36.69 kg/m².      Intake/Output Summary (Last 24 hours) at 02/09/18 0826  Last data filed at 02/09/18 0701   Gross per 24 hour   Intake          3897.82 ml   Output             1135 ml   Net          2762.82 ml       Physical Exam   Constitutional: He appears well-developed and well-nourished. He is intubated.   Intubated, sedated on propofol   HENT:   Head: Normocephalic and atraumatic.   Eyes: Conjunctivae are normal. Pupils are equal, round, and reactive to light. No scleral icterus.   Neck: No thyromegaly present.   Cardiovascular: Normal rate, regular rhythm and intact distal pulses.    No murmur heard.  Pulmonary/Chest: He is intubated. He has no wheezes. He has no rhonchi. He has no rales.   Abdominal: Soft. He exhibits distension (obese). He exhibits no mass.   Musculoskeletal: He exhibits no edema or deformity.   Lymphadenopathy:     He has no cervical adenopathy.   Neurological:   Intubated, sedated on propofol, pupils small reactive   Skin: Skin is warm. No rash noted. No erythema.   Nursing note and vitals reviewed.      Vents:  Vent Mode: Spont (02/09/18 0824)  Ventilator Initiated: Yes (02/08/18 0125)  Set Rate: 0 bmp (02/09/18 0824)  Vt Set: 450 mL (02/09/18 0824)  Pressure Support: 10 cmH20 (02/09/18 0824)  PEEP/CPAP: 5 cmH20 (02/09/18 0824)  Oxygen Concentration (%): 45 (02/09/18 0824)  Peak Airway Pressure: 16 cmH2O (02/09/18 0824)  Plateau Pressure: 0 cmH20 (02/09/18 0824)  Total Ve: 10 mL (02/09/18 0824)  F/VT Ratio<105 (RSBI): (!) (P) 24.21 (02/09/18 0824)    Lines/Drains/Airways     Central Venous Catheter Line                 Percutaneous Central Line Insertion/Assessment - triple lumen  02/08/18 0232 right internal jugular 1 day          Drain                 NG/OG Tube 02/08/18 0125 orogastric 18 Fr. 1 day         Urethral Catheter  02/08/18 0112 Double-lumen;Latex 16 Fr. 1 day          Airway                 Airway - Non-Surgical 02/08/18 0115 Endotracheal Tube 1 day          Peripheral Intravenous Line                 Peripheral IV - Single Lumen 02/08/18 Left Forearm 1 day         Peripheral IV - Single Lumen 02/08/18 Right Antecubital 1 day                Significant Labs:    CBC/Anemia Profile:    Recent Labs  Lab 02/08/18  0130 02/08/18  0602 02/09/18  0323   WBC 10.98 9.25 7.93   HGB 11.8* 10.6* 10.9*   HCT 36.3* 32.9* 32.0*    273 256   MCV 91 91 87   RDW 14.5 14.6* 14.4        Chemistries:    Recent Labs  Lab 02/08/18  0130 02/08/18  0602 02/09/18  0323    143 141   K 3.0* 3.2* 4.1    110 111*   CO2 21* 22* 21*   BUN 29* 30* 29*   CREATININE 2.1* 2.3* 2.2*   CALCIUM 9.3 8.3* 8.3*   ALBUMIN 3.2*  --  2.6*   PROT 6.8  --  5.7*   BILITOT 0.4  --  0.4   ALKPHOS 61  --  60   ALT 23  --  60*   AST 24  --  236*   MG 2.0 1.8 1.6   PHOS 3.9 3.9 2.7       ABGs:   Recent Labs  Lab 02/09/18  0421   PH 7.320*   PCO2 43.6   HCO3 22.5*   POCSATURATED 97   BE -4     Blood Culture:   Recent Labs  Lab 02/08/18  0200 02/08/18  0245   LABBLOO Gram stain aer bottle: Gram positive cocci in clusters resembling Staph   Results called to and read back by: Jeannette Crews RN  02/09/2018  05:22 No Growth to date     Coagulation:   Recent Labs  Lab 02/08/18  0130   INR 1.0   APTT 22.2     Lactic Acid:   Recent Labs  Lab 02/08/18  0245 02/08/18  0542 02/08/18  0936   LACTATE 2.5* 2.5* 1.7     Troponin:   Recent Labs  Lab 02/08/18  0130   TROPONINI 0.016     Urine Studies:   Recent Labs  Lab 02/08/18  0137   COLORU Yellow   APPEARANCEUA Clear   PHUR 6.0   SPECGRAV >=1.030*   PROTEINUA Trace*   GLUCUA Negative   KETONESU Negative   BILIRUBINUA Negative   OCCULTUA Negative   NITRITE Negative   UROBILINOGEN Negative   LEUKOCYTESUR 1+*   WBCUA 10*       Significant Imaging:    CXR; Tubes are stable. Right jugular central line tip overlies the SVC in  good position. No pneumothorax. In comparison to the prior study, there is no adverse interval changes.        Assessment/Plan:     I have reviewed all labs and imaging studies and compared to previous results. I have also discussed labs with all the teams in the medical care of the patient and my plan is outlined below     Neuro   * Acute encephalopathy    Suspected drug overdose. Hold sedation. Wake up and breathe trial. Appreciate neurology input.          Pulmonary    Ventiltor liberation =>SBT, weaning parameters, ABG. Extubate if criteria met. Keep SAO2 > 92%. Bronchodilators per protocol.        Cardiac/Vascular     Monitor hemodynamics. MAP goal of 65 mmHg.        Renal/    Monitor BUN / Cr. Montor urine output.        Electrolyte abnormality    Monitor and replete electrolytes PRN.        Acute renal failure superimposed on stage 3 chronic kidney disease    BUN / Cr stabilizing. Continue to monitor.         ID    AFEBRILE.  Monitor Fever curve. CULTURES => GPC. Panculture for temperature > 101 degrees. Source Control: VANCOMYCIN + ZOSYN        Hematology    Monitor hemogram. Transfuse as needed.        Endocrine   Type 2 diabetes mellitus with stage 3 chronic kidney disease, with long-term current use of insulin    Hyperglycemia:   moderate dose SSI for hyperglycemia with monitoring for glucose control and prevention of insulin toxicity. Blood glucose target 100 - 180 mg/dl            GI    NPO for now. Enteral nutrition per RD recommendation ( ADA diet). Stress ulcer prophylaxis.          Other   Drug overdose, multiple drugs, undetermined intent, initial encounter    UDS positive for Amphetamines and Benzodiazepines. Close monitoring. Case management for counseling once he is off the ventilator.            Critical Care Daily Checklist:    A: Awake: RASS Goal/Actual Goal: RASS Goal: -2-->light sedation  Actual: Bhat Agitation Sedation Scale (RASS): (P) Light sedation   B: Spontaneous Breathing Trial  Performed? Spon. Breathing Trial Initiated?: (S) Initiated (02/09/18 0754)   C: SAT & SBT Coordinated?  Yes               D: Delirium: CAM-ICU Overall CAM-ICU: Positive   E: Early Mobility Performed? Yes   F: Feeding Goal: Goals: Meet needs from nutrition support until extubated  Status: Nutrition Goal Status: new   Current Diet Order   Procedures    Diet NPO      AS: Analgesia/Sedation PROPOFOL   T: Thromboembolic Prophylaxis ENOXAPARIN   H: HOB > 300 Yes   U: Stress Ulcer Prophylaxis (if needed) FAMOTIDINE   G: Glucose Control INSULIN ASPART SSI   B: Bowel Function Stool Occurrence: 0   I: Indwelling Catheter (Lines & Medina) Necessity YES   D: De-escalation of Antimicrobials/Pharmacotherapies YES    Plan for the day/ETD Ventilator liberation    Code Status:  Family/Goals of Care: Full Code  Home with family     Critical Care Time: 60  minutes  Critical secondary to ACUTE ENCEPHALOPATHY, ACUTE RESPIRATORY FAILURE, CIRCULATORY SHOCK     Critical care was time spent personally by me on the following activities: development of treatment plan with patient or surrogate and bedside caregivers, discussions with consultants, evaluation of patient's response to treatment, examination of patient, ordering and performing treatments and interventions, ordering and review of laboratory studies, ordering and review of radiographic studies, pulse oximetry, re-evaluation of patient's condition. This critical care time did not overlap with that of any other provider or involve time for any procedures.     Zack Martinez MD  Critical Care Medicine  [facility] -

## 2018-02-09 NOTE — CONSULTS
Pharmacy Vancomycin Consult Note    WBC=7.93  Tmax=98.7  Crcl=46.1ml/min Scr=2.2  Patient is currently being pulse dosed.    Cultures: NGTD  Dx. Opportunistic infection prophylaxis    Vancomycin random level=13.4mcg/ml  Patient can receive a vancomycin 1 gram dose today.  Vancomycin 1250mg Q48 is a placeholder dose only.  Vancomycin random level due 2/10 with Am  labs.  Patient can receive another dose once Vancomycin random level <18mcg/ml    Thank you for allowing us to participate in this patient's care.  Zari Ny, Pharm D 2/9/2018 9:59 AM

## 2018-02-09 NOTE — ASSESSMENT & PLAN NOTE
Hold all diabetic agents due to hypoglycemia.  Hypoglycemia sec to Insulin OD-- resolved  Await hga1c

## 2018-02-09 NOTE — ASSESSMENT & PLAN NOTE
Hyperglycemia:   moderate dose SSI for hyperglycemia with monitoring for glucose control and prevention of insulin toxicity. Blood glucose target 100 - 180 mg/dl

## 2018-02-09 NOTE — PROGRESS NOTES
Brother provided Statement of Medical Status from patient's psychiatric provider, Luis Fernando Max, Ph. D. Phoned/faxed document to [facility] of Behavioral [facility]. (Phone [phone], fax [phone]). Form placed in documents in Blue Folder and in [employer].

## 2018-02-09 NOTE — SUBJECTIVE & OBJECTIVE
Interval History: looks and feels better, extubated.    Review of Systems   Constitutional: Positive for activity change and appetite change.   HENT: Positive for congestion and sore throat.    Eyes: Negative.    Respiratory: Negative.    Cardiovascular: Negative.    Gastrointestinal: Negative.    Endocrine: Negative.    Genitourinary: Negative.    Musculoskeletal: Negative.    Neurological: Negative.    Psychiatric/Behavioral: Positive for self-injury. Negative for confusion, decreased concentration, dysphoric mood, hallucinations and suicidal ideas. The patient is not nervous/anxious.      Objective:     Vital Signs (Most Recent):  Temp: 98.6 °F (37 °C) (02/09/18 0700)  Pulse: 60 (02/09/18 1030)  Resp: 18 (02/09/18 1030)  BP: (!) 191/105 (02/09/18 1030)  SpO2: (!) 94 % (02/09/18 1030) Vital Signs (24h Range):  Temp:  [97.4 °F (36.3 °C)-98.7 °F (37.1 °C)] 98.6 °F (37 °C)  Pulse:  [56-69] 60  Resp:  [14-23] 18  SpO2:  [90 %-100 %] 94 %  BP: (116-191)/() 191/105     Weight: 116 kg (255 lb 11.7 oz)  Body mass index is 36.69 kg/m².    Intake/Output Summary (Last 24 hours) at 02/09/18 1106  Last data filed at 02/09/18 1000   Gross per 24 hour   Intake          4200.21 ml   Output             1390 ml   Net          2810.21 ml      Physical Exam   Constitutional: He is oriented to person, place, and time. He appears well-developed and well-nourished. No distress.   HENT:   Head: Normocephalic and atraumatic.   Mouth/Throat: Oropharynx is clear and moist.   Eyes: Conjunctivae and EOM are normal. Pupils are equal, round, and reactive to light.   Neck: Normal range of motion. Neck supple. No JVD present. Tracheal deviation present.   Cardiovascular: Normal rate, regular rhythm, normal heart sounds and intact distal pulses.  Exam reveals no gallop and no friction rub.    No murmur heard.  Pulmonary/Chest: Effort normal and breath sounds normal. No stridor. No respiratory distress. He has no wheezes. He has no rales.    Abdominal: Soft. Bowel sounds are normal. He exhibits no distension. There is no tenderness. There is no guarding.   Genitourinary:   Genitourinary Comments: deferred   Musculoskeletal: Normal range of motion. He exhibits no edema or deformity.   Neurological: He is oriented to person, place, and time. He displays normal reflexes. No sensory deficit. He exhibits normal muscle tone. Coordination normal.   Skin: Skin is warm and dry. Capillary refill takes less than 2 seconds. He is not diaphoretic. No pallor.   Psychiatric: He has a normal mood and affect. His behavior is normal.   Nursing note and vitals reviewed.      Significant Labs:   A1C:   Recent Labs  Lab 11/01/17  1119 12/23/17  0508   HGBA1C 7.4* 7.7*     ABGs:   Recent Labs  Lab 02/09/18  0825   PH 7.374   PCO2 38.9   HCO3 22.7*   POCSATURATED 92*   BE -3     Blood Culture:   Recent Labs  Lab 02/08/18  0200 02/08/18  0245   LABBLOO Gram stain aer bottle: Gram positive cocci in clusters resembling Staph   Results called to and read back by: Jeannette Crews RN  02/09/2018  05:22 No Growth to date     BMP:   Recent Labs  Lab 02/09/18  0323   *      K 4.1   *   CO2 21*   BUN 29*   CREATININE 2.2*   CALCIUM 8.3*   MG 1.6     CBC:   Recent Labs  Lab 02/08/18  0130 02/08/18  0602 02/09/18  0323   WBC 10.98 9.25 7.93   HGB 11.8* 10.6* 10.9*   HCT 36.3* 32.9* 32.0*    273 256     CMP:   Recent Labs  Lab 02/08/18  0130 02/08/18  0602 02/09/18  0323    143 141   K 3.0* 3.2* 4.1    110 111*   CO2 21* 22* 21*   GLU 57* 103 225*   BUN 29* 30* 29*   CREATININE 2.1* 2.3* 2.2*   CALCIUM 9.3 8.3* 8.3*   PROT 6.8  --  5.7*   ALBUMIN 3.2*  --  2.6*   BILITOT 0.4  --  0.4   ALKPHOS 61  --  60   AST 24  --  236*   ALT 23  --  60*   ANIONGAP 16 11 9   EGFRNONAA 33* 30* 32*     Coagulation:   Recent Labs  Lab 02/08/18  0130   INR 1.0   APTT 22.2     Lactic Acid:   Recent Labs  Lab 02/08/18  0245 02/08/18  0542 02/08/18  0936   LACTATE 2.5*  2.5* 1.7     POCT Glucose:   Recent Labs  Lab 02/09/18  0401 02/09/18  0540 02/09/18  0718   POCTGLUCOSE 250* 229* 230*     Respiratory Culture:   Troponin:   Recent Labs  Lab 02/08/18  0130   TROPONINI 0.016     Urine Culture:   All pertinent labs within the past 24 hours have been reviewed.    Significant Imaging: I have reviewed all pertinent imaging results/findings within the past 24 hours.

## 2018-02-09 NOTE — HOSPITAL COURSE
"2/8- Mr. Rosales is a 58 y/o  male with h/o T2DM, HTN, psychiatric problems, unable to take care of himself at home (per brother), was in the process of seeking NH placement, was brought to the ED twice in the last 2 days. After going home, family found him to be unresponsive again in the late hours of 2/7/18. Per chart review, patient lives with a "mentally challenged" sister, who reportedly told the first responders that she is no longer able to take care of the patient. Upon arrival, EMS arrival, patient's GCS was 3, initial glucose was 37. He was intervened in the field and brought to the ED. He was emergently intubated in ED. BP was initially high, precipitously dropped to systolic 60's. Central line placed and levophed started. He was to be positive on his drug screen for opiates, THC.     Additional records review indicates that the patient had been hospitalized about one month ago with a very similar history. He has long standing known history of recreational drug abuse, alcoholism, non adherence to his diabetes and anti hypertensive medications. He has also had an established history of bipolar disorder/schizophrenia, and had been known to take prescription drugs haphazardly, if at all. The family has indicated that they can no longer provide care for him.     He is still intubated, sleepy but arousable, off Levophed and Diprivan, Lactic Acidosis resolved. Neurology and [facility] Med have seen and the pt and following him and he is hemodynamically stable, oxygenating well. Will leave over nite on vent.    2/9- pt extubated this am, now fully alert and awake and oriented, speech is clear, c/o mild throat irritation but he drank water without any difficulty. He is moving all 4 extremities. No visual or auditory hallucinations. He admitted before 2 RNs this am that he tried to commit suicide by intentionally taking all the meds and Insulin PTA. He however denies any suicidal thoughts or intentions now. " Hemodynamically stable, oxygenating well. LFTs moderately elevated this am but Bun/Cr stable at 29/2.2-- probably has CKD 3. BP elevated at 191/105 post extubation. One blood cx from central line is positive for GPC. He remains afebrile and is on Vanco/ Zosyn.  2/10/2018  Patient denies of any complains apart from being depressed . He mention he took large amount of his pills he don't remember exactly which one along with insulin with intent to harm self because he is depressed and don't want to live .blood culure 2/2 positive . Echo pending . Repeat blood . Id consulted . Case management for placement    2/11/2018  Patient final sensitivity on sputum and blood culture pending . Blood culture most likely containment . Keep antibiotic until final sensitivity back on respiratory   2/12/2018  Patient seen and examined today . Medically cleared for discharge . Blood pressure stable . Sputum is staph aureus will need 5 more days of bactrim . D/c hydralazine increase dose of lisnorpil . Also increase dose of seroquel   2/12/2018   Patient medically stable . Bacteria in blood contaminant . Sputum grew staph aureus sensitive to bactrim no signs of pneumonia but infection disease recommended completing five more days of bactrim . Blood pressure . Resume his psych meds . Patient seen and examined . Today  His home meds resume lower dose gabapentin 200 tid ,trazadone ,seroquel,clonapin and latuda.

## 2018-02-09 NOTE — ASSESSMENT & PLAN NOTE
Ventiltor liberation =>SBT, weaning parameters, ABG. Extubate if criteria met. Keep SAO2 > 92%. Bronchodilators per protocol.

## 2018-02-10 PROBLEM — R78.81 BACTEREMIA: Status: ACTIVE | Noted: 2018-02-10

## 2018-02-10 PROBLEM — R74.8 ELEVATED LIVER ENZYMES: Status: ACTIVE | Noted: 2018-02-09

## 2018-02-10 PROBLEM — T50.902A INTENTIONAL DRUG OVERDOSE: Status: ACTIVE | Noted: 2018-02-10

## 2018-02-10 LAB
DIASTOLIC DYSFUNCTION: NO
POCT GLUCOSE: 161 MG/DL (ref 70–110)
POCT GLUCOSE: 178 MG/DL (ref 70–110)
POCT GLUCOSE: 230 MG/DL (ref 70–110)
POCT GLUCOSE: 241 MG/DL (ref 70–110)
RETIRED EF AND QEF - SEE NOTES: 60 (ref 55–65)
VANCOMYCIN SERPL-MCNC: 12.5 UG/ML

## 2018-02-10 PROCEDURE — 25000003 PHARM REV CODE 250: Performed by: NURSE PRACTITIONER

## 2018-02-10 PROCEDURE — 80202 ASSAY OF VANCOMYCIN: CPT

## 2018-02-10 PROCEDURE — 25000003 PHARM REV CODE 250: Performed by: INTERNAL MEDICINE

## 2018-02-10 PROCEDURE — 99233 SBSQ HOSP IP/OBS HIGH 50: CPT | Mod: ,,, | Performed by: INTERNAL MEDICINE

## 2018-02-10 PROCEDURE — 63600175 PHARM REV CODE 636 W HCPCS: Performed by: EMERGENCY MEDICINE

## 2018-02-10 PROCEDURE — 63600175 PHARM REV CODE 636 W HCPCS: Performed by: INTERNAL MEDICINE

## 2018-02-10 PROCEDURE — 93306 TTE W/DOPPLER COMPLETE: CPT

## 2018-02-10 PROCEDURE — 36415 COLL VENOUS BLD VENIPUNCTURE: CPT

## 2018-02-10 PROCEDURE — 87040 BLOOD CULTURE FOR BACTERIA: CPT

## 2018-02-10 PROCEDURE — S0028 INJECTION, FAMOTIDINE, 20 MG: HCPCS | Performed by: INTERNAL MEDICINE

## 2018-02-10 PROCEDURE — 25000003 PHARM REV CODE 250: Performed by: EMERGENCY MEDICINE

## 2018-02-10 PROCEDURE — 93306 TTE W/DOPPLER COMPLETE: CPT | Mod: 26,,, | Performed by: INTERNAL MEDICINE

## 2018-02-10 PROCEDURE — 20000000 HC ICU ROOM

## 2018-02-10 PROCEDURE — 99291 CRITICAL CARE FIRST HOUR: CPT | Mod: ,,, | Performed by: PSYCHIATRY & NEUROLOGY

## 2018-02-10 RX ORDER — AMLODIPINE BESYLATE 5 MG/1
5 TABLET ORAL ONCE
Status: COMPLETED | OUTPATIENT
Start: 2018-02-10 | End: 2018-02-10

## 2018-02-10 RX ORDER — AMLODIPINE BESYLATE 10 MG/1
10 TABLET ORAL DAILY
Status: DISCONTINUED | OUTPATIENT
Start: 2018-02-11 | End: 2018-02-14 | Stop reason: HOSPADM

## 2018-02-10 RX ORDER — HYDRALAZINE HYDROCHLORIDE 50 MG/1
50 TABLET, FILM COATED ORAL EVERY 8 HOURS
Status: DISCONTINUED | OUTPATIENT
Start: 2018-02-10 | End: 2018-02-11

## 2018-02-10 RX ADMIN — ANTI-FUNGAL POWDER MICONAZOLE NITRATE TALC FREE: 1.42 POWDER TOPICAL at 08:02

## 2018-02-10 RX ADMIN — INSULIN ASPART 4 UNITS: 100 INJECTION, SOLUTION INTRAVENOUS; SUBCUTANEOUS at 04:02

## 2018-02-10 RX ADMIN — FAMOTIDINE 20 MG: 10 INJECTION, SOLUTION INTRAVENOUS at 08:02

## 2018-02-10 RX ADMIN — CLONIDINE HYDROCHLORIDE 0.3 MG: 0.3 TABLET ORAL at 06:02

## 2018-02-10 RX ADMIN — PIPERACILLIN AND TAZOBACTAM 4.5 G: 4; .5 INJECTION, POWDER, LYOPHILIZED, FOR SOLUTION INTRAVENOUS; PARENTERAL at 06:02

## 2018-02-10 RX ADMIN — HYDRALAZINE HYDROCHLORIDE 10 MG: 20 INJECTION INTRAMUSCULAR; INTRAVENOUS at 11:02

## 2018-02-10 RX ADMIN — INSULIN ASPART 1 UNITS: 100 INJECTION, SOLUTION INTRAVENOUS; SUBCUTANEOUS at 09:02

## 2018-02-10 RX ADMIN — INSULIN DETEMIR 10 UNITS: 100 INJECTION, SOLUTION SUBCUTANEOUS at 09:02

## 2018-02-10 RX ADMIN — ANTI-FUNGAL POWDER MICONAZOLE NITRATE TALC FREE: 1.42 POWDER TOPICAL at 09:02

## 2018-02-10 RX ADMIN — AMLODIPINE BESYLATE 5 MG: 5 TABLET ORAL at 10:02

## 2018-02-10 RX ADMIN — INSULIN ASPART 4 UNITS: 100 INJECTION, SOLUTION INTRAVENOUS; SUBCUTANEOUS at 11:02

## 2018-02-10 RX ADMIN — HYDRALAZINE HYDROCHLORIDE 50 MG: 50 TABLET, FILM COATED ORAL at 02:02

## 2018-02-10 RX ADMIN — CLONIDINE HYDROCHLORIDE 0.3 MG: 0.3 TABLET ORAL at 02:02

## 2018-02-10 RX ADMIN — INSULIN ASPART 2 UNITS: 100 INJECTION, SOLUTION INTRAVENOUS; SUBCUTANEOUS at 06:02

## 2018-02-10 RX ADMIN — ENOXAPARIN SODIUM 40 MG: 100 INJECTION SUBCUTANEOUS at 04:02

## 2018-02-10 RX ADMIN — AMLODIPINE BESYLATE 5 MG: 5 TABLET ORAL at 08:02

## 2018-02-10 RX ADMIN — VANCOMYCIN HYDROCHLORIDE 1250 MG: 1 INJECTION, POWDER, LYOPHILIZED, FOR SOLUTION INTRAVENOUS at 08:02

## 2018-02-10 RX ADMIN — HYDRALAZINE HYDROCHLORIDE 50 MG: 50 TABLET, FILM COATED ORAL at 09:02

## 2018-02-10 RX ADMIN — CLONIDINE HYDROCHLORIDE 0.3 MG: 0.3 TABLET ORAL at 09:02

## 2018-02-10 NOTE — ASSESSMENT & PLAN NOTE
Pt is non compliant w his meds   - patient comfortable and denies of any complains   - if he gets agitated will start  Seroquel

## 2018-02-10 NOTE — PLAN OF CARE
CM spoke with Dr. Russell and patient has positive blood cultures and he needs to repeat a second set. Patient is not medically stable for placement as of today. CM to f/u for safe transition/placement

## 2018-02-10 NOTE — PROGRESS NOTES
[facility] -   Pulmonology  Progress Note    Patient Name: Shukri Rosales  MRN: [id number]  Admission Date: 2/8/2018  Hospital Length of Stay: 2 days  Code Status: Full Code  Attending Provider: Natty Russell MD  Primary Care Provider: Farhana Burnham MD   Principal Problem: Acute encephalopathy    Subjective:     Interval History: Seen and examined at bedside. Hospital chart reviewed. No acute interval detrimental events noted.       Review of Systems   Constitutional: Negative for chills and weight loss.   HENT: Positive for sore throat. Negative for ear pain and tinnitus.    Eyes: Negative for double vision.   Respiratory: Negative for hemoptysis and sputum production.    Cardiovascular: Negative for chest pain and palpitations.   Gastrointestinal: Negative for nausea and vomiting.   Genitourinary: Negative for dysuria and urgency.   Musculoskeletal: Negative for myalgias and neck pain.   Neurological: Negative for dizziness and headaches.   Endo/Heme/Allergies: Does not bruise/bleed easily.   Psychiatric/Behavioral: Positive for suicidal ideas.     Scheduled Meds:   [START ON 2/11/2018] amLODIPine  10 mg Oral Daily    cloNIDine  0.3 mg Oral TID    enoxaparin  40 mg Subcutaneous Daily    famotidine (PF)  20 mg Intravenous Daily    hydrALAZINE  50 mg Oral Q8H    insulin aspart  1-10 Units Subcutaneous QID (AC & HS)    insulin detemir  10 Units Subcutaneous QHS    miconazole NITRATE 2 %   Topical (Top) BID    vancomycin (VANCOCIN) IVPB  1,250 mg Intravenous Q48H     Continuous Infusions:    PRN Meds:.dextrose 50%, dextrose 50%, glucagon (human recombinant), glucose, glucose, hydrALAZINE       Objective:     Vital Signs (Most Recent):  Temp: 99 °F (37.2 °C) (02/10/18 1500)  Pulse: 64 (02/10/18 1500)  Resp: 16 (02/10/18 1500)  BP: (!) 142/81 (02/10/18 1500)  SpO2: (!) 91 % (02/10/18 1500) Vital Signs (24h Range):  Temp:  [98 °F (36.7 °C)-99 °F (37.2 °C)] 99 °F (37.2  °C)  Pulse:  [53-81] 64  Resp:  [10-24] 16  SpO2:  [91 %-98 %] 91 %  BP: (119-183)/() 142/81     Weight: 116 kg (255 lb 11.7 oz)  Body mass index is 36.69 kg/m².      Intake/Output Summary (Last 24 hours) at 02/10/18 1624  Last data filed at 02/10/18 1400   Gross per 24 hour   Intake              850 ml   Output             2000 ml   Net            -1150 ml       Physical Exam   Constitutional: He appears well-developed and well-nourished. He is intubated.   HENT:   Head: Normocephalic and atraumatic.   Eyes: Conjunctivae are normal. Pupils are equal, round, and reactive to light. No scleral icterus.   Neck: No thyromegaly present.   Cardiovascular: Normal rate, regular rhythm and intact distal pulses.    No murmur heard.  Pulmonary/Chest: He is intubated. He has no wheezes. He has no rhonchi. He has no rales.   Abdominal: Soft. He exhibits no distension (obese) and no mass.   Musculoskeletal: He exhibits no edema or deformity.   Lymphadenopathy:     He has no cervical adenopathy.   Skin: Skin is warm. No rash noted. No erythema.   Nursing note and vitals reviewed.      Vents:  Vent Mode: Spont (02/09/18 0824)  Ventilator Initiated: Yes (02/08/18 0125)  Set Rate: 0 bmp (02/09/18 0824)  Vt Set: 450 mL (02/09/18 0824)  Pressure Support: 10 cmH20 (02/09/18 0824)  PEEP/CPAP: 5 cmH20 (02/09/18 0824)  Oxygen Concentration (%): 45 (02/09/18 0830)  Peak Airway Pressure: 16 cmH2O (02/09/18 0824)  Plateau Pressure: 0 cmH20 (02/09/18 0824)  Total Ve: 10 mL (02/09/18 0824)  Negative Inspiratory Force (cm H2O): -28 (02/09/18 0835)  F/VT Ratio<105 (RSBI): (!) 24.21 (02/09/18 0824)    Lines/Drains/Airways     Central Venous Catheter Line                 Percutaneous Central Line Insertion/Assessment - triple lumen  02/08/18 0232 right internal jugular 2 days                Significant Labs:    CBC/Anemia Profile:    Recent Labs  Lab 02/09/18  0323   WBC 7.93   HGB 10.9*   HCT 32.0*      MCV 87   RDW 14.4         Chemistries:    Recent Labs  Lab 02/09/18  0323      K 4.1   *   CO2 21*   BUN 29*   CREATININE 2.2*   CALCIUM 8.3*   ALBUMIN 2.6*   PROT 5.7*   BILITOT 0.4   ALKPHOS 60   ALT 60*   *   MG 1.6   PHOS 2.7       ABGs:     Recent Labs  Lab 02/09/18  0825   PH 7.374   PCO2 38.9   HCO3 22.7*   POCSATURATED 92*   BE -3       Significant Imaging:    CXR; Tubes are stable. Right jugular central line tip overlies the SVC in good position. No pneumothorax. In comparison to the prior study, there is no adverse interval changes.        Assessment/Plan:     Intentional drug overdose    [facility]ains Emergency Certificate Executed. Awaiting psych placement.         Schizoaffective disorder, bipolar type    Social work consulted for Psych placement.         Elevated liver enzymes    Monitor and trend. Avoid nephrotoxic meds.         Electrolyte abnormality    Monitor and replete electrolytes PRN.        Drug overdose, multiple drugs, undetermined intent, initial encounter    UDS positive for Amphetamines and Benzodiazepines. Close monitoring.         Stage 3 chronic kidney disease    ? Diabetic nephropathy. Monitor and trend.BUN / Cr.         Pulmonary    Supplement oxygen. Keep SAO2 > 92%. Bronchodilators per protocol.        Cardiac/Vascular     Monitor hemodynamics. MAP goal of 65 mmHg.        Renal/    Monitor BUN / Cr. Montor urine output.        ID    AFEBRILE.  Monitor Fever curve. CULTURES => GPC. Panculture for temperature > 101 degrees. Source Control: De escalate to VANCOMYCIN  Only.          Hematology    Monitor hemogram. Transfuse as needed.        GI    ADA diet. Stress ulcer prophylaxis.              Will sign off for now. Please feel free to re consult if further pulmonary issues arise.     Zack Martinez MD  Pulmonology  [facility]

## 2018-02-10 NOTE — PLAN OF CARE
Problem: Infection, Risk/Actual (Adult)  Intervention: Prevent Infection/Maximize Resistance  Patient is afebrile. IV sites are intact, no redness, pain or swelling. Sputum culture collected. Patient has adequate oral nutrition and fluids are encouraged. Monitoring blood glucose levels and supplemental insulin is given. Plan of care discussed with patient.

## 2018-02-10 NOTE — ASSESSMENT & PLAN NOTE
krista and will work on transfer to [facility] facility once medically stable

## 2018-02-10 NOTE — ASSESSMENT & PLAN NOTE
AFEBRILE.  Monitor Fever curve. CULTURES => GPC. Panculture for temperature > 101 degrees. Source Control: De escalate to VANCOMYCIN  Only.

## 2018-02-10 NOTE — ASSESSMENT & PLAN NOTE
Hold all diabetic agents due to hypoglycemia.  Resume sliding   If blood sugar stable will start low dose long acting insulin

## 2018-02-10 NOTE — PLAN OF CARE
Problem: Patient Care Overview  Goal: Plan of Care Review  Outcome: Ongoing (interventions implemented as appropriate)  Patient remains with a PEC for observation of patient.  Complained of sore throat, spoke with Dr Martinez and got chloraseptic throat spray ordered for patient. Patient afebrile during the night.  Urine output adequate.  See MAR for medication doses and times of administration.

## 2018-02-10 NOTE — SUBJECTIVE & OBJECTIVE
Review of Systems   Constitutional: Negative for activity change, appetite change and chills.   HENT: Negative for congestion and sore throat.    Eyes: Negative.    Respiratory: Negative.  Negative for chest tightness and shortness of breath.    Cardiovascular: Negative.    Gastrointestinal: Negative.    Endocrine: Negative.  Negative for cold intolerance.   Genitourinary: Negative.  Negative for difficulty urinating and dysuria.   Musculoskeletal: Negative.  Negative for back pain.   Neurological: Negative.  Negative for dizziness.   Psychiatric/Behavioral: Positive for self-injury. Negative for confusion, decreased concentration, dysphoric mood, hallucinations and suicidal ideas. The patient is not nervous/anxious.      Objective:     Vital Signs (Most Recent):  Temp: 98.1 °F (36.7 °C) (02/10/18 0700)  Pulse: 70 (02/10/18 1400)  Resp: 16 (02/10/18 1400)  BP: (!) 166/106 (02/10/18 1400)  SpO2: 96 % (02/10/18 1200) Vital Signs (24h Range):  Temp:  [98 °F (36.7 °C)-98.4 °F (36.9 °C)] 98.1 °F (36.7 °C)  Pulse:  [53-81] 70  Resp:  [10-24] 16  SpO2:  [92 %-98 %] 96 %  BP: (119-183)/() 166/106     Weight: 116 kg (255 lb 11.7 oz)  Body mass index is 36.69 kg/m².    Intake/Output Summary (Last 24 hours) at 02/10/18 1427  Last data filed at 02/10/18 0815   Gross per 24 hour   Intake              950 ml   Output             1875 ml   Net             -925 ml      Physical Exam   Constitutional: He is oriented to person, place, and time. He appears well-developed and well-nourished. No distress.   HENT:   Head: Normocephalic and atraumatic.   Mouth/Throat: Oropharynx is clear and moist.   Eyes: Conjunctivae and EOM are normal. Pupils are equal, round, and reactive to light.   Neck: Normal range of motion. Neck supple. No JVD present. Tracheal deviation present.   Cardiovascular: Normal rate, regular rhythm, normal heart sounds and intact distal pulses.  Exam reveals no gallop and no friction rub.    No murmur  heard.  Pulmonary/Chest: Effort normal and breath sounds normal. No stridor. No respiratory distress. He has no wheezes. He has no rales.   Abdominal: Soft. Bowel sounds are normal. He exhibits no distension. There is no tenderness. There is no guarding.   Genitourinary:   Genitourinary Comments: deferred   Musculoskeletal: Normal range of motion. He exhibits no edema or deformity.   Neurological: He is oriented to person, place, and time. He displays normal reflexes. No sensory deficit. He exhibits normal muscle tone. Coordination normal.   Skin: Skin is warm and dry. Capillary refill takes less than 2 seconds. He is not diaphoretic. No pallor.   Psychiatric: He has a normal mood and affect. His behavior is normal.   Nursing note and vitals reviewed.      Significant Labs: All pertinent labs within the past 24 hours have been reviewed.    Significant Imaging: I have reviewed all pertinent imaging results/findings within the past 24 hours.

## 2018-02-10 NOTE — PROGRESS NOTES
"[facility] Hospital Medicine  Progress Note    Patient Name: Shukri Rosales  MRN: [id number]  Patient Class: IP- Inpatient   Admission Date: 2/8/2018  Length of Stay: 2 days  Attending Physician: Natty Russell MD  Primary Care Provider: Farhana Burnham MD        Subjective:     Principal Problem:Acute encephalopathy    HPI:  Mr. Rosales is a 56 y/o  male with h/o T2DM, HTN, psychiatric problems, unable to take care of himself at home (per brother), was in the process of seeking NH placement, was brought to the ED twice in the last 2 days.    Patient was brought to the ED on 2/7/18 for AMS. Patient was "acting confused" and sitting in a chair in the carport. He reported h/o amphetamine use, but denied use recently. Per yesterday's chart review, he missed 2-3 days of his regular medications, hence he reportedly then all at once. He was evaluated in the ED yesterday, was found to be altered initially, but later was able to ambulate, fully alert, oriented x3 and was discharged via taxi at around 10:30 AM yesterday.    After going home, family found him to be unresponsive again in the late hours of 2/7/18. Per chart review, patient lives with a "mentally challenged" sister, who reportedly told the first responders that she is no longer able to take care of the patient. Upon arrival, EMS arrival, patient's GCS was 3, initial glucose was 37. He was intervened in the field and brought to the ED. He was emergently intubated in ED. BP was initially high, precipitously dropped to systolic 60's. Central line placed and levophed started. Unclear etiology of hypotension. No reported fever.    Labs reviewed. Lactic acid 2.5. Creatinine 2.1. K 3.1 UDS + for amphetamines, benzos. Currently intubated on tiny dose of propofol.    Hospital Course:  2/8- Mr. Rosales is a 56 y/o  male with h/o T2DM, HTN, psychiatric problems, unable to take care of himself at home (per brother), " "was in the process of seeking NH placement, was brought to the ED twice in the last 2 days. After going home, family found him to be unresponsive again in the late hours of 2/7/18. Per chart review, patient lives with a "mentally challenged" sister, who reportedly told the first responders that she is no longer able to take care of the patient. Upon arrival, EMS arrival, patient's GCS was 3, initial glucose was 37. He was intervened in the field and brought to the ED. He was emergently intubated in ED. BP was initially high, precipitously dropped to systolic 60's. Central line placed and levophed started. He was to be positive on his drug screen for opiates, THC.     Additional records review indicates that the patient had been hospitalized about one month ago with a very similar history. He has long standing known history of recreational drug abuse, alcoholism, non adherence to his diabetes and anti hypertensive medications. He has also had an established history of bipolar disorder/schizophrenia, and had been known to take prescription drugs haphazardly, if at all. The family has indicated that they can no longer provide care for him.     He is still intubated, sleepy but arousable, off Levophed and Diprivan, Lactic Acidosis resolved. Neurology and [facility] have seen and the pt and following him and he is hemodynamically stable, oxygenating well. Will leave over nite on vent.    2/9- pt extubated this am, now fully alert and awake and oriented, speech is clear, c/o mild throat irritation but he drank water without any difficulty. He is moving all 4 extremities. No visual or auditory hallucinations. He admitted before 2 RNs this am that he tried to commit suicide by intentionally taking all the meds and Insulin PTA. He however denies any suicidal thoughts or intentions now. Hemodynamically stable, oxygenating well. LFTs moderately elevated this am but Bun/Cr stable at 29/2.2-- probably has CKD 3. BP elevated at " 191/105 post extubation. One blood cx from central line is positive for GPC. He remains afebrile and is on Vanco/ Zosyn.  2/10/2017   Patient denies of any complains apart from being depressed . He mention he took large amount of his pills he don't remember exactly which one along with insulin with intent to harm self because he is depressed and don't want to live .blood culure 2/2 positive . Echo pending . Repeat blood . Id consulted . Case management for placement          Review of Systems   Constitutional: Negative for activity change, appetite change and chills.   HENT: Negative for congestion and sore throat.    Eyes: Negative.    Respiratory: Negative.  Negative for chest tightness and shortness of breath.    Cardiovascular: Negative.    Gastrointestinal: Negative.    Endocrine: Negative.  Negative for cold intolerance.   Genitourinary: Negative.  Negative for difficulty urinating and dysuria.   Musculoskeletal: Negative.  Negative for back pain.   Neurological: Negative.  Negative for dizziness.   Psychiatric/Behavioral: Positive for self-injury. Negative for confusion, decreased concentration, dysphoric mood, hallucinations and suicidal ideas. The patient is not nervous/anxious.      Objective:     Vital Signs (Most Recent):  Temp: 98.1 °F (36.7 °C) (02/10/18 0700)  Pulse: 70 (02/10/18 1400)  Resp: 16 (02/10/18 1400)  BP: (!) 166/106 (02/10/18 1400)  SpO2: 96 % (02/10/18 1200) Vital Signs (24h Range):  Temp:  [98 °F (36.7 °C)-98.4 °F (36.9 °C)] 98.1 °F (36.7 °C)  Pulse:  [53-81] 70  Resp:  [10-24] 16  SpO2:  [92 %-98 %] 96 %  BP: (119-183)/() 166/106     Weight: 116 kg (255 lb 11.7 oz)  Body mass index is 36.69 kg/m².    Intake/Output Summary (Last 24 hours) at 02/10/18 1427  Last data filed at 02/10/18 0815   Gross per 24 hour   Intake              950 ml   Output             1875 ml   Net             -925 ml      Physical Exam   Constitutional: He is oriented to person, place, and time. He appears  well-developed and well-nourished. No distress.   HENT:   Head: Normocephalic and atraumatic.   Mouth/Throat: Oropharynx is clear and moist.   Eyes: Conjunctivae and EOM are normal. Pupils are equal, round, and reactive to light.   Neck: Normal range of motion. Neck supple. No JVD present. Tracheal deviation present.   Cardiovascular: Normal rate, regular rhythm, normal heart sounds and intact distal pulses.  Exam reveals no gallop and no friction rub.    No murmur heard.  Pulmonary/Chest: Effort normal and breath sounds normal. No stridor. No respiratory distress. He has no wheezes. He has no rales.   Abdominal: Soft. Bowel sounds are normal. He exhibits no distension. There is no tenderness. There is no guarding.   Genitourinary:   Genitourinary Comments: deferred   Musculoskeletal: Normal range of motion. He exhibits no edema or deformity.   Neurological: He is oriented to person, place, and time. He displays normal reflexes. No sensory deficit. He exhibits normal muscle tone. Coordination normal.   Skin: Skin is warm and dry. Capillary refill takes less than 2 seconds. He is not diaphoretic. No pallor.   Psychiatric: He has a normal mood and affect. His behavior is normal.   Nursing note and vitals reviewed.      Significant Labs: All pertinent labs within the past 24 hours have been reviewed.    Significant Imaging: I have reviewed all pertinent imaging results/findings within the past 24 hours.    Assessment/Plan:      Bacteremia    Staph bactermia 2/2 pending echo and repeat cultures  Id on board             Intentional drug overdose    pec and will work on transfer to [facility] facility once medically stable         Schizoaffective disorder, bipolar type    Pt is non compliant w his meds   - patient comfortable and denies of any complains   - if he gets agitated will start  Seroquel           Elevated liver enzymes    - medication induced   - will continue to monitor   - if trend up will order further work up            Drug overdose, multiple drugs, undetermined intent, initial encounter    Overdose on home exact medication unknown but include hypoglycemic meds   He is PECed/ needs psych eval        Type 2 diabetes mellitus with stage 3 chronic kidney disease, with long-term current use of insulin    Hold all diabetic agents due to hypoglycemia.  Resume sliding   If blood sugar stable will start low dose long acting insulin         Essential hypertension    He is on Norvasc, hydralazine and clonidine             Borderline personality disorder    Per chart review, family unable to take care of him at home.  Family started NH placement.  Case management consul for discharge planning.            VTE Risk Mitigation         Ordered     enoxaparin injection 40 mg  Daily     Route:  Subcutaneous        02/09/18 0912     Medium Risk of VTE  Once      02/08/18 0535     Place EDVIN hose  Until discontinued      02/08/18 0535     Place sequential compression device  Until discontinued      02/08/18 0535      transfer patient to floor once bp stable   Natty Russell MD  Department of Hospital Medicine   [facility]

## 2018-02-10 NOTE — PROGRESS NOTES
Progress Note  Neurological ICU    Admit Date: 2/8/2018   LOS: 2 days     SUBJECTIVE:     Follow-up For:  Acute encephalopathy due to multiple factors, including drug overdose, hypoglycemia, hypoxia.  The patient has been successfully extubated without respiratory distress.  He is sitting up in bed conversing with caregivers.  He denies any headache, nausea, chest pain, abdominal discomfort.    Continuous Infusions:  Scheduled Meds:   [START ON 2/11/2018] amLODIPine  10 mg Oral Daily    amLODIPine  5 mg Oral Once    cloNIDine  0.3 mg Oral TID    enoxaparin  40 mg Subcutaneous Daily    famotidine (PF)  20 mg Intravenous Daily    hydrALAZINE  50 mg Oral Q8H    insulin aspart  1-10 Units Subcutaneous QID (AC & HS)    miconazole NITRATE 2 %   Topical (Top) BID    vancomycin (VANCOCIN) IVPB  1,250 mg Intravenous Q48H     PRN Meds:dextrose 50%, dextrose 50%, glucagon (human recombinant), glucose, glucose, hydrALAZINE    Review of patient's allergies indicates:  No Known Allergies    OBJECTIVE:     Vital Signs (Most Recent)  Temp: 98.1 °F (36.7 °C) (02/10/18 0700)  Pulse: (!) 57 (02/10/18 0756)  Resp: 18 (02/10/18 0756)  BP: (!) 165/94 (02/10/18 0600)  SpO2: 95 % (02/10/18 0756)    Vital Signs Range (Last 24H):  Temp:  [97.9 °F (36.6 °C)-98.4 °F (36.9 °C)]   Pulse:  [57-81]   Resp:  [14-24]   BP: (127-191)/()   SpO2:  [91 %-98 %]     I & O (Last 24H):  Intake/Output Summary (Last 24 hours) at 02/10/18 0854  Last data filed at 02/10/18 0700   Gross per 24 hour   Intake             2050 ml   Output             2665 ml   Net             -615 ml     Ventilator Data (Last 24H):          Hemodynamic Parameters (Last 24H):       Physical Exam:  General: well developed, well nourished, moderately obese  HENT: Head:normocephalic, atraumatic. Ears:not examined. Nose: no discharge. Throat: no throat erythema and edentulous.  Eyes: conjunctivae/corneas clear. PERRL.   Lungs:  clear to auscultation bilaterally and normal  "respiratory effort  Cardiovascular: Heart: regular rate and rhythm. Chest Wall: not examined. Extremities: no cyanosis or edema, or clubbing. Pulses: not examined.  Abdomen/Rectal: Abdomen: soft, non-tender non-distented; bowel sounds normal; no masses,  no organomegaly. Rectal: not examined  Neurologic: Mental status: alertness: alert, orientation: person, place, affect: expansive  Cranial nerves: normal  Motor:grossly normal  Reflexes: 2+ and symmetric  Coordination: finger to nose normal bilaterally  Psych/Behavioral:  Behavior: normal and Affect: elated    Lines/Drains:       Percutaneous Central Line Insertion/Assessment - triple lumen  02/08/18 0232 right internal jugular (Active)   Dressing biopatch in place;dressing dry and intact;transparent semipermeable dressing applied 2/10/2018  7:05 AM   Securement secured w/ sutures 2/10/2018  7:05 AM   Distal Patency/Care infusing 2/10/2018  7:05 AM   Medial Patency/Care normal saline locked 2/10/2018  7:05 AM   Proximal Patency/Care normal saline locked 2/10/2018  7:05 AM   Dressing Change Due 02/16/18 2/10/2018  7:05 AM   Daily Line Review Performed 2/10/2018  7:05 AM   Number of days: 2            Urethral Catheter 02/08/18 0112 Double-lumen;Latex 16 Fr. (Active)   Site Assessment Clean;Intact;Dry 2/10/2018  7:05 AM   Collection Container Urimeter 2/10/2018  7:05 AM   Securement Method secured to top of thigh w/ adhesive device 2/10/2018  7:05 AM   Catheter Care Performed yes 2/10/2018  3:05 AM   Reason for Continuing Urinary Catheterization Critically ill in ICU requiring intensive monitoring 2/10/2018  7:05 AM   CAUTI Prevention Bundle StatLock in place w 1" slack;Intact seal between catheter & drainage tubing;Drainage bag off the floor;Green sheeting clip in use;No dependent loops or kinks;Drainage bag not overfilled (<2/3 full);Drainage bag below bladder 2/10/2018  7:05 AM   Output (mL) 100 mL 2/10/2018  7:00 AM   Number of days: 2       Laboratory (Last " 24H):  CBC:  No results for input(s): WBC, HGB, HCT, PLT in the last 24 hours.  CMP:  No results for input(s): GLUCOSE, CALCIUM, ALBUMIN, PROT, NA, K, CO2, CL, BUN, CREATININE, ALKPHOS, ALT, AST, BILITOT in the last 24 hours.  BMP:   Recent Labs  Lab 02/09/18  0323   *      K 4.1   *   CO2 21*   BUN 29*   CREATININE 2.2*   CALCIUM 8.3*   MG 1.6     Microbiology Results (last 7 days)     Procedure Component Value Units Date/Time    Blood culture [[id number]] Collected:  02/10/18 0855    Order Status:  Sent Specimen:  Blood Updated:  02/10/18 0855    Blood culture [[id number]] Collected:  02/10/18 0850    Order Status:  Sent Specimen:  Blood Updated:  02/10/18 0855    Blood culture x two cultures. Draw prior to antibiotics. [[id number]] Collected:  02/08/18 0245    Order Status:  Completed Specimen:  Blood from Line, Jugular, Internal Right Updated:  02/10/18 0756     Blood Culture, Routine Gram stain ajit bottle: Gram positive cocci in clusters resembling Staph     Blood Culture, Routine Results called to and read back by:  Milvia Zaldivar 02/09/2018  11:35     Blood Culture, Routine --     STAPHYLOCOCCUS SPECIES  Identification and susceptibility pending      Narrative:       Aerobic and anaerobic    Blood culture x two cultures. Draw prior to antibiotics. [[id number]] Collected:  02/08/18 0200    Order Status:  Completed Specimen:  Blood from Line, Jugular, Internal Right Updated:  02/10/18 0753     Blood Culture, Routine Gram stain aer bottle: Gram positive cocci in clusters resembling Staph      Blood Culture, Routine Results called to and read back by: Jeannette Crews RN  02/09/2018  05:22     Blood Culture, Routine --     STAPHYLOCOCCUS SPECIES  Identification and susceptibility pending      Narrative:       Aerobic and anaerobic    Culture, Respiratory with Gram Stain [[id number]] Collected:  02/09/18 0853    Order Status:  Completed Specimen:  Respiratory from Tracheal Aspirate Updated:  02/09/18  2240     Gram Stain (Respiratory) >10 epithelial cells per low power field     Gram Stain (Respiratory) Moderate WBC's     Gram Stain (Respiratory) Many Gram positive cocci     Gram Stain (Respiratory) Few Gram negative rods     Gram Stain (Respiratory) Rare budding yeast    Urine culture [[id number]] Collected:  02/08/18 0137    Order Status:  Completed Specimen:  Urine from Urine, Catheterized Updated:  02/09/18 1208     Urine Culture, Routine No growth          Chest X-Ray: Not examined    Diagnostic Results:  No Further    ASSESSMENT/PLAN:     Preventive Measures: Nutrition: Goal: Tolerate oral feedings, DVT: continue prophyllaxis, Head of Bet: elevated, Physical Therapy: Begin active exercise, out of bed to chair        Plan:  Neuro: No further studies planned.  Will sign off.  Reconsult if needed.    Counseling/Consultation:Family not available.  Discussed with his ICU nurse.      Critical Care Time greater than: 30 Minutes

## 2018-02-10 NOTE — ASSESSMENT & PLAN NOTE
Overdose on home exact medication unknown but include hypoglycemic meds   He is PECed/ needs psych eval

## 2018-02-10 NOTE — SUBJECTIVE & OBJECTIVE
Interval History: Seen and examined at bedside. Hospital chart reviewed. No acute interval detrimental events noted.       Review of Systems   Constitutional: Negative for chills and weight loss.   HENT: Positive for sore throat. Negative for ear pain and tinnitus.    Eyes: Negative for double vision.   Respiratory: Negative for hemoptysis and sputum production.    Cardiovascular: Negative for chest pain and palpitations.   Gastrointestinal: Negative for nausea and vomiting.   Genitourinary: Negative for dysuria and urgency.   Musculoskeletal: Negative for myalgias and neck pain.   Neurological: Negative for dizziness and headaches.   Endo/Heme/Allergies: Does not bruise/bleed easily.   Psychiatric/Behavioral: Positive for suicidal ideas.     Scheduled Meds:   [START ON 2/11/2018] amLODIPine  10 mg Oral Daily    cloNIDine  0.3 mg Oral TID    enoxaparin  40 mg Subcutaneous Daily    famotidine (PF)  20 mg Intravenous Daily    hydrALAZINE  50 mg Oral Q8H    insulin aspart  1-10 Units Subcutaneous QID (AC & HS)    insulin detemir  10 Units Subcutaneous QHS    miconazole NITRATE 2 %   Topical (Top) BID    vancomycin (VANCOCIN) IVPB  1,250 mg Intravenous Q48H     Continuous Infusions:    PRN Meds:.dextrose 50%, dextrose 50%, glucagon (human recombinant), glucose, glucose, hydrALAZINE       Objective:     Vital Signs (Most Recent):  Temp: 99 °F (37.2 °C) (02/10/18 1500)  Pulse: 64 (02/10/18 1500)  Resp: 16 (02/10/18 1500)  BP: (!) 142/81 (02/10/18 1500)  SpO2: (!) 91 % (02/10/18 1500) Vital Signs (24h Range):  Temp:  [98 °F (36.7 °C)-99 °F (37.2 °C)] 99 °F (37.2 °C)  Pulse:  [53-81] 64  Resp:  [10-24] 16  SpO2:  [91 %-98 %] 91 %  BP: (119-183)/() 142/81     Weight: 116 kg (255 lb 11.7 oz)  Body mass index is 36.69 kg/m².      Intake/Output Summary (Last 24 hours) at 02/10/18 2582  Last data filed at 02/10/18 1400   Gross per 24 hour   Intake              850 ml   Output             2000 ml   Net             -1150 ml       Physical Exam   Constitutional: He appears well-developed and well-nourished. He is intubated.   HENT:   Head: Normocephalic and atraumatic.   Eyes: Conjunctivae are normal. Pupils are equal, round, and reactive to light. No scleral icterus.   Neck: No thyromegaly present.   Cardiovascular: Normal rate, regular rhythm and intact distal pulses.    No murmur heard.  Pulmonary/Chest: He is intubated. He has no wheezes. He has no rhonchi. He has no rales.   Abdominal: Soft. He exhibits no distension (obese) and no mass.   Musculoskeletal: He exhibits no edema or deformity.   Lymphadenopathy:     He has no cervical adenopathy.   Skin: Skin is warm. No rash noted. No erythema.   Nursing note and vitals reviewed.      Vents:  Vent Mode: Spont (02/09/18 0824)  Ventilator Initiated: Yes (02/08/18 0125)  Set Rate: 0 bmp (02/09/18 0824)  Vt Set: 450 mL (02/09/18 0824)  Pressure Support: 10 cmH20 (02/09/18 0824)  PEEP/CPAP: 5 cmH20 (02/09/18 0824)  Oxygen Concentration (%): 45 (02/09/18 0830)  Peak Airway Pressure: 16 cmH2O (02/09/18 0824)  Plateau Pressure: 0 cmH20 (02/09/18 0824)  Total Ve: 10 mL (02/09/18 0824)  Negative Inspiratory Force (cm H2O): -28 (02/09/18 0835)  F/VT Ratio<105 (RSBI): (!) 24.21 (02/09/18 0824)    Lines/Drains/Airways     Central Venous Catheter Line                 Percutaneous Central Line Insertion/Assessment - triple lumen  02/08/18 0232 right internal jugular 2 days                Significant Labs:    CBC/Anemia Profile:    Recent Labs  Lab 02/09/18  0323   WBC 7.93   HGB 10.9*   HCT 32.0*      MCV 87   RDW 14.4        Chemistries:    Recent Labs  Lab 02/09/18  0323      K 4.1   *   CO2 21*   BUN 29*   CREATININE 2.2*   CALCIUM 8.3*   ALBUMIN 2.6*   PROT 5.7*   BILITOT 0.4   ALKPHOS 60   ALT 60*   *   MG 1.6   PHOS 2.7       ABGs:     Recent Labs  Lab 02/09/18  0825   PH 7.374   PCO2 38.9   HCO3 22.7*   POCSATURATED 92*   BE -3       Significant  Imaging:    CXR; Tubes are stable. Right jugular central line tip overlies the SVC in good position. No pneumothorax. In comparison to the prior study, there is no adverse interval changes.

## 2018-02-11 LAB
ALBUMIN SERPL BCP-MCNC: 2.6 G/DL
ALP SERPL-CCNC: 70 U/L
ALT SERPL W/O P-5'-P-CCNC: 61 U/L
ANION GAP SERPL CALC-SCNC: 10 MMOL/L
AST SERPL-CCNC: 146 U/L
BASOPHILS # BLD AUTO: 0.03 K/UL
BASOPHILS NFR BLD: 0.4 %
BILIRUB SERPL-MCNC: 0.5 MG/DL
BUN SERPL-MCNC: 22 MG/DL
CALCIUM SERPL-MCNC: 9.2 MG/DL
CHLORIDE SERPL-SCNC: 100 MMOL/L
CO2 SERPL-SCNC: 27 MMOL/L
CREAT SERPL-MCNC: 1.2 MG/DL
DIFFERENTIAL METHOD: ABNORMAL
EOSINOPHIL # BLD AUTO: 0.3 K/UL
EOSINOPHIL NFR BLD: 3.6 %
ERYTHROCYTE [DISTWIDTH] IN BLOOD BY AUTOMATED COUNT: 13.9 %
EST. GFR  (AFRICAN AMERICAN): >60 ML/MIN/1.73 M^2
EST. GFR  (NON AFRICAN AMERICAN): >60 ML/MIN/1.73 M^2
GLUCOSE SERPL-MCNC: 171 MG/DL
HCT VFR BLD AUTO: 34.3 %
HGB BLD-MCNC: 11.5 G/DL
LYMPHOCYTES # BLD AUTO: 2.3 K/UL
LYMPHOCYTES NFR BLD: 29.7 %
MCH RBC QN AUTO: 29.5 PG
MCHC RBC AUTO-ENTMCNC: 33.5 G/DL
MCV RBC AUTO: 88 FL
MONOCYTES # BLD AUTO: 0.9 K/UL
MONOCYTES NFR BLD: 10.9 %
NEUTROPHILS # BLD AUTO: 4.4 K/UL
NEUTROPHILS NFR BLD: 56.4 %
PLATELET # BLD AUTO: 293 K/UL
PLATELET BLD QL SMEAR: ABNORMAL
PMV BLD AUTO: 9.6 FL
POCT GLUCOSE: 145 MG/DL (ref 70–110)
POCT GLUCOSE: 151 MG/DL (ref 70–110)
POCT GLUCOSE: 165 MG/DL (ref 70–110)
POCT GLUCOSE: 181 MG/DL (ref 70–110)
POCT GLUCOSE: 188 MG/DL (ref 70–110)
POTASSIUM SERPL-SCNC: 3.3 MMOL/L
PROT SERPL-MCNC: 6.4 G/DL
RBC # BLD AUTO: 3.9 M/UL
SODIUM SERPL-SCNC: 137 MMOL/L
VANCOMYCIN SERPL-MCNC: 10.2 UG/ML
WBC # BLD AUTO: 7.88 K/UL

## 2018-02-11 PROCEDURE — 63600175 PHARM REV CODE 636 W HCPCS: Performed by: EMERGENCY MEDICINE

## 2018-02-11 PROCEDURE — 20000000 HC ICU ROOM

## 2018-02-11 PROCEDURE — 25000003 PHARM REV CODE 250: Performed by: NURSE PRACTITIONER

## 2018-02-11 PROCEDURE — 80202 ASSAY OF VANCOMYCIN: CPT

## 2018-02-11 PROCEDURE — 63600175 PHARM REV CODE 636 W HCPCS: Performed by: INTERNAL MEDICINE

## 2018-02-11 PROCEDURE — 80053 COMPREHEN METABOLIC PANEL: CPT

## 2018-02-11 PROCEDURE — 25000003 PHARM REV CODE 250: Performed by: INTERNAL MEDICINE

## 2018-02-11 PROCEDURE — S0028 INJECTION, FAMOTIDINE, 20 MG: HCPCS | Performed by: INTERNAL MEDICINE

## 2018-02-11 PROCEDURE — 85025 COMPLETE CBC W/AUTO DIFF WBC: CPT

## 2018-02-11 RX ORDER — POTASSIUM CHLORIDE 20 MEQ/1
20 TABLET, EXTENDED RELEASE ORAL ONCE
Status: COMPLETED | OUTPATIENT
Start: 2018-02-11 | End: 2018-02-11

## 2018-02-11 RX ORDER — HYDRALAZINE HYDROCHLORIDE 50 MG/1
50 TABLET, FILM COATED ORAL EVERY 8 HOURS
Status: DISCONTINUED | OUTPATIENT
Start: 2018-02-11 | End: 2018-02-11

## 2018-02-11 RX ORDER — LISINOPRIL 20 MG/1
20 TABLET ORAL DAILY
Status: DISCONTINUED | OUTPATIENT
Start: 2018-02-11 | End: 2018-02-11

## 2018-02-11 RX ORDER — QUETIAPINE FUMARATE 25 MG/1
50 TABLET, FILM COATED ORAL NIGHTLY
Status: DISCONTINUED | OUTPATIENT
Start: 2018-02-11 | End: 2018-02-11

## 2018-02-11 RX ORDER — LISINOPRIL 20 MG/1
20 TABLET ORAL DAILY
Status: DISCONTINUED | OUTPATIENT
Start: 2018-02-11 | End: 2018-02-12

## 2018-02-11 RX ORDER — CLONAZEPAM 0.5 MG/1
0.5 TABLET ORAL 2 TIMES DAILY PRN
Status: DISCONTINUED | OUTPATIENT
Start: 2018-02-11 | End: 2018-02-14 | Stop reason: HOSPADM

## 2018-02-11 RX ORDER — HYDROCHLOROTHIAZIDE 25 MG/1
25 TABLET ORAL DAILY
Status: DISCONTINUED | OUTPATIENT
Start: 2018-02-11 | End: 2018-02-14 | Stop reason: HOSPADM

## 2018-02-11 RX ORDER — HYDRALAZINE HYDROCHLORIDE 50 MG/1
50 TABLET, FILM COATED ORAL EVERY 8 HOURS
Status: DISCONTINUED | OUTPATIENT
Start: 2018-02-11 | End: 2018-02-12

## 2018-02-11 RX ORDER — ACETAMINOPHEN 325 MG/1
650 TABLET ORAL EVERY 8 HOURS PRN
Status: DISCONTINUED | OUTPATIENT
Start: 2018-02-11 | End: 2018-02-14 | Stop reason: HOSPADM

## 2018-02-11 RX ORDER — QUETIAPINE FUMARATE 25 MG/1
100 TABLET, FILM COATED ORAL NIGHTLY
Status: DISCONTINUED | OUTPATIENT
Start: 2018-02-11 | End: 2018-02-12

## 2018-02-11 RX ADMIN — CLONIDINE HYDROCHLORIDE 0.3 MG: 0.3 TABLET ORAL at 06:02

## 2018-02-11 RX ADMIN — HYDRALAZINE HYDROCHLORIDE 50 MG: 50 TABLET, FILM COATED ORAL at 01:02

## 2018-02-11 RX ADMIN — HYDRALAZINE HYDROCHLORIDE 50 MG: 50 TABLET, FILM COATED ORAL at 06:02

## 2018-02-11 RX ADMIN — ANTI-FUNGAL POWDER MICONAZOLE NITRATE TALC FREE: 1.42 POWDER TOPICAL at 09:02

## 2018-02-11 RX ADMIN — CLONIDINE HYDROCHLORIDE 0.3 MG: 0.3 TABLET ORAL at 01:02

## 2018-02-11 RX ADMIN — FAMOTIDINE 20 MG: 10 INJECTION, SOLUTION INTRAVENOUS at 08:02

## 2018-02-11 RX ADMIN — VANCOMYCIN HYDROCHLORIDE 1250 MG: 1 INJECTION, POWDER, LYOPHILIZED, FOR SOLUTION INTRAVENOUS at 10:02

## 2018-02-11 RX ADMIN — AMLODIPINE BESYLATE 10 MG: 10 TABLET ORAL at 08:02

## 2018-02-11 RX ADMIN — HYDRALAZINE HYDROCHLORIDE 10 MG: 20 INJECTION INTRAMUSCULAR; INTRAVENOUS at 09:02

## 2018-02-11 RX ADMIN — ACETAMINOPHEN 650 MG: 325 TABLET ORAL at 04:02

## 2018-02-11 RX ADMIN — ANTI-FUNGAL POWDER MICONAZOLE NITRATE TALC FREE: 1.42 POWDER TOPICAL at 08:02

## 2018-02-11 RX ADMIN — ENOXAPARIN SODIUM 40 MG: 100 INJECTION SUBCUTANEOUS at 04:02

## 2018-02-11 RX ADMIN — INSULIN ASPART 2 UNITS: 100 INJECTION, SOLUTION INTRAVENOUS; SUBCUTANEOUS at 06:02

## 2018-02-11 RX ADMIN — HYDROCHLOROTHIAZIDE 25 MG: 25 TABLET ORAL at 10:02

## 2018-02-11 RX ADMIN — CLONIDINE HYDROCHLORIDE 0.3 MG: 0.3 TABLET ORAL at 09:02

## 2018-02-11 RX ADMIN — POTASSIUM CHLORIDE 20 MEQ: 1500 TABLET, EXTENDED RELEASE ORAL at 08:02

## 2018-02-11 RX ADMIN — HYDRALAZINE HYDROCHLORIDE 50 MG: 50 TABLET, FILM COATED ORAL at 09:02

## 2018-02-11 RX ADMIN — CLONAZEPAM 0.5 MG: 0.5 TABLET ORAL at 09:02

## 2018-02-11 RX ADMIN — LISINOPRIL 20 MG: 20 TABLET ORAL at 08:02

## 2018-02-11 RX ADMIN — INSULIN ASPART 2 UNITS: 100 INJECTION, SOLUTION INTRAVENOUS; SUBCUTANEOUS at 10:02

## 2018-02-11 RX ADMIN — QUETIAPINE FUMARATE 100 MG: 25 TABLET, FILM COATED ORAL at 09:02

## 2018-02-11 RX ADMIN — INSULIN ASPART 2 UNITS: 100 INJECTION, SOLUTION INTRAVENOUS; SUBCUTANEOUS at 04:02

## 2018-02-11 RX ADMIN — INSULIN DETEMIR 10 UNITS: 100 INJECTION, SOLUTION SUBCUTANEOUS at 09:02

## 2018-02-11 NOTE — HPI
57 year old man with history of diabetes, hypertension , psychiatric problems who was found unresponsive at home. Upon arrival, EMS arrival, patient's GCS was 3, initial glucose was 37Â Labs reviewed. Lactic acid 2.5. Creatinine 2.1. K 3.1 UDS + for amphetamines, benzos. Since admission,he is now PECEd. Blood cultures âstaph epidermidis and staph hominis .Tracheal cultures-staph aureus .Head CT scan -Small amount of layering fluid is suspected within the right sphenoid sinus which could reflect acute sinusitis.

## 2018-02-11 NOTE — ASSESSMENT & PLAN NOTE
- medication induced   - will continue to monitor   - if trend up will order further work up   - trending down

## 2018-02-11 NOTE — PROGRESS NOTES
"[facility] Hospital Medicine  Progress Note    Patient Name: Shukri Rosales  MRN: [id number]  Patient Class: IP- Inpatient   Admission Date: 2/8/2018  Length of Stay: 3 days  Attending Physician: Natty Russell MD  Primary Care Provider: Farhana Burnham MD        Subjective:     Principal Problem:Intentional drug overdose    HPI:  Mr. Rosales is a 58 y/o  male with h/o T2DM, HTN, psychiatric problems, unable to take care of himself at home (per brother), was in the process of seeking NH placement, was brought to the ED twice in the last 2 days.    Patient was brought to the ED on 2/7/18 for AMS. Patient was "acting confused" and sitting in a chair in the carport. He reported h/o amphetamine use, but denied use recently. Per yesterday's chart review, he missed 2-3 days of his regular medications, hence he reportedly then all at once. He was evaluated in the ED yesterday, was found to be altered initially, but later was able to ambulate, fully alert, oriented x3 and was discharged via taxi at around 10:30 AM yesterday.    After going home, family found him to be unresponsive again in the late hours of 2/7/18. Per chart review, patient lives with a "mentally challenged" sister, who reportedly told the first responders that she is no longer able to take care of the patient. Upon arrival, EMS arrival, patient's GCS was 3, initial glucose was 37. He was intervened in the field and brought to the ED. He was emergently intubated in ED. BP was initially high, precipitously dropped to systolic 60's. Central line placed and levophed started. Unclear etiology of hypotension. No reported fever.    Labs reviewed. Lactic acid 2.5. Creatinine 2.1. K 3.1 UDS + for amphetamines, benzos. Currently intubated on tiny dose of propofol.    Hospital Course:  2/8- Mr. Rosales is a 58 y/o  male with h/o T2DM, HTN, psychiatric problems, unable to take care of himself at home (per " "brother), was in the process of seeking NH placement, was brought to the ED twice in the last 2 days. After going home, family found him to be unresponsive again in the late hours of 2/7/18. Per chart review, patient lives with a "mentally challenged" sister, who reportedly told the first responders that she is no longer able to take care of the patient. Upon arrival, EMS arrival, patient's GCS was 3, initial glucose was 37. He was intervened in the field and brought to the ED. He was emergently intubated in ED. BP was initially high, precipitously dropped to systolic 60's. Central line placed and levophed started. He was to be positive on his drug screen for opiates, THC.     Additional records review indicates that the patient had been hospitalized about one month ago with a very similar history. He has long standing known history of recreational drug abuse, alcoholism, non adherence to his diabetes and anti hypertensive medications. He has also had an established history of bipolar disorder/schizophrenia, and had been known to take prescription drugs haphazardly, if at all. The family has indicated that they can no longer provide care for him.     He is still intubated, sleepy but arousable, off Levophed and Diprivan, Lactic Acidosis resolved. Neurology and [facility] Med have seen and the pt and following him and he is hemodynamically stable, oxygenating well. Will leave over nite on vent.    2/9- pt extubated this am, now fully alert and awake and oriented, speech is clear, c/o mild throat irritation but he drank water without any difficulty. He is moving all 4 extremities. No visual or auditory hallucinations. He admitted before 2 RNs this am that he tried to commit suicide by intentionally taking all the meds and Insulin PTA. He however denies any suicidal thoughts or intentions now. Hemodynamically stable, oxygenating well. LFTs moderately elevated this am but Bun/Cr stable at 29/2.2-- probably has CKD 3. BP " elevated at 191/105 post extubation. One blood cx from central line is positive for GPC. He remains afebrile and is on Vanco/ Zosyn.  2/10/2018  Patient denies of any complains apart from being depressed . He mention he took large amount of his pills he don't remember exactly which one along with insulin with intent to harm self because he is depressed and don't want to live .blood culure 2/2 positive . Echo pending . Repeat blood . Id consulted . Case management for placement    2/11/2018  Patient final sensitivity on sputum and blood culture pending . Blood culture most likely containment . Keep antibiotic until final sensitivity back on respiratory         Review of Systems   Constitutional: Negative for activity change, appetite change and chills.   HENT: Negative for congestion and sore throat.    Eyes: Negative.    Respiratory: Negative.  Negative for chest tightness and shortness of breath.    Cardiovascular: Negative.    Gastrointestinal: Negative.    Endocrine: Negative.  Negative for cold intolerance.   Genitourinary: Negative.  Negative for difficulty urinating and dysuria.   Musculoskeletal: Negative.  Negative for back pain.   Neurological: Negative.  Negative for dizziness.   Psychiatric/Behavioral: Positive for self-injury. Negative for confusion, decreased concentration, dysphoric mood, hallucinations and suicidal ideas. The patient is not nervous/anxious.      Objective:     Vital Signs (Most Recent):  Temp: 99 °F (37.2 °C) (02/11/18 1101)  Pulse: 85 (02/11/18 1200)  Resp: (!) 21 (02/11/18 1200)  BP: (!) 190/114 (02/11/18 1200)  SpO2: 97 % (02/11/18 1101) Vital Signs (24h Range):  Temp:  [98.3 °F (36.8 °C)-99 °F (37.2 °C)] 99 °F (37.2 °C)  Pulse:  [53-85] 85  Resp:  [11-22] 21  SpO2:  [91 %-99 %] 97 %  BP: (113-204)/() 190/114     Weight: 116 kg (255 lb 11.7 oz)  Body mass index is 36.69 kg/m².    Intake/Output Summary (Last 24 hours) at 02/11/18 1342  Last data filed at 02/11/18 1200   Gross  per 24 hour   Intake              840 ml   Output             3050 ml   Net            -2210 ml      Physical Exam   Constitutional: He is oriented to person, place, and time. He appears well-developed and well-nourished. No distress.   HENT:   Head: Normocephalic and atraumatic.   Mouth/Throat: Oropharynx is clear and moist.   Eyes: Conjunctivae and EOM are normal. Pupils are equal, round, and reactive to light.   Neck: Normal range of motion. Neck supple. No JVD present. No tracheal deviation present.   Cardiovascular: Normal rate, regular rhythm, normal heart sounds and intact distal pulses.  Exam reveals no gallop and no friction rub.    No murmur heard.  Pulmonary/Chest: Effort normal and breath sounds normal. No stridor. No respiratory distress. He has no wheezes. He has no rales.   Abdominal: Soft. Bowel sounds are normal. He exhibits no distension. There is no tenderness. There is no guarding.   Genitourinary:   Genitourinary Comments: deferred   Musculoskeletal: Normal range of motion. He exhibits no edema or deformity.   Neurological: He is oriented to person, place, and time. He displays normal reflexes. No sensory deficit. He exhibits normal muscle tone. Coordination normal.   Skin: Skin is warm and dry. Capillary refill takes less than 2 seconds. He is not diaphoretic. No pallor.   Psychiatric: He has a normal mood and affect. His behavior is normal.   Nursing note and vitals reviewed.      Significant Labs: All pertinent labs within the past 24 hours have been reviewed.    Significant Imaging: I have reviewed all pertinent imaging results/findings within the past 24 hours.    Assessment/Plan:      * Intentional drug overdose    pec and will work on transfer to [facility] facility once medically stable         Bacteremia    Staph bactermia 2/2 echo and repeat cultures negative  Id on board   Most likely containment waiting on final sensitivity           Schizoaffective disorder, bipolar type    Pt is non  compliant w his meds   - patient comfortable and denies of any complains   - if he gets agitated will start  Seroquel           Elevated liver enzymes    - medication induced   - will continue to monitor   - if trend up will order further work up   - trending down           Electrolyte abnormality    corrected          Drug overdose, multiple drugs, undetermined intent, initial encounter    Overdose on home exact medication unknown but include hypoglycemic meds   He is PECed/ needs psych eval        Type 2 diabetes mellitus with stage 3 chronic kidney disease, with long-term current use of insulin    Hold all diabetic agents due to hypoglycemia.  Resume sliding   If blood sugar stable will start low dose long acting insulin         Essential hypertension    He is on Norvasc, hydralazine and clonidine             Borderline personality disorder    Per chart review, family unable to take care of him at home.  Family started NH placement.  Case management consul for discharge planning.            VTE Risk Mitigation         Ordered     enoxaparin injection 40 mg  Daily     Route:  Subcutaneous        02/09/18 0912     Medium Risk of VTE  Once      02/08/18 0535     Place EDVIN hose  Until discontinued      02/08/18 0535     Place sequential compression device  Until discontinued      02/08/18 0535              Natty Russell MD  Department of Hospital Medicine   [facility]

## 2018-02-11 NOTE — ASSESSMENT & PLAN NOTE
Isolate is CONS-likely contaminant.  Will repeat blood cultures and will stop therapy soon.    Will need to follow final drug susceptibility of staph aureus from respiratory culture.    On vancomycin.

## 2018-02-11 NOTE — SUBJECTIVE & OBJECTIVE
Review of Systems   Constitutional: Negative for activity change, appetite change and chills.   HENT: Negative for congestion and sore throat.    Eyes: Negative.    Respiratory: Negative.  Negative for chest tightness and shortness of breath.    Cardiovascular: Negative.    Gastrointestinal: Negative.    Endocrine: Negative.  Negative for cold intolerance.   Genitourinary: Negative.  Negative for difficulty urinating and dysuria.   Musculoskeletal: Negative.  Negative for back pain.   Neurological: Negative.  Negative for dizziness.   Psychiatric/Behavioral: Positive for self-injury. Negative for confusion, decreased concentration, dysphoric mood, hallucinations and suicidal ideas. The patient is not nervous/anxious.      Objective:     Vital Signs (Most Recent):  Temp: 99 °F (37.2 °C) (02/11/18 1101)  Pulse: 85 (02/11/18 1200)  Resp: (!) 21 (02/11/18 1200)  BP: (!) 190/114 (02/11/18 1200)  SpO2: 97 % (02/11/18 1101) Vital Signs (24h Range):  Temp:  [98.3 °F (36.8 °C)-99 °F (37.2 °C)] 99 °F (37.2 °C)  Pulse:  [53-85] 85  Resp:  [11-22] 21  SpO2:  [91 %-99 %] 97 %  BP: (113-204)/() 190/114     Weight: 116 kg (255 lb 11.7 oz)  Body mass index is 36.69 kg/m².    Intake/Output Summary (Last 24 hours) at 02/11/18 1342  Last data filed at 02/11/18 1200   Gross per 24 hour   Intake              840 ml   Output             3050 ml   Net            -2210 ml      Physical Exam   Constitutional: He is oriented to person, place, and time. He appears well-developed and well-nourished. No distress.   HENT:   Head: Normocephalic and atraumatic.   Mouth/Throat: Oropharynx is clear and moist.   Eyes: Conjunctivae and EOM are normal. Pupils are equal, round, and reactive to light.   Neck: Normal range of motion. Neck supple. No JVD present. No tracheal deviation present.   Cardiovascular: Normal rate, regular rhythm, normal heart sounds and intact distal pulses.  Exam reveals no gallop and no friction rub.    No murmur  heard.  Pulmonary/Chest: Effort normal and breath sounds normal. No stridor. No respiratory distress. He has no wheezes. He has no rales.   Abdominal: Soft. Bowel sounds are normal. He exhibits no distension. There is no tenderness. There is no guarding.   Genitourinary:   Genitourinary Comments: deferred   Musculoskeletal: Normal range of motion. He exhibits no edema or deformity.   Neurological: He is oriented to person, place, and time. He displays normal reflexes. No sensory deficit. He exhibits normal muscle tone. Coordination normal.   Skin: Skin is warm and dry. Capillary refill takes less than 2 seconds. He is not diaphoretic. No pallor.   Psychiatric: He has a normal mood and affect. His behavior is normal.   Nursing note and vitals reviewed.      Significant Labs: All pertinent labs within the past 24 hours have been reviewed.    Significant Imaging: I have reviewed all pertinent imaging results/findings within the past 24 hours.

## 2018-02-11 NOTE — CONSULTS
[facility]  Infectious Disease  Consult Note    Patient Name: Shukri Rosales  MRN: [id number]  Admission Date: 2/8/2018  Hospital Length of Stay: 3 days  Attending Physician: Natty Russell MD  Primary Care Provider: Farhana Burnham MD     Isolation Status: No active isolations    Patient information was obtained from patient, past medical records and ER records.      Consults  Assessment/Plan:     Bacteremia    Isolate is CONS-likely contaminant.  Will repeat blood cultures and will stop therapy soon.    Will need to follow final drug susceptibility of staph aureus from respiratory culture.    On vancomycin.          Schizoaffective disorder, bipolar type    Psychiatry follow up on discharge         Type 2 diabetes mellitus with stage 3 chronic kidney disease, with long-term current use of insulin    Insulin sliding scale as per primary team         Stage 3 chronic kidney disease    Closely monitor serum creatinine ,avoid nephrotoxic meds             Thank you for your consult. I will follow-up with patient. Please contact us if you have any additional questions.    Eamon Mitchell MD  Infectious Disease  [facility]  (late entry note)  Subjective:     Principal Problem: Acute encephalopathy    HPI:  57 year old man with history of diabetes, hypertension , psychiatric problems who was found unresponsive at home.  Upon arrival, EMS arrival, patient's GCS was 3, initial glucose was 37Â  Labs reviewed. Lactic acid 2.5. Creatinine 2.1. K 3.1 UDS + for amphetamines, benzos.   Since admission,he is now PECEd. Blood cultures âstaph epidermidis and staph hominis . Tracheal cultures-staph aureus .  Head CT scan -Small amount of layering fluid is suspected within the right sphenoid sinus which could reflect acute sinusitis.          Review of Systems   Constitutional: Negative for activity change, appetite change and chills.   HENT: Negative for congestion and sore throat.     Eyes: Negative.    Respiratory: Negative.  Negative for chest tightness and shortness of breath.    Cardiovascular: Negative.    Gastrointestinal: Negative.    Endocrine: Negative.  Negative for cold intolerance.   Genitourinary: Negative.  Negative for difficulty urinating and dysuria.   Musculoskeletal: Negative.  Negative for back pain.   Neurological: Negative.  Negative for dizziness.   Psychiatric/Behavioral: Positive for self-injury. Negative for confusion, decreased concentration, dysphoric mood, hallucinations and suicidal ideas. The patient is not nervous/anxious.      Objective:     Vital Signs (Most Recent):  Temp: 98.6 °F (37 °C) (02/11/18 0305)  Pulse: (!) 58 (02/11/18 0700)  Resp: 11 (02/11/18 0700)  BP: (!) 176/106 (02/11/18 0600)  SpO2: 97 % (02/11/18 0500) Vital Signs (24h Range):  Temp:  [98.3 °F (36.8 °C)-99 °F (37.2 °C)] 98.6 °F (37 °C)  Pulse:  [53-79] 58  Resp:  [11-22] 11  SpO2:  [91 %-99 %] 97 %  BP: (113-196)/() 176/106     Weight: 116 kg (255 lb 11.7 oz)  Body mass index is 36.69 kg/m².    Intake/Output Summary (Last 24 hours) at 02/11/18 0745  Last data filed at 02/11/18 0600   Gross per 24 hour   Intake              840 ml   Output             2450 ml   Net            -1610 ml      Physical Exam   Constitutional: He is oriented to person, place, and time. He appears well-developed and well-nourished. No distress.   HENT:   Head: Normocephalic and atraumatic.   Mouth/Throat: Oropharynx is clear and moist.   Eyes: Conjunctivae and EOM are normal. Pupils are equal, round, and reactive to light.   Neck: Normal range of motion. Neck supple. No JVD present. Tracheal deviation present.   Cardiovascular: Normal rate, regular rhythm, normal heart sounds and intact distal pulses.  Exam reveals no gallop and no friction rub.    No murmur heard.  Pulmonary/Chest: Effort normal and breath sounds normal. No stridor. No respiratory distress. He has no wheezes. He has no rales.   Abdominal:  Soft. Bowel sounds are normal. He exhibits no distension. There is no tenderness. There is no guarding.   Genitourinary:   Genitourinary Comments: deferred   Musculoskeletal: Normal range of motion. He exhibits no edema or deformity.   Neurological: He is oriented to person, place, and time. He displays normal reflexes. No sensory deficit. He exhibits normal muscle tone. Coordination normal.   Skin: Skin is warm and dry. Capillary refill takes less than 2 seconds. He is not diaphoretic. No pallor.   Psychiatric: He has a normal mood and affect. His behavior is normal.   Nursing note and vitals reviewed.      Significant Labs: All pertinent labs within the past 24 hours have been reviewed.    Significant Imaging: I have reviewed all pertinent imaging results/findings within the past 24 hours.

## 2018-02-11 NOTE — PROGRESS NOTES
Shukri Rosales [id number] is a 57 y.o. male who has been consulted for vancomycin dosing.    The patient has the following labs:     Date  Creatinine (mg/dl)  BUN  WBC Count  2/11/2018  Estimated Creatinine Clearance: 84.6 mL/min (based on SCr of 1.2 mg/dL).  Lab Results  Component  Value  Date  BUN  22 (H)  02/11/2018     Lab Results  Component  Value  Date  WBC  7.88  02/11/2018    Current weight is 116 kg (255 lb 11.7 oz)    Vancomycin trough from 2/11 was 10.2 mg/dL @ 0400. Target trough range is 10-15.  Due to scr improving from 2.2 to 1.2, pharmacy will stop pulse dosing and change to vancomycin dose 1250 mg iv q18hrs.  A vancomycin trough has been ordered for 2/12 at 2130.  Patient will be followed by pharmacy for changes in renal function, toxicity, and efficacy.    Thank you for allowing us to participate in this patient's care.     Etelvina Reyes [facility]

## 2018-02-11 NOTE — ASSESSMENT & PLAN NOTE
krista and will work on transfer to [facility] facility once medically stable

## 2018-02-11 NOTE — NURSING
received pt report; assumed care of pt. Pt resting w even, unlabored resp w O2 sat <94% via rm air. Per monitor, pt sinus balbir w HR 58. pt w clear speech, +PERRLA, and +pulses to all extremities. abd soft, non-teder. R IJ TLC remains free of any complications. pt voids via urinal which is at pt bedside.  Pt denies SI and/or HI at this time; pt also denies any AH and/or VH at this time. Pt agrees to safety contact w Rn, stating that pt will make ANY staff member aware if he experiences any changes or worsening with thoughts, feelings, mood, vision, or auditory.   All hazards removed from pt room and pt remains in line of sight of sitter at bedside.   HOB elevated, call bell at pt side, and bed locked. will cont to monitor. full comp assessment to follow.

## 2018-02-11 NOTE — ASSESSMENT & PLAN NOTE
Staph bactermia 2/2 echo and repeat cultures negative  Id on board   Most likely containment waiting on final sensitivity

## 2018-02-11 NOTE — SUBJECTIVE & OBJECTIVE
Review of Systems   Constitutional: Negative for activity change, appetite change and chills.   HENT: Negative for congestion and sore throat.    Eyes: Negative.    Respiratory: Negative.  Negative for chest tightness and shortness of breath.    Cardiovascular: Negative.    Gastrointestinal: Negative.    Endocrine: Negative.  Negative for cold intolerance.   Genitourinary: Negative.  Negative for difficulty urinating and dysuria.   Musculoskeletal: Negative.  Negative for back pain.   Neurological: Negative.  Negative for dizziness.   Psychiatric/Behavioral: Positive for self-injury. Negative for confusion, decreased concentration, dysphoric mood, hallucinations and suicidal ideas. The patient is not nervous/anxious.      Objective:     Vital Signs (Most Recent):  Temp: 98.6 °F (37 °C) (02/11/18 0305)  Pulse: (!) 58 (02/11/18 0700)  Resp: 11 (02/11/18 0700)  BP: (!) 176/106 (02/11/18 0600)  SpO2: 97 % (02/11/18 0500) Vital Signs (24h Range):  Temp:  [98.3 °F (36.8 °C)-99 °F (37.2 °C)] 98.6 °F (37 °C)  Pulse:  [53-79] 58  Resp:  [11-22] 11  SpO2:  [91 %-99 %] 97 %  BP: (113-196)/() 176/106     Weight: 116 kg (255 lb 11.7 oz)  Body mass index is 36.69 kg/m².    Intake/Output Summary (Last 24 hours) at 02/11/18 0745  Last data filed at 02/11/18 0600   Gross per 24 hour   Intake              840 ml   Output             2450 ml   Net            -1610 ml      Physical Exam   Constitutional: He is oriented to person, place, and time. He appears well-developed and well-nourished. No distress.   HENT:   Head: Normocephalic and atraumatic.   Mouth/Throat: Oropharynx is clear and moist.   Eyes: Conjunctivae and EOM are normal. Pupils are equal, round, and reactive to light.   Neck: Normal range of motion. Neck supple. No JVD present. Tracheal deviation present.   Cardiovascular: Normal rate, regular rhythm, normal heart sounds and intact distal pulses.  Exam reveals no gallop and no friction rub.    No murmur  heard.  Pulmonary/Chest: Effort normal and breath sounds normal. No stridor. No respiratory distress. He has no wheezes. He has no rales.   Abdominal: Soft. Bowel sounds are normal. He exhibits no distension. There is no tenderness. There is no guarding.   Genitourinary:   Genitourinary Comments: deferred   Musculoskeletal: Normal range of motion. He exhibits no edema or deformity.   Neurological: He is oriented to person, place, and time. He displays normal reflexes. No sensory deficit. He exhibits normal muscle tone. Coordination normal.   Skin: Skin is warm and dry. Capillary refill takes less than 2 seconds. He is not diaphoretic. No pallor.   Psychiatric: He has a normal mood and affect. His behavior is normal.   Nursing note and vitals reviewed.      Significant Labs: All pertinent labs within the past 24 hours have been reviewed.    Significant Imaging: I have reviewed all pertinent imaging results/findings within the past 24 hours.

## 2018-02-11 NOTE — PLAN OF CARE
Problem: Patient Care Overview  Goal: Plan of Care Review  Patient remains with PEC this a.m.  Patient calm and cooperative during the night.  Levemir started at HS.  See MAR for medication doses and times of administration.

## 2018-02-12 PROBLEM — R78.81 BACTEREMIA: Status: RESOLVED | Noted: 2018-02-10 | Resolved: 2018-02-12

## 2018-02-12 LAB
ALBUMIN SERPL BCP-MCNC: 2.9 G/DL
ALP SERPL-CCNC: 76 U/L
ALT SERPL W/O P-5'-P-CCNC: 63 U/L
ANION GAP SERPL CALC-SCNC: 12 MMOL/L
AST SERPL-CCNC: 107 U/L
BACTERIA BLD CULT: NORMAL
BACTERIA SPEC AEROBE CULT: NORMAL
BASOPHILS # BLD AUTO: 0.05 K/UL
BASOPHILS NFR BLD: 0.6 %
BILIRUB SERPL-MCNC: 0.5 MG/DL
BUN SERPL-MCNC: 21 MG/DL
CALCIUM SERPL-MCNC: 10 MG/DL
CHLORIDE SERPL-SCNC: 99 MMOL/L
CO2 SERPL-SCNC: 26 MMOL/L
CREAT SERPL-MCNC: 1.2 MG/DL
DIFFERENTIAL METHOD: ABNORMAL
EOSINOPHIL # BLD AUTO: 0.3 K/UL
EOSINOPHIL NFR BLD: 3 %
ERYTHROCYTE [DISTWIDTH] IN BLOOD BY AUTOMATED COUNT: 13.5 %
EST. GFR  (AFRICAN AMERICAN): >60 ML/MIN/1.73 M^2
EST. GFR  (NON AFRICAN AMERICAN): >60 ML/MIN/1.73 M^2
GLUCOSE SERPL-MCNC: 145 MG/DL
GRAM STN SPEC: NORMAL
HCT VFR BLD AUTO: 38.6 %
HGB BLD-MCNC: 13.2 G/DL
LYMPHOCYTES # BLD AUTO: 2.7 K/UL
LYMPHOCYTES NFR BLD: 31 %
MCH RBC QN AUTO: 29.5 PG
MCHC RBC AUTO-ENTMCNC: 34.2 G/DL
MCV RBC AUTO: 86 FL
MONOCYTES # BLD AUTO: 1 K/UL
MONOCYTES NFR BLD: 11.1 %
NEUTROPHILS # BLD AUTO: 4.7 K/UL
NEUTROPHILS NFR BLD: 56.1 %
PLATELET # BLD AUTO: 337 K/UL
PMV BLD AUTO: 9.2 FL
POCT GLUCOSE: 143 MG/DL (ref 70–110)
POCT GLUCOSE: 175 MG/DL (ref 70–110)
POCT GLUCOSE: 176 MG/DL (ref 70–110)
POCT GLUCOSE: 229 MG/DL (ref 70–110)
POTASSIUM SERPL-SCNC: 3.4 MMOL/L
PROT SERPL-MCNC: 7.1 G/DL
RBC # BLD AUTO: 4.48 M/UL
SODIUM SERPL-SCNC: 137 MMOL/L
VANCOMYCIN TROUGH SERPL-MCNC: 6.6 UG/ML
WBC # BLD AUTO: 8.65 K/UL

## 2018-02-12 PROCEDURE — 25000003 PHARM REV CODE 250: Performed by: INTERNAL MEDICINE

## 2018-02-12 PROCEDURE — 63600175 PHARM REV CODE 636 W HCPCS: Performed by: INTERNAL MEDICINE

## 2018-02-12 PROCEDURE — 85025 COMPLETE CBC W/AUTO DIFF WBC: CPT

## 2018-02-12 PROCEDURE — 21400001 HC TELEMETRY ROOM

## 2018-02-12 PROCEDURE — 25000003 PHARM REV CODE 250: Performed by: NURSE PRACTITIONER

## 2018-02-12 PROCEDURE — 80202 ASSAY OF VANCOMYCIN: CPT

## 2018-02-12 PROCEDURE — 80053 COMPREHEN METABOLIC PANEL: CPT

## 2018-02-12 PROCEDURE — 97803 MED NUTRITION INDIV SUBSEQ: CPT

## 2018-02-12 RX ORDER — QUETIAPINE FUMARATE 100 MG/1
100 TABLET, FILM COATED ORAL 2 TIMES DAILY
Status: DISCONTINUED | OUTPATIENT
Start: 2018-02-12 | End: 2018-02-14 | Stop reason: HOSPADM

## 2018-02-12 RX ORDER — QUETIAPINE FUMARATE 25 MG/1
50 TABLET, FILM COATED ORAL ONCE
Status: COMPLETED | OUTPATIENT
Start: 2018-02-12 | End: 2018-02-12

## 2018-02-12 RX ORDER — LISINOPRIL 20 MG/1
40 TABLET ORAL DAILY
Status: DISCONTINUED | OUTPATIENT
Start: 2018-02-12 | End: 2018-02-14 | Stop reason: HOSPADM

## 2018-02-12 RX ORDER — FAMOTIDINE 20 MG/1
20 TABLET, FILM COATED ORAL 2 TIMES DAILY
Status: DISCONTINUED | OUTPATIENT
Start: 2018-02-12 | End: 2018-02-14 | Stop reason: HOSPADM

## 2018-02-12 RX ADMIN — ACETAMINOPHEN 650 MG: 325 TABLET ORAL at 03:02

## 2018-02-12 RX ADMIN — CLONIDINE HYDROCHLORIDE 0.3 MG: 0.3 TABLET ORAL at 06:02

## 2018-02-12 RX ADMIN — AMLODIPINE BESYLATE 10 MG: 10 TABLET ORAL at 09:02

## 2018-02-12 RX ADMIN — LISINOPRIL 40 MG: 20 TABLET ORAL at 09:02

## 2018-02-12 RX ADMIN — INSULIN ASPART 1 UNITS: 100 INJECTION, SOLUTION INTRAVENOUS; SUBCUTANEOUS at 09:02

## 2018-02-12 RX ADMIN — HYDRALAZINE HYDROCHLORIDE 50 MG: 50 TABLET, FILM COATED ORAL at 06:02

## 2018-02-12 RX ADMIN — VANCOMYCIN HYDROCHLORIDE 1250 MG: 1 INJECTION, POWDER, LYOPHILIZED, FOR SOLUTION INTRAVENOUS at 04:02

## 2018-02-12 RX ADMIN — QUETIAPINE FUMARATE 50 MG: 25 TABLET, FILM COATED ORAL at 10:02

## 2018-02-12 RX ADMIN — ENOXAPARIN SODIUM 40 MG: 100 INJECTION SUBCUTANEOUS at 05:02

## 2018-02-12 RX ADMIN — ANTI-FUNGAL POWDER MICONAZOLE NITRATE TALC FREE: 1.42 POWDER TOPICAL at 09:02

## 2018-02-12 RX ADMIN — FAMOTIDINE 20 MG: 20 TABLET, FILM COATED ORAL at 09:02

## 2018-02-12 RX ADMIN — CLONAZEPAM 0.5 MG: 0.5 TABLET ORAL at 09:02

## 2018-02-12 RX ADMIN — INSULIN DETEMIR 10 UNITS: 100 INJECTION, SOLUTION SUBCUTANEOUS at 09:02

## 2018-02-12 RX ADMIN — ACETAMINOPHEN 650 MG: 325 TABLET ORAL at 05:02

## 2018-02-12 RX ADMIN — CLONIDINE HYDROCHLORIDE 0.3 MG: 0.3 TABLET ORAL at 03:02

## 2018-02-12 RX ADMIN — HYDROCHLOROTHIAZIDE 25 MG: 25 TABLET ORAL at 09:02

## 2018-02-12 RX ADMIN — QUETIAPINE FUMARATE 100 MG: 100 TABLET ORAL at 09:02

## 2018-02-12 RX ADMIN — INSULIN ASPART 4 UNITS: 100 INJECTION, SOLUTION INTRAVENOUS; SUBCUTANEOUS at 10:02

## 2018-02-12 RX ADMIN — INSULIN ASPART 2 UNITS: 100 INJECTION, SOLUTION INTRAVENOUS; SUBCUTANEOUS at 05:02

## 2018-02-12 RX ADMIN — CLONIDINE HYDROCHLORIDE 0.3 MG: 0.3 TABLET ORAL at 09:02

## 2018-02-12 NOTE — ASSESSMENT & PLAN NOTE
Overdose on home exact medication unknown but include hypoglycemic meds   He is PECed/ needs psych eval  Medically stable to discharge to psych facility

## 2018-02-12 NOTE — PLAN OF CARE
Problem: Patient Care Overview  Goal: Plan of Care Review  Outcome: Ongoing (interventions implemented as appropriate)  Recommendation/Intervention: 1. Continue current diet and encourage adequate intake  Goals: % intake of meals  Nutrition Goal Status: new

## 2018-02-12 NOTE — SUBJECTIVE & OBJECTIVE
Review of Systems   Constitutional: Negative for activity change, appetite change and chills.   HENT: Negative for congestion and sore throat.    Eyes: Negative.    Respiratory: Negative.  Negative for chest tightness and shortness of breath.    Cardiovascular: Negative.    Gastrointestinal: Negative.    Endocrine: Negative.  Negative for cold intolerance.   Genitourinary: Negative.  Negative for difficulty urinating and dysuria.   Musculoskeletal: Negative.  Negative for back pain.   Neurological: Negative.  Negative for dizziness.   Psychiatric/Behavioral: Positive for self-injury. Negative for confusion, decreased concentration, dysphoric mood, hallucinations and suicidal ideas. The patient is not nervous/anxious.      Objective:     Vital Signs (Most Recent):  Temp: 98.5 °F (36.9 °C) (02/12/18 0722)  Pulse: 73 (02/12/18 0900)  Resp: 16 (02/12/18 0900)  BP: (!) 133/110 (02/12/18 0900)  SpO2: 98 % (02/12/18 0000) Vital Signs (24h Range):  Temp:  [98.2 °F (36.8 °C)-99 °F (37.2 °C)] 98.5 °F (36.9 °C)  Pulse:  [59-85] 73  Resp:  [12-21] 16  SpO2:  [97 %-98 %] 98 %  BP: (114-190)/() 133/110     Weight: 117.2 kg (258 lb 6.1 oz)  Body mass index is 37.07 kg/m².    Intake/Output Summary (Last 24 hours) at 02/12/18 1005  Last data filed at 02/12/18 0500   Gross per 24 hour   Intake              490 ml   Output             1560 ml   Net            -1070 ml      Physical Exam   Constitutional: He is oriented to person, place, and time. He appears well-developed and well-nourished. No distress.   HENT:   Head: Normocephalic and atraumatic.   Mouth/Throat: Oropharynx is clear and moist.   Eyes: Conjunctivae and EOM are normal. Pupils are equal, round, and reactive to light.   Neck: Normal range of motion. Neck supple. No JVD present. No tracheal deviation present.   Cardiovascular: Normal rate, regular rhythm, normal heart sounds and intact distal pulses.  Exam reveals no gallop and no friction rub.    No murmur  heard.  Pulmonary/Chest: Effort normal and breath sounds normal. No stridor. No respiratory distress. He has no wheezes. He has no rales.   Abdominal: Soft. Bowel sounds are normal. He exhibits no distension. There is no tenderness. There is no guarding.   Genitourinary:   Genitourinary Comments: deferred   Musculoskeletal: Normal range of motion. He exhibits no edema or deformity.   Neurological: He is oriented to person, place, and time. He displays normal reflexes. No sensory deficit. He exhibits normal muscle tone. Coordination normal.   Skin: Skin is warm and dry. Capillary refill takes less than 2 seconds. He is not diaphoretic. No pallor.   Psychiatric: He has a normal mood and affect. His behavior is normal.   Nursing note and vitals reviewed.      Significant Labs: All pertinent labs within the past 24 hours have been reviewed.    Significant Imaging: I have reviewed all pertinent imaging results/findings within the past 24 hours.

## 2018-02-12 NOTE — ASSESSMENT & PLAN NOTE
Pt is non compliant w his meds   - patient comfortable and denies of any complains   - continue seroquel

## 2018-02-12 NOTE — CONSULTS
[facility]   Adult Nutrition  Consult Note    SUMMARY     Recommendations  Recommendation/Intervention: 1. Continue current diet and encourage adequate intake  Goals: % intake of meals  Nutrition Goal Status: new    Reason for Assessment  Reason for Assessment: RD follow-up         1. Hypoglycemia    2. Hypothermia    3. Altered mental status    4. Encounter for central line placement    5. Acute encephalopathy    6. Acute renal failure superimposed on stage 3 chronic kidney disease, unspecified acute renal failure type    7. Drug overdose, multiple drugs, undetermined intent, initial encounter    8. Electrolyte abnormality    9. Shock circulatory    10. Type 2 diabetes mellitus with stage 3 chronic kidney disease, with long-term current use of insulin    11. Borderline personality disorder    12. Suicide attempt by drug ingestion, initial encounter    13. Bacteremia      Past Medical History:   Diagnosis Date    Adrenal cortical adenoma     Anxiety     Arthritis     CKD (chronic kidney disease) stage 3, GFR 30-59 ml/min     Depression     Diabetes mellitus type II 2011    does not take    DM (diabetes mellitus) 2011     am 09/21/2017 Insulin x 1 1/2 year    GERD (gastroesophageal reflux disease) 7/9/2013    Hypertension     Migraine headache 7/22/2013    Schizophrenia     Severe obesity with body mass index of 36.0 to 36.9              General Information Comments: Patient is extubated on oral diet, PECd    Nutrition Discharge Planning: Home on 2000 calorie Diabetic diet    Nutrition Prescription Ordered  Current Diet Order: 1800 calorie Diabetic       Nutrition Risk Screen  Nutrition Risk Screen: no indicators present    Nutrition/Diet History    Food Preferences: none reported                         Labs/Tests/Procedures/Meds       Pertinent Labs Reviewed: reviewed      BMP  Lab Results   Component Value Date     02/12/2018    K 3.4 (L) 02/12/2018    CL 99  "02/12/2018    CO2 26 02/12/2018    BUN 21 (H) 02/12/2018    CREATININE 1.2 02/12/2018    CALCIUM 10.0 02/12/2018    ANIONGAP 12 02/12/2018    ESTGFRAFRICA >60 02/12/2018    EGFRNONAA >60 02/12/2018     Lab Results   Component Value Date    ALBUMIN 2.9 (L) 02/12/2018     Lab Results   Component Value Date    CALCIUM 10.0 02/12/2018    PHOS 2.7 02/09/2018     Lab Results   Component Value Date    HGBA1C 6.9 (H) 02/09/2018       Recent Labs  Lab 02/12/18  1034   POCTGLUCOSE 229*       Pertinent Medications Reviewed: reviewed       Physical Findings    Overall Physical Appearance: obese     Oral/Mouth Cavity: tooth/teeth missing  Skin:  (WDL)    Anthropometrics    Temp: 98.3 °F (36.8 °C)     Height: 5' 10" (177.8 cm)  Weight Method: Bed Scale  Weight: 117.2 kg (258 lb 6.1 oz)  Ideal Body Weight (IBW), Male: 166 lb     % Ideal Body Weight, Male (lb): 155.65 lb     BMI (Calculated): 37.2  BMI Grade: 35 - 39.9 - obesity - grade II                            Estimated/Assessed Needs    Weight Used For Calorie Calculations: 117.2 kg (258 lb 6.1 oz)      Energy Calorie Requirements (kcal): 1993  Energy Need Method: Ware-St Jeor       Weight Used For Protein Calculations: 117.2 kg (258 lb 6.1 oz)  Protein Requirements: 94g (0.8g/kg)     RDA Method (mL): 1993               Assessment and Plan  Nutrition Problem  suboptimal oral food and beverage intake    Related to (etiology):   Inability to consume oral diet    Signs and Symptoms (as evidenced by):   Intubated and sedated     Interventions/Recommendations (treatment strategy):  1. Enteral nutrition support to meet needs until extubated    Nutrition Diagnosis Status:   Resolved (patient extubated on oral diet)        Monitor and Evaluation    Food and Nutrient Intake: food and beverage intake  Food and Nutrient Adminstration: diet order     Physical Activity and Function: nutrition-related ADLs and IADLs  Anthropometric Measurements: weight  Biochemical Data, Medical Tests " and Procedures: electrolyte and renal panel, glucose/endocrine profile         Nutrition Follow-Up    RD Follow-up?: Yes (1x weekly)

## 2018-02-12 NOTE — PROGRESS NOTES
Pharmacist Intervention IV to PO Note    Shukri Rosales is a 57 y.o. male being treated with IV medication FAMOTIDINE.     Patient Data:    Vital Signs (Most Recent):  Temp: 98.4 °F (36.9 °C) (02/12/18 0305)  Pulse: 69 (02/12/18 0500)  Resp: 13 (02/12/18 0500)  BP: (!) 178/90 (02/12/18 0400)  SpO2: 98 % (02/12/18 0000)   Vital Signs (72h Range):  Temp:  [97.9 °F (36.6 °C)-99 °F (37.2 °C)]   Pulse:  [53-85]   Resp:  [10-24]   BP: (113-204)/()   SpO2:  [90 %-99 %]      CBC:    Recent Labs     Lab 02/09/18  0323 02/11/18  0400 02/12/18  0356   WBC 7.93 7.88 8.65   RBC 3.67* 3.90* 4.48*   HGB 10.9* 11.5* 13.2*   HCT 32.0* 34.3* 38.6*    293 337   MCV 87 88 86   MCH 29.7 29.5 29.5   MCHC 34.1 33.5 34.2     CMP:     Recent Labs     Lab 02/09/18  0323 02/11/18  0400 02/12/18  0356   * 171* 145*   CALCIUM 8.3* 9.2 10.0   ALBUMIN 2.6* 2.6* 2.9*   PROT 5.7* 6.4 7.1    137 137   K 4.1 3.3* 3.4*   CO2 21* 27 26   * 100 99   BUN 29* 22* 21*   CREATININE 2.2* 1.2 1.2   ALKPHOS 60 70 76   ALT 60* 61* 63*   * 146* 107*   BILITOT 0.4 0.5 0.5       Dietary Orders:  Diet Orders            Diet Diabetic 1800 Calories: Diabetic 1800 starting at 02/09 1133            Based on the following criteria, this patient qualifies for intravenous to oral conversion:  [x] The patients gastrointestinal tract is functioning (tolerating medications via oral or enteral route for 24 hours and tolerating food or enteral feeds for 24 hours).  [x] The patient is hemodynamically stable for 24 hours (heart rate <100 beats per minute, systolic blood pressure >99 mm Hg, and respiratory rate <20 breaths per minute).  [x] The patient shows clinical improvement (afebrile for at least 24 hours and white blood cell count downtrending or normalized). Additionally, the patient must be non-neutropenic (absolute neutrophil count >500 cells/mm3).  [x] For antimicrobials, the patient has received IV therapy for at least  24 hours.    IV medication will be changed to oral medication.     Pharmacist's Name: Shannen Agustin  Pharmacist's Extension: [phone]

## 2018-02-12 NOTE — PROGRESS NOTES
Received call from Joann Pham LCSW, Care Manager-Behavioral Health, [facility]. She will come today at 11 am to complete the Level II PASSR.

## 2018-02-12 NOTE — PROGRESS NOTES
"[facility] Hospital Medicine  Progress Note    Patient Name: Shukri Rosales  MRN: [id number]  Patient Class: IP- Inpatient   Admission Date: 2/8/2018  Length of Stay: 4 days  Attending Physician: Natty Russell MD  Primary Care Provider: Farhana Burnham MD        Subjective:     Principal Problem:Intentional drug overdose    HPI:  Mr. Rosales is a 56 y/o  male with h/o T2DM, HTN, psychiatric problems, unable to take care of himself at home (per brother), was in the process of seeking NH placement, was brought to the ED twice in the last 2 days.    Patient was brought to the ED on 2/7/18 for AMS. Patient was "acting confused" and sitting in a chair in the carport. He reported h/o amphetamine use, but denied use recently. Per yesterday's chart review, he missed 2-3 days of his regular medications, hence he reportedly then all at once. He was evaluated in the ED yesterday, was found to be altered initially, but later was able to ambulate, fully alert, oriented x3 and was discharged via taxi at around 10:30 AM yesterday.    After going home, family found him to be unresponsive again in the late hours of 2/7/18. Per chart review, patient lives with a "mentally challenged" sister, who reportedly told the first responders that she is no longer able to take care of the patient. Upon arrival, EMS arrival, patient's GCS was 3, initial glucose was 37. He was intervened in the field and brought to the ED. He was emergently intubated in ED. BP was initially high, precipitously dropped to systolic 60's. Central line placed and levophed started. Unclear etiology of hypotension. No reported fever.    Labs reviewed. Lactic acid 2.5. Creatinine 2.1. K 3.1 UDS + for amphetamines, benzos. Currently intubated on tiny dose of propofol.    Hospital Course:  2/8- Mr. Rosales is a 56 y/o  male with h/o T2DM, HTN, psychiatric problems, unable to take care of himself at home (per " "brother), was in the process of seeking NH placement, was brought to the ED twice in the last 2 days. After going home, family found him to be unresponsive again in the late hours of 2/7/18. Per chart review, patient lives with a "mentally challenged" sister, who reportedly told the first responders that she is no longer able to take care of the patient. Upon arrival, EMS arrival, patient's GCS was 3, initial glucose was 37. He was intervened in the field and brought to the ED. He was emergently intubated in ED. BP was initially high, precipitously dropped to systolic 60's. Central line placed and levophed started. He was to be positive on his drug screen for opiates, THC.     Additional records review indicates that the patient had been hospitalized about one month ago with a very similar history. He has long standing known history of recreational drug abuse, alcoholism, non adherence to his diabetes and anti hypertensive medications. He has also had an established history of bipolar disorder/schizophrenia, and had been known to take prescription drugs haphazardly, if at all. The family has indicated that they can no longer provide care for him.     He is still intubated, sleepy but arousable, off Levophed and Diprivan, Lactic Acidosis resolved. Neurology and [facility] have seen and the pt and following him and he is hemodynamically stable, oxygenating well. Will leave over nite on vent.    2/9- pt extubated this am, now fully alert and awake and oriented, speech is clear, c/o mild throat irritation but he drank water without any difficulty. He is moving all 4 extremities. No visual or auditory hallucinations. He admitted before 2 RNs this am that he tried to commit suicide by intentionally taking all the meds and Insulin PTA. He however denies any suicidal thoughts or intentions now. Hemodynamically stable, oxygenating well. LFTs moderately elevated this am but Bun/Cr stable at 29/2.2-- probably has CKD 3. BP " elevated at 191/105 post extubation. One blood cx from central line is positive for GPC. He remains afebrile and is on Vanco/ Zosyn.  2/10/2018  Patient denies of any complains apart from being depressed . He mention he took large amount of his pills he don't remember exactly which one along with insulin with intent to harm self because he is depressed and don't want to live .blood culure 2/2 positive . Echo pending . Repeat blood . Id consulted . Case management for placement    2/11/2018  Patient final sensitivity on sputum and blood culture pending . Blood culture most likely containment . Keep antibiotic until final sensitivity back on respiratory   2/12/2018  Patient seen and examined today . Medically cleared for discharge . Blood pressure stable . Sputum is staph aureus will need 5 more days of bactrim . D/c hydralazine increase dose of lisnorpil . Also increase dose of seroquel .         Review of Systems   Constitutional: Negative for activity change, appetite change and chills.   HENT: Negative for congestion and sore throat.    Eyes: Negative.    Respiratory: Negative.  Negative for chest tightness and shortness of breath.    Cardiovascular: Negative.    Gastrointestinal: Negative.    Endocrine: Negative.  Negative for cold intolerance.   Genitourinary: Negative.  Negative for difficulty urinating and dysuria.   Musculoskeletal: Negative.  Negative for back pain.   Neurological: Negative.  Negative for dizziness.   Psychiatric/Behavioral: Positive for self-injury. Negative for confusion, decreased concentration, dysphoric mood, hallucinations and suicidal ideas. The patient is not nervous/anxious.      Objective:     Vital Signs (Most Recent):  Temp: 98.5 °F (36.9 °C) (02/12/18 0722)  Pulse: 73 (02/12/18 0900)  Resp: 16 (02/12/18 0900)  BP: (!) 133/110 (02/12/18 0900)  SpO2: 98 % (02/12/18 0000) Vital Signs (24h Range):  Temp:  [98.2 °F (36.8 °C)-99 °F (37.2 °C)] 98.5 °F (36.9 °C)  Pulse:  [59-85]  73  Resp:  [12-21] 16  SpO2:  [97 %-98 %] 98 %  BP: (114-190)/() 133/110     Weight: 117.2 kg (258 lb 6.1 oz)  Body mass index is 37.07 kg/m².    Intake/Output Summary (Last 24 hours) at 02/12/18 1005  Last data filed at 02/12/18 0500   Gross per 24 hour   Intake              490 ml   Output             1560 ml   Net            -1070 ml      Physical Exam   Constitutional: He is oriented to person, place, and time. He appears well-developed and well-nourished. No distress.   HENT:   Head: Normocephalic and atraumatic.   Mouth/Throat: Oropharynx is clear and moist.   Eyes: Conjunctivae and EOM are normal. Pupils are equal, round, and reactive to light.   Neck: Normal range of motion. Neck supple. No JVD present. No tracheal deviation present.   Cardiovascular: Normal rate, regular rhythm, normal heart sounds and intact distal pulses.  Exam reveals no gallop and no friction rub.    No murmur heard.  Pulmonary/Chest: Effort normal and breath sounds normal. No stridor. No respiratory distress. He has no wheezes. He has no rales.   Abdominal: Soft. Bowel sounds are normal. He exhibits no distension. There is no tenderness. There is no guarding.   Genitourinary:   Genitourinary Comments: deferred   Musculoskeletal: Normal range of motion. He exhibits no edema or deformity.   Neurological: He is oriented to person, place, and time. He displays normal reflexes. No sensory deficit. He exhibits normal muscle tone. Coordination normal.   Skin: Skin is warm and dry. Capillary refill takes less than 2 seconds. He is not diaphoretic. No pallor.   Psychiatric: He has a normal mood and affect. His behavior is normal.   Nursing note and vitals reviewed.      Significant Labs: All pertinent labs within the past 24 hours have been reviewed.    Significant Imaging: I have reviewed all pertinent imaging results/findings within the past 24 hours.    Assessment/Plan:      * Intentional drug overdose    pec and will work on transfer  to [facility] facility        Schizoaffective disorder, bipolar type    Pt is non compliant w his meds   - patient comfortable and denies of any complains   - continue seroquel           Elevated liver enzymes    - medication induced   - will continue to monitor   - if trend up will order further work up   - trending down           Electrolyte abnormality    corrected          Drug overdose, multiple drugs, undetermined intent, initial encounter    Overdose on home exact medication unknown but include hypoglycemic meds   He is PECed/ needs psych eval  Medically stable to discharge to psych facility         Type 2 diabetes mellitus with stage 3 chronic kidney disease, with long-term current use of insulin    Continue determir  with sliding scale         Essential hypertension    Continue lisnopril,norvasc and clonidine   Metoprolol held due to bradycardia             Stage 3 chronic kidney disease    Improved back to baseline           Borderline personality disorder    Per chart review, family unable to take care of him at home.  Family started NH placement.  Case management consul for discharge planning.            VTE Risk Mitigation         Ordered     enoxaparin injection 40 mg  Daily     Route:  Subcutaneous        02/09/18 0912     Medium Risk of VTE  Once      02/08/18 0535     Place EDVIN hose  Until discontinued      02/08/18 0535     Place sequential compression device  Until discontinued      02/08/18 0535            Natty Russell MD  Department of Hospital Medicine   [facility]

## 2018-02-12 NOTE — PLAN OF CARE
Problem: Patient Care Overview  Goal: Plan of Care Review  Outcome: Ongoing (interventions implemented as appropriate)  PEC remains with patient  During the night.  No acute changes over night.  Blood pressure better controlled tonight.  See MAR for medication doses and times of administration.

## 2018-02-12 NOTE — PLAN OF CARE
Pt remains w even, unlabored resp, NAD w O2 sat 99% via rm air. Pt denies any complaint at this time. Awaiting assigned tele bed for pt transfer to floor while awaiting nursing home acceptance pending med clearence.   All hazards removed from pt room and pt remains in line of sight of sitter at bedside. HOB elevated, call bell at pt side, and bed locked. will cont to monitor.

## 2018-02-12 NOTE — PLAN OF CARE
Problem: Patient Care Overview  Goal: Plan of Care Review  Outcome: Ongoing (interventions implemented as appropriate)  Pt AAO x3.  VSS.  Pt remained free of falls this shift.   Pt free of pain this shift.  Plan of care reviewed. Patient verbalizes understanding.   Pt moving/turing independently.   Patient on monitor.   Bed low, side rails up x 2, wheels locked, call light in reach.   Patient instructed to call for assistance.   Hourly rounding completed.   Will continue to monitor.

## 2018-02-12 NOTE — NURSING
Patient arrived to unit from ICU.   Patient in room 243.  Transferred into bed from wheelchair.   Bedside report given by YA Carrizales.  Charge nurse advised of patient arrival.   VS currently stable.   Tele monitored applied.   Patient oriented to room, rounding sheet and call bell.   Bed in lowest position, call light in reach.  Encouraged to notify of all needs.   Will continue to monitor.

## 2018-02-13 VITALS
OXYGEN SATURATION: 96 % | SYSTOLIC BLOOD PRESSURE: 162 MMHG | WEIGHT: 258.38 LBS | HEART RATE: 72 BPM | DIASTOLIC BLOOD PRESSURE: 95 MMHG | RESPIRATION RATE: 18 BRPM | BODY MASS INDEX: 36.99 KG/M2 | HEIGHT: 70 IN | TEMPERATURE: 98 F

## 2018-02-13 PROBLEM — R74.8 ELEVATED LIVER ENZYMES: Status: RESOLVED | Noted: 2018-02-09 | Resolved: 2018-02-13

## 2018-02-13 PROBLEM — E87.8 ELECTROLYTE ABNORMALITY: Status: RESOLVED | Noted: 2018-02-08 | Resolved: 2018-02-13

## 2018-02-13 LAB
ALBUMIN SERPL BCP-MCNC: 2.8 G/DL
ALP SERPL-CCNC: 74 U/L
ALT SERPL W/O P-5'-P-CCNC: 60 U/L
ANION GAP SERPL CALC-SCNC: 11 MMOL/L
AST SERPL-CCNC: 75 U/L
BASOPHILS # BLD AUTO: 0.05 K/UL
BASOPHILS NFR BLD: 0.6 %
BILIRUB SERPL-MCNC: 0.4 MG/DL
BUN SERPL-MCNC: 22 MG/DL
CALCIUM SERPL-MCNC: 9.9 MG/DL
CHLORIDE SERPL-SCNC: 100 MMOL/L
CO2 SERPL-SCNC: 25 MMOL/L
CREAT SERPL-MCNC: 1.2 MG/DL
DIFFERENTIAL METHOD: ABNORMAL
EOSINOPHIL # BLD AUTO: 0.3 K/UL
EOSINOPHIL NFR BLD: 4.1 %
ERYTHROCYTE [DISTWIDTH] IN BLOOD BY AUTOMATED COUNT: 13.4 %
EST. GFR  (AFRICAN AMERICAN): >60 ML/MIN/1.73 M^2
EST. GFR  (NON AFRICAN AMERICAN): >60 ML/MIN/1.73 M^2
GLUCOSE SERPL-MCNC: 172 MG/DL
HCT VFR BLD AUTO: 37.8 %
HGB BLD-MCNC: 13.2 G/DL
LYMPHOCYTES # BLD AUTO: 2.3 K/UL
LYMPHOCYTES NFR BLD: 29 %
MCH RBC QN AUTO: 30 PG
MCHC RBC AUTO-ENTMCNC: 34.9 G/DL
MCV RBC AUTO: 86 FL
MONOCYTES # BLD AUTO: 0.9 K/UL
MONOCYTES NFR BLD: 11.7 %
NEUTROPHILS # BLD AUTO: 4.3 K/UL
NEUTROPHILS NFR BLD: 58.8 %
PLATELET # BLD AUTO: 320 K/UL
PMV BLD AUTO: 9.1 FL
POCT GLUCOSE: 167 MG/DL (ref 70–110)
POCT GLUCOSE: 192 MG/DL (ref 70–110)
POCT GLUCOSE: 195 MG/DL (ref 70–110)
POCT GLUCOSE: 210 MG/DL (ref 70–110)
POTASSIUM SERPL-SCNC: 3.3 MMOL/L
PROT SERPL-MCNC: 6.9 G/DL
RBC # BLD AUTO: 4.4 M/UL
SODIUM SERPL-SCNC: 136 MMOL/L
WBC # BLD AUTO: 7.83 K/UL

## 2018-02-13 PROCEDURE — 63600175 PHARM REV CODE 636 W HCPCS: Performed by: INTERNAL MEDICINE

## 2018-02-13 PROCEDURE — 25000003 PHARM REV CODE 250: Performed by: NURSE PRACTITIONER

## 2018-02-13 PROCEDURE — 96372 THER/PROPH/DIAG INJ SC/IM: CPT

## 2018-02-13 PROCEDURE — 25000003 PHARM REV CODE 250: Performed by: INTERNAL MEDICINE

## 2018-02-13 PROCEDURE — 21400001 HC TELEMETRY ROOM

## 2018-02-13 PROCEDURE — 85025 COMPLETE CBC W/AUTO DIFF WBC: CPT

## 2018-02-13 PROCEDURE — 80053 COMPREHEN METABOLIC PANEL: CPT

## 2018-02-13 RX ORDER — TRAZODONE HYDROCHLORIDE 100 MG/1
100 TABLET ORAL NIGHTLY
Qty: 30 TABLET | Refills: 11
Start: 2018-02-13 | End: 2018-04-04

## 2018-02-13 RX ORDER — AMLODIPINE BESYLATE 10 MG/1
10 TABLET ORAL DAILY
Qty: 30 TABLET | Refills: 1
Start: 2018-02-14 | End: 2018-04-24 | Stop reason: SDUPTHER

## 2018-02-13 RX ORDER — QUETIAPINE FUMARATE 100 MG/1
100 TABLET, FILM COATED ORAL 2 TIMES DAILY
Qty: 60 TABLET | Refills: 11
Start: 2018-02-13 | End: 2018-04-04

## 2018-02-13 RX ORDER — LISINOPRIL 40 MG/1
40 TABLET ORAL DAILY
Qty: 90 TABLET | Refills: 3
Start: 2018-02-14 | End: 2018-04-24 | Stop reason: SDUPTHER

## 2018-02-13 RX ORDER — TRAZODONE HYDROCHLORIDE 100 MG/1
100 TABLET ORAL NIGHTLY
Status: DISCONTINUED | OUTPATIENT
Start: 2018-02-13 | End: 2018-02-14 | Stop reason: HOSPADM

## 2018-02-13 RX ORDER — HYDROCHLOROTHIAZIDE 25 MG/1
25 TABLET ORAL DAILY
Qty: 30 TABLET | Refills: 11
Start: 2018-02-14 | End: 2018-04-24 | Stop reason: SDUPTHER

## 2018-02-13 RX ORDER — IBUPROFEN 200 MG
1 TABLET ORAL DAILY
Status: DISCONTINUED | OUTPATIENT
Start: 2018-02-14 | End: 2018-02-14 | Stop reason: HOSPADM

## 2018-02-13 RX ORDER — SENNOSIDES 8.6 MG/1
8.6 TABLET ORAL DAILY
Status: DISCONTINUED | OUTPATIENT
Start: 2018-02-13 | End: 2018-02-14 | Stop reason: HOSPADM

## 2018-02-13 RX ORDER — CLONAZEPAM 0.5 MG/1
0.5 TABLET ORAL 2 TIMES DAILY PRN
Qty: 10 TABLET | Refills: 0 | Status: SHIPPED | OUTPATIENT
Start: 2018-02-13 | End: 2018-04-04

## 2018-02-13 RX ORDER — SULFAMETHOXAZOLE AND TRIMETHOPRIM 800; 160 MG/1; MG/1
1 TABLET ORAL 2 TIMES DAILY
Qty: 8 TABLET | Refills: 0
Start: 2018-02-13 | End: 2018-02-17

## 2018-02-13 RX ORDER — GABAPENTIN 100 MG/1
200 CAPSULE ORAL 3 TIMES DAILY
Qty: 180 CAPSULE | Refills: 11 | Status: SHIPPED | OUTPATIENT
Start: 2018-02-13 | End: 2018-04-04

## 2018-02-13 RX ORDER — POTASSIUM CHLORIDE 20 MEQ/1
20 TABLET, EXTENDED RELEASE ORAL ONCE
Status: COMPLETED | OUTPATIENT
Start: 2018-02-13 | End: 2018-02-13

## 2018-02-13 RX ORDER — SULFAMETHOXAZOLE AND TRIMETHOPRIM 800; 160 MG/1; MG/1
1 TABLET ORAL 2 TIMES DAILY
Status: DISCONTINUED | OUTPATIENT
Start: 2018-02-13 | End: 2018-02-14 | Stop reason: HOSPADM

## 2018-02-13 RX ADMIN — FAMOTIDINE 20 MG: 20 TABLET, FILM COATED ORAL at 09:02

## 2018-02-13 RX ADMIN — CLONAZEPAM 0.5 MG: 0.5 TABLET ORAL at 09:02

## 2018-02-13 RX ADMIN — POTASSIUM CHLORIDE 20 MEQ: 1500 TABLET, EXTENDED RELEASE ORAL at 03:02

## 2018-02-13 RX ADMIN — QUETIAPINE FUMARATE 100 MG: 100 TABLET ORAL at 09:02

## 2018-02-13 RX ADMIN — ANTI-FUNGAL POWDER MICONAZOLE NITRATE TALC FREE: 1.42 POWDER TOPICAL at 09:02

## 2018-02-13 RX ADMIN — INSULIN ASPART 4 UNITS: 100 INJECTION, SOLUTION INTRAVENOUS; SUBCUTANEOUS at 12:02

## 2018-02-13 RX ADMIN — INSULIN ASPART 1 UNITS: 100 INJECTION, SOLUTION INTRAVENOUS; SUBCUTANEOUS at 09:02

## 2018-02-13 RX ADMIN — CLONIDINE HYDROCHLORIDE 0.3 MG: 0.3 TABLET ORAL at 09:02

## 2018-02-13 RX ADMIN — INSULIN DETEMIR 10 UNITS: 100 INJECTION, SOLUTION SUBCUTANEOUS at 09:02

## 2018-02-13 RX ADMIN — INSULIN ASPART 2 UNITS: 100 INJECTION, SOLUTION INTRAVENOUS; SUBCUTANEOUS at 05:02

## 2018-02-13 RX ADMIN — ENOXAPARIN SODIUM 40 MG: 100 INJECTION SUBCUTANEOUS at 05:02

## 2018-02-13 RX ADMIN — TRAZODONE HYDROCHLORIDE 100 MG: 100 TABLET ORAL at 09:02

## 2018-02-13 RX ADMIN — HYDROCHLOROTHIAZIDE 25 MG: 25 TABLET ORAL at 09:02

## 2018-02-13 RX ADMIN — CLONIDINE HYDROCHLORIDE 0.3 MG: 0.3 TABLET ORAL at 03:02

## 2018-02-13 RX ADMIN — CLONIDINE HYDROCHLORIDE 0.3 MG: 0.3 TABLET ORAL at 05:02

## 2018-02-13 RX ADMIN — SENNOSIDES 8.6 MG: 8.6 TABLET, FILM COATED ORAL at 05:02

## 2018-02-13 RX ADMIN — AMLODIPINE BESYLATE 10 MG: 10 TABLET ORAL at 09:02

## 2018-02-13 RX ADMIN — LISINOPRIL 40 MG: 20 TABLET ORAL at 09:02

## 2018-02-13 RX ADMIN — SULFAMETHOXAZOLE AND TRIMETHOPRIM 1 TABLET: 800; 160 TABLET ORAL at 09:02

## 2018-02-13 RX ADMIN — VANCOMYCIN HYDROCHLORIDE 1500 MG: 1 INJECTION, POWDER, LYOPHILIZED, FOR SOLUTION INTRAVENOUS at 12:02

## 2018-02-13 NOTE — PLAN OF CARE
Problem: Patient Care Overview  Goal: Plan of Care Review  Outcome: Ongoing (interventions implemented as appropriate)  Pt AAO x3.  VSS.  Pt remained afebrile throughout this shift.   Pt remained free of falls this shift.   Pt free of pain this shift.  Plan of care reviewed. Patient verbalizes understanding.   Pt moving/turing independently.   Patient on monitor.   Bed low, side rails up x 2, wheels locked, call light in reach.   Patient instructed to call for assistance.   Hourly rounding completed, sitter present.  Will continue to monitor.

## 2018-02-13 NOTE — SUBJECTIVE & OBJECTIVE
Review of Systems   Constitutional: Negative for activity change, appetite change and chills.   HENT: Negative for congestion and sore throat.    Eyes: Negative.    Respiratory: Negative.  Negative for chest tightness and shortness of breath.    Cardiovascular: Negative.    Gastrointestinal: Negative.    Endocrine: Negative.  Negative for cold intolerance.   Genitourinary: Negative.  Negative for difficulty urinating and dysuria.   Musculoskeletal: Negative.  Negative for back pain.   Neurological: Negative.  Negative for dizziness.   Psychiatric/Behavioral: Positive for self-injury. Negative for confusion, decreased concentration, dysphoric mood, hallucinations and suicidal ideas. The patient is not nervous/anxious.      Objective:     Vital Signs (Most Recent):  Temp: 97.7 °F (36.5 °C) (02/13/18 0755)  Pulse: 72 (02/13/18 0755)  Resp: 16 (02/13/18 0755)  BP: (!) 163/95 (02/13/18 0755)  SpO2: 96 % (02/13/18 0755) Vital Signs (24h Range):  Temp:  [96.7 °F (35.9 °C)-98.3 °F (36.8 °C)] 97.7 °F (36.5 °C)  Pulse:  [66-84] 72  Resp:  [16-20] 16  SpO2:  [96 %-98 %] 96 %  BP: (124-164)/(81-98) 163/95     Weight: 117.2 kg (258 lb 6.1 oz)  Body mass index is 37.07 kg/m².    Intake/Output Summary (Last 24 hours) at 02/13/18 1210  Last data filed at 02/13/18 0543   Gross per 24 hour   Intake              240 ml   Output                0 ml   Net              240 ml      Physical Exam   Constitutional: He is oriented to person, place, and time. He appears well-developed and well-nourished. No distress.   HENT:   Head: Normocephalic and atraumatic.   Mouth/Throat: Oropharynx is clear and moist.   Eyes: Conjunctivae and EOM are normal. Pupils are equal, round, and reactive to light.   Neck: Normal range of motion. Neck supple. No JVD present. No tracheal deviation present.   Cardiovascular: Normal rate, regular rhythm, normal heart sounds and intact distal pulses.  Exam reveals no gallop and no friction rub.    No murmur  heard.  Pulmonary/Chest: Effort normal and breath sounds normal. No stridor. No respiratory distress. He has no wheezes. He has no rales.   Abdominal: Soft. Bowel sounds are normal. He exhibits no distension. There is no tenderness. There is no guarding.   Genitourinary:   Genitourinary Comments: deferred   Musculoskeletal: Normal range of motion. He exhibits no edema or deformity.   Neurological: He is oriented to person, place, and time. He displays normal reflexes. No sensory deficit. He exhibits normal muscle tone. Coordination normal.   Skin: Skin is warm and dry. Capillary refill takes less than 2 seconds. He is not diaphoretic. No pallor.   Psychiatric: He has a normal mood and affect. His behavior is normal.   Nursing note and vitals reviewed.      Significant Labs: All pertinent labs within the past 24 hours have been reviewed.    Significant Imaging: I have reviewed all pertinent imaging results/findings within the past 24 hours.

## 2018-02-13 NOTE — PROGRESS NOTES
"[facility] Hospital Medicine  Progress Note    Patient Name: Shukri Rosales  MRN: [id number]  Patient Class: IP- Inpatient   Admission Date: 2/8/2018  Length of Stay: 5 days  Attending Physician: Natty Russell MD  Primary Care Provider: Farhana Burnham MD        Subjective:     Principal Problem:Intentional drug overdose    HPI:  Mr. Rosales is a 58 y/o  male with h/o T2DM, HTN, psychiatric problems, unable to take care of himself at home (per brother), was in the process of seeking NH placement, was brought to the ED twice in the last 2 days.    Patient was brought to the ED on 2/7/18 for AMS. Patient was "acting confused" and sitting in a chair in the carport. He reported h/o amphetamine use, but denied use recently. Per yesterday's chart review, he missed 2-3 days of his regular medications, hence he reportedly then all at once. He was evaluated in the ED yesterday, was found to be altered initially, but later was able to ambulate, fully alert, oriented x3 and was discharged via taxi at around 10:30 AM yesterday.    After going home, family found him to be unresponsive again in the late hours of 2/7/18. Per chart review, patient lives with a "mentally challenged" sister, who reportedly told the first responders that she is no longer able to take care of the patient. Upon arrival, EMS arrival, patient's GCS was 3, initial glucose was 37. He was intervened in the field and brought to the ED. He was emergently intubated in ED. BP was initially high, precipitously dropped to systolic 60's. Central line placed and levophed started. Unclear etiology of hypotension. No reported fever.    Labs reviewed. Lactic acid 2.5. Creatinine 2.1. K 3.1 UDS + for amphetamines, benzos. Currently intubated on tiny dose of propofol.    Hospital Course:  2/8- Mr. Rosales is a 58 y/o  male with h/o T2DM, HTN, psychiatric problems, unable to take care of himself at home (per " "brother), was in the process of seeking NH placement, was brought to the ED twice in the last 2 days. After going home, family found him to be unresponsive again in the late hours of 2/7/18. Per chart review, patient lives with a "mentally challenged" sister, who reportedly told the first responders that she is no longer able to take care of the patient. Upon arrival, EMS arrival, patient's GCS was 3, initial glucose was 37. He was intervened in the field and brought to the ED. He was emergently intubated in ED. BP was initially high, precipitously dropped to systolic 60's. Central line placed and levophed started. He was to be positive on his drug screen for opiates, THC.     Additional records review indicates that the patient had been hospitalized about one month ago with a very similar history. He has long standing known history of recreational drug abuse, alcoholism, non adherence to his diabetes and anti hypertensive medications. He has also had an established history of bipolar disorder/schizophrenia, and had been known to take prescription drugs haphazardly, if at all. The family has indicated that they can no longer provide care for him.     He is still intubated, sleepy but arousable, off Levophed and Diprivan, Lactic Acidosis resolved. Neurology and CC Med have seen and the pt and following him and he is hemodynamically stable, oxygenating well. Will leave over nite on vent.    2/9- pt extubated this am, now fully alert and awake and oriented, speech is clear, c/o mild throat irritation but he drank water without any difficulty. He is moving all 4 extremities. No visual or auditory hallucinations. He admitted before 2 RNs this am that he tried to commit suicide by intentionally taking all the meds and Insulin PTA. He however denies any suicidal thoughts or intentions now. Hemodynamically stable, oxygenating well. LFTs moderately elevated this am but Bun/Cr stable at 29/2.2-- probably has CKD 3. BP " elevated at 191/105 post extubation. One blood cx from central line is positive for GPC. He remains afebrile and is on Vanco/ Zosyn.  2/10/2018  Patient denies of any complains apart from being depressed . He mention he took large amount of his pills he don't remember exactly which one along with insulin with intent to harm self because he is depressed and don't want to live .blood culure 2/2 positive . Echo pending . Repeat blood . Id consulted . Case management for placement    2/11/2018  Patient final sensitivity on sputum and blood culture pending . Blood culture most likely containment . Keep antibiotic until final sensitivity back on respiratory   2/12/2018  Patient seen and examined today . Medically cleared for discharge . Blood pressure stable . Sputum is staph aureus will need 5 more days of bactrim . D/c hydralazine increase dose of lisnorpil . Also increase dose of seroquel   2/12/2018   Patient medically stable . Bacteria in blood contaminant . Sputum grew staph aureus sensitive to bactrim no signs of pneumonia but infection disease recommended completing five more days of bactrim . Blood pressure . Resume his psych meds         Review of Systems   Constitutional: Negative for activity change, appetite change and chills.   HENT: Negative for congestion and sore throat.    Eyes: Negative.    Respiratory: Negative.  Negative for chest tightness and shortness of breath.    Cardiovascular: Negative.    Gastrointestinal: Negative.    Endocrine: Negative.  Negative for cold intolerance.   Genitourinary: Negative.  Negative for difficulty urinating and dysuria.   Musculoskeletal: Negative.  Negative for back pain.   Neurological: Negative.  Negative for dizziness.   Psychiatric/Behavioral: Positive for self-injury. Negative for confusion, decreased concentration, dysphoric mood, hallucinations and suicidal ideas. The patient is not nervous/anxious.      Objective:     Vital Signs (Most Recent):  Temp: 97.7 °F  (36.5 °C) (02/13/18 0755)  Pulse: 72 (02/13/18 0755)  Resp: 16 (02/13/18 0755)  BP: (!) 163/95 (02/13/18 0755)  SpO2: 96 % (02/13/18 0755) Vital Signs (24h Range):  Temp:  [96.7 °F (35.9 °C)-98.3 °F (36.8 °C)] 97.7 °F (36.5 °C)  Pulse:  [66-84] 72  Resp:  [16-20] 16  SpO2:  [96 %-98 %] 96 %  BP: (124-164)/(81-98) 163/95     Weight: 117.2 kg (258 lb 6.1 oz)  Body mass index is 37.07 kg/m².    Intake/Output Summary (Last 24 hours) at 02/13/18 1210  Last data filed at 02/13/18 0543   Gross per 24 hour   Intake              240 ml   Output                0 ml   Net              240 ml      Physical Exam   Constitutional: He is oriented to person, place, and time. He appears well-developed and well-nourished. No distress.   HENT:   Head: Normocephalic and atraumatic.   Mouth/Throat: Oropharynx is clear and moist.   Eyes: Conjunctivae and EOM are normal. Pupils are equal, round, and reactive to light.   Neck: Normal range of motion. Neck supple. No JVD present. No tracheal deviation present.   Cardiovascular: Normal rate, regular rhythm, normal heart sounds and intact distal pulses.  Exam reveals no gallop and no friction rub.    No murmur heard.  Pulmonary/Chest: Effort normal and breath sounds normal. No stridor. No respiratory distress. He has no wheezes. He has no rales.   Abdominal: Soft. Bowel sounds are normal. He exhibits no distension. There is no tenderness. There is no guarding.   Genitourinary:   Genitourinary Comments: deferred   Musculoskeletal: Normal range of motion. He exhibits no edema or deformity.   Neurological: He is oriented to person, place, and time. He displays normal reflexes. No sensory deficit. He exhibits normal muscle tone. Coordination normal.   Skin: Skin is warm and dry. Capillary refill takes less than 2 seconds. He is not diaphoretic. No pallor.   Psychiatric: He has a normal mood and affect. His behavior is normal.   Nursing note and vitals reviewed.      Significant Labs: All  pertinent labs within the past 24 hours have been reviewed.    Significant Imaging: I have reviewed all pertinent imaging results/findings within the past 24 hours.    Assessment/Plan:      * Intentional drug overdose    pec and will work on transfer to [facility] facility        Schizoaffective disorder, bipolar type    Pt is non compliant w his meds   - patient comfortable and denies of any complains   - continue seroquel           Drug overdose, multiple drugs, undetermined intent, initial encounter    Overdose on home exact medication unknown but include hypoglycemic meds   He is PECed/ needs psych eval  Medically stable to discharge to psych facility         Type 2 diabetes mellitus with stage 3 chronic kidney disease, with long-term current use of insulin    Continue determir  with sliding scale   Controlled         Essential hypertension    Continue lisnopril,norvasc and clonidine   Metoprolol held due to bradycardia   Controlled         Stage 3 chronic kidney disease    Improved back to baseline           Borderline personality disorder    Per chart review, family unable to take care of him at home.  Family started NH placement.  Case management consul for discharge planning.            VTE Risk Mitigation         Ordered     enoxaparin injection 40 mg  Daily     Route:  Subcutaneous        02/09/18 0912     Medium Risk of VTE  Once      02/08/18 0535     Place EDVIN hose  Until discontinued      02/08/18 0535     Place sequential compression device  Until discontinued      02/08/18 0535              Natty Russell MD  Department of Hospital Medicine   [facility]

## 2018-02-13 NOTE — ASSESSMENT & PLAN NOTE
- medication induced   - will continue to monitor   - if trend up will order further work up   - trending down  Improved

## 2018-02-13 NOTE — PLAN OF CARE
Problem: Patient Care Overview  Goal: Plan of Care Review  Outcome: Ongoing (interventions implemented as appropriate)  Sitter is at  documenting 15 minutes checks on patient. Suicide risk assessment performed during shift. Pt stated that he no longer wants to hurt himself. Contact precautions in place. VSS. NSR on monitor. Pt repositioned independently.

## 2018-02-13 NOTE — PLAN OF CARE
CM sent out 42 referral for [facility] inpatient. CM called [facility], -No bed, [facility]- still Pending, [facility]- No Beds, [facility] - No beds in [place] ( intake sent referral to all in there data base )  [facility] is pending - awaiting decision from MD. CM spoke with fac AND PATIENT IS ACCEPTED. SANTA was told to have [phone]. [employer] called and transportation arranged.  SANTA gave report to Merna charge nurse.

## 2018-02-14 NOTE — DISCHARGE SUMMARY
"[facility] Hospital Medicine  Discharge Summary      Patient Name: Shukri Rosales  MRN: [id number]  Admission Date: 2/8/2018  Hospital Length of Stay: 5 days  Discharge Date and Time:  02/13/2018 7:03 PM  Attending Physician: Natty Russell MD   Discharging Provider: Natty Russell MD  Primary Care Provider: Farhana Burnham MD      HPI:   Mr. Rosales is a 56 y/o  male with h/o T2DM, HTN, psychiatric problems, unable to take care of himself at home (per brother), was in the process of seeking NH placement, was brought to the ED twice in the last 2 days.    Patient was brought to the ED on 2/7/18 for AMS. Patient was "acting confused" and sitting in a chair in the carport. He reported h/o amphetamine use, but denied use recently. Per yesterday's chart review, he missed 2-3 days of his regular medications, hence he reportedly then all at once. He was evaluated in the ED yesterday, was found to be altered initially, but later was able to ambulate, fully alert, oriented x3 and was discharged via taxi at around 10:30 AM yesterday.    After going home, family found him to be unresponsive again in the late hours of 2/7/18. Per chart review, patient lives with a "mentally challenged" sister, who reportedly told the first responders that she is no longer able to take care of the patient. Upon arrival, EMS arrival, patient's GCS was 3, initial glucose was 37. He was intervened in the field and brought to the ED. He was emergently intubated in ED. BP was initially high, precipitously dropped to systolic 60's. Central line placed and levophed started. Unclear etiology of hypotension. No reported fever.    Labs reviewed. Lactic acid 2.5. Creatinine 2.1. K 3.1 UDS + for amphetamines, benzos. Currently intubated on tiny dose of propofol.    * No surgery found *      Hospital Course:   2/8- Mr. Rosales is a 56 y/o  male with h/o T2DM, HTN, psychiatric problems, unable to " "take care of himself at home (per brother), was in the process of seeking NH placement, was brought to the ED twice in the last 2 days. After going home, family found him to be unresponsive again in the late hours of 2/7/18. Per chart review, patient lives with a "mentally challenged" sister, who reportedly told the first responders that she is no longer able to take care of the patient. Upon arrival, EMS arrival, patient's GCS was 3, initial glucose was 37. He was intervened in the field and brought to the ED. He was emergently intubated in ED. BP was initially high, precipitously dropped to systolic 60's. Central line placed and levophed started. He was to be positive on his drug screen for opiates, THC.     Additional records review indicates that the patient had been hospitalized about one month ago with a very similar history. He has long standing known history of recreational drug abuse, alcoholism, non adherence to his diabetes and anti hypertensive medications. He has also had an established history of bipolar disorder/schizophrenia, and had been known to take prescription drugs haphazardly, if at all. The family has indicated that they can no longer provide care for him.     He is still intubated, sleepy but arousable, off Levophed and Diprivan, Lactic Acidosis resolved. Neurology and [facility] Med have seen and the pt and following him and he is hemodynamically stable, oxygenating well. Will leave over nite on vent.    2/9- pt extubated this am, now fully alert and awake and oriented, speech is clear, c/o mild throat irritation but he drank water without any difficulty. He is moving all 4 extremities. No visual or auditory hallucinations. He admitted before 2 RNs this am that he tried to commit suicide by intentionally taking all the meds and Insulin PTA. He however denies any suicidal thoughts or intentions now. Hemodynamically stable, oxygenating well. LFTs moderately elevated this am but Bun/Cr stable at " 29/2.2-- probably has CKD 3. BP elevated at 191/105 post extubation. One blood cx from central line is positive for GPC. He remains afebrile and is on Vanco/ Zosyn.  2/10/2018  Patient denies of any complains apart from being depressed . He mention he took large amount of his pills he don't remember exactly which one along with insulin with intent to harm self because he is depressed and don't want to live .blood culure 2/2 positive . Echo pending . Repeat blood . Id consulted . Case management for placement    2/11/2018  Patient final sensitivity on sputum and blood culture pending . Blood culture most likely containment . Keep antibiotic until final sensitivity back on respiratory   2/12/2018  Patient seen and examined today . Medically cleared for discharge . Blood pressure stable . Sputum is staph aureus will need 5 more days of bactrim . D/c hydralazine increase dose of lisnorpil . Also increase dose of seroquel   2/12/2018   Patient medically stable . Bacteria in blood contaminant . Sputum grew staph aureus sensitive to bactrim no signs of pneumonia but infection disease recommended completing five more days of bactrim . Blood pressure . Resume his psych meds . Patient seen and examined . Today  His home meds resume lower dose gabapentin 200 tid ,trazadone ,seroquel,clonapin and latuda.      Consults:   Consults         Status Ordering Provider     Inpatient consult to Infectious Diseases  Once     Provider:  (Not yet assigned)    Acknowledged DOLORES ROCHA     Inpatient consult to Neurology  Once     Provider:  (Not yet assigned)    Completed NATALIYA CURRY     Inpatient consult to Pulmonology  Once     Provider:  (Not yet assigned)    Acknowledged FELY KRAMER JR     Inpatient consult to Registered Dietitian/Nutritionist  Once     Provider:  (Not yet assigned)    Completed KHLOE STANTON     Inpatient consult to Social Work  Once     Provider:  (Not yet assigned)    Acknowledged NATALIYA CURRY           No new Assessment & Plan notes have been filed under this hospital service since the last note was generated.  Service: Hospital Medicine    Final Active Diagnoses:    Diagnosis Date Noted POA    PRINCIPAL PROBLEM:  Intentional drug overdose [T50.902A] 02/10/2018 Yes    Schizoaffective disorder, bipolar type [F25.0] 02/09/2018 Yes    Drug overdose, multiple drugs, undetermined intent, initial encounter [T50.904A] 02/08/2018 Yes    Type 2 diabetes mellitus with stage 3 chronic kidney disease, with long-term current use of insulin [E11.22, N18.3, Z79.4] 07/25/2016 Yes    Essential hypertension [I10] 10/09/2014 Yes    Stage 3 chronic kidney disease [N18.3] 08/26/2014 Yes      Problems Resolved During this Admission:    Diagnosis Date Noted Date Resolved POA    Bacteremia [R78.81] 02/10/2018 02/12/2018 Unknown    Shock circulatory [R57.9] 02/09/2018 02/09/2018 Yes    Elevated liver enzymes [R74.8] 02/09/2018 02/13/2018 Yes    Hypothermia [T68.XXXA] 02/08/2018 02/09/2018 Yes    Hypoglycemia [E16.2] 02/08/2018 02/09/2018 Yes    Hypotension [I95.9] 02/08/2018 02/09/2018 Yes    Acute renal failure superimposed on stage 3 chronic kidney disease [N17.9, N18.3] 02/08/2018 02/09/2018 Yes    Electrolyte abnormality [E87.8] 02/08/2018 02/13/2018 Yes    Acute encephalopathy [G93.40] 12/22/2017 02/09/2018 Yes       Discharged Condition: stable    Disposition: Psychiatric Hospital    Follow Up:  Follow-up Information     Farhana Burnham MD.    Specialty:  Family Medicine  Contact information:  [address] [address] [address] [phone]                 Patient Instructions:   No discharge procedures on file.    Significant Diagnostic Studies: Labs:   BMP:   Recent Labs  Lab 02/12/18  0356 02/13/18  0354   * 172*    136   K 3.4* 3.3*   CL 99 100   CO2 26 25   BUN 21* 22*   CREATININE 1.2 1.2   CALCIUM 10.0 9.9    and CMP   Recent Labs  Lab 02/12/18  0356 02/13/18  0354     136   K 3.4* 3.3*   CL 99 100   CO2 26 25   * 172*   BUN 21* 22*   CREATININE 1.2 1.2   CALCIUM 10.0 9.9   PROT 7.1 6.9   ALBUMIN 2.9* 2.8*   BILITOT 0.5 0.4   ALKPHOS 76 74   * 75*   ALT 63* 60*   ANIONGAP 12 11   ESTGFRAFRICA >60 >60   EGFRNONAA >60 >60     Microbiology:   Blood Culture   Lab Results   Component Value Date    LABBLOO No Growth to date 02/10/2018    LABBLOO No Growth to date 02/10/2018    LABBLOO No Growth to date 02/10/2018    and Sputum Culture   Lab Results   Component Value Date    GSRESP >10 epithelial cells per low power field 02/09/2018    GSRESP Moderate WBC's 02/09/2018    GSRESP Many Gram positive cocci 02/09/2018    GSRESP Few Gram negative rods 02/09/2018    GSRESP Rare budding yeast 02/09/2018    RESPIRATORYC  02/09/2018     METHICILLIN RESISTANT STAPHYLOCOCCUS AUREUS  Many  Normal respiratory louise also present         Pending Diagnostic Studies:     None         Medications:  Reconciled Home Medications:   Current Discharge Medication List      START taking these medications    Details   hydroCHLOROthiazide (HYDRODIURIL) 25 MG tablet Take 1 tablet (25 mg total) by mouth once daily.  Qty: 30 tablet, Refills: 11      insulin detemir (LEVEMIR FLEXTOUCH) 100 unit/mL (3 mL) SubQ InPn pen Inject 10 Units into the skin every evening.  Refills: 0      lisinopril (PRINIVIL,ZESTRIL) 40 MG tablet Take 1 tablet (40 mg total) by mouth once daily.  Qty: 90 tablet, Refills: 3      sulfamethoxazole-trimethoprim 800-160mg (BACTRIM DS) 800-160 mg Tab Take 1 tablet by mouth 2 (two) times daily.  Qty: 8 tablet, Refills: 0         CONTINUE these medications which have CHANGED    Details   amLODIPine (NORVASC) 10 MG tablet Take 1 tablet (10 mg total) by mouth once daily.  Qty: 30 tablet, Refills: 1      clonazePAM (KLONOPIN) 0.5 MG tablet Take 1 tablet (0.5 mg total) by mouth 2 (two) times daily as needed (anxiety).  Qty: 10 tablet, Refills: 0      gabapentin (NEURONTIN) 100 MG capsule Take  "2 capsules (200 mg total) by mouth 3 (three) times daily.  Qty: 180 capsule, Refills: 11      QUEtiapine (SEROQUEL) 100 MG Tab Take 1 tablet (100 mg total) by mouth 2 (two) times daily.  Qty: 60 tablet, Refills: 11      traZODone (DESYREL) 100 MG tablet Take 1 tablet (100 mg total) by mouth every evening.  Qty: 30 tablet, Refills: 11         CONTINUE these medications which have NOT CHANGED    Details   blood sugar diagnostic Strp 1 each by Misc.(Non-Drug; Combo Route) route 3 (three) times daily before meals. For One Touch Verio flex  Qty: 100 each, Refills: 11      blood-glucose meter kit One Touch Verio Meter  Qty: 1 each, Refills: 0      cloNIDine (CATAPRES) 0.3 MG tablet Take 1 tablet (0.3 mg total) by mouth 3 (three) times daily.  Qty: 90 tablet, Refills: 3    Associated Diagnoses: Essential hypertension      insulin syringe-needle U-100 1 mL 31 gauge x 5/16 Syrg 1 Syringe by Misc.(Non-Drug; Combo Route) route 3 (three) times daily.  Qty: 100 each, Refills: 11    Associated Diagnoses: Type 2 diabetes mellitus with hyperglycemia, with long-term current use of insulin; Type 2 diabetes mellitus with diabetic neuropathy, with long-term current use of insulin      lancets (ONETOUCH DELICA LANCETS) 33 gauge Misc 1 lancet by Misc.(Non-Drug; Combo Route) route 3 (three) times daily.  Qty: 100 each, Refills: 11      lurasidone (LATUDA) 60 mg Tab tablet Take 60 mg by mouth once daily.      pantoprazole (PROTONIX) 40 MG tablet TAKE 1 TABLET(40 MG) BY MOUTH EVERY DAY  Qty: 30 tablet, Refills: 3    Associated Diagnoses: Gastroesophageal reflux disease, esophagitis presence not specified      pen needle, diabetic 32 gauge x 5/32" Ndle 1 each by Misc.(Non-Drug; Combo Route) route once daily.  Qty: 100 each, Refills: 11    Associated Diagnoses: Type 2 diabetes mellitus with stage 2 chronic kidney disease, with long-term current use of insulin      polyethylene glycol (GLYCOLAX) 17 gram/dose powder Take 17 g by mouth once " daily.  Qty: 1 Bottle, Refills: 1    Associated Diagnoses: Constipation, unspecified constipation type      pravastatin (PRAVACHOL) 20 MG tablet Take 1 tablet (20 mg total) by mouth every evening.  Qty: 30 tablet, Refills: 5    Associated Diagnoses: Hyperlipidemia, unspecified hyperlipidemia type      sertraline (ZOLOFT) 100 MG tablet Take 2 tablets (200 mg total) by mouth once daily.  Qty: 30 tablet, Refills: 0    Associated Diagnoses: Bipolar disorder         STOP taking these medications       acetaminophen-codeine 300-30mg (TYLENOL #3) 300-30 mg Tab Comments:   Reason for Stopping:         amoxicillin-clavulanate 875-125mg (AUGMENTIN) 875-125 mg per tablet Comments:   Reason for Stopping:         insulin lispro protamin-lispro (HUMALOG MIX 50-50) 100 unit/mL (50-50) Susp Comments:   Reason for Stopping:         liraglutide 0.6 mg/0.1 mL, 18 mg/3 mL, subq PNIJ (VICTOZA 3-NEGRITA) 0.6 mg/0.1 mL (18 mg/3 mL) PnIj Comments:   Reason for Stopping:         lisinopril-hydrochlorothiazide (PRINZIDE,ZESTORETIC) 20-25 mg Tab Comments:   Reason for Stopping:         metformin (GLUCOPHAGE) 500 MG tablet Comments:   Reason for Stopping:         metoprolol tartrate (LOPRESSOR) 50 MG tablet Comments:   Reason for Stopping:               Indwelling Lines/Drains at time of discharge:   Lines/Drains/Airways          No matching active lines, drains, or airways          Time spent on the discharge of patient: 35 minutes  Patient was seen and examined on the date of discharge and determined to be suitable for discharge.         Natty Russell MD  Department of Hospital Medicine  [facility]

## 2018-02-14 NOTE — PLAN OF CARE
02/14/18 0833   Final Note   Assessment Type Final Discharge Note   Discharge Disposition ([facility] to [facility])   Hospital Follow Up  Appt(s) scheduled? No   Discharge plans and expectations educations in teach back method with documentation complete? Yes   Right Care Referral Info   Post Acute Recommendation No Care

## 2018-02-14 NOTE — NURSING
Discharge instructions reviewed with patient. Report called at 2100 to Tiffanie at [facility]. Personal items returned to patient, however, items were given to  for safekeeping; pt witnessed this exchange.  Pt discharged and ambulated independently, accompanied by security and left via hospitals transportation services.

## 2018-02-16 LAB
BACTERIA BLD CULT: NORMAL
BACTERIA BLD CULT: NORMAL

## 2018-03-14 ENCOUNTER — TELEPHONE (OUTPATIENT)
Dept: DIABETES | Facility: CLINIC | Age: 58
End: 2018-03-14

## 2018-03-14 NOTE — TELEPHONE ENCOUNTER
----- Message from Nathalie Ashby sent at 3/14/2018 11:18 AM CDT -----  Contact: pt  Please call pt @ [phone] regarding an appt, no available appt , pt need to be worked in.

## 2018-03-19 ENCOUNTER — LAB VISIT (OUTPATIENT)
Dept: LAB | Facility: HOSPITAL | Age: 58
End: 2018-03-19
Attending: NURSE PRACTITIONER
Payer: MEDICAID

## 2018-03-19 ENCOUNTER — OFFICE VISIT (OUTPATIENT)
Dept: DIABETES | Facility: CLINIC | Age: 58
End: 2018-03-19
Payer: MEDICAID

## 2018-03-19 VITALS
WEIGHT: 246.5 LBS | DIASTOLIC BLOOD PRESSURE: 66 MMHG | HEIGHT: 70 IN | SYSTOLIC BLOOD PRESSURE: 90 MMHG | BODY MASS INDEX: 35.29 KG/M2

## 2018-03-19 DIAGNOSIS — E11.40 TYPE 2 DIABETES MELLITUS WITH DIABETIC NEUROPATHY, WITH LONG-TERM CURRENT USE OF INSULIN: Primary | ICD-10-CM

## 2018-03-19 DIAGNOSIS — E78.5 HYPERLIPIDEMIA ASSOCIATED WITH TYPE 2 DIABETES MELLITUS: ICD-10-CM

## 2018-03-19 DIAGNOSIS — E11.40 TYPE 2 DIABETES MELLITUS WITH DIABETIC NEUROPATHY, WITH LONG-TERM CURRENT USE OF INSULIN: ICD-10-CM

## 2018-03-19 DIAGNOSIS — E11.69 HYPERLIPIDEMIA ASSOCIATED WITH TYPE 2 DIABETES MELLITUS: ICD-10-CM

## 2018-03-19 DIAGNOSIS — Z79.4 TYPE 2 DIABETES MELLITUS WITH STAGE 3 CHRONIC KIDNEY DISEASE, WITH LONG-TERM CURRENT USE OF INSULIN: ICD-10-CM

## 2018-03-19 DIAGNOSIS — Z79.4 TYPE 2 DIABETES MELLITUS WITH DIABETIC NEUROPATHY, WITH LONG-TERM CURRENT USE OF INSULIN: ICD-10-CM

## 2018-03-19 DIAGNOSIS — E11.22 TYPE 2 DIABETES MELLITUS WITH STAGE 3 CHRONIC KIDNEY DISEASE, WITH LONG-TERM CURRENT USE OF INSULIN: ICD-10-CM

## 2018-03-19 DIAGNOSIS — Z79.4 TYPE 2 DIABETES MELLITUS WITH DIABETIC NEUROPATHY, WITH LONG-TERM CURRENT USE OF INSULIN: Primary | ICD-10-CM

## 2018-03-19 DIAGNOSIS — E66.9 OBESITY (BMI 30-39.9): ICD-10-CM

## 2018-03-19 DIAGNOSIS — I10 ESSENTIAL HYPERTENSION: ICD-10-CM

## 2018-03-19 DIAGNOSIS — N18.30 TYPE 2 DIABETES MELLITUS WITH STAGE 3 CHRONIC KIDNEY DISEASE, WITH LONG-TERM CURRENT USE OF INSULIN: ICD-10-CM

## 2018-03-19 LAB
ALBUMIN SERPL BCP-MCNC: 3.2 G/DL
ALP SERPL-CCNC: 73 U/L
ALT SERPL W/O P-5'-P-CCNC: 16 U/L
ANION GAP SERPL CALC-SCNC: 10 MMOL/L
AST SERPL-CCNC: 20 U/L
BILIRUB SERPL-MCNC: 0.3 MG/DL
BUN SERPL-MCNC: 14 MG/DL
CALCIUM SERPL-MCNC: 11.4 MG/DL
CHLORIDE SERPL-SCNC: 100 MMOL/L
CO2 SERPL-SCNC: 29 MMOL/L
CREAT SERPL-MCNC: 1.9 MG/DL
CREAT UR-MCNC: 72 MG/DL
EST. GFR  (AFRICAN AMERICAN): 43.6 ML/MIN/1.73 M^2
EST. GFR  (NON AFRICAN AMERICAN): 37.7 ML/MIN/1.73 M^2
GLUCOSE SERPL-MCNC: 143 MG/DL (ref 70–110)
GLUCOSE SERPL-MCNC: 57 MG/DL
MICROALBUMIN UR DL<=1MG/L-MCNC: 3 UG/ML
MICROALBUMIN/CREATININE RATIO: 4.2 UG/MG
POTASSIUM SERPL-SCNC: 4 MMOL/L
PROT SERPL-MCNC: 6.9 G/DL
SODIUM SERPL-SCNC: 139 MMOL/L

## 2018-03-19 PROCEDURE — 99214 OFFICE O/P EST MOD 30 MIN: CPT | Mod: PBBFAC,PO | Performed by: NURSE PRACTITIONER

## 2018-03-19 PROCEDURE — 80053 COMPREHEN METABOLIC PANEL: CPT

## 2018-03-19 PROCEDURE — 99214 OFFICE O/P EST MOD 30 MIN: CPT | Mod: S$PBB,,, | Performed by: NURSE PRACTITIONER

## 2018-03-19 PROCEDURE — 82948 REAGENT STRIP/BLOOD GLUCOSE: CPT | Mod: PBBFAC,PO | Performed by: NURSE PRACTITIONER

## 2018-03-19 PROCEDURE — 82043 UR ALBUMIN QUANTITATIVE: CPT

## 2018-03-19 PROCEDURE — 99999 PR PBB SHADOW E&M-EST. PATIENT-LVL IV: CPT | Mod: PBBFAC,,, | Performed by: NURSE PRACTITIONER

## 2018-03-19 PROCEDURE — 36415 COLL VENOUS BLD VENIPUNCTURE: CPT | Mod: PO

## 2018-03-19 NOTE — PROGRESS NOTES
"PCP: Farhana Burnham MD    Subjective:     HISTORY OF PRESENT ILLNESS: 57 year old  male patient is in clinic today for diabetes.  Patient has had Type II diabetes since 2009.  He has been seen by dietitian in the past.  Most recent A1C is 6.9, ADA recommends less than 7.0.  He reports prior use of glimepiride but was told to discontinue.  He has not monitored BG in the past several months. Since his last visit, he lost 15 pounds.      Patient was recently seen in the ER for attempted suicide, now stabilized - followed by SW, and psychiatrist. He is living at an assisted living facility, but complains of uncomfortable living situations.      He denies polyuria, polyphagia, or blurred vision.  He complains of polydipsia, cotton mouth.  Also denies nausea, vomiting, diarrhea or hypoglycemia.       Height: 5 ' 9.5 ", Weight:  246 pounds, BMI 35.37  Blood Glucose reading this AM: Not Taken  Blood Glucose reading in clinic: 143 mg/dl at 8:43 am    DM MEDICATIONS:  Metformin 500 mg BID  Humalog 50 / 50 -  76 units before breakfast and 24 units before dinner  Victoza 1.8 mg daily ( in am )    Labs Reviewed.    REVIEW OF SYSTEMS:  General: Denies fever, chills, decreased appetite.  HEENT: Denies impaired hearing, dysphagia.  Respiratory: Denies shortness of breath, cough or wheezing.  Cardiovascular: Denies chest pain, palpitations.  GI: Denies hematochezia.  : Denies hematuria, dysuria or frequency.  MS:  Normal gait. Denies difficulty with mobility, muscle or joint pain.  SKIN: Denies rashes and lesions.  Neuro: Has numbness or tingling in the hands or feet.   PSYCH: Denies depression or anxiety. No suicidal ideations.  ENDO: See HPI.    STANDARDS OF CARE:  Eye Doctor: Dr. Caruso, Last exam 09 / 2017  Dental exam: Recommend regular exams; denies gums bleeding.  Podiatry Doctor: Dr. Sampson, Last exam 3 / 2015  ACE / ARB: Yes  Statin: Yes    ACTIVITY LEVEL: No routine exercise.   MEAL PLANNING: Number of " meals per day - 3. Number of snacks per day - 2.  Per dietary recall, patient is not limiting carbohydrates, saturated fats and sodium.     BLOOD GLUCOSE TESTING: Self-monitoring with, ONE TOUCH verio  SOCIAL HISTORY: Disabled. Smokes > 1 pack per day.    Objective:     PHYSICAL EXAMINATION:  GENERAL: WDWN  male in no acute distress, ambulatory, responds appropriately. AAO X 3.   NECK: Supple, no thyromegaly, no cervical or supraclavicular lymphadenopathy, no carotid bruit.  HEART: Regular rate and rhythm. No rubs, murmurs or gallops.  LUNGS: CTA bilaterally. Unlabored breathing, no use of accessory muscles.  MUSCULOSKELETAL: Normal gait and muscle strength.  ABDOMEN: Active bowel sounds X 4, no masses or tenderness.   SKIN: Warm, dry skin. No lesions or abrasions. Clean, dry well-healed injection sites.   NEUROLOGIC: Cranial nerves II-XII grossly intact.   FOOT EXAMINATION: Protective Sensation (w/ 10 gram monofilament):  Right: Decreased  Left: Decreased  //  Visual Inspection: Onychomycosis -  Bilateral toes.  Significant calluses to lateral sides of great toes and heels.  Dry skin.  // Pedal Pulses:  Right: Diminshed Left: Diminshed     Assessment:     (1.) Diabetes Type II, stage III CKD, neuropathy ( on gabapentin ) - suboptimal control on metformin, Humalog 50 / 50, Victoza, A1C 6.9  (2.) Hypertension - suboptimal control on norvasc, catapres, hydralazine, lisinopril-HCT  (3.) Obesity, BMI 35.37  (4.) Hyperlipidemia - continue on Pravastatin  (5.) Tobacco Abuse     Plan:     1.) Patient was instructed to monitor blood glucose 2 x daily, fasting and ac dinner.  Discussed ADA goal for fasting blood sugar, 80 - 130 mg/dL; pp blood sugars below 180 mg/dl. Also, discussed prevention of hypoglycemia and the need to adjust goals to higher levels if persistent hypoglycemia.  Reminded to bring blood glucose records or meter to each visit for review.  2.) Reviewed pathophysiology of Type 2 diabetes,  complications related to the disease, importance of annual dilated eye exam and daily foot examination.  3.) Continue metformin 500 mg BID.  Continue Humalog 50 / 50 -  76 units before breakfast and 35 units before dinner. Continue Victoza 1.8 mg daily, same time every day.  This regimen appears to be working.  He has lost 15 pounds since last visit.  He has poor dietary compliance, which consists of regular table sugar with his coffee, cereal, rice, and other carbs.    4.) Meal planning teaching: Carbohydrate definition - one serving is 15 gms. Carbohydrate spacing - carbohydrates should be spaced into approximately 3 meals with 2 snacks ( of one carbohydrate ) between meals or at bedtime. Increase vegetable intake to 2 or more cups of vegetables per day as well as 2 fruit servings.  Recommended low saturated fat, low sodium diet to aid in control of hypertension and cholesterol.  5.) Discussed activity, benefits, methods, and precautions. Recommended patient start some form of exercise and increase as tolerated to 30 minutes per day to facilitate weight loss and aid in control of BGs.  6.) Return to clinic in 3 month for follow up.  Labs Today: CMP, micral.  Podiatry appointment scheduled. Advised patient to call clinic with any questions or concerns.    Ladan Dowd, SHAWNA-C, CDE

## 2018-03-21 ENCOUNTER — TELEPHONE (OUTPATIENT)
Dept: PODIATRY | Facility: CLINIC | Age: 58
End: 2018-03-21

## 2018-03-21 NOTE — TELEPHONE ENCOUNTER
----- Message from Meera Matt sent at 3/21/2018  8:45 AM CDT -----  Contact: pt  He's calling regards to being worked into schedule before 4/30 pls call pt back at [phone] (home)

## 2018-03-21 NOTE — TELEPHONE ENCOUNTER
Natalya Shukri Rosales was given a return call, but there was no answer. A message was left for him to return my call at his convenience.

## 2018-03-26 DIAGNOSIS — E11.22 TYPE 2 DIABETES MELLITUS WITH STAGE 2 CHRONIC KIDNEY DISEASE, WITH LONG-TERM CURRENT USE OF INSULIN: ICD-10-CM

## 2018-03-26 DIAGNOSIS — N18.2 TYPE 2 DIABETES MELLITUS WITH STAGE 2 CHRONIC KIDNEY DISEASE, WITH LONG-TERM CURRENT USE OF INSULIN: ICD-10-CM

## 2018-03-26 DIAGNOSIS — Z79.4 TYPE 2 DIABETES MELLITUS WITH STAGE 2 CHRONIC KIDNEY DISEASE, WITH LONG-TERM CURRENT USE OF INSULIN: ICD-10-CM

## 2018-03-26 RX ORDER — PEN NEEDLE, DIABETIC 30 GX3/16"
1 NEEDLE, DISPOSABLE MISCELLANEOUS DAILY
Qty: 100 EACH | Refills: 11 | Status: SHIPPED | OUTPATIENT
Start: 2018-03-26 | End: 2022-01-19

## 2018-03-29 ENCOUNTER — TELEPHONE (OUTPATIENT)
Dept: FAMILY MEDICINE | Facility: CLINIC | Age: 58
End: 2018-03-29

## 2018-03-29 NOTE — TELEPHONE ENCOUNTER
----- Message from Chiquita Perry sent at 3/29/2018  9:26 AM CDT -----  Contact: Pt  Pt called and stated he needed to speak to the nurse. He stated that he has questions about dialysis. She can be reached at [phone].    Thanks,  TF

## 2018-03-29 NOTE — TELEPHONE ENCOUNTER
Spoke with patient he stated that a [employer] Van pulled into his driveway to take him to Dialysis and he had know idea what was going on . Patient told I would send this message to his Nephrologist Dr. Oh.

## 2018-03-29 NOTE — TELEPHONE ENCOUNTER
----- Message from Chiquita Perry sent at 3/29/2018  9:26 AM CDT -----  Contact: Pt  Pt called and stated he needed to speak to the nurse. He stated that he has questions about dialysis. She can be reached at [phone].    Thanks,  TF

## 2018-04-02 ENCOUNTER — TELEPHONE (OUTPATIENT)
Dept: NEPHROLOGY | Facility: CLINIC | Age: 58
End: 2018-04-02

## 2018-04-02 NOTE — TELEPHONE ENCOUNTER
Patient was given phone number to [facility] dialysis unit to contact regarding appointment and transportation.

## 2018-04-02 NOTE — TELEPHONE ENCOUNTER
----- Message from Milvia Cain sent at 4/2/2018 10:45 AM CDT -----  Contact: Pt  Pt calling in regards to wanting to speak to the nurse pertaining to if he had an appt for kidney dialysis due to he is unaware.    He stated that the transportation came to pick him up for an appt and mwanted to know if it is for dialysis how often is it per week.    Pt can be reached at [phone]

## 2018-04-04 ENCOUNTER — TELEPHONE (OUTPATIENT)
Dept: PODIATRY | Facility: CLINIC | Age: 58
End: 2018-04-04

## 2018-04-04 ENCOUNTER — OFFICE VISIT (OUTPATIENT)
Dept: URGENT CARE | Facility: CLINIC | Age: 58
End: 2018-04-04
Payer: MEDICAID

## 2018-04-04 ENCOUNTER — HOSPITAL ENCOUNTER (OUTPATIENT)
Dept: RADIOLOGY | Facility: HOSPITAL | Age: 58
Discharge: HOME | End: 2018-04-04
Attending: NURSE PRACTITIONER
Payer: MEDICAID

## 2018-04-04 VITALS
RESPIRATION RATE: 20 BRPM | SYSTOLIC BLOOD PRESSURE: 106 MMHG | HEIGHT: 71 IN | WEIGHT: 242.63 LBS | DIASTOLIC BLOOD PRESSURE: 68 MMHG | OXYGEN SATURATION: 97 % | BODY MASS INDEX: 33.97 KG/M2 | HEART RATE: 105 BPM | TEMPERATURE: 98 F

## 2018-04-04 DIAGNOSIS — Z79.4 TYPE 2 DIABETES MELLITUS WITH DIABETIC NEUROPATHY, WITH LONG-TERM CURRENT USE OF INSULIN: ICD-10-CM

## 2018-04-04 DIAGNOSIS — E11.40 TYPE 2 DIABETES MELLITUS WITH DIABETIC NEUROPATHY, WITH LONG-TERM CURRENT USE OF INSULIN: ICD-10-CM

## 2018-04-04 DIAGNOSIS — M79.671 RIGHT FOOT PAIN: Primary | ICD-10-CM

## 2018-04-04 PROCEDURE — 73630 X-RAY EXAM OF FOOT: CPT | Mod: TC,PO,RT

## 2018-04-04 PROCEDURE — 99214 OFFICE O/P EST MOD 30 MIN: CPT | Mod: PBBFAC,PO,25 | Performed by: NURSE PRACTITIONER

## 2018-04-04 PROCEDURE — 73630 X-RAY EXAM OF FOOT: CPT | Mod: 26,RT,, | Performed by: RADIOLOGY

## 2018-04-04 PROCEDURE — 99214 OFFICE O/P EST MOD 30 MIN: CPT | Mod: S$PBB,,, | Performed by: NURSE PRACTITIONER

## 2018-04-04 PROCEDURE — 99999 PR PBB SHADOW E&M-EST. PATIENT-LVL IV: CPT | Mod: PBBFAC,,, | Performed by: NURSE PRACTITIONER

## 2018-04-04 RX ORDER — QUETIAPINE FUMARATE 200 MG/1
200 TABLET, FILM COATED ORAL DAILY
Refills: 3 | COMMUNITY
Start: 2018-03-28 | End: 2022-01-19

## 2018-04-04 RX ORDER — MUPIROCIN 20 MG/G
OINTMENT TOPICAL 3 TIMES DAILY
Qty: 30 G | Refills: 0 | Status: SHIPPED | OUTPATIENT
Start: 2018-04-04 | End: 2018-04-14

## 2018-04-04 RX ORDER — TRAZODONE HYDROCHLORIDE 150 MG/1
300 TABLET ORAL NIGHTLY
Refills: 2 | COMMUNITY
Start: 2018-03-28 | End: 2022-01-19 | Stop reason: DRUGHIGH

## 2018-04-04 RX ORDER — INSULIN LISPRO 100 [IU]/ML
INJECTION, SUSPENSION SUBCUTANEOUS
Refills: 2 | COMMUNITY
Start: 2018-03-28 | End: 2018-06-27 | Stop reason: SDUPTHER

## 2018-04-04 RX ORDER — GABAPENTIN 400 MG/1
CAPSULE ORAL
Refills: 2 | COMMUNITY
Start: 2018-03-28 | End: 2018-06-28 | Stop reason: SDUPTHER

## 2018-04-04 RX ORDER — CLONAZEPAM 1 MG/1
1 TABLET ORAL 2 TIMES DAILY
Refills: 2 | COMMUNITY
Start: 2018-03-28 | End: 2022-01-19

## 2018-04-04 RX ORDER — PRAZOSIN HYDROCHLORIDE 1 MG/1
1 CAPSULE ORAL
Refills: 0 | COMMUNITY
Start: 2018-03-28 | End: 2018-06-12

## 2018-04-04 RX ORDER — QUETIAPINE FUMARATE 300 MG/1
300 TABLET, FILM COATED ORAL DAILY
Refills: 3 | COMMUNITY
Start: 2018-03-19 | End: 2019-08-29

## 2018-04-04 NOTE — TELEPHONE ENCOUNTER
Contacted pt and wasn't able to come in today due to transportation issues. I was able to schedule him with the next appointment with Dr. Sampson. Pt was pleased call ended pleasantly.

## 2018-04-04 NOTE — PROGRESS NOTES
Subjective:       Patient ID: Shukri Rosales is a 57 y.o. male.    Chief Complaint: Foot Pain (right )    Here for right foot pain, no injury, he noticed a small bump on the bottom of the foot yesterday. No drainage or erythema       Foot Pain   This is a new problem. The current episode started in the past 7 days. The problem has been gradually worsening. The symptoms are aggravated by standing and walking. He has tried nothing for the symptoms.     Review of Systems   Musculoskeletal: Positive for gait problem.   Skin: Negative for wound.       Objective:      Physical Exam   Constitutional: He is oriented to person, place, and time. He appears well-developed and well-nourished.  Non-toxic appearance. He does not have a sickly appearance. He does not appear ill. No distress.   Musculoskeletal:        Feet:    Neurological: He is alert and oriented to person, place, and time.   Skin: Skin is warm and dry. He is not diaphoretic.       Assessment:       1. Right foot pain    2. Type 2 diabetes mellitus with diabetic neuropathy, with long-term current use of insulin                Plan:   Shukri was seen today for foot pain.    Diagnoses and all orders for this visit:    Right foot pain  -     Ambulatory referral to Podiatry  -     X-Ray Foot Complete Right  -     mupirocin (BACTROBAN) 2 % ointment; Apply topically 3 (three) times daily.    Type 2 diabetes mellitus with diabetic neuropathy, with long-term current use of insulin        -     Diagnosis and treatment discussed, AVS provided  -     Soaks, keep clean  -     Follow up with podiatry (nurse will contact for appt) or ER immediately for worsening, new or no improvement of symptoms.   -     Patient understands and agrees with plan

## 2018-04-04 NOTE — TELEPHONE ENCOUNTER
----- Message from Jennifer Bledsoe LPN sent at 4/4/2018 12:06 PM CDT -----  Dr. Correia is on-call today.  ----- Message -----  From: Wicho Troncoso LPN  Sent: 4/4/2018  12:00 PM  To: Olya Grey Staff, Adrián Dillard Staff    Patient is in Urgent Care today, he has a lump on the bottom of his foot, it is very painful and he can barely walk on it, we are getting an xray to check for foreign body, he is a diabetic and needs special care regarding his feet. Is there any way he can be seen soon? He does have an appointment on the 30th of this month. Thanks

## 2018-04-06 ENCOUNTER — TELEPHONE (OUTPATIENT)
Dept: URGENT CARE | Facility: CLINIC | Age: 58
End: 2018-04-06

## 2018-04-06 NOTE — TELEPHONE ENCOUNTER
Patient called, results discussed, there are no signs of infection. Area has not changed. He has no transportation to follow up sooner than Monday.

## 2018-04-09 ENCOUNTER — OFFICE VISIT (OUTPATIENT)
Dept: PODIATRY | Facility: CLINIC | Age: 58
End: 2018-04-09
Payer: MEDICAID

## 2018-04-09 ENCOUNTER — HOSPITAL ENCOUNTER (OUTPATIENT)
Dept: RADIOLOGY | Facility: HOSPITAL | Age: 58
Discharge: HOME | End: 2018-04-09
Attending: PODIATRIST
Payer: MEDICAID

## 2018-04-09 VITALS — SYSTOLIC BLOOD PRESSURE: 114 MMHG | DIASTOLIC BLOOD PRESSURE: 86 MMHG | HEART RATE: 79 BPM

## 2018-04-09 DIAGNOSIS — S90.851A FOREIGN BODY IN RIGHT FOOT, INITIAL ENCOUNTER: ICD-10-CM

## 2018-04-09 DIAGNOSIS — S90.851A FOREIGN BODY IN RIGHT FOOT, INITIAL ENCOUNTER: Primary | ICD-10-CM

## 2018-04-09 PROCEDURE — 73630 X-RAY EXAM OF FOOT: CPT | Mod: TC,RT

## 2018-04-09 PROCEDURE — 99999 PR PBB SHADOW E&M-EST. PATIENT-LVL III: CPT | Mod: PBBFAC,,, | Performed by: PODIATRIST

## 2018-04-09 PROCEDURE — 99204 OFFICE O/P NEW MOD 45 MIN: CPT | Mod: 25,S$PBB,, | Performed by: PODIATRIST

## 2018-04-09 PROCEDURE — 10120 INC&RMVL FB SUBQ TISS SMPL: CPT | Mod: PBBFAC,RT | Performed by: PODIATRIST

## 2018-04-09 PROCEDURE — 99213 OFFICE O/P EST LOW 20 MIN: CPT | Mod: PBBFAC,25 | Performed by: PODIATRIST

## 2018-04-09 PROCEDURE — 10120 INC&RMVL FB SUBQ TISS SMPL: CPT | Mod: S$PBB,RT,, | Performed by: PODIATRIST

## 2018-04-09 PROCEDURE — 73630 X-RAY EXAM OF FOOT: CPT | Mod: 26,RT,, | Performed by: RADIOLOGY

## 2018-04-09 RX ORDER — AMOXICILLIN AND CLAVULANATE POTASSIUM 875; 125 MG/1; MG/1
1 TABLET, FILM COATED ORAL EVERY 12 HOURS
Qty: 20 TABLET | Refills: 0 | Status: SHIPPED | OUTPATIENT
Start: 2018-04-09 | End: 2018-04-19

## 2018-04-09 NOTE — PROGRESS NOTES
Subjective:     Patient ID: Shukri Rosales is a 57 y.o. male.    Chief Complaint: Foot Problem (blister noted to bottom of right foot)    Shukri is a 57 y.o. male who presents to the podiatry clinic  with complaint of  right foot pain. Patient states he may have steeped on some wire about 2 weeks ago. Onset of the symptoms was several weeks ago. Precipitating event: walking barefoot. Current symptoms include: inability to bear weight. Aggravating factors: any weight bearing. Symptoms have gradually improved. Patient has had no prior foot problems. Evaluation to date: plain films: foreign body noted. Treatment to date: avoidance of offending activity. Patients rates pain 4/10 on pain scale.            Patient Active Problem List   Diagnosis    Depression, major    Type 2 diabetes mellitus with diabetic neuropathy    Borderline personality disorder    GERD (gastroesophageal reflux disease)    Migraine headache    Colon cancer screening    Special screening for malignant neoplasms, colon    History of colon polyps    Nausea    Stage 3 chronic kidney disease    Syncope    Chest pain    Bipolar 2 disorder    Hyperlipidemia associated with type 2 diabetes mellitus    Essential hypertension    Umbilical hernia    Anterior cruciate ligament tear    Right ACL tear    Postoperative state    Arthritis of right knee    EMMANUEL on CPAP    ESPINAL (dyspnea on exertion)    Cigarette smoker    Effusion of bursa of right knee    Chondromalacia of right knee    Obesity (BMI 30-39.9)    Right knee pain    Chondromalacia, right knee    Type 2 diabetes mellitus with stage 3 chronic kidney disease, with long-term current use of insulin    Epigastric pain    Transient synovitis, right knee    Drug dependence    Fluid volume deficit    Mixed acid base balance disorder    Hypophosphatemia    Drug overdose, multiple drugs, undetermined intent, initial encounter    Schizoaffective disorder, bipolar type  "   Intentional drug overdose       Medication List with Changes/Refills   New Medications    AMOXICILLIN-CLAVULANATE 875-125MG (AUGMENTIN) 875-125 MG PER TABLET    Take 1 tablet by mouth every 12 (twelve) hours.   Current Medications    AMLODIPINE (NORVASC) 10 MG TABLET    Take 1 tablet (10 mg total) by mouth once daily.    BLOOD SUGAR DIAGNOSTIC STRP    1 each by Misc.(Non-Drug; Combo Route) route 3 (three) times daily before meals. For One Touch Verio flex    BLOOD-GLUCOSE METER KIT    One Touch Verio Meter    CLONAZEPAM (KLONOPIN) 1 MG TABLET    1 mg 2 (two) times daily.     CLONIDINE (CATAPRES) 0.3 MG TABLET    Take 1 tablet (0.3 mg total) by mouth 3 (three) times daily.    GABAPENTIN (NEURONTIN) 400 MG CAPSULE    One capsule three times a day    HUMALOG MIX 50-50 INSULN U-100 100 UNIT/ML (50-50) SUSP    76 units every morning, 36 units every night    HYDROCHLOROTHIAZIDE (HYDRODIURIL) 25 MG TABLET    Take 1 tablet (25 mg total) by mouth once daily.    INSULIN SYRINGE-NEEDLE U-100 1 ML 31 GAUGE X 5/16 SYRG    1 Syringe by Misc.(Non-Drug; Combo Route) route 3 (three) times daily.    LANCETS (ONETOUCH DELICA LANCETS) 33 GAUGE MISC    1 lancet by Misc.(Non-Drug; Combo Route) route 3 (three) times daily.    LIRAGLUTIDE 0.6 MG/0.1 ML, 18 MG/3 ML, SUBQ PNIJ (VICTOZA 3-NEGRITA) 0.6 MG/0.1 ML (18 MG/3 ML) PNIJ    Inject 1.8 mg into the skin Daily.    LISINOPRIL (PRINIVIL,ZESTRIL) 40 MG TABLET    Take 1 tablet (40 mg total) by mouth once daily.    LURASIDONE (LATUDA) 60 MG TAB TABLET    Take 60 mg by mouth once daily.    PANTOPRAZOLE (PROTONIX) 40 MG TABLET    TAKE 1 TABLET(40 MG) BY MOUTH EVERY DAY    PEN NEEDLE, DIABETIC 32 GAUGE X 5/32" NDLE    1 each by Misc.(Non-Drug; Combo Route) route once daily.    POLYETHYLENE GLYCOL (GLYCOLAX) 17 GRAM/DOSE POWDER    Take 17 g by mouth once daily.    PRAVASTATIN (PRAVACHOL) 20 MG TABLET    Take 1 tablet (20 mg total) by mouth every evening.    PRAZOSIN (MINIPRESS) 1 MG CAP    1 mg.  "    QUETIAPINE (SEROQUEL) 200 MG TAB    200 mg once daily. Every morning    QUETIAPINE (SEROQUEL) 300 MG TAB    300 mg once daily. Every evening    SERTRALINE (ZOLOFT) 100 MG TABLET    Take 2 tablets (200 mg total) by mouth once daily.    TRAZODONE (DESYREL) 150 MG TABLET    300 mg nightly.        Review of patient's allergies indicates:  No Known Allergies    Past Surgical History:   Procedure Laterality Date    BACK SURGERY      HERNIA REPAIR      UMBILICAL    left arm exfix      NASAL SEPTUM SURGERY Bilateral     TONSILLECTOMY      URETEROSCOPY         Family History   Problem Relation Age of Onset    Hypertension Mother     Diabetes Mother     Cataracts Mother     Hypertension Sister     Diabetes Sister     Hypertension Brother     Diabetes Brother     Cancer Paternal Grandmother     Cancer Paternal Grandfather        Social History     Social History    Marital status:      Spouse name: N/A    Number of children: N/A    Years of education: N/A     Occupational History     IT/disabled      Social History Main Topics    Smoking status: Current Every Day Smoker     Packs/day: 0.50     Years: 44.00     Types: Cigarettes    Smokeless tobacco: Never Used      Comment: instructed not to smoke after midnight after midnight prior to surgery    Alcohol use Yes      Comment: social    Drug use: No    Sexual activity: No     Other Topics Concern    Not on file     Social History Narrative    . Planning to live with sisters. Disabled .         Vitals:    04/09/18 1304   BP: 114/86   Pulse: 79       Hemoglobin A1C   Date Value Ref Range Status   02/09/2018 6.9 (H) 4.0 - 5.6 % Final     Comment:     According to ADA guidelines, hemoglobin A1c <7.0% represents  optimal control in non-pregnant diabetic patients. Different  metrics may apply to specific patient populations.   Standards of Medical Care in Diabetes-2016.  For the purpose of screening for the presence of  diabetes:  <5.7%     Consistent with the absence of diabetes  5.7-6.4%  Consistent with increasing risk for diabetes   (prediabetes)  >or=6.5%  Consistent with diabetes  Currently, no consensus exists for use of hemoglobin A1c  for diagnosis of diabetes for children.  This Hemoglobin A1c assay has significant interference with fetal   hemoglobin   (HbF). The results are invalid for patients with abnormal amounts of   HbF,   including those with known Hereditary Persistence   of Fetal Hemoglobin. Heterozygous hemoglobin variants (HbAS, HbAC,   HbAD, HbAE, HbA2) do not significantly interfere with this assay;   however, presence of multiple variants in a sample may impact the %   interference.     12/23/2017 7.7 (H) 4.0 - 5.6 % Final     Comment:     According to ADA guidelines, hemoglobin A1c <7.0% represents  optimal control in non-pregnant diabetic patients. Different  metrics may apply to specific patient populations.   Standards of Medical Care in Diabetes-2016.  For the purpose of screening for the presence of diabetes:  <5.7%     Consistent with the absence of diabetes  5.7-6.4%  Consistent with increasing risk for diabetes   (prediabetes)  >or=6.5%  Consistent with diabetes  Currently, no consensus exists for use of hemoglobin A1c  for diagnosis of diabetes for children.  This Hemoglobin A1c assay has significant interference with fetal   hemoglobin   (HbF). The results are invalid for patients with abnormal amounts of   HbF,   including those with known Hereditary Persistence   of Fetal Hemoglobin. Heterozygous hemoglobin variants (HbAS, HbAC,   HbAD, HbAE, HbA2) do not significantly interfere with this assay;   however, presence of multiple variants in a sample may impact the %   interference.     11/01/2017 7.4 (H) 4.0 - 5.6 % Final     Comment:     According to ADA guidelines, hemoglobin A1c <7.0% represents  optimal control in non-pregnant diabetic patients. Different  metrics may apply to specific  patient populations.   Standards of Medical Care in Diabetes-2016.  For the purpose of screening for the presence of diabetes:  <5.7%     Consistent with the absence of diabetes  5.7-6.4%  Consistent with increasing risk for diabetes   (prediabetes)  >or=6.5%  Consistent with diabetes  Currently, no consensus exists for use of hemoglobin A1c  for diagnosis of diabetes for children.  This Hemoglobin A1c assay has significant interference with fetal   hemoglobin   (HbF). The results are invalid for patients with abnormal amounts of   HbF,   including those with known Hereditary Persistence   of Fetal Hemoglobin. Heterozygous hemoglobin variants (HbAS, HbAC,   HbAD, HbAE, HbA2) do not significantly interfere with this assay;   however, presence of multiple variants in a sample may impact the %   interference.         Review of Systems   Constitutional: Negative for chills and fever.   Respiratory: Negative for shortness of breath.    Cardiovascular: Negative for chest pain, palpitations, orthopnea, claudication and leg swelling.   Gastrointestinal: Negative for diarrhea, nausea and vomiting.   Musculoskeletal: Negative for joint pain.   Skin: Negative for rash.   Neurological: Negative for dizziness, tingling, sensory change, focal weakness and weakness.   Psychiatric/Behavioral: Negative.              Objective:       PHYSICAL EXAM: Apperance: Alert and orient in no distress,well developed, and with good attention to grooming and body habits  Patient presents ambulating in tennis shoes.   Lower Extremity Physical Exam:  VASCULAR: Dorsalis pedis pulses 2/4 bilateral and Posterior Tibial pulses 2/4 bilateral. Capillary fill time <4 seconds bilateral. No edema observed bilateral. Varicosities present bilateral. Skin temperature of the lower extremities is warm to warm, proximal to distal. Hair growth WNL bilateral.  DERMATOLOGICAL: No skin rashes, subcutaneous nodules, lesions, or ulcers observed bilateral. Puncture  wound noted to right plantar foot. No erythema or increased temp noted. Post foreign body removal (+) pus drainage noted.   NEUROLOGICAL: Light touch, sharp-dull, proprioception all present and equal bilaterally.    MUSCULOSKELETAL: Muscle strength is 5/5 for foot inverters, everters, plantarflexors, and dorsiflexors. Muscle tone is normal. (+) pain on palpation of right plantar foot at puncture wound site.       Assessment:       Encounter Diagnosis   Name Primary?    Foreign body in right foot, initial encounter Yes         Plan:   Foreign body in right foot, initial encounter  -     amoxicillin-clavulanate 875-125mg (AUGMENTIN) 875-125 mg per tablet; Take 1 tablet by mouth every 12 (twelve) hours.  Dispense: 20 tablet; Refill: 0  -     X-Ray Foot Complete Right; Future; Expected date: 04/09/2018      I counseled the patient on his conditions, regarding findings of my examination, my impressions, and usual treatment plan.   Treatment options discussed with patient. Patient would like procedure to help alleviate pain of foreign body. Patient understands all potential risks and complications as well as alternatives. No guarantees are given or implied as to the outcome. Verbal consent given.     FOREIGN BODY REMOVAL      SURGEON: Nishant Sampson DPM   PRE-OP DX: Foreign body right foot  POST-OP DX: same   PROCEDURE: Removal foreign body   ANESTHESIA: none   HEMOSTASIS: pressure   EBL: Less than 1cc   Findings:Metal wire    Betadine was applied to area for sterilization. Next utilizing a #15 blade the area was trimmed and utilizing a  the foreign body was removed. The area was flushed with sterile saline, and dressed with Betadine, offloading pad, and covered with a band-aid. Patient tolerated the procedure well. Written and verbal instructions were dispensed to the patient on post-operative toe care. Pt. to follow-up in two weeks but should call immediately if any signs of infection, such as fever, chills,  sweats, increased redness or pain.   Prescription was written for Augmentin 875mg to be taken twice daily.   Right foot x-ray was ordered and taken to note foreign body removal.   Patient to return in 2 weeks or sooner if needed.                 Nishant Sampson DPM  [facility]

## 2018-04-23 ENCOUNTER — TELEPHONE (OUTPATIENT)
Dept: FAMILY MEDICINE | Facility: CLINIC | Age: 58
End: 2018-04-23

## 2018-04-23 NOTE — TELEPHONE ENCOUNTER
The pt has medicaid and the soonest available ivonne with Dr Alvarez is in June. He wants to know if Dr Alvarez can refill is blood pressure medication until his appt. Please advise

## 2018-04-23 NOTE — TELEPHONE ENCOUNTER
----- Message from Jenni Lind sent at 4/23/2018 11:49 AM CDT -----  Contact: self   Patient would like to consult with nurse regarding if a refill on all blood pressure medication can be refilled since her next available is not until 6/6/18 and he will need them before then. Please call back at [phone].      Thanks,  Jenni Lind

## 2018-04-24 DIAGNOSIS — K21.9 GASTROESOPHAGEAL REFLUX DISEASE, ESOPHAGITIS PRESENCE NOT SPECIFIED: ICD-10-CM

## 2018-04-24 DIAGNOSIS — I10 ESSENTIAL HYPERTENSION: ICD-10-CM

## 2018-04-24 RX ORDER — AMLODIPINE BESYLATE 10 MG/1
10 TABLET ORAL DAILY
Qty: 30 TABLET | Refills: 1
Start: 2018-04-24 | End: 2018-06-12 | Stop reason: SDUPTHER

## 2018-04-24 RX ORDER — CLONIDINE HYDROCHLORIDE 0.3 MG/1
0.3 TABLET ORAL 3 TIMES DAILY
Qty: 90 TABLET | Refills: 3 | Status: SHIPPED | OUTPATIENT
Start: 2018-04-24 | End: 2018-06-12 | Stop reason: SDUPTHER

## 2018-04-24 RX ORDER — HYDROCHLOROTHIAZIDE 25 MG/1
25 TABLET ORAL DAILY
Qty: 30 TABLET | Refills: 11
Start: 2018-04-24 | End: 2018-06-12 | Stop reason: SDUPTHER

## 2018-04-24 RX ORDER — PANTOPRAZOLE SODIUM 40 MG/1
TABLET, DELAYED RELEASE ORAL
Qty: 30 TABLET | Refills: 3 | Status: SHIPPED | OUTPATIENT
Start: 2018-04-24 | End: 2018-06-28 | Stop reason: SDUPTHER

## 2018-04-24 RX ORDER — LISINOPRIL 40 MG/1
40 TABLET ORAL DAILY
Qty: 90 TABLET | Refills: 3
Start: 2018-04-24 | End: 2018-06-12 | Stop reason: SDUPTHER

## 2018-04-24 NOTE — TELEPHONE ENCOUNTER
----- Message from Philipp Miller sent at 4/24/2018  3:33 PM CDT -----  Contact: pt  Call pt at  [phone] regarding medication refill

## 2018-04-24 NOTE — TELEPHONE ENCOUNTER
----- Message from Philipp Miller sent at 4/24/2018  3:33 PM CDT -----  Contact: pt  Call pt at  [phone] regarding medication refill

## 2018-04-25 ENCOUNTER — TELEPHONE (OUTPATIENT)
Dept: FAMILY MEDICINE | Facility: CLINIC | Age: 58
End: 2018-04-25

## 2018-04-25 NOTE — TELEPHONE ENCOUNTER
Spoke to pt. Pt states that he doesn't need a refill on blood pressure medication bc Dr Gordon refilled it. I told pt to make sure he schedules and appt with Dr thacker at least a month in advance so we can get him into the clinic due to insurance. I offered to scheduled pt an appt. Pt declined . Pt verbalized understanding

## 2018-05-04 ENCOUNTER — TELEPHONE (OUTPATIENT)
Dept: NEPHROLOGY | Facility: CLINIC | Age: 58
End: 2018-05-04

## 2018-05-04 DIAGNOSIS — E11.9 TYPE 2 DIABETES MELLITUS WITHOUT COMPLICATION: ICD-10-CM

## 2018-05-08 ENCOUNTER — HOSPITAL ENCOUNTER (EMERGENCY)
Facility: HOSPITAL | Age: 58
Discharge: HOME | End: 2018-05-08
Attending: EMERGENCY MEDICINE
Payer: MEDICAID

## 2018-05-08 ENCOUNTER — OFFICE VISIT (OUTPATIENT)
Dept: URGENT CARE | Facility: CLINIC | Age: 58
End: 2018-05-08
Payer: MEDICAID

## 2018-05-08 VITALS
RESPIRATION RATE: 18 BRPM | DIASTOLIC BLOOD PRESSURE: 91 MMHG | BODY MASS INDEX: 34.59 KG/M2 | WEIGHT: 241.63 LBS | SYSTOLIC BLOOD PRESSURE: 168 MMHG | TEMPERATURE: 99 F | OXYGEN SATURATION: 95 % | HEART RATE: 85 BPM | HEIGHT: 70 IN

## 2018-05-08 VITALS
BODY MASS INDEX: 33.79 KG/M2 | OXYGEN SATURATION: 97 % | HEART RATE: 94 BPM | SYSTOLIC BLOOD PRESSURE: 140 MMHG | TEMPERATURE: 98 F | RESPIRATION RATE: 22 BRPM | DIASTOLIC BLOOD PRESSURE: 92 MMHG | WEIGHT: 241.38 LBS | HEIGHT: 71 IN

## 2018-05-08 DIAGNOSIS — R06.2 WHEEZING: ICD-10-CM

## 2018-05-08 DIAGNOSIS — J18.9 PNEUMONIA OF RIGHT LUNG DUE TO INFECTIOUS ORGANISM, UNSPECIFIED PART OF LUNG: Primary | ICD-10-CM

## 2018-05-08 DIAGNOSIS — I10 ESSENTIAL HYPERTENSION: ICD-10-CM

## 2018-05-08 DIAGNOSIS — R06.02 SHORTNESS OF BREATH: ICD-10-CM

## 2018-05-08 DIAGNOSIS — R05.9 COUGH: ICD-10-CM

## 2018-05-08 DIAGNOSIS — F17.200 SMOKER: ICD-10-CM

## 2018-05-08 DIAGNOSIS — J40 BRONCHITIS: Primary | ICD-10-CM

## 2018-05-08 LAB
ALBUMIN SERPL BCP-MCNC: 3.2 G/DL
ALP SERPL-CCNC: 77 U/L
ALT SERPL W/O P-5'-P-CCNC: 16 U/L
ANION GAP SERPL CALC-SCNC: 8 MMOL/L
AST SERPL-CCNC: 13 U/L
BASOPHILS # BLD AUTO: 0.03 K/UL
BASOPHILS NFR BLD: 0.3 %
BILIRUB SERPL-MCNC: 0.4 MG/DL
BILIRUB UR QL STRIP: NEGATIVE
BNP SERPL-MCNC: 29 PG/ML
BUN SERPL-MCNC: 10 MG/DL
CALCIUM SERPL-MCNC: 9.5 MG/DL
CHLORIDE SERPL-SCNC: 103 MMOL/L
CLARITY UR: CLEAR
CO2 SERPL-SCNC: 28 MMOL/L
COLOR UR: YELLOW
CREAT SERPL-MCNC: 1.3 MG/DL
DIFFERENTIAL METHOD: ABNORMAL
EOSINOPHIL # BLD AUTO: 0.4 K/UL
EOSINOPHIL NFR BLD: 3.8 %
ERYTHROCYTE [DISTWIDTH] IN BLOOD BY AUTOMATED COUNT: 13.6 %
EST. GFR  (AFRICAN AMERICAN): >60 ML/MIN/1.73 M^2
EST. GFR  (NON AFRICAN AMERICAN): 60 ML/MIN/1.73 M^2
FLUAV AG SPEC QL IA: NEGATIVE
FLUBV AG SPEC QL IA: NEGATIVE
GLUCOSE SERPL-MCNC: 79 MG/DL
GLUCOSE UR QL STRIP: NEGATIVE
HCT VFR BLD AUTO: 36.2 %
HGB BLD-MCNC: 12.5 G/DL
HGB UR QL STRIP: NEGATIVE
INR PPP: 1
KETONES UR QL STRIP: NEGATIVE
LACTATE SERPL-SCNC: 1 MMOL/L
LEUKOCYTE ESTERASE UR QL STRIP: ABNORMAL
LIPASE SERPL-CCNC: 11 U/L
LYMPHOCYTES # BLD AUTO: 2.9 K/UL
LYMPHOCYTES NFR BLD: 29.2 %
MAGNESIUM SERPL-MCNC: 1.3 MG/DL
MCH RBC QN AUTO: 29.2 PG
MCHC RBC AUTO-ENTMCNC: 34.5 G/DL
MCV RBC AUTO: 85 FL
MICROSCOPIC COMMENT: NORMAL
MONOCYTES # BLD AUTO: 0.8 K/UL
MONOCYTES NFR BLD: 8.2 %
NEUTROPHILS # BLD AUTO: 5.8 K/UL
NEUTROPHILS NFR BLD: 58.5 %
NITRITE UR QL STRIP: NEGATIVE
PH UR STRIP: 7 [PH] (ref 5–8)
PHOSPHATE SERPL-MCNC: 2.7 MG/DL
PLATELET # BLD AUTO: 316 K/UL
PMV BLD AUTO: 8.7 FL
POTASSIUM SERPL-SCNC: 3.6 MMOL/L
PROCALCITONIN SERPL IA-MCNC: 0.03 NG/ML
PROT SERPL-MCNC: 6.7 G/DL
PROT UR QL STRIP: NEGATIVE
PROTHROMBIN TIME: 10.2 SEC
RBC # BLD AUTO: 4.28 M/UL
RBC #/AREA URNS HPF: 1 /HPF (ref 0–4)
SODIUM SERPL-SCNC: 139 MMOL/L
SP GR UR STRIP: 1.02 (ref 1–1.03)
SPECIMEN SOURCE: NORMAL
TROPONIN I SERPL DL<=0.01 NG/ML-MCNC: <0.006 NG/ML
URN SPEC COLLECT METH UR: ABNORMAL
UROBILINOGEN UR STRIP-ACNC: 1 EU/DL
WBC # BLD AUTO: 9.84 K/UL
WBC #/AREA URNS HPF: 3 /HPF (ref 0–5)

## 2018-05-08 PROCEDURE — 80053 COMPREHEN METABOLIC PANEL: CPT

## 2018-05-08 PROCEDURE — 94640 AIRWAY INHALATION TREATMENT: CPT

## 2018-05-08 PROCEDURE — 99215 OFFICE O/P EST HI 40 MIN: CPT | Mod: PBBFAC,25,PN | Performed by: NURSE PRACTITIONER

## 2018-05-08 PROCEDURE — 94640 AIRWAY INHALATION TREATMENT: CPT | Mod: PBBFAC,PN

## 2018-05-08 PROCEDURE — 87040 BLOOD CULTURE FOR BACTERIA: CPT

## 2018-05-08 PROCEDURE — 85025 COMPLETE CBC W/AUTO DIFF WBC: CPT

## 2018-05-08 PROCEDURE — 84484 ASSAY OF TROPONIN QUANT: CPT

## 2018-05-08 PROCEDURE — 93005 ELECTROCARDIOGRAM TRACING: CPT

## 2018-05-08 PROCEDURE — 25000242 PHARM REV CODE 250 ALT 637 W/ HCPCS: Performed by: EMERGENCY MEDICINE

## 2018-05-08 PROCEDURE — 87086 URINE CULTURE/COLONY COUNT: CPT

## 2018-05-08 PROCEDURE — 83735 ASSAY OF MAGNESIUM: CPT

## 2018-05-08 PROCEDURE — 96374 THER/PROPH/DIAG INJ IV PUSH: CPT

## 2018-05-08 PROCEDURE — 83690 ASSAY OF LIPASE: CPT

## 2018-05-08 PROCEDURE — 63600175 PHARM REV CODE 636 W HCPCS: Performed by: EMERGENCY MEDICINE

## 2018-05-08 PROCEDURE — 87400 INFLUENZA A/B EACH AG IA: CPT | Mod: 59

## 2018-05-08 PROCEDURE — 99900031 HC PATIENT EDUCATION (STAT)

## 2018-05-08 PROCEDURE — 93010 ELECTROCARDIOGRAM REPORT: CPT | Mod: ,,, | Performed by: INTERNAL MEDICINE

## 2018-05-08 PROCEDURE — 83880 ASSAY OF NATRIURETIC PEPTIDE: CPT

## 2018-05-08 PROCEDURE — 99214 OFFICE O/P EST MOD 30 MIN: CPT | Mod: S$PBB,,, | Performed by: NURSE PRACTITIONER

## 2018-05-08 PROCEDURE — 81000 URINALYSIS NONAUTO W/SCOPE: CPT

## 2018-05-08 PROCEDURE — 94760 N-INVAS EAR/PLS OXIMETRY 1: CPT

## 2018-05-08 PROCEDURE — 85610 PROTHROMBIN TIME: CPT

## 2018-05-08 PROCEDURE — 84145 PROCALCITONIN (PCT): CPT

## 2018-05-08 PROCEDURE — 99999 PR PBB SHADOW E&M-EST. PATIENT-LVL V: CPT | Mod: PBBFAC,,, | Performed by: NURSE PRACTITIONER

## 2018-05-08 PROCEDURE — 84100 ASSAY OF PHOSPHORUS: CPT

## 2018-05-08 PROCEDURE — 99284 EMERGENCY DEPT VISIT MOD MDM: CPT | Mod: 25,27

## 2018-05-08 PROCEDURE — 83605 ASSAY OF LACTIC ACID: CPT

## 2018-05-08 RX ORDER — LEVOFLOXACIN 500 MG/1
500 TABLET, FILM COATED ORAL DAILY
Qty: 5 TABLET | Refills: 0 | Status: SHIPPED | OUTPATIENT
Start: 2018-05-08 | End: 2018-05-13

## 2018-05-08 RX ORDER — METHYLPREDNISOLONE SOD SUCC 125 MG
125 VIAL (EA) INJECTION
Status: COMPLETED | OUTPATIENT
Start: 2018-05-08 | End: 2018-05-08

## 2018-05-08 RX ORDER — IPRATROPIUM BROMIDE AND ALBUTEROL SULFATE 2.5; .5 MG/3ML; MG/3ML
3 SOLUTION RESPIRATORY (INHALATION)
Status: COMPLETED | OUTPATIENT
Start: 2018-05-08 | End: 2018-05-08

## 2018-05-08 RX ORDER — ALBUTEROL SULFATE 0.83 MG/ML
2.5 SOLUTION RESPIRATORY (INHALATION)
Status: COMPLETED | OUTPATIENT
Start: 2018-05-08 | End: 2018-05-08

## 2018-05-08 RX ORDER — ALBUTEROL SULFATE 90 UG/1
2 AEROSOL, METERED RESPIRATORY (INHALATION) EVERY 4 HOURS PRN
Qty: 1 INHALER | Refills: 0 | Status: SHIPPED | OUTPATIENT
Start: 2018-05-08 | End: 2019-05-08

## 2018-05-08 RX ORDER — PREDNISONE 20 MG/1
40 TABLET ORAL DAILY
Qty: 10 TABLET | Refills: 0 | Status: SHIPPED | OUTPATIENT
Start: 2018-05-08 | End: 2018-05-13

## 2018-05-08 RX ADMIN — METHYLPREDNISOLONE SODIUM SUCCINATE 125 MG: 125 INJECTION, POWDER, FOR SOLUTION INTRAMUSCULAR; INTRAVENOUS at 07:05

## 2018-05-08 RX ADMIN — ALBUTEROL SULFATE 2.5 MG: 2.5 SOLUTION RESPIRATORY (INHALATION) at 04:05

## 2018-05-08 RX ADMIN — IPRATROPIUM BROMIDE AND ALBUTEROL SULFATE 3 ML: .5; 3 SOLUTION RESPIRATORY (INHALATION) at 06:05

## 2018-05-08 NOTE — ED PROVIDER NOTES
SCRIBE #1 NOTE: I, Corinne Mack, am scribing for, and in the presence of, Sánchez Rendon Jr., MD. I have scribed the HPI, ROS, and PEx.     SCRIBE #2 NOTE: I, Zack Maciel, am scribing for, and in the presence of,  Ryan Lino MD. I have scribed the remaining portions of the note not scribed by Scribe #1.     History      Chief Complaint   Patient presents with    Cough     Pt reports chest congestion, cough, and body aches with SOB x 4 days       Review of patient's allergies indicates:  No Known Allergies     HPI   HPI    5/8/2018, 6:19 PM   History obtained from the patient      History of Present Illness: Shukri Rosales is a 57 y.o. male patient with PMHx of CKD, DM, GERD, HTN, and schizophrenia who was sent to the Emergency Department from Urgent Care for further evaluation and Tx of possible pneumonia. Pt c/o cough which onset gradually 4 days ago. Symptoms are intermittent and moderate in severity. No mitigating or exacerbating factors reported. Associated sxs include congestion, wheezing, SOB, and generalized myalgias. Patient denies any fever, chills, CP, N/V/D, rhinorrhea, sore throat, HA, dizziness, and all other sxs at this time. Prior Tx includes a breathing Tx given at the clinic with no relief. Pt reports he was hospitalized at this facility 2 months ago for pneumonia. No further complaints or concerns at this time.         Arrival mode: Personal vehicle    PCP: Farhana Burnham MD       Past Medical History:  Past Medical History:   Diagnosis Date    Adrenal cortical adenoma     Anxiety     Arthritis     CKD (chronic kidney disease) stage 3, GFR 30-59 ml/min     Depression     Diabetes mellitus type II 2011    does not take    DM (diabetes mellitus) 2011     am 09/21/2017 Insulin x 1 1/2 year    GERD (gastroesophageal reflux disease) 7/9/2013    Hypertension     Migraine headache 7/22/2013    Schizophrenia     Severe obesity with body mass index of 36.0 to 36.9         Past Surgical History:  Past Surgical History:   Procedure Laterality Date    BACK SURGERY      HERNIA REPAIR      UMBILICAL    left arm exfix      NASAL SEPTUM SURGERY Bilateral     TONSILLECTOMY      URETEROSCOPY           Family History:  Family History   Problem Relation Age of Onset    Hypertension Mother     Diabetes Mother     Cataracts Mother     Hypertension Sister     Diabetes Sister     Hypertension Brother     Diabetes Brother     Cancer Paternal Grandmother     Cancer Paternal Grandfather        Social History:  Social History     Social History Main Topics    Smoking status: Current Every Day Smoker     Packs/day: 0.50     Years: 44.00     Types: Cigarettes    Smokeless tobacco: Never Used      Comment: instructed not to smoke after midnight after midnight prior to surgery    Alcohol use Yes      Comment: social    Drug use: No    Sexual activity: No       ROS   Review of Systems   Constitutional: Negative for chills and fever.   HENT: Positive for congestion. Negative for rhinorrhea and sore throat.    Respiratory: Positive for cough, shortness of breath and wheezing.    Cardiovascular: Negative for chest pain and leg swelling.   Gastrointestinal: Negative for abdominal pain, diarrhea, nausea and vomiting.   Musculoskeletal: Positive for myalgias (generalized). Negative for back pain, neck pain and neck stiffness.   Skin: Negative for rash and wound.   Neurological: Negative for dizziness, light-headedness, numbness and headaches.   All other systems reviewed and are negative.    Physical Exam      Initial Vitals [05/08/18 1811]   BP Pulse Resp Temp SpO2   (!) 183/105 88 18 99.4 °F (37.4 °C) 95 %      MAP       131          Physical Exam  Nursing Notes and Vital Signs Reviewed.  Constitutional: Patient is in no apparent distress. Well-developed and well-nourished. Obese.  Head: Atraumatic. Normocephalic.  Eyes: PERRL. EOM intact. Conjunctivae are not pale. No scleral  "icterus.  ENT: Mucous membranes are moist. Oropharynx is clear and symmetric.    Neck: Supple. Full ROM. No lymphadenopathy.  Cardiovascular: Regular rate. Regular rhythm. No murmurs, rubs, or gallops. Distal pulses are 2+ and symmetric.  Pulmonary/Chest: No respiratory distress. Bilateral wheezes with crackles to the lung bases.  Abdominal: Soft and non-distended.  There is no tenderness.  No rebound, guarding, or rigidity.   Musculoskeletal: Moves all extremities. No obvious deformities. No edema. No calf tenderness.  Skin: Warm and dry.  Neurological:  Alert, awake, and appropriate.  Normal speech.  No acute focal neurological deficits are appreciated.  Psychiatric: Normal affect. Good eye contact. Appropriate in content.    ED Course    Procedures  ED Vital Signs:  Vitals:    05/08/18 1811 05/08/18 1849 05/08/18 1851 05/08/18 1930   BP: (!) 183/105   (!) 164/95   Pulse: 88 84  83   Resp: 18 20  18   Temp: 99.4 °F (37.4 °C)      TempSrc: Oral      SpO2: 95% 99% 99% 96%   Weight: 109.6 kg (241 lb 10 oz)      Height: 5' 10" (1.778 m)       05/08/18 2030   BP: (!) 168/91   Pulse: 85   Resp:    Temp:    TempSrc:    SpO2: 95%   Weight:    Height:        Abnormal Lab Results:  Labs Reviewed   CBC W/ AUTO DIFFERENTIAL - Abnormal; Notable for the following:        Result Value    RBC 4.28 (*)     Hemoglobin 12.5 (*)     Hematocrit 36.2 (*)     MPV 8.7 (*)     All other components within normal limits   COMPREHENSIVE METABOLIC PANEL - Abnormal; Notable for the following:     Albumin 3.2 (*)     All other components within normal limits   URINALYSIS - Abnormal; Notable for the following:     Leukocytes, UA Trace (*)     All other components within normal limits   MAGNESIUM - Abnormal; Notable for the following:     Magnesium 1.3 (*)     All other components within normal limits   CULTURE, BLOOD   CULTURE, BLOOD   CULTURE, URINE   LACTIC ACID, PLASMA   INFLUENZA A AND B ANTIGEN   PHOSPHORUS   PROTIME-INR   B-TYPE " NATRIURETIC PEPTIDE   LIPASE   TROPONIN I   PROCALCITONIN   URINALYSIS MICROSCOPIC        All Lab Results:  Results for orders placed or performed during the hospital encounter of 05/08/18   CBC auto differential   Result Value Ref Range    WBC 9.84 3.90 - 12.70 K/uL    RBC 4.28 (L) 4.60 - 6.20 M/uL    Hemoglobin 12.5 (L) 14.0 - 18.0 g/dL    Hematocrit 36.2 (L) 40.0 - 54.0 %    MCV 85 82 - 98 fL    MCH 29.2 27.0 - 31.0 pg    MCHC 34.5 32.0 - 36.0 g/dL    RDW 13.6 11.5 - 14.5 %    Platelets 316 150 - 350 K/uL    MPV 8.7 (L) 9.2 - 12.9 fL    Gran # (ANC) 5.8 1.8 - 7.7 K/uL    Lymph # 2.9 1.0 - 4.8 K/uL    Mono # 0.8 0.3 - 1.0 K/uL    Eos # 0.4 0.0 - 0.5 K/uL    Baso # 0.03 0.00 - 0.20 K/uL    Gran% 58.5 38.0 - 73.0 %    Lymph% 29.2 18.0 - 48.0 %    Mono% 8.2 4.0 - 15.0 %    Eosinophil% 3.8 0.0 - 8.0 %    Basophil% 0.3 0.0 - 1.9 %    Differential Method Automated    Comprehensive metabolic panel   Result Value Ref Range    Sodium 139 136 - 145 mmol/L    Potassium 3.6 3.5 - 5.1 mmol/L    Chloride 103 95 - 110 mmol/L    CO2 28 23 - 29 mmol/L    Glucose 79 70 - 110 mg/dL    BUN, Bld 10 6 - 20 mg/dL    Creatinine 1.3 0.5 - 1.4 mg/dL    Calcium 9.5 8.7 - 10.5 mg/dL    Total Protein 6.7 6.0 - 8.4 g/dL    Albumin 3.2 (L) 3.5 - 5.2 g/dL    Total Bilirubin 0.4 0.1 - 1.0 mg/dL    Alkaline Phosphatase 77 55 - 135 U/L    AST 13 10 - 40 U/L    ALT 16 10 - 44 U/L    Anion Gap 8 8 - 16 mmol/L    eGFR if African American >60 >60 mL/min/1.73 m^2    eGFR if non African American 60 >60 mL/min/1.73 m^2   Lactic acid, plasma #1   Result Value Ref Range    Lactate (Lactic Acid) 1.0 0.5 - 2.2 mmol/L   Urinalysis   Result Value Ref Range    Specimen UA Urine, Clean Catch     Color, UA Yellow Yellow, Straw, Esha    Appearance, UA Clear Clear    pH, UA 7.0 5.0 - 8.0    Specific Gravity, UA 1.020 1.005 - 1.030    Protein, UA Negative Negative    Glucose, UA Negative Negative    Ketones, UA Negative Negative    Bilirubin (UA) Negative Negative     Occult Blood UA Negative Negative    Nitrite, UA Negative Negative    Urobilinogen, UA 1.0 <2.0 EU/dL    Leukocytes, UA Trace (A) Negative   Influenza antigen Nasopharyngeal Swab   Result Value Ref Range    Influenza A Ag, EIA Negative Negative    Influenza B Ag, EIA Negative Negative    Flu A & B Source Nasopharyngeal Swab    Magnesium   Result Value Ref Range    Magnesium 1.3 (L) 1.6 - 2.6 mg/dL   Phosphorus   Result Value Ref Range    Phosphorus 2.7 2.7 - 4.5 mg/dL   Protime-INR   Result Value Ref Range    Prothrombin Time 10.2 9.0 - 12.5 sec    INR 1.0 0.8 - 1.2   Brain natriuretic peptide   Result Value Ref Range    BNP 29 0 - 99 pg/mL   Lipase   Result Value Ref Range    Lipase 11 4 - 60 U/L   Troponin I   Result Value Ref Range    Troponin I <0.006 0.000 - 0.026 ng/mL   Procalcitonin   Result Value Ref Range    Procalcitonin 0.03 <0.25 ng/mL   Urinalysis Microscopic   Result Value Ref Range    RBC, UA 1 0 - 4 /hpf    WBC, UA 3 0 - 5 /hpf    Microscopic Comment SEE COMMENT        Imaging Results:  Imaging Results          X-Ray Chest AP Portable (Final result)  Result time 05/08/18 19:13:24    Final result by Shukri Lambert MD (05/08/18 19:13:24)                 Impression:      No definite acute findings      Electronically signed by: Shukri Lambert MD  Date:    05/08/2018  Time:    19:13             Narrative:    EXAMINATION:  XR CHEST AP PORTABLE    CLINICAL HISTORY:  Fever, sepsis    COMPARISON:  Two-view chest x-ray 05/08/2018, 1637 hours    FINDINGS:  No definite infiltrate appreciated on the present study.  Normal heart size.                                 The EKG was ordered, reviewed, and independently interpreted by the ED provider.  Interpretation time: 1835  Rate: 86 BPM  Rhythm: normal sinus rhythm  Interpretation: Prolonged QT. No STEMI.             The Emergency Provider reviewed the vital signs and test results, which are outlined above.    ED Discussion     8:00 PM: Dr. Rendon  transfers care of pt to Dr. Lino, pending lab results.    8:30 PM: Reassessed pt at this time.  Pt states his condition has improved at this time. Discussed with pt all pertinent ED information and results. Discussed pt dx and plan of tx. Gave pt all f/u and return to the ED instructions. All questions and concerns were addressed at this time. Pt expresses understanding of information and instructions, and is comfortable with plan to discharge. Pt is stable for discharge.    I discussed with patient and/or family/caretaker that evaluation in the ED does not suggest any emergent or life threatening medical conditions requiring immediate intervention beyond what was provided in the ED, and I believe patient is safe for discharge.  Regardless, an unremarkable evaluation in the ED does not preclude the development or presence of a serious of life threatening condition. As such, patient was instructed to return immediately for any worsening or change in current symptoms.          ED Medication(s):  Medications   methylPREDNISolone sodium succinate injection 125 mg (125 mg Intravenous Given 5/8/18 1915)   albuterol-ipratropium 2.5mg-0.5mg/3mL nebulizer solution 3 mL (3 mLs Nebulization Given 5/8/18 1849)       Discharge Medication List as of 5/8/2018  8:33 PM      START taking these medications    Details   albuterol 90 mcg/actuation inhaler Inhale 2 puffs into the lungs every 4 (four) hours as needed for Wheezing., Starting Tue 5/8/2018, Until Wed 5/8/2019, Print      levoFLOXacin (LEVAQUIN) 500 MG tablet Take 1 tablet (500 mg total) by mouth once daily., Starting Tue 5/8/2018, Until Sun 5/13/2018, Print      predniSONE (DELTASONE) 20 MG tablet Take 2 tablets (40 mg total) by mouth once daily., Starting Tue 5/8/2018, Until Sun 5/13/2018, Print             Follow-up Information     Farhana Burnham MD In 2 days.    Specialty:  Family Medicine  Contact information:  [address] [address]  [address]  [phone]             [facility]    Specialty:  Emergency Medicine  Why:  As needed, If symptoms worsen  Contact information:  [address]  [facility] [address] [phone]                   Medical Decision Making    Medical Decision Making:   Clinical Tests:   Lab Tests: Reviewed and Ordered  Radiological Study: Ordered and Reviewed  Medical Tests: Ordered and Reviewed           Scribe Attestation:   Scribe #1: I performed the above scribed service and the documentation accurately describes the services I performed. I attest to the accuracy of the note.    Attending:   Physician Attestation Statement for Scribe #1: I, Sánchez Rendon Jr., MD, personally performed the services described in this documentation, as scribed by Corinne Mack, in my presence, and it is both accurate and complete.       Scribe Attestation:   Scribe #2: I performed the above scribed service and the documentation accurately describes the services I performed. I attest to the accuracy of the note.    Attending Attestation:           Physician Attestation for Scribe:    Physician Attestation Statement for Scribe #2: I, Ryan Lino MD, reviewed documentation, as scribed by Zack Maciel in my presence, and it is both accurate and complete. I also acknowledge and confirm the content of the note done by Scribe #1.          Clinical Impression       ICD-10-CM ICD-9-CM   1. Bronchitis J40 490   2. Cough R05 786.2   3. Essential hypertension I10 401.9   4. Smoker F17.200 305.1       Disposition:   Disposition: Discharged  Condition: Stable           Ryan Lino MD  05/09/18 0546

## 2018-05-08 NOTE — PROGRESS NOTES
Subjective:       Patient ID: Shukri Rosales is a 57 y.o. male.    Chief Complaint: Cough; Fever; and Shortness of Breath    57 year old presents to Urgent Care with reports of wheezing, shortness of breath that has been present for about 4 days that is progressively becoming worse. According to patient he was diagnosed and hospitalized with pneumonia about 2 months ago.       Cough   Associated symptoms include shortness of breath and wheezing. Pertinent negatives include no chest pain, chills, ear pain, fever, rash or sore throat. The symptoms are aggravated by lying down. He has tried nothing for the symptoms. There is no history of environmental allergies.     Review of Systems   Constitutional: Negative for appetite change, chills and fever.   HENT: Negative for ear pain, sinus pressure, sore throat and trouble swallowing.    Eyes: Negative for visual disturbance.   Respiratory: Positive for cough, chest tightness, shortness of breath and wheezing.    Cardiovascular: Negative for chest pain.   Gastrointestinal: Negative for abdominal pain, diarrhea, nausea and vomiting.   Endocrine: Negative for cold intolerance, polyphagia and polyuria.   Genitourinary: Negative for decreased urine volume and dysuria.   Musculoskeletal: Negative for back pain.   Skin: Negative for rash.   Allergic/Immunologic: Negative for environmental allergies and food allergies.   Neurological: Negative for dizziness, tremors, weakness and numbness.   Hematological: Does not bruise/bleed easily.   Psychiatric/Behavioral: Negative for confusion and hallucinations. The patient is not nervous/anxious and is not hyperactive.    All other systems reviewed and are negative.      Objective:     Physical Exam   Constitutional: He is oriented to person, place, and time. He appears well-developed and well-nourished. No distress.   HENT:   Head: Normocephalic.   Right Ear: Tympanic membrane, external ear and ear canal normal.   Left Ear:  Tympanic membrane, external ear and ear canal normal.   Nose: Nose normal. No mucosal edema or rhinorrhea. Right sinus exhibits no maxillary sinus tenderness and no frontal sinus tenderness. Left sinus exhibits no maxillary sinus tenderness and no frontal sinus tenderness.   Mouth/Throat: Uvula is midline and oropharynx is clear and moist. No oropharyngeal exudate, posterior oropharyngeal edema or posterior oropharyngeal erythema.   Eyes: Conjunctivae and EOM are normal.   Neck: Normal range of motion. Neck supple.   Cardiovascular: Normal rate, regular rhythm and normal heart sounds.    Pulmonary/Chest: Effort normal. No accessory muscle usage. No apnea, no tachypnea and no bradypnea. No respiratory distress. He has decreased breath sounds in the right upper field and the right lower field. He has wheezes in the right upper field, the right lower field, the left upper field and the left lower field. He has no rhonchi. He has no rales.   Lymphadenopathy:        Head (right side): No submental, no submandibular and no tonsillar adenopathy present.        Head (left side): No submental, no submandibular and no tonsillar adenopathy present.     He has no cervical adenopathy.   Neurological: He is alert and oriented to person, place, and time.   Skin: Skin is warm and dry. He is not diaphoretic.   Administered neb treatment in clinic-patient symptoms did not improve after neb treatment (wheezing and shortness of breath).  Recommended that patient report to ER for evaluation of SOB/Wheezing/Pneumonia-   Assessment:     1. Pneumonia of right lung due to infectious organism, unspecified part of lung    2. Wheezing    3. Shortness of breath      Plan:   Shukri was seen today for cough, fever and shortness of breath.    Diagnoses and all orders for this visit:    Pneumonia of right lung due to infectious organism, unspecified part of lung    Wheezing  -     X-Ray Chest PA And Lateral; Future    Shortness of breath    Other  orders  -     albuterol nebulizer solution 2.5 mg; Take 3 mLs (2.5 mg total) by nebulization one time.

## 2018-05-10 ENCOUNTER — TELEPHONE (OUTPATIENT)
Dept: DIABETES | Facility: CLINIC | Age: 58
End: 2018-05-10

## 2018-05-10 LAB — BACTERIA UR CULT: NO GROWTH

## 2018-05-10 NOTE — TELEPHONE ENCOUNTER
----- Message from Ruth Juarez sent at 5/9/2018  1:39 PM CDT -----  Contact: pt   Call pt regarding numbness in his right foot.  [phone]

## 2018-05-12 LAB
BACTERIA BLD CULT: NORMAL

## 2018-05-14 LAB — BACTERIA BLD CULT: NORMAL

## 2018-06-05 ENCOUNTER — TELEPHONE (OUTPATIENT)
Dept: ORTHOPEDICS | Facility: CLINIC | Age: 58
End: 2018-06-05

## 2018-06-12 ENCOUNTER — LAB VISIT (OUTPATIENT)
Dept: LAB | Facility: HOSPITAL | Age: 58
End: 2018-06-12
Attending: NURSE PRACTITIONER
Payer: MEDICAID

## 2018-06-12 ENCOUNTER — OFFICE VISIT (OUTPATIENT)
Dept: FAMILY MEDICINE | Facility: CLINIC | Age: 58
End: 2018-06-12
Payer: MEDICAID

## 2018-06-12 DIAGNOSIS — I10 ESSENTIAL HYPERTENSION: Primary | ICD-10-CM

## 2018-06-12 DIAGNOSIS — Z72.0 TOBACCO ABUSE: ICD-10-CM

## 2018-06-12 DIAGNOSIS — E11.69 HYPERLIPIDEMIA ASSOCIATED WITH TYPE 2 DIABETES MELLITUS: ICD-10-CM

## 2018-06-12 DIAGNOSIS — E66.9 OBESITY (BMI 30-39.9): ICD-10-CM

## 2018-06-12 DIAGNOSIS — E11.40 TYPE 2 DIABETES MELLITUS WITH DIABETIC NEUROPATHY: ICD-10-CM

## 2018-06-12 DIAGNOSIS — E78.5 HYPERLIPIDEMIA ASSOCIATED WITH TYPE 2 DIABETES MELLITUS: ICD-10-CM

## 2018-06-12 DIAGNOSIS — Z79.4 TYPE 2 DIABETES MELLITUS WITH DIABETIC NEUROPATHY, WITH LONG-TERM CURRENT USE OF INSULIN: ICD-10-CM

## 2018-06-12 DIAGNOSIS — E11.40 TYPE 2 DIABETES MELLITUS WITH DIABETIC NEUROPATHY, WITH LONG-TERM CURRENT USE OF INSULIN: ICD-10-CM

## 2018-06-12 LAB
ALBUMIN SERPL BCP-MCNC: 3.4 G/DL
ALP SERPL-CCNC: 110 U/L
ALT SERPL W/O P-5'-P-CCNC: 14 U/L
ANION GAP SERPL CALC-SCNC: 10 MMOL/L
AST SERPL-CCNC: 13 U/L
BILIRUB SERPL-MCNC: 0.3 MG/DL
BUN SERPL-MCNC: 14 MG/DL
CALCIUM SERPL-MCNC: 10.2 MG/DL
CHLORIDE SERPL-SCNC: 101 MMOL/L
CO2 SERPL-SCNC: 27 MMOL/L
CREAT SERPL-MCNC: 1.5 MG/DL
EST. GFR  (AFRICAN AMERICAN): 58.1 ML/MIN/1.73 M^2
EST. GFR  (NON AFRICAN AMERICAN): 50.2 ML/MIN/1.73 M^2
ESTIMATED AVG GLUCOSE: 157 MG/DL
GLUCOSE SERPL-MCNC: 207 MG/DL
HBA1C MFR BLD HPLC: 7.1 %
POTASSIUM SERPL-SCNC: 4.4 MMOL/L
PROT SERPL-MCNC: 6.9 G/DL
SODIUM SERPL-SCNC: 138 MMOL/L

## 2018-06-12 PROCEDURE — 99999 PR PBB SHADOW E&M-EST. PATIENT-LVL III: CPT | Mod: PBBFAC,,, | Performed by: FAMILY MEDICINE

## 2018-06-12 PROCEDURE — 99214 OFFICE O/P EST MOD 30 MIN: CPT | Mod: S$PBB,,, | Performed by: FAMILY MEDICINE

## 2018-06-12 PROCEDURE — 99213 OFFICE O/P EST LOW 20 MIN: CPT | Mod: PBBFAC,PO | Performed by: FAMILY MEDICINE

## 2018-06-12 PROCEDURE — 83036 HEMOGLOBIN GLYCOSYLATED A1C: CPT

## 2018-06-12 PROCEDURE — 80053 COMPREHEN METABOLIC PANEL: CPT

## 2018-06-12 PROCEDURE — 36415 COLL VENOUS BLD VENIPUNCTURE: CPT | Mod: PO

## 2018-06-12 RX ORDER — LISINOPRIL 40 MG/1
40 TABLET ORAL DAILY
Qty: 90 TABLET | Refills: 3 | Status: CANCELLED
Start: 2018-06-12 | End: 2019-06-12

## 2018-06-12 RX ORDER — METFORMIN HYDROCHLORIDE 500 MG/1
500 TABLET ORAL 2 TIMES DAILY WITH MEALS
Refills: 0 | COMMUNITY
Start: 2018-04-30 | End: 2018-06-27 | Stop reason: SDUPTHER

## 2018-06-12 RX ORDER — IBUPROFEN 200 MG
1 TABLET ORAL DAILY
Refills: 1 | COMMUNITY
Start: 2018-04-23 | End: 2018-07-19 | Stop reason: ALTCHOICE

## 2018-06-12 RX ORDER — CLONIDINE HYDROCHLORIDE 0.3 MG/1
0.3 TABLET ORAL 3 TIMES DAILY
Qty: 90 TABLET | Refills: 3 | Status: ON HOLD | OUTPATIENT
Start: 2018-06-12 | End: 2022-01-23 | Stop reason: HOSPADM

## 2018-06-12 RX ORDER — AMLODIPINE BESYLATE 10 MG/1
10 TABLET ORAL DAILY
Qty: 30 TABLET | Refills: 3 | Status: SHIPPED | OUTPATIENT
Start: 2018-06-12 | End: 2019-06-12

## 2018-06-12 RX ORDER — PRAZOSIN HYDROCHLORIDE 2 MG/1
2 CAPSULE ORAL NIGHTLY
Refills: 0 | COMMUNITY
Start: 2018-04-30 | End: 2018-06-28 | Stop reason: SDUPTHER

## 2018-06-12 RX ORDER — CLONIDINE HYDROCHLORIDE 0.3 MG/1
0.3 TABLET ORAL 3 TIMES DAILY
Qty: 90 TABLET | Refills: 3 | Status: CANCELLED | OUTPATIENT
Start: 2018-06-12

## 2018-06-12 RX ORDER — HYDROCHLOROTHIAZIDE 25 MG/1
25 TABLET ORAL DAILY
Qty: 30 TABLET | Refills: 11 | Status: CANCELLED
Start: 2018-06-12 | End: 2019-06-12

## 2018-06-12 RX ORDER — HYDROCHLOROTHIAZIDE 25 MG/1
25 TABLET ORAL DAILY
Qty: 30 TABLET | Refills: 3 | Status: SHIPPED | OUTPATIENT
Start: 2018-06-12 | End: 2022-01-19 | Stop reason: DRUGHIGH

## 2018-06-12 RX ORDER — PROPRANOLOL HYDROCHLORIDE 60 MG/1
60 CAPSULE, EXTENDED RELEASE ORAL DAILY
Refills: 0 | COMMUNITY
Start: 2018-04-30 | End: 2019-08-29

## 2018-06-12 RX ORDER — LISINOPRIL 40 MG/1
40 TABLET ORAL DAILY
Qty: 30 TABLET | Refills: 3 | Status: SHIPPED | OUTPATIENT
Start: 2018-06-12 | End: 2019-06-12

## 2018-06-12 NOTE — PROGRESS NOTES
"Subjective:       Patient ID: Shukri Rosales is a 57 y.o. male.    Chief Complaint: Chronic Care    HPI   Chronic Care  58yo male presents today for chronic care assessment. He has been out of BP medication for the past week apart from Clonidine. He has changed his pharmacy and notes that he has not transferred his prescriptions. He has not been checking his blood pressure. He is staying in an independent living home with two roommates. He notes he had a visit with his Psychiatrist yesterday (Dr. Deleon) and no adjustments have been made to his medication regimen. He reports stable mood symptoms. He has not had assessment of A1c since February 2018. At that time his level was 6.9. His AM fasting glucose has been in the 110-115 range. He has been skipping breakfast but has been eating lunch and supper. No symptoms of hyper or hypoglycemia are reported. He is followed by SHAWNA Dowd and has a visit scheduled next week.     Review of Systems   Constitutional: Negative for activity change, appetite change, fatigue and unexpected weight change.   HENT: Negative for congestion, ear pain and sore throat.    Eyes: Negative for visual disturbance.   Respiratory: Negative for cough and shortness of breath.    Cardiovascular: Negative for chest pain, palpitations and leg swelling.   Gastrointestinal: Negative for abdominal pain, constipation and diarrhea.   Endocrine: Negative for polydipsia and polyuria.   Genitourinary: Negative for decreased urine volume and difficulty urinating.   Musculoskeletal: Negative for arthralgias and myalgias.   Skin: Negative for rash.   Neurological: Negative for dizziness, weakness and light-headedness.   Psychiatric/Behavioral: Negative for dysphoric mood and sleep disturbance.       Objective:   BP (!) 177/110   Pulse 88   Temp 97.4 °F (36.3 °C) (Temporal)   Resp 18   Ht 5' 10" (1.778 m)   Wt 109.7 kg (241 lb 13.5 oz)   SpO2 97%   BMI 34.70 kg/m²   Physical Exam "   Constitutional: He is oriented to person, place, and time. He appears well-developed and well-nourished. No distress.   Obese, non-toxic   HENT:   Head: Normocephalic and atraumatic.   Right Ear: Tympanic membrane, external ear and ear canal normal.   Left Ear: Tympanic membrane, external ear and ear canal normal.   Nose: Nose normal.   Mouth/Throat: Oropharynx is clear and moist.   Eyes: Conjunctivae and EOM are normal. Pupils are equal, round, and reactive to light.   Neck: Normal range of motion. Neck supple.   Cardiovascular: Normal rate, regular rhythm and normal heart sounds.    Pulmonary/Chest: Effort normal and breath sounds normal.   Abdominal: Soft. Bowel sounds are normal.   Musculoskeletal: He exhibits no edema.   Neurological: He is alert and oriented to person, place, and time.   Skin: Skin is warm and dry. He is not diaphoretic.   Psychiatric: He has a normal mood and affect. His behavior is normal.       Assessment:       1. Essential hypertension    2. Uncontrolled type 2 diabetes with neuropathy    3. Hyperlipidemia associated with type 2 diabetes mellitus    4. Obesity (BMI 30-39.9)    5. Tobacco abuse        Plan:      Essential hypertension  Uncontrolled but patient has been off all medication apart from Clonidine. Will renew. Recommend home monitoring. Short interval follow-up will be arranged for next week. Target BP goal <140/90.   -     amLODIPine (NORVASC) 10 MG tablet; Take 1 tablet (10 mg total) by mouth once daily.  Dispense: 30 tablet; Refill: 3  -     Comprehensive metabolic panel; Future; Expected date: 06/12/2018  -     cloNIDine (CATAPRES) 0.3 MG tablet; Take 1 tablet (0.3 mg total) by mouth 3 (three) times daily.  Dispense: 90 tablet; Refill: 3  -     hydroCHLOROthiazide (HYDRODIURIL) 25 MG tablet; Take 1 tablet (25 mg total) by mouth once daily.  Dispense: 30 tablet; Refill: 3  -     lisinopril (PRINIVIL,ZESTRIL) 40 MG tablet; Take 1 tablet (40 mg total) by mouth once daily.   Dispense: 30 tablet; Refill: 3    Uncontrolled type 2 diabetes with neuropathy  Recommend updated lab assessment in light of upcoming visit with diabetes management. Continue with Humalog 76 units in the AM and 36 units in the PM along with Victoza and Metformin. Maintain a blood glucose log of readings and return with this next week for review.  -     Hemoglobin A1c; Future; Expected date: 06/12/2018    Hyperlipidemia associated with type 2 diabetes mellitus  Continue with pravastatin. Lipids to be obtained next week- patient is asked to come to his return visit in fasting state.  -     Lipid panel; Future; Expected date: 06/12/2018    Obesity (BMI 30-39.9)  Weight loss efforts remain encouraged.    Tobacco abuse  Smoking cessation is advised.

## 2018-06-18 VITALS
OXYGEN SATURATION: 97 % | RESPIRATION RATE: 18 BRPM | HEIGHT: 70 IN | TEMPERATURE: 97 F | WEIGHT: 241.88 LBS | BODY MASS INDEX: 34.63 KG/M2 | HEART RATE: 88 BPM | SYSTOLIC BLOOD PRESSURE: 177 MMHG | DIASTOLIC BLOOD PRESSURE: 110 MMHG

## 2018-06-19 ENCOUNTER — PATIENT OUTREACH (OUTPATIENT)
Dept: ADMINISTRATIVE | Facility: HOSPITAL | Age: 58
End: 2018-06-19

## 2018-06-21 ENCOUNTER — OFFICE VISIT (OUTPATIENT)
Dept: URGENT CARE | Facility: CLINIC | Age: 58
End: 2018-06-21
Payer: MEDICAID

## 2018-06-21 VITALS
TEMPERATURE: 98 F | SYSTOLIC BLOOD PRESSURE: 104 MMHG | OXYGEN SATURATION: 96 % | WEIGHT: 240.31 LBS | BODY MASS INDEX: 33.64 KG/M2 | HEART RATE: 110 BPM | HEIGHT: 71 IN | DIASTOLIC BLOOD PRESSURE: 68 MMHG | RESPIRATION RATE: 14 BRPM

## 2018-06-21 DIAGNOSIS — I10 ESSENTIAL HYPERTENSION: ICD-10-CM

## 2018-06-21 DIAGNOSIS — M54.30 SCIATIC LEG PAIN: ICD-10-CM

## 2018-06-21 DIAGNOSIS — N18.30 STAGE 3 CHRONIC KIDNEY DISEASE: ICD-10-CM

## 2018-06-21 DIAGNOSIS — G89.29 CHRONIC PAIN OF RIGHT KNEE: ICD-10-CM

## 2018-06-21 DIAGNOSIS — M25.552 LEFT HIP PAIN: ICD-10-CM

## 2018-06-21 DIAGNOSIS — M25.561 CHRONIC PAIN OF RIGHT KNEE: ICD-10-CM

## 2018-06-21 DIAGNOSIS — E78.5 HYPERLIPIDEMIA ASSOCIATED WITH TYPE 2 DIABETES MELLITUS: Primary | ICD-10-CM

## 2018-06-21 DIAGNOSIS — E11.69 HYPERLIPIDEMIA ASSOCIATED WITH TYPE 2 DIABETES MELLITUS: Primary | ICD-10-CM

## 2018-06-21 PROCEDURE — 99999 PR PBB SHADOW E&M-EST. PATIENT-LVL V: CPT | Mod: PBBFAC,,,

## 2018-06-21 PROCEDURE — 99215 OFFICE O/P EST HI 40 MIN: CPT | Mod: PBBFAC,PO

## 2018-06-21 PROCEDURE — 99214 OFFICE O/P EST MOD 30 MIN: CPT | Mod: S$PBB,,, | Performed by: FAMILY MEDICINE

## 2018-06-21 NOTE — PROGRESS NOTES
CHIEF COMPLAINT/REASON FOR VISIT:  Right knee pain, left hip and buttock pain    HISTORY OF PRESENT ILLNESS:  57-year-old male complains of right knee pain onset 2 months ago.  Patient denies any specific injury or trauma.  Complains of left hip, left buttock and leg pain onset 1 month ago.  Patient admits tried heat on left hip, buttocks and right knee.  Discuss further evaluation with Orthopedics.  Patient denies chest pain, shortness of breath, congestion, fever, cough, nausea, vomiting, and urinary discomfort.     Past Medical History:   Diagnosis Date    Adrenal cortical adenoma     Anxiety     Arthritis     CKD (chronic kidney disease) stage 3, GFR 30-59 ml/min     Depression     Diabetes mellitus type II 2011    does not take    DM (diabetes mellitus) 2011     am 09/21/2017 Insulin x 1 1/2 year    GERD (gastroesophageal reflux disease) 7/9/2013    Hypertension     Migraine headache 7/22/2013    Schizophrenia     Severe obesity with body mass index of 36.0 to 36.9           Social History     Social History    Marital status:      Spouse name: N/A    Number of children: N/A    Years of education: N/A     Occupational History     IT/disabled      Social History Main Topics    Smoking status: Current Every Day Smoker     Packs/day: 0.50     Years: 44.00     Types: Cigarettes    Smokeless tobacco: Never Used      Comment: instructed not to smoke after midnight after midnight prior to surgery    Alcohol use Yes      Comment: social    Drug use: No    Sexual activity: No     Other Topics Concern    Not on file     Social History Narrative    . Planning to live with sisters. Disabled .            Family History   Problem Relation Age of Onset    Hypertension Mother     Diabetes Mother     Cataracts Mother     Hypertension Sister     Diabetes Sister     Hypertension Brother     Diabetes Brother     Cancer Paternal Grandmother     Cancer Paternal  Grandfather        ROS:  GENERAL: No fever, chills, fatigability or weight loss.  SKIN: No rashes, itching or changes in color or texture of skin.  HEENT: No headaches or recent head trauma.  Denies ear pain, discharge or vertigo. No loss of smell, no epistaxis or postnasal drip. No hoarseness or change in voice.   CHEST: Denies cyanosis, wheezing, cough and sputum production.  CARDIOVASCULAR: Denies chest pain, PND, orthopnea or reduced exercise tolerance.  ABDOMEN: Appetite fine. No weight loss. Denies diarrhea, abdominal pain.  MUSCULOSKELETAL:  Right knee pain, left hip and buttock pain  NEUROLOGIC: No history of seizures, paralysis, alteration of gait or coordination.  PSYCHIATRIC: Denies mood swings, depression or suicidal thoughts.    PE:   APPEARANCE: Well nourished, well developed, in mild distress.   V/S: Reviewed.  SKIN: Normal skin turgor, no lesions.  HEENT:  turbinates pink, oral cavity with no teeth, pink pharynx, TM's clear bilateral.  CHEST:  No respiratory symptoms.  CARDIOVASCULAR: Regular rate and rhythm   MUSCULOSKELETAL:  Left buttock and lower leg with limited range of motion due to muscle pain, left buttock with minimal tenderness, right knee with limited range of motion due to pain.  NEUROLOGIC: No sensory deficits. Gait & Posture:  Ambulates slowly. No cerebellar signs.  MENTAL STATUS: Patient alert, oriented x 3 & conversant.    PLAN:   Consult Orthopedics  Advise cool compresses to left buttock & leg   Tylenol arthritis for pain & body aches..  Advise follow up with PCP  Advise go to ER if nausea, vomiting, fever, increased  pain, or fail to improve with treatment.  AVS provided and reviewed with patient including supportive care, follow up, and red flag symptoms.   Patient verbalizes understanding and agrees with treatment plan. Discharged from Urgent Care in stable condition.    DIAGNOSIS:   Left hip pain  Right knee pain  Hypertension  Left buttock pain/sciatica  Chronic kidney  disease  Hyperlipidemia associated with diabetes mellitus

## 2018-06-21 NOTE — PATIENT INSTRUCTIONS
PLAN:   Consult Orthopedics  Advise cool compresses to left buttock & leg   Tylenol arthritis for pain & body aches..  Advise follow up with PCP  Advise go to ER if nausea, vomiting, fever, increased  pain, or fail to improve with treatment.  AVS provided and reviewed with patient including supportive care, follow up, and red flag symptoms.   Patient verbalizes understanding and agrees with treatment plan. Discharged from Urgent Care in stable condition.

## 2018-06-22 DIAGNOSIS — M25.561 PAIN IN BOTH KNEES, UNSPECIFIED CHRONICITY: Primary | ICD-10-CM

## 2018-06-22 DIAGNOSIS — M25.562 PAIN IN BOTH KNEES, UNSPECIFIED CHRONICITY: Primary | ICD-10-CM

## 2018-06-26 ENCOUNTER — OFFICE VISIT (OUTPATIENT)
Dept: ORTHOPEDICS | Facility: CLINIC | Age: 58
End: 2018-06-26
Payer: MEDICAID

## 2018-06-26 ENCOUNTER — HOSPITAL ENCOUNTER (OUTPATIENT)
Dept: RADIOLOGY | Facility: HOSPITAL | Age: 58
Discharge: HOME | End: 2018-06-26
Attending: PHYSICIAN ASSISTANT
Payer: MEDICAID

## 2018-06-26 VITALS
HEART RATE: 83 BPM | SYSTOLIC BLOOD PRESSURE: 105 MMHG | WEIGHT: 240.31 LBS | BODY MASS INDEX: 33.64 KG/M2 | HEIGHT: 71 IN | DIASTOLIC BLOOD PRESSURE: 67 MMHG

## 2018-06-26 DIAGNOSIS — M25.562 PAIN IN BOTH KNEES, UNSPECIFIED CHRONICITY: ICD-10-CM

## 2018-06-26 DIAGNOSIS — M25.561 PATELLOFEMORAL INSTABILITY OF RIGHT KNEE WITH PAIN: Primary | ICD-10-CM

## 2018-06-26 DIAGNOSIS — M25.361 PATELLOFEMORAL INSTABILITY OF RIGHT KNEE WITH PAIN: Primary | ICD-10-CM

## 2018-06-26 DIAGNOSIS — M25.561 PAIN IN BOTH KNEES, UNSPECIFIED CHRONICITY: ICD-10-CM

## 2018-06-26 PROCEDURE — 73562 X-RAY EXAM OF KNEE 3: CPT | Mod: TC,50,FY,PO

## 2018-06-26 PROCEDURE — 99999 PR PBB SHADOW E&M-EST. PATIENT-LVL V: CPT | Mod: PBBFAC,,, | Performed by: PHYSICIAN ASSISTANT

## 2018-06-26 PROCEDURE — 99215 OFFICE O/P EST HI 40 MIN: CPT | Mod: PBBFAC,25,PO | Performed by: PHYSICIAN ASSISTANT

## 2018-06-26 PROCEDURE — 73562 X-RAY EXAM OF KNEE 3: CPT | Mod: 26,50,, | Performed by: RADIOLOGY

## 2018-06-26 PROCEDURE — 99213 OFFICE O/P EST LOW 20 MIN: CPT | Mod: S$PBB,,, | Performed by: PHYSICIAN ASSISTANT

## 2018-06-26 RX ORDER — DICLOFENAC SODIUM 10 MG/G
2 GEL TOPICAL 4 TIMES DAILY
Qty: 100 G | Refills: 2 | Status: SHIPPED | OUTPATIENT
Start: 2018-06-26 | End: 2022-01-19

## 2018-06-26 NOTE — PROGRESS NOTES
CC: 56 y/o male right knee pain    Date of surgery: 5/04/2015- Right knee ACL tear with right medial and lateral   meniscal tear.  The patient also had synovitis and chondromalacia of the medial   femoral condyle.    HPI:  Right knee pain for the past few months.  Also incurred a recent fall and injury to his knee with increased pain.  He was seen in emergency room for the same knee pain. The knee locks pops and clicks on him.  Pain level 8 on a 10 scale.  PMH:    Past Medical History:   Diagnosis Date    Adrenal cortical adenoma     Anxiety     Arthritis     CKD (chronic kidney disease) stage 3, GFR 30-59 ml/min     Depression     Diabetes mellitus type II 2011    does not take    DM (diabetes mellitus) 2011     am 09/21/2017 Insulin x 1 1/2 year    GERD (gastroesophageal reflux disease) 7/9/2013    Hypertension     Migraine headache 7/22/2013    Schizophrenia     Severe obesity with body mass index of 36.0 to 36.9        PSH:    Past Surgical History:   Procedure Laterality Date    BACK SURGERY      HERNIA REPAIR      UMBILICAL    left arm exfix      NASAL SEPTUM SURGERY Bilateral     TONSILLECTOMY      URETEROSCOPY         Family Hx:    Family History   Problem Relation Age of Onset    Hypertension Mother     Diabetes Mother     Cataracts Mother     Hypertension Sister     Diabetes Sister     Hypertension Brother     Diabetes Brother     Cancer Paternal Grandmother     Cancer Paternal Grandfather        Allergy:  Review of patient's allergies indicates:  No Known Allergies    Medication:    Current Outpatient Prescriptions:     albuterol 90 mcg/actuation inhaler, Inhale 2 puffs into the lungs every 4 (four) hours as needed for Wheezing., Disp: 1 Inhaler, Rfl: 0    amLODIPine (NORVASC) 10 MG tablet, Take 1 tablet (10 mg total) by mouth once daily., Disp: 30 tablet, Rfl: 3    blood sugar diagnostic Strp, 1 each by Misc.(Non-Drug; Combo Route) route 3 (three) times daily before  "meals. For One Touch Verio flex, Disp: 100 each, Rfl: 11    blood-glucose meter kit, One Touch Verio Meter, Disp: 1 each, Rfl: 0    clonazePAM (KLONOPIN) 1 MG tablet, 1 mg 2 (two) times daily. Take 1.5mg twice daily, Disp: , Rfl: 2    cloNIDine (CATAPRES) 0.3 MG tablet, Take 1 tablet (0.3 mg total) by mouth 3 (three) times daily., Disp: 90 tablet, Rfl: 3    gabapentin (NEURONTIN) 400 MG capsule, One capsule three times a day, Disp: , Rfl: 2    HUMALOG MIX 50-50 INSULN U-100 100 unit/mL (50-50) Susp, 76 units every morning, 36 units every night, Disp: , Rfl: 2    hydroCHLOROthiazide (HYDRODIURIL) 25 MG tablet, Take 1 tablet (25 mg total) by mouth once daily., Disp: 30 tablet, Rfl: 3    insulin syringe-needle U-100 1 mL 31 gauge x 5/16 Syrg, 1 Syringe by Misc.(Non-Drug; Combo Route) route 3 (three) times daily., Disp: 100 each, Rfl: 11    lancets (ONETOUCH DELICA LANCETS) 33 gauge Misc, 1 lancet by Misc.(Non-Drug; Combo Route) route 3 (three) times daily., Disp: 100 each, Rfl: 11    liraglutide 0.6 mg/0.1 mL, 18 mg/3 mL, subq PNIJ (VICTOZA 3-NEGRITA) 0.6 mg/0.1 mL (18 mg/3 mL) PnIj, Inject 1.8 mg into the skin Daily., Disp: 27 mL, Rfl: 0    lisinopril (PRINIVIL,ZESTRIL) 40 MG tablet, Take 1 tablet (40 mg total) by mouth once daily., Disp: 30 tablet, Rfl: 3    lurasidone (LATUDA) 60 mg Tab tablet, Take 60 mg by mouth once daily., Disp: , Rfl:     metFORMIN (GLUCOPHAGE) 500 MG tablet, Take 500 mg by mouth 2 (two) times daily with meals. , Disp: , Rfl: 0    nicotine (NICODERM CQ) 21 mg/24 hr, Place 1 patch onto the skin once daily. , Disp: , Rfl: 1    pantoprazole (PROTONIX) 40 MG tablet, TAKE 1 TABLET(40 MG) BY MOUTH EVERY DAY, Disp: 30 tablet, Rfl: 3    pen needle, diabetic 32 gauge x 5/32" Ndle, 1 each by Misc.(Non-Drug; Combo Route) route once daily., Disp: 100 each, Rfl: 11    polyethylene glycol (GLYCOLAX) 17 gram/dose powder, Take 17 g by mouth once daily., Disp: 1 Bottle, Rfl: 1    pravastatin " "(PRAVACHOL) 20 MG tablet, Take 1 tablet (20 mg total) by mouth every evening., Disp: 30 tablet, Rfl: 5    prazosin (MINIPRESS) 2 MG Cap, 2 mg every evening. , Disp: , Rfl: 0    propranolol (INDERAL LA) 60 MG 24 hr capsule, Take 60 mg by mouth once daily. , Disp: , Rfl: 0    QUEtiapine (SEROQUEL) 200 MG Tab, 200 mg once daily. Every morning, Disp: , Rfl: 3    QUEtiapine (SEROQUEL) 300 MG Tab, 300 mg once daily. Every evening, Disp: , Rfl: 3    sertraline (ZOLOFT) 100 MG tablet, Take 2 tablets (200 mg total) by mouth once daily. (Patient taking differently: Take 150 mg by mouth once daily. ), Disp: 30 tablet, Rfl: 0    traZODone (DESYREL) 150 MG tablet, 300 mg nightly. , Disp: , Rfl: 2    Social History:    Social History     Social History    Marital status:      Spouse name: N/A    Number of children: N/A    Years of education: N/A     Occupational History     IT/disabled      Social History Main Topics    Smoking status: Current Every Day Smoker     Packs/day: 1.00     Years: 44.00     Types: Cigarettes    Smokeless tobacco: Never Used      Comment: instructed not to smoke after midnight after midnight prior to surgery    Alcohol use No    Drug use: No    Sexual activity: No     Other Topics Concern    Not on file     Social History Narrative    . Planning to live with sisters. Disabled .         Vitals:   /67 (BP Location: Right arm, Patient Position: Sitting, BP Method: Medium (Automatic))   Pulse 83   Ht 5' 10.5" (1.791 m)   Wt 109 kg (240 lb 4.8 oz)   BMI 33.99 kg/m²      ROS:  GENERAL: No fever, chills, fatigability or weight loss.  SKIN: No rashes, itching or changes in color or texture of skin.  HEAD: No headaches or recent head trauma.  EYES: Visual acuity fine. No photophobia, ocular pain or diplopia.  EARS: Denies ear pain, discharge or vertigo.  NOSE: No loss of smell, no epistaxis or postnasal drip.  MOUTH & THROAT: No hoarseness or change in voice. " No excessive gum bleeding.  NODES: Denies swollen glands.  CHEST: Denies ESPINAL, cyanosis, wheezing, cough and sputum production.  CARDIOVASCULAR: Denies chest pain, PND, orthopnea or reduced exercise tolerance.  ABDOMEN: Appetite fine. No weight loss. Denies diarrhea, abdominal pain, hematemesis or blood in stool.  URINARY: No flank pain, dysuria or hematuria.  PERIPHERAL VASCULAR: No claudication or cyanosis.  NEUROLOGIC: No history of seizures, paralysis, alteration of gait or coordination.  MUSCULOSKELETAL: See HPI    PE:  APPEARANCE: Well nourished, well developed, in no acute distress.   HEAD: Normocephalic, atraumatic.  NEUROLOGIC: Cranial Nerves: II-XII grossly intact, also see MUSCULOSKELETAL  MUSCULOSKELETAL: right Knee Exam-abnormal    Gait-abnormal  Muscle Appearance:abnormal  Spine Alignment-normal  Muscle Atrophy-Negative  Deformities-Negative  Tenderness-Positive  Paresthesias-Negative  Range of Motion         Ext-abnormal, 0 degrees         Flex-abnormal  Muscle Strength-abnormal  Sensation-abnormal  Reflexes-normal  Crepitus-Positive                                Swelling-Negative  Effusion- Negative                                Edema-Negative  Lachman-Negative                                Erythema-Negative  [facility]'s-Negative                              Apley Grind-Positive  Patellar Comp-Positive                         Alignment-normal/symmetric  Patellar Apprehension-Positive              Synovial fullness-Negative  Passive Patellar Tilt-normal  Patellar Tracking-normal   Patellar Glide-normal  Q-Angle at 90 degrees-normal  Patellar Grind-abnormal  [id number]  Fatigue-Negative                                     HS Tightness-Positive  Tests on Exam, No ligamentous laxity  Neurovascular Status-normal+2 DP and PT artery pulses  Skin-normal    Assessment:           Diagnosis:              1.Right knee Chondromalacia               2.  Right knee internal derangement    Diagnostic  Studies  MRI-Yes to assess possible internal derangement status post anterior cruciate ligament repair with recent trauma.    X-Ray-Yes with the findings ofNo acute fracture or dislocation is demonstrated.  A metallic pin is present in the distal femur which is unchanged appearance of interval changes from previous ACL repair.  Mild arthritic change of the medial compartment is present with joint space narrowing and spurring.  EMG/NCV-No  Arthrogram-No  Bone Scan-No  CT Scan-No  Doppler-No  ESR-No  CRP-No  CBC with Diff-No   Rheumatoid/Arthritis Panel-No      Plan:                                                 1. PT-no                                                 2.OT-no                                          3.NSAID-no                                        4. Narcotics-no                                     5. Wound care-N/A                                 6. Rest-no                                           7. Surgery-no                                         8. EDVIN Hose-no                                    9. Anticoagulation therapy-no               10. Elevation-no                                     11. Crutches-no                                    12. Walker-no             13. Cane no                        14. Referral-no                                     15.Injection-no                            16. Splint   /    Cast   /   Cast Shoe-No              17. RICE            18. Follow up-  after MRI

## 2018-06-26 NOTE — PATIENT INSTRUCTIONS
Osteoarthritis  Osteoarthritis (also called degenerative joint disease) happens when the cartilage in a joint becomes damaged and worn. This may be due to age, wear and tear, overuse of the joint, or other problems. Osteoarthritis can affect any joint. But it is most common in hands, knees, spine, hips, and feet. Symptoms include joint stiffness, pain, and swelling.  Home care  · When a joint is more sore than usual, rest it for a day or two.  · Heat can help relieve stiffness. Take a hot bath or apply a heating pad for up to 30 minutes at a time. If symptoms are worse in the morning, using heat just after awakening can help relax the muscle and soothe the joints.   · Ice helps relieve pain and swelling. It is often used after activity. Use a cold pack wrapped in a thin cloth on the joint for 10 to 15 minutes at a time.   · Alternating hot and cold can also help relieve pain. Try this for 20 minutes at a time, several times per day.  · Exercise helps prevent the muscles and ligaments around the joint from becoming weak. It also helps maintain function in the joint.  Be as active as you can. Talk to your healthcare provider about what activity program is best for you.  · Excess weight puts a lot of extra strain on weight-bearing joints of the lower back, hips, knees, feet and ankles. If you are overweight, talk to your healthcare provider about a safe and effective weight loss program.  · Use anti-inflammatory medicines as prescribed for pain. This includes acetaminophen or NSAIDs such as ibuprofen or naproxen. If needed, topical or injected medicines may be recommended. Talk to your healthcare provider if these options are not enough to manage your pain.  · Talk with your healthcare provider about devices that might help improve your function and reduce pain.  Follow-up care  Follow up with your healthcare provider as advised by our staff.  When to seek medical advice  Call your healthcare provider right away if  any of these occur:  · Redness or swelling of a painful joint  · Discharge or pus from a painful joint  · Fever of 100.4ºF (38ºC) or higher, or as directed by your healthcare provider  · Worsening joint pain  · Decreased ability to move the joint or bear weight on the joint  Date Last Reviewed: 3/1/2017  © 3016-0171 [employer]. [address] [place] [address]. All rights reserved. This information is not intended as a substitute for professional medical care. Always follow your healthcare professional's instructions.

## 2018-06-26 NOTE — LETTER
June 27, 2018      Farhana Burnham MD  [address] [address] [address]           [facility] Orthoped[facility]  [address]  [place] [address]  Phone: [phone]  Fax: [phone]          Patient: Shukri Rosales   MR Number: [id number]   YOB: 1960   Date of Visit: 6/26/2018       Dear Dr. Farhana Burnham:    Thank you for referring Shukri Rosales to me for evaluation. Attached you will find relevant portions of my assessment and plan of care.    If you have questions, please do not hesitate to call me. I look forward to following Shukri Rosales along with you.    Sincerely,    Kaushik Cain PA-C    Enclosure  CC:  No Recipients    If you would like to receive this communication electronically, please contact [email or web address] or [phone] to request more information on [email or web address] Link access.    For providers and/or their staff who would like to refer a patient to [facility], please contact us through our one-stop-shop provider referral line, [facility], at [phone].    If you feel you have received this communication in error or would no longer like to receive these types of communications, please e-mail [email or web address]

## 2018-06-27 ENCOUNTER — TELEPHONE (OUTPATIENT)
Dept: SMOKING CESSATION | Facility: CLINIC | Age: 58
End: 2018-06-27

## 2018-06-27 DIAGNOSIS — Z79.4 TYPE 2 DIABETES MELLITUS WITH DIABETIC NEUROPATHY, WITH LONG-TERM CURRENT USE OF INSULIN: ICD-10-CM

## 2018-06-27 DIAGNOSIS — E11.40 TYPE 2 DIABETES MELLITUS WITH DIABETIC NEUROPATHY, WITH LONG-TERM CURRENT USE OF INSULIN: ICD-10-CM

## 2018-06-28 DIAGNOSIS — K21.9 GASTROESOPHAGEAL REFLUX DISEASE, ESOPHAGITIS PRESENCE NOT SPECIFIED: ICD-10-CM

## 2018-06-28 DIAGNOSIS — E78.5 HYPERLIPIDEMIA, UNSPECIFIED HYPERLIPIDEMIA TYPE: ICD-10-CM

## 2018-06-28 RX ORDER — INSULIN LISPRO 100 [IU]/ML
INJECTION, SUSPENSION SUBCUTANEOUS
Qty: 3 VIAL | Refills: 2 | Status: SHIPPED | OUTPATIENT
Start: 2018-06-28 | End: 2022-01-19

## 2018-06-28 RX ORDER — METFORMIN HYDROCHLORIDE 500 MG/1
500 TABLET ORAL 2 TIMES DAILY WITH MEALS
Qty: 60 TABLET | Refills: 1 | Status: SHIPPED | OUTPATIENT
Start: 2018-06-28 | End: 2018-08-31 | Stop reason: SDUPTHER

## 2018-06-29 RX ORDER — PRAVASTATIN SODIUM 20 MG/1
20 TABLET ORAL NIGHTLY
Qty: 30 TABLET | Refills: 0 | Status: SHIPPED | OUTPATIENT
Start: 2018-06-29 | End: 2022-01-19

## 2018-06-29 RX ORDER — GABAPENTIN 400 MG/1
400 CAPSULE ORAL 3 TIMES DAILY
Qty: 90 CAPSULE | Refills: 0 | Status: SHIPPED | OUTPATIENT
Start: 2018-06-29 | End: 2022-01-19 | Stop reason: DRUGHIGH

## 2018-06-29 RX ORDER — PRAZOSIN HYDROCHLORIDE 2 MG/1
2 CAPSULE ORAL NIGHTLY
Qty: 30 CAPSULE | Refills: 0 | Status: SHIPPED | OUTPATIENT
Start: 2018-06-29 | End: 2022-01-19

## 2018-06-29 RX ORDER — PANTOPRAZOLE SODIUM 40 MG/1
TABLET, DELAYED RELEASE ORAL
Qty: 30 TABLET | Refills: 0 | Status: SHIPPED | OUTPATIENT
Start: 2018-06-29 | End: 2019-08-29

## 2018-07-02 DIAGNOSIS — Z79.4 TYPE 2 DIABETES MELLITUS WITH DIABETIC NEUROPATHY, WITH LONG-TERM CURRENT USE OF INSULIN: ICD-10-CM

## 2018-07-02 DIAGNOSIS — E11.40 TYPE 2 DIABETES MELLITUS WITH DIABETIC NEUROPATHY, WITH LONG-TERM CURRENT USE OF INSULIN: ICD-10-CM

## 2018-07-02 NOTE — TELEPHONE ENCOUNTER
----- Message from Ruth Larry sent at 7/2/2018  8:34 AM CDT -----  Contact: pt  1. What is the name of the medication you are requesting?liraglutide 0.6 mg/0.1 mL, 18 mg/3 mL, subq PNIJ (VICTOZA 3-NEGRITA)   2. What is the dose? 0.6 mg/0.1 mL (18 mg/3 mL) PnIj  3. How do you take the medication? Orally, topically, etc?   4. How often do you take this medication?   5. Do you need a 30 day or 90 day supply? 30  6. How many refills are you requesting? 1  7. What is your preferred pharmacy and location of the pharmacy?   [facility] [place] - [address]  [address] [address]  Phone: [phone] Fax: [phone]  8. Who can we contact with further questions? the patient

## 2018-07-06 ENCOUNTER — TELEPHONE (OUTPATIENT)
Dept: RADIOLOGY | Facility: HOSPITAL | Age: 58
End: 2018-07-06

## 2018-07-09 ENCOUNTER — HOSPITAL ENCOUNTER (OUTPATIENT)
Dept: RADIOLOGY | Facility: HOSPITAL | Age: 58
Discharge: HOME | End: 2018-07-09
Attending: PHYSICIAN ASSISTANT
Payer: MEDICAID

## 2018-07-09 DIAGNOSIS — M25.361 PATELLOFEMORAL INSTABILITY OF RIGHT KNEE WITH PAIN: ICD-10-CM

## 2018-07-09 DIAGNOSIS — M25.561 PATELLOFEMORAL INSTABILITY OF RIGHT KNEE WITH PAIN: ICD-10-CM

## 2018-07-09 PROCEDURE — 73721 MRI JNT OF LWR EXTRE W/O DYE: CPT | Mod: TC,PO,RT

## 2018-07-09 PROCEDURE — 73721 MRI JNT OF LWR EXTRE W/O DYE: CPT | Mod: 26,RT,, | Performed by: RADIOLOGY

## 2018-07-11 ENCOUNTER — CLINICAL SUPPORT (OUTPATIENT)
Dept: SMOKING CESSATION | Facility: CLINIC | Age: 58
End: 2018-07-11
Payer: COMMERCIAL

## 2018-07-11 DIAGNOSIS — F17.200 NICOTINE DEPENDENCE: Primary | ICD-10-CM

## 2018-07-11 PROCEDURE — 99407 BEHAV CHNG SMOKING > 10 MIN: CPT | Mod: S$GLB,,, | Performed by: INTERNAL MEDICINE

## 2018-07-11 NOTE — PROGRESS NOTES
Spoke with patient today in regard to smoking cessation progress for 1 year follow up, he states not tobacco free. Patient scheduled an appointment to return the program for Quit attempt #2 on 7/19/18 @ 10:00 am. Informed patient of benefit period, future follow ups, and contact information if any further help or support is needed. Will resolve episode and complete smart form for Quit attempt #1.

## 2018-07-12 ENCOUNTER — OFFICE VISIT (OUTPATIENT)
Dept: ORTHOPEDICS | Facility: CLINIC | Age: 58
End: 2018-07-12
Payer: MEDICAID

## 2018-07-12 ENCOUNTER — TELEPHONE (OUTPATIENT)
Dept: FAMILY MEDICINE | Facility: CLINIC | Age: 58
End: 2018-07-12

## 2018-07-12 ENCOUNTER — PATIENT MESSAGE (OUTPATIENT)
Dept: FAMILY MEDICINE | Facility: CLINIC | Age: 58
End: 2018-07-12

## 2018-07-12 VITALS
BODY MASS INDEX: 33.64 KG/M2 | DIASTOLIC BLOOD PRESSURE: 79 MMHG | WEIGHT: 240.31 LBS | HEIGHT: 71 IN | HEART RATE: 97 BPM | SYSTOLIC BLOOD PRESSURE: 117 MMHG

## 2018-07-12 DIAGNOSIS — S83.231D COMPLEX TEAR OF MEDIAL MENISCUS OF RIGHT KNEE AS CURRENT INJURY, SUBSEQUENT ENCOUNTER: Primary | ICD-10-CM

## 2018-07-12 DIAGNOSIS — Z01.818 PREOP TESTING: Primary | ICD-10-CM

## 2018-07-12 PROCEDURE — 99214 OFFICE O/P EST MOD 30 MIN: CPT | Mod: PBBFAC,PO | Performed by: PHYSICIAN ASSISTANT

## 2018-07-12 PROCEDURE — 99213 OFFICE O/P EST LOW 20 MIN: CPT | Mod: S$PBB,,, | Performed by: PHYSICIAN ASSISTANT

## 2018-07-12 PROCEDURE — 99999 PR PBB SHADOW E&M-EST. PATIENT-LVL IV: CPT | Mod: PBBFAC,,, | Performed by: PHYSICIAN ASSISTANT

## 2018-07-12 NOTE — TELEPHONE ENCOUNTER
Patient will need a preop visit. He will also need to see his Cardiologist. He can see me the second week of August.

## 2018-07-12 NOTE — PATIENT INSTRUCTIONS
Knee Arthroscopy  Knee problems can often be diagnosed and treated with a technique called arthroscopy. This type of surgery is done using an instrument called an arthroscope (scope). Only a few small incisions are needed for this surgery. The procedure can be used to diagnose a knee problem. In many cases, treatment can also be done using arthroscopy.     Insertion of fluid, arthroscope, and instruments through small incisions (portals).         Patient undergoes procedure      The arthroscope  The scope allows the doctor to look directly into the knee joint. It is about the size of a pencil and contains a pathway for fluids. It also contains coated glass fibers that beam an intense, cool light into the knee joint. A camera is attached to the scope as well. It provides clear images of most areas in your knee joint. The doctor views these images on a monitor.  Preparing for the procedure  · Have lab or other testing done as advised.  · Tell your doctor about any medicines or supplements you take  · Do not eat or drink anything for 10 hours before the procedure.  · Once you arrive for surgery, you will be given an IV line in your arm or hand. This provides fluids and medicines.  · To keep you free of pain during the surgery, youll receive medicine called anesthesia. You may have:  ¨ General anesthesia. This puts you into a deep sleep during the surgery.  ¨ Regional anesthesia. This numbs the body from the waist down.  ¨ Local anesthesia. This numbs just the knee.  In addition to regional or local anesthesia, you may receive sedation. This medicine makes you relaxed and sleepy during the surgery.  The procedure  · A few small incisions (portals) are made in your knee.  · The scope is inserted through one of the portals.  · Sterile fluid is put into the knee joint. This makes it easier to see and work inside your joint.  · Using the scope, the doctor confirms the type and degree of knee damage. If possible, the  problem is treated at this time. This is done using surgical tools put through the other portals.  · When the surgery is done, all tools are removed. The incisions are closed with sutures, staples, surgical glue, or strips of surgical tape.  Risks and complications of arthroscopy  All surgeries have risks. The risks of arthroscopy include:  · Bleeding  · Infection  · Blood clots  · Swelling and stiffness of the knee  · Injury to normal tissue  · Continuing knee problems   Date Last Reviewed: 9/20/2015 © 2000-2017 [employer]. [address] [place] [address], [place] [address]. All rights reserved. This information is not intended as a substitute for professional medical care. Always follow your healthcare professional's instructions.

## 2018-07-12 NOTE — TELEPHONE ENCOUNTER
----- Message from Li Barahona LPN sent at 7/12/2018  9:19 AM CDT -----      ----- Message -----  From: Raina Melgar LPN  Sent: 7/12/2018   9:12 AM  To: Tej Jones    Good morning,     Pt will be scheduled to have a right knee ats on 8/29/18 with Dr. Calderon.   EKG and CXR are already done and he will be getting lab work done on 8/3.     Please let me know if pt will need an appt with Dr. Burnham or if he is cleared per preop work.       Thank you!

## 2018-07-12 NOTE — PROGRESS NOTES
CC: 58 y/o male right knee pain    Date of surgery: 5/04/2015- Right knee ACL tear with right medial and lateral   meniscal tear.  The patient also had synovitis and chondromalacia of the medial   femoral condyle.    HPI:  Right knee pain for the past few months.  Also incurred a recent fall and injury to his knee with increased pain.  He was seen in emergency room for the same knee pain. The knee locks pops and clicks on him.  Pain level 8 on a 10 scale.  PMH:    Past Medical History:   Diagnosis Date    Adrenal cortical adenoma     Anxiety     Arthritis     CKD (chronic kidney disease) stage 3, GFR 30-59 ml/min     Depression     Diabetes mellitus type II 2011    does not take    DM (diabetes mellitus) 2011     am 09/21/2017 Insulin x 1 1/2 year    GERD (gastroesophageal reflux disease) 7/9/2013    Hypertension     Migraine headache 7/22/2013    Schizophrenia     Severe obesity with body mass index of 36.0 to 36.9        PSH:    Past Surgical History:   Procedure Laterality Date    BACK SURGERY      HERNIA REPAIR      UMBILICAL    left arm exfix      NASAL SEPTUM SURGERY Bilateral     TONSILLECTOMY      URETEROSCOPY         Family Hx:    Family History   Problem Relation Age of Onset    Hypertension Mother     Diabetes Mother     Cataracts Mother     Hypertension Sister     Diabetes Sister     Hypertension Brother     Diabetes Brother     Cancer Paternal Grandmother     Cancer Paternal Grandfather        Allergy:  Review of patient's allergies indicates:  No Known Allergies    Medication:    Current Outpatient Prescriptions:     albuterol 90 mcg/actuation inhaler, Inhale 2 puffs into the lungs every 4 (four) hours as needed for Wheezing., Disp: 1 Inhaler, Rfl: 0    amLODIPine (NORVASC) 10 MG tablet, Take 1 tablet (10 mg total) by mouth once daily., Disp: 30 tablet, Rfl: 3    blood sugar diagnostic Strp, 1 each by Misc.(Non-Drug; Combo Route) route 3 (three) times daily before  "meals. For One Touch Verio flex, Disp: 100 each, Rfl: 11    blood-glucose meter kit, One Touch Verio Meter, Disp: 1 each, Rfl: 0    clonazePAM (KLONOPIN) 1 MG tablet, 1 mg 2 (two) times daily. Take 1.5mg twice daily, Disp: , Rfl: 2    cloNIDine (CATAPRES) 0.3 MG tablet, Take 1 tablet (0.3 mg total) by mouth 3 (three) times daily., Disp: 90 tablet, Rfl: 3    gabapentin (NEURONTIN) 400 MG capsule, Take 1 capsule (400 mg total) by mouth 3 (three) times daily. One capsule three times a day, Disp: 90 capsule, Rfl: 0    HUMALOG MIX 50-50 INSULN U-100 100 unit/mL (50-50) Susp, 76 units every morning, 36 units every night, Disp: 3 vial, Rfl: 2    hydroCHLOROthiazide (HYDRODIURIL) 25 MG tablet, Take 1 tablet (25 mg total) by mouth once daily., Disp: 30 tablet, Rfl: 3    insulin syringe-needle U-100 1 mL 31 gauge x 5/16 Syrg, 1 Syringe by Misc.(Non-Drug; Combo Route) route 3 (three) times daily., Disp: 100 each, Rfl: 11    lancets (ONETOUCH DELICA LANCETS) 33 gauge Misc, 1 lancet by Misc.(Non-Drug; Combo Route) route 3 (three) times daily., Disp: 100 each, Rfl: 11    liraglutide 0.6 mg/0.1 mL, 18 mg/3 mL, subq PNIJ (VICTOZA 3-NEGRITA) 0.6 mg/0.1 mL (18 mg/3 mL) PnIj, Inject 1.8 mg into the skin Daily., Disp: 27 mL, Rfl: 0    lisinopril (PRINIVIL,ZESTRIL) 40 MG tablet, Take 1 tablet (40 mg total) by mouth once daily., Disp: 30 tablet, Rfl: 3    lurasidone (LATUDA) 60 mg Tab tablet, Take 60 mg by mouth once daily., Disp: , Rfl:     metFORMIN (GLUCOPHAGE) 500 MG tablet, Take 1 tablet (500 mg total) by mouth 2 (two) times daily with meals., Disp: 60 tablet, Rfl: 1    nicotine (NICODERM CQ) 21 mg/24 hr, Place 1 patch onto the skin once daily. , Disp: , Rfl: 1    pantoprazole (PROTONIX) 40 MG tablet, TAKE 1 TABLET(40 MG) BY MOUTH EVERY DAY, Disp: 30 tablet, Rfl: 0    pen needle, diabetic 32 gauge x 5/32" Ndle, 1 each by Misc.(Non-Drug; Combo Route) route once daily., Disp: 100 each, Rfl: 11    polyethylene glycol " "(GLYCOLAX) 17 gram/dose powder, Take 17 g by mouth once daily., Disp: 1 Bottle, Rfl: 1    pravastatin (PRAVACHOL) 20 MG tablet, Take 1 tablet (20 mg total) by mouth every evening., Disp: 30 tablet, Rfl: 0    prazosin (MINIPRESS) 2 MG Cap, Take 1 capsule (2 mg total) by mouth every evening., Disp: 30 capsule, Rfl: 0    QUEtiapine (SEROQUEL) 200 MG Tab, 200 mg once daily. Every morning, Disp: , Rfl: 3    QUEtiapine (SEROQUEL) 300 MG Tab, 300 mg once daily. Every evening, Disp: , Rfl: 3    sertraline (ZOLOFT) 100 MG tablet, Take 2 tablets (200 mg total) by mouth once daily. (Patient taking differently: Take 150 mg by mouth once daily. ), Disp: 30 tablet, Rfl: 0    traZODone (DESYREL) 150 MG tablet, 300 mg nightly. , Disp: , Rfl: 2    diclofenac sodium 1 % Gel, Apply 2 g topically 4 (four) times daily. for 10 days, Disp: 100 g, Rfl: 2    propranolol (INDERAL LA) 60 MG 24 hr capsule, Take 60 mg by mouth once daily. , Disp: , Rfl: 0    Social History:    Social History     Social History    Marital status:      Spouse name: N/A    Number of children: N/A    Years of education: N/A     Occupational History     IT/disabled      Social History Main Topics    Smoking status: Current Every Day Smoker     Packs/day: 1.00     Years: 44.00     Types: Cigarettes    Smokeless tobacco: Never Used      Comment: instructed not to smoke after midnight after midnight prior to surgery    Alcohol use No    Drug use: No    Sexual activity: No     Other Topics Concern    Not on file     Social History Narrative    . Planning to live with sisters. Disabled .         Vitals:   /79   Pulse 97   Ht 5' 10.5" (1.791 m)   Wt 109 kg (240 lb 4.8 oz)   BMI 33.99 kg/m²      ROS:  GENERAL: No fever, chills, fatigability or weight loss.  SKIN: No rashes, itching or changes in color or texture of skin.  HEAD: No headaches or recent head trauma.  EYES: Visual acuity fine. No photophobia, ocular pain " or diplopia.  EARS: Denies ear pain, discharge or vertigo.  NOSE: No loss of smell, no epistaxis or postnasal drip.  MOUTH & THROAT: No hoarseness or change in voice. No excessive gum bleeding.  NODES: Denies swollen glands.  CHEST: Denies ESPINAL, cyanosis, wheezing, cough and sputum production.  CARDIOVASCULAR: Denies chest pain, PND, orthopnea or reduced exercise tolerance.  ABDOMEN: Appetite fine. No weight loss. Denies diarrhea, abdominal pain, hematemesis or blood in stool.  URINARY: No flank pain, dysuria or hematuria.  PERIPHERAL VASCULAR: No claudication or cyanosis.  NEUROLOGIC: No history of seizures, paralysis, alteration of gait or coordination.  MUSCULOSKELETAL: See HPI    PE:  APPEARANCE: Well nourished, well developed, in no acute distress.   HEAD: Normocephalic, atraumatic.  NEUROLOGIC: Cranial Nerves: II-XII grossly intact, also see MUSCULOSKELETAL  MUSCULOSKELETAL: right Knee Exam-abnormal    Gait-abnormal  Muscle Appearance:abnormal  Spine Alignment-normal  Muscle Atrophy-Negative  Deformities-Negative  Tenderness-Positive  Paresthesias-Negative  Range of Motion         Ext-abnormal, 0 degrees         Flex-abnormal  Muscle Strength-abnormal  Sensation-abnormal  Reflexes-normal  Crepitus-Positive                                Swelling-Negative  Effusion- Negative                                Edema-Negative  Lachman-Negative                                Erythema-Negative  Ivan's-Negative                              Apley Grind-Positive  Patellar Comp-Positive                         Alignment-normal/symmetric  Patellar Apprehension-Positive              Synovial fullness-Negative  Passive Patellar Tilt-normal  Patellar Tracking-normal   Patellar Glide-normal  Q-Angle at 90 degrees-normal  Patellar Grind-abnormal  K-Czpr-Cnaottjl  Fatigue-Negative                                     HS Tightness-Positive  Tests on Exam, No ligamentous laxity  Neurovascular Status-normal+2 DP and PT artery  pulses  Skin-normal    Assessment:  Patient stance that he has arthritis of his knee and has had a ACL reconstruction.  A right knee arthroscope may not relieve all of his pain.           Diagnosis:              1.Right knee medial complex meniscal tear               2.  Right knee internal derangement               3.  Right knee arthritis      Diagnostic Studies  MRI-Yes   1. Tricompartmental osteoarthritis and chondromalacia.  2. Large geographic areas of bone infarction in the distal femur and proximal tibia.  3. Status post prior ACL repair with intact ACL graft.  4. Suspected complex tear posterior horn medial meniscus.  5. Complex joint effusion and chronic synovitis changes    X-Ray-Yes with the findings ofNo acute fracture or dislocation is demonstrated.  A metallic pin is present in the distal femur which is unchanged appearance of interval changes from previous ACL repair.  Mild arthritic change of the medial compartment is present with joint space narrowing and spurring.  EMG/NCV-No  Arthrogram-No  Bone Scan-No  CT Scan-No  Doppler-No  ESR-No  CRP-No  CBC with Diff-No   Rheumatoid/Arthritis Panel-No      Plan:                                                 1. PT-no                                                 2.OT-no                                          3.NSAID-no                                        4. Narcotics-no                                     5. Wound care-N/A                                 6. Rest-no                                           7. Surgery- right knee arthroscope                                     8. EDVIN Hose-no                                    9. Anticoagulation therapy-no               10. Elevation-no                                     11. Crutches-no                                    12. Walker-no             13. Cane no                        14. Referral-no                                     15.Injection-no                            16. Splint   /    Cast    /   Cast Shoe-No              17. RICE            18. Follow up-  2 weeks after surgery

## 2018-07-17 ENCOUNTER — TELEPHONE (OUTPATIENT)
Dept: ORTHOPEDICS | Facility: CLINIC | Age: 58
End: 2018-07-17

## 2018-07-17 NOTE — TELEPHONE ENCOUNTER
----- Message from Cindi Sarah sent at 7/17/2018  9:15 AM CDT -----  Contact: pt  States he's calling regarding his surgery, to see if it can be scheduled sooner. Please call pt at [phone]. Thank you

## 2018-07-17 NOTE — TELEPHONE ENCOUNTER
Spoke with patient and informed him that there are no earlier surgery dates available. Patient verbalized understanding.

## 2018-07-19 ENCOUNTER — CLINICAL SUPPORT (OUTPATIENT)
Dept: SMOKING CESSATION | Facility: CLINIC | Age: 58
End: 2018-07-19
Payer: COMMERCIAL

## 2018-07-19 DIAGNOSIS — F17.210 MODERATE SMOKER (20 OR LESS PER DAY): Primary | ICD-10-CM

## 2018-07-19 PROCEDURE — 99999 PR PBB SHADOW E&M-EST. PATIENT-LVL I: CPT | Mod: PBBFAC,,,

## 2018-07-19 PROCEDURE — 99404 PREV MED CNSL INDIV APPRX 60: CPT | Mod: S$GLB,,,

## 2018-07-19 RX ORDER — IBUPROFEN 200 MG
1 TABLET ORAL DAILY
Qty: 28 PATCH | Refills: 0 | Status: SHIPPED | OUTPATIENT
Start: 2018-07-19 | End: 2020-10-29 | Stop reason: SDUPTHER

## 2018-07-19 RX ORDER — VARENICLINE TARTRATE 0.5 (11)-1
KIT ORAL
Qty: 1 PACKAGE | Refills: 0 | Status: SHIPPED | OUTPATIENT
Start: 2018-07-19 | End: 2020-01-17 | Stop reason: ALTCHOICE

## 2018-07-19 NOTE — Clinical Note
Patient seen for the tobacco cessation program. This is his second attempt to stop smoking through our program.    has been a cigarette smoker of 1  PPD X 46 years. He is motivated to quit the use of tobacco and has agreed to attend our 6 week tobacco cessation program.

## 2018-07-26 ENCOUNTER — CLINICAL SUPPORT (OUTPATIENT)
Dept: SMOKING CESSATION | Facility: CLINIC | Age: 58
End: 2018-07-26
Payer: COMMERCIAL

## 2018-07-26 DIAGNOSIS — F17.210 LIGHT CIGARETTE SMOKER (1-9 CIGS/DAY): Primary | ICD-10-CM

## 2018-07-26 PROCEDURE — 99999 PR PBB SHADOW E&M-EST. PATIENT-LVL I: CPT | Mod: PBBFAC,,,

## 2018-07-26 PROCEDURE — 90853 GROUP PSYCHOTHERAPY: CPT | Mod: S$GLB,,,

## 2018-07-26 RX ORDER — ASPIRIN/CALCIUM CARB/MAGNESIUM 325 MG
4 TABLET ORAL
Qty: 144 LOZENGE | Refills: 0 | Status: SHIPPED | OUTPATIENT
Start: 2018-07-26 | End: 2020-11-25 | Stop reason: SDUPTHER

## 2018-07-26 NOTE — Clinical Note
Patient reports decreasing cigarette smoking from 1 pack per day for 46 years to 1/2 pack per day. He is determined to stop smoking and has set a quit date of 8/15/18. The patient remains on the prescribed tobacco cessation medication regimen of 1 mg Chantix BID with a 21 mg nicotine patch QD and 4 mg nicotine lozenges PRN without any negative side effects at this time.

## 2018-07-26 NOTE — PROGRESS NOTES
Smoking Cessation Group Session #2    Site: [place]  Date:  7/26/18  Clinical Status of Patient: Outpatient   Length of Service and Code: 90 minutes - 69448   Number in Attendance: 2  Group Activities/Focus of Group: completion of TCRS (Tobacco Cessation Rating Scale) reviewed strategies, cues, and triggers. Introduced the negative impact of tobacco on health, the health advantages of discontinuing the use of tobacco, time line improved health changes after a quit, withdrawal issues to expect from nicotine and habit, and ways to achieve the goal of a quit.    Target symptoms:  withdrawal and medication side effects             The following were rated moderate (3) to severe (4) on TCRS:       Moderate 3: none     Severe 4:   none  Patient's Response to Intervention: Patient reports decreasing cigarette smoking from 1 pack per day for 46 years to 1/2 pack per day. He is determined to stop smoking and has set a quit date of 8/15/18. The patient remains on the prescribed tobacco cessation medication regimen of 1 mg Chantix BID with a 21 mg nicotine patch QD and 4 mg nicotine lozenges PRN without any negative side effects at this time.     Progress Toward Goals and Other Mental Status Changes: The patient denies any abnormal behavioral or mental changes at this time.     Plan: The patient will continue with group therapy sessions and medication regimen prescribed with management by physician or by the Cessation Clinic Provider. Patient will inform Smoking Cessation Counselor of symptoms as rated high on TCRS.  The patient will continue with group therapy sessions and medication monitoring by CTTS. Prescribed medication management will be by physician.     Return to Clinic: 1 week    Quit Date: none   Planned Quit Date: 8/15/18

## 2018-08-02 ENCOUNTER — CLINICAL SUPPORT (OUTPATIENT)
Dept: SMOKING CESSATION | Facility: CLINIC | Age: 58
End: 2018-08-02
Payer: COMMERCIAL

## 2018-08-02 DIAGNOSIS — E78.5 HYPERLIPIDEMIA, UNSPECIFIED HYPERLIPIDEMIA TYPE: ICD-10-CM

## 2018-08-02 DIAGNOSIS — F17.210 MODERATE SMOKER (20 OR LESS PER DAY): Primary | ICD-10-CM

## 2018-08-02 DIAGNOSIS — K21.9 GASTROESOPHAGEAL REFLUX DISEASE, ESOPHAGITIS PRESENCE NOT SPECIFIED: ICD-10-CM

## 2018-08-02 PROCEDURE — 99999 PR PBB SHADOW E&M-EST. PATIENT-LVL I: CPT | Mod: PBBFAC,,,

## 2018-08-02 PROCEDURE — 90853 GROUP PSYCHOTHERAPY: CPT | Mod: S$GLB,,,

## 2018-08-02 RX ORDER — PRAZOSIN HYDROCHLORIDE 2 MG/1
2 CAPSULE ORAL NIGHTLY
Qty: 30 CAPSULE | Refills: 0 | OUTPATIENT
Start: 2018-08-02

## 2018-08-02 RX ORDER — PRAVASTATIN SODIUM 20 MG/1
20 TABLET ORAL NIGHTLY
Qty: 30 TABLET | Refills: 0 | OUTPATIENT
Start: 2018-08-02

## 2018-08-02 RX ORDER — PANTOPRAZOLE SODIUM 40 MG/1
TABLET, DELAYED RELEASE ORAL
Qty: 30 TABLET | Refills: 0 | OUTPATIENT
Start: 2018-08-02

## 2018-08-02 NOTE — Clinical Note
Patient reports decreasing cigarette smoking from 1 pack per day for 46 years to a 1/2 pack per day. He shared he will be having knee surgery and would like to be quit by them. The patient remains on the prescribed tobacco cessation medication regimen of 1 mg Chantix BID with a 21 mg nicotine patch QD without any negative side effects at this time. 4 mg nicotine lozenges have been ordered but he has not received them from the pharmacy as of this group session.

## 2018-08-06 ENCOUNTER — LAB VISIT (OUTPATIENT)
Dept: LAB | Facility: HOSPITAL | Age: 58
End: 2018-08-06
Attending: ORTHOPAEDIC SURGERY
Payer: MEDICAID

## 2018-08-06 DIAGNOSIS — Z01.818 PREOP TESTING: ICD-10-CM

## 2018-08-06 DIAGNOSIS — Z01.818 PRE-OP TESTING: Primary | ICD-10-CM

## 2018-08-06 LAB
ALBUMIN SERPL BCP-MCNC: 3.7 G/DL
ALP SERPL-CCNC: 87 U/L
ALT SERPL W/O P-5'-P-CCNC: 17 U/L
ANION GAP SERPL CALC-SCNC: 10 MMOL/L
AST SERPL-CCNC: 17 U/L
BASOPHILS # BLD AUTO: 0.09 K/UL
BASOPHILS NFR BLD: 0.8 %
BILIRUB SERPL-MCNC: 0.2 MG/DL
BUN SERPL-MCNC: 14 MG/DL
CALCIUM SERPL-MCNC: 10.5 MG/DL
CHLORIDE SERPL-SCNC: 96 MMOL/L
CO2 SERPL-SCNC: 29 MMOL/L
CREAT SERPL-MCNC: 1.3 MG/DL
DIFFERENTIAL METHOD: ABNORMAL
EOSINOPHIL # BLD AUTO: 0.1 K/UL
EOSINOPHIL NFR BLD: 0.8 %
ERYTHROCYTE [DISTWIDTH] IN BLOOD BY AUTOMATED COUNT: 14.3 %
EST. GFR  (AFRICAN AMERICAN): >60 ML/MIN/1.73 M^2
EST. GFR  (NON AFRICAN AMERICAN): 59.3 ML/MIN/1.73 M^2
GLUCOSE SERPL-MCNC: 55 MG/DL
HCT VFR BLD AUTO: 42.5 %
HGB BLD-MCNC: 13.9 G/DL
IMM GRANULOCYTES # BLD AUTO: 0.1 K/UL
IMM GRANULOCYTES NFR BLD AUTO: 0.9 %
LYMPHOCYTES # BLD AUTO: 3.3 K/UL
LYMPHOCYTES NFR BLD: 28.5 %
MCH RBC QN AUTO: 27.6 PG
MCHC RBC AUTO-ENTMCNC: 32.7 G/DL
MCV RBC AUTO: 85 FL
MONOCYTES # BLD AUTO: 0.8 K/UL
MONOCYTES NFR BLD: 6.7 %
NEUTROPHILS # BLD AUTO: 7.2 K/UL
NEUTROPHILS NFR BLD: 62.3 %
NRBC BLD-RTO: 0 /100 WBC
PLATELET # BLD AUTO: 468 K/UL
PMV BLD AUTO: 9.4 FL
POTASSIUM SERPL-SCNC: 3.9 MMOL/L
PROT SERPL-MCNC: 7.5 G/DL
RBC # BLD AUTO: 5.03 M/UL
SODIUM SERPL-SCNC: 135 MMOL/L
WBC # BLD AUTO: 11.58 K/UL

## 2018-08-06 PROCEDURE — 85025 COMPLETE CBC W/AUTO DIFF WBC: CPT

## 2018-08-06 PROCEDURE — 80053 COMPREHEN METABOLIC PANEL: CPT

## 2018-08-06 PROCEDURE — 36415 COLL VENOUS BLD VENIPUNCTURE: CPT | Mod: PO

## 2018-08-06 NOTE — PROGRESS NOTES
Site: [place]  Date:  8/2/18  Clinical Status of Patient: Outpatient   Length of Service and Code: 90 minutes - 81778   Number in Attendance: 3  Group Activities/Focus of Group:  Orientation, client introductions, completion of TCRS (Tobacco Cessation Rating Scale) learned addiction model, cues/triggers, personal reasons for quitting, medications, goals and quit date. The patient will continue with group therapy sessions and medication monitoring by CTTS. Prescribed medication management will be by physician.     Target symptoms:  withdrawal and medication side effects             The following were rated moderate (3) to severe (4) on TCRS:       Moderate 3: none     Severe 4:   none  Patient's Response to Intervention: Patient reports decreasing cigarette smoking from 1 pack per day for 46 years to a 1/2 pack per day. He shared he will be having knee surgery and would like to be quit by them. The patient remains on the prescribed tobacco cessation medication regimen of 1 mg Chantix BID with a 21 mg nicotine patch QD without any negative side effects at this time. 4 mg nicotine lozenges have been ordered but he has not received them from the pharmacy as of this group session.      Progress Toward Goals and Other Mental Status Changes: The patient denies any abnormal behavioral or mental changes at this time.     Plan: The patient will continue with group therapy sessions and medication regimen prescribed with management by physician or Cessation Clinic Provider. Patient will inform Smoking Clinic Cessation Counselor of symptoms as rated high on TCRS.  The patient will continue with group therapy sessions and medication monitoring by CTTS. Prescribed medication management will be by physician.     Return to Clinic: 1 week

## 2018-08-15 ENCOUNTER — TELEPHONE (OUTPATIENT)
Dept: SMOKING CESSATION | Facility: CLINIC | Age: 58
End: 2018-08-15

## 2018-08-21 ENCOUNTER — TELEPHONE (OUTPATIENT)
Dept: SMOKING CESSATION | Facility: CLINIC | Age: 58
End: 2018-08-21

## 2018-08-21 NOTE — TELEPHONE ENCOUNTER
Attempted to contact patient to follow up tobacco cessation. I was not able to leave a message. No answer at the home phone and the cell phone does not have voicemail setup.

## 2018-08-28 ENCOUNTER — TELEPHONE (OUTPATIENT)
Dept: ORTHOPEDICS | Facility: CLINIC | Age: 58
End: 2018-08-28

## 2018-08-28 ENCOUNTER — ANESTHESIA EVENT (OUTPATIENT)
Dept: SURGERY | Facility: HOSPITAL | Age: 58
End: 2018-08-28

## 2018-08-28 NOTE — TELEPHONE ENCOUNTER
Felicia from [facility] called the office 3:30 pm, she stated that the patient was contacted 3x and answered.     Felicia called an alternate number and he did not answer. She left his surgery arrival time on vm.    as

## 2018-08-29 ENCOUNTER — ANESTHESIA (OUTPATIENT)
Dept: SURGERY | Facility: HOSPITAL | Age: 58
End: 2018-08-29

## 2018-08-31 RX ORDER — METFORMIN HYDROCHLORIDE 500 MG/1
500 TABLET ORAL 2 TIMES DAILY WITH MEALS
Qty: 60 TABLET | Refills: 1 | Status: SHIPPED | OUTPATIENT
Start: 2018-08-31 | End: 2022-01-19

## 2018-11-05 ENCOUNTER — TELEPHONE (OUTPATIENT)
Dept: ORTHOPEDICS | Facility: CLINIC | Age: 58
End: 2018-11-05

## 2018-11-05 DIAGNOSIS — M25.562 PAIN IN BOTH KNEES, UNSPECIFIED CHRONICITY: Primary | ICD-10-CM

## 2018-11-05 DIAGNOSIS — M25.561 PAIN IN BOTH KNEES, UNSPECIFIED CHRONICITY: Primary | ICD-10-CM

## 2018-11-05 NOTE — TELEPHONE ENCOUNTER
----- Message from Glenis Souza sent at 11/5/2018 11:59 AM CST -----  Contact: pt  Calling in regards to please give him a call back at [phone] (home) [phone] (work)

## 2018-11-05 NOTE — TELEPHONE ENCOUNTER
Spoke with patient and scheduled him an appointment to come back in for a surgical evaluation since he has had injuries since his last visit. Patient verbalized understanding.

## 2018-11-08 ENCOUNTER — HOSPITAL ENCOUNTER (OUTPATIENT)
Dept: RADIOLOGY | Facility: HOSPITAL | Age: 58
Discharge: HOME | End: 2018-11-08
Attending: ORTHOPAEDIC SURGERY
Payer: MEDICAID

## 2018-11-08 ENCOUNTER — OFFICE VISIT (OUTPATIENT)
Dept: ORTHOPEDICS | Facility: CLINIC | Age: 58
End: 2018-11-08
Payer: MEDICAID

## 2018-11-08 VITALS
HEART RATE: 91 BPM | WEIGHT: 246 LBS | HEIGHT: 71 IN | SYSTOLIC BLOOD PRESSURE: 154 MMHG | DIASTOLIC BLOOD PRESSURE: 99 MMHG | BODY MASS INDEX: 34.44 KG/M2

## 2018-11-08 DIAGNOSIS — M25.562 PAIN IN BOTH KNEES, UNSPECIFIED CHRONICITY: ICD-10-CM

## 2018-11-08 DIAGNOSIS — M94.261 CHONDROMALACIA OF RIGHT KNEE: ICD-10-CM

## 2018-11-08 DIAGNOSIS — M17.11 ARTHRITIS OF RIGHT KNEE: Primary | ICD-10-CM

## 2018-11-08 DIAGNOSIS — M25.561 PAIN IN BOTH KNEES, UNSPECIFIED CHRONICITY: ICD-10-CM

## 2018-11-08 PROCEDURE — 99999 PR PBB SHADOW E&M-EST. PATIENT-LVL III: CPT | Mod: PBBFAC,,, | Performed by: ORTHOPAEDIC SURGERY

## 2018-11-08 PROCEDURE — 99214 OFFICE O/P EST MOD 30 MIN: CPT | Mod: S$PBB,,, | Performed by: ORTHOPAEDIC SURGERY

## 2018-11-08 PROCEDURE — 99213 OFFICE O/P EST LOW 20 MIN: CPT | Mod: PBBFAC,25,PO | Performed by: ORTHOPAEDIC SURGERY

## 2018-11-08 PROCEDURE — 73562 X-RAY EXAM OF KNEE 3: CPT | Mod: TC,50,FY,PO

## 2018-11-08 PROCEDURE — 73562 X-RAY EXAM OF KNEE 3: CPT | Mod: 26,50,, | Performed by: RADIOLOGY
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
## 2018-11-09 ENCOUNTER — TELEPHONE (OUTPATIENT)
Dept: ORTHOPEDICS | Facility: CLINIC | Age: 58
End: 2018-11-09

## 2018-11-09 NOTE — TELEPHONE ENCOUNTER
Spoke with [facility] about the patient. They want to know if the patient could get a earlier time for the same day. I got the patient scheduled for the same day 12/11 at 9:30. verbalized understanding. Was thankful for the call.FP      ----- Message from Miguel Landeros sent at 11/9/2018  8:17 AM CST -----  Contact: [facility]-[phone]  Would like to know if patient can be seen at earlier time on 12/11.  Please call back at [phone].  Md Clark

## 2018-11-09 NOTE — PATIENT INSTRUCTIONS
What is Arthritis?  Arthritis is a disease that affects the joints (the parts where bones meet and move). It can affect any joint in your body. There are many types of arthritis, including osteoarthritis and rheumatoid arthrtitis. If your symptoms are mild, medications may be enough to reduce pain and swelling. For more severe arthritis, surgery may be needed to improve the condition of the joint or replace the joint entirely.                  What causes arthritis?  Cartilage is a smooth substance that protects the ends of your bones and provides cushioning. When you have arthritis, this cartilage breaks down and can no longer protect your bones. The bones rub against each other, causing pain and swelling. Over time, bone spurs (small pieces of rough or splintered bone) may develop, and the joint's range of motion can become limited.  Symptoms  Some of the more common symptoms of arthritis include:  · Joint pain and stiffness. Pain and stiffness get worse with long periods of rest or using a joint too long or too hard.  · Joints that have lost normal shape and motion.  · Tender, inflamed joints. They may look red and feel warm.  · Grinding or popping noise with joint movement.   · Feeling tired all the time.  Reducing symptoms  Following a healthy lifestyle by losing weight and exercising can help reduce symptoms of osteoarthritis. Medicines can be very helpful for arthritis.     Date Last Reviewed: 9/10/2015  © 1223-8705 [employer]. [address] [place] [address], [place] [address]. All rights reserved. This information is not intended as a substitute for professional medical care. Always follow your healthcare professional's instructions.

## 2018-12-10 ENCOUNTER — TELEPHONE (OUTPATIENT)
Dept: ORTHOPEDICS | Facility: CLINIC | Age: 58
End: 2018-12-10

## 2018-12-10 NOTE — TELEPHONE ENCOUNTER
Called to help RS appt. Will need to call back to speak with  Elvia tomorrow morning. [phone].

## 2018-12-10 NOTE — TELEPHONE ENCOUNTER
----- Message from Jenni Lind sent at 12/10/2018  9:51 AM CST -----  [facility]  Requesting a call back to r/s canceled appointment.Please call back at [phone]      Thanks,  Jenni Lind

## 2018-12-12 ENCOUNTER — TELEPHONE (OUTPATIENT)
Dept: ORTHOPEDICS | Facility: CLINIC | Age: 58
End: 2018-12-12

## 2018-12-12 ENCOUNTER — PATIENT MESSAGE (OUTPATIENT)
Dept: ORTHOPEDICS | Facility: CLINIC | Age: 58
End: 2018-12-12

## 2018-12-12 NOTE — TELEPHONE ENCOUNTER
Called [facility]. Unable to get through several attempts. Called pt. Left message to help him get RS with Estee HAIDER  or another provider.

## 2019-01-22 ENCOUNTER — TELEPHONE (OUTPATIENT)
Dept: SMOKING CESSATION | Facility: CLINIC | Age: 59
End: 2019-01-22

## 2019-01-30 ENCOUNTER — CLINICAL SUPPORT (OUTPATIENT)
Dept: SMOKING CESSATION | Facility: CLINIC | Age: 59
End: 2019-01-30
Payer: COMMERCIAL

## 2019-01-30 DIAGNOSIS — F17.200 NICOTINE DEPENDENCE: Primary | ICD-10-CM

## 2019-01-30 PROCEDURE — 99407 BEHAV CHNG SMOKING > 10 MIN: CPT | Mod: S$GLB,,, | Performed by: INTERNAL MEDICINE

## 2019-01-30 PROCEDURE — 99407 PR TOBACCO USE CESSATION INTENSIVE >10 MINUTES: ICD-10-PCS | Mod: S$GLB,,, | Performed by: INTERNAL MEDICINE

## 2019-01-30 NOTE — PROGRESS NOTES
Spoke with patient's nurse(Mrs. Barrett) at nursing home facility today in regard to smoking cessation progress, she states he is not tobacco free. Will complete smart form for 3 and 6 month follow up on Quit attempt #2.

## 2019-08-13 ENCOUNTER — PATIENT OUTREACH (OUTPATIENT)
Dept: ADMINISTRATIVE | Facility: HOSPITAL | Age: 59
End: 2019-08-13

## 2019-08-23 ENCOUNTER — TELEPHONE (OUTPATIENT)
Dept: ORTHOPEDICS | Facility: CLINIC | Age: 59
End: 2019-08-23

## 2019-08-23 NOTE — TELEPHONE ENCOUNTER
Spoke c rep from [facility]. She informed be that Brooklyn Roper is currently out of the office & asked me to call back on Monday, 08/26/19 between 6:00a-2:00p to schedule appt for pt.    ==  Reminder set to contact Brooklyn Roper on 08/26/19.

## 2019-08-23 NOTE — TELEPHONE ENCOUNTER
----- Message from Ro Sanchez sent at 8/23/2019 12:34 PM CDT -----  Contact: Ms Valorie Roper  Stated pt is having pain in his right knee, and want to be seen, he can be reached [phone]  Ext [phone] Thanks

## 2019-08-26 ENCOUNTER — TELEPHONE (OUTPATIENT)
Dept: ORTHOPEDICS | Facility: CLINIC | Age: 59
End: 2019-08-26

## 2019-08-26 DIAGNOSIS — M25.521 RIGHT ELBOW PAIN: Primary | ICD-10-CM

## 2019-08-26 DIAGNOSIS — M25.511 RIGHT SHOULDER PAIN, UNSPECIFIED CHRONICITY: ICD-10-CM

## 2019-08-26 NOTE — TELEPHONE ENCOUNTER
Attempted to contact Brooklyn. She was unavailable.  took message stating that I was returning call to schedule pt appt. Advised that Brooklyn call [phone] to schedule appt.  expressed understanding.

## 2019-08-26 NOTE — TELEPHONE ENCOUNTER
----- Message from Edouard Carreon sent at 8/26/2019  8:08 AM CDT -----  Contact: Ofelia Roper from [facility]   Type:  Patient Returning Call    Who Called: pt   Who Left Message for Patient:nurse for Dr   Does the patient know what this is regarding?:sched an appt for the pt   Would the patient rather a call back or a response via [facility]? Phone   Best Call Back Number:[phone] ext [phone]  Additional Information:

## 2019-08-26 NOTE — TELEPHONE ENCOUNTER
----- Message from Lynne Trinidad sent at 8/23/2019  2:15 PM CDT -----  Regarding: call to schedule appt  Call Brooklyn at [facility] [place] [phone]  On MONDAY 08/26/19to schedule appt for pt.

## 2019-08-28 DIAGNOSIS — M25.561 RIGHT KNEE PAIN, UNSPECIFIED CHRONICITY: Primary | ICD-10-CM

## 2019-08-29 ENCOUNTER — OFFICE VISIT (OUTPATIENT)
Dept: ORTHOPEDICS | Facility: CLINIC | Age: 59
End: 2019-08-29
Payer: MEDICAID

## 2019-08-29 ENCOUNTER — HOSPITAL ENCOUNTER (OUTPATIENT)
Dept: RADIOLOGY | Facility: HOSPITAL | Age: 59
Discharge: HOME | End: 2019-08-29
Attending: PHYSICIAN ASSISTANT
Payer: MEDICAID

## 2019-08-29 VITALS
HEART RATE: 85 BPM | HEIGHT: 71 IN | WEIGHT: 246 LBS | SYSTOLIC BLOOD PRESSURE: 123 MMHG | BODY MASS INDEX: 34.44 KG/M2 | DIASTOLIC BLOOD PRESSURE: 86 MMHG

## 2019-08-29 DIAGNOSIS — M17.11 PRIMARY OSTEOARTHRITIS OF RIGHT KNEE: Primary | ICD-10-CM

## 2019-08-29 DIAGNOSIS — G89.29 CHRONIC PAIN OF RIGHT KNEE: ICD-10-CM

## 2019-08-29 DIAGNOSIS — F25.0 SCHIZOAFFECTIVE DISORDER, BIPOLAR TYPE: ICD-10-CM

## 2019-08-29 DIAGNOSIS — Z79.4 TYPE 2 DIABETES MELLITUS WITH STAGE 3 CHRONIC KIDNEY DISEASE, WITH LONG-TERM CURRENT USE OF INSULIN: ICD-10-CM

## 2019-08-29 DIAGNOSIS — M25.561 CHRONIC PAIN OF RIGHT KNEE: ICD-10-CM

## 2019-08-29 DIAGNOSIS — N18.30 TYPE 2 DIABETES MELLITUS WITH STAGE 3 CHRONIC KIDNEY DISEASE, WITH LONG-TERM CURRENT USE OF INSULIN: ICD-10-CM

## 2019-08-29 DIAGNOSIS — M25.561 RIGHT KNEE PAIN, UNSPECIFIED CHRONICITY: ICD-10-CM

## 2019-08-29 DIAGNOSIS — E11.22 TYPE 2 DIABETES MELLITUS WITH STAGE 3 CHRONIC KIDNEY DISEASE, WITH LONG-TERM CURRENT USE OF INSULIN: ICD-10-CM

## 2019-08-29 PROCEDURE — 20610 PR DRAIN/INJECT LARGE JOINT/BURSA: ICD-10-PCS | Mod: S$PBB,RT,, | Performed by: PHYSICIAN ASSISTANT

## 2019-08-29 PROCEDURE — 20610 DRAIN/INJ JOINT/BURSA W/O US: CPT | Mod: S$PBB,RT,, | Performed by: PHYSICIAN ASSISTANT

## 2019-08-29 PROCEDURE — 99215 OFFICE O/P EST HI 40 MIN: CPT | Mod: PBBFAC,25 | Performed by: PHYSICIAN ASSISTANT

## 2019-08-29 PROCEDURE — 99999 PR PBB SHADOW E&M-EST. PATIENT-LVL V: CPT | Mod: PBBFAC,,, | Performed by: PHYSICIAN ASSISTANT

## 2019-08-29 PROCEDURE — 99999 PR PBB SHADOW E&M-EST. PATIENT-LVL V: ICD-10-PCS | Mod: PBBFAC,,, | Performed by: PHYSICIAN ASSISTANT

## 2019-08-29 PROCEDURE — 99214 OFFICE O/P EST MOD 30 MIN: CPT | Mod: 25,S$PBB,, | Performed by: PHYSICIAN ASSISTANT

## 2019-08-29 PROCEDURE — 73562 X-RAY EXAM OF KNEE 3: CPT | Mod: 26,59,LT, | Performed by: RADIOLOGY

## 2019-08-29 PROCEDURE — 73562 X-RAY EXAM OF KNEE 3: CPT | Mod: TC,LT

## 2019-08-29 PROCEDURE — 73562 XR KNEE ORTHO RIGHT WITH FLEXION: ICD-10-PCS | Mod: 26,59,LT, | Performed by: RADIOLOGY

## 2019-08-29 PROCEDURE — 99214 PR OFFICE/OUTPT VISIT, EST, LEVL IV, 30-39 MIN: ICD-10-PCS | Mod: 25,S$PBB,, | Performed by: PHYSICIAN ASSISTANT

## 2019-08-29 PROCEDURE — 20610 DRAIN/INJ JOINT/BURSA W/O US: CPT | Mod: PBBFAC | Performed by: PHYSICIAN ASSISTANT

## 2019-08-29 PROCEDURE — 73564 XR KNEE ORTHO RIGHT WITH FLEXION: ICD-10-PCS | Mod: 26,RT,, | Performed by: RADIOLOGY

## 2019-08-29 PROCEDURE — 73564 X-RAY EXAM KNEE 4 OR MORE: CPT | Mod: 26,RT,, | Performed by: RADIOLOGY

## 2019-08-29 RX ORDER — AMLODIPINE BESYLATE 10 MG/1
10 TABLET ORAL
Status: ON HOLD | COMMUNITY
End: 2022-02-13 | Stop reason: HOSPADM

## 2019-08-29 RX ORDER — METHYLPREDNISOLONE ACETATE 80 MG/ML
80 INJECTION, SUSPENSION INTRA-ARTICULAR; INTRALESIONAL; INTRAMUSCULAR; SOFT TISSUE ONCE
Status: COMPLETED | OUTPATIENT
Start: 2019-08-29 | End: 2019-08-29

## 2019-08-29 RX ORDER — INSULIN PUMP SYRINGE, 3 ML
EACH MISCELLANEOUS
COMMUNITY
Start: 2016-09-09 | End: 2019-08-29

## 2019-08-29 RX ORDER — FERROUS SULFATE 325(65) MG
325 TABLET ORAL
COMMUNITY

## 2019-08-29 RX ORDER — GABAPENTIN 800 MG/1
800 TABLET ORAL 3 TIMES DAILY
Status: ON HOLD | COMMUNITY
End: 2022-02-13 | Stop reason: HOSPADM

## 2019-08-29 RX ORDER — QUETIAPINE FUMARATE 200 MG/1
200 TABLET, FILM COATED ORAL
COMMUNITY
Start: 2018-03-28 | End: 2019-08-29

## 2019-08-29 RX ORDER — OMEPRAZOLE 20 MG/1
20 CAPSULE, DELAYED RELEASE ORAL DAILY
COMMUNITY

## 2019-08-29 RX ORDER — TRAZODONE HYDROCHLORIDE 50 MG/1
50 TABLET ORAL
COMMUNITY
End: 2019-08-29

## 2019-08-29 RX ORDER — SERTRALINE HYDROCHLORIDE 100 MG/1
100 TABLET, FILM COATED ORAL
COMMUNITY
End: 2019-08-29

## 2019-08-29 RX ORDER — CLONIDINE HYDROCHLORIDE 0.3 MG/1
0.3 TABLET ORAL
COMMUNITY
End: 2019-08-29

## 2019-08-29 RX ORDER — QUETIAPINE FUMARATE 300 MG/1
300 TABLET, FILM COATED ORAL
COMMUNITY
End: 2019-08-29

## 2019-08-29 RX ORDER — SYRINGE,SAFETY WITH NEEDLE,1ML 25GX1"
1 SYRINGE (EA) MISCELLANEOUS
COMMUNITY
Start: 2017-06-07 | End: 2019-08-29

## 2019-08-29 RX ORDER — QUETIAPINE FUMARATE 100 MG/1
150 TABLET, FILM COATED ORAL
COMMUNITY
End: 2019-08-29

## 2019-08-29 RX ORDER — DULOXETIN HYDROCHLORIDE 60 MG/1
60 CAPSULE, DELAYED RELEASE ORAL 2 TIMES DAILY
COMMUNITY

## 2019-08-29 RX ORDER — PEN NEEDLE, DIABETIC 30 GX3/16"
1 NEEDLE, DISPOSABLE MISCELLANEOUS
COMMUNITY
Start: 2018-03-26 | End: 2019-08-29

## 2019-08-29 RX ORDER — LISINOPRIL 40 MG/1
40 TABLET ORAL DAILY
Status: ON HOLD | COMMUNITY
End: 2022-02-13 | Stop reason: HOSPADM

## 2019-08-29 RX ORDER — BISACODYL 10 MG
10 SUPPOSITORY, RECTAL RECTAL DAILY PRN
COMMUNITY
End: 2022-01-19

## 2019-08-29 RX ORDER — ACETAMINOPHEN 650 MG/20.3ML
650 LIQUID ORAL EVERY 6 HOURS PRN
COMMUNITY
Start: 2018-09-28 | End: 2022-01-19

## 2019-08-29 RX ORDER — HYDROCODONE BITARTRATE AND ACETAMINOPHEN 5; 325 MG/1; MG/1
1 TABLET ORAL EVERY 8 HOURS PRN
COMMUNITY
Start: 2019-08-09 | End: 2019-09-08

## 2019-08-29 RX ORDER — ALBUTEROL SULFATE 90 UG/1
2 AEROSOL, METERED RESPIRATORY (INHALATION) EVERY 4 HOURS PRN
COMMUNITY
Start: 2019-03-20 | End: 2022-01-02

## 2019-08-29 RX ORDER — INSULIN GLARGINE 100 [IU]/ML
15 INJECTION, SOLUTION SUBCUTANEOUS
COMMUNITY
End: 2019-08-29

## 2019-08-29 RX ORDER — METFORMIN HYDROCHLORIDE 500 MG/1
500 TABLET ORAL
COMMUNITY
End: 2019-08-29

## 2019-08-29 RX ORDER — SILVER SULFADIAZINE 10 G/1000G
CREAM TOPICAL
COMMUNITY
End: 2022-01-19 | Stop reason: ALTCHOICE

## 2019-08-29 RX ORDER — TAMSULOSIN HYDROCHLORIDE 0.4 MG/1
0.4 CAPSULE ORAL DAILY
COMMUNITY

## 2019-08-29 RX ORDER — CARVEDILOL 6.25 MG/1
6.25 TABLET ORAL 2 TIMES DAILY
COMMUNITY

## 2019-08-29 RX ORDER — DOCUSATE SODIUM 100 MG/1
100 CAPSULE, LIQUID FILLED ORAL 2 TIMES DAILY
COMMUNITY

## 2019-08-29 RX ADMIN — METHYLPREDNISOLONE ACETATE 80 MG: 80 INJECTION, SUSPENSION INTRALESIONAL; INTRAMUSCULAR; INTRASYNOVIAL; SOFT TISSUE at 01:08
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
## 2019-09-30 ENCOUNTER — OFFICE VISIT (OUTPATIENT)
Dept: ORTHOPEDICS | Facility: CLINIC | Age: 59
End: 2019-09-30
Payer: MEDICAID

## 2019-09-30 VITALS
SYSTOLIC BLOOD PRESSURE: 123 MMHG | BODY MASS INDEX: 34.44 KG/M2 | DIASTOLIC BLOOD PRESSURE: 83 MMHG | HEART RATE: 74 BPM | HEIGHT: 71 IN | WEIGHT: 246 LBS

## 2019-09-30 DIAGNOSIS — M25.561 CHRONIC PAIN OF RIGHT KNEE: ICD-10-CM

## 2019-09-30 DIAGNOSIS — G89.29 CHRONIC PAIN OF RIGHT KNEE: ICD-10-CM

## 2019-09-30 DIAGNOSIS — M17.11 PRIMARY OSTEOARTHRITIS OF RIGHT KNEE: Primary | ICD-10-CM

## 2019-09-30 PROCEDURE — 99999 PR PBB SHADOW E&M-EST. PATIENT-LVL IV: ICD-10-PCS | Mod: PBBFAC,,, | Performed by: PHYSICIAN ASSISTANT

## 2019-09-30 PROCEDURE — 20610 DRAIN/INJ JOINT/BURSA W/O US: CPT | Mod: PBBFAC | Performed by: PHYSICIAN ASSISTANT

## 2019-09-30 PROCEDURE — 99214 OFFICE O/P EST MOD 30 MIN: CPT | Mod: PBBFAC | Performed by: PHYSICIAN ASSISTANT

## 2019-09-30 PROCEDURE — 99999 PR PBB SHADOW E&M-EST. PATIENT-LVL IV: CPT | Mod: PBBFAC,,, | Performed by: PHYSICIAN ASSISTANT

## 2019-09-30 PROCEDURE — 20610 PR DRAIN/INJECT LARGE JOINT/BURSA: ICD-10-PCS | Mod: S$PBB,RT,, | Performed by: PHYSICIAN ASSISTANT

## 2019-09-30 PROCEDURE — 99499 UNLISTED E&M SERVICE: CPT | Mod: S$PBB,,, | Performed by: PHYSICIAN ASSISTANT

## 2019-09-30 PROCEDURE — 20610 DRAIN/INJ JOINT/BURSA W/O US: CPT | Mod: S$PBB,RT,, | Performed by: PHYSICIAN ASSISTANT

## 2019-09-30 PROCEDURE — 99499 NO LOS: ICD-10-PCS | Mod: S$PBB,,, | Performed by: PHYSICIAN ASSISTANT

## 2019-09-30 RX ADMIN — Medication 48 MG: at 01:09

## 2019-09-30 NOTE — PROGRESS NOTES
Mr. Rosales comes in today for Synvisc-One injection in the right knee.  At last office visit, I gave him a steroid injection in the right knee.  He states the steroid really did not help alleviate his symptoms.  Pain level 7/10.  Quality is aching, stinging, popping.  Alleviating factors include pain medication.  This is monitored by the nursing home in the.  Aggravating factors include weight-bearing and deep flexion.  We have again discussed risks and benefits of Synvisc-One injections.  He wishes to proceed today I will plan to see him back in the office in about 3 months.  In the meantime, he can continue with physical therapy over the next 6 weeks or so.  He does not need x-rays at that time. He verbalizes understanding and agrees.    Right Knee Synvisc One Injection Report:  After verbal consent was obtained for right knee injection, patient ID, site, and side were verified.  The  right  knee was sterilly prepped in the standard fashion.  A 22-gauge needle was introduced into the joint at the supero-lateral site without complication. The right knee was then injected with 20 mg of lidocaine.  The right knee was reprepped utilizing the same superolateral site.  After adequate time was given for anesthesia, an 18-gauge needle was inserted into the right knee utilizing the superolateral site without complication.  It was then injected with 1 prefilled syringe of Synvisc One.  A sterile bandaid was applied.  The patient tolerated the procedure well.  The patient was informed to apply an ice pack approximately 10min once arriving home and not to do anything strenuous for 24hours.  She was instructed to call if there were any problems. The patient was discharged in stable condition.

## 2019-10-03 ENCOUNTER — TELEPHONE (OUTPATIENT)
Dept: SMOKING CESSATION | Facility: CLINIC | Age: 59
End: 2019-10-03

## 2019-10-03 NOTE — TELEPHONE ENCOUNTER
Attempted to contact patient to follow up with smoking cessation. I was able to leave a detailed message with the following information: Diane Miller, [facility] and my phone number [phone]. I requested a return call.

## 2019-10-14 ENCOUNTER — TELEPHONE (OUTPATIENT)
Dept: SMOKING CESSATION | Facility: CLINIC | Age: 59
End: 2019-10-14

## 2019-10-14 NOTE — TELEPHONE ENCOUNTER
Attempted to contact patient to reschedule his intake appointment with smoking cessation. I was able to leave a detailed message with the following information: Diane Miller, [facility] and my phone number [phone]. I requested a return call.

## 2019-11-18 ENCOUNTER — TELEPHONE (OUTPATIENT)
Dept: FAMILY MEDICINE | Facility: CLINIC | Age: 59
End: 2019-11-18

## 2019-11-18 NOTE — TELEPHONE ENCOUNTER
----- Message from Jeannette Godoy sent at 11/18/2019 11:38 AM CST -----  Type:  Needs Medical Advice    Who Called: Pt  Shukri  Symptoms (please be specific):     How long has patient had these symptoms:     Pharmacy name and phone #:     Would the patient rather a call back or a response via [facility]?  Call back  Best Call Back Number:   [phone]  Additional Information:  Pt states he is calling to ask if it is time for him to have another colonoscopy//please call to inform//this is his second message//thanks/telly

## 2020-01-17 ENCOUNTER — CLINICAL SUPPORT (OUTPATIENT)
Dept: SMOKING CESSATION | Facility: CLINIC | Age: 60
End: 2020-01-17
Payer: COMMERCIAL

## 2020-01-17 DIAGNOSIS — F17.200 NICOTINE DEPENDENCE: Primary | ICD-10-CM

## 2020-01-17 PROCEDURE — 99404 PREV MED CNSL INDIV APPRX 60: CPT | Mod: S$GLB,,, | Performed by: GENERAL PRACTICE

## 2020-01-17 PROCEDURE — 99999 PR PBB SHADOW E&M-EST. PATIENT-LVL I: ICD-10-PCS | Mod: PBBFAC,,,

## 2020-01-17 PROCEDURE — 99404 PR PREVENT COUNSEL,INDIV,60 MIN: ICD-10-PCS | Mod: S$GLB,,, | Performed by: GENERAL PRACTICE

## 2020-01-17 PROCEDURE — 99999 PR PBB SHADOW E&M-EST. PATIENT-LVL I: CPT | Mod: PBBFAC,,,

## 2020-01-17 RX ORDER — VARENICLINE TARTRATE 0.5 (11)-1
KIT ORAL
Qty: 53 TABLET | Refills: 0 | Status: SHIPPED | OUTPATIENT
Start: 2020-01-17 | End: 2020-10-29 | Stop reason: SDUPTHER

## 2020-01-29 ENCOUNTER — TELEPHONE (OUTPATIENT)
Dept: SMOKING CESSATION | Facility: CLINIC | Age: 60
End: 2020-01-29

## 2020-01-29 NOTE — TELEPHONE ENCOUNTER
Attempt to contact patient on both numbers listed. Recorded message left with return contact information.

## 2020-07-22 ENCOUNTER — TELEPHONE (OUTPATIENT)
Dept: FAMILY MEDICINE | Facility: CLINIC | Age: 60
End: 2020-07-22

## 2020-07-22 NOTE — TELEPHONE ENCOUNTER
----- Message from Sonya Rodrigues sent at 7/22/2020  8:32 AM CDT -----  Regarding: missed call  Type:  Patient Returning Call    Who Called: PT   Who Left Message for Patient: nurse   Does the patient know what this is regarding?: no   Would the patient rather a call back or a response via [facility]? Call back   Best Call Back Number: [phone]  Additional Information: n/a

## 2020-09-21 ENCOUNTER — TELEPHONE (OUTPATIENT)
Dept: FAMILY MEDICINE | Facility: CLINIC | Age: 60
End: 2020-09-21

## 2020-09-21 NOTE — TELEPHONE ENCOUNTER
----- Message from Isadora Olivares sent at 9/21/2020  2:38 PM CDT -----  Regarding: est care  Pt missed appt on 9/14/20 and would like to get a sooner appt and can be reached at [phone]        Thanks,  Isadora Olivares

## 2020-09-21 NOTE — TELEPHONE ENCOUNTER
Patient is asking if he can be seen sooner? He missed 2 previous appointments and is new Medicaid. Please advise?

## 2020-10-29 ENCOUNTER — CLINICAL SUPPORT (OUTPATIENT)
Dept: SMOKING CESSATION | Facility: CLINIC | Age: 60
End: 2020-10-29
Payer: COMMERCIAL

## 2020-10-29 DIAGNOSIS — F17.200 NICOTINE DEPENDENCE: Primary | ICD-10-CM

## 2020-10-29 PROCEDURE — 99404 PR PREVENT COUNSEL,INDIV,60 MIN: ICD-10-PCS | Mod: S$GLB,,, | Performed by: GENERAL PRACTICE

## 2020-10-29 PROCEDURE — 99404 PREV MED CNSL INDIV APPRX 60: CPT | Mod: S$GLB,,, | Performed by: GENERAL PRACTICE

## 2020-10-29 RX ORDER — VARENICLINE TARTRATE 0.5 (11)-1
KIT ORAL
Qty: 53 TABLET | Refills: 0 | Status: SHIPPED | OUTPATIENT
Start: 2020-10-29 | End: 2020-11-25

## 2020-10-29 RX ORDER — IBUPROFEN 200 MG
1 TABLET ORAL DAILY
Qty: 28 PATCH | Refills: 0 | Status: SHIPPED | OUTPATIENT
Start: 2020-10-29 | End: 2020-11-25 | Stop reason: SDUPTHER

## 2020-11-04 ENCOUNTER — TELEPHONE (OUTPATIENT)
Dept: SMOKING CESSATION | Facility: CLINIC | Age: 60
End: 2020-11-04

## 2020-11-11 ENCOUNTER — TELEPHONE (OUTPATIENT)
Dept: ENDOSCOPY | Facility: HOSPITAL | Age: 60
End: 2020-11-11

## 2020-11-11 DIAGNOSIS — Z12.11 SPECIAL SCREENING FOR MALIGNANT NEOPLASMS, COLON: ICD-10-CM

## 2020-11-11 DIAGNOSIS — Z12.11 COLON CANCER SCREENING: Primary | ICD-10-CM

## 2020-11-11 RX ORDER — POLYETHYLENE GLYCOL 3350, SODIUM SULFATE ANHYDROUS, SODIUM BICARBONATE, SODIUM CHLORIDE, POTASSIUM CHLORIDE 236; 22.74; 6.74; 5.86; 2.97 G/4L; G/4L; G/4L; G/4L; G/4L
4 POWDER, FOR SOLUTION ORAL ONCE
Qty: 4000 ML | Refills: 0 | Status: SHIPPED | OUTPATIENT
Start: 2020-11-11 | End: 2020-11-11

## 2020-11-11 NOTE — TELEPHONE ENCOUNTER
Received completed form form provider and entered order for colonoscopy.    Location Screening:    If answers yes to any of the following, schedule at [facility] ONLY. If No, OK for either location.    1. Is there a diagnosis of heart failure, severe heart valve disease (aortic stenosis) or mechanical valve? no  a. Is the Left Ventricle Ejection Fraction <30% ? no    2. Does the pt have pulmonary hypertension? no   a. Is pulmonary arterial pressure gradient >50mmHg? no   b. Is there evidence of right ventricular dysfunction? no    3. Does the pt have achalasia? no    4. Any history of negative reaction to anesthesia? no   a. Myasthenia gravis? no   b. Malignant hyperthermia? no   c. Other? no    5. Is procedure for esophageal banding? no      COVID Screening    1. Have you had a fever in the last 7 days or have you used fever reducing medicines for a fever in the last 7 days?  no    2. Are you experiencing shortness of breath, cough, muscle aches, loss of taste or loss of smell?  no    If answered yes to questions 1 and 2, the patient must seek medical attention with their PCP.  Do not schedule their procedure.     3. Are you residing with anyone who has tested positive for Covid?  no    If answered yes to question 3, recommend 14 day self-quarantine from the date of relative's positive test and place special needs note in the depot.  Wait to schedule.     ENDO screening    1. Have you been admitted for cardiac, kidney or pulmonary causes to the hospital in the past 3 months? no   If yes, schedule an appointment with PCP before scheduling endoscopic procedure.     2. Have you had a stent placed in the last 12 months? no   If yes, for a screening visit, cancel and message the ordering provider.  The patient will need a new order when the time is appropriate.     3. Have you had a stroke or heart attack in the past 6 months? no   If yes, cancel and refer patient to ordering provider for clearance, also message ordering  "provider to inform.     4. Have you had any chest pain in the past 3 months? no   If yes, Have you been evaluated by your PCP and/or cardiologist and it was determined to not be heart related? not applicable   If No, Pt needs to be seen by PCP or Cardiologist .  Pt can be scheduled once clearance obtained by either of those providers.     5. Do you take prescription weight loss medications?  no   If yes, must stop for 2 weeks prior to procedure.     6. Have you been diagnosed with diverticulitis within the past 3 months? no   If yes, must have been seen by GI within the last 3 months, if not schedule with [facility].    If pt has been seen by GI, schedule procedure 8-12 weeks post antibiotic treatment.     7. Are you on Dialysis? no  If yes, schedule procedure for the day AFTER dialysis.  Appt time should be 9am or later, patient arrival time is 2 hours prior.  Nulytely or miralax prep for all patients with kidney disease.     8. Are you diabetic?  yes   If yes, schedule morning appt. Advise pt to hold all diabetic meds day of procedure.     9. If pt is older than 80 years of age and HAS NOT been seen by GI or PCP within the last 6 months, needs appt with [facility].   If pt has been seen by the GI provider or PCP within the past 6  months AND meets criteria, schedule procedure AND send message to the endoscopist.     10. Is patient on a "high risk" medication (blood thinner/antiplatelet agent)?  no   If yes, has cardiac clearance been obtained within the last 60 days? N/A   If no, a new clearance needs to be obtained.     Final Questions:    1.I have reviewed the last colonoscopy for recommendations regarding next procedure bowel prep.  yes  2. I have reviewed medications and allergies.  yes  3. I have verified the pharmacy information and appropriate prep sent if needed. yes  4. Prep instructions have been mailed or sent to portal per patient request. yes    Instructions sent to [email or web address]    All schedulers " will have ability to reach out to the ordering GI provider to clarify any issues.

## 2020-11-19 ENCOUNTER — CLINICAL SUPPORT (OUTPATIENT)
Dept: SMOKING CESSATION | Facility: CLINIC | Age: 60
End: 2020-11-19
Payer: COMMERCIAL

## 2020-11-19 DIAGNOSIS — F17.200 NICOTINE DEPENDENCE: Primary | ICD-10-CM

## 2020-11-19 PROCEDURE — 99404 PREV MED CNSL INDIV APPRX 60: CPT | Mod: S$GLB,,, | Performed by: GENERAL PRACTICE

## 2020-11-19 PROCEDURE — 99404 PR PREVENT COUNSEL,INDIV,60 MIN: ICD-10-PCS | Mod: S$GLB,,, | Performed by: GENERAL PRACTICE

## 2020-11-19 NOTE — PROGRESS NOTES
Individual Follow-Up Form    11/19/2020    Quit Date: N/A    Clinical Status of Patient: Outpatient    Length of Service: 60 minutes    Continuing Medication: yes  Chantix    Other Medications: Patches     Target Symptoms: Withdrawal and medication side effects. The following were rated moderate (3) to severe (4) on TCRS:  · Moderate (3): None  · Severe (4): None    Comments: Sharing last weeks challenges, triggers, and coping activities to remain keep making progress toward cessation, completion of TCRS (Tobacco Cessation Rating Scale) learned addiction model, personal reasons for quitting, medications, goals, quit date. Patient states that he is smoking about the same. Patient is currently on 21 mg nicotine patches QD and Chantix starter pack. Patient reports no negative side effects at this time. Discussed strategies with patient to help with quit. The patient denies any abnormal behavioral or mental changes at this time.       Diagnosis: F17.210    Next Visit: 1 week

## 2020-11-25 ENCOUNTER — CLINICAL SUPPORT (OUTPATIENT)
Dept: SMOKING CESSATION | Facility: CLINIC | Age: 60
End: 2020-11-25
Payer: COMMERCIAL

## 2020-11-25 DIAGNOSIS — F17.200 NICOTINE DEPENDENCE: Primary | ICD-10-CM

## 2020-11-25 DIAGNOSIS — F17.210 LIGHT CIGARETTE SMOKER (1-9 CIGS/DAY): ICD-10-CM

## 2020-11-25 PROCEDURE — 99999 PR PBB SHADOW E&M-EST. PATIENT-LVL III: ICD-10-PCS | Mod: PBBFAC,,,

## 2020-11-25 PROCEDURE — 99404 PREV MED CNSL INDIV APPRX 60: CPT | Mod: S$GLB,,, | Performed by: GENERAL PRACTICE

## 2020-11-25 PROCEDURE — 99404 PR PREVENT COUNSEL,INDIV,60 MIN: ICD-10-PCS | Mod: S$GLB,,, | Performed by: GENERAL PRACTICE

## 2020-11-25 PROCEDURE — 99999 PR PBB SHADOW E&M-EST. PATIENT-LVL III: CPT | Mod: PBBFAC,,,

## 2020-11-25 RX ORDER — IBUPROFEN 200 MG
1 TABLET ORAL DAILY
Qty: 28 PATCH | Refills: 0 | Status: SHIPPED | OUTPATIENT
Start: 2020-11-25 | End: 2020-12-16 | Stop reason: DRUGHIGH

## 2020-11-25 RX ORDER — ASPIRIN/CALCIUM CARB/MAGNESIUM 325 MG
4 TABLET ORAL
Qty: 144 LOZENGE | Refills: 0 | Status: SHIPPED | OUTPATIENT
Start: 2020-11-25 | End: 2022-02-07

## 2020-11-25 RX ORDER — VARENICLINE TARTRATE 1 MG/1
1 TABLET, FILM COATED ORAL 2 TIMES DAILY
Qty: 56 TABLET | Refills: 0 | Status: SHIPPED | OUTPATIENT
Start: 2020-11-25 | End: 2020-12-23 | Stop reason: SDUPTHER

## 2020-11-25 NOTE — Clinical Note
Just a note to advise how the patient is progressing in the tobacco cessation program.  PT still smoking 1 pack per day. Discussed cravings and wait time, and  coffee and cigarettes. Ordered 4 mg lozenges and advised using lozenges in place of cigarettes. The patient remains on the prescribed tobacco cessation medication regimen of 1.0 mg Chantix twice a day without any negative side effects at this time. The patient denies any abnormal behavioral or mental changes at this time.

## 2020-11-30 ENCOUNTER — TELEPHONE (OUTPATIENT)
Dept: ENDOSCOPY | Facility: HOSPITAL | Age: 60
End: 2020-11-30

## 2020-11-30 ENCOUNTER — LAB VISIT (OUTPATIENT)
Dept: URGENT CARE | Facility: CLINIC | Age: 60
End: 2020-11-30
Payer: MEDICAID

## 2020-11-30 DIAGNOSIS — Z12.11 COLON CANCER SCREENING: ICD-10-CM

## 2020-11-30 PROCEDURE — U0003 INFECTIOUS AGENT DETECTION BY NUCLEIC ACID (DNA OR RNA); SEVERE ACUTE RESPIRATORY SYNDROME CORONAVIRUS 2 (SARS-COV-2) (CORONAVIRUS DISEASE [COVID-19]), AMPLIFIED PROBE TECHNIQUE, MAKING USE OF HIGH THROUGHPUT TECHNOLOGIES AS DESCRIBED BY CMS-2020-01-R: HCPCS

## 2020-12-01 LAB — SARS-COV-2 RNA RESP QL NAA+PROBE: NOT DETECTED

## 2020-12-02 ENCOUNTER — CLINICAL SUPPORT (OUTPATIENT)
Dept: SMOKING CESSATION | Facility: CLINIC | Age: 60
End: 2020-12-02
Payer: COMMERCIAL

## 2020-12-02 DIAGNOSIS — F17.210 LIGHT CIGARETTE SMOKER (1-9 CIGS/DAY): Primary | ICD-10-CM

## 2020-12-02 PROCEDURE — 99999 PR PBB SHADOW E&M-EST. PATIENT-LVL III: ICD-10-PCS | Mod: PBBFAC,,,

## 2020-12-02 PROCEDURE — 99404 PREV MED CNSL INDIV APPRX 60: CPT | Mod: S$GLB,,, | Performed by: GENERAL PRACTICE

## 2020-12-02 PROCEDURE — 99404 PR PREVENT COUNSEL,INDIV,60 MIN: ICD-10-PCS | Mod: S$GLB,,, | Performed by: GENERAL PRACTICE

## 2020-12-02 PROCEDURE — 99999 PR PBB SHADOW E&M-EST. PATIENT-LVL III: CPT | Mod: PBBFAC,,,

## 2020-12-02 NOTE — PROGRESS NOTES
Individual Follow-Up Form    12/2/2020    Quit Date:     Clinical Status of Patient: Outpatient    Length of Service: 60 minutes    Continuing Medication: yes  Chantix, Patches or Nicotine Lozenges    Other Medications: None     Target Symptoms: Withdrawal and medication side effects. The following were  rated moderate (3) to severe (4) on TCRS:  · Moderate (3): None  · Severe (4): None    Comments: Telephone visit because of COVID 19. Verbal review of TCRS (Tobacco Cessation Rating Scale) reviewed strategies, cues, and triggers. Introduced the negative impact of tobacco on health, the health advantages of discontinuing the use of tobacco, time line improved health changes after a quit, withdrawal issues to expect from nicotine and habit, and ways to achieve the goal of a quit. Asked PT to have a quit date because he has no more cigarettes and is rolling his own and stated he will not be buying any more cigarettes. The patient remains on the prescribed tobacco cessation medication regimen of 1.0 mg Chantix  BID without any negative side effects at this time. PT is smoking 7 cigarettes per day and using 3-4 lozenges to replace smoking.  Commended PT on his reduction so far. The patient denies any abnormal behavioral or mental changes at this time. .The patient will continue with therapy sessions and medication monitoring by CTTS. Prescribed medication management will be by physician.           Diagnosis: F17.210    Next Visit: 1 week

## 2020-12-02 NOTE — Clinical Note
Just a note to advise how the patient is progressing in the tobacco cessation program.   Verbal review of TCRS (Tobacco Cessation Rating Scale) reviewed strategies, cues, and triggers. Introduced the negative impact of tobacco on health, the health advantages of discontinuing the use of tobacco, time line improved health changes after a quit, withdrawal issues to expect from nicotine and habit, and ways to achieve the goal of a quit. Asked PT to have a quit date because he has no more cigarettes and is rolling his own and stated he will not be buying any more cigarettes. The patient remains on the prescribed tobacco cessation medication regimen of 1.0 mg Chantix  BID without any negative side effects at this time. PT is smoking 7 cigarettes per day and using 3-4 lozenges to replace smoking.  Commended PT on his reduction so far. The patient denies any abnormal behavioral or mental changes at this time.

## 2020-12-03 ENCOUNTER — ANESTHESIA EVENT (OUTPATIENT)
Dept: ENDOSCOPY | Facility: HOSPITAL | Age: 60
End: 2020-12-03
Payer: MEDICAID

## 2020-12-03 ENCOUNTER — HOSPITAL ENCOUNTER (OUTPATIENT)
Facility: HOSPITAL | Age: 60
Discharge: HOME | End: 2020-12-03
Attending: FAMILY MEDICINE | Admitting: FAMILY MEDICINE
Payer: MEDICAID

## 2020-12-03 ENCOUNTER — ANESTHESIA (OUTPATIENT)
Dept: ENDOSCOPY | Facility: HOSPITAL | Age: 60
End: 2020-12-03
Payer: MEDICAID

## 2020-12-03 ENCOUNTER — TELEPHONE (OUTPATIENT)
Dept: ENDOSCOPY | Facility: HOSPITAL | Age: 60
End: 2020-12-03

## 2020-12-03 VITALS
TEMPERATURE: 98 F | DIASTOLIC BLOOD PRESSURE: 74 MMHG | SYSTOLIC BLOOD PRESSURE: 160 MMHG | WEIGHT: 216 LBS | HEIGHT: 71 IN | BODY MASS INDEX: 30.24 KG/M2 | OXYGEN SATURATION: 97 % | RESPIRATION RATE: 17 BRPM | HEART RATE: 54 BPM

## 2020-12-03 DIAGNOSIS — K59.00 CONSTIPATION, UNSPECIFIED CONSTIPATION TYPE: ICD-10-CM

## 2020-12-03 DIAGNOSIS — K63.5 POLYP OF COLON, UNSPECIFIED PART OF COLON, UNSPECIFIED TYPE: ICD-10-CM

## 2020-12-03 DIAGNOSIS — Z12.11 COLON CANCER SCREENING: Primary | ICD-10-CM

## 2020-12-03 DIAGNOSIS — Z86.010 HISTORY OF COLON POLYPS: ICD-10-CM

## 2020-12-03 LAB — POCT GLUCOSE: 276 MG/DL (ref 70–110)

## 2020-12-03 PROCEDURE — 25000003 PHARM REV CODE 250: Performed by: NURSE ANESTHETIST, CERTIFIED REGISTERED

## 2020-12-03 PROCEDURE — 88305 TISSUE EXAM BY PATHOLOGIST: CPT | Performed by: PATHOLOGY

## 2020-12-03 PROCEDURE — 00811 ANES LWR INTST NDSC NOS: CPT | Performed by: FAMILY MEDICINE

## 2020-12-03 PROCEDURE — 88305 TISSUE EXAM BY PATHOLOGIST: CPT | Mod: 26,,, | Performed by: PATHOLOGY

## 2020-12-03 PROCEDURE — 27201089 HC SNARE, DISP (ANY): Performed by: FAMILY MEDICINE

## 2020-12-03 PROCEDURE — 37000008 HC ANESTHESIA 1ST 15 MINUTES: Performed by: FAMILY MEDICINE

## 2020-12-03 PROCEDURE — 37000009 HC ANESTHESIA EA ADD 15 MINS: Performed by: FAMILY MEDICINE

## 2020-12-03 PROCEDURE — 45385 COLONOSCOPY W/LESION REMOVAL: CPT | Performed by: FAMILY MEDICINE

## 2020-12-03 PROCEDURE — 82962 GLUCOSE BLOOD TEST: CPT | Performed by: FAMILY MEDICINE

## 2020-12-03 PROCEDURE — 45385 PR COLONOSCOPY,REMV LESN,SNARE: ICD-10-PCS | Mod: 52,,, | Performed by: FAMILY MEDICINE

## 2020-12-03 PROCEDURE — 63600175 PHARM REV CODE 636 W HCPCS: Performed by: NURSE ANESTHETIST, CERTIFIED REGISTERED

## 2020-12-03 PROCEDURE — 45385 COLONOSCOPY W/LESION REMOVAL: CPT | Mod: 52,,, | Performed by: FAMILY MEDICINE

## 2020-12-03 PROCEDURE — 88305 TISSUE EXAM BY PATHOLOGIST: ICD-10-PCS | Mod: 26,,, | Performed by: PATHOLOGY

## 2020-12-03 RX ORDER — PROPOFOL 10 MG/ML
INJECTION, EMULSION INTRAVENOUS
Status: DISCONTINUED | OUTPATIENT
Start: 2020-12-03 | End: 2020-12-03

## 2020-12-03 RX ORDER — POLYETHYLENE GLYCOL 3350, SODIUM SULFATE ANHYDROUS, SODIUM BICARBONATE, SODIUM CHLORIDE, POTASSIUM CHLORIDE 236; 22.74; 6.74; 5.86; 2.97 G/4L; G/4L; G/4L; G/4L; G/4L
POWDER, FOR SOLUTION ORAL
Qty: 4000 ML | Refills: 0 | Status: SHIPPED | OUTPATIENT
Start: 2020-12-03 | End: 2022-01-19 | Stop reason: ALTCHOICE

## 2020-12-03 RX ORDER — POLYETHYLENE GLYCOL 3350 17 G/17G
17 POWDER, FOR SOLUTION ORAL DAILY
Qty: 1 BOTTLE | Refills: 1 | Status: SHIPPED | OUTPATIENT
Start: 2020-12-03 | End: 2022-01-19

## 2020-12-03 RX ORDER — SODIUM, POTASSIUM,MAG SULFATES 17.5-3.13G
SOLUTION, RECONSTITUTED, ORAL ORAL
Qty: 354 ML | Refills: 0 | Status: SHIPPED | OUTPATIENT
Start: 2020-12-03 | End: 2022-01-19 | Stop reason: ALTCHOICE

## 2020-12-03 RX ORDER — SODIUM CHLORIDE, SODIUM LACTATE, POTASSIUM CHLORIDE, CALCIUM CHLORIDE 600; 310; 30; 20 MG/100ML; MG/100ML; MG/100ML; MG/100ML
INJECTION, SOLUTION INTRAVENOUS CONTINUOUS PRN
Status: DISCONTINUED | OUTPATIENT
Start: 2020-12-03 | End: 2020-12-03

## 2020-12-03 RX ORDER — POLYETHYLENE GLYCOL 3350, SODIUM SULFATE ANHYDROUS, SODIUM BICARBONATE, SODIUM CHLORIDE, POTASSIUM CHLORIDE 236; 22.74; 6.74; 5.86; 2.97 G/4L; G/4L; G/4L; G/4L; G/4L
POWDER, FOR SOLUTION ORAL
Qty: 4000 ML | Refills: 0 | Status: CANCELLED | OUTPATIENT
Start: 2020-12-03

## 2020-12-03 RX ORDER — LIDOCAINE HCL/PF 100 MG/5ML
SYRINGE (ML) INTRAVENOUS
Status: DISCONTINUED | OUTPATIENT
Start: 2020-12-03 | End: 2020-12-03

## 2020-12-03 RX ORDER — SODIUM CHLORIDE 0.9 % (FLUSH) 0.9 %
10 SYRINGE (ML) INJECTION
Status: DISCONTINUED | OUTPATIENT
Start: 2020-12-03 | End: 2020-12-03 | Stop reason: HOSPADM

## 2020-12-03 RX ADMIN — PROPOFOL 50 MG: 10 INJECTION, EMULSION INTRAVENOUS at 11:12

## 2020-12-03 RX ADMIN — SODIUM CHLORIDE, SODIUM LACTATE, POTASSIUM CHLORIDE, AND CALCIUM CHLORIDE: .6; .31; .03; .02 INJECTION, SOLUTION INTRAVENOUS at 11:12

## 2020-12-03 RX ADMIN — PROPOFOL 100 MG: 10 INJECTION, EMULSION INTRAVENOUS at 11:12

## 2020-12-03 RX ADMIN — LIDOCAINE HYDROCHLORIDE 50 MG: 20 INJECTION, SOLUTION INTRAVENOUS at 11:12

## 2020-12-03 NOTE — DISCHARGE SUMMARY
Endoscopy Discharge Summary      Admit Date: 12/3/2020    Discharge Date and Time:  12/3/2020 12:00 PM    Attending Physician: Christofer Palmer MD     Discharge Physician: Christofer Palmer MD     Principal Admitting Diagnoses: Colon cancer screening         Discharge Diagnosis: The primary encounter diagnosis was Colon cancer screening. Diagnoses of History of colon polyps and Polyp of colon, unspecified part of colon, unspecified type were also pertinent to this visit.     Discharged Condition: Good    Indication for Admission: Colon cancer screening     Hospital Course: Patient was admitted for an inpatient procedure and tolerated the procedure well with no complications.    Significant Diagnostic Studies: Colonoscopy with hot snare polypectomy    Pathology (if any):  Specimen (12h ago, onward)    None          Estimated Blood Loss: 0 ml.    Discussed with: patient and family.    Disposition: Home.  Repeat the scope tomorrow.  Reprep.  Use a clear liquid diet today.    Follow Up/Patient Instructions:   Current Discharge Medication List      CONTINUE these medications which have NOT CHANGED    Details   amLODIPine (NORVASC) 10 MG tablet Take 10 mg by mouth.      bisacodyl (DULCOLAX) 10 mg Supp Place 10 mg rectally daily as needed.      !! blood sugar diagnostic (PRECISION Q-I-D TEST) Strp 1 each.      !! blood sugar diagnostic Strp 1 each by Misc.(Non-Drug; Combo Route) route 3 (three) times daily before meals. For One Touch Verio flex  Qty: 100 each, Refills: 11      blood-glucose meter kit One Touch Verio Meter  Qty: 1 each, Refills: 0      carvedilol (COREG) 6.25 MG tablet Take 6.25 mg by mouth.      clonazePAM (KLONOPIN) 1 MG tablet 1 mg 2 (two) times daily. Take 1.5mg twice daily  Refills: 2      cloNIDine (CATAPRES) 0.3 MG tablet Take 1 tablet (0.3 mg total) by mouth 3 (three) times daily.  Qty: 90 tablet, Refills: 3    Associated Diagnoses: Essential hypertension      docusate sodium (COLACE) 100 MG capsule  Take 100 mg by mouth.      DULoxetine (CYMBALTA) 60 MG capsule Take 60 mg by mouth.      ferrous sulfate (FEOSOL) 325 mg (65 mg iron) Tab tablet Take 325 mg by mouth.      gabapentin (NEURONTIN) 400 MG capsule Take 1 capsule (400 mg total) by mouth 3 (three) times daily. One capsule three times a day  Qty: 90 capsule, Refills: 0      gabapentin (NEURONTIN) 800 MG tablet Take 800 mg by mouth.      HUMALOG MIX 50-50 INSULN U-100 100 unit/mL (50-50) Susp 76 units every morning, 36 units every night  Qty: 3 vial, Refills: 2      inhalation spacing device (AEROCHAMBER WITH FLOWSIGNAL) Use with inhaler      insulin regular (HUMULIN R REGULAR U-100 INSULN) 100 unit/mL Inj injection Inject into the skin.      insulin syringe-needle U-100 1 mL 31 gauge x 5/16 Syrg 1 Syringe by Misc.(Non-Drug; Combo Route) route 3 (three) times daily.  Qty: 100 each, Refills: 11    Associated Diagnoses: Type 2 diabetes mellitus with hyperglycemia, with long-term current use of insulin; Type 2 diabetes mellitus with diabetic neuropathy, with long-term current use of insulin      lancets (ONETOUCH DELICA LANCETS) 33 gauge Misc 1 lancet by Misc.(Non-Drug; Combo Route) route 3 (three) times daily.  Qty: 100 each, Refills: 11      lisinopril (PRINIVIL,ZESTRIL) 40 MG tablet Take 40 mg by mouth.      metFORMIN (GLUCOPHAGE) 500 MG tablet Take 1 tablet (500 mg total) by mouth 2 (two) times daily with meals.  Qty: 60 tablet, Refills: 1      nicotine (NICODERM CQ) 21 mg/24 hr Place 1 patch onto the skin once daily.  Qty: 28 patch, Refills: 0    Comments: SCT# [id number] Please mail to patient. Address verified.  Associated Diagnoses: Nicotine dependence      nicotine polacrilex 4 MG Lozg Take 1 lozenge (4 mg total) by mouth as needed. Not to exceed 10 lozenges per day.  Qty: 144 lozenge, Refills: 0    Comments: Not to exceed 10 lozenges per day.**SMOKING CESSATION [employer]UST [facility] INSURANCE - 0.00 CO-PAY**  Associated Diagnoses: Light cigarette smoker (1-9  "cigs/day)      omeprazole (PRILOSEC) 20 MG capsule Take 20 mg by mouth.      pen needle, diabetic 32 gauge x 5/32" Ndle 1 each by Misc.(Non-Drug; Combo Route) route once daily.  Qty: 100 each, Refills: 11    Associated Diagnoses: Type 2 diabetes mellitus with stage 2 chronic kidney disease, with long-term current use of insulin      polyethylene glycol (GLYCOLAX) 17 gram/dose powder Take 17 g by mouth once daily.  Qty: 1 Bottle, Refills: 1    Associated Diagnoses: Constipation, unspecified constipation type      prazosin (MINIPRESS) 2 MG Cap Take 1 capsule (2 mg total) by mouth every evening.  Qty: 30 capsule, Refills: 0      QUEtiapine (SEROQUEL) 200 MG Tab 200 mg once daily. Every morning  Refills: 3      silver sulfADIAZINE 1% (SILVADENE) 1 % cream Apply topically.      tamsulosin (FLOMAX) 0.4 mg Cap Take 0.4 mg by mouth.      traZODone (DESYREL) 150 MG tablet 300 mg nightly.   Refills: 2      varenicline (CHANTIX) 1 mg Tab Take 1 tablet (1 mg total) by mouth 2 (two) times daily.  Qty: 56 tablet, Refills: 0    Comments: $0 Cost Chantix program SCT#[id number] Please mail to patient. Address verified.  Associated Diagnoses: Nicotine dependence      acetaminophen (TYLENOL) 650 mg/20.3 mL Soln Take 650 mg by mouth every 6 (six) hours as needed.      albuterol (PROVENTIL HFA) 90 mcg/actuation inhaler Inhale 2 puffs into the lungs every 4 (four) hours as needed.      diclofenac sodium 1 % Gel Apply 2 g topically 4 (four) times daily. for 10 days  Qty: 100 g, Refills: 2      glucagon, human recombinant, (GLUCAGEN DIAGNOSTIC KIT) 1 mg/mL SolR Inject 1 mg into the muscle daily as needed.      hydroCHLOROthiazide (HYDRODIURIL) 25 MG tablet Take 1 tablet (25 mg total) by mouth once daily.  Qty: 30 tablet, Refills: 3    Associated Diagnoses: Essential hypertension      liraglutide 0.6 mg/0.1 mL, 18 mg/3 mL, subq PNIJ (VICTOZA 3-NEGRITA) 0.6 mg/0.1 mL (18 mg/3 mL) PnIj Inject 1.8 mg into the skin Daily.  Qty: 27 mL, Refills: 0    " Associated Diagnoses: Type 2 diabetes mellitus with diabetic neuropathy, with long-term current use of insulin      pravastatin (PRAVACHOL) 20 MG tablet Take 1 tablet (20 mg total) by mouth every evening.  Qty: 30 tablet, Refills: 0    Associated Diagnoses: Hyperlipidemia, unspecified hyperlipidemia type       !! - Potential duplicate medications found. Please discuss with provider.          Discharge Procedure Orders   Diet general     Call MD for:  temperature >100.4     Call MD for:  persistent nausea and vomiting     Call MD for:  severe uncontrolled pain     Call MD for:  difficulty breathing, headache or visual disturbances     Call MD for:  redness, tenderness, or signs of infection (pain, swelling, redness, odor or green/yellow discharge around incision site)     Call MD for:  hives     Call MD for:  persistent dizziness or light-headedness     No dressing needed       Follow-up Information     Christofer Palmer MD. Call in 2 weeks.    Specialty: Family Medicine  Why: To receive pathology results.  Contact information:  [address] [facility] [place] [address] [phone]

## 2020-12-03 NOTE — DISCHARGE INSTRUCTIONS
Diverticulosis    Diverticulosis means that small pouches have formed in the wall of your large intestine (colon). Most often, this problem causes no symptoms and is common as people age. But the pouches in the colon are at risk of becoming infected. When this happens, the condition is called diverticulitis. Although most people with diverticulosis never develop diverticulitis, it is still not uncommon. Rectal bleeding can also occur and in less common situations, a type of colon inflammation called colitis.  While most people do not have symptoms, some people with diverticulosis may have:  · Abdominal cramps and pain  · Bloating  · Constipation  · Change in bowel habits  Causes  The exact cause of diverticulosis (and diverticulitis) has not been proved, but a few things are associated with the condition:  · Low-fiber diet  · Constipation  · Lack of exercise  Your healthcare provider will talk with you about how to manage your condition. Diet changes may be all that are needed to help control diverticulosis and prevent progression to diverticulitis. If you develop diverticulitis, you will likely need other treatments.  Home care  You may be told to take fiber supplements daily. Fiber adds bulk to the stool so that it passes through the colon more easily. Stool softeners may be recommended. You may also be given medications for pain relief. Be sure to take all medications as directed.  In the past, people were told to avoid corn, nuts, and seeds. This is no longer necessary.  Follow these guidelines when caring for yourself at home:  · Eat unprocessed foods that are high in fiber. Whole grains, fruits, and vegetables are good choices.  · Drink 6 to 8 glasses of water every day unless your healthcare provider has you limit how much fluid you should have.  · Watch for changes in your bowel movements. Tell your provider if you notice any changes.  · Begin an exercise program. Ask your provider how to get started.  Generally, walking is the best.  · Get plenty of rest and sleep.  Follow-up care  Follow up with your healthcare provider, or as advised. Regular visits may be needed to check on your health. Sometimes special procedures such as colonoscopy, are needed after an episode of diverticulitis or blooding. Be sure to keep all your appointments.  If a stool sample was taken, or cultures were done, you should be told if they are positive, or if your treatment needs to be changed. You can call as directed for the results.  If X-rays were done, a radiologist will look at them. You will be told if there is a change in your treatment.  If antibiotics were prescribed, be sure to finish them all.  When to seek medical advice  Call your healthcare provider right away if any of these occur:  · Fever of 100.4°F (38°C) or higher, or as directed by your healthcare provider  · Severe cramps in the lower left side of the abdomen or pain that is getting worse  · Tenderness in the lower left side of the abdomen or worsening pain throughout the abdomen  · Diarrhea or constipation that doesn't get better within 24 hours  · Nausea and vomiting  · Bleeding from the rectum  Call 911  Call emergency services if any of the following occur:  · Trouble breathing  · Confusion  · Very drowsy or trouble awakening  · Fainting or loss of consciousness  · Rapid heart rate  · Chest pain  Date Last Reviewed: 12/30/2015 © 2000-2017 [employer]. [address] [place] [address]. All rights reserved. This information is not intended as a substitute for professional medical care. Always follow your healthcare professional's instructions.        Understanding Colon and Rectal Polyps    The colon (also called the large intestine) is a muscular tube that forms the last part of the digestive tract. It absorbs water and stores food waste. The colon is about 4 to 6 feet long. The rectum is the last 6 inches of the colon. The colon and rectum  have a smooth lining composed of millions of cells. Changes in these cells can lead to growths in the colon that can become cancerous and should be removed. Multiple tests are available to screen for colon cancer, but the colonoscopy is the most recommended test. During colonoscopy, these polyps can be removed. How often you need this test depends on many things including your condition, your family history, symptoms, and what the findings were at the previous colonoscopy.   When the colon lining changes  Changes that happen in the cells that line the colon or rectum can lead to growths called polyps. Over a period of years, polyps can turn cancerous. Removing polyps early may prevent cancer from ever forming.  Polyps  Polyps are fleshy clumps of tissue that form on the lining of the colon or rectum. Small polyps are usually benign (not cancerous). However, over time, cells in a polyp can change and become cancerous. Certain types of polyps known as adenomatous polyps are premalignant. The risk for invasive cancer increases with the size of the polyp and certain cell and gene features. This means that they can become cancerous if they're not removed. Hyperplastic polyps are benign. They can grow quite large and not turn cancerous.   Cancer  Almost all colorectal cancers start when polyp cells begin growing abnormally. As a cancerous tumor grows, it may involve more and more of the colon or rectum. In time, cancer can also grow beyond the colon or rectum and spread to nearby organs or to glands called lymph nodes. The cells can also travel to other parts of the body. This is known as metastasis. The earlier a cancerous tumor is removed, the better the chance of preventing its spread.    Date Last Reviewed: 8/1/2016  © 0217-0670 [employer] .[employer]. [address] [place] [address], [place] [address]. All rights reserved. This information is not intended as a substitute for professional medical care. Always follow your  healthcare professional's instructions.

## 2020-12-03 NOTE — TRANSFER OF CARE
"Anesthesia Transfer of Care Note    Patient: Shukri Rosales    Procedure(s) Performed: Procedure(s) (LRB):  COLONOSCOPY (N/A)    Patient location: PACU    Anesthesia Type: MAC    Transport from OR: Transported from OR on room air with adequate spontaneous ventilation    Post pain: adequate analgesia    Post assessment: no apparent anesthetic complications    Post vital signs: stable    Level of consciousness: awake    Complications: none    Transfer of care protocol was followed      Last vitals:   Visit Vitals  BP (!) 180/100 (BP Location: Left arm, Patient Position: Sitting)   Pulse 60   Temp 36.2 °C (97.2 °F) (Temporal)   Resp 20   Ht 5' 11" (1.803 m)   Wt 98 kg (216 lb)   SpO2 95%   BMI 30.13 kg/m²     "

## 2020-12-03 NOTE — TELEPHONE ENCOUNTER
Attempted to contact patient in regards to colonoscopy procedure on 12/04/2020, no answer. Left message to call office, number provided. Sent procedure instructions through patient portal.

## 2020-12-03 NOTE — PLAN OF CARE
Dr Palmer came to bedside and discussed findings. NO N/V,  no abdominal pain, no GI bleeding, and vitals stable.  Pt discharged from unit.

## 2020-12-03 NOTE — ANESTHESIA POSTPROCEDURE EVALUATION
Anesthesia Post Evaluation    Patient: Shukri Rosales    Procedure(s) Performed: Procedure(s) (LRB):  COLONOSCOPY (N/A)    Final Anesthesia Type: MAC    Patient location during evaluation: GI PACU  Patient participation: Yes- Able to Participate  Level of consciousness: awake and alert  Post-procedure vital signs: reviewed and stable  Pain management: adequate  Airway patency: patent  EMMANUEL mitigation strategies: Multimodal analgesia  PONV status at discharge: No PONV  Anesthetic complications: no      Cardiovascular status: hemodynamically stable  Respiratory status: unassisted  Hydration status: euvolemic  Follow-up not needed.          Vitals Value Taken Time   /93 12/03/20 1157   Temp 36.5 °C (97.7 °F) 12/03/20 1157   Pulse 52 12/03/20 1157   Resp 17 12/03/20 1157   SpO2 97 % 12/03/20 1157         No case tracking events are documented in the log.      Pain/Reginald Score: No data recorded

## 2020-12-03 NOTE — H&P
Short Stay Endoscopy History and Physical    PCP - Cris Matias NP    Procedure - Colonoscopy  ASA - 3  Mallampati - per anesthesia  History of Anesthesia problems - no  Family history Anesthesia problems -  no     HPI:  This is a 60 y.o. male here for evaluation of :   Active Hospital Problems    Diagnosis  POA    *Colon cancer screening [Z12.11]  Not Applicable    History of colon polyps [Z86.010]  Not Applicable      Resolved Hospital Problems   No resolved problems to display.         Health Maintenance       Date Due Completion Date    HIV Screening 07/09/1973 ---    Shingles Vaccine (1 of 2) 07/09/2008 ---    Eye Exam 09/21/2018 9/21/2017    Override on 9/21/2017: Done    Override on 7/29/2016: Done    Override on 3/6/2015: Done    Urine Microalbumin 03/19/2019 3/19/2018    Foot Exam 04/09/2019 4/9/2018    Override on 8/30/2017: Done    Override on 7/25/2016: Done    Override on 4/21/2016: Done    Override on 12/22/2015: Done    Override on 3/10/2015: Done    Colorectal Cancer Screening 07/29/2019 7/29/2014    Override on 3/11/2014: Done    LDCT Lung Screen 08/09/2019 8/9/2018    Hemoglobin A1c 10/16/2020 4/16/2020    Lipid Panel 10/25/2020 10/25/2019    Low Dose Statin 10/29/2021 10/29/2020    TETANUS VACCINE 01/04/2027 1/4/2017          Screening - Yes  History of polyps - Yes     Diarrhea - no  Anemia - no  Blood in stools - no  Abdominal pain - no  Other - no    ROS:  CONSTITUTIONAL: Denies weight change,  fatigue, fevers, chills, night sweats.  CARDIOVASCULAR: Denies chest pain, shortness of breath, orthopnea and edema.  RESPIRATORY: Denies cough, hemoptysis, dyspnea, and wheezing.  GI: See HPI.    Medical History:   Past Medical History:   Diagnosis Date    Adrenal cortical adenoma     Anxiety     Arthritis     CKD (chronic kidney disease) stage 3, GFR 30-59 ml/min     Depression     Diabetes mellitus type II 2011    does not take    DM (diabetes mellitus) 2011     am 09/21/2017 Insulin  x 1 1/2 year    GERD (gastroesophageal reflux disease) 7/9/2013    Hypertension     Migraine headache 7/22/2013    Schizophrenia     Severe obesity with body mass index of 36.0 to 36.9        Surgical History:   Past Surgical History:   Procedure Laterality Date    BACK SURGERY      HERNIA REPAIR      UMBILICAL    left arm exfix      NASAL SEPTUM SURGERY Bilateral     TONSILLECTOMY      URETEROSCOPY         Family History:   Family History   Problem Relation Age of Onset    Hypertension Mother     Diabetes Mother     Cataracts Mother     Hypertension Sister     Diabetes Sister     Hypertension Brother     Diabetes Brother     Cancer Paternal Grandmother     Cancer Paternal Grandfather        Social History:   Social History     Tobacco Use    Smoking status: Current Every Day Smoker     Packs/day: 2.00     Years: 37.00     Pack years: 74.00     Types: Cigarettes    Smokeless tobacco: Never Used    Tobacco comment: instructed not to smoke after midnight after midnight prior to surgery   Substance Use Topics    Alcohol use: No    Drug use: No       Allergies:   Review of patient's allergies indicates:  No Known Allergies    Medications:   No current facility-administered medications on file prior to encounter.      Current Outpatient Medications on File Prior to Encounter   Medication Sig Dispense Refill    amLODIPine (NORVASC) 10 MG tablet Take 10 mg by mouth.      bisacodyl (DULCOLAX) 10 mg Supp Place 10 mg rectally daily as needed.      blood sugar diagnostic (PRECISION Q-I-D TEST) Strp 1 each.      blood sugar diagnostic Strp 1 each by Misc.(Non-Drug; Combo Route) route 3 (three) times daily before meals. For One Touch Verio flex 100 each 11    blood-glucose meter kit One Touch Verio Meter 1 each 0    carvedilol (COREG) 6.25 MG tablet Take 6.25 mg by mouth.      clonazePAM (KLONOPIN) 1 MG tablet 1 mg 2 (two) times daily. Take 1.5mg twice daily  2    cloNIDine (CATAPRES) 0.3 MG  "tablet Take 1 tablet (0.3 mg total) by mouth 3 (three) times daily. 90 tablet 3    docusate sodium (COLACE) 100 MG capsule Take 100 mg by mouth.      DULoxetine (CYMBALTA) 60 MG capsule Take 60 mg by mouth.      ferrous sulfate (FEOSOL) 325 mg (65 mg iron) Tab tablet Take 325 mg by mouth.      gabapentin (NEURONTIN) 400 MG capsule Take 1 capsule (400 mg total) by mouth 3 (three) times daily. One capsule three times a day 90 capsule 0    gabapentin (NEURONTIN) 800 MG tablet Take 800 mg by mouth.      HUMALOG MIX 50-50 INSULN U-100 100 unit/mL (50-50) Susp 76 units every morning, 36 units every night 3 vial 2    inhalation spacing device (AEROCHAMBER WITH FLOWSIGNAL) Use with inhaler      insulin regular (HUMULIN R REGULAR U-100 INSULN) 100 unit/mL Inj injection Inject into the skin.      insulin syringe-needle U-100 1 mL 31 gauge x 5/16 Syrg 1 Syringe by Misc.(Non-Drug; Combo Route) route 3 (three) times daily. 100 each 11    lancets (ONETOUCH DELICA LANCETS) 33 gauge Misc 1 lancet by Misc.(Non-Drug; Combo Route) route 3 (three) times daily. 100 each 11    lisinopril (PRINIVIL,ZESTRIL) 40 MG tablet Take 40 mg by mouth.      metFORMIN (GLUCOPHAGE) 500 MG tablet Take 1 tablet (500 mg total) by mouth 2 (two) times daily with meals. 60 tablet 1    omeprazole (PRILOSEC) 20 MG capsule Take 20 mg by mouth.      pen needle, diabetic 32 gauge x 5/32" Ndle 1 each by Misc.(Non-Drug; Combo Route) route once daily. 100 each 11    polyethylene glycol (GLYCOLAX) 17 gram/dose powder Take 17 g by mouth once daily. 1 Bottle 1    prazosin (MINIPRESS) 2 MG Cap Take 1 capsule (2 mg total) by mouth every evening. 30 capsule 0    QUEtiapine (SEROQUEL) 200 MG Tab 200 mg once daily. Every morning  3    silver sulfADIAZINE 1% (SILVADENE) 1 % cream Apply topically.      tamsulosin (FLOMAX) 0.4 mg Cap Take 0.4 mg by mouth.      traZODone (DESYREL) 150 MG tablet 300 mg nightly.   2    acetaminophen (TYLENOL) 650 mg/20.3 mL " Soln Take 650 mg by mouth every 6 (six) hours as needed.      albuterol (PROVENTIL HFA) 90 mcg/actuation inhaler Inhale 2 puffs into the lungs every 4 (four) hours as needed.      diclofenac sodium 1 % Gel Apply 2 g topically 4 (four) times daily. for 10 days 100 g 2    glucagon, human recombinant, (GLUCAGEN DIAGNOSTIC KIT) 1 mg/mL SolR Inject 1 mg into the muscle daily as needed.      hydroCHLOROthiazide (HYDRODIURIL) 25 MG tablet Take 1 tablet (25 mg total) by mouth once daily. 30 tablet 3    liraglutide 0.6 mg/0.1 mL, 18 mg/3 mL, subq PNIJ (VICTOZA 3-NEGRITA) 0.6 mg/0.1 mL (18 mg/3 mL) PnIj Inject 1.8 mg into the skin Daily. 27 mL 0    pravastatin (PRAVACHOL) 20 MG tablet Take 1 tablet (20 mg total) by mouth every evening. 30 tablet 0       Physical Exam:  Vital Signs:   Vitals:    12/03/20 1027   BP: (!) 180/100   Pulse: 60   Resp: 20   Temp: 97.2 °F (36.2 °C)     General Appearance: Well appearing in no acute distress  ENT: OP clear  Chest: CTA B  CV: RRR, no m/r/g  Abd: s/nt/nd/nabs  Ext: no edema    Labs:Reviewed    IMP:  Active Hospital Problems    Diagnosis  POA    *Colon cancer screening [Z12.11]  Not Applicable    History of colon polyps [Z86.010]  Not Applicable      Resolved Hospital Problems   No resolved problems to display.         Plan:   I have explained the risks and benefits of colonoscopy to the patient including but not limited to bleeding, perforation, infection, and death. The patient wishes to proceed.

## 2020-12-03 NOTE — [employer] PATIENT INSTRUCTIONS
Discharge Summary/Instructions after an Endoscopic Procedure  Patient Name: Shukri Rosales  Patient MRN: [id number]  Patient YOB: 1960  Thursday, December 3, 2020 Christofer Palmer MD  RESTRICTIONS:  During your procedure today, you received medications for sedation.  These   medications may affect your judgment, balance and coordination.  Therefore,   for 24 hours, you have the following restrictions:   - DO NOT drive a car, operate machinery, make legal/financial decisions,   sign important papers or drink alcohol.    ACTIVITY:  Today: no heavy lifting, straining or running due to procedural   sedation/anesthesia.  The following day: return to full activity including work.  DIET:  Eat and drink normally unless instructed otherwise.     TREATMENT FOR COMMON SIDE EFFECTS:  - Mild abdominal pain, nausea, belching, bloating or excessive gas:  rest,   eat lightly and use a heating pad.  - Sore Throat: treat with throat lozenges and/or gargle with warm salt   water.  - Because air was used during the procedure, expelling large amounts of air   from your rectum or belching is normal.  - If a bowel prep was taken, you may not have a bowel movement for 1-3 days.    This is normal.  SYMPTOMS TO WATCH FOR AND REPORT TO YOUR PHYSICIAN:  1. Abdominal pain or bloating, other than gas cramps.  2. Chest pain.  3. Back pain.  4. Signs of infection such as: chills or fever occurring within 24 hours   after the procedure.  5. Rectal bleeding, which would show as bright red, maroon, or black stools.   (A tablespoon of blood from the rectum is not serious, especially if   hemorrhoids are present.)  6. Vomiting.  7. Weakness or dizziness.  GO DIRECTLY TO THE NEAREST EMERGENCY ROOM IF YOU HAVE ANY OF THE FOLLOWING:      Difficulty breathing              Chills and/or fever over 101 F   Persistent vomiting and/or vomiting blood   Severe abdominal pain   Severe chest pain   Black, tarry stools   Bleeding- more than one  tablespoon   Any other symptom or condition that you feel may need urgent attention  Your doctor recommends these additional instructions:  If any biopsies were taken, your doctors clinic will contact you in 1 to 2   weeks with any results.  - Patient has a contact number available for emergencies.  The signs and   symptoms of potential delayed complications were discussed with the   patient.  Return to normal activities tomorrow.  Written discharge   instructions were provided to the patient.   - Clear liquid diet.   - Repeat colonoscopy tomorrow for screening purposes.   - Telephone my office for pathology results in 2 weeks.   - Continue present medications.   - Discharge patient to home (via wheelchair).  For questions, problems or results please call your physician Christofer Palmer MD at Work:  [phone]  If you have any questions about the above instructions, call the GI   department at [phone] or call the endoscopy unit at [phone]   from 7am until 3 pm.  [facility] [place], EMERGENCY ROOM PHONE NUMBER:   [phone]  IF A COMPLICATION OR EMERGENCY SITUATION ARISES AND YOU ARE UNABLE TO REACH   YOUR PHYSICIAN - GO DIRECTLY TO THE EMERGENCY ROOM.  I have read or have had read to me these discharge instructions for my   procedure and have received a written copy.  I understand these   instructions and will follow-up with my physician if I have any questions.     __________________________________       _____________________________________  Nurse Signature                                          Patient/Designated   Responsible Party Signature  Christofer Palmer MD  12/3/2020 11:58:15 AM  This report has been verified and signed electronically.  [employer]

## 2020-12-03 NOTE — TELEPHONE ENCOUNTER
Spoke with patient in regards to prep that was sent to pharmacy. Patient thought he was getting Miralax and I Informed him that is was Suprep. Patient verbalized understanding.

## 2020-12-03 NOTE — TELEPHONE ENCOUNTER
Call his pharmacy and make sure he picked up one of his preps I sent in today. It is the [facility].    I have signed for the following orders AND/OR meds.  Please call the patient and ask the patient to schedule the testing AND/OR inform about any medications that were sent.      No orders of the defined types were placed in this encounter.      Medications Ordered This Encounter   Medications    sodium,potassium,mag sulfates (SUPREP BOWEL PREP KIT) 17.5-3.13-1.6 gram SolR     Sig: Take as instructed on prep sheet     Dispense:  354 mL     Refill:  0

## 2020-12-03 NOTE — ANESTHESIA PREPROCEDURE EVALUATION
12/03/2020  Shukri Rosales is a 60 y.o., male.    Pre-op Assessment    I have reviewed the Patient Summary Reports.     I have reviewed the Nursing Notes. I have reviewed the NPO Status.   I have reviewed the Medications.     Review of Systems  Anesthesia Hx:  No problems with previous Anesthesia  History of prior surgery of interest to airway management or planning: Denies Family Hx of Anesthesia complications.   Denies Personal Hx of Anesthesia complications.   Social:  Smoker, No Alcohol Use 1 ppd X 45 years   Cardiovascular:   Exercise tolerance: good Hypertension ESPINAL    Pulmonary:   Shortness of breath Sleep Apnea, CPAP    Renal/:   Chronic Renal Disease, CRI    Hepatic/GI:   GERD, poorly controlled Denies Liver Disease.    Musculoskeletal:   Arthritis     Neurological:   Headaches    Endocrine:   Diabetes, well controlled, type 2    Psych:   Psychiatric History anxiety depression          Physical Exam  General:  Obesity      Dental:  Dental Findings: Edentulous   Chest/Lungs:  Chest/Lungs Findings: Clear to auscultation     Heart/Vascular:  Heart Findings: Rate: Normal  Rhythm: Regular Rhythm        Mental Status:  Mental Status Findings:  Cooperative         Anesthesia Plan  Type of Anesthesia, risks & benefits discussed:  Anesthesia Type:  MAC  Patient's Preference:   Intra-op Monitoring Plan: standard ASA monitors  Intra-op Monitoring Plan Comments:   Post Op Pain Control Plan: multimodal analgesia  Post Op Pain Control Plan Comments:   Induction:   IV  Beta Blocker:         Informed Consent: Patient understands risks and agrees with Anesthesia plan.  Questions answered. Anesthesia consent signed with patient.  ASA Score: 3     Day of Surgery Review of History & Physical: I have interviewed and examined the patient. I have reviewed the patient's H&P dated:  There are no significant  changes.          Ready For Surgery From Anesthesia Perspective.

## 2020-12-04 ENCOUNTER — ANESTHESIA EVENT (OUTPATIENT)
Dept: ENDOSCOPY | Facility: HOSPITAL | Age: 60
End: 2020-12-04
Payer: MEDICAID

## 2020-12-04 ENCOUNTER — HOSPITAL ENCOUNTER (OUTPATIENT)
Facility: HOSPITAL | Age: 60
Discharge: HOME | End: 2020-12-04
Attending: COLON & RECTAL SURGERY | Admitting: COLON & RECTAL SURGERY
Payer: MEDICAID

## 2020-12-04 ENCOUNTER — TELEPHONE (OUTPATIENT)
Dept: ENDOSCOPY | Facility: HOSPITAL | Age: 60
End: 2020-12-04

## 2020-12-04 ENCOUNTER — ANESTHESIA (OUTPATIENT)
Dept: ENDOSCOPY | Facility: HOSPITAL | Age: 60
End: 2020-12-04
Payer: MEDICAID

## 2020-12-04 VITALS
BODY MASS INDEX: 30.13 KG/M2 | WEIGHT: 216.06 LBS | DIASTOLIC BLOOD PRESSURE: 85 MMHG | OXYGEN SATURATION: 99 % | HEART RATE: 70 BPM | RESPIRATION RATE: 18 BRPM | SYSTOLIC BLOOD PRESSURE: 139 MMHG | TEMPERATURE: 98 F

## 2020-12-04 DIAGNOSIS — Z86.010 HISTORY OF COLON POLYPS: Primary | ICD-10-CM

## 2020-12-04 DIAGNOSIS — Z12.11 SCREENING FOR COLON CANCER: ICD-10-CM

## 2020-12-04 LAB
POCT GLUCOSE: 292 MG/DL (ref 70–110)
SARS-COV-2 RDRP RESP QL NAA+PROBE: NEGATIVE

## 2020-12-04 PROCEDURE — 45378 PR COLONOSCOPY,DIAGNOSTIC: ICD-10-PCS | Mod: ,,, | Performed by: COLON & RECTAL SURGERY

## 2020-12-04 PROCEDURE — 25000003 PHARM REV CODE 250: Performed by: NURSE ANESTHETIST, CERTIFIED REGISTERED

## 2020-12-04 PROCEDURE — 37000009 HC ANESTHESIA EA ADD 15 MINS: Performed by: COLON & RECTAL SURGERY

## 2020-12-04 PROCEDURE — 63600175 PHARM REV CODE 636 W HCPCS: Performed by: NURSE ANESTHETIST, CERTIFIED REGISTERED

## 2020-12-04 PROCEDURE — 00812 ANES LWR INTST SCR COLSC: CPT | Performed by: COLON & RECTAL SURGERY

## 2020-12-04 PROCEDURE — U0002 COVID-19 LAB TEST NON-CDC: HCPCS

## 2020-12-04 PROCEDURE — 45378 DIAGNOSTIC COLONOSCOPY: CPT | Mod: ,,, | Performed by: COLON & RECTAL SURGERY

## 2020-12-04 PROCEDURE — 37000008 HC ANESTHESIA 1ST 15 MINUTES: Performed by: COLON & RECTAL SURGERY

## 2020-12-04 PROCEDURE — G0105 COLORECTAL SCRN; HI RISK IND: HCPCS | Performed by: COLON & RECTAL SURGERY

## 2020-12-04 RX ORDER — SODIUM CHLORIDE, SODIUM LACTATE, POTASSIUM CHLORIDE, CALCIUM CHLORIDE 600; 310; 30; 20 MG/100ML; MG/100ML; MG/100ML; MG/100ML
INJECTION, SOLUTION INTRAVENOUS CONTINUOUS PRN
Status: DISCONTINUED | OUTPATIENT
Start: 2020-12-04 | End: 2020-12-04

## 2020-12-04 RX ORDER — LIDOCAINE HYDROCHLORIDE 10 MG/ML
INJECTION, SOLUTION EPIDURAL; INFILTRATION; INTRACAUDAL; PERINEURAL
Status: DISCONTINUED | OUTPATIENT
Start: 2020-12-04 | End: 2020-12-04

## 2020-12-04 RX ORDER — SODIUM CHLORIDE, SODIUM LACTATE, POTASSIUM CHLORIDE, CALCIUM CHLORIDE 600; 310; 30; 20 MG/100ML; MG/100ML; MG/100ML; MG/100ML
INJECTION, SOLUTION INTRAVENOUS CONTINUOUS
Status: DISCONTINUED | OUTPATIENT
Start: 2020-12-04 | End: 2020-12-04 | Stop reason: HOSPADM

## 2020-12-04 RX ORDER — PROPOFOL 10 MG/ML
VIAL (ML) INTRAVENOUS
Status: DISCONTINUED | OUTPATIENT
Start: 2020-12-04 | End: 2020-12-04

## 2020-12-04 RX ADMIN — SODIUM CHLORIDE, SODIUM LACTATE, POTASSIUM CHLORIDE, AND CALCIUM CHLORIDE: 600; 310; 30; 20 INJECTION, SOLUTION INTRAVENOUS at 02:12

## 2020-12-04 RX ADMIN — PROPOFOL 30 MG: 10 INJECTION, EMULSION INTRAVENOUS at 03:12

## 2020-12-04 RX ADMIN — LIDOCAINE HYDROCHLORIDE 50 MG: 10 INJECTION, SOLUTION EPIDURAL; INFILTRATION; INTRACAUDAL; PERINEURAL at 03:12

## 2020-12-04 RX ADMIN — PROPOFOL 50 MG: 10 INJECTION, EMULSION INTRAVENOUS at 03:12

## 2020-12-04 NOTE — TRANSFER OF CARE
Anesthesia Transfer of Care Note    Patient: Shukri Rosales    Procedure(s) Performed: Procedure(s) (LRB):  COLONOSCOPY (N/A)    Patient location: GI    Anesthesia Type: MAC    Transport from OR: Transported from OR on room air with adequate spontaneous ventilation    Post pain: adequate analgesia    Post assessment: no apparent anesthetic complications and tolerated procedure well    Post vital signs: stable    Level of consciousness: awake, alert and oriented    Nausea/Vomiting: no nausea/vomiting    Complications: none    Transfer of care protocol was followed      Last vitals:   Visit Vitals  BP (!) 176/86 (BP Location: Left arm, Patient Position: Lying)   Pulse 66   Temp 36.6 °C (97.9 °F) (Temporal)   Resp 18   Wt 98 kg (216 lb 0.8 oz)   SpO2 100%   BMI 30.13 kg/m²

## 2020-12-04 NOTE — [employer] PATIENT INSTRUCTIONS
Discharge Summary/Instructions after an Endoscopic Procedure  Patient Name: Shukri Rosales  Patient MRN: [id number]  Patient YOB: 1960 Friday, December 4, 2020 Frandy Stanton MD  RESTRICTIONS:  During your procedure today, you received medications for sedation.  These   medications may affect your judgment, balance and coordination.  Therefore,   for 24 hours, you have the following restrictions:   - DO NOT drive a car, operate machinery, make legal/financial decisions,   sign important papers or drink alcohol.    ACTIVITY:  Today: no heavy lifting, straining or running due to procedural   sedation/anesthesia.  The following day: return to full activity including work.  DIET:  Eat and drink normally unless instructed otherwise.     TREATMENT FOR COMMON SIDE EFFECTS:  - Mild abdominal pain, nausea, belching, bloating or excessive gas:  rest,   eat lightly and use a heating pad.  - Sore Throat: treat with throat lozenges and/or gargle with warm salt   water.  - Because air was used during the procedure, expelling large amounts of air   from your rectum or belching is normal.  - If a bowel prep was taken, you may not have a bowel movement for 1-3 days.    This is normal.  SYMPTOMS TO WATCH FOR AND REPORT TO YOUR PHYSICIAN:  1. Abdominal pain or bloating, other than gas cramps.  2. Chest pain.  3. Back pain.  4. Signs of infection such as: chills or fever occurring within 24 hours   after the procedure.  5. Rectal bleeding, which would show as bright red, maroon, or black stools.   (A tablespoon of blood from the rectum is not serious, especially if   hemorrhoids are present.)  6. Vomiting.  7. Weakness or dizziness.  GO DIRECTLY TO THE NEAREST EMERGENCY ROOM IF YOU HAVE ANY OF THE FOLLOWING:      Difficulty breathing              Chills and/or fever over 101 F   Persistent vomiting and/or vomiting blood   Severe abdominal pain   Severe chest pain   Black, tarry stools   Bleeding- more than one  tablespoon   Any other symptom or condition that you feel may need urgent attention  Your doctor recommends these additional instructions:  If any biopsies were taken, your doctors clinic will contact you in 1 to 2   weeks with any results.  - Discharge patient to home.   - Resume previous diet.   - Continue present medications.   - Repeat colonoscopy at the next available appointment because the bowel   preparation was poor.   - Return to primary care physician PRN.  For questions, problems or results please call your physician Frandy Stanton MD at Work:  [phone]  If you have any questions about the above instructions, call the GI   department at [phone] or call the endoscopy unit at [phone]   from 7am until 3 pm.  [facility] [place], EMERGENCY ROOM PHONE NUMBER:   [phone]  IF A COMPLICATION OR EMERGENCY SITUATION ARISES AND YOU ARE UNABLE TO REACH   YOUR PHYSICIAN - GO DIRECTLY TO THE EMERGENCY ROOM.  I have read or have had read to me these discharge instructions for my   procedure and have received a written copy.  I understand these   instructions and will follow-up with my physician if I have any questions.     __________________________________       _____________________________________  Nurse Signature                                          Patient/Designated   Responsible Party Signature  MD Frandy Kaplan MD  12/4/2020 3:28:35 PM  This report has been verified and signed electronically.  [employer]

## 2020-12-04 NOTE — PLAN OF CARE
Patient did not want to reschedule colonoscopy at this time; Endo scheduling phone number provided to patient to call and reschedule;   Instructed to notify  that he will require extended prep

## 2020-12-04 NOTE — H&P
[facility]  Colon and Rectal Surgery  History & Physical    Patient Name: Shukri Rosales  MRN: [id number]  Admission Date: 12/4/2020  Attending Physician: Frandy Stanton MD  Primary Care Provider: Cris Matias NP    Patient information was obtained from patient and medical records.    Subjective:     Chief Complaint/Reason for Admission: Here for Colonoscopy    History of Present Illness:  Patient is a 60 y.o. male presents for colonoscopy. +personal hx of colonic polyps. Cscope yesterday with poor prep. No current hematochezia, melena or change in bowel habits. No personal or fam hx of CRC or IBD.    No current facility-administered medications on file prior to encounter.      Current Outpatient Medications on File Prior to Encounter   Medication Sig    acetaminophen (TYLENOL) 650 mg/20.3 mL Soln Take 650 mg by mouth every 6 (six) hours as needed.    albuterol (PROVENTIL HFA) 90 mcg/actuation inhaler Inhale 2 puffs into the lungs every 4 (four) hours as needed.    amLODIPine (NORVASC) 10 MG tablet Take 10 mg by mouth.    bisacodyl (DULCOLAX) 10 mg Supp Place 10 mg rectally daily as needed.    blood sugar diagnostic (PRECISION Q-I-D TEST) Strp 1 each.    blood sugar diagnostic Strp 1 each by Misc.(Non-Drug; Combo Route) route 3 (three) times daily before meals. For One Touch Verio flex    blood-glucose meter kit One Touch Verio Meter    carvedilol (COREG) 6.25 MG tablet Take 6.25 mg by mouth.    clonazePAM (KLONOPIN) 1 MG tablet 1 mg 2 (two) times daily. Take 1.5mg twice daily    cloNIDine (CATAPRES) 0.3 MG tablet Take 1 tablet (0.3 mg total) by mouth 3 (three) times daily.    diclofenac sodium 1 % Gel Apply 2 g topically 4 (four) times daily. for 10 days    docusate sodium (COLACE) 100 MG capsule Take 100 mg by mouth.    DULoxetine (CYMBALTA) 60 MG capsule Take 60 mg by mouth.    ferrous sulfate (FEOSOL) 325 mg (65 mg iron) Tab tablet Take 325 mg by mouth.    gabapentin  "(NEURONTIN) 400 MG capsule Take 1 capsule (400 mg total) by mouth 3 (three) times daily. One capsule three times a day    gabapentin (NEURONTIN) 800 MG tablet Take 800 mg by mouth.    glucagon, human recombinant, (GLUCAGEN DIAGNOSTIC KIT) 1 mg/mL SolR Inject 1 mg into the muscle daily as needed.    HUMALOG MIX 50-50 INSULN U-100 100 unit/mL (50-50) Susp 76 units every morning, 36 units every night    hydroCHLOROthiazide (HYDRODIURIL) 25 MG tablet Take 1 tablet (25 mg total) by mouth once daily.    inhalation spacing device (AEROCHAMBER WITH FLOWSIGNAL) Use with inhaler    insulin regular (HUMULIN R REGULAR U-100 INSULN) 100 unit/mL Inj injection Inject into the skin.    insulin syringe-needle U-100 1 mL 31 gauge x 5/16 Syrg 1 Syringe by Misc.(Non-Drug; Combo Route) route 3 (three) times daily.    lancets (ONETOUCH DELICA LANCETS) 33 gauge Misc 1 lancet by Misc.(Non-Drug; Combo Route) route 3 (three) times daily.    liraglutide 0.6 mg/0.1 mL, 18 mg/3 mL, subq PNIJ (VICTOZA 3-NEGRITA) 0.6 mg/0.1 mL (18 mg/3 mL) PnIj Inject 1.8 mg into the skin Daily.    lisinopril (PRINIVIL,ZESTRIL) 40 MG tablet Take 40 mg by mouth.    metFORMIN (GLUCOPHAGE) 500 MG tablet Take 1 tablet (500 mg total) by mouth 2 (two) times daily with meals.    nicotine (NICODERM CQ) 21 mg/24 hr Place 1 patch onto the skin once daily.    nicotine polacrilex 4 MG Lozg Take 1 lozenge (4 mg total) by mouth as needed. Not to exceed 10 lozenges per day.    omeprazole (PRILOSEC) 20 MG capsule Take 20 mg by mouth.    pen needle, diabetic 32 gauge x 5/32" Ndle 1 each by Misc.(Non-Drug; Combo Route) route once daily.    polyethylene glycol (GLYCOLAX) 17 gram/dose powder Take 17 g by mouth once daily.    polyethylene glycol (GOLYTELY,NULYTELY) 236-22.74-6.74 -5.86 gram suspension Drink 1 glass every 15 minutes starting at 5PM the night before the procedure until the bottle is empty.    pravastatin (PRAVACHOL) 20 MG tablet Take 1 tablet (20 mg " total) by mouth every evening.    prazosin (MINIPRESS) 2 MG Cap Take 1 capsule (2 mg total) by mouth every evening.    QUEtiapine (SEROQUEL) 200 MG Tab 200 mg once daily. Every morning    silver sulfADIAZINE 1% (SILVADENE) 1 % cream Apply topically.    tamsulosin (FLOMAX) 0.4 mg Cap Take 0.4 mg by mouth.    traZODone (DESYREL) 150 MG tablet 300 mg nightly.     varenicline (CHANTIX) 1 mg Tab Take 1 tablet (1 mg total) by mouth 2 (two) times daily.       Review of patient's allergies indicates:  No Known Allergies    Past Medical History:   Diagnosis Date    Adrenal cortical adenoma     Anxiety     Arthritis     CKD (chronic kidney disease) stage 3, GFR 30-59 ml/min     Depression     Diabetes mellitus type II 2011    does not take    DM (diabetes mellitus) 2011     am 09/21/2017 Insulin x 1 1/2 year    GERD (gastroesophageal reflux disease) 7/9/2013    Hypertension     Migraine headache 7/22/2013    Schizophrenia     Severe obesity with body mass index of 36.0 to 36.9      Past Surgical History:   Procedure Laterality Date    BACK SURGERY      COLONOSCOPY N/A 12/3/2020    Procedure: COLONOSCOPY;  Surgeon: Christofer Palmer MD;  Location: [facility];  Service: Endoscopy;  Laterality: N/A;    HERNIA REPAIR      UMBILICAL    left arm exfix      NASAL SEPTUM SURGERY Bilateral     TONSILLECTOMY      URETEROSCOPY       Family History     Problem Relation (Age of Onset)    Cancer Paternal Grandmother, Paternal Grandfather    Cataracts Mother    Diabetes Mother, Sister, Brother    Hypertension Mother, Sister, Brother        Tobacco Use    Smoking status: Current Every Day Smoker     Packs/day: 2.00     Years: 37.00     Pack years: 74.00     Types: Cigarettes    Smokeless tobacco: Never Used    Tobacco comment: instructed not to smoke after midnight after midnight prior to surgery   Substance and Sexual Activity    Alcohol use: No    Drug use: No    Sexual activity: Never     Partners:  Female     Review of Systems   Constitutional: Negative for activity change, appetite change, chills, fatigue, fever and unexpected weight change.   HENT: Negative for congestion, ear pain, sore throat and trouble swallowing.    Eyes: Negative for pain, redness and itching.   Respiratory: Negative for cough, shortness of breath and wheezing.    Cardiovascular: Negative for chest pain, palpitations and leg swelling.   Gastrointestinal: Negative for abdominal distention, abdominal pain, anal bleeding, blood in stool, constipation, diarrhea, nausea, rectal pain and vomiting.   Endocrine: Negative for cold intolerance, heat intolerance and polyuria.   Genitourinary: Negative for dysuria, flank pain, frequency and hematuria.   Musculoskeletal: Negative for gait problem, joint swelling and neck pain.   Skin: Negative for color change, rash and wound.   Allergic/Immunologic: Negative for environmental allergies and immunocompromised state.   Neurological: Negative for dizziness, speech difficulty, weakness and numbness.   Psychiatric/Behavioral: Negative for agitation, confusion and hallucinations.     Objective:     Vital Signs (Most Recent):  Temp: 97.9 °F (36.6 °C) (12/04/20 1444)  Pulse: 66 (12/04/20 1444)  Resp: 18 (12/04/20 1444)  BP: (!) 176/86 (12/04/20 1444)  SpO2: 100 % (12/04/20 1444) Vital Signs (24h Range):  Temp:  [97.9 °F (36.6 °C)] 97.9 °F (36.6 °C)  Pulse:  [66] 66  Resp:  [18] 18  SpO2:  [100 %] 100 %  BP: (176)/(86) 176/86     Weight: 98 kg (216 lb 0.8 oz)  Body mass index is 30.13 kg/m².    Physical Exam  Constitutional:       Appearance: He is well-developed.   HENT:      Head: Normocephalic and atraumatic.   Eyes:      Conjunctiva/sclera: Conjunctivae normal.   Neck:      Musculoskeletal: Normal range of motion.      Thyroid: No thyromegaly.   Cardiovascular:      Rate and Rhythm: Normal rate and regular rhythm.   Pulmonary:      Effort: Pulmonary effort is normal. No respiratory distress.    Abdominal:      General: There is no distension.      Palpations: Abdomen is soft. There is no mass.      Tenderness: There is no abdominal tenderness.   Musculoskeletal: Normal range of motion.         General: No tenderness.   Skin:     General: Skin is warm and dry.      Capillary Refill: Capillary refill takes less than 2 seconds.      Findings: No rash.   Neurological:      Mental Status: He is alert and oriented to person, place, and time.           Assessment/Plan:     Patient is a 60 y.o. male who presents for colonoscopy     - Ok to proceed to endoscopy suite for colonoscopy  - Consent obtained. All risks, benefits and alternatives fully explained to patient, including but not limited to bleeding, infection, perforation, and missed polyps. All questions appropriately answered to patient's satisfaction. Consent signed and placed on chart.    Active Diagnoses:    Diagnosis Date Noted POA    Screening for colon cancer [Z12.11] 12/04/2020 Not Applicable      Problems Resolved During this Admission:     VTE Risk Mitigation (From admission, onward)    None          Frandy Stanton MD  Colon and Rectal Surgery  [facility]

## 2020-12-04 NOTE — DISCHARGE INSTRUCTIONS
Colonoscopy     A camera attached to a flexible tube with a viewing lens is used to take video pictures.     Colonoscopy is a test to view the inside of your lower digestive tract (colon and rectum). Sometimes it can show the last part of the small intestine (ileum). During the test, small pieces of tissue may be removed for testing. This is called a biopsy. Small growths, such as polyps, may also be removed.   Why is colonoscopy done?  The test is done to help look for colon cancer. And it can help find the source of abdominal pain, bleeding, and changes in bowel habits. It may be needed once a year, depending on factors such as your:  · Age  · Health history  · Family health history  · Symptoms  · Results from any prior colonoscopy  Risks and possible complications  These include:  · Bleeding               · A puncture or tear in the colon   · Risks of anesthesia  · A cancer lesion not being seen  Getting ready   To prepare for the test:  · Talk with your healthcare provider about the risks of the test (see below). Also ask your healthcare provider about alternatives to the test.  · Tell your healthcare provider about any medicines you take. Also tell him or her about any health conditions you may have.  · Make sure your rectum and colon are empty for the test. Follow the diet and bowel prep instructions exactly. If you dont, the test may need to be rescheduled.  · Plan for a friend or family member to drive you home after the test.     Colonoscopy provides an inside view of the entire colon.     You may discuss the results with your doctor right away or at a future visit.  During the test   The test is usually done in the hospital on an outpatient basis. This means you go home the same day. The procedure takes about 30 minutes. During that time:  · You are given relaxing (sedating) medicine through an IV line. You may be drowsy, or fully asleep.  · The healthcare provider will first give you a physical exam to  check for anal and rectal problems.  · Then the anus is lubricated and the scope inserted.  · If you are awake, you may have a feeling similar to needing to have a bowel movement. You may also feel pressure as air is pumped into the colon. Its OK to pass gas during the procedure.  · Biopsy, polyp removal, or other treatments may be done during the test.  After the test   You may have gas right after the test. It can help to try to pass it to help prevent later bloating. Your healthcare provider may discuss the results with you right away. Or you may need to schedule a follow-up visit to talk about the results. After the test, you can go back to your normal eating and other activities. You may be tired from the sedation and need to rest for a few hours.  Date Last Reviewed: 11/1/2016 © 2000-2017 [employer]. [address] [place] [address], [place] [address]. All rights reserved. This information is not intended as a substitute for professional medical care. Always follow your healthcare professional's instructions.

## 2020-12-04 NOTE — BRIEF OP NOTE
[facility] -   Brief Operative Note     SUMMARY     Surgery Date: 12/4/2020     Surgeon(s) and Role:     * Frandy Stanton MD - Primary    Assisting Surgeon: None    Pre-op Diagnosis:  Colon cancer screening [Z12.11]    Post-op Diagnosis:  Post-Op Diagnosis Codes:     * Colon cancer screening [Z12.11]    Procedure(s) (LRB):  COLONOSCOPY (N/A)    Anesthesia: Local MAC    Description of the findings of the procedure: See Op Note    Findings/Key Components: See Op Note    Estimated Blood Loss: * No values recorded between 12/4/2020 12:00 AM and 12/4/2020  3:25 PM *         Specimens:   Specimen (12h ago, onward)    None          Discharge Note    SUMMARY     Admit Date: 12/4/2020    Discharge Date and Time: 12/4/2020 3:29 PM    Hospital Course Patient was seen in the preoperative area by both myself and anesthesia. All consents were verified and all questions appropriately answered. All risks, benefits and alternatives explained to patient. Patient proceeded to endoscopy suite for colonoscopy and was discharged home postoperative once cleared by anesthesia.    Final Diagnosis: Post-Op Diagnosis Codes:     * Colon cancer screening [Z12.11]    Disposition: Home or Self Care    Follow Up/Patient Instructions: See Provation report    Medications:  Reconciled Home Medications:      Medication List      CONTINUE taking these medications    acetaminophen 650 mg/20.3 mL Soln  Commonly known as: TYLENOL  Take 650 mg by mouth every 6 (six) hours as needed.     AEROCHAMBER WITH FLOWSIGNAL  Generic drug: inhalation spacing device  Use with inhaler     amLODIPine 10 MG tablet  Commonly known as: NORVASC  Take 10 mg by mouth.     bisacodyL 10 mg Supp  Commonly known as: DULCOLAX  Place 10 mg rectally daily as needed.     * blood sugar diagnostic Strp  1 each by Misc.(Non-Drug; Combo Route) route 3 (three) times daily before meals. For One Touch Verio flex     * PRECISION Q-I-D TEST Strp  Generic drug: blood sugar  diagnostic  1 each.     blood-glucose meter kit  One Touch Verio Meter     carvediloL 6.25 MG tablet  Commonly known as: COREG  Take 6.25 mg by mouth.     CHANTIX 1 mg Tab  Generic drug: varenicline  Take 1 tablet (1 mg total) by mouth 2 (two) times daily.     cloNIDine 0.3 MG tablet  Commonly known as: CATAPRES  Take 1 tablet (0.3 mg total) by mouth 3 (three) times daily.     diclofenac sodium 1 % Gel  Commonly known as: VOLTAREN  Apply 2 g topically 4 (four) times daily. for 10 days     docusate sodium 100 MG capsule  Commonly known as: COLACE  Take 100 mg by mouth.     DULoxetine 60 MG capsule  Commonly known as: CYMBALTA  Take 60 mg by mouth.     ferrous sulfate 325 mg (65 mg iron) Tab tablet  Commonly known as: FEOSOL  Take 325 mg by mouth.     FLOMAX 0.4 mg Cap  Generic drug: tamsulosin  Take 0.4 mg by mouth.     * gabapentin 800 MG tablet  Commonly known as: NEURONTIN  Take 800 mg by mouth.     * gabapentin 400 MG capsule  Commonly known as: NEURONTIN  Take 1 capsule (400 mg total) by mouth 3 (three) times daily. One capsule three times a day     GLUCAGEN DIAGNOSTIC KIT 1 mg/mL Solr  Generic drug: glucagon (human recombinant)  Inject 1 mg into the muscle daily as needed.     HumaLOG Mix 50-50 Insuln U-100 100 unit/mL (50-50) Susp  Generic drug: insulin lispro protamin-lispro  76 units every morning, 36 units every night     HumuLIN R Regular U-100 Insuln 100 unit/mL injection  Generic drug: insulin regular  Inject into the skin.     hydroCHLOROthiazide 25 MG tablet  Commonly known as: HYDRODIURIL  Take 1 tablet (25 mg total) by mouth once daily.     insulin syringe-needle U-100 1 mL 31 gauge x 5/16 Syrg  1 Syringe by Misc.(Non-Drug; Combo Route) route 3 (three) times daily.     lancets 33 gauge Misc  Commonly known as: ONETOUCH DELICA LANCETS  1 lancet by Misc.(Non-Drug; Combo Route) route 3 (three) times daily.     liraglutide 0.6 mg/0.1 mL (18 mg/3 mL) subq PNIJ 0.6 mg/0.1 mL (18 mg/3 mL) Pnij pen  Commonly  "known as: VICTOZA 3-NEGRITA  Inject 1.8 mg into the skin Daily.     lisinopriL 40 MG tablet  Commonly known as: PRINIVIL,ZESTRIL  Take 40 mg by mouth.     metFORMIN 500 MG tablet  Commonly known as: GLUCOPHAGE  Take 1 tablet (500 mg total) by mouth 2 (two) times daily with meals.     nicotine 21 mg/24 hr  Commonly known as: NICODERM CQ  Place 1 patch onto the skin once daily.     nicotine polacrilex 4 MG Lozg  Take 1 lozenge (4 mg total) by mouth as needed. Not to exceed 10 lozenges per day.     omeprazole 20 MG capsule  Commonly known as: PRILOSEC  Take 20 mg by mouth.     pen needle, diabetic 32 gauge x 5/32" Ndle  1 each by Misc.(Non-Drug; Combo Route) route once daily.     polyethylene glycol 17 gram/dose powder  Commonly known as: GLYCOLAX  Take 17 g by mouth once daily.     pravastatin 20 MG tablet  Commonly known as: PRAVACHOL  Take 1 tablet (20 mg total) by mouth every evening.     prazosin 2 MG Cap  Commonly known as: MINIPRESS  Take 1 capsule (2 mg total) by mouth every evening.     PROVENTIL HFA 90 mcg/actuation inhaler  Generic drug: albuterol  Inhale 2 puffs into the lungs every 4 (four) hours as needed.     QUEtiapine 200 MG Tab  Commonly known as: SEROQUEL  200 mg once daily. Every morning     silver sulfADIAZINE 1% 1 % cream  Commonly known as: SILVADENE  Apply topically.     traZODone 150 MG tablet  Commonly known as: DESYREL  300 mg nightly.         * This list has 4 medication(s) that are the same as other medications prescribed for you. Read the directions carefully, and ask your doctor or other care provider to review them with you.            ASK your doctor about these medications    clonazePAM 1 MG tablet  Commonly known as: KLONOPIN  1 mg 2 (two) times daily. Take 1.5mg twice daily     polyethylene glycol 236-22.74-6.74 -5.86 gram suspension  Commonly known as: GoLYTELY,NuLYTELY  Drink 1 glass every 15 minutes starting at 5PM the night before the procedure until the bottle is empty.     SUPREP " BOWEL PREP KIT 17.5-3.13-1.6 gram Solr  Generic drug: sodium,potassium,mag sulfates  Take as instructed on prep sheet          Discharge Procedure Orders   Diet general     Call MD for:  temperature >100.4     Call MD for:  persistent nausea and vomiting     Call MD for:  severe uncontrolled pain     Call MD for:  difficulty breathing, headache or visual disturbances     Call MD for:  redness, tenderness, or signs of infection (pain, swelling, redness, odor or green/yellow discharge around incision site)     Call MD for:  hives     Call MD for:  persistent dizziness or light-headedness     Call MD for:  extreme fatigue     Activity as tolerated     Follow-up Information     Cris Matias NP.    Specialty: Family Medicine  Why: As needed  Contact information:  [address] [facility].  [address]  [facility] [address] [phone]

## 2020-12-04 NOTE — TELEPHONE ENCOUNTER
Pt left message on [employer]. Retrieved message and attempted to return pt call. Left pt a message to call our office.

## 2020-12-05 NOTE — ANESTHESIA POSTPROCEDURE EVALUATION
Anesthesia Post Evaluation    Patient: Shukri Rosales    Procedure(s) Performed: Procedure(s) (LRB):  COLONOSCOPY (N/A)    Final Anesthesia Type: MAC    Patient location during evaluation: GI PACU  Patient participation: Yes- Able to Participate  Level of consciousness: awake and alert  Post-procedure vital signs: reviewed and stable  Pain management: adequate  Airway patency: patent    PONV status at discharge: No PONV  Anesthetic complications: no      Cardiovascular status: blood pressure returned to baseline  Respiratory status: unassisted  Hydration status: euvolemic  Follow-up not needed.          Vitals Value Taken Time   /85 12/04/20 1550   Temp 36.4 °C (97.6 °F) 12/04/20 1530   Pulse 70 12/04/20 1550   Resp 18 12/04/20 1550   SpO2 99 % 12/04/20 1550         Event Time   Out of Recovery 16:01:51         Pain/Reginald Score: Reginald Score: 10 (12/4/2020  3:50 PM)

## 2020-12-09 ENCOUNTER — TELEPHONE (OUTPATIENT)
Dept: SMOKING CESSATION | Facility: CLINIC | Age: 60
End: 2020-12-09

## 2020-12-09 NOTE — TELEPHONE ENCOUNTER
Called and left message about missed appointment. Left message to call DONAVON Edwards @ [phone].

## 2020-12-11 LAB
FINAL PATHOLOGIC DIAGNOSIS: NORMAL
GROSS: NORMAL
Lab: NORMAL

## 2020-12-16 ENCOUNTER — CLINICAL SUPPORT (OUTPATIENT)
Dept: SMOKING CESSATION | Facility: CLINIC | Age: 60
End: 2020-12-16
Payer: COMMERCIAL

## 2020-12-16 DIAGNOSIS — F17.210 LIGHT CIGARETTE SMOKER (1-9 CIGS/DAY): Primary | ICD-10-CM

## 2020-12-16 DIAGNOSIS — F17.200 NICOTINE DEPENDENCE: ICD-10-CM

## 2020-12-16 PROCEDURE — 99403 PR PREVENT COUNSEL,INDIV,45 MIN: ICD-10-PCS | Mod: S$GLB,,, | Performed by: GENERAL PRACTICE

## 2020-12-16 PROCEDURE — 99403 PREV MED CNSL INDIV APPRX 45: CPT | Mod: S$GLB,,, | Performed by: GENERAL PRACTICE

## 2020-12-16 RX ORDER — IBUPROFEN 200 MG
1 TABLET ORAL DAILY
Qty: 28 PATCH | Refills: 0 | Status: SHIPPED | OUTPATIENT
Start: 2020-12-16 | End: 2021-01-28 | Stop reason: SDUPTHER

## 2020-12-16 NOTE — PROGRESS NOTES
Individual Follow-Up Form    12/16/2020    Quit Date:  TBD    Clinical Status of Patient: Outpatient    Length of Service: 45 minutes    Continuing Medication: yes  Chantix    Other Medications: Patches and Lozenges     Target Symptoms: Withdrawal and medication side effects. The following were  rated moderate (3) to severe (4) on TCRS:  · Moderate (3): None  · Severe (4): None    Comments: Telephone visit because of COVID 19. Completion of TCRS (Tobacco Cessation Rating Scale) reviewed strategies, habitual behavior, high risks situations, understanding urges and cravings, stress and relaxation with open discussion and additional interventions, Introduced lapses, relapses, understanding them and analyzing the situation of a lapse, conflict issues that may be linked to a lapse. PT was ill the last week and started to feel better and had a relapse and smoked 8 cigarettes per day. Discussed usage of lozenge in place of cigarettes. Also adjusted patch dosage to 14 mg patch. Encouraged PT to use behavior modification techniques and commended PT on hard work and his commitment to quit. The patient remains on the prescribed tobacco cessation medication regimen of 1.0 mg Chantix BID without any negative side effects at this time. The patient remains on the prescribed tobacco cessation medication regimen of 14 mg patch without any negative side effects at this time. The patient remains to use 4 mg lozenge in place of cigarette. The patient will continue with group therapy sessions and medication monitoring by CTTS. Prescribed medication management will be by physician. The patient denies any abnormal behavioral or mental changes at this time.         Diagnosis: F17.210    Next Visit: 1 week

## 2020-12-16 NOTE — Clinical Note
Just a note to advise how the patient is progressing in the tobacco cessation program.   PT was ill the last week and started to feel better but had a relapse and smoked 8 cigarettes per day. Discussed usage of lozenge in place of cigarettes. Also adjusted patch dosage to 14 mg patch. Encouraged PT to use behavior modification techniques and commended PT on hard work and his commitment to quit. The patient remains on the prescribed tobacco cessation medication regimen of 1.0 mg Chantix BID without any negative side effects at this time. The patient remains on the prescribed tobacco cessation medication regimen of 14 mg patch without any negative side effects at this time. The patient remains to use 4 mg lozenge in place of cigarette.

## 2020-12-23 ENCOUNTER — CLINICAL SUPPORT (OUTPATIENT)
Dept: SMOKING CESSATION | Facility: CLINIC | Age: 60
End: 2020-12-23
Payer: COMMERCIAL

## 2020-12-23 DIAGNOSIS — F17.200 NICOTINE DEPENDENCE: ICD-10-CM

## 2020-12-23 DIAGNOSIS — F17.210 LIGHT CIGARETTE SMOKER (1-9 CIGS/DAY): Primary | ICD-10-CM

## 2020-12-23 PROCEDURE — 99403 PREV MED CNSL INDIV APPRX 45: CPT | Mod: S$GLB,,, | Performed by: GENERAL PRACTICE

## 2020-12-23 PROCEDURE — 99403 PR PREVENT COUNSEL,INDIV,45 MIN: ICD-10-PCS | Mod: S$GLB,,, | Performed by: GENERAL PRACTICE

## 2020-12-23 RX ORDER — VARENICLINE TARTRATE 1 MG/1
1 TABLET, FILM COATED ORAL 2 TIMES DAILY
Qty: 56 TABLET | Refills: 0 | Status: SHIPPED | OUTPATIENT
Start: 2020-12-23 | End: 2021-01-21 | Stop reason: SDUPTHER

## 2020-12-23 NOTE — Clinical Note
Just a note to advise how the patient is progressing in the tobacco cessation program. PT stated he was out of CHANTIX because he thinks his dog got to where he keeps his medicine. He found most of his pills chewed up and then the dog threw up. PT is currently smoking 5-7 cigarettes per day. PT stated his dog also ate his headache medicine and he is smoking more to help with getting his mind off his pain. Advised PT call PCP or follow up with ER visit. PT said that lozenges are making him nauseated even if he breaks them in half. Advised PT to discontinue lozenges. I reordered the patient tobacco cessation medication regimen of 1.0 mg Chantix to be taken BID. PT states no negative side effects at this time. The patient remains on the prescribed tobacco cessation medication regimen of 14 mg patch without any negative side effects at this time.

## 2020-12-23 NOTE — PROGRESS NOTES
Individual Follow-Up Form    12/23/2020    Quit Date: TBD    Clinical Status of Patient: Outpatient    Length of Service: 45 minutes    Continuing Medication: yes  Chantix    Other Medications: Patches & lozenges     Target Symptoms: Withdrawal and medication side effects. The following were  rated moderate (3) to severe (4) on TCRS:  · Moderate (3): [place]auzea from lozenges  · Severe (4): None    Comments: Telephone visit because of COVID 19. Completion of TCRS (Tobacco Cessation Rating Scale) reviewed strategies, cues, and triggers. Introduced the negative impact of tobacco on health, the health advantages of discontinuing the use of tobacco, time line improved health changes after a quit, withdrawal issues to expect from nicotine and habit, and ways to achieve the goal of a quit. PT stated he was out of CHANTIX because he thinks his dog got to where he keeps his medicine. He found most of his pills chewed up and then the dog threw up. PT is currently smoking 5-7 cigarettes per day. PT stated his dog also ate his headache medicine and he is smoking more to help with getting his mind off his pain. Advised PT call PCP or follow up with ER visit. PT said that lozenges are making him nauseated even if he breaks them in half. Advised PT to discontinue lozenges. I reordered the patient tobacco cessation medication regimen of 1.0 mg Chantix to be taken BID. PT states no negative side effects at this time. The patient remains on the prescribed tobacco cessation medication regimen of 14 mg patch without any negative side effects at this time. The patient will continue with group therapy sessions and medication monitoring by CTTS. Prescribed medication management will be by physician. The patient denies any abnormal behavioral or mental changes at this time.         Diagnosis: F17.210    Next Visit: 2 weeks

## 2021-01-06 ENCOUNTER — CLINICAL SUPPORT (OUTPATIENT)
Dept: SMOKING CESSATION | Facility: CLINIC | Age: 61
End: 2021-01-06
Payer: COMMERCIAL

## 2021-01-06 DIAGNOSIS — F17.200 NICOTINE DEPENDENCE: Primary | ICD-10-CM

## 2021-01-06 PROCEDURE — 99403 PR PREVENT COUNSEL,INDIV,45 MIN: ICD-10-PCS | Mod: S$GLB,,, | Performed by: GENERAL PRACTICE

## 2021-01-06 PROCEDURE — 99403 PREV MED CNSL INDIV APPRX 45: CPT | Mod: S$GLB,,, | Performed by: GENERAL PRACTICE

## 2021-01-13 ENCOUNTER — TELEPHONE (OUTPATIENT)
Dept: SMOKING CESSATION | Facility: CLINIC | Age: 61
End: 2021-01-13

## 2021-01-21 ENCOUNTER — TELEPHONE (OUTPATIENT)
Dept: SMOKING CESSATION | Facility: CLINIC | Age: 61
End: 2021-01-21

## 2021-01-21 ENCOUNTER — CLINICAL SUPPORT (OUTPATIENT)
Dept: SMOKING CESSATION | Facility: CLINIC | Age: 61
End: 2021-01-21
Payer: COMMERCIAL

## 2021-01-21 DIAGNOSIS — F17.200 NICOTINE DEPENDENCE: Primary | ICD-10-CM

## 2021-01-21 PROCEDURE — 99402 PR PREVENT COUNSEL,INDIV,30 MIN: ICD-10-PCS | Mod: S$GLB,,, | Performed by: GENERAL PRACTICE

## 2021-01-21 PROCEDURE — 99402 PREV MED CNSL INDIV APPRX 30: CPT | Mod: S$GLB,,, | Performed by: GENERAL PRACTICE

## 2021-01-21 RX ORDER — VARENICLINE TARTRATE 1 MG/1
1 TABLET, FILM COATED ORAL 2 TIMES DAILY
Qty: 56 TABLET | Refills: 0 | Status: SHIPPED | OUTPATIENT
Start: 2021-01-21 | End: 2021-03-11 | Stop reason: SDUPTHER

## 2021-01-28 ENCOUNTER — CLINICAL SUPPORT (OUTPATIENT)
Dept: SMOKING CESSATION | Facility: CLINIC | Age: 61
End: 2021-01-28
Payer: COMMERCIAL

## 2021-01-28 DIAGNOSIS — F17.210 LIGHT CIGARETTE SMOKER (1-9 CIGS/DAY): Primary | ICD-10-CM

## 2021-01-28 PROCEDURE — 99999 PR PBB SHADOW E&M-EST. PATIENT-LVL I: CPT | Mod: PBBFAC,,,

## 2021-01-28 PROCEDURE — 99402 PR PREVENT COUNSEL,INDIV,30 MIN: ICD-10-PCS | Mod: S$GLB,,, | Performed by: GENERAL PRACTICE

## 2021-01-28 PROCEDURE — 99999 PR PBB SHADOW E&M-EST. PATIENT-LVL I: ICD-10-PCS | Mod: PBBFAC,,,

## 2021-01-28 PROCEDURE — 99402 PREV MED CNSL INDIV APPRX 30: CPT | Mod: S$GLB,,, | Performed by: GENERAL PRACTICE

## 2021-01-28 RX ORDER — IBUPROFEN 200 MG
1 TABLET ORAL DAILY
Qty: 28 PATCH | Refills: 0 | Status: SHIPPED | OUTPATIENT
Start: 2021-01-28 | End: 2022-02-07

## 2021-02-04 ENCOUNTER — TELEPHONE (OUTPATIENT)
Dept: SMOKING CESSATION | Facility: CLINIC | Age: 61
End: 2021-02-04

## 2021-02-17 ENCOUNTER — TELEPHONE (OUTPATIENT)
Dept: SMOKING CESSATION | Facility: CLINIC | Age: 61
End: 2021-02-17

## 2021-02-25 ENCOUNTER — CLINICAL SUPPORT (OUTPATIENT)
Dept: SMOKING CESSATION | Facility: CLINIC | Age: 61
End: 2021-02-25
Payer: COMMERCIAL

## 2021-02-25 ENCOUNTER — TELEPHONE (OUTPATIENT)
Dept: SMOKING CESSATION | Facility: CLINIC | Age: 61
End: 2021-02-25

## 2021-02-25 DIAGNOSIS — F17.210 LIGHT CIGARETTE SMOKER (1-9 CIGS/DAY): Primary | ICD-10-CM

## 2021-02-25 PROCEDURE — 90853 PR GROUP PSYCHOTHERAPY: ICD-10-PCS | Mod: S$GLB,,, | Performed by: GENERAL PRACTICE

## 2021-02-25 PROCEDURE — 99999 PR PBB SHADOW E&M-EST. PATIENT-LVL I: ICD-10-PCS | Mod: PBBFAC,,,

## 2021-02-25 PROCEDURE — 90853 GROUP PSYCHOTHERAPY: CPT | Mod: S$GLB,,, | Performed by: GENERAL PRACTICE

## 2021-02-25 PROCEDURE — 99999 PR PBB SHADOW E&M-EST. PATIENT-LVL I: CPT | Mod: PBBFAC,,,

## 2021-03-02 ENCOUNTER — TELEPHONE (OUTPATIENT)
Dept: PAIN MEDICINE | Facility: CLINIC | Age: 61
End: 2021-03-02

## 2021-03-03 ENCOUNTER — CLINICAL SUPPORT (OUTPATIENT)
Dept: SMOKING CESSATION | Facility: CLINIC | Age: 61
End: 2021-03-03
Payer: COMMERCIAL

## 2021-03-03 DIAGNOSIS — F17.210 MODERATE SMOKER (20 OR LESS PER DAY): Primary | ICD-10-CM

## 2021-03-03 PROCEDURE — 99999 PR PBB SHADOW E&M-EST. PATIENT-LVL I: ICD-10-PCS | Mod: PBBFAC,,,

## 2021-03-03 PROCEDURE — 99999 PR PBB SHADOW E&M-EST. PATIENT-LVL I: CPT | Mod: PBBFAC,,,

## 2021-03-03 PROCEDURE — 99404 PR PREVENT COUNSEL,INDIV,60 MIN: ICD-10-PCS | Mod: S$GLB,,, | Performed by: GENERAL PRACTICE

## 2021-03-03 PROCEDURE — 99404 PREV MED CNSL INDIV APPRX 60: CPT | Mod: S$GLB,,, | Performed by: GENERAL PRACTICE

## 2021-03-09 ENCOUNTER — TELEPHONE (OUTPATIENT)
Dept: SMOKING CESSATION | Facility: CLINIC | Age: 61
End: 2021-03-09

## 2021-03-11 ENCOUNTER — CLINICAL SUPPORT (OUTPATIENT)
Dept: SMOKING CESSATION | Facility: CLINIC | Age: 61
End: 2021-03-11
Payer: COMMERCIAL

## 2021-03-11 DIAGNOSIS — F17.200 NICOTINE DEPENDENCE: ICD-10-CM

## 2021-03-11 DIAGNOSIS — F17.210 LIGHT CIGARETTE SMOKER (1-9 CIGS/DAY): Primary | ICD-10-CM

## 2021-03-11 PROCEDURE — 99999 PR PBB SHADOW E&M-EST. PATIENT-LVL I: CPT | Mod: PBBFAC,,,

## 2021-03-11 PROCEDURE — 99402 PREV MED CNSL INDIV APPRX 30: CPT | Mod: S$GLB,,, | Performed by: GENERAL PRACTICE

## 2021-03-11 PROCEDURE — 99999 PR PBB SHADOW E&M-EST. PATIENT-LVL I: ICD-10-PCS | Mod: PBBFAC,,,

## 2021-03-11 PROCEDURE — 99402 PR PREVENT COUNSEL,INDIV,30 MIN: ICD-10-PCS | Mod: S$GLB,,, | Performed by: GENERAL PRACTICE

## 2021-03-11 RX ORDER — VARENICLINE TARTRATE 1 MG/1
1 TABLET, FILM COATED ORAL 2 TIMES DAILY
Qty: 56 TABLET | Refills: 0 | Status: SHIPPED | OUTPATIENT
Start: 2021-03-11 | End: 2022-01-19

## 2021-03-16 ENCOUNTER — TELEPHONE (OUTPATIENT)
Dept: SMOKING CESSATION | Facility: CLINIC | Age: 61
End: 2021-03-16

## 2021-03-16 ENCOUNTER — CLINICAL SUPPORT (OUTPATIENT)
Dept: SMOKING CESSATION | Facility: CLINIC | Age: 61
End: 2021-03-16
Payer: COMMERCIAL

## 2021-03-16 DIAGNOSIS — F17.210 LIGHT CIGARETTE SMOKER (1-9 CIGS/DAY): Primary | ICD-10-CM

## 2021-03-16 PROCEDURE — 99402 PR PREVENT COUNSEL,INDIV,30 MIN: ICD-10-PCS | Mod: S$GLB,,, | Performed by: GENERAL PRACTICE

## 2021-03-16 PROCEDURE — 99402 PREV MED CNSL INDIV APPRX 30: CPT | Mod: S$GLB,,, | Performed by: GENERAL PRACTICE

## 2021-03-16 PROCEDURE — 99999 PR PBB SHADOW E&M-EST. PATIENT-LVL I: CPT | Mod: PBBFAC,,,

## 2021-03-16 PROCEDURE — 99999 PR PBB SHADOW E&M-EST. PATIENT-LVL I: ICD-10-PCS | Mod: PBBFAC,,,

## 2021-03-23 ENCOUNTER — CLINICAL SUPPORT (OUTPATIENT)
Dept: SMOKING CESSATION | Facility: CLINIC | Age: 61
End: 2021-03-23
Payer: COMMERCIAL

## 2021-03-23 DIAGNOSIS — F17.210 LIGHT CIGARETTE SMOKER (1-9 CIGS/DAY): Primary | ICD-10-CM

## 2021-03-23 PROCEDURE — 99402 PREV MED CNSL INDIV APPRX 30: CPT | Mod: S$GLB,,, | Performed by: GENERAL PRACTICE

## 2021-03-23 PROCEDURE — 99999 PR PBB SHADOW E&M-EST. PATIENT-LVL I: CPT | Mod: PBBFAC,,,

## 2021-03-23 PROCEDURE — 99999 PR PBB SHADOW E&M-EST. PATIENT-LVL I: ICD-10-PCS | Mod: PBBFAC,,,

## 2021-03-23 PROCEDURE — 99402 PR PREVENT COUNSEL,INDIV,30 MIN: ICD-10-PCS | Mod: S$GLB,,, | Performed by: GENERAL PRACTICE

## 2021-03-30 ENCOUNTER — CLINICAL SUPPORT (OUTPATIENT)
Dept: SMOKING CESSATION | Facility: CLINIC | Age: 61
End: 2021-03-30
Payer: COMMERCIAL

## 2021-03-30 DIAGNOSIS — F17.200 NICOTINE DEPENDENCE: Primary | ICD-10-CM

## 2021-03-30 PROCEDURE — 99402 PREV MED CNSL INDIV APPRX 30: CPT | Mod: S$GLB,,, | Performed by: GENERAL PRACTICE

## 2021-03-30 PROCEDURE — 99999 PR PBB SHADOW E&M-EST. PATIENT-LVL I: CPT | Mod: PBBFAC,,,

## 2021-03-30 PROCEDURE — 99999 PR PBB SHADOW E&M-EST. PATIENT-LVL I: ICD-10-PCS | Mod: PBBFAC,,,

## 2021-03-30 PROCEDURE — 99402 PR PREVENT COUNSEL,INDIV,30 MIN: ICD-10-PCS | Mod: S$GLB,,, | Performed by: GENERAL PRACTICE

## 2021-04-06 ENCOUNTER — TELEPHONE (OUTPATIENT)
Dept: SMOKING CESSATION | Facility: CLINIC | Age: 61
End: 2021-04-06

## 2021-04-08 ENCOUNTER — HOSPITAL ENCOUNTER (OUTPATIENT)
Facility: HOSPITAL | Age: 61
Discharge: HOME | End: 2021-04-10
Attending: EMERGENCY MEDICINE | Admitting: INTERNAL MEDICINE
Payer: MEDICAID

## 2021-04-08 DIAGNOSIS — N39.0 URINARY TRACT INFECTION WITH HEMATURIA, SITE UNSPECIFIED: Primary | ICD-10-CM

## 2021-04-08 DIAGNOSIS — R41.82 ALTERED MENTAL STATUS: ICD-10-CM

## 2021-04-08 DIAGNOSIS — R31.9 URINARY TRACT INFECTION WITH HEMATURIA, SITE UNSPECIFIED: Primary | ICD-10-CM

## 2021-04-08 DIAGNOSIS — G93.41 ACUTE METABOLIC ENCEPHALOPATHY: ICD-10-CM

## 2021-04-08 PROBLEM — I67.4 HYPERTENSIVE ENCEPHALOPATHY: Status: ACTIVE | Noted: 2021-04-08

## 2021-04-08 LAB
ALBUMIN SERPL BCP-MCNC: 4.2 G/DL (ref 3.5–5.2)
ALP SERPL-CCNC: 84 U/L (ref 55–135)
ALT SERPL W/O P-5'-P-CCNC: 17 U/L (ref 10–44)
AMMONIA PLAS-SCNC: 32 UMOL/L (ref 10–50)
AMPHET+METHAMPHET UR QL: NEGATIVE
ANION GAP SERPL CALC-SCNC: 16 MMOL/L (ref 8–16)
AST SERPL-CCNC: 13 U/L (ref 10–40)
BACTERIA #/AREA URNS HPF: ABNORMAL /HPF
BARBITURATES UR QL SCN>200 NG/ML: NEGATIVE
BASOPHILS # BLD AUTO: 0.07 K/UL (ref 0–0.2)
BASOPHILS NFR BLD: 0.5 % (ref 0–1.9)
BENZODIAZ UR QL SCN>200 NG/ML: NEGATIVE
BILIRUB SERPL-MCNC: 0.6 MG/DL (ref 0.1–1)
BILIRUB UR QL STRIP: ABNORMAL
BNP SERPL-MCNC: 24 PG/ML (ref 0–99)
BUN SERPL-MCNC: 34 MG/DL (ref 8–23)
BZE UR QL SCN: NEGATIVE
CALCIUM SERPL-MCNC: 9.7 MG/DL (ref 8.7–10.5)
CANNABINOIDS UR QL SCN: NEGATIVE
CHLORIDE SERPL-SCNC: 101 MMOL/L (ref 95–110)
CLARITY UR: CLEAR
CO2 SERPL-SCNC: 23 MMOL/L (ref 23–29)
COLOR UR: YELLOW
CREAT SERPL-MCNC: 1.5 MG/DL (ref 0.5–1.4)
CREAT UR-MCNC: 142.9 MG/DL (ref 23–375)
CTP QC/QA: YES
DIFFERENTIAL METHOD: ABNORMAL
EOSINOPHIL # BLD AUTO: 0 K/UL (ref 0–0.5)
EOSINOPHIL NFR BLD: 0.1 % (ref 0–8)
ERYTHROCYTE [DISTWIDTH] IN BLOOD BY AUTOMATED COUNT: 12.7 % (ref 11.5–14.5)
EST. GFR  (AFRICAN AMERICAN): 57 ML/MIN/1.73 M^2
EST. GFR  (NON AFRICAN AMERICAN): 49 ML/MIN/1.73 M^2
ETHANOL SERPL-MCNC: <10 MG/DL
GLUCOSE SERPL-MCNC: 329 MG/DL (ref 70–110)
GLUCOSE UR QL STRIP: ABNORMAL
HCT VFR BLD AUTO: 50 % (ref 40–54)
HGB BLD-MCNC: 16.6 G/DL (ref 14–18)
HGB UR QL STRIP: ABNORMAL
HYALINE CASTS #/AREA URNS LPF: 0 /LPF
IMM GRANULOCYTES # BLD AUTO: 0.11 K/UL (ref 0–0.04)
IMM GRANULOCYTES NFR BLD AUTO: 0.8 % (ref 0–0.5)
KETONES UR QL STRIP: ABNORMAL
LACTATE SERPL-SCNC: 2.2 MMOL/L (ref 0.5–2.2)
LEUKOCYTE ESTERASE UR QL STRIP: NEGATIVE
LYMPHOCYTES # BLD AUTO: 3.3 K/UL (ref 1–4.8)
LYMPHOCYTES NFR BLD: 22.6 % (ref 18–48)
MCH RBC QN AUTO: 29.9 PG (ref 27–31)
MCHC RBC AUTO-ENTMCNC: 33.2 G/DL (ref 32–36)
MCV RBC AUTO: 90 FL (ref 82–98)
METHADONE UR QL SCN>300 NG/ML: NEGATIVE
MICROSCOPIC COMMENT: ABNORMAL
MONOCYTES # BLD AUTO: 1 K/UL (ref 0.3–1)
MONOCYTES NFR BLD: 6.9 % (ref 4–15)
NEUTROPHILS # BLD AUTO: 10.1 K/UL (ref 1.8–7.7)
NEUTROPHILS NFR BLD: 69.1 % (ref 38–73)
NITRITE UR QL STRIP: NEGATIVE
NRBC BLD-RTO: 0 /100 WBC
OPIATES UR QL SCN: NORMAL
PCP UR QL SCN>25 NG/ML: NEGATIVE
PH UR STRIP: 6 [PH] (ref 5–8)
PLATELET # BLD AUTO: 402 K/UL (ref 150–450)
PMV BLD AUTO: 9.1 FL (ref 9.2–12.9)
POTASSIUM SERPL-SCNC: 3.8 MMOL/L (ref 3.5–5.1)
PROT SERPL-MCNC: 7.8 G/DL (ref 6–8.4)
PROT UR QL STRIP: ABNORMAL
RBC # BLD AUTO: 5.56 M/UL (ref 4.6–6.2)
RBC #/AREA URNS HPF: 5 /HPF (ref 0–4)
SARS-COV-2 RDRP RESP QL NAA+PROBE: NEGATIVE
SODIUM SERPL-SCNC: 140 MMOL/L (ref 136–145)
SP GR UR STRIP: >=1.03 (ref 1–1.03)
TOXICOLOGY INFORMATION: NORMAL
TROPONIN I SERPL DL<=0.01 NG/ML-MCNC: 0.01 NG/ML (ref 0–0.03)
TSH SERPL DL<=0.005 MIU/L-ACNC: 0.78 UIU/ML (ref 0.4–4)
URN SPEC COLLECT METH UR: ABNORMAL
UROBILINOGEN UR STRIP-ACNC: NEGATIVE EU/DL
WBC # BLD AUTO: 14.61 K/UL (ref 3.9–12.7)
WBC #/AREA URNS HPF: 25 /HPF (ref 0–5)
WBC CLUMPS URNS QL MICRO: ABNORMAL

## 2021-04-08 PROCEDURE — U0002 COVID-19 LAB TEST NON-CDC: HCPCS | Performed by: EMERGENCY MEDICINE

## 2021-04-08 PROCEDURE — G0378 HOSPITAL OBSERVATION PER HR: HCPCS

## 2021-04-08 PROCEDURE — 83605 ASSAY OF LACTIC ACID: CPT | Performed by: EMERGENCY MEDICINE

## 2021-04-08 PROCEDURE — 80053 COMPREHEN METABOLIC PANEL: CPT | Performed by: EMERGENCY MEDICINE

## 2021-04-08 PROCEDURE — 96365 THER/PROPH/DIAG IV INF INIT: CPT

## 2021-04-08 PROCEDURE — 81000 URINALYSIS NONAUTO W/SCOPE: CPT | Mod: 59 | Performed by: EMERGENCY MEDICINE

## 2021-04-08 PROCEDURE — 87088 URINE BACTERIA CULTURE: CPT | Performed by: EMERGENCY MEDICINE

## 2021-04-08 PROCEDURE — 93005 ELECTROCARDIOGRAM TRACING: CPT

## 2021-04-08 PROCEDURE — 87040 BLOOD CULTURE FOR BACTERIA: CPT | Performed by: EMERGENCY MEDICINE

## 2021-04-08 PROCEDURE — 84443 ASSAY THYROID STIM HORMONE: CPT | Performed by: EMERGENCY MEDICINE

## 2021-04-08 PROCEDURE — 99285 EMERGENCY DEPT VISIT HI MDM: CPT | Mod: 25

## 2021-04-08 PROCEDURE — 25000003 PHARM REV CODE 250: Performed by: EMERGENCY MEDICINE

## 2021-04-08 PROCEDURE — 96375 TX/PRO/DX INJ NEW DRUG ADDON: CPT

## 2021-04-08 PROCEDURE — 63600175 PHARM REV CODE 636 W HCPCS: Performed by: EMERGENCY MEDICINE

## 2021-04-08 PROCEDURE — 85025 COMPLETE CBC W/AUTO DIFF WBC: CPT | Performed by: EMERGENCY MEDICINE

## 2021-04-08 PROCEDURE — 96361 HYDRATE IV INFUSION ADD-ON: CPT

## 2021-04-08 PROCEDURE — 82077 ASSAY SPEC XCP UR&BREATH IA: CPT | Performed by: EMERGENCY MEDICINE

## 2021-04-08 PROCEDURE — 93010 ELECTROCARDIOGRAM REPORT: CPT | Mod: ,,, | Performed by: INTERNAL MEDICINE

## 2021-04-08 PROCEDURE — 83880 ASSAY OF NATRIURETIC PEPTIDE: CPT | Performed by: EMERGENCY MEDICINE

## 2021-04-08 PROCEDURE — 96374 THER/PROPH/DIAG INJ IV PUSH: CPT

## 2021-04-08 PROCEDURE — 87186 SC STD MICRODIL/AGAR DIL: CPT | Performed by: EMERGENCY MEDICINE

## 2021-04-08 PROCEDURE — 80307 DRUG TEST PRSMV CHEM ANLYZR: CPT | Performed by: EMERGENCY MEDICINE

## 2021-04-08 PROCEDURE — 87077 CULTURE AEROBIC IDENTIFY: CPT | Performed by: EMERGENCY MEDICINE

## 2021-04-08 PROCEDURE — 82140 ASSAY OF AMMONIA: CPT | Performed by: EMERGENCY MEDICINE

## 2021-04-08 PROCEDURE — P9612 CATHETERIZE FOR URINE SPEC: HCPCS

## 2021-04-08 PROCEDURE — 93010 EKG 12-LEAD: ICD-10-PCS | Mod: ,,, | Performed by: INTERNAL MEDICINE

## 2021-04-08 PROCEDURE — 84484 ASSAY OF TROPONIN QUANT: CPT | Performed by: EMERGENCY MEDICINE

## 2021-04-08 PROCEDURE — 87086 URINE CULTURE/COLONY COUNT: CPT | Performed by: EMERGENCY MEDICINE

## 2021-04-08 RX ORDER — HYDRALAZINE HYDROCHLORIDE 20 MG/ML
10 INJECTION INTRAMUSCULAR; INTRAVENOUS EVERY 6 HOURS PRN
Status: DISCONTINUED | OUTPATIENT
Start: 2021-04-08 | End: 2021-04-10 | Stop reason: HOSPADM

## 2021-04-08 RX ORDER — AMLODIPINE BESYLATE 10 MG/1
10 TABLET ORAL DAILY
Status: DISCONTINUED | OUTPATIENT
Start: 2021-04-09 | End: 2021-04-10 | Stop reason: HOSPADM

## 2021-04-08 RX ORDER — CARVEDILOL 6.25 MG/1
6.25 TABLET ORAL 2 TIMES DAILY WITH MEALS
Status: DISCONTINUED | OUTPATIENT
Start: 2021-04-09 | End: 2021-04-10 | Stop reason: HOSPADM

## 2021-04-08 RX ORDER — MAG HYDROX/ALUMINUM HYD/SIMETH 200-200-20
30 SUSPENSION, ORAL (FINAL DOSE FORM) ORAL EVERY 6 HOURS PRN
Status: DISCONTINUED | OUTPATIENT
Start: 2021-04-08 | End: 2021-04-10 | Stop reason: HOSPADM

## 2021-04-08 RX ORDER — ACETAMINOPHEN 325 MG/1
650 TABLET ORAL EVERY 6 HOURS PRN
Status: DISCONTINUED | OUTPATIENT
Start: 2021-04-08 | End: 2021-04-10 | Stop reason: HOSPADM

## 2021-04-08 RX ORDER — IBUPROFEN 200 MG
16 TABLET ORAL
Status: DISCONTINUED | OUTPATIENT
Start: 2021-04-09 | End: 2021-04-10 | Stop reason: HOSPADM

## 2021-04-08 RX ORDER — GUAIFENESIN 100 MG/5ML
200 SOLUTION ORAL EVERY 4 HOURS PRN
Status: DISCONTINUED | OUTPATIENT
Start: 2021-04-08 | End: 2021-04-10 | Stop reason: HOSPADM

## 2021-04-08 RX ORDER — PRAVASTATIN SODIUM 20 MG/1
20 TABLET ORAL NIGHTLY
Status: DISCONTINUED | OUTPATIENT
Start: 2021-04-09 | End: 2021-04-10 | Stop reason: HOSPADM

## 2021-04-08 RX ORDER — HYDRALAZINE HYDROCHLORIDE 20 MG/ML
10 INJECTION INTRAMUSCULAR; INTRAVENOUS
Status: COMPLETED | OUTPATIENT
Start: 2021-04-08 | End: 2021-04-08

## 2021-04-08 RX ORDER — DULOXETIN HYDROCHLORIDE 30 MG/1
60 CAPSULE, DELAYED RELEASE ORAL DAILY
Status: DISCONTINUED | OUTPATIENT
Start: 2021-04-09 | End: 2021-04-10 | Stop reason: HOSPADM

## 2021-04-08 RX ORDER — QUETIAPINE FUMARATE 100 MG/1
200 TABLET, FILM COATED ORAL DAILY
Status: DISCONTINUED | OUTPATIENT
Start: 2021-04-09 | End: 2021-04-10 | Stop reason: HOSPADM

## 2021-04-08 RX ORDER — ONDANSETRON 2 MG/ML
4 INJECTION INTRAMUSCULAR; INTRAVENOUS EVERY 8 HOURS PRN
Status: DISCONTINUED | OUTPATIENT
Start: 2021-04-08 | End: 2021-04-10 | Stop reason: HOSPADM

## 2021-04-08 RX ORDER — IBUPROFEN 200 MG
24 TABLET ORAL
Status: DISCONTINUED | OUTPATIENT
Start: 2021-04-09 | End: 2021-04-10 | Stop reason: HOSPADM

## 2021-04-08 RX ORDER — INSULIN ASPART 100 [IU]/ML
1-10 INJECTION, SOLUTION INTRAVENOUS; SUBCUTANEOUS
Status: DISCONTINUED | OUTPATIENT
Start: 2021-04-09 | End: 2021-04-10 | Stop reason: HOSPADM

## 2021-04-08 RX ORDER — IPRATROPIUM BROMIDE AND ALBUTEROL SULFATE 2.5; .5 MG/3ML; MG/3ML
3 SOLUTION RESPIRATORY (INHALATION) EVERY 4 HOURS PRN
Status: DISCONTINUED | OUTPATIENT
Start: 2021-04-08 | End: 2021-04-10 | Stop reason: HOSPADM

## 2021-04-08 RX ORDER — GLUCAGON 1 MG
1 KIT INJECTION
Status: DISCONTINUED | OUTPATIENT
Start: 2021-04-09 | End: 2021-04-10 | Stop reason: HOSPADM

## 2021-04-08 RX ORDER — TAMSULOSIN HYDROCHLORIDE 0.4 MG/1
0.4 CAPSULE ORAL DAILY
Status: DISCONTINUED | OUTPATIENT
Start: 2021-04-09 | End: 2021-04-10 | Stop reason: HOSPADM

## 2021-04-08 RX ADMIN — SODIUM CHLORIDE 500 ML: 0.9 INJECTION, SOLUTION INTRAVENOUS at 06:04

## 2021-04-08 RX ADMIN — HYDRALAZINE HYDROCHLORIDE 10 MG: 20 INJECTION INTRAMUSCULAR; INTRAVENOUS at 06:04

## 2021-04-08 RX ADMIN — CEFTRIAXONE 1 G: 1 INJECTION, SOLUTION INTRAVENOUS at 08:04

## 2021-04-09 LAB
ANION GAP SERPL CALC-SCNC: 14 MMOL/L (ref 8–16)
BASOPHILS # BLD AUTO: 0.09 K/UL (ref 0–0.2)
BASOPHILS NFR BLD: 0.8 % (ref 0–1.9)
BUN SERPL-MCNC: 32 MG/DL (ref 8–23)
CALCIUM SERPL-MCNC: 9.4 MG/DL (ref 8.7–10.5)
CHLORIDE SERPL-SCNC: 107 MMOL/L (ref 95–110)
CO2 SERPL-SCNC: 18 MMOL/L (ref 23–29)
CREAT SERPL-MCNC: 1.2 MG/DL (ref 0.5–1.4)
DIFFERENTIAL METHOD: ABNORMAL
EOSINOPHIL # BLD AUTO: 0.1 K/UL (ref 0–0.5)
EOSINOPHIL NFR BLD: 0.7 % (ref 0–8)
ERYTHROCYTE [DISTWIDTH] IN BLOOD BY AUTOMATED COUNT: 12.7 % (ref 11.5–14.5)
EST. GFR  (AFRICAN AMERICAN): >60 ML/MIN/1.73 M^2
EST. GFR  (NON AFRICAN AMERICAN): >60 ML/MIN/1.73 M^2
GLUCOSE SERPL-MCNC: 233 MG/DL (ref 70–110)
HCT VFR BLD AUTO: 47.1 % (ref 40–54)
HGB BLD-MCNC: 15.8 G/DL (ref 14–18)
IMM GRANULOCYTES # BLD AUTO: 0.09 K/UL (ref 0–0.04)
IMM GRANULOCYTES NFR BLD AUTO: 0.8 % (ref 0–0.5)
LYMPHOCYTES # BLD AUTO: 2.6 K/UL (ref 1–4.8)
LYMPHOCYTES NFR BLD: 23.2 % (ref 18–48)
MAGNESIUM SERPL-MCNC: 1.9 MG/DL (ref 1.6–2.6)
MCH RBC QN AUTO: 30.2 PG (ref 27–31)
MCHC RBC AUTO-ENTMCNC: 33.5 G/DL (ref 32–36)
MCV RBC AUTO: 90 FL (ref 82–98)
MONOCYTES # BLD AUTO: 0.9 K/UL (ref 0.3–1)
MONOCYTES NFR BLD: 8.2 % (ref 4–15)
NEUTROPHILS # BLD AUTO: 7.4 K/UL (ref 1.8–7.7)
NEUTROPHILS NFR BLD: 66.3 % (ref 38–73)
NRBC BLD-RTO: 0 /100 WBC
PLATELET # BLD AUTO: 290 K/UL (ref 150–450)
PMV BLD AUTO: 10.5 FL (ref 9.2–12.9)
POCT GLUCOSE: 232 MG/DL (ref 70–110)
POCT GLUCOSE: 261 MG/DL (ref 70–110)
POCT GLUCOSE: 262 MG/DL (ref 70–110)
POCT GLUCOSE: 262 MG/DL (ref 70–110)
POCT GLUCOSE: 292 MG/DL (ref 70–110)
POTASSIUM SERPL-SCNC: 3.3 MMOL/L (ref 3.5–5.1)
RBC # BLD AUTO: 5.23 M/UL (ref 4.6–6.2)
SODIUM SERPL-SCNC: 139 MMOL/L (ref 136–145)
WBC # BLD AUTO: 11.16 K/UL (ref 3.9–12.7)

## 2021-04-09 PROCEDURE — 96376 TX/PRO/DX INJ SAME DRUG ADON: CPT

## 2021-04-09 PROCEDURE — 25000003 PHARM REV CODE 250: Performed by: NURSE PRACTITIONER

## 2021-04-09 PROCEDURE — G0378 HOSPITAL OBSERVATION PER HR: HCPCS

## 2021-04-09 PROCEDURE — 85025 COMPLETE CBC W/AUTO DIFF WBC: CPT | Performed by: INTERNAL MEDICINE

## 2021-04-09 PROCEDURE — 25000003 PHARM REV CODE 250: Performed by: INTERNAL MEDICINE

## 2021-04-09 PROCEDURE — 80048 BASIC METABOLIC PNL TOTAL CA: CPT | Performed by: INTERNAL MEDICINE

## 2021-04-09 PROCEDURE — 96372 THER/PROPH/DIAG INJ SC/IM: CPT

## 2021-04-09 PROCEDURE — 36415 COLL VENOUS BLD VENIPUNCTURE: CPT | Performed by: INTERNAL MEDICINE

## 2021-04-09 PROCEDURE — 96372 THER/PROPH/DIAG INJ SC/IM: CPT | Mod: 59

## 2021-04-09 PROCEDURE — 83735 ASSAY OF MAGNESIUM: CPT | Performed by: INTERNAL MEDICINE

## 2021-04-09 PROCEDURE — 63600175 PHARM REV CODE 636 W HCPCS: Performed by: INTERNAL MEDICINE

## 2021-04-09 RX ORDER — CLONIDINE HYDROCHLORIDE 0.2 MG/1
0.2 TABLET ORAL EVERY 6 HOURS PRN
Status: DISCONTINUED | OUTPATIENT
Start: 2021-04-09 | End: 2021-04-10 | Stop reason: HOSPADM

## 2021-04-09 RX ADMIN — CLONIDINE HYDROCHLORIDE 0.3 MG: 0.2 TABLET ORAL at 09:04

## 2021-04-09 RX ADMIN — INSULIN ASPART 3 UNITS: 100 INJECTION, SOLUTION INTRAVENOUS; SUBCUTANEOUS at 12:04

## 2021-04-09 RX ADMIN — GUAIFENESIN 200 MG: 200 SOLUTION ORAL at 12:04

## 2021-04-09 RX ADMIN — DULOXETINE HYDROCHLORIDE 60 MG: 30 CAPSULE, DELAYED RELEASE ORAL at 09:04

## 2021-04-09 RX ADMIN — INSULIN ASPART 6 UNITS: 100 INJECTION, SOLUTION INTRAVENOUS; SUBCUTANEOUS at 04:04

## 2021-04-09 RX ADMIN — CEFTRIAXONE 1 G: 1 INJECTION, SOLUTION INTRAVENOUS at 09:04

## 2021-04-09 RX ADMIN — AMLODIPINE BESYLATE 10 MG: 10 TABLET ORAL at 09:04

## 2021-04-09 RX ADMIN — ACETAMINOPHEN 650 MG: 325 TABLET ORAL at 12:04

## 2021-04-09 RX ADMIN — INSULIN ASPART 2 UNITS: 100 INJECTION, SOLUTION INTRAVENOUS; SUBCUTANEOUS at 10:04

## 2021-04-09 RX ADMIN — CLONIDINE HYDROCHLORIDE 0.3 MG: 0.2 TABLET ORAL at 11:04

## 2021-04-09 RX ADMIN — QUETIAPINE 200 MG: 100 TABLET ORAL at 09:04

## 2021-04-09 RX ADMIN — CARVEDILOL 6.25 MG: 6.25 TABLET, FILM COATED ORAL at 04:04

## 2021-04-09 RX ADMIN — CLONIDINE HYDROCHLORIDE 0.3 MG: 0.2 TABLET ORAL at 02:04

## 2021-04-09 RX ADMIN — INSULIN ASPART 6 UNITS: 100 INJECTION, SOLUTION INTRAVENOUS; SUBCUTANEOUS at 11:04

## 2021-04-09 RX ADMIN — CLONIDINE HYDROCHLORIDE 0.2 MG: 0.2 TABLET ORAL at 04:04

## 2021-04-09 RX ADMIN — INSULIN ASPART 6 UNITS: 100 INJECTION, SOLUTION INTRAVENOUS; SUBCUTANEOUS at 05:04

## 2021-04-09 RX ADMIN — PRAVASTATIN SODIUM 20 MG: 20 TABLET ORAL at 09:04

## 2021-04-09 RX ADMIN — HYDRALAZINE HYDROCHLORIDE 10 MG: 20 INJECTION INTRAMUSCULAR; INTRAVENOUS at 12:04

## 2021-04-09 RX ADMIN — TAMSULOSIN HYDROCHLORIDE 0.4 MG: 0.4 CAPSULE ORAL at 09:04

## 2021-04-09 RX ADMIN — CARVEDILOL 6.25 MG: 6.25 TABLET, FILM COATED ORAL at 07:04

## 2021-04-10 VITALS
WEIGHT: 216.06 LBS | OXYGEN SATURATION: 98 % | HEIGHT: 71 IN | HEART RATE: 93 BPM | SYSTOLIC BLOOD PRESSURE: 107 MMHG | TEMPERATURE: 98 F | RESPIRATION RATE: 16 BRPM | BODY MASS INDEX: 30.25 KG/M2 | DIASTOLIC BLOOD PRESSURE: 63 MMHG

## 2021-04-10 PROBLEM — I67.4 HYPERTENSIVE ENCEPHALOPATHY: Status: RESOLVED | Noted: 2021-04-08 | Resolved: 2021-04-10

## 2021-04-10 PROBLEM — G93.41 ACUTE METABOLIC ENCEPHALOPATHY: Status: RESOLVED | Noted: 2021-04-08 | Resolved: 2021-04-10

## 2021-04-10 LAB
ALBUMIN SERPL BCP-MCNC: 3.4 G/DL (ref 3.5–5.2)
ALP SERPL-CCNC: 74 U/L (ref 55–135)
ALT SERPL W/O P-5'-P-CCNC: 11 U/L (ref 10–44)
ANION GAP SERPL CALC-SCNC: 11 MMOL/L (ref 8–16)
AST SERPL-CCNC: 9 U/L (ref 10–40)
BASOPHILS # BLD AUTO: 0.09 K/UL (ref 0–0.2)
BASOPHILS NFR BLD: 0.9 % (ref 0–1.9)
BILIRUB SERPL-MCNC: 0.6 MG/DL (ref 0.1–1)
BUN SERPL-MCNC: 41 MG/DL (ref 8–23)
CALCIUM SERPL-MCNC: 9.2 MG/DL (ref 8.7–10.5)
CHLORIDE SERPL-SCNC: 104 MMOL/L (ref 95–110)
CO2 SERPL-SCNC: 21 MMOL/L (ref 23–29)
CREAT SERPL-MCNC: 1.4 MG/DL (ref 0.5–1.4)
DIFFERENTIAL METHOD: ABNORMAL
EOSINOPHIL # BLD AUTO: 0.3 K/UL (ref 0–0.5)
EOSINOPHIL NFR BLD: 2.7 % (ref 0–8)
ERYTHROCYTE [DISTWIDTH] IN BLOOD BY AUTOMATED COUNT: 12.5 % (ref 11.5–14.5)
EST. GFR  (AFRICAN AMERICAN): >60 ML/MIN/1.73 M^2
EST. GFR  (NON AFRICAN AMERICAN): 53 ML/MIN/1.73 M^2
GLUCOSE SERPL-MCNC: 261 MG/DL (ref 70–110)
HCT VFR BLD AUTO: 43.6 % (ref 40–54)
HGB BLD-MCNC: 14.8 G/DL (ref 14–18)
IMM GRANULOCYTES # BLD AUTO: 0.16 K/UL (ref 0–0.04)
IMM GRANULOCYTES NFR BLD AUTO: 1.6 % (ref 0–0.5)
LYMPHOCYTES # BLD AUTO: 2.7 K/UL (ref 1–4.8)
LYMPHOCYTES NFR BLD: 26.3 % (ref 18–48)
MCH RBC QN AUTO: 30.4 PG (ref 27–31)
MCHC RBC AUTO-ENTMCNC: 33.9 G/DL (ref 32–36)
MCV RBC AUTO: 90 FL (ref 82–98)
MONOCYTES # BLD AUTO: 0.8 K/UL (ref 0.3–1)
MONOCYTES NFR BLD: 7.8 % (ref 4–15)
NEUTROPHILS # BLD AUTO: 6.3 K/UL (ref 1.8–7.7)
NEUTROPHILS NFR BLD: 60.7 % (ref 38–73)
NRBC BLD-RTO: 0 /100 WBC
PLATELET # BLD AUTO: 311 K/UL (ref 150–450)
PMV BLD AUTO: 9.3 FL (ref 9.2–12.9)
POCT GLUCOSE: 308 MG/DL (ref 70–110)
POCT GLUCOSE: 408 MG/DL (ref 70–110)
POTASSIUM SERPL-SCNC: 3.4 MMOL/L (ref 3.5–5.1)
PROT SERPL-MCNC: 6.4 G/DL (ref 6–8.4)
RBC # BLD AUTO: 4.87 M/UL (ref 4.6–6.2)
SODIUM SERPL-SCNC: 136 MMOL/L (ref 136–145)
WBC # BLD AUTO: 10.32 K/UL (ref 3.9–12.7)

## 2021-04-10 PROCEDURE — 25000003 PHARM REV CODE 250: Performed by: INTERNAL MEDICINE

## 2021-04-10 PROCEDURE — 96376 TX/PRO/DX INJ SAME DRUG ADON: CPT

## 2021-04-10 PROCEDURE — 80053 COMPREHEN METABOLIC PANEL: CPT | Performed by: NURSE PRACTITIONER

## 2021-04-10 PROCEDURE — 36415 COLL VENOUS BLD VENIPUNCTURE: CPT | Performed by: NURSE PRACTITIONER

## 2021-04-10 PROCEDURE — 63600175 PHARM REV CODE 636 W HCPCS: Performed by: INTERNAL MEDICINE

## 2021-04-10 PROCEDURE — 85025 COMPLETE CBC W/AUTO DIFF WBC: CPT | Performed by: NURSE PRACTITIONER

## 2021-04-10 PROCEDURE — G0378 HOSPITAL OBSERVATION PER HR: HCPCS

## 2021-04-10 PROCEDURE — 96372 THER/PROPH/DIAG INJ SC/IM: CPT

## 2021-04-10 PROCEDURE — 96372 THER/PROPH/DIAG INJ SC/IM: CPT | Mod: 59

## 2021-04-10 PROCEDURE — 25000003 PHARM REV CODE 250: Performed by: NURSE PRACTITIONER

## 2021-04-10 RX ORDER — AMOXICILLIN AND CLAVULANATE POTASSIUM 875; 125 MG/1; MG/1
1 TABLET, FILM COATED ORAL EVERY 12 HOURS
Qty: 10 TABLET | Refills: 0 | Status: SHIPPED | OUTPATIENT
Start: 2021-04-10 | End: 2021-04-15

## 2021-04-10 RX ORDER — POTASSIUM CHLORIDE 20 MEQ/1
40 TABLET, EXTENDED RELEASE ORAL ONCE
Status: COMPLETED | OUTPATIENT
Start: 2021-04-10 | End: 2021-04-10

## 2021-04-10 RX ORDER — AMOXICILLIN AND CLAVULANATE POTASSIUM 875; 125 MG/1; MG/1
1 TABLET, FILM COATED ORAL EVERY 12 HOURS
Status: DISCONTINUED | OUTPATIENT
Start: 2021-04-10 | End: 2021-04-10 | Stop reason: HOSPADM

## 2021-04-10 RX ADMIN — POTASSIUM CHLORIDE 40 MEQ: 1500 TABLET, EXTENDED RELEASE ORAL at 11:04

## 2021-04-10 RX ADMIN — CLONIDINE HYDROCHLORIDE 0.3 MG: 0.2 TABLET ORAL at 08:04

## 2021-04-10 RX ADMIN — INSULIN ASPART 8 UNITS: 100 INJECTION, SOLUTION INTRAVENOUS; SUBCUTANEOUS at 06:04

## 2021-04-10 RX ADMIN — AMOXICILLIN AND CLAVULANATE POTASSIUM 1 TABLET: 875; 125 TABLET, FILM COATED ORAL at 11:04

## 2021-04-10 RX ADMIN — INSULIN ASPART 10 UNITS: 100 INJECTION, SOLUTION INTRAVENOUS; SUBCUTANEOUS at 11:04

## 2021-04-10 RX ADMIN — CEFTRIAXONE 1 G: 1 INJECTION, SOLUTION INTRAVENOUS at 08:04

## 2021-04-10 RX ADMIN — CARVEDILOL 6.25 MG: 6.25 TABLET, FILM COATED ORAL at 07:04

## 2021-04-10 RX ADMIN — AMLODIPINE BESYLATE 10 MG: 10 TABLET ORAL at 08:04

## 2021-04-10 RX ADMIN — TAMSULOSIN HYDROCHLORIDE 0.4 MG: 0.4 CAPSULE ORAL at 08:04

## 2021-04-10 RX ADMIN — QUETIAPINE 200 MG: 100 TABLET ORAL at 08:04

## 2021-04-10 RX ADMIN — DULOXETINE HYDROCHLORIDE 60 MG: 30 CAPSULE, DELAYED RELEASE ORAL at 08:04

## 2021-04-12 LAB — BACTERIA UR CULT: ABNORMAL

## 2021-04-13 LAB
BACTERIA BLD CULT: NORMAL
BACTERIA BLD CULT: NORMAL

## 2021-04-14 ENCOUNTER — TELEPHONE (OUTPATIENT)
Dept: SMOKING CESSATION | Facility: CLINIC | Age: 61
End: 2021-04-14

## 2021-05-02 ENCOUNTER — PATIENT MESSAGE (OUTPATIENT)
Dept: ENDOSCOPY | Facility: HOSPITAL | Age: 61
End: 2021-05-02

## 2021-05-25 ENCOUNTER — HOSPITAL ENCOUNTER (EMERGENCY)
Facility: HOSPITAL | Age: 61
Discharge: HOME | End: 2021-05-25
Attending: EMERGENCY MEDICINE
Payer: MEDICAID

## 2021-05-25 VITALS
WEIGHT: 212.5 LBS | OXYGEN SATURATION: 97 % | DIASTOLIC BLOOD PRESSURE: 112 MMHG | HEIGHT: 71 IN | TEMPERATURE: 99 F | RESPIRATION RATE: 18 BRPM | SYSTOLIC BLOOD PRESSURE: 190 MMHG | BODY MASS INDEX: 29.75 KG/M2 | HEART RATE: 72 BPM

## 2021-05-25 DIAGNOSIS — G89.29 OTHER CHRONIC PAIN: Primary | ICD-10-CM

## 2021-05-25 DIAGNOSIS — M25.561 ACUTE PAIN OF RIGHT KNEE: ICD-10-CM

## 2021-05-25 PROCEDURE — 99283 EMERGENCY DEPT VISIT LOW MDM: CPT | Mod: 25

## 2021-05-25 PROCEDURE — 25000003 PHARM REV CODE 250: Performed by: EMERGENCY MEDICINE

## 2021-05-25 RX ORDER — ATORVASTATIN CALCIUM 20 MG/1
20 TABLET, FILM COATED ORAL DAILY
COMMUNITY
Start: 2021-05-12

## 2021-05-25 RX ORDER — HYDROCODONE BITARTRATE AND ACETAMINOPHEN 5; 325 MG/1; MG/1
1 TABLET ORAL
Status: COMPLETED | OUTPATIENT
Start: 2021-05-25 | End: 2021-05-25

## 2021-05-25 RX ORDER — EMPAGLIFLOZIN 25 MG/1
25 TABLET, FILM COATED ORAL
COMMUNITY
Start: 2021-01-27

## 2021-05-25 RX ORDER — TOPIRAMATE 100 MG/1
2 CAPSULE, EXTENDED RELEASE ORAL
COMMUNITY
Start: 2020-12-17 | End: 2022-01-19

## 2021-05-25 RX ORDER — INSULIN LISPRO 100 [IU]/ML
INJECTION, SOLUTION INTRAVENOUS; SUBCUTANEOUS
COMMUNITY
Start: 2021-01-27

## 2021-05-25 RX ORDER — BACLOFEN 20 MG/1
20 TABLET ORAL EVERY 8 HOURS PRN
Status: ON HOLD | COMMUNITY
Start: 2021-05-07 | End: 2022-02-13 | Stop reason: HOSPADM

## 2021-05-25 RX ORDER — IBUPROFEN 800 MG/1
TABLET ORAL
COMMUNITY
Start: 2021-03-24 | End: 2022-01-19

## 2021-05-25 RX ORDER — QUETIAPINE FUMARATE 100 MG/1
TABLET, FILM COATED ORAL
COMMUNITY
End: 2022-01-19

## 2021-05-25 RX ORDER — INSULIN GLARGINE 100 [IU]/ML
25 INJECTION, SOLUTION SUBCUTANEOUS
COMMUNITY
Start: 2021-03-03 | End: 2021-08-30

## 2021-05-25 RX ORDER — HYDROCODONE BITARTRATE AND ACETAMINOPHEN 5; 325 MG/1; MG/1
TABLET ORAL
COMMUNITY
Start: 2021-04-02 | End: 2022-01-19

## 2021-05-25 RX ORDER — SERTRALINE HYDROCHLORIDE 100 MG/1
100 TABLET, FILM COATED ORAL NIGHTLY
COMMUNITY
Start: 2021-05-12 | End: 2022-01-19

## 2021-05-25 RX ORDER — METFORMIN HYDROCHLORIDE 1000 MG/1
500 TABLET ORAL 2 TIMES DAILY WITH MEALS
COMMUNITY
Start: 2021-05-12

## 2021-05-25 RX ADMIN — HYDROCODONE BITARTRATE AND ACETAMINOPHEN 1 TABLET: 5; 325 TABLET ORAL at 09:05

## 2021-05-27 ENCOUNTER — HOSPITAL ENCOUNTER (EMERGENCY)
Facility: HOSPITAL | Age: 61
Discharge: HOME | End: 2021-05-27
Attending: EMERGENCY MEDICINE
Payer: MEDICAID

## 2021-05-27 VITALS
SYSTOLIC BLOOD PRESSURE: 167 MMHG | DIASTOLIC BLOOD PRESSURE: 94 MMHG | WEIGHT: 209.69 LBS | OXYGEN SATURATION: 98 % | BODY MASS INDEX: 29.24 KG/M2 | HEART RATE: 74 BPM | TEMPERATURE: 98 F | RESPIRATION RATE: 16 BRPM

## 2021-05-27 DIAGNOSIS — R07.9 CHEST PAIN: ICD-10-CM

## 2021-05-27 DIAGNOSIS — G89.29 OTHER CHRONIC PAIN: Primary | ICD-10-CM

## 2021-05-27 DIAGNOSIS — R73.9 HYPERGLYCEMIA: ICD-10-CM

## 2021-05-27 LAB
ALBUMIN SERPL BCP-MCNC: 3.1 G/DL (ref 3.5–5.2)
ALP SERPL-CCNC: 89 U/L (ref 55–135)
ALT SERPL W/O P-5'-P-CCNC: 17 U/L (ref 10–44)
ANION GAP SERPL CALC-SCNC: 15 MMOL/L (ref 8–16)
AST SERPL-CCNC: 15 U/L (ref 10–40)
BASOPHILS # BLD AUTO: 0.08 K/UL (ref 0–0.2)
BASOPHILS NFR BLD: 0.8 % (ref 0–1.9)
BILIRUB SERPL-MCNC: 0.2 MG/DL (ref 0.1–1)
BNP SERPL-MCNC: <10 PG/ML (ref 0–99)
BUN SERPL-MCNC: 23 MG/DL (ref 8–23)
CALCIUM SERPL-MCNC: 8.8 MG/DL (ref 8.7–10.5)
CHLORIDE SERPL-SCNC: 101 MMOL/L (ref 95–110)
CK SERPL-CCNC: 29 U/L (ref 20–200)
CO2 SERPL-SCNC: 20 MMOL/L (ref 23–29)
CREAT SERPL-MCNC: 1.7 MG/DL (ref 0.5–1.4)
DIFFERENTIAL METHOD: ABNORMAL
EOSINOPHIL # BLD AUTO: 0.4 K/UL (ref 0–0.5)
EOSINOPHIL NFR BLD: 3.5 % (ref 0–8)
ERYTHROCYTE [DISTWIDTH] IN BLOOD BY AUTOMATED COUNT: 12.4 % (ref 11.5–14.5)
EST. GFR  (AFRICAN AMERICAN): 49 ML/MIN/1.73 M^2
EST. GFR  (NON AFRICAN AMERICAN): 42 ML/MIN/1.73 M^2
GLUCOSE SERPL-MCNC: 489 MG/DL (ref 70–110)
HCT VFR BLD AUTO: 39.5 % (ref 40–54)
HCV AB SERPL QL IA: NEGATIVE
HEP C VIRUS HOLD SPECIMEN: NORMAL
HGB BLD-MCNC: 13.1 G/DL (ref 14–18)
HIV 1+2 AB+HIV1 P24 AG SERPL QL IA: NEGATIVE
IMM GRANULOCYTES # BLD AUTO: 0.22 K/UL (ref 0–0.04)
IMM GRANULOCYTES NFR BLD AUTO: 2.2 % (ref 0–0.5)
LYMPHOCYTES # BLD AUTO: 2.6 K/UL (ref 1–4.8)
LYMPHOCYTES NFR BLD: 25.3 % (ref 18–48)
MCH RBC QN AUTO: 30.4 PG (ref 27–31)
MCHC RBC AUTO-ENTMCNC: 33.2 G/DL (ref 32–36)
MCV RBC AUTO: 92 FL (ref 82–98)
MONOCYTES # BLD AUTO: 0.8 K/UL (ref 0.3–1)
MONOCYTES NFR BLD: 7.7 % (ref 4–15)
NEUTROPHILS # BLD AUTO: 6.1 K/UL (ref 1.8–7.7)
NEUTROPHILS NFR BLD: 60.5 % (ref 38–73)
NRBC BLD-RTO: 0 /100 WBC
PLATELET # BLD AUTO: 306 K/UL (ref 150–450)
PMV BLD AUTO: 9.1 FL (ref 9.2–12.9)
POCT GLUCOSE: 450 MG/DL (ref 70–110)
POTASSIUM SERPL-SCNC: 4.5 MMOL/L (ref 3.5–5.1)
PROT SERPL-MCNC: 6.2 G/DL (ref 6–8.4)
RBC # BLD AUTO: 4.31 M/UL (ref 4.6–6.2)
SODIUM SERPL-SCNC: 136 MMOL/L (ref 136–145)
TROPONIN I SERPL DL<=0.01 NG/ML-MCNC: <0.006 NG/ML (ref 0–0.03)
WBC # BLD AUTO: 10.08 K/UL (ref 3.9–12.7)

## 2021-05-27 PROCEDURE — 93005 ELECTROCARDIOGRAM TRACING: CPT

## 2021-05-27 PROCEDURE — 63600175 PHARM REV CODE 636 W HCPCS: Performed by: EMERGENCY MEDICINE

## 2021-05-27 PROCEDURE — 36415 COLL VENOUS BLD VENIPUNCTURE: CPT | Performed by: EMERGENCY MEDICINE

## 2021-05-27 PROCEDURE — 82962 GLUCOSE BLOOD TEST: CPT

## 2021-05-27 PROCEDURE — 83880 ASSAY OF NATRIURETIC PEPTIDE: CPT | Performed by: EMERGENCY MEDICINE

## 2021-05-27 PROCEDURE — 93010 EKG 12-LEAD: ICD-10-PCS | Mod: ,,, | Performed by: INTERNAL MEDICINE

## 2021-05-27 PROCEDURE — 82550 ASSAY OF CK (CPK): CPT | Performed by: EMERGENCY MEDICINE

## 2021-05-27 PROCEDURE — 86803 HEPATITIS C AB TEST: CPT | Performed by: EMERGENCY MEDICINE

## 2021-05-27 PROCEDURE — 84484 ASSAY OF TROPONIN QUANT: CPT | Performed by: EMERGENCY MEDICINE

## 2021-05-27 PROCEDURE — 93010 ELECTROCARDIOGRAM REPORT: CPT | Mod: ,,, | Performed by: INTERNAL MEDICINE

## 2021-05-27 PROCEDURE — 25000003 PHARM REV CODE 250: Performed by: EMERGENCY MEDICINE

## 2021-05-27 PROCEDURE — 85025 COMPLETE CBC W/AUTO DIFF WBC: CPT | Performed by: EMERGENCY MEDICINE

## 2021-05-27 PROCEDURE — 80053 COMPREHEN METABOLIC PANEL: CPT | Performed by: EMERGENCY MEDICINE

## 2021-05-27 PROCEDURE — 96374 THER/PROPH/DIAG INJ IV PUSH: CPT

## 2021-05-27 PROCEDURE — 99285 EMERGENCY DEPT VISIT HI MDM: CPT | Mod: 25

## 2021-05-27 PROCEDURE — 96361 HYDRATE IV INFUSION ADD-ON: CPT

## 2021-05-27 PROCEDURE — 86703 HIV-1/HIV-2 1 RESULT ANTBDY: CPT | Performed by: EMERGENCY MEDICINE

## 2021-05-27 RX ADMIN — INSULIN HUMAN 10 UNITS: 100 INJECTION, SOLUTION PARENTERAL at 05:05

## 2021-05-27 RX ADMIN — SODIUM CHLORIDE 1000 ML: 0.9 INJECTION, SOLUTION INTRAVENOUS at 05:05

## 2021-05-28 LAB — POCT GLUCOSE: 326 MG/DL (ref 70–110)

## 2021-05-28 RX ORDER — POLYETHYLENE GLYCOL 3350, SODIUM SULFATE ANHYDROUS, SODIUM BICARBONATE, SODIUM CHLORIDE, POTASSIUM CHLORIDE 236; 22.74; 6.74; 5.86; 2.97 G/4L; G/4L; G/4L; G/4L; G/4L
4 POWDER, FOR SOLUTION ORAL ONCE
Qty: 4000 ML | Refills: 0 | Status: SHIPPED | OUTPATIENT
Start: 2021-05-28 | End: 2021-05-28

## 2021-06-28 RX ORDER — SODIUM, POTASSIUM,MAG SULFATES 17.5-3.13G
1 SOLUTION, RECONSTITUTED, ORAL ORAL ONCE
Qty: 354 ML | Refills: 0 | Status: SHIPPED | OUTPATIENT
Start: 2021-06-28 | End: 2021-06-28

## 2021-06-29 ENCOUNTER — TELEPHONE (OUTPATIENT)
Dept: ENDOSCOPY | Facility: HOSPITAL | Age: 61
End: 2021-06-29

## 2021-06-29 RX ORDER — POLYETHYLENE GLYCOL 3350, SODIUM SULFATE ANHYDROUS, SODIUM BICARBONATE, SODIUM CHLORIDE, POTASSIUM CHLORIDE 236; 22.74; 6.74; 5.86; 2.97 G/4L; G/4L; G/4L; G/4L; G/4L
POWDER, FOR SOLUTION ORAL ONCE
Status: CANCELLED | OUTPATIENT
Start: 2021-06-29 | End: 2021-06-29

## 2022-01-01 ENCOUNTER — HOSPITAL ENCOUNTER (OUTPATIENT)
Facility: HOSPITAL | Age: 62
Discharge: LEFT BEFORE DISCHARGE | End: 2022-01-02
Attending: EMERGENCY MEDICINE | Admitting: INTERNAL MEDICINE
Payer: MEDICAID

## 2022-01-01 DIAGNOSIS — R79.89 ELEVATED BRAIN NATRIURETIC PEPTIDE (BNP) LEVEL: ICD-10-CM

## 2022-01-01 DIAGNOSIS — R06.02 SHORTNESS OF BREATH: ICD-10-CM

## 2022-01-01 DIAGNOSIS — I50.9 ACUTE HEART FAILURE, UNSPECIFIED HEART FAILURE TYPE: ICD-10-CM

## 2022-01-01 DIAGNOSIS — L03.115 CELLULITIS OF RIGHT LEG: Primary | ICD-10-CM

## 2022-01-01 DIAGNOSIS — I21.4 NSTEMI (NON-ST ELEVATED MYOCARDIAL INFARCTION): ICD-10-CM

## 2022-01-01 DIAGNOSIS — R79.89 ELEVATED TROPONIN: ICD-10-CM

## 2022-01-01 LAB
ALBUMIN SERPL BCP-MCNC: 3.3 G/DL (ref 3.5–5.2)
ALP SERPL-CCNC: 72 U/L (ref 55–135)
ALT SERPL W/O P-5'-P-CCNC: 17 U/L (ref 10–44)
ANION GAP SERPL CALC-SCNC: 11 MMOL/L (ref 8–16)
AST SERPL-CCNC: 17 U/L (ref 10–40)
BASOPHILS # BLD AUTO: 0.07 K/UL (ref 0–0.2)
BASOPHILS NFR BLD: 0.6 % (ref 0–1.9)
BILIRUB SERPL-MCNC: 0.5 MG/DL (ref 0.1–1)
BNP SERPL-MCNC: 1536 PG/ML (ref 0–99)
BUN SERPL-MCNC: 17 MG/DL (ref 8–23)
CALCIUM SERPL-MCNC: 9.4 MG/DL (ref 8.7–10.5)
CHLORIDE SERPL-SCNC: 99 MMOL/L (ref 95–110)
CO2 SERPL-SCNC: 26 MMOL/L (ref 23–29)
CREAT SERPL-MCNC: 1.2 MG/DL (ref 0.5–1.4)
CTP QC/QA: YES
DIFFERENTIAL METHOD: ABNORMAL
EOSINOPHIL # BLD AUTO: 0.4 K/UL (ref 0–0.5)
EOSINOPHIL NFR BLD: 3.2 % (ref 0–8)
ERYTHROCYTE [DISTWIDTH] IN BLOOD BY AUTOMATED COUNT: 15.3 % (ref 11.5–14.5)
EST. GFR  (AFRICAN AMERICAN): >60 ML/MIN/1.73 M^2
EST. GFR  (NON AFRICAN AMERICAN): >60 ML/MIN/1.73 M^2
GLUCOSE SERPL-MCNC: 136 MG/DL (ref 70–110)
HCT VFR BLD AUTO: 37.8 % (ref 40–54)
HEP C VIRUS HOLD SPECIMEN: NORMAL
HGB BLD-MCNC: 12.4 G/DL (ref 14–18)
IMM GRANULOCYTES # BLD AUTO: 0.1 K/UL (ref 0–0.04)
IMM GRANULOCYTES NFR BLD AUTO: 0.9 % (ref 0–0.5)
LYMPHOCYTES # BLD AUTO: 2 K/UL (ref 1–4.8)
LYMPHOCYTES NFR BLD: 18.5 % (ref 18–48)
MCH RBC QN AUTO: 27.6 PG (ref 27–31)
MCHC RBC AUTO-ENTMCNC: 32.8 G/DL (ref 32–36)
MCV RBC AUTO: 84 FL (ref 82–98)
MONOCYTES # BLD AUTO: 1 K/UL (ref 0.3–1)
MONOCYTES NFR BLD: 8.7 % (ref 4–15)
NEUTROPHILS # BLD AUTO: 7.5 K/UL (ref 1.8–7.7)
NEUTROPHILS NFR BLD: 68.1 % (ref 38–73)
NRBC BLD-RTO: 0 /100 WBC
PLATELET # BLD AUTO: 397 K/UL (ref 150–450)
PMV BLD AUTO: 8.4 FL (ref 9.2–12.9)
POTASSIUM SERPL-SCNC: 3.9 MMOL/L (ref 3.5–5.1)
PROT SERPL-MCNC: 7 G/DL (ref 6–8.4)
RBC # BLD AUTO: 4.49 M/UL (ref 4.6–6.2)
SARS-COV-2 RDRP RESP QL NAA+PROBE: NEGATIVE
SODIUM SERPL-SCNC: 136 MMOL/L (ref 136–145)
TROPONIN I SERPL DL<=0.01 NG/ML-MCNC: 1.18 NG/ML (ref 0–0.03)
WBC # BLD AUTO: 10.94 K/UL (ref 3.9–12.7)

## 2022-01-01 PROCEDURE — 93005 ELECTROCARDIOGRAM TRACING: CPT

## 2022-01-01 PROCEDURE — 93010 ELECTROCARDIOGRAM REPORT: CPT | Mod: 59,,, | Performed by: INTERNAL MEDICINE

## 2022-01-01 PROCEDURE — 84484 ASSAY OF TROPONIN QUANT: CPT | Performed by: EMERGENCY MEDICINE

## 2022-01-01 PROCEDURE — 80053 COMPREHEN METABOLIC PANEL: CPT | Performed by: EMERGENCY MEDICINE

## 2022-01-01 PROCEDURE — 85025 COMPLETE CBC W/AUTO DIFF WBC: CPT | Performed by: EMERGENCY MEDICINE

## 2022-01-01 PROCEDURE — 25000242 PHARM REV CODE 250 ALT 637 W/ HCPCS: Performed by: EMERGENCY MEDICINE

## 2022-01-01 PROCEDURE — 63600175 PHARM REV CODE 636 W HCPCS: Performed by: EMERGENCY MEDICINE

## 2022-01-01 PROCEDURE — 87389 HIV-1 AG W/HIV-1&-2 AB AG IA: CPT | Performed by: EMERGENCY MEDICINE

## 2022-01-01 PROCEDURE — 83880 ASSAY OF NATRIURETIC PEPTIDE: CPT | Performed by: EMERGENCY MEDICINE

## 2022-01-01 PROCEDURE — 93010 EKG 12-LEAD: ICD-10-PCS | Mod: ,,, | Performed by: INTERNAL MEDICINE

## 2022-01-01 PROCEDURE — 94640 AIRWAY INHALATION TREATMENT: CPT

## 2022-01-01 PROCEDURE — 86803 HEPATITIS C AB TEST: CPT | Performed by: EMERGENCY MEDICINE

## 2022-01-01 PROCEDURE — 96374 THER/PROPH/DIAG INJ IV PUSH: CPT | Mod: 59

## 2022-01-01 PROCEDURE — 99291 CRITICAL CARE FIRST HOUR: CPT | Mod: 25

## 2022-01-01 PROCEDURE — U0002 COVID-19 LAB TEST NON-CDC: HCPCS | Performed by: EMERGENCY MEDICINE

## 2022-01-01 RX ORDER — SULFAMETHOXAZOLE AND TRIMETHOPRIM 800; 160 MG/1; MG/1
1 TABLET ORAL 2 TIMES DAILY
Qty: 14 TABLET | Refills: 0 | Status: SHIPPED | OUTPATIENT
Start: 2022-01-01 | End: 2022-01-08

## 2022-01-01 RX ORDER — PREDNISONE 10 MG/1
40 TABLET ORAL DAILY
Qty: 20 TABLET | Refills: 0 | Status: SHIPPED | OUTPATIENT
Start: 2022-01-01 | End: 2022-01-06

## 2022-01-01 RX ORDER — METHYLPREDNISOLONE SOD SUCC 125 MG
125 VIAL (EA) INJECTION
Status: COMPLETED | OUTPATIENT
Start: 2022-01-01 | End: 2022-01-01

## 2022-01-01 RX ORDER — ASPIRIN 325 MG
325 TABLET ORAL
Status: COMPLETED | OUTPATIENT
Start: 2022-01-02 | End: 2022-01-01

## 2022-01-01 RX ORDER — HEPARIN SODIUM,PORCINE/D5W 25000/250
0-40 INTRAVENOUS SOLUTION INTRAVENOUS CONTINUOUS
Status: DISCONTINUED | OUTPATIENT
Start: 2022-01-02 | End: 2022-01-01

## 2022-01-01 RX ORDER — ALBUTEROL SULFATE 0.83 MG/ML
2.5 SOLUTION RESPIRATORY (INHALATION)
Status: COMPLETED | OUTPATIENT
Start: 2022-01-01 | End: 2022-01-01

## 2022-01-01 RX ORDER — ALBUTEROL SULFATE 90 UG/1
1-2 AEROSOL, METERED RESPIRATORY (INHALATION) EVERY 4 HOURS PRN
Qty: 18 G | Refills: 0 | Status: SHIPPED | OUTPATIENT
Start: 2022-01-01 | End: 2023-01-01

## 2022-01-01 RX ADMIN — METHYLPREDNISOLONE SODIUM SUCCINATE 125 MG: 125 INJECTION, POWDER, FOR SOLUTION INTRAMUSCULAR; INTRAVENOUS at 11:01

## 2022-01-01 RX ADMIN — ALBUTEROL SULFATE 2.5 MG: 2.5 SOLUTION RESPIRATORY (INHALATION) at 11:01

## 2022-01-01 RX ADMIN — ASPIRIN 325 MG ORAL TABLET 325 MG: 325 PILL ORAL at 11:01

## 2022-01-02 VITALS
RESPIRATION RATE: 18 BRPM | TEMPERATURE: 98 F | WEIGHT: 201.19 LBS | OXYGEN SATURATION: 97 % | SYSTOLIC BLOOD PRESSURE: 156 MMHG | BODY MASS INDEX: 27.25 KG/M2 | HEART RATE: 84 BPM | HEIGHT: 72 IN | DIASTOLIC BLOOD PRESSURE: 89 MMHG

## 2022-01-02 PROBLEM — I21.4 NSTEMI (NON-ST ELEVATED MYOCARDIAL INFARCTION): Status: ACTIVE | Noted: 2022-01-02

## 2022-01-02 PROBLEM — L03.90 CELLULITIS: Status: ACTIVE | Noted: 2022-01-02

## 2022-01-02 PROBLEM — J44.9 COPD (CHRONIC OBSTRUCTIVE PULMONARY DISEASE): Status: ACTIVE | Noted: 2022-01-02

## 2022-01-02 PROBLEM — I50.9 CHF (CONGESTIVE HEART FAILURE): Status: ACTIVE | Noted: 2022-01-02

## 2022-01-02 PROBLEM — E78.5 HLD (HYPERLIPIDEMIA): Status: ACTIVE | Noted: 2022-01-02

## 2022-01-02 LAB
ALBUMIN SERPL BCP-MCNC: 3.6 G/DL (ref 3.5–5.2)
ALP SERPL-CCNC: 84 U/L (ref 55–135)
ALT SERPL W/O P-5'-P-CCNC: 19 U/L (ref 10–44)
ANION GAP SERPL CALC-SCNC: 18 MMOL/L (ref 8–16)
APTT BLDCRRT: 26.4 SEC (ref 21–32)
AST SERPL-CCNC: 18 U/L (ref 10–40)
BASOPHILS # BLD AUTO: 0.04 K/UL (ref 0–0.2)
BASOPHILS NFR BLD: 0.5 % (ref 0–1.9)
BILIRUB SERPL-MCNC: 0.6 MG/DL (ref 0.1–1)
BUN SERPL-MCNC: 16 MG/DL (ref 8–23)
CALCIUM SERPL-MCNC: 9.5 MG/DL (ref 8.7–10.5)
CHLORIDE SERPL-SCNC: 95 MMOL/L (ref 95–110)
CHOLEST SERPL-MCNC: 143 MG/DL (ref 120–199)
CHOLEST/HDLC SERPL: 2.4 {RATIO} (ref 2–5)
CO2 SERPL-SCNC: 24 MMOL/L (ref 23–29)
CREAT SERPL-MCNC: 1.2 MG/DL (ref 0.5–1.4)
DIFFERENTIAL METHOD: ABNORMAL
EOSINOPHIL # BLD AUTO: 0 K/UL (ref 0–0.5)
EOSINOPHIL NFR BLD: 0 % (ref 0–8)
ERYTHROCYTE [DISTWIDTH] IN BLOOD BY AUTOMATED COUNT: 15.5 % (ref 11.5–14.5)
EST. GFR  (AFRICAN AMERICAN): >60 ML/MIN/1.73 M^2
EST. GFR  (NON AFRICAN AMERICAN): >60 ML/MIN/1.73 M^2
ESTIMATED AVG GLUCOSE: 174 MG/DL (ref 68–131)
GLUCOSE SERPL-MCNC: 224 MG/DL (ref 70–110)
HBA1C MFR BLD: 7.7 % (ref 4–5.6)
HCT VFR BLD AUTO: 41.8 % (ref 40–54)
HCV AB SERPL QL IA: NEGATIVE
HDLC SERPL-MCNC: 60 MG/DL (ref 40–75)
HDLC SERPL: 42 % (ref 20–50)
HGB BLD-MCNC: 13.2 G/DL (ref 14–18)
HIV 1+2 AB+HIV1 P24 AG SERPL QL IA: NEGATIVE
IMM GRANULOCYTES # BLD AUTO: 0.09 K/UL (ref 0–0.04)
IMM GRANULOCYTES NFR BLD AUTO: 1 % (ref 0–0.5)
INR PPP: 1 (ref 0.8–1.2)
LDLC SERPL CALC-MCNC: 63 MG/DL (ref 63–159)
LYMPHOCYTES # BLD AUTO: 0.9 K/UL (ref 1–4.8)
LYMPHOCYTES NFR BLD: 10 % (ref 18–48)
MAGNESIUM SERPL-MCNC: 1.4 MG/DL (ref 1.6–2.6)
MCH RBC QN AUTO: 27.2 PG (ref 27–31)
MCHC RBC AUTO-ENTMCNC: 31.6 G/DL (ref 32–36)
MCV RBC AUTO: 86 FL (ref 82–98)
MONOCYTES # BLD AUTO: 0.1 K/UL (ref 0.3–1)
MONOCYTES NFR BLD: 1.4 % (ref 4–15)
NEUTROPHILS # BLD AUTO: 7.5 K/UL (ref 1.8–7.7)
NEUTROPHILS NFR BLD: 87.1 % (ref 38–73)
NONHDLC SERPL-MCNC: 83 MG/DL
NRBC BLD-RTO: 0 /100 WBC
PLATELET # BLD AUTO: 463 K/UL (ref 150–450)
PMV BLD AUTO: 8.9 FL (ref 9.2–12.9)
POCT GLUCOSE: 286 MG/DL (ref 70–110)
POTASSIUM SERPL-SCNC: 4.1 MMOL/L (ref 3.5–5.1)
PROT SERPL-MCNC: 7.6 G/DL (ref 6–8.4)
PROTHROMBIN TIME: 11 SEC (ref 9–12.5)
RBC # BLD AUTO: 4.86 M/UL (ref 4.6–6.2)
SODIUM SERPL-SCNC: 137 MMOL/L (ref 136–145)
TRIGL SERPL-MCNC: 100 MG/DL (ref 30–150)
TROPONIN I SERPL DL<=0.01 NG/ML-MCNC: 0.97 NG/ML (ref 0–0.03)
TSH SERPL DL<=0.005 MIU/L-ACNC: 0.48 UIU/ML (ref 0.4–4)
WBC # BLD AUTO: 8.61 K/UL (ref 3.9–12.7)

## 2022-01-02 PROCEDURE — 63600175 PHARM REV CODE 636 W HCPCS: Performed by: NURSE PRACTITIONER

## 2022-01-02 PROCEDURE — 96372 THER/PROPH/DIAG INJ SC/IM: CPT | Mod: 59

## 2022-01-02 PROCEDURE — 96366 THER/PROPH/DIAG IV INF ADDON: CPT

## 2022-01-02 PROCEDURE — G0378 HOSPITAL OBSERVATION PER HR: HCPCS

## 2022-01-02 PROCEDURE — 85025 COMPLETE CBC W/AUTO DIFF WBC: CPT | Performed by: NURSE PRACTITIONER

## 2022-01-02 PROCEDURE — 80053 COMPREHEN METABOLIC PANEL: CPT | Performed by: NURSE PRACTITIONER

## 2022-01-02 PROCEDURE — 84484 ASSAY OF TROPONIN QUANT: CPT | Performed by: NURSE PRACTITIONER

## 2022-01-02 PROCEDURE — 63600175 PHARM REV CODE 636 W HCPCS: Performed by: EMERGENCY MEDICINE

## 2022-01-02 PROCEDURE — 83735 ASSAY OF MAGNESIUM: CPT | Performed by: NURSE PRACTITIONER

## 2022-01-02 PROCEDURE — 25500020 PHARM REV CODE 255: Performed by: EMERGENCY MEDICINE

## 2022-01-02 PROCEDURE — 96365 THER/PROPH/DIAG IV INF INIT: CPT | Mod: 59

## 2022-01-02 PROCEDURE — 96376 TX/PRO/DX INJ SAME DRUG ADON: CPT | Mod: 59

## 2022-01-02 PROCEDURE — 36415 COLL VENOUS BLD VENIPUNCTURE: CPT | Performed by: NURSE PRACTITIONER

## 2022-01-02 PROCEDURE — 80061 LIPID PANEL: CPT | Performed by: NURSE PRACTITIONER

## 2022-01-02 PROCEDURE — 83036 HEMOGLOBIN GLYCOSYLATED A1C: CPT | Performed by: NURSE PRACTITIONER

## 2022-01-02 PROCEDURE — 85730 THROMBOPLASTIN TIME PARTIAL: CPT | Performed by: EMERGENCY MEDICINE

## 2022-01-02 PROCEDURE — 84443 ASSAY THYROID STIM HORMONE: CPT | Performed by: NURSE PRACTITIONER

## 2022-01-02 PROCEDURE — 96375 TX/PRO/DX INJ NEW DRUG ADDON: CPT

## 2022-01-02 PROCEDURE — 25000003 PHARM REV CODE 250: Performed by: EMERGENCY MEDICINE

## 2022-01-02 PROCEDURE — 85610 PROTHROMBIN TIME: CPT | Performed by: EMERGENCY MEDICINE

## 2022-01-02 RX ORDER — CLONAZEPAM 1 MG/1
1 TABLET ORAL 2 TIMES DAILY
Status: DISCONTINUED | OUTPATIENT
Start: 2022-01-02 | End: 2022-01-02 | Stop reason: HOSPADM

## 2022-01-02 RX ORDER — BACLOFEN 10 MG/1
20 TABLET ORAL EVERY 8 HOURS PRN
Status: DISCONTINUED | OUTPATIENT
Start: 2022-01-02 | End: 2022-01-02 | Stop reason: HOSPADM

## 2022-01-02 RX ORDER — PANTOPRAZOLE SODIUM 40 MG/1
40 TABLET, DELAYED RELEASE ORAL DAILY
Status: DISCONTINUED | OUTPATIENT
Start: 2022-01-02 | End: 2022-01-02 | Stop reason: HOSPADM

## 2022-01-02 RX ORDER — QUETIAPINE FUMARATE 100 MG/1
300 TABLET, FILM COATED ORAL NIGHTLY
Status: DISCONTINUED | OUTPATIENT
Start: 2022-01-02 | End: 2022-01-02 | Stop reason: HOSPADM

## 2022-01-02 RX ORDER — AMLODIPINE BESYLATE 10 MG/1
10 TABLET ORAL DAILY
Status: DISCONTINUED | OUTPATIENT
Start: 2022-01-02 | End: 2022-01-02 | Stop reason: HOSPADM

## 2022-01-02 RX ORDER — FUROSEMIDE 10 MG/ML
40 INJECTION INTRAMUSCULAR; INTRAVENOUS
Status: COMPLETED | OUTPATIENT
Start: 2022-01-02 | End: 2022-01-02

## 2022-01-02 RX ORDER — DULOXETIN HYDROCHLORIDE 30 MG/1
60 CAPSULE, DELAYED RELEASE ORAL DAILY
Status: DISCONTINUED | OUTPATIENT
Start: 2022-01-02 | End: 2022-01-02 | Stop reason: HOSPADM

## 2022-01-02 RX ORDER — TAMSULOSIN HYDROCHLORIDE 0.4 MG/1
0.4 CAPSULE ORAL DAILY
Status: DISCONTINUED | OUTPATIENT
Start: 2022-01-02 | End: 2022-01-02 | Stop reason: HOSPADM

## 2022-01-02 RX ORDER — PRAZOSIN HYDROCHLORIDE 1 MG/1
2 CAPSULE ORAL NIGHTLY
Status: DISCONTINUED | OUTPATIENT
Start: 2022-01-02 | End: 2022-01-02 | Stop reason: HOSPADM

## 2022-01-02 RX ORDER — CARVEDILOL 6.25 MG/1
6.25 TABLET ORAL 2 TIMES DAILY WITH MEALS
Status: DISCONTINUED | OUTPATIENT
Start: 2022-01-02 | End: 2022-01-02 | Stop reason: HOSPADM

## 2022-01-02 RX ORDER — IPRATROPIUM BROMIDE AND ALBUTEROL SULFATE 2.5; .5 MG/3ML; MG/3ML
3 SOLUTION RESPIRATORY (INHALATION) EVERY 6 HOURS
Status: DISCONTINUED | OUTPATIENT
Start: 2022-01-02 | End: 2022-01-02 | Stop reason: HOSPADM

## 2022-01-02 RX ORDER — IBUPROFEN 200 MG
1 TABLET ORAL DAILY
Status: DISCONTINUED | OUTPATIENT
Start: 2022-01-02 | End: 2022-01-02 | Stop reason: HOSPADM

## 2022-01-02 RX ORDER — ATORVASTATIN CALCIUM 10 MG/1
20 TABLET, FILM COATED ORAL DAILY
Status: DISCONTINUED | OUTPATIENT
Start: 2022-01-02 | End: 2022-01-02 | Stop reason: HOSPADM

## 2022-01-02 RX ORDER — SERTRALINE HYDROCHLORIDE 50 MG/1
100 TABLET, FILM COATED ORAL NIGHTLY
Status: DISCONTINUED | OUTPATIENT
Start: 2022-01-02 | End: 2022-01-02 | Stop reason: HOSPADM

## 2022-01-02 RX ORDER — HEPARIN SODIUM,PORCINE/D5W 25000/250
0-40 INTRAVENOUS SOLUTION INTRAVENOUS CONTINUOUS
Status: DISCONTINUED | OUTPATIENT
Start: 2022-01-02 | End: 2022-01-02 | Stop reason: HOSPADM

## 2022-01-02 RX ORDER — GABAPENTIN 400 MG/1
800 CAPSULE ORAL 3 TIMES DAILY
Status: DISCONTINUED | OUTPATIENT
Start: 2022-01-02 | End: 2022-01-02 | Stop reason: HOSPADM

## 2022-01-02 RX ORDER — SULFAMETHOXAZOLE AND TRIMETHOPRIM 800; 160 MG/1; MG/1
1 TABLET ORAL
Status: COMPLETED | OUTPATIENT
Start: 2022-01-02 | End: 2022-01-02

## 2022-01-02 RX ORDER — QUETIAPINE FUMARATE 100 MG/1
200 TABLET, FILM COATED ORAL DAILY
Status: DISCONTINUED | OUTPATIENT
Start: 2022-01-02 | End: 2022-01-02 | Stop reason: HOSPADM

## 2022-01-02 RX ORDER — METHYLPREDNISOLONE SOD SUCC 125 MG
125 VIAL (EA) INJECTION EVERY 8 HOURS
Status: DISCONTINUED | OUTPATIENT
Start: 2022-01-02 | End: 2022-01-02 | Stop reason: HOSPADM

## 2022-01-02 RX ORDER — CLONIDINE HYDROCHLORIDE 0.3 MG/1
0.3 TABLET ORAL 3 TIMES DAILY
Status: DISCONTINUED | OUTPATIENT
Start: 2022-01-02 | End: 2022-01-02 | Stop reason: HOSPADM

## 2022-01-02 RX ORDER — LANOLIN ALCOHOL/MO/W.PET/CERES
1 CREAM (GRAM) TOPICAL DAILY
Status: DISCONTINUED | OUTPATIENT
Start: 2022-01-02 | End: 2022-01-02 | Stop reason: HOSPADM

## 2022-01-02 RX ORDER — QUETIAPINE FUMARATE 100 MG/1
100 TABLET, FILM COATED ORAL
Status: DISCONTINUED | OUTPATIENT
Start: 2022-01-02 | End: 2022-01-02 | Stop reason: HOSPADM

## 2022-01-02 RX ORDER — LEVOFLOXACIN 750 MG/1
750 TABLET ORAL DAILY
Status: DISCONTINUED | OUTPATIENT
Start: 2022-01-02 | End: 2022-01-02

## 2022-01-02 RX ORDER — ALBUTEROL SULFATE 90 UG/1
2 AEROSOL, METERED RESPIRATORY (INHALATION)
Status: DISCONTINUED | OUTPATIENT
Start: 2022-01-02 | End: 2022-01-02 | Stop reason: HOSPADM

## 2022-01-02 RX ORDER — HYDRALAZINE HYDROCHLORIDE 20 MG/ML
10 INJECTION INTRAMUSCULAR; INTRAVENOUS EVERY 6 HOURS PRN
Status: DISCONTINUED | OUTPATIENT
Start: 2022-01-02 | End: 2022-01-02 | Stop reason: HOSPADM

## 2022-01-02 RX ORDER — TRAZODONE HYDROCHLORIDE 100 MG/1
300 TABLET ORAL NIGHTLY
Status: DISCONTINUED | OUTPATIENT
Start: 2022-01-02 | End: 2022-01-02 | Stop reason: HOSPADM

## 2022-01-02 RX ORDER — BUDESONIDE 0.5 MG/2ML
0.5 INHALANT ORAL EVERY 12 HOURS
Status: DISCONTINUED | OUTPATIENT
Start: 2022-01-02 | End: 2022-01-02 | Stop reason: HOSPADM

## 2022-01-02 RX ORDER — FUROSEMIDE 10 MG/ML
40 INJECTION INTRAMUSCULAR; INTRAVENOUS
Status: DISCONTINUED | OUTPATIENT
Start: 2022-01-02 | End: 2022-01-02 | Stop reason: HOSPADM

## 2022-01-02 RX ORDER — LISINOPRIL 20 MG/1
40 TABLET ORAL DAILY
Status: DISCONTINUED | OUTPATIENT
Start: 2022-01-02 | End: 2022-01-02 | Stop reason: HOSPADM

## 2022-01-02 RX ORDER — GLUCAGON 1 MG
1 KIT INJECTION
Status: DISCONTINUED | OUTPATIENT
Start: 2022-01-02 | End: 2022-01-02 | Stop reason: HOSPADM

## 2022-01-02 RX ORDER — INSULIN ASPART 100 [IU]/ML
1-10 INJECTION, SOLUTION INTRAVENOUS; SUBCUTANEOUS EVERY 6 HOURS PRN
Status: DISCONTINUED | OUTPATIENT
Start: 2022-01-02 | End: 2022-01-02 | Stop reason: HOSPADM

## 2022-01-02 RX ORDER — SULFAMETHOXAZOLE AND TRIMETHOPRIM 800; 160 MG/1; MG/1
1 TABLET ORAL 2 TIMES DAILY
Status: DISCONTINUED | OUTPATIENT
Start: 2022-01-02 | End: 2022-01-02 | Stop reason: HOSPADM

## 2022-01-02 RX ADMIN — METHYLPREDNISOLONE SODIUM SUCCINATE 125 MG: 125 INJECTION, POWDER, FOR SOLUTION INTRAMUSCULAR; INTRAVENOUS at 06:01

## 2022-01-02 RX ADMIN — FUROSEMIDE 40 MG: 10 INJECTION, SOLUTION INTRAMUSCULAR; INTRAVENOUS at 01:01

## 2022-01-02 RX ADMIN — INSULIN ASPART 6 UNITS: 100 INJECTION, SOLUTION INTRAVENOUS; SUBCUTANEOUS at 06:01

## 2022-01-02 RX ADMIN — FUROSEMIDE 40 MG: 10 INJECTION, SOLUTION INTRAMUSCULAR; INTRAVENOUS at 06:01

## 2022-01-02 RX ADMIN — HEPARIN SODIUM 12 UNITS/KG/HR: 10000 INJECTION, SOLUTION INTRAVENOUS at 02:01

## 2022-01-02 RX ADMIN — IOHEXOL 100 ML: 350 INJECTION, SOLUTION INTRAVENOUS at 12:01

## 2022-01-02 RX ADMIN — SULFAMETHOXAZOLE AND TRIMETHOPRIM 1 TABLET: 800; 160 TABLET ORAL at 01:01

## 2022-01-02 NOTE — ASSESSMENT & PLAN NOTE
A1c at 8.5 on 11/01/2021 / BG in ED at 136  Accuchecks with SSI prn   Holding home jardiance, metformin  Wean solumedrol ASAP

## 2022-01-02 NOTE — ASSESSMENT & PLAN NOTE
Chest tightness resolved, EKG unrevealing  Troponin to 1.181 in ED  Cardiology consulted  Continuous cardiac monitoring  Continue titrating heparin gtt for now  TSH, A1c, lipid panel pending  EKG prn  Trend out troponin levels  ECHO pending  NPO in case of AM testing/procedure  Smoking cessation education

## 2022-01-02 NOTE — ASSESSMENT & PLAN NOTE
Currently well-controlled  Home medications resumed  Hydralazine IV prn SBP > 170  VS per unit routine  Monitor closely

## 2022-01-02 NOTE — H&P
[facility] Medicine  History & Physical    Patient Name: Shukri Rosales  MRN: [id number]  Patient Class: OP- Observation  Admission Date: 1/1/2022  Attending Physician: Silvio Stinson MD   Primary Care Provider: Cris Matias NP         Patient information was obtained from patient, past medical records and ER records.     Subjective:     Principal Problem:NSTEMI (non-ST elevated myocardial infarction)    Chief Complaint:   Chief Complaint   Patient presents with    Shortness of Breath     Pt presented to ED by EMS for c/o SOB for the past three days, Pt has hx of COPD         HPI: 60 y/o WM with hx of COPD, depression, DM2, borderline personality disorder, GERD, migraine, CKD3, bipolar 2 disorder, HLD, HTN, EMMANUEL on CPAP, polysubstance abuse, BPH, schizophrenia, back sx, tonsillectomy, current smoker to ED with c/o gradually increasing SOB (worst on exertion), rhinorrhea, non-productive cough, wheezing, chest tightness over the previous 5 days. Also reporting persistent RLE redness, warmth and pain over the previous 2-3 weeks. Symptoms are progressive and of moderate severity, without known mitigating factors. Denies chest pain, edema, palpitations, abdominal pain, N/V/D, constipation, dysuria, flank pain, HA, dizziness, weakness, fever, chills, falls/trauma, blurred vision, focal deficits. Vital signs stable in ED - pt was afebrile; WBCs 10.94, H&H 12.4&37.8, platelets 397, INR 1.0, Na 136, K+ 3.9, BUN 17, creatinine 1.2, GFR > 60, , BNP 1536, troponin 1.181; COVID-19 negative, Cxray showed no acute abnormality, EKG showed NSR (87 BPM), CTA showed no PE, mild bilateral pleural effusions and mild pulmonary edema. In ED the pt was given duoneb treatment, ASA, lasix 40mg IV, solumedrol and bactrim and was started on a heparin gtt - remained stable throughout. Hospital medicine was called and the pt was placed in observation with telemetry monitoring. Pt is a full code -  surrogate decision makers are his sisters.       Past Medical History:   Diagnosis Date    Adrenal cortical adenoma     Anxiety     Arthritis     CKD (chronic kidney disease) stage 3, GFR 30-59 ml/min     Depression     Diabetes mellitus type II 2011    does not take    DM (diabetes mellitus) 2011     am 09/21/2017 Insulin x 1 1/2 year    GERD (gastroesophageal reflux disease) 7/9/2013    Hypertension     Migraine headache 7/22/2013    Schizophrenia     Severe obesity with body mass index of 36.0 to 36.9        Past Surgical History:   Procedure Laterality Date    BACK SURGERY      COLONOSCOPY N/A 12/3/2020    Procedure: COLONOSCOPY;  Surgeon: Christofer Palmer MD;  Location: [facility] ENDO;  Service: Endoscopy;  Laterality: N/A;    COLONOSCOPY N/A 12/4/2020    Procedure: COLONOSCOPY;  Surgeon: Frandy Stanton MD;  Location: [facility] ENDO;  Service: Endoscopy;  Laterality: N/A;    HERNIA REPAIR      UMBILICAL    left arm exfix      NASAL SEPTUM SURGERY Bilateral     TONSILLECTOMY      URETEROSCOPY         Review of patient's allergies indicates:  No Known Allergies    No current facility-administered medications on file prior to encounter.     Current Outpatient Medications on File Prior to Encounter   Medication Sig    albuterol (PROVENTIL/VENTOLIN HFA) 90 mcg/actuation inhaler Inhale 2 puffs into the lungs every 4 (four) hours as needed.    amLODIPine (NORVASC) 10 MG tablet Take 10 mg by mouth.    atorvastatin (LIPITOR) 20 MG tablet Take 20 mg by mouth once daily.    carvedilol (COREG) 6.25 MG tablet Take 6.25 mg by mouth.    cloNIDine (CATAPRES) 0.3 MG tablet Take 1 tablet (0.3 mg total) by mouth 3 (three) times daily.    DULoxetine (CYMBALTA) 60 MG capsule Take 60 mg by mouth.    empagliflozin (JARDIANCE) 25 mg tablet Take 25 mg by mouth.    ferrous sulfate (FEOSOL) 325 mg (65 mg iron) Tab tablet Take 325 mg by mouth.    gabapentin (NEURONTIN) 800 MG tablet Take 800 mg by mouth 3  (three) times daily.     insulin lispro 100 unit/mL injection Inject under the skin with meals by sliding scale:  under 150 0 unit, 151-200 2 units, 201-250 4 units, 251-300 6 units, 301-350 8 units, 351-400 10 units, over 400 12 units.    insulin syringe-needle U-100 1 mL 31 gauge x 5/16 Syrg 1 Syringe by Misc.(Non-Drug; Combo Route) route 3 (three) times daily.    lancets (ONETOUCH DELICA LANCETS) 33 gauge Misc 1 lancet by Misc.(Non-Drug; Combo Route) route 3 (three) times daily.    lisinopril (PRINIVIL,ZESTRIL) 40 MG tablet Take 40 mg by mouth.    metFORMIN (GLUCOPHAGE) 1000 MG tablet 2 (two) times daily with meals.    omeprazole (PRILOSEC) 20 MG capsule Take 20 mg by mouth.    pravastatin (PRAVACHOL) 20 MG tablet Take 1 tablet (20 mg total) by mouth every evening.    prazosin (MINIPRESS) 2 MG Cap Take 1 capsule (2 mg total) by mouth every evening.    QUEtiapine (SEROQUEL) 100 MG Tab Take 100 mg by mouth. 100 mg at lunch and 300 at night    QUEtiapine (SEROQUEL) 200 MG Tab 200 mg once daily. 200mg Every morning    sertraline (ZOLOFT) 100 MG tablet Take 100 mg by mouth nightly.    topiramate (TROKENDI XR) 100 mg Cp24 Take 2 capsules by mouth.    traZODone (DESYREL) 150 MG tablet 300 mg nightly.     acetaminophen (TYLENOL) 650 mg/20.3 mL Soln Take 650 mg by mouth every 6 (six) hours as needed.    baclofen (LIORESAL) 20 MG tablet Take 20 mg by mouth every 8 (eight) hours as needed.    bisacodyl (DULCOLAX) 10 mg Supp Place 10 mg rectally daily as needed.    blood sugar diagnostic (PRECISION Q-I-D TEST) Strp 1 each.    blood sugar diagnostic Strp 1 each by Misc.(Non-Drug; Combo Route) route 3 (three) times daily before meals. For One Touch Verio flex    blood-glucose meter kit One Touch Verio Meter    clonazePAM (KLONOPIN) 1 MG tablet 1 mg 2 (two) times daily. Take 1.5mg twice daily    diclofenac sodium 1 % Gel Apply 2 g topically 4 (four) times daily. for 10 days    docusate sodium (COLACE) 100  "MG capsule Take 100 mg by mouth.    gabapentin (NEURONTIN) 400 MG capsule Take 1 capsule (400 mg total) by mouth 3 (three) times daily. One capsule three times a day    glucagon, human recombinant, (GLUCAGEN DIAGNOSTIC KIT) 1 mg/mL SolR Inject 1 mg into the muscle daily as needed.    HUMALOG MIX 50-50 INSULN U-100 100 unit/mL (50-50) Susp 76 units every morning, 36 units every night    hydroCHLOROthiazide (HYDRODIURIL) 25 MG tablet Take 1 tablet (25 mg total) by mouth once daily.    HYDROcodone-acetaminophen (NORCO) 5-325 mg per tablet TAKE 1 TABLET BY MOUTH EVERY 4 HOURS    ibuprofen (ADVIL,MOTRIN) 800 MG tablet TAKE 1 TABLET BY MOUTH EVERY 6 HOURS FOR UP TO 10 DAYS AS NEEDED FOR PAIN    inhalation spacing device (AEROCHAMBER WITH FLOWSIGNAL) Use with inhaler    insulin regular (HUMULIN R REGULAR U-100 INSULN) 100 unit/mL Inj injection Inject into the skin.    liraglutide 0.6 mg/0.1 mL, 18 mg/3 mL, subq PNIJ (VICTOZA 3-NEGRITA) 0.6 mg/0.1 mL (18 mg/3 mL) PnIj Inject 1.8 mg into the skin Daily.    metFORMIN (GLUCOPHAGE) 500 MG tablet Take 1 tablet (500 mg total) by mouth 2 (two) times daily with meals.    nicotine (NICODERM CQ) 14 mg/24 hr Place 1 patch onto the skin once daily. (Patient not taking: Reported on 3/3/2021)    nicotine polacrilex 4 MG Lozg Take 1 lozenge (4 mg total) by mouth as needed. Not to exceed 10 lozenges per day.    pen needle, diabetic 32 gauge x 5/32" Ndle 1 each by Misc.(Non-Drug; Combo Route) route once daily.    polyethylene glycol (GLYCOLAX) 17 gram/dose powder Take 17 g by mouth once daily.    polyethylene glycol (GOLYTELY,NULYTELY) 236-22.74-6.74 -5.86 gram suspension Drink 1 glass every 15 minutes starting at 5PM the night before the procedure until the bottle is empty.    silver sulfADIAZINE 1% (SILVADENE) 1 % cream Apply topically.    sodium,potassium,mag sulfates (SUPREP BOWEL PREP KIT) 17.5-3.13-1.6 gram SolR Take as instructed on prep sheet    tamsulosin (FLOMAX) 0.4 " mg Cap Take 0.4 mg by mouth.    varenicline (CHANTIX) 1 mg Tab Take 1 tablet (1 mg total) by mouth 2 (two) times daily. CX     Family History     Problem Relation (Age of Onset)    Cancer Paternal Grandmother, Paternal Grandfather    Cataracts Mother    Diabetes Mother, Sister, Brother    Hypertension Mother, Sister, Brother        Tobacco Use    Smoking status: Former Smoker     Packs/day: 2.00     Years: 37.00     Pack years: 74.00     Types: Cigarettes     Quit date: 3/28/2021     Years since quittin.7    Smokeless tobacco: Never Used    Tobacco comment: instructed not to smoke after midnight after midnight prior to surgery   Substance and Sexual Activity    Alcohol use: No    Drug use: No    Sexual activity: Never     Partners: Female     Review of Systems   Constitutional: Negative for activity change, chills, diaphoresis, fatigue and fever.   HENT: Positive for rhinorrhea. Negative for congestion, trouble swallowing and voice change.    Eyes: Negative for photophobia and discharge.   Respiratory: Positive for cough (non-productive), chest tightness, shortness of breath and wheezing (intermittent).    Cardiovascular: Negative for chest pain, palpitations and leg swelling.   Gastrointestinal: Negative for abdominal pain, blood in stool, constipation, diarrhea, nausea and vomiting.   Endocrine: Negative for cold intolerance, heat intolerance, polydipsia, polyphagia and polyuria.   Genitourinary: Negative for difficulty urinating, dysuria, flank pain, frequency and urgency.   Musculoskeletal: Negative for back pain, joint swelling and myalgias.   Skin: Positive for rash (RLE) and wound (R shin abrasions).   Neurological: Negative for dizziness, seizures, syncope, facial asymmetry, weakness, light-headedness, numbness and headaches.   Psychiatric/Behavioral: Negative for confusion and hallucinations.     Objective:     Vital Signs (Most Recent):  Temp: 97.9 °F (36.6 °C) (22 0240)  Pulse: 89  (01/02/22 0300)  Resp: 20 (01/02/22 0240)  BP: (!) 167/94 (01/02/22 0240)  SpO2: 97 % (01/02/22 0240) Vital Signs (24h Range):  Temp:  [97.9 °F (36.6 °C)-98.3 °F (36.8 °C)] 97.9 °F (36.6 °C)  Pulse:  [81-96] 89  Resp:  [14-25] 20  SpO2:  [93 %-100 %] 97 %  BP: (146-167)/() 167/94     Weight: 91.3 kg (201 lb 3.2 oz)  Body mass index is 27.29 kg/m².    Physical Exam  Vitals reviewed.   Constitutional:       General: He is not in acute distress.     Appearance: Normal appearance. He is normal weight. He is not toxic-appearing or diaphoretic.   HENT:      Head: Normocephalic and atraumatic.      Nose: Nose normal. No congestion.      Mouth/Throat:      Mouth: Mucous membranes are moist.   Eyes:      General: No scleral icterus.     Pupils: Pupils are equal, round, and reactive to light.   Cardiovascular:      Rate and Rhythm: Normal rate and regular rhythm.      Pulses: Normal pulses.      Heart sounds: Normal heart sounds.   Pulmonary:      Effort: Pulmonary effort is normal. No respiratory distress.      Breath sounds: Wheezing (mild, expiratory throughout) present. No rhonchi.   Abdominal:      General: Abdomen is flat. Bowel sounds are normal.      Palpations: Abdomen is soft.      Tenderness: There is no abdominal tenderness. There is no guarding or rebound.      Hernia: A hernia (large) is present.      Comments: Extensive abdominal scarring   Musculoskeletal:         General: Deformity (R 2nd toe amputation) present. Normal range of motion.      Cervical back: Normal range of motion and neck supple. No tenderness.      Right lower leg: No edema.      Left lower leg: No edema.   Skin:     General: Skin is warm and dry.      Capillary Refill: Capillary refill takes less than 2 seconds.      Coloration: Skin is not jaundiced.      Findings: Erythema (redness, warmth with small abrasions to R shin) present. No bruising.   Neurological:      General: No focal deficit present.      Mental Status: He is alert and  oriented to person, place, and time.      Cranial Nerves: No dysarthria or facial asymmetry.      Sensory: No sensory deficit.      Motor: No weakness.   Psychiatric:         Attention and Perception: Attention and perception normal.         Mood and Affect: Mood and affect normal.         Speech: Speech normal.         Behavior: Behavior normal. Behavior is cooperative.         Thought Content: Thought content normal.         Cognition and Memory: Cognition and memory normal.         Judgment: Judgment normal.      Comments: Frequent lip-smacking/tongue rolling           CRANIAL NERVES     CN III, IV, VI   Pupils are equal, round, and reactive to light.       Significant Labs:   Results for orders placed or performed during the hospital encounter of 01/01/22   HIV 1/2 Ag/Ab (4th Gen)   Result Value Ref Range    HIV 1/2 Ag/Ab Negative Negative   Hepatitis C Antibody   Result Value Ref Range    Hepatitis C Ab Negative Negative   HCV Virus Hold Specimen   Result Value Ref Range    HEP C Virus Hold Specimen Hold for HCV sendout    CBC Auto Differential   Result Value Ref Range    WBC 10.94 3.90 - 12.70 K/uL    RBC 4.49 (L) 4.60 - 6.20 M/uL    Hemoglobin 12.4 (L) 14.0 - 18.0 g/dL    Hematocrit 37.8 (L) 40.0 - 54.0 %    MCV 84 82 - 98 fL    MCH 27.6 27.0 - 31.0 pg    MCHC 32.8 32.0 - 36.0 g/dL    RDW 15.3 (H) 11.5 - 14.5 %    Platelets 397 150 - 450 K/uL    MPV 8.4 (L) 9.2 - 12.9 fL    Immature Granulocytes 0.9 (H) 0.0 - 0.5 %    Gran # (ANC) 7.5 1.8 - 7.7 K/uL    Immature Grans (Abs) 0.10 (H) 0.00 - 0.04 K/uL    Lymph # 2.0 1.0 - 4.8 K/uL    Mono # 1.0 0.3 - 1.0 K/uL    Eos # 0.4 0.0 - 0.5 K/uL    Baso # 0.07 0.00 - 0.20 K/uL    nRBC 0 0 /100 WBC    Gran % 68.1 38.0 - 73.0 %    Lymph % 18.5 18.0 - 48.0 %    Mono % 8.7 4.0 - 15.0 %    Eosinophil % 3.2 0.0 - 8.0 %    Basophil % 0.6 0.0 - 1.9 %    Differential Method Automated    Comprehensive Metabolic Panel   Result Value Ref Range    Sodium 136 136 - 145 mmol/L     Potassium 3.9 3.5 - 5.1 mmol/L    Chloride 99 95 - 110 mmol/L    CO2 26 23 - 29 mmol/L    Glucose 136 (H) 70 - 110 mg/dL    BUN 17 8 - 23 mg/dL    Creatinine 1.2 0.5 - 1.4 mg/dL    Calcium 9.4 8.7 - 10.5 mg/dL    Total Protein 7.0 6.0 - 8.4 g/dL    Albumin 3.3 (L) 3.5 - 5.2 g/dL    Total Bilirubin 0.5 0.1 - 1.0 mg/dL    Alkaline Phosphatase 72 55 - 135 U/L    AST 17 10 - 40 U/L    ALT 17 10 - 44 U/L    Anion Gap 11 8 - 16 mmol/L    eGFR if African American >60 >60 mL/min/1.73 m^2    eGFR if non African American >60 >60 mL/min/1.73 m^2   Troponin I   Result Value Ref Range    Troponin I 1.181 (H) 0.000 - 0.026 ng/mL   Brain Natriuretic Peptide   Result Value Ref Range    BNP 1,536 (H) 0 - 99 pg/mL   APTT   Result Value Ref Range    aPTT 26.4 21.0 - 32.0 sec   Protime-INR   Result Value Ref Range    Prothrombin Time 11.0 9.0 - 12.5 sec    INR 1.0 0.8 - 1.2   POCT COVID-19 Rapid Screening   Result Value Ref Range    POC Rapid COVID Negative Negative     Acceptable Yes          Significant Imaging:   Imaging Results          CTA Chest Non-Coronary (PE Study) (In process)                X-Ray Chest 1 View (Final result)  Result time 01/01/22 23:26:10    Final result by Zack Chao MD (01/01/22 23:26:10)                 Impression:      No acute abnormality.      Electronically signed by: Zack Chao  Date:    01/01/2022  Time:    23:26             Narrative:    EXAMINATION:  XR CHEST 1 VIEW    CLINICAL HISTORY:  shortness of breath;    TECHNIQUE:  Single frontal view of the chest was performed.    COMPARISON:  Multiple priors.    FINDINGS:  The lungs are clear, with normal appearance of pulmonary vasculature and no pleural effusion or pneumothorax.    The cardiac silhouette is normal in size. The hilar and mediastinal contours are unremarkable.    Bones are intact.                                       The EKG was ordered, reviewed, and independently interpreted by the ED provider.  Interpretation  time: 11:10  Rate: 87 BPM  Rhythm: normal sinus rhythm  Interpretation: Cannot rule out inferior infarct, age undetermined. Abnormal ECG. No STEMI.     The EKG was ordered, reviewed, and independently interpreted by the ED provider.  Interpretation time: 11:52  Rate: 87 BPM  Rhythm: normal sinus rhythm  Interpretation: Cannot rule out inferior infarct, age undetermined. Prolonged QT. Abnormal ECG. No STEMI.         Assessment/Plan:     * NSTEMI (non-ST elevated myocardial infarction)  Chest tightness resolved, EKG unrevealing  Troponin to 1.181 in ED  Cardiology consulted  Continuous cardiac monitoring  Continue titrating heparin gtt for now  TSH, A1c, lipid panel pending  EKG prn  Trend out troponin levels  ECHO pending  NPO in case of AM testing/procedure  Smoking cessation education      Cellulitis  RLE, mild - pt states several weeks - no previous treatment  Continue PO Bactrim DS for now  Fall precautions  Monitor for progressing infection   Provide adequate pain control    CHF (congestive heart failure)  Appears per chart review to be new onset, possibly secondary to NSTEMI  BNP 1536 / CTA - mild bilateral pleural effusions, mild pulmonary edema  Cardiology consulted  Continue IV lasix pending cardiology input  ECHO pending  Strict I&Os  Daily weights  Cardiac diet         Essential hypertension  Currently well-controlled  Home medications resumed  Hydralazine IV prn SBP > 170  VS per unit routine  Monitor closely        COPD (chronic obstructive pulmonary disease)  Appears to be in acute exacerbation per clinical presentation  Cxray - no acute abnormalities  Continue IV steroids for now  Supplemental O2 prn   Duonebs scheduled and prn  Pulmicort nebs BID  Emperic bactrim for now        Type 2 diabetes mellitus with diabetic neuropathy  A1c at 8.5 on 11/01/2021 / BG in ED at 136  Accuchecks with SSI prn   Holding home jardiance, metformin  Wean solumedrol ASAP        Tobacco abuse disorder  Smoking cessation  education - 5 minutes  Nicotine patch as ordered    GERD (gastroesophageal reflux disease)  Continued home PPI      CKD (chronic kidney disease) stage 3, GFR 30-59 ml/min  Creatinine at 1.2 in ED / Baseline appears to be around 1.2-1.4  Monitor renal function labs closely  Avoid nephrotoxins as able  Monitor I&Os        HLD (hyperlipidemia)  Continue home statin  Lipid panel pending        VTE Risk Mitigation (From admission, onward)         Ordered     Place sequential compression device  Until discontinued         01/02/22 0504     heparin 25,000 units in dextrose 5% (100 units/ml) IV bolus from bag - ADDITIONAL PRN BOLUS - 60 units/kg (max bolus 4000 units)  As needed (PRN)        Question:  Heparin Infusion Adjustment (DO NOT MODIFY ANSWER)  Answer:  \\[email or web address]\epic\Images\Pharmacy\HeparinInfusions\heparin LOW INTENSITY nomogram for [facility] [id number].pdf    01/02/22 0129     heparin 25,000 units in dextrose 5% (100 units/ml) IV bolus from bag - ADDITIONAL PRN BOLUS - 30 units/kg (max bolus 4000 units)  As needed (PRN)        Question:  Heparin Infusion Adjustment (DO NOT MODIFY ANSWER)  Answer:  \\[email or web address]\epic\Images\Pharmacy\HeparinInfusions\heparin LOW INTENSITY nomogram for [facility] [id number].pdf    01/02/22 0129     heparin 25,000 units in dextrose 5% 250 mL (100 units/mL) infusion LOW INTENSITY nomogram - [facility]  Continuous        Question Answer Comment   Heparin Infusion Adjustment (DO NOT MODIFY ANSWER) \\[email or web address]\epic\Images\Pharmacy\HeparinInfusions\heparin LOW INTENSITY nomogram for [facility] [id number].pdf    Begin at (in units/kg/hr) 12        01/02/22 0129                   Ai Miner NP  Department of Hospital Medicine   [facility] - Telemetry ([facility])

## 2022-01-02 NOTE — ED PROVIDER NOTES
SCRIBE #1 NOTE: I, Mason Gibbs, am scribing for, and in the presence of, Kem Aponte MD. I have scribed the entire note.       History     Chief Complaint   Patient presents with    Shortness of Breath     Pt presented to ED by EMS for c/o SOB for the past three days, Pt has hx of COPD      Review of patient's allergies indicates:  No Known Allergies      History of Present Illness     HPI    1/1/2022, 10:53 PM  History obtained from the patient      History of Present Illness: Shukri Rosales is a 61 y.o. male patient with a PMHx of HTN, CKD, DM, COPD who presents to the Emergency Department for evaluation of SOB which onset gradually x 5 days pta after having smoked more cigarettes than he usually does. Pt states he took emergency inhaler with minimal relief. Symptoms are constant and moderate in severity. No mitigating or exacerbating factors reported. Associated sxs include rhinorrhea and productive cough. Patient reports intermittent anterior central chest tightness and pain, currently denies any pain.  Patient denies any fever, chills, palpitations, sore throat, and all other sxs at this time. No further complaints or concerns at this time.       Arrival mode: Ambulance service    PCP: Cris Matias NP        Past Medical History:  Past Medical History:   Diagnosis Date    Adrenal cortical adenoma     Anxiety     Arthritis     CKD (chronic kidney disease) stage 3, GFR 30-59 ml/min     Depression     Diabetes mellitus type II 2011    does not take    DM (diabetes mellitus) 2011     am 09/21/2017 Insulin x 1 1/2 year    GERD (gastroesophageal reflux disease) 7/9/2013    Hypertension     Migraine headache 7/22/2013    Schizophrenia     Severe obesity with body mass index of 36.0 to 36.9        Past Surgical History:  Past Surgical History:   Procedure Laterality Date    BACK SURGERY      COLONOSCOPY N/A 12/3/2020    Procedure: COLONOSCOPY;  Surgeon: Christofer Palmer MD;   Location: [facility];  Service: Endoscopy;  Laterality: N/A;    COLONOSCOPY N/A 2020    Procedure: COLONOSCOPY;  Surgeon: Frandy Stanton MD;  Location: [facility];  Service: Endoscopy;  Laterality: N/A;    HERNIA REPAIR      UMBILICAL    left arm exfix      NASAL SEPTUM SURGERY Bilateral     TONSILLECTOMY      URETEROSCOPY           Family History:  Family History   Problem Relation Age of Onset    Hypertension Mother     Diabetes Mother     Cataracts Mother     Hypertension Sister     Diabetes Sister     Hypertension Brother     Diabetes Brother     Cancer Paternal Grandmother     Cancer Paternal Grandfather        Social History:  Social History     Tobacco Use    Smoking status: Former Smoker     Packs/day: 2.00     Years: 37.00     Pack years: 74.00     Types: Cigarettes     Quit date: 3/28/2021     Years since quittin.7    Smokeless tobacco: Never Used    Tobacco comment: instructed not to smoke after midnight after midnight prior to surgery   Substance and Sexual Activity    Alcohol use: No    Drug use: No    Sexual activity: Never     Partners: Female        Review of Systems     Review of Systems   Constitutional: Negative for chills and fever.   HENT: Positive for rhinorrhea. Negative for sore throat.    Respiratory: Positive for cough (productive) and shortness of breath.    Cardiovascular: Positive for chest pain. Negative for palpitations.   Gastrointestinal: Negative for nausea.   Genitourinary: Negative for dysuria.   Musculoskeletal: Negative for back pain.   Skin: Negative for rash.   Neurological: Negative for weakness.   Hematological: Does not bruise/bleed easily.   All other systems reviewed and are negative.       Physical Exam     Initial Vitals [22 2216]   BP Pulse Resp Temp SpO2   (!) 158/96 89 (!) 25 98.2 °F (36.8 °C) 100 %      MAP       --          Physical Exam  Nursing Notes and Vital Signs Reviewed.  Constitutional: Patient is in no acute distress.  Well-developed and well-nourished.  Head: Atraumatic. Normocephalic.  Eyes: PERRL. EOM intact. Conjunctivae are not pale. No scleral icterus.  ENT: Mucous membranes are moist. Oropharynx is clear and symmetric.    Neck: Supple. Full ROM. No lymphadenopathy.  Cardiovascular: Regular rate. Regular rhythm. No murmurs, rubs, or gallops. Distal pulses are 2+ and symmetric.  Pulmonary/Chest: Expiratory wheezing. No crackles. No respiratory distress.  Abdominal: Large hernia abdominal wall. Easily reducible. Non tender.   Genitourinary: No CVA tenderness  Musculoskeletal: Moves all extremities. No obvious deformities. No edema. No calf tenderness.   Skin: Warm and dry.Erythema to R anterior shin. Few region of abrasion to RLE. No warmth. 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  Neurological:  Alert, awake, and appropriate.  Normal speech.  No acute focal neurological deficits are appreciated.  Psychiatric: Normal affect. Good eye contact. Appropriate in content.     ED Course   Critical Care    Date/Time: 1/2/2022 1:34 AM  Performed by: Kem Aponte MD  Authorized by: Kem Aponte MD   Direct patient critical care time: 9 minutes  Additional history critical care time: 8 minutes  Ordering / reviewing critical care time: 5 minutes  Documentation critical care time: 6 minutes  Consulting other physicians critical care time: 8 minutes  Consult with family critical care time: 6 minutes  Total critical care time (exclusive of procedural time) : 42 minutes  Critical care time was exclusive of separately billable procedures and treating other patients and teaching time.  Critical care was necessary to treat or prevent imminent or life-threatening deterioration of the following conditions: Troponin elevation.  Critical care was time spent personally by me on the following activities: blood draw for specimens, development of treatment plan with patient or surrogate, discussions with consultants, interpretation of cardiac output  measurements, evaluation of patient's response to treatment, examination of patient, obtaining history from patient or surrogate, ordering and performing treatments and interventions, ordering and review of laboratory studies, pulse oximetry, re-evaluation of patient's condition, review of old charts and ordering and review of radiographic studies.        ED Vital Signs:  Vitals:    01/01/22 2216 01/01/22 2305 01/01/22 2306 01/01/22 2321   BP: (!) 158/96  (!) 159/107    Pulse: 89 82 82 81   Resp: (!) 25  15 19   Temp: 98.2 °F (36.8 °C)      TempSrc: Oral      SpO2: 100%  96% 95%   Weight:   91.3 kg (201 lb 3.2 oz)    Height: 6' (1.829 m)       01/01/22 2326 01/01/22 2332 01/01/22 2336 01/02/22 0030   BP:   (!) 160/86 (!) 162/95   Pulse: 84 85 85 88   Resp: 14 16 (!) 21 18   Temp:    98 °F (36.7 °C)   TempSrc:    Oral   SpO2: 96% (!) 93% 97% 97%   Weight:       Height:        01/02/22 0133 01/02/22 0222 01/02/22 0240 01/02/22 0300   BP: (!) 150/84 (!) 146/86 (!) 167/94    Pulse: 88 89 96 89   Resp: 20 18 20    Temp:  98.3 °F (36.8 °C) 97.9 °F (36.6 °C)    TempSrc:  Oral Oral    SpO2: 95% 95% 97%    Weight:       Height:           Abnormal Lab Results:  Labs Reviewed   CBC W/ AUTO DIFFERENTIAL - Abnormal; Notable for the following components:       Result Value    RBC 4.49 (*)     Hemoglobin 12.4 (*)     Hematocrit 37.8 (*)     RDW 15.3 (*)     MPV 8.4 (*)     Immature Granulocytes 0.9 (*)     Immature Grans (Abs) 0.10 (*)     All other components within normal limits   COMPREHENSIVE METABOLIC PANEL - Abnormal; Notable for the following components:    Glucose 136 (*)     Albumin 3.3 (*)     All other components within normal limits   TROPONIN I - Abnormal; Notable for the following components:    Troponin I 1.181 (*)     All other components within normal limits   B-TYPE NATRIURETIC PEPTIDE - Abnormal; Notable for the following components:    BNP 1,536 (*)     All other components within normal limits   HIV 1 / 2  ANTIBODY    Narrative:     Release to patient->Immediate   HEPATITIS C ANTIBODY    Narrative:     Release to patient->Immediate   HEP C VIRUS HOLD SPECIMEN    Narrative:     Release to patient->Immediate   APTT    Narrative:     (if patient is on warfarin prior to heparin therapy)   PROTIME-INR    Narrative:     (if patient is on warfarin prior to heparin therapy)   CBC W/ AUTO DIFFERENTIAL   SARS-COV-2 RDRP GENE    Narrative:     This test utilizes isothermal nucleic acid amplification   technology to detect the SARS-CoV-2 RdRp nucleic acid segment.   The analytical sensitivity (limit of detection) is 125 genome   equivalents/mL.   A POSITIVE result implies infection with the SARS-CoV-2 virus;   the patient is presumed to be contagious.     A NEGATIVE result means that SARS-CoV-2 nucleic acids are not   present above the limit of detection. A NEGATIVE result should be   treated as presumptive. It does not rule out the possibility of   COVID-19 and should not be the sole basis for treatment decisions.   If COVID-19 is strongly suspected based on clinical and exposure   history, re-testing using an alternate molecular assay should be   considered.   This test is only for use under the Food and Drug   Administration s Emergency Use Authorization (EUA).   Commercial kits are provided by [employer].   Performance characteristics of the EUA have been independently   verified by [facility] Department of   Pathology and Laboratory Medicine.   _________________________________________________________________   The authorized Fact Sheet for Healthcare Providers and the authorized Fact   Sheet for Patients of the ID NOW COVID-19 are available on the FDA   website:     https://www.fda.gov/media/306155/download  https://www.fda.gov/media/034607/download              All Lab Results:  Results for orders placed or performed during the hospital encounter of 01/01/22   HIV 1/2 Ag/Ab (4th Gen)   Result Value Ref  Range    HIV 1/2 Ag/Ab Negative Negative   Hepatitis C Antibody   Result Value Ref Range    Hepatitis C Ab Negative Negative   HCV Virus Hold Specimen   Result Value Ref Range    HEP C Virus Hold Specimen Hold for HCV sendout    CBC Auto Differential   Result Value Ref Range    WBC 10.94 3.90 - 12.70 K/uL    RBC 4.49 (L) 4.60 - 6.20 M/uL    Hemoglobin 12.4 (L) 14.0 - 18.0 g/dL    Hematocrit 37.8 (L) 40.0 - 54.0 %    MCV 84 82 - 98 fL    MCH 27.6 27.0 - 31.0 pg    MCHC 32.8 32.0 - 36.0 g/dL    RDW 15.3 (H) 11.5 - 14.5 %    Platelets 397 150 - 450 K/uL    MPV 8.4 (L) 9.2 - 12.9 fL    Immature Granulocytes 0.9 (H) 0.0 - 0.5 %    Gran # (ANC) 7.5 1.8 - 7.7 K/uL    Immature Grans (Abs) 0.10 (H) 0.00 - 0.04 K/uL    Lymph # 2.0 1.0 - 4.8 K/uL    Mono # 1.0 0.3 - 1.0 K/uL    Eos # 0.4 0.0 - 0.5 K/uL    Baso # 0.07 0.00 - 0.20 K/uL    nRBC 0 0 /100 WBC    Gran % 68.1 38.0 - 73.0 %    Lymph % 18.5 18.0 - 48.0 %    Mono % 8.7 4.0 - 15.0 %    Eosinophil % 3.2 0.0 - 8.0 %    Basophil % 0.6 0.0 - 1.9 %    Differential Method Automated    Comprehensive Metabolic Panel   Result Value Ref Range    Sodium 136 136 - 145 mmol/L    Potassium 3.9 3.5 - 5.1 mmol/L    Chloride 99 95 - 110 mmol/L    CO2 26 23 - 29 mmol/L    Glucose 136 (H) 70 - 110 mg/dL    BUN 17 8 - 23 mg/dL    Creatinine 1.2 0.5 - 1.4 mg/dL    Calcium 9.4 8.7 - 10.5 mg/dL    Total Protein 7.0 6.0 - 8.4 g/dL    Albumin 3.3 (L) 3.5 - 5.2 g/dL    Total Bilirubin 0.5 0.1 - 1.0 mg/dL    Alkaline Phosphatase 72 55 - 135 U/L    AST 17 10 - 40 U/L    ALT 17 10 - 44 U/L    Anion Gap 11 8 - 16 mmol/L    eGFR if African American >60 >60 mL/min/1.73 m^2    eGFR if non African American >60 >60 mL/min/1.73 m^2   Troponin I   Result Value Ref Range    Troponin I 1.181 (H) 0.000 - 0.026 ng/mL   Brain Natriuretic Peptide   Result Value Ref Range    BNP 1,536 (H) 0 - 99 pg/mL   APTT   Result Value Ref Range    aPTT 26.4 21.0 - 32.0 sec   Protime-INR   Result Value Ref Range    Prothrombin  Time 11.0 9.0 - 12.5 sec    INR 1.0 0.8 - 1.2   POCT COVID-19 Rapid Screening   Result Value Ref Range    POC Rapid COVID Negative Negative     Acceptable Yes        Imaging Results:  Imaging Results          CTA Chest Non-Coronary (PE Study) (In process)                X-Ray Chest 1 View (Final result)  Result time 01/01/22 23:26:10    Final result by Zack Chao MD (01/01/22 23:26:10)                 Impression:      No acute abnormality.      Electronically signed by: Zack Chao  Date:    01/01/2022  Time:    23:26             Narrative:    EXAMINATION:  XR CHEST 1 VIEW    CLINICAL HISTORY:  shortness of breath;    TECHNIQUE:  Single frontal view of the chest was performed.    COMPARISON:  Multiple priors.    FINDINGS:  The lungs are clear, with normal appearance of pulmonary vasculature and no pleural effusion or pneumothorax.    The cardiac silhouette is normal in size. The hilar and mediastinal contours are unremarkable.    Bones are intact.                             Type of Interpretation: Outside Written Report.  Chest X-Ray Types: CT Anglography with intravenous contrast.  Interpretation: 1. No Pulmonary Embolism  2. Mild bilateral pleural effusions. Mild pulmonary edema.        The EKG was ordered, reviewed, and independently interpreted by the ED provider.  Interpretation time: 11:10  Rate: 87 BPM  Rhythm: normal sinus rhythm  Interpretation: Cannot rule out inferior infarct, age undetermined. Abnormal ECG. No STEMI.    The EKG was ordered, reviewed, and independently interpreted by the ED provider.  Interpretation time: 11:52  Rate: 87 BPM  Rhythm: normal sinus rhythm  Interpretation: Cannot rule out inferior infarct, age undetermined. Prolonged QT. Abnormal ECG. No STEMI.             The Emergency Provider reviewed the vital signs and test results, which are outlined above.     ED Discussion     12:27 AM: Discussed pt's case with Dr. Leonardo (Cardiology) who says he would call  it an inferior posterior STEMI compared to prior EKG, and If pt is still having chest pain he would run it by DR Arenas on call for STEMI.    12:30 AM: Discussed pt's case with Dr. Arenas (Interventional Cardiology) who says no STEMI or cath tonight. He recommends starting on anticoagulants. Discussed case again with Dr. Leonardo who agree's with plan.     1:35 AM: Discussed case with Ai Miner NP (Hospital Medicine). Dr. Stinson agrees with current care and management of pt and accepts admission.   Admitting Service: Hospital Medicine  Admitting Physician: Dr. Stinson  Admit to: Obs tele    1:36 AM: Re-evaluated pt. I have discussed test results, shared treatment plan, and the need for admission with patient and family at bedside. Pt and family express understanding at this time and agree with all information. All questions answered. Pt and family have no further questions or concerns at this time. Pt is ready for admit.            ED Course as of 01/02/22 0344   Sun Jan 02, 2022   0025 No Cath per Deepa.  [BA]   0132 62 y/o M with PMH of COPD, Tobacco abuse here with SOB, chest tightness.  Was wheezing, said it started after having a cigarette binge 4-5 days ago. Started nebs/steroids. He initially had some subtle ST changes, but then his troponin came back at 1, and BNP was elevated. He was still having some chest pain, and I spoke with cards who thought STEMI, so I talked to interventional who said no STEMI, and patient's chest pain stopped. Dr. Leonardo will see in the AM, heparin has been started. CTA shows no PE, but some effusions. Will start lasix.  [BA]      ED Course User Index  [BA] Kem Aponte MD     Medical Decision Making:   Clinical Tests:   Lab Tests: Ordered and Reviewed  Radiological Study: Ordered and Reviewed  Medical Tests: Ordered and Reviewed           ED Medication(s):  Medications   heparin 25,000 units in dextrose 5% 250 mL (100 units/mL) infusion LOW INTENSITY nomogram - [facility]  (12 Units/kg/hr × 83.1 kg (Adjusted) Intravenous Handoff 1/2/22 0240)   heparin 25,000 units in dextrose 5% (100 units/ml) IV bolus from bag - ADDITIONAL PRN BOLUS - 60 units/kg (max bolus 4000 units) (has no administration in time range)   heparin 25,000 units in dextrose 5% (100 units/ml) IV bolus from bag - ADDITIONAL PRN BOLUS - 30 units/kg (max bolus 4000 units) (has no administration in time range)   albuterol nebulizer solution 2.5 mg (2.5 mg Nebulization Given 1/1/22 2332)   methylPREDNISolone sodium succinate injection 125 mg (125 mg Intravenous Given 1/1/22 2336)   aspirin tablet 325 mg (325 mg Oral Given 1/1/22 2357)   iohexoL (OMNIPAQUE 350) injection 100 mL (100 mLs Intravenous Given 1/2/22 0046)   heparin 25,000 units in dextrose 5% (100 units/ml) IV bolus from bag INITIAL BOLUS (max bolus 4000 units) (4,000 Units Intravenous Bolus from Bag 1/2/22 0222)   furosemide injection 40 mg (40 mg Intravenous Given 1/2/22 0139)   sulfamethoxazole-trimethoprim 800-160mg per tablet 1 tablet (1 tablet Oral Given 1/2/22 0148)       Current Discharge Medication List      START taking these medications    Details   !! albuterol (PROVENTIL/VENTOLIN HFA) 90 mcg/actuation inhaler Inhale 1-2 puffs into the lungs every 4 (four) hours as needed for Wheezing. Rescue  Qty: 18 g, Refills: 0      predniSONE (DELTASONE) 10 MG tablet Take 4 tablets (40 mg total) by mouth once daily. for 5 days  Qty: 20 tablet, Refills: 0      sulfamethoxazole-trimethoprim 800-160mg (BACTRIM DS) 800-160 mg Tab Take 1 tablet by mouth 2 (two) times daily. for 7 days  Qty: 14 tablet, Refills: 0       !! - Potential duplicate medications found. Please discuss with provider.                  Scribe Attestation:   Scribe #1: I performed the above scribed service and the documentation accurately describes the services I performed. I attest to the accuracy of the note.     Attending:   Physician Attestation Statement for Scribe #1: I, Kem Aponte MD,  personally performed the services described in this documentation, as scribed by Mason Gibbs, in my presence, and it is both accurate and complete.           Clinical Impression       ICD-10-CM ICD-9-CM   1. Cellulitis of right leg  L03.115 682.6   2. Shortness of breath  R06.02 786.05   3. Elevated troponin  R77.8 790.6   4. Acute heart failure, unspecified heart failure type  I50.9 428.9       Disposition:   Disposition: Placed in Observation  Condition: Fair         Kem Aponte MD  01/02/22 0343       Kem Aponte MD  01/02/22 0343       Kem Aponte MD  01/02/22 0344

## 2022-01-02 NOTE — ASSESSMENT & PLAN NOTE
RLE, mild - pt states several weeks - no previous treatment  Continue PO Bactrim DS for now  Fall precautions  Monitor for progressing infection   Provide adequate pain control

## 2022-01-02 NOTE — ASSESSMENT & PLAN NOTE
Appears per chart review to be new onset, possibly secondary to NSTEMI  BNP 1536 / CTA - mild bilateral pleural effusions, mild pulmonary edema  Cardiology consulted  Continue IV lasix pending cardiology input  ECHO pending  Strict I&Os  Daily weights  Cardiac diet

## 2022-01-02 NOTE — HPI
62 y/o WM with hx of COPD, depression, DM2, borderline personality disorder, GERD, migraine, CKD3, bipolar 2 disorder, HLD, HTN, EMMANUEL on CPAP, polysubstance abuse, BPH, schizophrenia, back sx, tonsillectomy, current smoker to ED with c/o gradually increasing SOB (worst on exertion), rhinorrhea, non-productive cough, wheezing, chest tightness over the previous 5 days. Also reporting persistent RLE redness, warmth and pain over the previous 2-3 weeks. Symptoms are progressive and of moderate severity, without known mitigating factors. Denies chest pain, edema, palpitations, abdominal pain, N/V/D, constipation, dysuria, flank pain, HA, dizziness, weakness, fever, chills, falls/trauma, blurred vision, focal deficits. Vital signs stable in ED - pt was afebrile; WBCs 10.94, H&H 12.4&37.8, platelets 397, INR 1.0, Na 136, K+ 3.9, BUN 17, creatinine 1.2, GFR > 60, , BNP 1536, troponin 1.181; COVID-19 negative, Cxray showed no acute abnormality, EKG showed NSR (87 BPM), CTA showed no PE, mild bilateral pleural effusions and mild pulmonary edema. In ED the pt was given duoneb treatment, ASA, lasix 40mg IV, solumedrol and bactrim and was started on a heparin gtt - remained stable throughout. Hospital medicine was called and the pt was placed in observation with telemetry monitoring. Pt is a full code - surrogate decision makers are his sisters.

## 2022-01-02 NOTE — ASSESSMENT & PLAN NOTE
Appears to be in acute exacerbation per clinical presentation  Cxray - no acute abnormalities  Continue IV steroids for now  Supplemental O2 prn   Duonebs scheduled and prn  Pulmicort nebs BID  Emperic bactrim for now

## 2022-01-02 NOTE — NURSING
Pt states he needs to go home  Explained to pt he is on heparin and needs to stay to be evaluated  Pt states he knows but rather go home at this time  Pt STEF wallace MD came to bedside and explained to pt risk of leaving AMA  Pt verbalized understanding and signed paper  IV and Tele monitor d/c  Pt ambulated out of unit

## 2022-01-02 NOTE — ASSESSMENT & PLAN NOTE
Creatinine at 1.2 in ED / Baseline appears to be around 1.2-1.4  Monitor renal function labs closely  Avoid nephrotoxins as able  Monitor I&Os

## 2022-01-02 NOTE — SUBJECTIVE & OBJECTIVE
Past Medical History:   Diagnosis Date    Adrenal cortical adenoma     Anxiety     Arthritis     CKD (chronic kidney disease) stage 3, GFR 30-59 ml/min     Depression     Diabetes mellitus type II 2011    does not take    DM (diabetes mellitus) 2011     am 09/21/2017 Insulin x 1 1/2 year    GERD (gastroesophageal reflux disease) 7/9/2013    Hypertension     Migraine headache 7/22/2013    Schizophrenia     Severe obesity with body mass index of 36.0 to 36.9        Past Surgical History:   Procedure Laterality Date    BACK SURGERY      COLONOSCOPY N/A 12/3/2020    Procedure: COLONOSCOPY;  Surgeon: Christofer Palmer MD;  Location: [facility] ENDO;  Service: Endoscopy;  Laterality: N/A;    COLONOSCOPY N/A 12/4/2020    Procedure: COLONOSCOPY;  Surgeon: Frandy Stanton MD;  Location: [facility] ENDO;  Service: Endoscopy;  Laterality: N/A;    HERNIA REPAIR      UMBILICAL    left arm exfix      NASAL SEPTUM SURGERY Bilateral     TONSILLECTOMY      URETEROSCOPY         Review of patient's allergies indicates:  No Known Allergies    No current facility-administered medications on file prior to encounter.     Current Outpatient Medications on File Prior to Encounter   Medication Sig    albuterol (PROVENTIL/VENTOLIN HFA) 90 mcg/actuation inhaler Inhale 2 puffs into the lungs every 4 (four) hours as needed.    amLODIPine (NORVASC) 10 MG tablet Take 10 mg by mouth.    atorvastatin (LIPITOR) 20 MG tablet Take 20 mg by mouth once daily.    carvedilol (COREG) 6.25 MG tablet Take 6.25 mg by mouth.    cloNIDine (CATAPRES) 0.3 MG tablet Take 1 tablet (0.3 mg total) by mouth 3 (three) times daily.    DULoxetine (CYMBALTA) 60 MG capsule Take 60 mg by mouth.    empagliflozin (JARDIANCE) 25 mg tablet Take 25 mg by mouth.    ferrous sulfate (FEOSOL) 325 mg (65 mg iron) Tab tablet Take 325 mg by mouth.    gabapentin (NEURONTIN) 800 MG tablet Take 800 mg by mouth 3 (three) times daily.     insulin lispro 100 unit/mL  injection Inject under the skin with meals by sliding scale:  under 150 0 unit, 151-200 2 units, 201-250 4 units, 251-300 6 units, 301-350 8 units, 351-400 10 units, over 400 12 units.    insulin syringe-needle U-100 1 mL 31 gauge x 5/16 Syrg 1 Syringe by Misc.(Non-Drug; Combo Route) route 3 (three) times daily.    lancets (ONETOUCH DELICA LANCETS) 33 gauge Misc 1 lancet by Misc.(Non-Drug; Combo Route) route 3 (three) times daily.    lisinopril (PRINIVIL,ZESTRIL) 40 MG tablet Take 40 mg by mouth.    metFORMIN (GLUCOPHAGE) 1000 MG tablet 2 (two) times daily with meals.    omeprazole (PRILOSEC) 20 MG capsule Take 20 mg by mouth.    pravastatin (PRAVACHOL) 20 MG tablet Take 1 tablet (20 mg total) by mouth every evening.    prazosin (MINIPRESS) 2 MG Cap Take 1 capsule (2 mg total) by mouth every evening.    QUEtiapine (SEROQUEL) 100 MG Tab Take 100 mg by mouth. 100 mg at lunch and 300 at night    QUEtiapine (SEROQUEL) 200 MG Tab 200 mg once daily. 200mg Every morning    sertraline (ZOLOFT) 100 MG tablet Take 100 mg by mouth nightly.    topiramate (TROKENDI XR) 100 mg Cp24 Take 2 capsules by mouth.    traZODone (DESYREL) 150 MG tablet 300 mg nightly.     acetaminophen (TYLENOL) 650 mg/20.3 mL Soln Take 650 mg by mouth every 6 (six) hours as needed.    baclofen (LIORESAL) 20 MG tablet Take 20 mg by mouth every 8 (eight) hours as needed.    bisacodyl (DULCOLAX) 10 mg Supp Place 10 mg rectally daily as needed.    blood sugar diagnostic (PRECISION Q-I-D TEST) Strp 1 each.    blood sugar diagnostic Strp 1 each by Misc.(Non-Drug; Combo Route) route 3 (three) times daily before meals. For One Touch Verio flex    blood-glucose meter kit One Touch Verio Meter    clonazePAM (KLONOPIN) 1 MG tablet 1 mg 2 (two) times daily. Take 1.5mg twice daily    diclofenac sodium 1 % Gel Apply 2 g topically 4 (four) times daily. for 10 days    docusate sodium (COLACE) 100 MG capsule Take 100 mg by mouth.    gabapentin  "(NEURONTIN) 400 MG capsule Take 1 capsule (400 mg total) by mouth 3 (three) times daily. One capsule three times a day    glucagon, human recombinant, (GLUCAGEN DIAGNOSTIC KIT) 1 mg/mL SolR Inject 1 mg into the muscle daily as needed.    HUMALOG MIX 50-50 INSULN U-100 100 unit/mL (50-50) Susp 76 units every morning, 36 units every night    hydroCHLOROthiazide (HYDRODIURIL) 25 MG tablet Take 1 tablet (25 mg total) by mouth once daily.    HYDROcodone-acetaminophen (NORCO) 5-325 mg per tablet TAKE 1 TABLET BY MOUTH EVERY 4 HOURS    ibuprofen (ADVIL,MOTRIN) 800 MG tablet TAKE 1 TABLET BY MOUTH EVERY 6 HOURS FOR UP TO 10 DAYS AS NEEDED FOR PAIN    inhalation spacing device (AEROCHAMBER WITH FLOWSIGNAL) Use with inhaler    insulin regular (HUMULIN R REGULAR U-100 INSULN) 100 unit/mL Inj injection Inject into the skin.    liraglutide 0.6 mg/0.1 mL, 18 mg/3 mL, subq PNIJ (VICTOZA 3-NEGRITA) 0.6 mg/0.1 mL (18 mg/3 mL) PnIj Inject 1.8 mg into the skin Daily.    metFORMIN (GLUCOPHAGE) 500 MG tablet Take 1 tablet (500 mg total) by mouth 2 (two) times daily with meals.    nicotine (NICODERM CQ) 14 mg/24 hr Place 1 patch onto the skin once daily. (Patient not taking: Reported on 3/3/2021)    nicotine polacrilex 4 MG Lozg Take 1 lozenge (4 mg total) by mouth as needed. Not to exceed 10 lozenges per day.    pen needle, diabetic 32 gauge x 5/32" Ndle 1 each by Misc.(Non-Drug; Combo Route) route once daily.    polyethylene glycol (GLYCOLAX) 17 gram/dose powder Take 17 g by mouth once daily.    polyethylene glycol (GOLYTELY,NULYTELY) 236-22.74-6.74 -5.86 gram suspension Drink 1 glass every 15 minutes starting at 5PM the night before the procedure until the bottle is empty.    silver sulfADIAZINE 1% (SILVADENE) 1 % cream Apply topically.    sodium,potassium,mag sulfates (SUPREP BOWEL PREP KIT) 17.5-3.13-1.6 gram SolR Take as instructed on prep sheet    tamsulosin (FLOMAX) 0.4 mg Cap Take 0.4 mg by mouth.    varenicline " (CHANTIX) 1 mg Tab Take 1 tablet (1 mg total) by mouth 2 (two) times daily. CX     Family History     Problem Relation (Age of Onset)    Cancer Paternal Grandmother, Paternal Grandfather    Cataracts Mother    Diabetes Mother, Sister, Brother    Hypertension Mother, Sister, Brother        Tobacco Use    Smoking status: Former Smoker     Packs/day: 2.00     Years: 37.00     Pack years: 74.00     Types: Cigarettes     Quit date: 3/28/2021     Years since quittin.7    Smokeless tobacco: Never Used    Tobacco comment: instructed not to smoke after midnight after midnight prior to surgery   Substance and Sexual Activity    Alcohol use: No    Drug use: No    Sexual activity: Never     Partners: Female     Review of Systems   Constitutional: Negative for activity change, chills, diaphoresis, fatigue and fever.   HENT: Positive for rhinorrhea. Negative for congestion, trouble swallowing and voice change.    Eyes: Negative for photophobia and discharge.   Respiratory: Positive for cough (non-productive), chest tightness, shortness of breath and wheezing (intermittent).    Cardiovascular: Negative for chest pain, palpitations and leg swelling.   Gastrointestinal: Negative for abdominal pain, blood in stool, constipation, diarrhea, nausea and vomiting.   Endocrine: Negative for cold intolerance, heat intolerance, polydipsia, polyphagia and polyuria.   Genitourinary: Negative for difficulty urinating, dysuria, flank pain, frequency and urgency.   Musculoskeletal: Negative for back pain, joint swelling and myalgias.   Skin: Positive for rash (RLE) and wound (R shin abrasions).   Neurological: Negative for dizziness, seizures, syncope, facial asymmetry, weakness, light-headedness, numbness and headaches.   Psychiatric/Behavioral: Negative for confusion and hallucinations.     Objective:     Vital Signs (Most Recent):  Temp: 97.9 °F (36.6 °C) (22 024)  Pulse: 89 (22 0300)  Resp: 20 (22 024)  BP: (!)  167/94 (01/02/22 0240)  SpO2: 97 % (01/02/22 0240) Vital Signs (24h Range):  Temp:  [97.9 °F (36.6 °C)-98.3 °F (36.8 °C)] 97.9 °F (36.6 °C)  Pulse:  [81-96] 89  Resp:  [14-25] 20  SpO2:  [93 %-100 %] 97 %  BP: (146-167)/() 167/94     Weight: 91.3 kg (201 lb 3.2 oz)  Body mass index is 27.29 kg/m².    Physical Exam  Vitals reviewed.   Constitutional:       General: He is not in acute distress.     Appearance: Normal appearance. He is normal weight. He is not toxic-appearing or diaphoretic.   HENT:      Head: Normocephalic and atraumatic.      Nose: Nose normal. No congestion.      Mouth/Throat:      Mouth: Mucous membranes are moist.   Eyes:      General: No scleral icterus.     Pupils: Pupils are equal, round, and reactive to light.   Cardiovascular:      Rate and Rhythm: Normal rate and regular rhythm.      Pulses: Normal pulses.      Heart sounds: Normal heart sounds.   Pulmonary:      Effort: Pulmonary effort is normal. No respiratory distress.      Breath sounds: Wheezing (mild, expiratory throughout) present. No rhonchi.   Abdominal:      General: Abdomen is flat. Bowel sounds are normal.      Palpations: Abdomen is soft.      Tenderness: There is no abdominal tenderness. There is no guarding or rebound.      Hernia: A hernia (large) is present.      Comments: Extensive abdominal scarring   Musculoskeletal:         General: Deformity (R 2nd toe amputation) present. Normal range of motion.      Cervical back: Normal range of motion and neck supple. No tenderness.      Right lower leg: No edema.      Left lower leg: No edema.   Skin:     General: Skin is warm and dry.      Capillary Refill: Capillary refill takes less than 2 seconds.      Coloration: Skin is not jaundiced.      Findings: Erythema (redness, warmth with small abrasions to R shin) present. No bruising.   Neurological:      General: No focal deficit present.      Mental Status: He is alert and oriented to person, place, and time.      Cranial  Nerves: No dysarthria or facial asymmetry.      Sensory: No sensory deficit.      Motor: No weakness.   Psychiatric:         Attention and Perception: Attention and perception normal.         Mood and Affect: Mood and affect normal.         Speech: Speech normal.         Behavior: Behavior normal. Behavior is cooperative.         Thought Content: Thought content normal.         Cognition and Memory: Cognition and memory normal.         Judgment: Judgment normal.      Comments: Frequent lip-smacking/tongue rolling           CRANIAL NERVES     CN III, IV, VI   Pupils are equal, round, and reactive to light.       Significant Labs:   Results for orders placed or performed during the hospital encounter of 01/01/22   HIV 1/2 Ag/Ab (4th Gen)   Result Value Ref Range    HIV 1/2 Ag/Ab Negative Negative   Hepatitis C Antibody   Result Value Ref Range    Hepatitis C Ab Negative Negative   HCV Virus Hold Specimen   Result Value Ref Range    HEP C Virus Hold Specimen Hold for HCV sendout    CBC Auto Differential   Result Value Ref Range    WBC 10.94 3.90 - 12.70 K/uL    RBC 4.49 (L) 4.60 - 6.20 M/uL    Hemoglobin 12.4 (L) 14.0 - 18.0 g/dL    Hematocrit 37.8 (L) 40.0 - 54.0 %    MCV 84 82 - 98 fL    MCH 27.6 27.0 - 31.0 pg    MCHC 32.8 32.0 - 36.0 g/dL    RDW 15.3 (H) 11.5 - 14.5 %    Platelets 397 150 - 450 K/uL    MPV 8.4 (L) 9.2 - 12.9 fL    Immature Granulocytes 0.9 (H) 0.0 - 0.5 %    Gran # (ANC) 7.5 1.8 - 7.7 K/uL    Immature Grans (Abs) 0.10 (H) 0.00 - 0.04 K/uL    Lymph # 2.0 1.0 - 4.8 K/uL    Mono # 1.0 0.3 - 1.0 K/uL    Eos # 0.4 0.0 - 0.5 K/uL    Baso # 0.07 0.00 - 0.20 K/uL    nRBC 0 0 /100 WBC    Gran % 68.1 38.0 - 73.0 %    Lymph % 18.5 18.0 - 48.0 %    Mono % 8.7 4.0 - 15.0 %    Eosinophil % 3.2 0.0 - 8.0 %    Basophil % 0.6 0.0 - 1.9 %    Differential Method Automated    Comprehensive Metabolic Panel   Result Value Ref Range    Sodium 136 136 - 145 mmol/L    Potassium 3.9 3.5 - 5.1 mmol/L    Chloride 99 95 - 110  mmol/L    CO2 26 23 - 29 mmol/L    Glucose 136 (H) 70 - 110 mg/dL    BUN 17 8 - 23 mg/dL    Creatinine 1.2 0.5 - 1.4 mg/dL    Calcium 9.4 8.7 - 10.5 mg/dL    Total Protein 7.0 6.0 - 8.4 g/dL    Albumin 3.3 (L) 3.5 - 5.2 g/dL    Total Bilirubin 0.5 0.1 - 1.0 mg/dL    Alkaline Phosphatase 72 55 - 135 U/L    AST 17 10 - 40 U/L    ALT 17 10 - 44 U/L    Anion Gap 11 8 - 16 mmol/L    eGFR if African American >60 >60 mL/min/1.73 m^2    eGFR if non African American >60 >60 mL/min/1.73 m^2   Troponin I   Result Value Ref Range    Troponin I 1.181 (H) 0.000 - 0.026 ng/mL   Brain Natriuretic Peptide   Result Value Ref Range    BNP 1,536 (H) 0 - 99 pg/mL   APTT   Result Value Ref Range    aPTT 26.4 21.0 - 32.0 sec   Protime-INR   Result Value Ref Range    Prothrombin Time 11.0 9.0 - 12.5 sec    INR 1.0 0.8 - 1.2   POCT COVID-19 Rapid Screening   Result Value Ref Range    POC Rapid COVID Negative Negative     Acceptable Yes          Significant Imaging:   Imaging Results          CTA Chest Non-Coronary (PE Study) (In process)                X-Ray Chest 1 View (Final result)  Result time 01/01/22 23:26:10    Final result by Zack Chao MD (01/01/22 23:26:10)                 Impression:      No acute abnormality.      Electronically signed by: Zack Chao  Date:    01/01/2022  Time:    23:26             Narrative:    EXAMINATION:  XR CHEST 1 VIEW    CLINICAL HISTORY:  shortness of breath;    TECHNIQUE:  Single frontal view of the chest was performed.    COMPARISON:  Multiple priors.    FINDINGS:  The lungs are clear, with normal appearance of pulmonary vasculature and no pleural effusion or pneumothorax.    The cardiac silhouette is normal in size. The hilar and mediastinal contours are unremarkable.    Bones are intact.                                       The EKG was ordered, reviewed, and independently interpreted by the ED provider.  Interpretation time: 11:10  Rate: 87 BPM  Rhythm: normal sinus  rhythm  Interpretation: Cannot rule out inferior infarct, age undetermined. Abnormal ECG. No STEMI.     The EKG was ordered, reviewed, and independently interpreted by the ED provider.  Interpretation time: 11:52  Rate: 87 BPM  Rhythm: normal sinus rhythm  Interpretation: Cannot rule out inferior infarct, age undetermined. Prolonged QT. Abnormal ECG. No STEMI.

## 2022-01-02 NOTE — PLAN OF CARE
Plan of care reviewed with patient, pt verbalized understanding.  Pt remains free from falls this shift, safety and bleeding precautions in place.bed alarm set.  Pt remains free from skin breakdown, pt educated on the importance of frequent weight shift to decrease the risk of pressure injury. Pt verbalized understanding teaching and outcome.  AAOx4 ,NAD noted at this time.  PIV 20 G 22 G  to R AC R Hand , Saline locked  Pt remained afebrile.  NS on the tele monitor  Heparin @ 12 units/kg  Pt admitted for NSTEMI.  Pt currently resting comfortably in bed.  Hourly rounding complete. Bed in lowest position, side rails up, call light in reach.

## 2022-01-08 NOTE — DISCHARGE SUMMARY
[facility] - Telemetry ([facility])  [facility] Medicine  Discharge Summary      Patient Name: Shukri Rosales  MRN: [id number]  Patient Class: OP- Observation  Admission Date: 1/1/2022  Hospital Length of Stay: 0 days  Discharge Date and Time: 1/2/2022  8:43 AM  Attending Physician:  Silvio Stinson MD  Discharging Provider: Silvio Stinson MD  Primary Care Provider: Cris Matias NP      HPI:   60 y/o WM with hx of COPD, depression, DM2, borderline personality disorder, GERD, migraine, CKD3, bipolar 2 disorder, HLD, HTN, EMMANUEL on CPAP, polysubstance abuse, BPH, schizophrenia, back sx, tonsillectomy, current smoker to ED with c/o gradually increasing SOB (worst on exertion), rhinorrhea, non-productive cough, wheezing, chest tightness over the previous 5 days. Also reporting persistent RLE redness, warmth and pain over the previous 2-3 weeks. Symptoms are progressive and of moderate severity, without known mitigating factors. Denies chest pain, edema, palpitations, abdominal pain, N/V/D, constipation, dysuria, flank pain, HA, dizziness, weakness, fever, chills, falls/trauma, blurred vision, focal deficits. Vital signs stable in ED - pt was afebrile; WBCs 10.94, H&H 12.4&37.8, platelets 397, INR 1.0, Na 136, K+ 3.9, BUN 17, creatinine 1.2, GFR > 60, , BNP 1536, troponin 1.181; COVID-19 negative, Cxray showed no acute abnormality, EKG showed NSR (87 BPM), CTA showed no PE, mild bilateral pleural effusions and mild pulmonary edema. In ED the pt was given duoneb treatment, ASA, lasix 40mg IV, solumedrol and bactrim and was started on a heparin gtt - remained stable throughout. Hospital medicine was called and the pt was placed in observation with telemetry monitoring. Pt is a full code - surrogate decision makers are his sisters.       * No surgery found *      Hospital Course:   Patient left against medical advice before being seen.       Goals of Care Treatment Preferences:  Code Status: Full  Code      Consults:     No new Assessment & Plan notes have been filed under this hospital service since the last note was generated.  Service: Hospital Medicine    Final Active Diagnoses:    Diagnosis Date Noted POA    PRINCIPAL PROBLEM:  NSTEMI (non-ST elevated myocardial infarction) [I21.4] 01/02/2022 Yes    CHF (congestive heart failure) [I50.9] 01/02/2022 Yes    COPD (chronic obstructive pulmonary disease) [J44.9] 01/02/2022 Yes    Cellulitis [L03.90] 01/02/2022 Yes    HLD (hyperlipidemia) [E78.5] 01/02/2022 Yes    Tobacco abuse disorder [Z72.0] 01/19/2016 Yes    Essential hypertension [I10] 10/09/2014 Yes    CKD (chronic kidney disease) stage 3, GFR 30-59 ml/min [N18.30] 08/26/2014 Yes    Type 2 diabetes mellitus with diabetic neuropathy [E11.40] 07/09/2013 Yes    GERD (gastroesophageal reflux disease) [K21.9] 07/09/2013 Yes      Problems Resolved During this Admission:       Discharged Condition: against medical advice    Disposition: Left Against Medical Adv*    Follow Up:    Patient Instructions:   No discharge procedures on file.    Significant Diagnostic Studies: Labs: All labs within the past 24 hours have been reviewed    Pending Diagnostic Studies:     None         Medications:  None    Indwelling Lines/Drains at time of discharge:   Lines/Drains/Airways     None                 Time spent on the discharge of patient: 30 minutes         Silvio Stinson MD  Department of Hospital Medicine  [facility] ([facility])

## 2022-01-19 ENCOUNTER — HOSPITAL ENCOUNTER (INPATIENT)
Facility: HOSPITAL | Age: 62
LOS: 4 days | Discharge: HOME | DRG: 871 | End: 2022-01-23
Attending: EMERGENCY MEDICINE | Admitting: EMERGENCY MEDICINE
Payer: MEDICAID

## 2022-01-19 DIAGNOSIS — A41.9 SEPSIS, DUE TO UNSPECIFIED ORGANISM, UNSPECIFIED WHETHER ACUTE ORGAN DYSFUNCTION PRESENT: ICD-10-CM

## 2022-01-19 DIAGNOSIS — R45.1 AGITATION: ICD-10-CM

## 2022-01-19 DIAGNOSIS — I50.9 CHF (CONGESTIVE HEART FAILURE): ICD-10-CM

## 2022-01-19 DIAGNOSIS — J44.9 CHRONIC OBSTRUCTIVE PULMONARY DISEASE, UNSPECIFIED COPD TYPE: ICD-10-CM

## 2022-01-19 DIAGNOSIS — J18.9 PNEUMONIA OF BOTH LOWER LOBES DUE TO INFECTIOUS ORGANISM: ICD-10-CM

## 2022-01-19 DIAGNOSIS — J96.22 ACUTE ON CHRONIC RESPIRATORY FAILURE WITH HYPOXIA AND HYPERCAPNIA: Primary | ICD-10-CM

## 2022-01-19 DIAGNOSIS — R79.89 ELEVATED BRAIN NATRIURETIC PEPTIDE (BNP) LEVEL: ICD-10-CM

## 2022-01-19 DIAGNOSIS — J96.21 ACUTE ON CHRONIC RESPIRATORY FAILURE WITH HYPOXIA AND HYPERCAPNIA: Primary | ICD-10-CM

## 2022-01-19 DIAGNOSIS — R06.02 SHORTNESS OF BREATH: ICD-10-CM

## 2022-01-19 DIAGNOSIS — A41.9 SEVERE SEPSIS: ICD-10-CM

## 2022-01-19 DIAGNOSIS — Z79.899 POLYPHARMACY: ICD-10-CM

## 2022-01-19 DIAGNOSIS — E11.22 TYPE 2 DIABETES MELLITUS WITH STAGE 3 CHRONIC KIDNEY DISEASE, WITH LONG-TERM CURRENT USE OF INSULIN, UNSPECIFIED WHETHER STAGE 3A OR 3B CKD: Chronic | ICD-10-CM

## 2022-01-19 DIAGNOSIS — Z79.4 TYPE 2 DIABETES MELLITUS WITH STAGE 3 CHRONIC KIDNEY DISEASE, WITH LONG-TERM CURRENT USE OF INSULIN, UNSPECIFIED WHETHER STAGE 3A OR 3B CKD: Chronic | ICD-10-CM

## 2022-01-19 DIAGNOSIS — R65.20 SEVERE SEPSIS: ICD-10-CM

## 2022-01-19 DIAGNOSIS — I50.9 CHRONIC HEART FAILURE, UNSPECIFIED HEART FAILURE TYPE: ICD-10-CM

## 2022-01-19 DIAGNOSIS — N18.30 TYPE 2 DIABETES MELLITUS WITH STAGE 3 CHRONIC KIDNEY DISEASE, WITH LONG-TERM CURRENT USE OF INSULIN, UNSPECIFIED WHETHER STAGE 3A OR 3B CKD: Chronic | ICD-10-CM

## 2022-01-19 PROBLEM — J96.02 ACUTE HYPERCAPNIC RESPIRATORY FAILURE: Status: ACTIVE | Noted: 2022-01-19

## 2022-01-19 PROBLEM — J96.01 ACUTE RESPIRATORY FAILURE WITH HYPOXIA AND HYPERCARBIA: Status: ACTIVE | Noted: 2022-01-19

## 2022-01-19 LAB
ALBUMIN SERPL BCP-MCNC: 3.3 G/DL (ref 3.5–5.2)
ALLENS TEST: ABNORMAL
ALP SERPL-CCNC: 90 U/L (ref 55–135)
ALT SERPL W/O P-5'-P-CCNC: 21 U/L (ref 10–44)
ANION GAP SERPL CALC-SCNC: 14 MMOL/L (ref 8–16)
APTT BLDCRRT: 24.3 SEC (ref 21–32)
AST SERPL-CCNC: 30 U/L (ref 10–40)
BACTERIA #/AREA URNS HPF: ABNORMAL /HPF
BASOPHILS # BLD AUTO: 0.02 K/UL (ref 0–0.2)
BASOPHILS NFR BLD: 0.1 % (ref 0–1.9)
BILIRUB SERPL-MCNC: 0.7 MG/DL (ref 0.1–1)
BILIRUB UR QL STRIP: ABNORMAL
BNP SERPL-MCNC: 3494 PG/ML (ref 0–99)
BUN SERPL-MCNC: 39 MG/DL (ref 8–23)
CALCIUM SERPL-MCNC: 10.2 MG/DL (ref 8.7–10.5)
CHLORIDE SERPL-SCNC: 108 MMOL/L (ref 95–110)
CLARITY UR: CLEAR
CO2 SERPL-SCNC: 21 MMOL/L (ref 23–29)
COLOR UR: YELLOW
CREAT SERPL-MCNC: 1.3 MG/DL (ref 0.5–1.4)
CTP QC/QA: YES
CTP QC/QA: YES
DELSYS: ABNORMAL
DIFFERENTIAL METHOD: ABNORMAL
EOSINOPHIL # BLD AUTO: 0 K/UL (ref 0–0.5)
EOSINOPHIL NFR BLD: 0 % (ref 0–8)
EP: 6
ERYTHROCYTE [DISTWIDTH] IN BLOOD BY AUTOMATED COUNT: 16.5 % (ref 11.5–14.5)
ERYTHROCYTE [SEDIMENTATION RATE] IN BLOOD BY WESTERGREN METHOD: 18 MM/H
EST. GFR  (AFRICAN AMERICAN): >60 ML/MIN/1.73 M^2
EST. GFR  (NON AFRICAN AMERICAN): 58 ML/MIN/1.73 M^2
FIO2: 100
FIO2: 50
FIO2: 60
GLUCOSE SERPL-MCNC: 271 MG/DL (ref 70–110)
GLUCOSE UR QL STRIP: NEGATIVE
HCO3 UR-SCNC: 27.5 MMOL/L (ref 24–28)
HCO3 UR-SCNC: 28.8 MMOL/L (ref 24–28)
HCO3 UR-SCNC: 29.7 MMOL/L (ref 24–28)
HCT VFR BLD AUTO: 44.1 % (ref 40–54)
HGB BLD-MCNC: 14.3 G/DL (ref 14–18)
HGB UR QL STRIP: ABNORMAL
HYALINE CASTS #/AREA URNS LPF: 1 /LPF
IMM GRANULOCYTES # BLD AUTO: 0.14 K/UL (ref 0–0.04)
IMM GRANULOCYTES NFR BLD AUTO: 0.7 % (ref 0–0.5)
INFLUENZA A, MOLECULAR: NEGATIVE
INFLUENZA B, MOLECULAR: NEGATIVE
INR PPP: 1 (ref 0.8–1.2)
IP: 18
KETONES UR QL STRIP: ABNORMAL
LACTATE SERPL-SCNC: 2.6 MMOL/L (ref 0.5–2.2)
LEUKOCYTE ESTERASE UR QL STRIP: NEGATIVE
LYMPHOCYTES # BLD AUTO: 1.1 K/UL (ref 1–4.8)
LYMPHOCYTES NFR BLD: 5.8 % (ref 18–48)
MCH RBC QN AUTO: 27.8 PG (ref 27–31)
MCHC RBC AUTO-ENTMCNC: 32.4 G/DL (ref 32–36)
MCV RBC AUTO: 86 FL (ref 82–98)
MICROSCOPIC COMMENT: ABNORMAL
MODE: ABNORMAL
MONOCYTES # BLD AUTO: 1 K/UL (ref 0.3–1)
MONOCYTES NFR BLD: 4.9 % (ref 4–15)
NEUTROPHILS # BLD AUTO: 17.3 K/UL (ref 1.8–7.7)
NEUTROPHILS NFR BLD: 88.5 % (ref 38–73)
NITRITE UR QL STRIP: NEGATIVE
NRBC BLD-RTO: 0 /100 WBC
PCO2 BLDA: 49.8 MMHG (ref 35–45)
PCO2 BLDA: 56.1 MMHG (ref 35–45)
PCO2 BLDA: 57.2 MMHG (ref 35–45)
PH SMN: 7.31 [PH] (ref 7.35–7.45)
PH SMN: 7.33 [PH] (ref 7.35–7.45)
PH SMN: 7.35 [PH] (ref 7.35–7.45)
PH UR STRIP: 6 [PH] (ref 5–8)
PLATELET # BLD AUTO: 479 K/UL (ref 150–450)
PMV BLD AUTO: 9 FL (ref 9.2–12.9)
PO2 BLDA: 143 MMHG (ref 80–100)
PO2 BLDA: 34 MMHG (ref 40–60)
PO2 BLDA: 419 MMHG (ref 80–100)
POC BE: 2 MMOL/L
POC BE: 3 MMOL/L
POC BE: 4 MMOL/L
POC MOLECULAR INFLUENZA A AGN: NEGATIVE
POC MOLECULAR INFLUENZA B AGN: NEGATIVE
POC SATURATED O2: 100 % (ref 95–100)
POC SATURATED O2: 60 % (ref 95–100)
POC SATURATED O2: 99 % (ref 95–100)
POCT GLUCOSE: 273 MG/DL (ref 70–110)
POCT GLUCOSE: 278 MG/DL (ref 70–110)
POCT GLUCOSE: 329 MG/DL (ref 70–110)
POTASSIUM SERPL-SCNC: 4.9 MMOL/L (ref 3.5–5.1)
PROCALCITONIN SERPL IA-MCNC: 0.1 NG/ML
PROCALCITONIN SERPL IA-MCNC: 0.11 NG/ML
PROT SERPL-MCNC: 7.3 G/DL (ref 6–8.4)
PROT UR QL STRIP: ABNORMAL
PROTHROMBIN TIME: 11.1 SEC (ref 9–12.5)
RBC # BLD AUTO: 5.15 M/UL (ref 4.6–6.2)
RBC #/AREA URNS HPF: 3 /HPF (ref 0–4)
SAMPLE: ABNORMAL
SARS-COV-2 RDRP RESP QL NAA+PROBE: NEGATIVE
SITE: ABNORMAL
SODIUM SERPL-SCNC: 143 MMOL/L (ref 136–145)
SP GR UR STRIP: >=1.03 (ref 1–1.03)
SPECIMEN SOURCE: NORMAL
SQUAMOUS #/AREA URNS HPF: 6 /HPF
TROPONIN I SERPL DL<=0.01 NG/ML-MCNC: 0.2 NG/ML (ref 0–0.03)
URN SPEC COLLECT METH UR: ABNORMAL
UROBILINOGEN UR STRIP-ACNC: NEGATIVE EU/DL
WBC # BLD AUTO: 19.59 K/UL (ref 3.9–12.7)
WBC #/AREA URNS HPF: 5 /HPF (ref 0–5)

## 2022-01-19 PROCEDURE — 20000000 HC ICU ROOM

## 2022-01-19 PROCEDURE — 87040 BLOOD CULTURE FOR BACTERIA: CPT | Performed by: EMERGENCY MEDICINE

## 2022-01-19 PROCEDURE — 81000 URINALYSIS NONAUTO W/SCOPE: CPT | Performed by: EMERGENCY MEDICINE

## 2022-01-19 PROCEDURE — 84484 ASSAY OF TROPONIN QUANT: CPT | Performed by: EMERGENCY MEDICINE

## 2022-01-19 PROCEDURE — 87502 INFLUENZA DNA AMP PROBE: CPT | Performed by: INTERNAL MEDICINE

## 2022-01-19 PROCEDURE — U0005 INFEC AGEN DETEC AMPLI PROBE: HCPCS | Performed by: INTERNAL MEDICINE

## 2022-01-19 PROCEDURE — 84145 PROCALCITONIN (PCT): CPT | Performed by: EMERGENCY MEDICINE

## 2022-01-19 PROCEDURE — 99291 CRITICAL CARE FIRST HOUR: CPT | Mod: 25

## 2022-01-19 PROCEDURE — 82962 GLUCOSE BLOOD TEST: CPT

## 2022-01-19 PROCEDURE — 27000190 HC CPAP FULL FACE MASK W/VALVE

## 2022-01-19 PROCEDURE — 85025 COMPLETE CBC W/AUTO DIFF WBC: CPT | Performed by: EMERGENCY MEDICINE

## 2022-01-19 PROCEDURE — U0002 COVID-19 LAB TEST NON-CDC: HCPCS | Performed by: EMERGENCY MEDICINE

## 2022-01-19 PROCEDURE — 80053 COMPREHEN METABOLIC PANEL: CPT | Performed by: EMERGENCY MEDICINE

## 2022-01-19 PROCEDURE — 84145 PROCALCITONIN (PCT): CPT | Mod: 91 | Performed by: INTERNAL MEDICINE

## 2022-01-19 PROCEDURE — 27000207 HC ISOLATION

## 2022-01-19 PROCEDURE — 36415 COLL VENOUS BLD VENIPUNCTURE: CPT | Performed by: INTERNAL MEDICINE

## 2022-01-19 PROCEDURE — 99900035 HC TECH TIME PER 15 MIN (STAT)

## 2022-01-19 PROCEDURE — 93010 EKG 12-LEAD: ICD-10-PCS | Mod: ,,, | Performed by: STUDENT IN AN ORGANIZED HEALTH CARE EDUCATION/TRAINING PROGRAM

## 2022-01-19 PROCEDURE — C9399 UNCLASSIFIED DRUGS OR BIOLOG: HCPCS | Performed by: STUDENT IN AN ORGANIZED HEALTH CARE EDUCATION/TRAINING PROGRAM

## 2022-01-19 PROCEDURE — 96372 THER/PROPH/DIAG INJ SC/IM: CPT

## 2022-01-19 PROCEDURE — 83880 ASSAY OF NATRIURETIC PEPTIDE: CPT | Performed by: EMERGENCY MEDICINE

## 2022-01-19 PROCEDURE — 85610 PROTHROMBIN TIME: CPT | Performed by: EMERGENCY MEDICINE

## 2022-01-19 PROCEDURE — 93005 ELECTROCARDIOGRAM TRACING: CPT

## 2022-01-19 PROCEDURE — 83605 ASSAY OF LACTIC ACID: CPT | Performed by: EMERGENCY MEDICINE

## 2022-01-19 PROCEDURE — 25000003 PHARM REV CODE 250: Performed by: STUDENT IN AN ORGANIZED HEALTH CARE EDUCATION/TRAINING PROGRAM

## 2022-01-19 PROCEDURE — 85730 THROMBOPLASTIN TIME PARTIAL: CPT | Performed by: EMERGENCY MEDICINE

## 2022-01-19 PROCEDURE — 63600175 PHARM REV CODE 636 W HCPCS: Performed by: EMERGENCY MEDICINE

## 2022-01-19 PROCEDURE — 93010 ELECTROCARDIOGRAM REPORT: CPT | Mod: ,,, | Performed by: STUDENT IN AN ORGANIZED HEALTH CARE EDUCATION/TRAINING PROGRAM

## 2022-01-19 PROCEDURE — 94660 CPAP INITIATION&MGMT: CPT

## 2022-01-19 PROCEDURE — U0003 INFECTIOUS AGENT DETECTION BY NUCLEIC ACID (DNA OR RNA); SEVERE ACUTE RESPIRATORY SYNDROME CORONAVIRUS 2 (SARS-COV-2) (CORONAVIRUS DISEASE [COVID-19]), AMPLIFIED PROBE TECHNIQUE, MAKING USE OF HIGH THROUGHPUT TECHNOLOGIES AS DESCRIBED BY CMS-2020-01-R: HCPCS | Performed by: INTERNAL MEDICINE

## 2022-01-19 PROCEDURE — 82803 BLOOD GASES ANY COMBINATION: CPT

## 2022-01-19 PROCEDURE — 25000003 PHARM REV CODE 250: Performed by: EMERGENCY MEDICINE

## 2022-01-19 PROCEDURE — 36600 WITHDRAWAL OF ARTERIAL BLOOD: CPT

## 2022-01-19 PROCEDURE — 63600175 PHARM REV CODE 636 W HCPCS: Performed by: STUDENT IN AN ORGANIZED HEALTH CARE EDUCATION/TRAINING PROGRAM

## 2022-01-19 RX ORDER — ONDANSETRON 2 MG/ML
4 INJECTION INTRAMUSCULAR; INTRAVENOUS EVERY 8 HOURS PRN
Status: DISCONTINUED | OUTPATIENT
Start: 2022-01-19 | End: 2022-01-23 | Stop reason: HOSPADM

## 2022-01-19 RX ORDER — HYDRALAZINE HYDROCHLORIDE 20 MG/ML
20 INJECTION INTRAMUSCULAR; INTRAVENOUS EVERY 6 HOURS PRN
Status: DISCONTINUED | OUTPATIENT
Start: 2022-01-19 | End: 2022-01-23 | Stop reason: HOSPADM

## 2022-01-19 RX ORDER — SODIUM CHLORIDE 0.9 % (FLUSH) 0.9 %
10 SYRINGE (ML) INJECTION
Status: DISCONTINUED | OUTPATIENT
Start: 2022-01-19 | End: 2022-01-23 | Stop reason: HOSPADM

## 2022-01-19 RX ORDER — FUROSEMIDE 10 MG/ML
60 INJECTION INTRAMUSCULAR; INTRAVENOUS
Status: COMPLETED | OUTPATIENT
Start: 2022-01-19 | End: 2022-01-19

## 2022-01-19 RX ORDER — ACETAMINOPHEN 325 MG/1
650 TABLET ORAL EVERY 8 HOURS PRN
Status: DISCONTINUED | OUTPATIENT
Start: 2022-01-19 | End: 2022-01-20

## 2022-01-19 RX ORDER — LORAZEPAM 2 MG/ML
2 INJECTION INTRAMUSCULAR
Status: COMPLETED | OUTPATIENT
Start: 2022-01-19 | End: 2022-01-19

## 2022-01-19 RX ORDER — IBUPROFEN 200 MG
1 TABLET ORAL DAILY
Status: DISCONTINUED | OUTPATIENT
Start: 2022-01-20 | End: 2022-01-23 | Stop reason: HOSPADM

## 2022-01-19 RX ORDER — TRAZODONE HYDROCHLORIDE 50 MG/1
150 TABLET ORAL NIGHTLY
Status: DISCONTINUED | OUTPATIENT
Start: 2022-01-19 | End: 2022-01-20

## 2022-01-19 RX ORDER — TRAZODONE HYDROCHLORIDE 150 MG/1
150 TABLET ORAL NIGHTLY
Status: ON HOLD | COMMUNITY
Start: 2021-04-14 | End: 2022-02-13 | Stop reason: HOSPADM

## 2022-01-19 RX ORDER — INSULIN ASPART 100 [IU]/ML
1-10 INJECTION, SOLUTION INTRAVENOUS; SUBCUTANEOUS EVERY 6 HOURS PRN
Status: DISCONTINUED | OUTPATIENT
Start: 2022-01-19 | End: 2022-01-23 | Stop reason: HOSPADM

## 2022-01-19 RX ORDER — DULOXETIN HYDROCHLORIDE 30 MG/1
60 CAPSULE, DELAYED RELEASE ORAL 2 TIMES DAILY
Status: DISCONTINUED | OUTPATIENT
Start: 2022-01-19 | End: 2022-01-20

## 2022-01-19 RX ORDER — NICARDIPINE HYDROCHLORIDE 0.2 MG/ML
0-15 INJECTION INTRAVENOUS CONTINUOUS
Status: DISCONTINUED | OUTPATIENT
Start: 2022-01-20 | End: 2022-01-20

## 2022-01-19 RX ORDER — GABAPENTIN 400 MG/1
800 CAPSULE ORAL 3 TIMES DAILY
Status: DISCONTINUED | OUTPATIENT
Start: 2022-01-19 | End: 2022-01-20

## 2022-01-19 RX ORDER — ERGOCALCIFEROL 1.25 MG/1
50000 CAPSULE ORAL
COMMUNITY
Start: 2022-01-13

## 2022-01-19 RX ORDER — GLUCAGON 1 MG
1 KIT INJECTION
Status: DISCONTINUED | OUTPATIENT
Start: 2022-01-19 | End: 2022-01-23 | Stop reason: HOSPADM

## 2022-01-19 RX ORDER — DOXYCYCLINE 100 MG/1
100 CAPSULE ORAL 2 TIMES DAILY
COMMUNITY
Start: 2022-01-12 | End: 2022-01-19

## 2022-01-19 RX ORDER — AMLODIPINE BESYLATE 10 MG/1
10 TABLET ORAL DAILY
Status: DISCONTINUED | OUTPATIENT
Start: 2022-01-20 | End: 2022-01-20

## 2022-01-19 RX ORDER — METHOCARBAMOL 750 MG/1
750 TABLET, FILM COATED ORAL 3 TIMES DAILY
Status: ON HOLD | COMMUNITY
Start: 2022-01-13 | End: 2022-02-13 | Stop reason: HOSPADM

## 2022-01-19 RX ORDER — ATORVASTATIN CALCIUM 10 MG/1
20 TABLET, FILM COATED ORAL DAILY
Status: DISCONTINUED | OUTPATIENT
Start: 2022-01-20 | End: 2022-01-20

## 2022-01-19 RX ORDER — ENOXAPARIN SODIUM 100 MG/ML
40 INJECTION SUBCUTANEOUS EVERY 24 HOURS
Status: DISCONTINUED | OUTPATIENT
Start: 2022-01-19 | End: 2022-01-22

## 2022-01-19 RX ORDER — TAMSULOSIN HYDROCHLORIDE 0.4 MG/1
0.4 CAPSULE ORAL DAILY
Status: DISCONTINUED | OUTPATIENT
Start: 2022-01-20 | End: 2022-01-20

## 2022-01-19 RX ORDER — HYDROCHLOROTHIAZIDE 12.5 MG/1
12.5 TABLET ORAL DAILY
Status: ON HOLD | COMMUNITY
Start: 2021-12-17 | End: 2022-01-23 | Stop reason: HOSPADM

## 2022-01-19 RX ORDER — CARVEDILOL 6.25 MG/1
6.25 TABLET ORAL 2 TIMES DAILY
Status: DISCONTINUED | OUTPATIENT
Start: 2022-01-19 | End: 2022-01-20

## 2022-01-19 RX ORDER — QUETIAPINE FUMARATE 300 MG/1
300 TABLET, FILM COATED ORAL NIGHTLY
COMMUNITY
Start: 2022-01-13 | End: 2022-01-19

## 2022-01-19 RX ADMIN — LORAZEPAM 2 MG: 2 INJECTION INTRAMUSCULAR; INTRAVENOUS at 02:01

## 2022-01-19 RX ADMIN — INSULIN DETEMIR 10 UNITS: 100 INJECTION, SOLUTION SUBCUTANEOUS at 10:01

## 2022-01-19 RX ADMIN — FUROSEMIDE 60 MG: 10 INJECTION, SOLUTION INTRAMUSCULAR; INTRAVENOUS at 04:01

## 2022-01-19 RX ADMIN — ENOXAPARIN SODIUM 40 MG: 100 INJECTION SUBCUTANEOUS at 07:01

## 2022-01-19 RX ADMIN — CEFTRIAXONE 1 G: 1 INJECTION, SOLUTION INTRAVENOUS at 04:01

## 2022-01-19 RX ADMIN — AZITHROMYCIN MONOHYDRATE 500 MG: 500 INJECTION, POWDER, LYOPHILIZED, FOR SOLUTION INTRAVENOUS at 04:01

## 2022-01-19 NOTE — PHARMACY MED REC
"Admission Medication History     The home medication history was taken by Meng Duke.    You may go to "Admission" then "Reconcile Home Medications" tabs to review and/or act upon these items.      The home medication list has been updated by the Pharmacy department.    Please read ALL comments highlighted in yellow.    Please address this information as you see fit.     Feel free to contact us if you have any questions or require assistance.      Medications listed below were obtained from: Medical records and Patient's pharmacy  (Not in a hospital admission)      Meng Duke  [id number]    Current Outpatient Medications on File Prior to Encounter   Medication Sig Dispense Refill Last Dose    albuterol (PROVENTIL/VENTOLIN HFA) 90 mcg/actuation inhaler Inhale 1-2 puffs into the lungs every 4 (four) hours as needed for Wheezing. Rescue 18 g 0 Unknown at Unknown time    amLODIPine (NORVASC) 10 MG tablet Take 10 mg by mouth.   Unknown at Unknown time    atorvastatin (LIPITOR) 20 MG tablet Take 20 mg by mouth once daily.   Unknown at Unknown time    baclofen (LIORESAL) 20 MG tablet Take 20 mg by mouth every 8 (eight) hours as needed.   Unknown at Unknown time    carvedilol (COREG) 6.25 MG tablet Take 6.25 mg by mouth 2 (two) times daily.   Unknown at Unknown time    cloNIDine (CATAPRES) 0.3 MG tablet Take 1 tablet (0.3 mg total) by mouth 3 (three) times daily. 90 tablet 3 Unknown at Unknown time    docusate sodium (COLACE) 100 MG capsule Take 100 mg by mouth 2 (two) times daily.   Unknown at Unknown time    doxycycline (VIBRAMYCIN) 100 MG Cap Take 100 mg by mouth 2 (two) times daily.   Unknown at Unknown time    DULoxetine (CYMBALTA) 60 MG capsule Take 60 mg by mouth 2 (two) times daily.   Unknown at Unknown time    empagliflozin (JARDIANCE) 25 mg tablet Take 25 mg by mouth.   Unknown at Unknown time    ergocalciferol (ERGOCALCIFEROL) 50,000 unit Cap Take 50,000 Units by mouth every 7 days.  "  Unknown at Unknown time    ferrous sulfate (FEOSOL) 325 mg (65 mg iron) Tab tablet Take 325 mg by mouth.   Unknown at Unknown time    gabapentin (NEURONTIN) 800 MG tablet Take 800 mg by mouth 3 (three) times daily.   Unknown at Unknown time    glucagon, human recombinant, (GLUCAGEN DIAGNOSTIC KIT) 1 mg/mL SolR Inject 1 mg into the muscle daily as needed.       hydroCHLOROthiazide (HYDRODIURIL) 12.5 MG Tab Take 12.5 mg by mouth once daily.   Unknown at Unknown time    insulin lispro 100 unit/mL injection Inject under the skin with meals by sliding scale:  under 150 0 unit, 151-200 2 units, 201-250 4 units, 251-300 6 units, 301-350 8 units, 351-400 10 units, over 400 12 units.   Unknown at Unknown time    lisinopril (PRINIVIL,ZESTRIL) 40 MG tablet Take 40 mg by mouth once daily.   Unknown at Unknown time    metFORMIN (GLUCOPHAGE) 1000 MG tablet Take 500 mg by mouth 2 (two) times daily with meals.   Unknown at Unknown time    methocarbamoL (ROBAXIN) 750 MG Tab Take 750 mg by mouth 3 (three) times daily.   Unknown at Unknown time    nicotine (NICODERM CQ) 14 mg/24 hr Place 1 patch onto the skin once daily. 28 patch 0 Unknown at Unknown time    nicotine polacrilex 4 MG Lozg Take 1 lozenge (4 mg total) by mouth as needed. Not to exceed 10 lozenges per day. 144 lozenge 0 Unknown at Unknown time    omeprazole (PRILOSEC) 20 MG capsule Take 20 mg by mouth once daily.   Unknown at Unknown time    QUEtiapine (SEROQUEL) 100 MG Tab 100 mg at lunch and 300 at night   Unknown at Unknown time    QUEtiapine (SEROQUEL) 300 MG Tab Take 300 mg by mouth nightly.   Unknown at Unknown time    sertraline (ZOLOFT) 100 MG tablet Take 100 mg by mouth nightly.   Unknown at Unknown time    tamsulosin (FLOMAX) 0.4 mg Cap Take 0.4 mg by mouth.   Unknown at Unknown time    traZODone (DESYREL) 150 MG tablet Take 150 mg by mouth every evening.   Unknown at Unknown time                             .

## 2022-01-19 NOTE — ED PROVIDER NOTES
SCRIBE #1 NOTE: I, Cora Garcia, am scribing for, and in the presence of, Maddie Middleton MD. I have scribed the entire note.      History      Chief Complaint   Patient presents with    Respiratory Distress     Per EMS, pt oxygen sat 77% on RA, AMS, and SOB since 0700       Review of patient's allergies indicates:  No Known Allergies     HPI   HPI    1/19/2022, 2:48 PM   History obtained from [facility] and the patient.  HPI/ROS limited secondary to respiratory distress, the pt being a poor historian, and AMS      History of Present Illness: Shukri Rosales is a 61 y.o. male patient with a PMHx of incisional abdominal hernia, peripheral vascular disease, DM2, adrenal cortical adenoma, CKD, carotid stenosis, schizophrenia, GERD, HTN, and HLD who presents to the Emergency Department for SOB. According to [facility], the pt's family reports that the pt started to have trouble breathing at 0700 this morning, but the pt refused to go to a hospital. According to \[facility]\"", the pt then became altered, so the pt's family members called 911. When [facility] arrived on scene, the pt's oxygen saturation was 77% on RA, and the pt was cyanotic. En route to the ED, [facility] administered a partial duo-neb which was ripped off by the pt, and the pt was put on 20L of O2 which brought his oxygen saturation up to 89%. The pt admits to smoking.      Arrival mode: \[facility]\""    PCP: Cris Matias NP       Past Medical History:  Past Medical History:   Diagnosis Date    Adrenal cortical adenoma     Anxiety     Arthritis     CKD (chronic kidney disease) stage 3, GFR 30-59 ml/min     Depression     Diabetes mellitus type II 2011    does not take    DM (diabetes mellitus) 2011     am 09/21/2017 Insulin x 1 1/2 year    GERD (gastroesophageal reflux disease) 7/9/2013    Hypertension     Migraine headache 7/22/2013    Schizophrenia     Severe obesity with body mass index of 36.0 to 36.9        Past Surgical History:  Past Surgical History:    Procedure Laterality Date    BACK SURGERY      COLONOSCOPY N/A 12/3/2020    Procedure: COLONOSCOPY;  Surgeon: Christofer Palmer MD;  Location: [facility] ENDO;  Service: Endoscopy;  Laterality: N/A;    COLONOSCOPY N/A 2020    Procedure: COLONOSCOPY;  Surgeon: Frandy Stanton MD;  Location: [facility] ENDO;  Service: Endoscopy;  Laterality: N/A;    HERNIA REPAIR      UMBILICAL    left arm exfix      NASAL SEPTUM SURGERY Bilateral     TONSILLECTOMY      URETEROSCOPY           Family History:  Family History   Problem Relation Age of Onset    Hypertension Mother     Diabetes Mother     Cataracts Mother     Hypertension Sister     Diabetes Sister     Hypertension Brother     Diabetes Brother     Cancer Paternal Grandmother     Cancer Paternal Grandfather        Social History:  Social History     Tobacco Use    Smoking status: Former Smoker     Packs/day: 2.00     Years: 37.00     Pack years: 74.00     Types: Cigarettes     Quit date: 3/28/2021     Years since quittin.8    Smokeless tobacco: Never Used    Tobacco comment: instructed not to smoke after midnight after midnight prior to surgery   Substance and Sexual Activity    Alcohol use: No    Drug use: No    Sexual activity: Never     Partners: Female       ROS   Review of Systems   Reason unable to perform ROS: respiratory distress.   Respiratory: Positive for shortness of breath.      Physical Exam      Initial Vitals [22 1437]   BP Pulse Resp Temp SpO2   (!) 148/105 89 (!) 30 98.8 °F (37.1 °C) (!) 74 %      MAP       --          Physical Exam  Nursing Notes and Vital Signs Reviewed.  Constitutional: Patient is in moderate distress. The pt is a poor historian. The pt appears disheveled, unkempt, and chronically ill.  Head: Atraumatic. Normocephalic.  Eyes: PERRL. EOM intact. Conjunctivae are not pale. No scleral icterus.  ENT: Mucous membranes are moist. Oropharynx is clear and symmetric.    Neck: Supple. Full ROM. No  lymphadenopathy.  Cardiovascular: Regular rate. Regular rhythm. No murmurs, rubs, or gallops. Distal pulses are 2+ and symmetric.  Pulmonary/Chest: Moderate respiratory distress. Tachypneic. Pt has diminished breath sounds bilaterally. Rhonchi ,no wheezing  Abdominal: Soft. There is no tenderness.  No rebound, guarding, or rigidity.There is a large ventral wall hernia.  Musculoskeletal: Moves all extremities. No obvious deformities. No edema.  Skin: Warm and dry.  Neurological:  Pt appears altered.  Oriented to person. No acute focal neurological deficits are appreciated.  Psychiatric: Anxious affect. Pt is agitated.    ED Course    Critical Care    Date/Time: 1/19/2022 3:47 PM  Performed by: Maddie Middleton MD  Authorized by: Maddie Middleton MD   Direct patient critical care time: 25 minutes  Additional history critical care time: 10 minutes  Ordering / reviewing critical care time: 10 minutes  Documentation critical care time: 5 minutes  Consulting other physicians critical care time: 10 minutes  Total critical care time (exclusive of procedural time) : 60 minutes  Critical care time was exclusive of separately billable procedures and treating other patients and teaching time.  Critical care was necessary to treat or prevent imminent or life-threatening deterioration of the following conditions: respiratory failure and sepsis (pneumonia).  Critical care was time spent personally by me on the following activities: blood draw for specimens, development of treatment plan with patient or surrogate, discussions with consultants, interpretation of cardiac output measurements, evaluation of patient's response to treatment, examination of patient, obtaining history from patient or surrogate, ordering and performing treatments and interventions, ordering and review of laboratory studies, ordering and review of radiographic studies, pulse oximetry, re-evaluation of patient's condition and review of old charts.        ED  Vital Signs:  Vitals:    01/19/22 1437 01/19/22 1520 01/19/22 1630   BP: (!) 148/105 (!) 148/92 (!) 156/92   Pulse: 89 93 92   Resp: (!) 30 (!) 22 20   Temp: 98.8 °F (37.1 °C)     TempSrc: Rectal     SpO2: (!) 74% 99% 100%   Weight: 96.6 kg (212 lb 15.4 oz)     Height: 6' (1.829 m)         Abnormal Lab Results:  Labs Reviewed   CBC W/ AUTO DIFFERENTIAL - Abnormal; Notable for the following components:       Result Value    WBC 19.59 (*)     RDW 16.5 (*)     Platelets 479 (*)     MPV 9.0 (*)     Immature Granulocytes 0.7 (*)     Gran # (ANC) 17.3 (*)     Immature Grans (Abs) 0.14 (*)     Gran % 88.5 (*)     Lymph % 5.8 (*)     All other components within normal limits   COMPREHENSIVE METABOLIC PANEL - Abnormal; Notable for the following components:    CO2 21 (*)     Glucose 271 (*)     BUN 39 (*)     Albumin 3.3 (*)     eGFR if non  58 (*)     All other components within normal limits   TROPONIN I - Abnormal; Notable for the following components:    Troponin I 0.199 (*)     All other components within normal limits   B-TYPE NATRIURETIC PEPTIDE - Abnormal; Notable for the following components:    BNP 3,494 (*)     All other components within normal limits   URINALYSIS, REFLEX TO URINE CULTURE - Abnormal; Notable for the following components:    Specific Gravity, UA >=1.030 (*)     Protein, UA 3+ (*)     Ketones, UA Trace (*)     Bilirubin (UA) 2+ (*)     Occult Blood UA 2+ (*)     All other components within normal limits    Narrative:     Specimen Source->Urine   LACTIC ACID, PLASMA - Abnormal; Notable for the following components:    Lactate (Lactic Acid) 2.6 (*)     All other components within normal limits   URINALYSIS MICROSCOPIC - Abnormal; Notable for the following components:    Bacteria Few (*)     All other components within normal limits    Narrative:     Specimen Source->Urine   POCT GLUCOSE - Abnormal; Notable for the following components:    POCT Glucose 273 (*)     All other components  within normal limits   ISTAT PROCEDURE - Abnormal; Notable for the following components:    POC PH 7.310 (*)     POC PCO2 57.2 (*)     POC PO2 419 (*)     POC HCO3 28.8 (*)     All other components within normal limits   CULTURE, BLOOD   CULTURE, BLOOD   CULTURE, RESPIRATORY   PROTIME-INR   APTT   PROCALCITONIN   SARS-COV-2 RDRP GENE    Narrative:     This test utilizes isothermal nucleic acid amplification   technology to detect the SARS-CoV-2 RdRp nucleic acid segment.   The analytical sensitivity (limit of detection) is 125 genome   equivalents/mL.   A POSITIVE result implies infection with the SARS-CoV-2 virus;   the patient is presumed to be contagious.     A NEGATIVE result means that SARS-CoV-2 nucleic acids are not   present above the limit of detection. A NEGATIVE result should be   treated as presumptive. It does not rule out the possibility of   COVID-19 and should not be the sole basis for treatment decisions.   If COVID-19 is strongly suspected based on clinical and exposure   history, re-testing using an alternate molecular assay should be   considered.   This test is only for use under the Food and Drug   Administration s Emergency Use Authorization (EUA).   Commercial kits are provided by [employer].   Performance characteristics of the EUA have been independently   verified by [facility] Department of   Pathology and Laboratory Medicine.   _________________________________________________________________   The authorized Fact Sheet for Healthcare Providers and the authorized Fact   Sheet for Patients of the ID NOW COVID-19 are available on the FDA   website:     https://www.fda.gov/media/609114/download  https://www.fda.gov/media/734011/download         POCT INFLUENZA A/B MOLECULAR   POCT GLUCOSE MONITORING CONTINUOUS        All Lab Results:  Results for orders placed or performed during the hospital encounter of 01/19/22   CBC auto differential   Result Value Ref Range    WBC  19.59 (H) 3.90 - 12.70 K/uL    RBC 5.15 4.60 - 6.20 M/uL    Hemoglobin 14.3 14.0 - 18.0 g/dL    Hematocrit 44.1 40.0 - 54.0 %    MCV 86 82 - 98 fL    MCH 27.8 27.0 - 31.0 pg    MCHC 32.4 32.0 - 36.0 g/dL    RDW 16.5 (H) 11.5 - 14.5 %    Platelets 479 (H) 150 - 450 K/uL    MPV 9.0 (L) 9.2 - 12.9 fL    Immature Granulocytes 0.7 (H) 0.0 - 0.5 %    Gran # (ANC) 17.3 (H) 1.8 - 7.7 K/uL    Immature Grans (Abs) 0.14 (H) 0.00 - 0.04 K/uL    Lymph # 1.1 1.0 - 4.8 K/uL    Mono # 1.0 0.3 - 1.0 K/uL    Eos # 0.0 0.0 - 0.5 K/uL    Baso # 0.02 0.00 - 0.20 K/uL    nRBC 0 0 /100 WBC    Gran % 88.5 (H) 38.0 - 73.0 %    Lymph % 5.8 (L) 18.0 - 48.0 %    Mono % 4.9 4.0 - 15.0 %    Eosinophil % 0.0 0.0 - 8.0 %    Basophil % 0.1 0.0 - 1.9 %    Differential Method Automated    Comprehensive metabolic panel   Result Value Ref Range    Sodium 143 136 - 145 mmol/L    Potassium 4.9 3.5 - 5.1 mmol/L    Chloride 108 95 - 110 mmol/L    CO2 21 (L) 23 - 29 mmol/L    Glucose 271 (H) 70 - 110 mg/dL    BUN 39 (H) 8 - 23 mg/dL    Creatinine 1.3 0.5 - 1.4 mg/dL    Calcium 10.2 8.7 - 10.5 mg/dL    Total Protein 7.3 6.0 - 8.4 g/dL    Albumin 3.3 (L) 3.5 - 5.2 g/dL    Total Bilirubin 0.7 0.1 - 1.0 mg/dL    Alkaline Phosphatase 90 55 - 135 U/L    AST 30 10 - 40 U/L    ALT 21 10 - 44 U/L    Anion Gap 14 8 - 16 mmol/L    eGFR if African American >60 >60 mL/min/1.73 m^2    eGFR if non African American 58 (A) >60 mL/min/1.73 m^2   Troponin I #1   Result Value Ref Range    Troponin I 0.199 (H) 0.000 - 0.026 ng/mL   BNP   Result Value Ref Range    BNP 3,494 (H) 0 - 99 pg/mL   Protime-INR   Result Value Ref Range    Prothrombin Time 11.1 9.0 - 12.5 sec    INR 1.0 0.8 - 1.2   APTT   Result Value Ref Range    aPTT 24.3 21.0 - 32.0 sec   Urinalysis, Reflex to Urine Culture Urine, Catheterized    Specimen: Urine   Result Value Ref Range    Specimen UA Urine, Catheterized     Color, UA Yellow Yellow, Straw, Esha    Appearance, UA Clear Clear    pH, UA 6.0 5.0 - 8.0     Specific Gravity, UA >=1.030 (A) 1.005 - 1.030    Protein, UA 3+ (A) Negative    Glucose, UA Negative Negative    Ketones, UA Trace (A) Negative    Bilirubin (UA) 2+ (A) Negative    Occult Blood UA 2+ (A) Negative    Nitrite, UA Negative Negative    Urobilinogen, UA Negative <2.0 EU/dL    Leukocytes, UA Negative Negative   Lactic acid, plasma   Result Value Ref Range    Lactate (Lactic Acid) 2.6 (H) 0.5 - 2.2 mmol/L   Procalcitonin   Result Value Ref Range    Procalcitonin 0.10 <0.25 ng/mL   Urinalysis Microscopic   Result Value Ref Range    RBC, UA 3 0 - 4 /hpf    WBC, UA 5 0 - 5 /hpf    Bacteria Few (A) None-Occ /hpf    Squam Epithel, UA 6 /hpf    Hyaline Casts, UA 1 0-1/lpf /lpf    Microscopic Comment SEE COMMENT    POCT COVID-19 Rapid Screening   Result Value Ref Range    POC Rapid COVID Negative Negative     Acceptable Yes    POCT Influenza A/B Molecular   Result Value Ref Range    POC Molecular Influenza A Ag Negative Negative, Not Reported    POC Molecular Influenza B Ag Negative Negative, Not Reported     Acceptable Yes    POCT glucose   Result Value Ref Range    POCT Glucose 273 (H) 70 - 110 mg/dL   ISTAT PROCEDURE   Result Value Ref Range    POC PH 7.310 (L) 7.35 - 7.45    POC PCO2 57.2 (HH) 35 - 45 mmHg    POC PO2 419 (H) 80 - 100 mmHg    POC HCO3 28.8 (H) 24 - 28 mmol/L    POC BE 3 -2 to 2 mmol/L    POC SATURATED O2 100 95 - 100 %    Rate 18     Sample ARTERIAL     Site RR     Allens Test Pass     DelSys CPAP/BiPAP     Mode BiPAP     FiO2 100     IP 18     EP 6          Imaging Results:  Imaging Results          X-Ray Chest AP Portable (Final result)  Result time 01/19/22 15:32:58    Final result by Willie Grewal MD (01/19/22 15:32:58)                 Impression:      1.  Worsening reticular interstitial changes throughout the lungs with patchy ground-glass/alveolar infiltrate within the mid lower left lung.  Findings most likely represent pneumonia, such as  community-acquired pneumonia or atypical viral pneumonia.  Asymmetric mixed interstitial and alveolar pulmonary edema could have this appearance in the right clinical setting.  Clinical correlation is advised.    2.  Stable findings as noted above.      Electronically signed by: Willie Grewal MD  Date:    01/19/2022  Time:    15:32             Narrative:    EXAMINATION:  XR CHEST AP PORTABLE    CLINICAL HISTORY:  shortness of breath;    COMPARISON:  January 1, 2022    FINDINGS:  Two frontal views were obtained.  Worsening reticular interstitial changes/vascular congestion throughout the lungs with development of ground-glass/alveolar infiltrate within the mid lower right lung.  The upper lungs are clear. The cardiac silhouette size is on the upper limits of normal.  The trachea is midline and the mediastinal width is normal. Negative for obvious effusion or pneumothorax.  Negative for osseous abnormalities. Tortuous aorta.  Degenerative changes of the spine and both shoulder girdles.                               The EKG was ordered, reviewed, and independently interpreted by the ED provider.  Interpretation time: 14:49  Rate: 92 BPM  Rhythm: Sinus rhythm with fusion complexes  Interpretation: Possible Left atrial enlargement. Possible Inferior infarct, age undetermined. No STEMI.           The Emergency Provider reviewed the vital signs and test results, which are outlined above.    ED Discussion   3:45 PM: Discussed case with MOISES Ricahrd ([facility]). Dr. Beltran agrees with current care and management of pt and accepts admission.   Admitting Service: Hospital Medicine  Admitting Physician: Dr. Beltran  Admit to: ICU         ED Medication(s):  Medications   cefTRIAXone (ROCEPHIN) 1 g/50 mL D5W IVPB (1 g Intravenous New Bag 1/19/22 1622)   azithromycin 500 mg in dextrose 5 % 250 mL IVPB (ready to mix system) (500 mg Intravenous New Bag 1/19/22 1621)   lorazepam injection 2 mg (2 mg Intramuscular  Given 1/19/22 1440)   furosemide injection 60 mg (60 mg Intravenous Given 1/19/22 1622)     New Prescriptions    No medications on file             Medical Decision Making    Medical Decision Making:   Clinical Tests:   Lab Tests: Ordered and Reviewed  Radiological Study: Ordered and Reviewed  Medical Tests: Ordered and Reviewed           Scribe Attestation:   Scribe #1: I performed the above scribed service and the documentation accurately describes the services I performed. I attest to the accuracy of the note.    Attending:   Physician Attestation Statement for Scribe #1: I, Maddie Middleton MD, personally performed the services described in this documentation, as scribed by Cora Garcia, in my presence, and it is both accurate and complete.          Clinical Impression       ICD-10-CM ICD-9-CM   1. Acute on chronic respiratory failure with hypoxia and hypercapnia  J96.21 518.84    J96.22 786.09     799.02   2. Shortness of breath  R06.02 786.05   3. Pneumonia of both lower lobes due to infectious organism  J18.9 486   4. Agitation  R45.1 307.9   5. Sepsis, due to unspecified organism, unspecified whether acute organ dysfunction present  A41.9 038.9     995.91   6. Chronic heart failure, unspecified heart failure type  I50.9 428.9   7. Elevated brain natriuretic peptide (BNP) level  R79.89 790.99       Disposition:   Disposition: Admitted  Condition: Critical         Maddie Middleton MD  01/19/22 1242

## 2022-01-20 PROBLEM — J18.9 PNEUMONIA OF LEFT LOWER LOBE DUE TO INFECTIOUS ORGANISM: Status: ACTIVE | Noted: 2022-01-20

## 2022-01-20 PROBLEM — F25.0 SCHIZOAFFECTIVE DISORDER, BIPOLAR TYPE: Chronic | Status: ACTIVE | Noted: 2018-02-09

## 2022-01-20 LAB
ALLENS TEST: ABNORMAL
ALLENS TEST: ABNORMAL
ANION GAP SERPL CALC-SCNC: 14 MMOL/L (ref 8–16)
BASOPHILS # BLD AUTO: 0.01 K/UL (ref 0–0.2)
BASOPHILS NFR BLD: 0.1 % (ref 0–1.9)
BUN SERPL-MCNC: 45 MG/DL (ref 8–23)
CALCIUM SERPL-MCNC: 9.3 MG/DL (ref 8.7–10.5)
CHLORIDE SERPL-SCNC: 110 MMOL/L (ref 95–110)
CO2 SERPL-SCNC: 21 MMOL/L (ref 23–29)
CREAT SERPL-MCNC: 1.3 MG/DL (ref 0.5–1.4)
D DIMER PPP IA.FEU-MCNC: 1.1 MG/L FEU
DELSYS: ABNORMAL
DELSYS: ABNORMAL
DIFFERENTIAL METHOD: ABNORMAL
EOSINOPHIL # BLD AUTO: 0 K/UL (ref 0–0.5)
EOSINOPHIL NFR BLD: 0 % (ref 0–8)
ERYTHROCYTE [DISTWIDTH] IN BLOOD BY AUTOMATED COUNT: 16.6 % (ref 11.5–14.5)
ERYTHROCYTE [SEDIMENTATION RATE] IN BLOOD BY WESTERGREN METHOD: 24 MM/H
EST. GFR  (AFRICAN AMERICAN): >60 ML/MIN/1.73 M^2
EST. GFR  (NON AFRICAN AMERICAN): 58 ML/MIN/1.73 M^2
FERRITIN SERPL-MCNC: 68 NG/ML (ref 20–300)
FIO2: 40
GLUCOSE SERPL-MCNC: 218 MG/DL (ref 70–110)
HCO3 UR-SCNC: 29.7 MMOL/L (ref 24–28)
HCO3 UR-SCNC: 30.9 MMOL/L (ref 24–28)
HCT VFR BLD AUTO: 38.6 % (ref 40–54)
HGB BLD-MCNC: 12 G/DL (ref 14–18)
IMM GRANULOCYTES # BLD AUTO: 0.44 K/UL (ref 0–0.04)
IMM GRANULOCYTES NFR BLD AUTO: 2.8 % (ref 0–0.5)
LYMPHOCYTES # BLD AUTO: 1.6 K/UL (ref 1–4.8)
LYMPHOCYTES NFR BLD: 9.8 % (ref 18–48)
MAGNESIUM SERPL-MCNC: 1.7 MG/DL (ref 1.6–2.6)
MCH RBC QN AUTO: 28 PG (ref 27–31)
MCHC RBC AUTO-ENTMCNC: 31.1 G/DL (ref 32–36)
MCV RBC AUTO: 90 FL (ref 82–98)
MODE: ABNORMAL
MONOCYTES # BLD AUTO: 1.2 K/UL (ref 0.3–1)
MONOCYTES NFR BLD: 7.8 % (ref 4–15)
NEUTROPHILS # BLD AUTO: 12.6 K/UL (ref 1.8–7.7)
NEUTROPHILS NFR BLD: 79.5 % (ref 38–73)
NRBC BLD-RTO: 0 /100 WBC
PCO2 BLDA: 38.7 MMHG (ref 35–45)
PCO2 BLDA: 46.6 MMHG (ref 35–45)
PH SMN: 7.41 [PH] (ref 7.35–7.45)
PH SMN: 7.51 [PH] (ref 7.35–7.45)
PLATELET # BLD AUTO: 366 K/UL (ref 150–450)
PMV BLD AUTO: 9 FL (ref 9.2–12.9)
PO2 BLDA: 126 MMHG (ref 80–100)
PO2 BLDA: 29 MMHG (ref 40–60)
POC BE: 5 MMOL/L
POC BE: 8 MMOL/L
POC SATURATED O2: 56 % (ref 95–100)
POC SATURATED O2: 99 % (ref 95–100)
POCT GLUCOSE: 181 MG/DL (ref 70–110)
POCT GLUCOSE: 228 MG/DL (ref 70–110)
POCT GLUCOSE: 229 MG/DL (ref 70–110)
POCT GLUCOSE: 243 MG/DL (ref 70–110)
POTASSIUM SERPL-SCNC: 3.9 MMOL/L (ref 3.5–5.1)
RBC # BLD AUTO: 4.29 M/UL (ref 4.6–6.2)
SAMPLE: ABNORMAL
SAMPLE: ABNORMAL
SARS-COV-2 RNA RESP QL NAA+PROBE: NOT DETECTED
SARS-COV-2- CYCLE NUMBER: NORMAL
SITE: ABNORMAL
SITE: ABNORMAL
SODIUM SERPL-SCNC: 145 MMOL/L (ref 136–145)
TROPONIN I SERPL DL<=0.01 NG/ML-MCNC: 0.19 NG/ML (ref 0–0.03)
VT: 450
WBC # BLD AUTO: 15.86 K/UL (ref 3.9–12.7)

## 2022-01-20 PROCEDURE — 36415 COLL VENOUS BLD VENIPUNCTURE: CPT | Performed by: INTERNAL MEDICINE

## 2022-01-20 PROCEDURE — 63700000 PHARM REV CODE 250 ALT 637 W/O HCPCS: Performed by: EMERGENCY MEDICINE

## 2022-01-20 PROCEDURE — 63600175 PHARM REV CODE 636 W HCPCS: Performed by: INTERNAL MEDICINE

## 2022-01-20 PROCEDURE — 63600175 PHARM REV CODE 636 W HCPCS

## 2022-01-20 PROCEDURE — 25000003 PHARM REV CODE 250: Performed by: INTERNAL MEDICINE

## 2022-01-20 PROCEDURE — C9399 UNCLASSIFIED DRUGS OR BIOLOG: HCPCS | Performed by: INTERNAL MEDICINE

## 2022-01-20 PROCEDURE — 94660 CPAP INITIATION&MGMT: CPT

## 2022-01-20 PROCEDURE — 94760 N-INVAS EAR/PLS OXIMETRY 1: CPT

## 2022-01-20 PROCEDURE — 27000207 HC ISOLATION

## 2022-01-20 PROCEDURE — 36415 COLL VENOUS BLD VENIPUNCTURE: CPT | Performed by: STUDENT IN AN ORGANIZED HEALTH CARE EDUCATION/TRAINING PROGRAM

## 2022-01-20 PROCEDURE — 85379 FIBRIN DEGRADATION QUANT: CPT | Performed by: INTERNAL MEDICINE

## 2022-01-20 PROCEDURE — 84484 ASSAY OF TROPONIN QUANT: CPT | Performed by: STUDENT IN AN ORGANIZED HEALTH CARE EDUCATION/TRAINING PROGRAM

## 2022-01-20 PROCEDURE — 99291 CRITICAL CARE FIRST HOUR: CPT | Mod: ,,, | Performed by: INTERNAL MEDICINE

## 2022-01-20 PROCEDURE — 63600175 PHARM REV CODE 636 W HCPCS: Performed by: EMERGENCY MEDICINE

## 2022-01-20 PROCEDURE — 63600175 PHARM REV CODE 636 W HCPCS: Performed by: STUDENT IN AN ORGANIZED HEALTH CARE EDUCATION/TRAINING PROGRAM

## 2022-01-20 PROCEDURE — 27000221 HC OXYGEN, UP TO 24 HOURS

## 2022-01-20 PROCEDURE — 99900035 HC TECH TIME PER 15 MIN (STAT)

## 2022-01-20 PROCEDURE — 20000000 HC ICU ROOM

## 2022-01-20 PROCEDURE — 25000003 PHARM REV CODE 250: Performed by: STUDENT IN AN ORGANIZED HEALTH CARE EDUCATION/TRAINING PROGRAM

## 2022-01-20 PROCEDURE — 43752 NASAL/OROGASTRIC W/TUBE PLMT: CPT

## 2022-01-20 PROCEDURE — 83735 ASSAY OF MAGNESIUM: CPT | Performed by: STUDENT IN AN ORGANIZED HEALTH CARE EDUCATION/TRAINING PROGRAM

## 2022-01-20 PROCEDURE — 25000003 PHARM REV CODE 250: Performed by: EMERGENCY MEDICINE

## 2022-01-20 PROCEDURE — 82728 ASSAY OF FERRITIN: CPT | Performed by: INTERNAL MEDICINE

## 2022-01-20 PROCEDURE — 99291 PR CRITICAL CARE, E/M 30-74 MINUTES: ICD-10-PCS | Mod: ,,, | Performed by: INTERNAL MEDICINE

## 2022-01-20 PROCEDURE — 85025 COMPLETE CBC W/AUTO DIFF WBC: CPT | Performed by: STUDENT IN AN ORGANIZED HEALTH CARE EDUCATION/TRAINING PROGRAM

## 2022-01-20 PROCEDURE — 80048 BASIC METABOLIC PNL TOTAL CA: CPT | Performed by: STUDENT IN AN ORGANIZED HEALTH CARE EDUCATION/TRAINING PROGRAM

## 2022-01-20 PROCEDURE — S4991 NICOTINE PATCH NONLEGEND: HCPCS | Performed by: STUDENT IN AN ORGANIZED HEALTH CARE EDUCATION/TRAINING PROGRAM

## 2022-01-20 RX ORDER — FUROSEMIDE 10 MG/ML
40 INJECTION INTRAMUSCULAR; INTRAVENOUS 2 TIMES DAILY
Status: DISCONTINUED | OUTPATIENT
Start: 2022-01-20 | End: 2022-01-21

## 2022-01-20 RX ORDER — MUPIROCIN 20 MG/G
OINTMENT TOPICAL 2 TIMES DAILY
Status: DISCONTINUED | OUTPATIENT
Start: 2022-01-20 | End: 2022-01-23 | Stop reason: HOSPADM

## 2022-01-20 RX ORDER — LORAZEPAM 2 MG/ML
3 INJECTION INTRAMUSCULAR ONCE
Status: COMPLETED | OUTPATIENT
Start: 2022-01-20 | End: 2022-01-20

## 2022-01-20 RX ORDER — GABAPENTIN 250 MG/5ML
800 SOLUTION ORAL 3 TIMES DAILY
Status: DISCONTINUED | OUTPATIENT
Start: 2022-01-20 | End: 2022-01-21

## 2022-01-20 RX ORDER — CARVEDILOL 6.25 MG/1
6.25 TABLET ORAL 2 TIMES DAILY
Status: DISCONTINUED | OUTPATIENT
Start: 2022-01-20 | End: 2022-01-21

## 2022-01-20 RX ORDER — HALOPERIDOL 5 MG/ML
2 INJECTION INTRAMUSCULAR ONCE
Status: COMPLETED | OUTPATIENT
Start: 2022-01-20 | End: 2022-01-20

## 2022-01-20 RX ORDER — HALOPERIDOL 5 MG/ML
5 INJECTION INTRAMUSCULAR EVERY 6 HOURS PRN
Status: DISCONTINUED | OUTPATIENT
Start: 2022-01-20 | End: 2022-01-23 | Stop reason: HOSPADM

## 2022-01-20 RX ORDER — DEXMEDETOMIDINE HYDROCHLORIDE 4 UG/ML
0-1.4 INJECTION INTRAVENOUS CONTINUOUS
Status: DISCONTINUED | OUTPATIENT
Start: 2022-01-20 | End: 2022-01-21

## 2022-01-20 RX ORDER — HALOPERIDOL 5 MG/ML
2 INJECTION INTRAMUSCULAR ONCE
Status: DISCONTINUED | OUTPATIENT
Start: 2022-01-20 | End: 2022-01-20

## 2022-01-20 RX ORDER — ATORVASTATIN CALCIUM 10 MG/1
20 TABLET, FILM COATED ORAL DAILY
Status: DISCONTINUED | OUTPATIENT
Start: 2022-01-21 | End: 2022-01-21

## 2022-01-20 RX ORDER — AMLODIPINE BESYLATE 10 MG/1
10 TABLET ORAL DAILY
Status: DISCONTINUED | OUTPATIENT
Start: 2022-01-21 | End: 2022-01-21

## 2022-01-20 RX ORDER — AZITHROMYCIN 250 MG/1
500 TABLET, FILM COATED ORAL
Status: DISCONTINUED | OUTPATIENT
Start: 2022-01-20 | End: 2022-01-21

## 2022-01-20 RX ORDER — FAMOTIDINE 20 MG/50ML
20 INJECTION, SOLUTION INTRAVENOUS 2 TIMES DAILY
Status: DISCONTINUED | OUTPATIENT
Start: 2022-01-20 | End: 2022-01-20

## 2022-01-20 RX ORDER — TRAZODONE HYDROCHLORIDE 50 MG/1
150 TABLET ORAL NIGHTLY
Status: DISCONTINUED | OUTPATIENT
Start: 2022-01-20 | End: 2022-01-21

## 2022-01-20 RX ORDER — ACETAMINOPHEN 650 MG/20.3ML
650 LIQUID ORAL EVERY 8 HOURS PRN
Status: DISCONTINUED | OUTPATIENT
Start: 2022-01-20 | End: 2022-01-21

## 2022-01-20 RX ORDER — FAMOTIDINE 40 MG/5ML
20 POWDER, FOR SUSPENSION ORAL 2 TIMES DAILY
Status: DISCONTINUED | OUTPATIENT
Start: 2022-01-20 | End: 2022-01-21

## 2022-01-20 RX ORDER — HALOPERIDOL 5 MG/ML
INJECTION INTRAMUSCULAR
Status: COMPLETED
Start: 2022-01-20 | End: 2022-01-20

## 2022-01-20 RX ORDER — POLYETHYLENE GLYCOL 3350 17 G/17G
17 POWDER, FOR SOLUTION ORAL DAILY PRN
Status: DISCONTINUED | OUTPATIENT
Start: 2022-01-20 | End: 2022-01-21

## 2022-01-20 RX ADMIN — INSULIN DETEMIR 10 UNITS: 100 INJECTION, SOLUTION SUBCUTANEOUS at 08:01

## 2022-01-20 RX ADMIN — MUPIROCIN: 20 OINTMENT TOPICAL at 09:01

## 2022-01-20 RX ADMIN — DEXMEDETOMIDINE HYDROCHLORIDE 1.4 MCG/KG/HR: 4 INJECTION INTRAVENOUS at 07:01

## 2022-01-20 RX ADMIN — DEXMEDETOMIDINE HYDROCHLORIDE 1.4 MCG/KG/HR: 4 INJECTION INTRAVENOUS at 11:01

## 2022-01-20 RX ADMIN — DEXMEDETOMIDINE HYDROCHLORIDE 0.5 MCG/KG/HR: 4 INJECTION INTRAVENOUS at 01:01

## 2022-01-20 RX ADMIN — DEXMEDETOMIDINE HYDROCHLORIDE 1.4 MCG/KG/HR: 4 INJECTION INTRAVENOUS at 04:01

## 2022-01-20 RX ADMIN — INSULIN ASPART 2 UNITS: 100 INJECTION, SOLUTION INTRAVENOUS; SUBCUTANEOUS at 12:01

## 2022-01-20 RX ADMIN — DEXMEDETOMIDINE HYDROCHLORIDE 1.4 MCG/KG/HR: 4 INJECTION INTRAVENOUS at 01:01

## 2022-01-20 RX ADMIN — HALOPERIDOL LACTATE 5 MG: 5 INJECTION, SOLUTION INTRAMUSCULAR at 09:01

## 2022-01-20 RX ADMIN — NICOTINE 1 PATCH: 14 PATCH, EXTENDED RELEASE TRANSDERMAL at 08:01

## 2022-01-20 RX ADMIN — LORAZEPAM 3 MG: 2 INJECTION INTRAMUSCULAR; INTRAVENOUS at 06:01

## 2022-01-20 RX ADMIN — INSULIN ASPART 2 UNITS: 100 INJECTION, SOLUTION INTRAVENOUS; SUBCUTANEOUS at 06:01

## 2022-01-20 RX ADMIN — AZITHROMYCIN MONOHYDRATE 500 MG: 250 TABLET ORAL at 06:01

## 2022-01-20 RX ADMIN — CARVEDILOL 6.25 MG: 6.25 TABLET, FILM COATED ORAL at 08:01

## 2022-01-20 RX ADMIN — MUPIROCIN: 20 OINTMENT TOPICAL at 08:01

## 2022-01-20 RX ADMIN — TRAZODONE HYDROCHLORIDE 150 MG: 50 TABLET ORAL at 08:01

## 2022-01-20 RX ADMIN — HALOPERIDOL LACTATE 5 MG: 5 INJECTION, SOLUTION INTRAMUSCULAR at 03:01

## 2022-01-20 RX ADMIN — FUROSEMIDE 40 MG: 10 INJECTION, SOLUTION INTRAMUSCULAR; INTRAVENOUS at 03:01

## 2022-01-20 RX ADMIN — ENOXAPARIN SODIUM 40 MG: 100 INJECTION SUBCUTANEOUS at 04:01

## 2022-01-20 RX ADMIN — HYDRALAZINE HYDROCHLORIDE 20 MG: 20 INJECTION INTRAMUSCULAR; INTRAVENOUS at 12:01

## 2022-01-20 RX ADMIN — DEXMEDETOMIDINE HYDROCHLORIDE 1.4 MCG/KG/HR: 4 INJECTION INTRAVENOUS at 09:01

## 2022-01-20 RX ADMIN — CEFTRIAXONE 2 G: 2 INJECTION, SOLUTION INTRAVENOUS at 04:01

## 2022-01-20 RX ADMIN — INSULIN ASPART 4 UNITS: 100 INJECTION, SOLUTION INTRAVENOUS; SUBCUTANEOUS at 06:01

## 2022-01-20 RX ADMIN — INSULIN ASPART 4 UNITS: 100 INJECTION, SOLUTION INTRAVENOUS; SUBCUTANEOUS at 12:01

## 2022-01-20 RX ADMIN — DEXMEDETOMIDINE HYDROCHLORIDE 1.4 MCG/KG/HR: 4 INJECTION INTRAVENOUS at 06:01

## 2022-01-20 RX ADMIN — FAMOTIDINE 20 MG: 40 POWDER, FOR SUSPENSION ORAL at 08:01

## 2022-01-20 RX ADMIN — HALOPERIDOL LACTATE 5 MG: 5 INJECTION, SOLUTION INTRAMUSCULAR at 10:01

## 2022-01-20 RX ADMIN — DEXMEDETOMIDINE HYDROCHLORIDE 1.3 MCG/KG/HR: 4 INJECTION INTRAVENOUS at 03:01

## 2022-01-20 RX ADMIN — HALOPERIDOL LACTATE 2 MG: 5 INJECTION, SOLUTION INTRAMUSCULAR at 06:01

## 2022-01-20 RX ADMIN — GABAPENTIN 800 MG: 250 SUSPENSION ORAL at 08:01

## 2022-01-20 NOTE — PROGRESS NOTES
[facility] Medicine  Progress Note    Patient Name: Shukri Rosales  MRN: [id number]  Patient Class: IP- Inpatient   Admission Date: 1/19/2022  Length of Stay: 1 days  Attending Physician: Skylar Beltran MD  Primary Care Provider: Cris Matias NP        Subjective:     Principal Problem:Severe sepsis        HPI:   61 y.o. male patient with a PMHx of incisional abdominal hernia, peripheral vascular disease, DM2, adrenal cortical adenoma, CKD, carotid stenosis, schizophrenia, GERD, HTN, and HLD who presents to the Emergency Department for SOB. According to AASI, the pt's family reports that the pt started to have trouble breathing at 0700 this morning, but the pt refused to go to a hospital. According to AASI, the pt then became altered, so the pt's family members called 911. When AASI arrived on scene, the pt's oxygen saturation was 77% on RA, and the pt was cyanotic. En route to the ED, AASI administered a partial duo-neb which was ripped off by the pt, and the pt was put on 20L of O2 which brought his oxygen saturation up to 89%. The pt admits to smoking. At the time of my exam, patient is on BIPAP and lethargic s/p Ativan. Will repeat ABG      Overview/Hospital Course:  61 y.o. male patient with Hx of incisional abdominal hernia, PAD, DM2, adrenal cortical adenoma, CKD, carotid stenosis, schizophrenia, GERD, HTN, and HLD brought to ER by EMS for SOB. According to AASI, the pt's family reports that the pt started to have trouble breathing at 0700 this morning, but the pt refused to go to a hospital. According to AASI, the pt then became altered, so the pt's family members called 911. When AASI arrived on scene, the pt's oxygen saturation was 77% on RA, and the pt was cyanotic. En route to the ED, AASI administered a partial duo-neb which was ripped off by the pt, and the pt was put on 20L of O2 which brought his oxygen saturation up to 89%. The pt admits to smoking.  Initial labs in the ER Show WBC 19, with 88% Segs, Bun 35, BNP 3500, Trop 0.199, ABG showed PC 7.31/57/419 on Bipap 100%. Pt had received a dose of lasix in the ER. Pt placed in ICU on Bipap. He was also on Precedex gtt for his agitation.    1/20- remains mildly sedated with Precedex on NC, off Bipap as Hypercapnia improved, still gets agitated, hence getting haldol plus Ativan prn, about to have NGT placed. WBC improved to 15.8, D dimer 1.1, Trop down to 0.191. continue present care. Started on IV lasix.      Interval History: remains mildly sedated with Precedex on NC, off Bipap as Hypercapnia improved, still gets agitated, hence getting haldol plus Ativan prn, about to have NGT placed. WBC improved to 15.8, D dimer 1.1, Trop down to 0.191. continue present care. Started on IV lasix.    Review of Systems   Unable to perform ROS: Mental status change     Objective:     Vital Signs (Most Recent):  Temp: 98 °F (36.7 °C) (01/20/22 0830)  Pulse: 74 (01/20/22 1530)  Resp: (!) 56 (01/20/22 1530)  BP: 131/77 (01/20/22 1115)  SpO2: (!) 90 % (01/20/22 1530) Vital Signs (24h Range):  Temp:  [97.6 °F (36.4 °C)-98.6 °F (37 °C)] 98 °F (36.7 °C)  Pulse:  [] 74  Resp:  [4-79] 56  SpO2:  [90 %-100 %] 90 %  BP: (103-199)/() 131/77     Weight: 89.8 kg (198 lb)  Body mass index is 26.85 kg/m².    Intake/Output Summary (Last 24 hours) at 1/20/2022 1540  Last data filed at 1/20/2022 1500  Gross per 24 hour   Intake 746.12 ml   Output 660 ml   Net 86.12 ml      Physical Exam  Vitals and nursing note reviewed.   Constitutional:       General: He is not in acute distress.     Appearance: He is well-developed. He is ill-appearing and toxic-appearing.      Comments: Sedated, on 5 L NC   HENT:      Head: Normocephalic and atraumatic.   Cardiovascular:      Rate and Rhythm: Normal rate and regular rhythm.   Pulmonary:      Effort: Pulmonary effort is normal.      Breath sounds: Wheezing and rhonchi present.   Abdominal:       Palpations: Abdomen is soft.      Tenderness: There is no abdominal tenderness.   Genitourinary:     Comments: Medina catheter in place  Musculoskeletal:         General: No deformity.      Cervical back: Neck supple.   Skin:     General: Skin is warm.   Neurological:      General: No focal deficit present.      Mental Status: He is disoriented.         Significant Labs:   All pertinent labs within the past 24 hours have been reviewed.  ABGs:   Recent Labs   Lab 01/19/22  2112 01/20/22  0212 01/20/22  0506   PH 7.332* 7.510* 7.412   PCO2 56.1* 38.7 46.6*   HCO3 29.7* 30.9* 29.7*   POCSATURATED 60* 99 56*   BE 4 8 5   PO2 34* 126* 29*     Blood Culture:   Recent Labs   Lab 01/19/22  1531   LABBLOO No Growth to date  No Growth to date     BMP:   Recent Labs   Lab 01/20/22  0449   *      K 3.9      CO2 21*   BUN 45*   CREATININE 1.3   CALCIUM 9.3   MG 1.7     CBC:   Recent Labs   Lab 01/19/22  1452 01/20/22  0449   WBC 19.59* 15.86*   HGB 14.3 12.0*   HCT 44.1 38.6*   * 366     CMP:   Recent Labs   Lab 01/19/22  1452 01/20/22  0449    145   K 4.9 3.9    110   CO2 21* 21*   * 218*   BUN 39* 45*   CREATININE 1.3 1.3   CALCIUM 10.2 9.3   PROT 7.3  --    ALBUMIN 3.3*  --    BILITOT 0.7  --    ALKPHOS 90  --    AST 30  --    ALT 21  --    ANIONGAP 14 14   EGFRNONAA 58* 58*     Lactic Acid:   Recent Labs   Lab 01/19/22  1532   LACTATE 2.6*     Lipid Panel:     Significant Imaging: I have reviewed all pertinent imaging results/findings within the past 24 hours.      Assessment/Plan:      * Severe sepsis  This patient does have evidence of infective focus  My overall impression is sepsis. Vital signs were reviewed and noted in progress note.  Antibiotics given-   Antibiotics (From admission, onward)            Start     Stop Route Frequency Ordered    01/20/22 1630  azithromycin tablet 500 mg         01/22 1629 PER NG TUBE Every 24 hours (non-standard times) 01/20/22 1523     01/20/22 1534  cefTRIAXone (ROCEPHIN) 2 g/50 mL D5W IVPB         01/24 1544 IV Every 24 hours (non-standard times) 01/20/22 1529    01/20/22 1000  mupirocin 2 % ointment         01/25 0859 Nasl 2 times daily 01/20/22 0851        Cultures were taken-   Microbiology Results (last 7 days)     Procedure Component Value Units Date/Time    Blood Culture #2 **CANNOT BE ORDERED STAT** [[id number]] Collected: 01/19/22 1531    Order Status: Completed Specimen: Blood from Peripheral, Antecubital, Right Updated: 01/20/22 0515     Blood Culture, Routine No Growth to date    Blood Culture #1 **CANNOT BE ORDERED STAT** [[id number]] Collected: 01/19/22 1531    Order Status: Completed Specimen: Blood from Peripheral, Antecubital, Left Updated: 01/20/22 0515     Blood Culture, Routine No Growth to date    Influenza A & B by Molecular [[id number]] Collected: 01/19/22 2215    Order Status: Completed Specimen: Nasopharyngeal Swab Updated: 01/19/22 2303     Influenza A, Molecular Negative     Influenza B, Molecular Negative     Flu A & B Source Nasal swab    Culture, Respiratory with Gram Stain [[id number]]     Order Status: No result Specimen: Respiratory         Latest lactate reviewed, they are-  Recent Labs   Lab 01/19/22  1532   LACTATE 2.6*       Organ dysfunction indicated by Acute respiratory failure  Source- PNA    Source control Achieved by- IV abx    Sepsis s/t CAP w/ acute hypercapnic resp failure  Empiric Abx  BIPAP, titrate as tolerated      Initially suspected of Sepsis due to elevated WBC but Leukocytosis likely reactive  Started on IV Lasix    Acute respiratory failure with hypoxia and hypercarbia  Patient with Hypercapnic and Hypoxic Respiratory failure which is Acute on chronic.  he is not on home oxygen. Supplemental oxygen was provided and noted- Oxygen Concentration (%):  [] 40.   Signs/symptoms of respiratory failure include- increased work of breathing and respiratory distress. Contributing diagnoses includes -  CHF, COPD and Pneumonia Labs and images were reviewed. Patient Has recent ABG, which has been reviewed. Will treat underlying causes and adjust management of respiratory failure as follows- IV abx, BIPAP    Improved with Bipap- now on NC  Expect improvement with diuresis    Acute metabolic encephalopathy  Sedated on Precedex      Type 2 diabetes mellitus with stage 3 chronic kidney disease, with long-term current use of insulin  SSI    Improved with Levemir and SSI    Schizoaffective disorder, bipolar type  Sedated with Precedex, also getting Prn Haldol      Tobacco abuse disorder  Smoking cessation counseling      Essential hypertension  Continue home meds amlodipine, clonidine  Restart lisinopril, HCTZ as tolerated, pt clinically volume deplete    BP under control        VTE Risk Mitigation (From admission, onward)         Ordered     enoxaparin injection 40 mg  Daily         01/19/22 1837     IP VTE HIGH RISK PATIENT  Once         01/19/22 1837     Place sequential compression device  Until discontinued         01/19/22 1837                Discharge Planning   ALICIA:      Code Status: Full Code   Is the patient medically ready for discharge?:     Reason for patient still in hospital (select all that apply): Patient trending condition, Laboratory test, Treatment, Imaging and Consult recommendations  Discharge Plan A: Home      Seen and discussed with Dr. Cain and the ICU team  Condition: Critical  Prognosis: Guarded to poor        Critical care time spent on the evaluation and treatment of severe organ dysfunction, review of pertinent labs and imaging studies, discussions with consulting providers and discussions with patient/family: 41 minutes.      Skylar Beltran MD  Department of Hospital Medicine   [facility] - Intensive Care ([facility])

## 2022-01-20 NOTE — PLAN OF CARE
Safety and fall precautions maintained over night.   Patient anxious over night. Precedex drip ordered.   Patient not tolerating bipap over night due to severe agitation. Day to team to assess.

## 2022-01-20 NOTE — ASSESSMENT & PLAN NOTE
Continue home meds amlodipine, clonidine  Restart lisinopril, HCTZ as tolerated, pt clinically volume deplete    BP under control

## 2022-01-20 NOTE — EICU
Brief Admit Note     Acute respiratory failure   On NIV   Pneumonia   Possible COVID- repeat   Possible H Flu - testing ordered     Continue azithromycin and ceftriaxone   Will give 1 dose of dexa   Needs VBG   Look for hypercapnic respiratory failure   DVT prophylaxis   GI prophylaxis     High risk of intubation, given his mental condition at the moment.     AV equipment used and discussed with bedside       ADDENDUM   Patient was initiially somnolent. Now he is alert and agitated. Precedex added.

## 2022-01-20 NOTE — ASSESSMENT & PLAN NOTE
Continue home meds amlodipine, clonidine  Restart lisinopril, HCTZ as tolerated, pt clinically volume deplete

## 2022-01-20 NOTE — ASSESSMENT & PLAN NOTE
Patient with Hypercapnic and Hypoxic Respiratory failure which is Acute on chronic.  he is not on home oxygen. Supplemental oxygen was provided and noted- Oxygen Concentration (%):  [100] 100.   Signs/symptoms of respiratory failure include- increased work of breathing and respiratory distress. Contributing diagnoses includes - CHF, COPD and Pneumonia Labs and images were reviewed. Patient Has recent ABG, which has been reviewed. Will treat underlying causes and adjust management of respiratory failure as follows- IV abx, BIPAP

## 2022-01-20 NOTE — ASSESSMENT & PLAN NOTE
Patient with Hypercapnic and Hypoxic Respiratory failure which is Acute.  he is not on home oxygen. Supplemental oxygen was provided and noted- Oxygen Concentration (%):  [] 40.   Signs/symptoms of respiratory failure include- increased work of breathing. Contributing diagnoses includes - Pneumonia Labs and images were reviewed. Patient Has recent ABG, which has been reviewed. Will treat underlying causes and adjust management of respiratory failure as follows- O2 target sat 90-94%.  BiPAP p.r.n. distress.  Albuterol Atrovent.  IV Solu-Medrol.  Treat with Rocephin high-dose 2 g plus azithromycin severe community-acquired pneumonia.  COVID-19 negative.  Ferritin within normal

## 2022-01-20 NOTE — ASSESSMENT & PLAN NOTE
Patient with Hypercapnic and Hypoxic Respiratory failure which is Acute on chronic.  he is not on home oxygen. Supplemental oxygen was provided and noted- Oxygen Concentration (%):  [] 40.   Signs/symptoms of respiratory failure include- increased work of breathing and respiratory distress. Contributing diagnoses includes - CHF, COPD and Pneumonia Labs and images were reviewed. Patient Has recent ABG, which has been reviewed. Will treat underlying causes and adjust management of respiratory failure as follows- IV abx, BIPAP    Improved with Bipap- now on NC  Expect improvement with diuresis

## 2022-01-20 NOTE — PLAN OF CARE
01/20/22 1338   Readmission   Why were you hospitalized in the last 30 days? short of breath   Why were you readmitted? New medical problem   When you left the hospital how did you feel? ok   When you left the hospital where did you go? Home Alone   Did patient/caregiver refused recommended DC plan? Yes   Was this a planned readmission? No     Patient left AMA 1/2/22

## 2022-01-20 NOTE — PLAN OF CARE
[facility] - Intensive Care (Hospital)  Initial Discharge Assessment       Primary Care Provider: Cris Matias NP    Admission Diagnosis: Shortness of breath [R06.02]  CHF (congestive heart failure) [I50.9]  Agitation [R45.1]  Elevated brain natriuretic peptide (BNP) level [R79.89]  Pneumonia of both lower lobes due to infectious organism [J18.9]  Chronic heart failure, unspecified heart failure type [I50.9]  Acute on chronic respiratory failure with hypoxia and hypercapnia [J96.21, J96.22]  Sepsis, due to unspecified organism, unspecified whether acute organ dysfunction present [A41.9]    Admission Date: 1/19/2022  Expected Discharge Date:     Discharge Barriers Identified: Does not adhere to care plan    Payor: MEDICAID / Plan: [facility] / Product Type: Managed Medicaid /     Extended Emergency Contact Information  Primary Emergency Contact: Breanna Aguila   [place]  Mobile Phone: [phone]  Relation: Sister  Secondary Emergency Contact: Anais Rosales  Mobile Phone: [phone]  Relation: Sister    Discharge Plan A: Home         [facility] [place] - [address]  [address]  [place] [id number]  Phone: [phone] Fax: [phone]    [facility]  [address] [facility]  [place] [id number]  Phone: [phone] Fax: [phone]    [id number] - [place], [place] - [address] AT [facility] [address]  [address]  [facility] [address]  Phone: [phone] Fax: [phone]    [id number] - [place] - [address] AT [facility]  [address]  [place] [address]  Phone: [phone] Fax: [phone]    [facility] [place] - [address]  [address]  [facility] [address]  Phone: [phone] Fax: [phone]      Initial Assessment (most recent)      Adult Discharge Assessment - 01/20/22 1329        Discharge Assessment    Assessment Type Discharge Planning Assessment     Confirmed/corrected address, phone number and insurance Yes     Confirmed Demographics Correct on Facesheet     Source of Information family     When was your last doctors appointment? 11/01/21     Communicated ALICIA with patient/caregiver Date not available/Unable to determine     Reason For Admission sever sepsis     Lives With alone     Facility Arrived From: home     Do you expect to return to your current living situation? Yes     Do you have help at home or someone to help you manage your care at home? Yes     Who are your caregiver(s) and their phone number(s)? Breanna siddiqui     Prior to hospitilization cognitive status: Alert/Oriented     Current cognitive status: Not Oriented to Place;Not Oriented to Time     Walking or Climbing Stairs Difficulty ambulation difficulty, requires equipment     Mobility Management rolling walker     Dressing/Bathing Difficulty none     Home Layout Able to live on 1st floor     Equipment Currently Used at Home glucometer;walker, rolling     Readmission within 30 days? Yes     Patient currently being followed by outpatient case management? No     Do you currently have service(s) that help you manage your care at home? No     Do you take prescription medications? Yes     Do you have prescription coverage? Yes     Coverage medicaid     Do you have any problems affording any of your prescribed medications? No     Is the patient taking medications as prescribed? yes     Who is going to help you get home at discharge? Breanna siddiqui     How do you get to doctors appointments? family or friend will provide     Are you on dialysis? No     Do you take coumadin? No     Discharge Plan A Home     DME Needed Upon Discharge  wheelchair     Discharge Plan discussed with: Spouse/sig other     Name(s) and Number(s) Breanna Aguila     Discharge Barriers Identified Does not  adhere to care plan               Anticipated DC dispo: home   Prior Level of Function: dependent with ambulation, uses a walker and borrowed wheelchair for long distances. WC ordered at PCP per chart and sister but patient never received.   PCP: Cris Matias NP    Comments:  CM met spoke with patient's sister, Breanna, to introduce role and discuss discharge planning. Patient lives alone. Breanna, sister, works during the day. She provides transportation for the patient. She states there is an older brother who spends his time between [place] who can also assist with DC needs and transport.  CM discharge needs depends on hospital progress. CM will continue following to assist with other needs.

## 2022-01-20 NOTE — ASSESSMENT & PLAN NOTE
This patient does have evidence of infective focus  My overall impression is sepsis. Vital signs were reviewed and noted in progress note.  Antibiotics given-   Antibiotics (From admission, onward)            Start     Stop Route Frequency Ordered    01/21/22 0600  cefTRIAXone (ROCEPHIN) 2 g/50 mL D5W IVPB         01/25 0559 IV Every 24 hours (non-standard times) 01/20/22 1529    01/20/22 1630  azithromycin tablet 500 mg         01/22 1629 PER NG TUBE Every 24 hours (non-standard times) 01/20/22 1523    01/20/22 1000  mupirocin 2 % ointment         01/25 0859 Nasl 2 times daily 01/20/22 0851        Cultures were taken-   Microbiology Results (last 7 days)     Procedure Component Value Units Date/Time    Blood Culture #2 **CANNOT BE ORDERED STAT** [[id number]] Collected: 01/19/22 1531    Order Status: Completed Specimen: Blood from Peripheral, Antecubital, Right Updated: 01/20/22 0515     Blood Culture, Routine No Growth to date    Blood Culture #1 **CANNOT BE ORDERED STAT** [[id number]] Collected: 01/19/22 1531    Order Status: Completed Specimen: Blood from Peripheral, Antecubital, Left Updated: 01/20/22 0515     Blood Culture, Routine No Growth to date    Influenza A & B by Molecular [[id number]] Collected: 01/19/22 2215    Order Status: Completed Specimen: Nasopharyngeal Swab Updated: 01/19/22 2303     Influenza A, Molecular Negative     Influenza B, Molecular Negative     Flu A & B Source Nasal swab    Culture, Respiratory with Gram Stain [[id number]]     Order Status: No result Specimen: Respiratory         Latest lactate reviewed, they are-  Recent Labs   Lab 01/19/22  1532   LACTATE 2.6*       Organ dysfunction indicated by Acute respiratory failure  Source- left lung pneumonia    Source control Achieved by- IV Rocephin azithromycin

## 2022-01-20 NOTE — HPI
61-year-old male patient admitted to the hospital for worsening shortness of breath for 1 week, brought to ED by EMS, was found to be severely hypoxemic needed BiPAP overnight and admitted to ICU.    PMHx of incisional abdominal hernia, peripheral vascular disease, DM2, adrenal cortical adenoma, CKD, carotid stenosis, schizophrenia, GERD, HTN, and HLD

## 2022-01-20 NOTE — CONSULTS
[facility]ensive [facility] ([facility])  Critical Care Medicine  Consult Note    Patient Name: Shukri Rosales  MRN: [id number]  Admission Date: 1/19/2022  Hospital Length of Stay: 1 days  Code Status: Full Code  Attending Physician: Skylar Beltran MD   Primary Care Provider: Cris Matias NP   Principal Problem: Severe sepsis    [unfilled]  Subjective:     HPI:  61-year-old male patient admitted to the hospital for worsening shortness of breath for 1 week, brought to ED by EMS, was found to be severely hypoxemic needed BiPAP overnight and admitted to ICU.    PMHx of incisional abdominal hernia, peripheral vascular disease, DM2, adrenal cortical adenoma, CKD, carotid stenosis, schizophrenia, GERD, HTN, and HLD       Hospital/ICU Course:  O2 sat 96% on 5 liters/minute.  T-max 98°.  Chest x-ray reviewed.  Status post Medina placement.  Chest x-ray ABG VBG reviewed.  CT angio chest 01/02/2022 reviewed.      Past Medical History:   Diagnosis Date    Adrenal cortical adenoma     Anxiety     Arthritis     CKD (chronic kidney disease) stage 3, GFR 30-59 ml/min     Depression     Diabetes mellitus type II 2011    does not take    DM (diabetes mellitus) 2011     am 09/21/2017 Insulin x 1 1/2 year    GERD (gastroesophageal reflux disease) 7/9/2013    Hypertension     Migraine headache 7/22/2013    Schizophrenia     Severe obesity with body mass index of 36.0 to 36.9        Past Surgical History:   Procedure Laterality Date    BACK SURGERY      COLONOSCOPY N/A 12/3/2020    Procedure: COLONOSCOPY;  Surgeon: Christofer Palmer MD;  Location: [facility] ENDO;  Service: Endoscopy;  Laterality: N/A;    COLONOSCOPY N/A 12/4/2020    Procedure: COLONOSCOPY;  Surgeon: Frandy Stanton MD;  Location: [facility] ENDO;  Service: Endoscopy;  Laterality: N/A;    HERNIA REPAIR      UMBILICAL    left arm exfix      NASAL SEPTUM SURGERY Bilateral     TONSILLECTOMY      URETEROSCOPY         Review of patient's allergies indicates:  No Known  Allergies    Family History       Problem Relation (Age of Onset)    Cancer Paternal Grandmother, Paternal Grandfather    Cataracts Mother    Diabetes Mother, Sister, Brother    Hypertension Mother, Sister, Brother          Tobacco Use    Smoking status: Former Smoker     Packs/day: 2.00     Years: 37.00     Pack years: 74.00     Types: Cigarettes     Quit date: 3/28/2021     Years since quittin.8    Smokeless tobacco: Never Used    Tobacco comment: instructed not to smoke after midnight after midnight prior to surgery   Substance and Sexual Activity    Alcohol use: No    Drug use: No    Sexual activity: Never     Partners: Female         Review of Systems   Unable to perform ROS: Acuity of condition     Objective:     Vital Signs (Most Recent):  Temp: 98 °F (36.7 °C) (22 0830)  Pulse: 78 (22 1245)  Resp: (!) 54 (22 1245)  BP: 131/77 (22 1115)  SpO2: 96 % (22 1245) Vital Signs (24h Range):  Temp:  [97.6 °F (36.4 °C)-98.6 °F (37 °C)] 98 °F (36.7 °C)  Pulse:  [] 78  Resp:  [4-79] 54  SpO2:  [90 %-100 %] 96 %  BP: (103-199)/() 131/77     Weight: 89.8 kg (198 lb)  Body mass index is 26.85 kg/m².      Intake/Output Summary (Last 24 hours) at 2022 1523  Last data filed at 2022 1200  Gross per 24 hour   Intake 645.24 ml   Output 660 ml   Net -14.76 ml       Physical Exam  Vitals and nursing note reviewed.   Constitutional:       General: He is not in acute distress.     Appearance: He is well-developed and well-nourished. He is ill-appearing and toxic-appearing.   HENT:      Head: Normocephalic and atraumatic.   Eyes:      Extraocular Movements: EOM normal.   Cardiovascular:      Rate and Rhythm: Normal rate and regular rhythm.   Pulmonary:      Effort: Pulmonary effort is normal.      Breath sounds: Wheezing and rhonchi present.   Abdominal:      Palpations: Abdomen is soft.      Tenderness: There is no abdominal tenderness.   Genitourinary:     Comments: Carol  catheter in place  Musculoskeletal:         General: No deformity or edema.      Cervical back: Neck supple.   Skin:     General: Skin is warm.   Neurological:      General: No focal deficit present.      Mental Status: He is disoriented.   Psychiatric:         Mood and Affect: Mood and affect normal.         Vents:  Oxygen Concentration (%): 40 (01/20/22 0236)    Lines/Drains/Airways       Drain                   Urethral Catheter 01/19/22 1445 16 Fr. 1 day              Peripheral Intravenous Line                   Peripheral IV - Single Lumen 01/19/22 1443 20 G Left Antecubital 1 day         Peripheral IV - Single Lumen 01/19/22 1448 18 G Right Antecubital 1 day                    Significant Labs:    CBC/Anemia Profile:  Recent Labs   Lab 01/19/22  1452 01/20/22  0449 01/20/22  1202   WBC 19.59* 15.86*  --    HGB 14.3 12.0*  --    HCT 44.1 38.6*  --    * 366  --    MCV 86 90  --    RDW 16.5* 16.6*  --    FERRITIN  --   --  68        Chemistries:  Recent Labs   Lab 01/19/22  1452 01/20/22  0449    145   K 4.9 3.9    110   CO2 21* 21*   BUN 39* 45*   CREATININE 1.3 1.3   CALCIUM 10.2 9.3   ALBUMIN 3.3*  --    PROT 7.3  --    BILITOT 0.7  --    ALKPHOS 90  --    ALT 21  --    AST 30  --    MG  --  1.7   Results for MERRILL WRIGHT (MRN [id number]) as of 1/20/2022 15:24   Ref. Range 1/19/2022 14:52 1/20/2022 04:49   BNP Latest Ref Range: 0 - 99 pg/mL 3,494 (H)    Troponin I Latest Ref Range: 0.000 - 0.026 ng/mL 0.199 (H) 0.191 (H)   Results for MERRILL WRIGHT (MRN [id number]) as of 1/20/2022 15:24   Ref. Range 1/20/2022 02:12 1/20/2022 05:06   POC PH Latest Ref Range: 7.35 - 7.45  7.510 (H) 7.412   POC PCO2 Latest Ref Range: 35 - 45 mmHg 38.7 46.6 (H)   POC PO2 Latest Ref Range: 40 - 60 mmHg 126 (H) 29 (L)   POC BE Latest Ref Range: -2 to 2 mmol/L 8 5   POC HCO3 Latest Ref Range: 24 - 28 mmol/L 30.9 (H) 29.7 (H)   POC SATURATED O2 Latest Ref Range: 95 - 100 % 99 56 (L)   FiO2 Unknown  40    Vt Unknown 450    Sample Unknown ARTERIAL VENOUS   DelSys Unknown CPAP/BiPAP Nasal Can   Allens Test Unknown Pass N/A   Site Unknown LR Other   Mode Unknown AVAPS    Rate Unknown 24        Significant Imaging:   I have reviewed all pertinent imaging results/findings within the past 24 hours.  CXR: I have reviewed all pertinent results/findings within the past 24 hours and my personal findings are:  COMPARISON:   COMPARISON:  January 1, 2022     FINDINGS:  Two frontal views were obtained.  Worsening reticular interstitial changes/vascular congestion throughout the lungs with development of ground-glass/alveolar infiltrate within the mid lower right lung.  The upper lungs are clear. The cardiac silhouette size is on the upper limits of normal.  The trachea is midline and the mediastinal width is normal. Negative for obvious effusion or pneumothorax.  Negative for osseous abnormalities. Tortuous aorta.  Degenerative changes of the spine and both shoulder girdles.     Impression:     1.  Worsening reticular interstitial changes throughout the lungs with patchy ground-glass/alveolar infiltrate within the mid lower left lung.  Findings most likely represent pneumonia, such as community-acquired pneumonia or atypical viral pneumonia.  Asymmetric mixed interstitial and alveolar pulmonary edema could have this appearance in the right clinical setting.      To      ABG  Recent Labs   Lab 01/20/22  0506   PH 7.412   PO2 29*   PCO2 46.6*   HCO3 29.7*   BE 5     Assessment/Plan:     Pulmonary  Acute respiratory failure with hypoxia and hypercarbia  Patient with Hypercapnic and Hypoxic Respiratory failure which is Acute.  he is not on home oxygen. Supplemental oxygen was provided and noted- Oxygen Concentration (%):  [] 40.   Signs/symptoms of respiratory failure include- increased work of breathing. Contributing diagnoses includes - Pneumonia Labs and images were reviewed. Patient Has recent ABG, which has been  reviewed. Will treat underlying causes and adjust management of respiratory failure as follows- O2 target sat 90-94%.  BiPAP p.r.n. distress.  Albuterol Atrovent.  IV Solu-Medrol.  Treat with Rocephin high-dose 2 g plus azithromycin severe community-acquired pneumonia.  COVID-19 negative.  Ferritin within normal     ID  * Severe sepsis  This patient does have evidence of infective focus  My overall impression is sepsis. Vital signs were reviewed and noted in progress note.  Antibiotics given-   Antibiotics (From admission, onward)                Start     Stop Route Frequency Ordered    01/21/22 0600  cefTRIAXone (ROCEPHIN) 2 g/50 mL D5W IVPB         01/25 0559 IV Every 24 hours (non-standard times) 01/20/22 1529    01/20/22 1630  azithromycin tablet 500 mg         01/22 1629 PER NG TUBE Every 24 hours (non-standard times) 01/20/22 1523    01/20/22 1000  mupirocin 2 % ointment         01/25 0859 Nasl 2 times daily 01/20/22 0851          Cultures were taken-   Microbiology Results (last 7 days)       Procedure Component Value Units Date/Time    Blood Culture #2 **CANNOT BE ORDERED STAT** [[id number]] Collected: 01/19/22 1531    Order Status: Completed Specimen: Blood from Peripheral, Antecubital, Right Updated: 01/20/22 0515     Blood Culture, Routine No Growth to date    Blood Culture #1 **CANNOT BE ORDERED STAT** [[id number]] Collected: 01/19/22 1531    Order Status: Completed Specimen: Blood from Peripheral, Antecubital, Left Updated: 01/20/22 0515     Blood Culture, Routine No Growth to date    Influenza A & B by Molecular [[id number]] Collected: 01/19/22 2215    Order Status: Completed Specimen: Nasopharyngeal Swab Updated: 01/19/22 2303     Influenza A, Molecular Negative     Influenza B, Molecular Negative     Flu A & B Source Nasal swab    Culture, Respiratory with Gram Stain [[id number]]     Order Status: No result Specimen: Respiratory           Latest lactate reviewed, they are-  Recent Labs   Lab  01/19/22  1532   LACTATE 2.6*       Organ dysfunction indicated by Acute respiratory failure  Source- left lung pneumonia    Source control Achieved by- IV Rocephin azithromycin      Endocrine  Type 2 diabetes mellitus with stage 3 chronic kidney disease, with long-term current use of insulin  Fingerstick q.6 hours.  SSI    Other  Tobacco abuse disorder  Smoking cessation.  Nicotine patches      Critical Care Daily Checklist:    A: Awake: RASS Goal/Actual Goal: RASS Goal: 0-->alert and calm  Actual: Bhat Agitation Sedation Scale (RASS): Drowsy   B: Spontaneous Breathing Trial Performed?     C: SAT & SBT Coordinated?  Not intubated                      D: Delirium: CAM-ICU Overall CAM-ICU: Positive   E: Early Mobility Performed? Yes   F: Feeding Goal:    Status:     Current Diet Order   Procedures    Diet Cardiac      AS: Analgesia/Sedation Precedex drip wean off   T: Thromboembolic Prophylaxis Lovenox   H: HOB > 300 Yes   U: Stress Ulcer Prophylaxis (if needed) Famotidine   G: Glucose Control INSULIN DETEMIR Fingerstick 6 hours SSI   B: Bowel Function     I: Indwelling Catheter (Lines & Medina) Necessity Critically ill keep Medina   D: De-escalation of Antimicrobials/Pharmacotherapies Rocephin azithromycin for cap    Plan for the day/ETD Y    Code Status:  Family/Goals of Care: Full Code       Critical Care Time: 35 minutes  Critical secondary to Patient has a condition that poses threat to life and bodily function: Severe Respiratory Distress COMPLICATING SEVERE CAP COMPLICATED BY ACUTE HYPERCAPNIC AND HYPOXEMIC RESPIRATORY FAILURE     Critical care was time spent personally by me on the following activities: development of treatment plan with patient or surrogate and bedside caregivers, discussions with consultants, evaluation of patient's response to treatment, examination of patient, ordering and performing treatments and interventions, ordering and review of laboratory studies, ordering and review of  radiographic studies, pulse oximetry, re-evaluation of patient's condition. This critical care time did not overlap with that of any other provider or involve time for any procedures.    Thank you for your consult. I will follow-up with patient. Please contact us if you have any additional questions.     Blanca Cain MD  Critical Care Medicine  [facility] - Intensive Care [facility])

## 2022-01-20 NOTE — SUBJECTIVE & OBJECTIVE
Past Medical History:   Diagnosis Date    Adrenal cortical adenoma     Anxiety     Arthritis     CKD (chronic kidney disease) stage 3, GFR 30-59 ml/min     Depression     Diabetes mellitus type II 2011    does not take    DM (diabetes mellitus)      am 2017 Insulin x 1 1/2 year    GERD (gastroesophageal reflux disease) 2013    Hypertension     Migraine headache 2013    Schizophrenia     Severe obesity with body mass index of 36.0 to 36.9        Past Surgical History:   Procedure Laterality Date    BACK SURGERY      COLONOSCOPY N/A 12/3/2020    Procedure: COLONOSCOPY;  Surgeon: Christofer Palmer MD;  Location: [facility] ENDO;  Service: Endoscopy;  Laterality: N/A;    COLONOSCOPY N/A 2020    Procedure: COLONOSCOPY;  Surgeon: Frandy Stanton MD;  Location: [facility] ENDO;  Service: Endoscopy;  Laterality: N/A;    HERNIA REPAIR      UMBILICAL    left arm exfix      NASAL SEPTUM SURGERY Bilateral     TONSILLECTOMY      URETEROSCOPY         Review of patient's allergies indicates:  No Known Allergies    Family History     Problem Relation (Age of Onset)    Cancer Paternal Grandmother, Paternal Grandfather    Cataracts Mother    Diabetes Mother, Sister, Brother    Hypertension Mother, Sister, Brother        Tobacco Use    Smoking status: Former Smoker     Packs/day: 2.00     Years: 37.00     Pack years: 74.00     Types: Cigarettes     Quit date: 3/28/2021     Years since quittin.8    Smokeless tobacco: Never Used    Tobacco comment: instructed not to smoke after midnight after midnight prior to surgery   Substance and Sexual Activity    Alcohol use: No    Drug use: No    Sexual activity: Never     Partners: Female         Review of Systems   Unable to perform ROS: Acuity of condition     Objective:     Vital Signs (Most Recent):  Temp: 98 °F (36.7 °C) (22 0830)  Pulse: 78 (22 1245)  Resp: (!) 54 (22 1245)  BP: 131/77 (22 1115)  SpO2: 96 % (22  1245) Vital Signs (24h Range):  Temp:  [97.6 °F (36.4 °C)-98.6 °F (37 °C)] 98 °F (36.7 °C)  Pulse:  [] 78  Resp:  [4-79] 54  SpO2:  [90 %-100 %] 96 %  BP: (103-199)/() 131/77     Weight: 89.8 kg (198 lb)  Body mass index is 26.85 kg/m².      Intake/Output Summary (Last 24 hours) at 1/20/2022 1523  Last data filed at 1/20/2022 1200  Gross per 24 hour   Intake 645.24 ml   Output 660 ml   Net -14.76 ml       Physical Exam  Vitals and nursing note reviewed.   Constitutional:       General: He is not in acute distress.     Appearance: He is well-developed and well-nourished. He is ill-appearing and toxic-appearing.   HENT:      Head: Normocephalic and atraumatic.   Eyes:      Extraocular Movements: EOM normal.   Cardiovascular:      Rate and Rhythm: Normal rate and regular rhythm.   Pulmonary:      Effort: Pulmonary effort is normal.      Breath sounds: Wheezing and rhonchi present.   Abdominal:      Palpations: Abdomen is soft.      Tenderness: There is no abdominal tenderness.   Genitourinary:     Comments: Medina catheter in place  Musculoskeletal:         General: No deformity or edema.      Cervical back: Neck supple.   Skin:     General: Skin is warm.   Neurological:      General: No focal deficit present.      Mental Status: He is disoriented.   Psychiatric:         Mood and Affect: Mood and affect normal.         Vents:  Oxygen Concentration (%): 40 (01/20/22 0236)    Lines/Drains/Airways     Drain                 Urethral Catheter 01/19/22 1445 16 Fr. 1 day          Peripheral Intravenous Line                 Peripheral IV - Single Lumen 01/19/22 1443 20 G Left Antecubital 1 day         Peripheral IV - Single Lumen 01/19/22 1448 18 G Right Antecubital 1 day                Significant Labs:    CBC/Anemia Profile:  Recent Labs   Lab 01/19/22  1452 01/20/22  0449 01/20/22  1202   WBC 19.59* 15.86*  --    HGB 14.3 12.0*  --    HCT 44.1 38.6*  --    * 366  --    MCV 86 90  --    RDW 16.5* 16.6*  --     FERRITIN  --   --  68        Chemistries:  Recent Labs   Lab 01/19/22  1452 01/20/22  0449    145   K 4.9 3.9    110   CO2 21* 21*   BUN 39* 45*   CREATININE 1.3 1.3   CALCIUM 10.2 9.3   ALBUMIN 3.3*  --    PROT 7.3  --    BILITOT 0.7  --    ALKPHOS 90  --    ALT 21  --    AST 30  --    MG  --  1.7   Results for MERRILL WRIGHT (MRN [id number]) as of 1/20/2022 15:24   Ref. Range 1/19/2022 14:52 1/20/2022 04:49   BNP Latest Ref Range: 0 - 99 pg/mL 3,494 (H)    Troponin I Latest Ref Range: 0.000 - 0.026 ng/mL 0.199 (H) 0.191 (H)   Results for MERRILL WRIGHT (MRN [id number]) as of 1/20/2022 15:24   Ref. Range 1/20/2022 02:12 1/20/2022 05:06   POC PH Latest Ref Range: 7.35 - 7.45  7.510 (H) 7.412   POC PCO2 Latest Ref Range: 35 - 45 mmHg 38.7 46.6 (H)   POC PO2 Latest Ref Range: 40 - 60 mmHg 126 (H) 29 (L)   POC BE Latest Ref Range: -2 to 2 mmol/L 8 5   POC HCO3 Latest Ref Range: 24 - 28 mmol/L 30.9 (H) 29.7 (H)   POC SATURATED O2 Latest Ref Range: 95 - 100 % 99 56 (L)   FiO2 Unknown 40    Vt Unknown 450    Sample Unknown ARTERIAL VENOUS   DelSys Unknown CPAP/BiPAP Nasal Can   Allens Test Unknown Pass N/A   Site Unknown LR Other   Mode Unknown AVAPS    Rate Unknown 24        Significant Imaging:   I have reviewed all pertinent imaging results/findings within the past 24 hours.  CXR: I have reviewed all pertinent results/findings within the past 24 hours and my personal findings are:  COMPARISON:   COMPARISON:  January 1, 2022     FINDINGS:  Two frontal views were obtained.  Worsening reticular interstitial changes/vascular congestion throughout the lungs with development of ground-glass/alveolar infiltrate within the mid lower right lung.  The upper lungs are clear. The cardiac silhouette size is on the upper limits of normal.  The trachea is midline and the mediastinal width is normal. Negative for obvious effusion or pneumothorax.  Negative for osseous abnormalities. Tortuous aorta.   Degenerative changes of the spine and both shoulder girdles.     Impression:     1.  Worsening reticular interstitial changes throughout the lungs with patchy ground-glass/alveolar infiltrate within the mid lower left lung.  Findings most likely represent pneumonia, such as community-acquired pneumonia or atypical viral pneumonia.  Asymmetric mixed interstitial and alveolar pulmonary edema could have this appearance in the right clinical setting.      To

## 2022-01-20 NOTE — SUBJECTIVE & OBJECTIVE
Interval History: remains mildly sedated with Precedex on NC, off Bipap as Hypercapnia improved, still gets agitated, hence getting haldol plus Ativan prn, about to have NGT placed. WBC improved to 15.8, D dimer 1.1, Trop down to 0.191. continue present care. Started on IV lasix.    Review of Systems   Unable to perform ROS: Mental status change     Objective:     Vital Signs (Most Recent):  Temp: 98 °F (36.7 °C) (01/20/22 0830)  Pulse: 74 (01/20/22 1530)  Resp: (!) 56 (01/20/22 1530)  BP: 131/77 (01/20/22 1115)  SpO2: (!) 90 % (01/20/22 1530) Vital Signs (24h Range):  Temp:  [97.6 °F (36.4 °C)-98.6 °F (37 °C)] 98 °F (36.7 °C)  Pulse:  [] 74  Resp:  [4-79] 56  SpO2:  [90 %-100 %] 90 %  BP: (103-199)/() 131/77     Weight: 89.8 kg (198 lb)  Body mass index is 26.85 kg/m².    Intake/Output Summary (Last 24 hours) at 1/20/2022 1540  Last data filed at 1/20/2022 1500  Gross per 24 hour   Intake 746.12 ml   Output 660 ml   Net 86.12 ml      Physical Exam  Vitals and nursing note reviewed.   Constitutional:       General: He is not in acute distress.     Appearance: He is well-developed. He is ill-appearing and toxic-appearing.      Comments: Sedated, on 5 L NC   HENT:      Head: Normocephalic and atraumatic.   Cardiovascular:      Rate and Rhythm: Normal rate and regular rhythm.   Pulmonary:      Effort: Pulmonary effort is normal.      Breath sounds: Wheezing and rhonchi present.   Abdominal:      Palpations: Abdomen is soft.      Tenderness: There is no abdominal tenderness.   Genitourinary:     Comments: Medina catheter in place  Musculoskeletal:         General: No deformity.      Cervical back: Neck supple.   Skin:     General: Skin is warm.   Neurological:      General: No focal deficit present.      Mental Status: He is disoriented.         Significant Labs:   All pertinent labs within the past 24 hours have been reviewed.  ABGs:   Recent Labs   Lab 01/19/22  2112 01/20/22  0212 01/20/22  0506   PH  7.332* 7.510* 7.412   PCO2 56.1* 38.7 46.6*   HCO3 29.7* 30.9* 29.7*   POCSATURATED 60* 99 56*   BE 4 8 5   PO2 34* 126* 29*     Blood Culture:   Recent Labs   Lab 01/19/22  1531   LABBLOO No Growth to date  No Growth to date     BMP:   Recent Labs   Lab 01/20/22  0449   *      K 3.9      CO2 21*   BUN 45*   CREATININE 1.3   CALCIUM 9.3   MG 1.7     CBC:   Recent Labs   Lab 01/19/22  1452 01/20/22  0449   WBC 19.59* 15.86*   HGB 14.3 12.0*   HCT 44.1 38.6*   * 366     CMP:   Recent Labs   Lab 01/19/22  1452 01/20/22  0449    145   K 4.9 3.9    110   CO2 21* 21*   * 218*   BUN 39* 45*   CREATININE 1.3 1.3   CALCIUM 10.2 9.3   PROT 7.3  --    ALBUMIN 3.3*  --    BILITOT 0.7  --    ALKPHOS 90  --    AST 30  --    ALT 21  --    ANIONGAP 14 14   EGFRNONAA 58* 58*     Lactic Acid:   Recent Labs   Lab 01/19/22  1532   LACTATE 2.6*     Lipid Panel:     Significant Imaging: I have reviewed all pertinent imaging results/findings within the past 24 hours.

## 2022-01-20 NOTE — HOSPITAL COURSE
O2 sat 96% on 5 liters/minute.  T-max 98°.  Chest x-ray reviewed.  Status post Medina placement.  Chest x-ray ABG VBG reviewed.  CT angio chest 01/02/2022 reviewed.  1/21 - still with periods of restlessness pulled NG overnight.  Awake and responsive but confused anxious and tachypnic again this afternoon but no hypoxia.  1/22 - Patient pulled Medina out yesterday.  This AM much more alert and awake and cooperative in no distress on RA SAT 95% with orientation X 3  1/23 seen and examined.  Awake alert x3.  O2 sat 93% on room air.  T-max 98°.  Afebrile.  Wearing BiPAP overnight.

## 2022-01-20 NOTE — ASSESSMENT & PLAN NOTE
This patient does have evidence of infective focus  My overall impression is sepsis. Vital signs were reviewed and noted in progress note.  Antibiotics given-   Antibiotics (From admission, onward)            Start     Stop Route Frequency Ordered    01/20/22 1630  azithromycin tablet 500 mg         01/22 1629 PER NG TUBE Every 24 hours (non-standard times) 01/20/22 1523    01/20/22 1534  cefTRIAXone (ROCEPHIN) 2 g/50 mL D5W IVPB         01/24 1544 IV Every 24 hours (non-standard times) 01/20/22 1529    01/20/22 1000  mupirocin 2 % ointment         01/25 0859 Nasl 2 times daily 01/20/22 0851        Cultures were taken-   Microbiology Results (last 7 days)     Procedure Component Value Units Date/Time    Blood Culture #2 **CANNOT BE ORDERED STAT** [[id number]] Collected: 01/19/22 1531    Order Status: Completed Specimen: Blood from Peripheral, Antecubital, Right Updated: 01/20/22 0515     Blood Culture, Routine No Growth to date    Blood Culture #1 **CANNOT BE ORDERED STAT** [[id number]] Collected: 01/19/22 1531    Order Status: Completed Specimen: Blood from Peripheral, Antecubital, Left Updated: 01/20/22 0515     Blood Culture, Routine No Growth to date    Influenza A & B by Molecular [[id number]] Collected: 01/19/22 2215    Order Status: Completed Specimen: Nasopharyngeal Swab Updated: 01/19/22 2303     Influenza A, Molecular Negative     Influenza B, Molecular Negative     Flu A & B Source Nasal swab    Culture, Respiratory with Gram Stain [[id number]]     Order Status: No result Specimen: Respiratory         Latest lactate reviewed, they are-  Recent Labs   Lab 01/19/22  1532   LACTATE 2.6*       Organ dysfunction indicated by Acute respiratory failure  Source- PNA    Source control Achieved by- IV abx    Sepsis s/t CAP w/ acute hypercapnic resp failure  Empiric Abx  BIPAP, titrate as tolerated      Initially suspected of Sepsis due to elevated WBC but Leukocytosis likely reactive  Started on IV Lasix

## 2022-01-20 NOTE — H&P
[facility] - Emergency Dept.  [facility]  History & Physical    Patient Name: Shukri Rosales  MRN: [id number]  Patient Class: IP- Inpatient  Admission Date: 1/19/2022  Attending Physician: Bill Gupta MD  Primary Care Provider: Cris Matias NP         Patient information was obtained from patient, past medical records and ER records.     Subjective:     Principal Problem:Severe sepsis    Chief Complaint:   Chief Complaint   Patient presents with    Respiratory Distress     Per EMS, pt oxygen sat 77% on RA, AMS, and SOB since 0700        HPI:  61 y.o. male patient with a PMHx of incisional abdominal hernia, peripheral vascular disease, DM2, adrenal cortical adenoma, CKD, carotid stenosis, schizophrenia, GERD, HTN, and HLD who presents to the Emergency Department for SOB. According to AASI, the pt's family reports that the pt started to have trouble breathing at 0700 this morning, but the pt refused to go to a hospital. According to AASI, the pt then became altered, so the pt's family members called 911. When [facility] arrived on scene, the pt's oxygen saturation was 77% on RA, and the pt was cyanotic. En route to the ED, AASI administered a partial duo-neb which was ripped off by the pt, and the pt was put on 20L of O2 which brought his oxygen saturation up to 89%. The pt admits to smoking. At the time of my exam, patient is on BIPAP and lethargic s/p Ativan. Will repeat ABG      Past Medical History:   Diagnosis Date    Adrenal cortical adenoma     Anxiety     Arthritis     CKD (chronic kidney disease) stage 3, GFR 30-59 ml/min     Depression     Diabetes mellitus type II 2011    does not take    DM (diabetes mellitus) 2011     am 09/21/2017 Insulin x 1 1/2 year    GERD (gastroesophageal reflux disease) 7/9/2013    Hypertension     Migraine headache 7/22/2013    Schizophrenia     Severe obesity with body mass index of 36.0 to 36.9        Past Surgical History:   Procedure Laterality  Date    BACK SURGERY      COLONOSCOPY N/A 12/3/2020    Procedure: COLONOSCOPY;  Surgeon: Christofer Palmer MD;  Location: [facility] ENDO;  Service: Endoscopy;  Laterality: N/A;    COLONOSCOPY N/A 12/4/2020    Procedure: COLONOSCOPY;  Surgeon: Frandy Stanton MD;  Location: [facility] ENDO;  Service: Endoscopy;  Laterality: N/A;    HERNIA REPAIR      UMBILICAL    left arm exfix      NASAL SEPTUM SURGERY Bilateral     TONSILLECTOMY      URETEROSCOPY         Review of patient's allergies indicates:  No Known Allergies    No current facility-administered medications on file prior to encounter.     Current Outpatient Medications on File Prior to Encounter   Medication Sig    albuterol (PROVENTIL/VENTOLIN HFA) 90 mcg/actuation inhaler Inhale 1-2 puffs into the lungs every 4 (four) hours as needed for Wheezing. Rescue    amLODIPine (NORVASC) 10 MG tablet Take 10 mg by mouth.    atorvastatin (LIPITOR) 20 MG tablet Take 20 mg by mouth once daily.    baclofen (LIORESAL) 20 MG tablet Take 20 mg by mouth every 8 (eight) hours as needed.    blood sugar diagnostic (PRECISION Q-I-D TEST) Strp 1 each.    carvedilol (COREG) 6.25 MG tablet Take 6.25 mg by mouth 2 (two) times daily.    cloNIDine (CATAPRES) 0.3 MG tablet Take 1 tablet (0.3 mg total) by mouth 3 (three) times daily.    docusate sodium (COLACE) 100 MG capsule Take 100 mg by mouth 2 (two) times daily.    DULoxetine (CYMBALTA) 60 MG capsule Take 60 mg by mouth 2 (two) times daily.    empagliflozin (JARDIANCE) 25 mg tablet Take 25 mg by mouth.    ergocalciferol (ERGOCALCIFEROL) 50,000 unit Cap Take 50,000 Units by mouth every 7 days.    ferrous sulfate (FEOSOL) 325 mg (65 mg iron) Tab tablet Take 325 mg by mouth.    gabapentin (NEURONTIN) 800 MG tablet Take 800 mg by mouth 3 (three) times daily.    glucagon, human recombinant, (GLUCAGEN DIAGNOSTIC KIT) 1 mg/mL SolR Inject 1 mg into the muscle daily as needed.    hydroCHLOROthiazide (HYDRODIURIL) 12.5 MG Tab  Take 12.5 mg by mouth once daily.    insulin lispro 100 unit/mL injection Inject under the skin with meals by sliding scale:  under 150 0 unit, 151-200 2 units, 201-250 4 units, 251-300 6 units, 301-350 8 units, 351-400 10 units, over 400 12 units.    lisinopril (PRINIVIL,ZESTRIL) 40 MG tablet Take 40 mg by mouth once daily.    metFORMIN (GLUCOPHAGE) 1000 MG tablet Take 500 mg by mouth 2 (two) times daily with meals.    methocarbamoL (ROBAXIN) 750 MG Tab Take 750 mg by mouth 3 (three) times daily.    nicotine (NICODERM CQ) 14 mg/24 hr Place 1 patch onto the skin once daily.    nicotine polacrilex 4 MG Lozg Take 1 lozenge (4 mg total) by mouth as needed. Not to exceed 10 lozenges per day.    omeprazole (PRILOSEC) 20 MG capsule Take 20 mg by mouth once daily.    tamsulosin (FLOMAX) 0.4 mg Cap Take 0.4 mg by mouth.    traZODone (DESYREL) 150 MG tablet Take 150 mg by mouth every evening.    [DISCONTINUED] acetaminophen (TYLENOL) 650 mg/20.3 mL Soln Take 650 mg by mouth every 6 (six) hours as needed.    [DISCONTINUED] bisacodyl (DULCOLAX) 10 mg Supp Place 10 mg rectally daily as needed.    [DISCONTINUED] blood sugar diagnostic Strp 1 each by Misc.(Non-Drug; Combo Route) route 3 (three) times daily before meals. For One Touch Verio flex    [DISCONTINUED] blood-glucose meter kit One Touch Verio Meter    [DISCONTINUED] clonazePAM (KLONOPIN) 1 MG tablet 1 mg 2 (two) times daily. Take 1.5mg twice daily    [DISCONTINUED] diclofenac sodium 1 % Gel Apply 2 g topically 4 (four) times daily. for 10 days    [DISCONTINUED] doxycycline (VIBRAMYCIN) 100 MG Cap Take 100 mg by mouth 2 (two) times daily.    [DISCONTINUED] gabapentin (NEURONTIN) 400 MG capsule Take 1 capsule (400 mg total) by mouth 3 (three) times daily. One capsule three times a day    [DISCONTINUED] HUMALOG MIX 50-50 INSULN U-100 100 unit/mL (50-50) Susp 76 units every morning, 36 units every night    [DISCONTINUED] hydroCHLOROthiazide (HYDRODIURIL)  "25 MG tablet Take 1 tablet (25 mg total) by mouth once daily.    [DISCONTINUED] HYDROcodone-acetaminophen (NORCO) 5-325 mg per tablet TAKE 1 TABLET BY MOUTH EVERY 4 HOURS    [DISCONTINUED] ibuprofen (ADVIL,MOTRIN) 800 MG tablet TAKE 1 TABLET BY MOUTH EVERY 6 HOURS FOR UP TO 10 DAYS AS NEEDED FOR PAIN    [DISCONTINUED] inhalation spacing device (AEROCHAMBER WITH FLOWSIGNAL) Use with inhaler    [DISCONTINUED] insulin regular 100 unit/mL Inj injection Inject into the skin.    [DISCONTINUED] insulin syringe-needle U-100 1 mL 31 gauge x 5/16 Syrg 1 Syringe by Misc.(Non-Drug; Combo Route) route 3 (three) times daily.    [DISCONTINUED] lancets (ONETOUCH DELICA LANCETS) 33 gauge Misc 1 lancet by Misc.(Non-Drug; Combo Route) route 3 (three) times daily.    [DISCONTINUED] liraglutide 0.6 mg/0.1 mL, 18 mg/3 mL, subq PNIJ (VICTOZA 3-NEGRITA) 0.6 mg/0.1 mL (18 mg/3 mL) PnIj Inject 1.8 mg into the skin Daily.    [DISCONTINUED] metFORMIN (GLUCOPHAGE) 500 MG tablet Take 1 tablet (500 mg total) by mouth 2 (two) times daily with meals.    [DISCONTINUED] pen needle, diabetic 32 gauge x 5/32" Ndle 1 each by Misc.(Non-Drug; Combo Route) route once daily.    [DISCONTINUED] polyethylene glycol (GLYCOLAX) 17 gram/dose powder Take 17 g by mouth once daily.    [DISCONTINUED] polyethylene glycol (GOLYTELY,NULYTELY) 236-22.74-6.74 -5.86 gram suspension Drink 1 glass every 15 minutes starting at 5PM the night before the procedure until the bottle is empty.    [DISCONTINUED] pravastatin (PRAVACHOL) 20 MG tablet Take 1 tablet (20 mg total) by mouth every evening.    [DISCONTINUED] prazosin (MINIPRESS) 2 MG Cap Take 1 capsule (2 mg total) by mouth every evening.    [DISCONTINUED] QUEtiapine (SEROQUEL) 100 MG Tab 100 mg at lunch and 300 at night    [DISCONTINUED] QUEtiapine (SEROQUEL) 200 MG Tab 200 mg once daily. 200mg Every morning    [DISCONTINUED] QUEtiapine (SEROQUEL) 300 MG Tab Take 300 mg by mouth nightly.    [DISCONTINUED] " sertraline (ZOLOFT) 100 MG tablet Take 100 mg by mouth nightly.    [DISCONTINUED] silver sulfADIAZINE 1% (SILVADENE) 1 % cream Apply topically.    [DISCONTINUED] sodium,potassium,mag sulfates (SUPREP BOWEL PREP KIT) 17.5-3.13-1.6 gram SolR Take as instructed on prep sheet    [DISCONTINUED] topiramate (TROKENDI XR) 100 mg Cp24 Take 2 capsules by mouth.    [DISCONTINUED] traZODone (DESYREL) 150 MG tablet 300 mg nightly.     [DISCONTINUED] varenicline (CHANTIX) 1 mg Tab Take 1 tablet (1 mg total) by mouth 2 (two) times daily. CX     Family History     Problem Relation (Age of Onset)    Cancer Paternal Grandmother, Paternal Grandfather    Cataracts Mother    Diabetes Mother, Sister, Brother    Hypertension Mother, Sister, Brother        Tobacco Use    Smoking status: Former Smoker     Packs/day: 2.00     Years: 37.00     Pack years: 74.00     Types: Cigarettes     Quit date: 3/28/2021     Years since quittin.8    Smokeless tobacco: Never Used    Tobacco comment: instructed not to smoke after midnight after midnight prior to surgery   Substance and Sexual Activity    Alcohol use: No    Drug use: No    Sexual activity: Never     Partners: Female     Review of Systems   Unable to perform ROS: Severe respiratory distress     Objective:     Vital Signs (Most Recent):  Temp: 98.5 °F (36.9 °C) (22 1630)  Pulse: 95 (22 1730)  Resp: (!) 26 (22 1730)  BP: (!) 154/92 (22 1730)  SpO2: 99 % (22 1730) Vital Signs (24h Range):  Temp:  [98.5 °F (36.9 °C)-98.8 °F (37.1 °C)] 98.5 °F (36.9 °C)  Pulse:  [89-95] 95  Resp:  [20-30] 26  SpO2:  [74 %-100 %] 99 %  BP: (148-156)/() 154/92     Weight: 96.6 kg (212 lb 15.4 oz)  Body mass index is 28.88 kg/m².    Physical Exam  Constitutional:       General: He is not in acute distress.     Appearance: He is ill-appearing.   HENT:      Head: Normocephalic and atraumatic.   Cardiovascular:      Rate and Rhythm: Normal rate and regular rhythm.    Pulmonary:      Breath sounds: Rhonchi and rales present. No wheezing.      Comments: Decreased BS b/l  Abdominal:      General: Abdomen is flat. Bowel sounds are normal. There is no distension.      Palpations: Abdomen is soft.      Tenderness: There is no abdominal tenderness. There is no guarding.      Comments: large ventral wall hernia   Musculoskeletal:         General: Normal range of motion.      Right lower leg: No edema.      Left lower leg: No edema.   Skin:     General: Skin is warm and dry.   Neurological:      Mental Status: He is alert.      Comments: Lethargic and minimally responsive s/p Ativan             Significant Labs:   All pertinent labs within the past 24 hours have been reviewed.  CBC:   Recent Labs   Lab 01/19/22  1452   WBC 19.59*   HGB 14.3   HCT 44.1   *     CMP:   Recent Labs   Lab 01/19/22  1452      K 4.9      CO2 21*   *   BUN 39*   CREATININE 1.3   CALCIUM 10.2   PROT 7.3   ALBUMIN 3.3*   BILITOT 0.7   ALKPHOS 90   AST 30   ALT 21   ANIONGAP 14   EGFRNONAA 58*       Significant Imaging: I have reviewed all pertinent imaging results/findings within the past 24 hours.   Imaging Results          X-Ray Chest AP Portable (Final result)  Result time 01/19/22 15:32:58    Final result by Willie Grewal MD (01/19/22 15:32:58)                 Impression:      1.  Worsening reticular interstitial changes throughout the lungs with patchy ground-glass/alveolar infiltrate within the mid lower left lung.  Findings most likely represent pneumonia, such as community-acquired pneumonia or atypical viral pneumonia.  Asymmetric mixed interstitial and alveolar pulmonary edema could have this appearance in the right clinical setting.  Clinical correlation is advised.    2.  Stable findings as noted above.      Electronically signed by: Willie Grewal MD  Date:    01/19/2022  Time:    15:32             Narrative:    EXAMINATION:  XR CHEST AP PORTABLE    CLINICAL  HISTORY:  shortness of breath;    COMPARISON:  January 1, 2022    FINDINGS:  Two frontal views were obtained.  Worsening reticular interstitial changes/vascular congestion throughout the lungs with development of ground-glass/alveolar infiltrate within the mid lower right lung.  The upper lungs are clear. The cardiac silhouette size is on the upper limits of normal.  The trachea is midline and the mediastinal width is normal. Negative for obvious effusion or pneumothorax.  Negative for osseous abnormalities. Tortuous aorta.  Degenerative changes of the spine and both shoulder girdles.                                  Assessment/Plan:     * Severe sepsis  This patient does have evidence of infective focus  My overall impression is sepsis. Vital signs were reviewed and noted in progress note.  Antibiotics given-   Antibiotics (From admission, onward)            Start     Stop Route Frequency Ordered    01/19/22 1535  azithromycin 500 mg in dextrose 5 % 250 mL IVPB (ready to mix system)         01/22 1544 IV Every 24 hours (non-standard times) 01/19/22 1532    01/19/22 1531  cefTRIAXone (ROCEPHIN) 1 g/50 mL D5W IVPB         01/24 1544 IV Every 24 hours (non-standard times) 01/19/22 1532        Cultures were taken-   Microbiology Results (last 7 days)     Procedure Component Value Units Date/Time    Blood Culture #2 **CANNOT BE ORDERED STAT** [[id number]] Collected: 01/19/22 1531    Order Status: Sent Specimen: Blood from Peripheral, Antecubital, Right Updated: 01/19/22 1532    Culture, Respiratory with Gram Stain [[id number]]     Order Status: No result Specimen: Respiratory     Blood Culture #1 **CANNOT BE ORDERED STAT** [[id number]] Collected: 01/19/22 1531    Order Status: Sent Specimen: Blood from Peripheral, Antecubital, Left Updated: 01/19/22 1532        Latest lactate reviewed, they are-  Recent Labs   Lab 01/19/22  1532   LACTATE 2.6*       Organ dysfunction indicated by Acute respiratory failure  Source-  PNA    Source control Achieved by- IV abx    Sepsis s/t CAP w/ acute hypercapnic resp failure  Empiric Abx  BIPAP, titrate as tolerated      Acute respiratory failure with hypoxia and hypercarbia  Patient with Hypercapnic and Hypoxic Respiratory failure which is Acute on chronic.  he is not on home oxygen. Supplemental oxygen was provided and noted- Oxygen Concentration (%):  [100] 100.   Signs/symptoms of respiratory failure include- increased work of breathing and respiratory distress. Contributing diagnoses includes - CHF, COPD and Pneumonia Labs and images were reviewed. Patient Has recent ABG, which has been reviewed. Will treat underlying causes and adjust management of respiratory failure as follows- IV abx, BIPAP    Type 2 diabetes mellitus with stage 3 chronic kidney disease, with long-term current use of insulin  SSI      Tobacco abuse disorder  Smoking cessation counseling      Essential hypertension  Continue home meds amlodipine, clonidine  Restart lisinopril, HCTZ as tolerated, pt clinically volume deplete      VTE Risk Mitigation (From admission, onward)         Ordered     enoxaparin injection 40 mg  Daily         01/19/22 1837     IP VTE HIGH RISK PATIENT  Once         01/19/22 1837     Place sequential compression device  Until discontinued         01/19/22 1837                   Bill Gupta MD  Department of Hospital Medicine   [facility] - Emergency Dept.

## 2022-01-20 NOTE — HOSPITAL COURSE
61 y.o. male patient with Hx of incisional abdominal hernia, PAD, DM2, adrenal cortical adenoma, CKD, carotid stenosis, schizophrenia, GERD, HTN, and HLD brought to ER by EMS for SOB. According to AASI, the pt's family reports that the pt started to have trouble breathing at 0700 this morning, but the pt refused to go to a hospital. According to AASI, the pt then became altered, so the pt's family members called 911. When [facility] arrived on scene, the pt's oxygen saturation was 77% on RA, and the pt was cyanotic. En route to the ED, LUZ administered a partial duo-neb which was ripped off by the pt, and the pt was put on 20L of O2 which brought his oxygen saturation up to 89%. The pt admits to smoking. Initial labs in the ER Show WBC 19, with 88% Segs, Bun 35, BNP 3500, Trop 0.199, ABG showed PC 7.31/57/419 on Bipap 100%. Pt had received a dose of lasix in the ER. Pt placed in ICU on Bipap. He was also on Precedex gtt for his agitation.    1/20- remains mildly sedated with Precedex on NC, off Bipap as Hypercapnia improved, still gets agitated, hence getting haldol plus Ativan prn, about to have NGT placed. WBC improved to 15.8, D dimer 1.1, Trop down to 0.191. continue present care. Started on IV lasix.  1/21 patient off Precedex gtt. Still restrained at wrist though per RN less confused today, off BIPAP and on 5L nasal canula  1/22 patient seen in ICY, AAO x 3, confusion is resolved, patient is appropriate, qualifies for home O2 88% on RA with exertion. Had some dimer elevation on admit, do a CTA to r/o PE, empiric full lovenox for now, if PE negative then d/c Full Lovenox.  Anticipate d/c in 24-48 hours  1/23 He is table for dischagre, on home O2, few days of oral antibiotics, changes have been made to his home antihypertensive and diabetes medications. Attempted to get the patient home health. He will follow up with Cards and Pulm as outpatient. Did not require require with NIPPV, d/w the pulm

## 2022-01-20 NOTE — HPI
61 y.o. male patient with a PMHx of incisional abdominal hernia, peripheral vascular disease, DM2, adrenal cortical adenoma, CKD, carotid stenosis, schizophrenia, GERD, HTN, and HLD who presents to the Emergency Department for SOB. According to AASI, the pt's family reports that the pt started to have trouble breathing at 0700 this morning, but the pt refused to go to a hospital. According to AASI, the pt then became altered, so the pt's family members called 911. When [facility] arrived on scene, the pt's oxygen saturation was 77% on RA, and the pt was cyanotic. En route to the ED, LUZ administered a partial duo-neb which was ripped off by the pt, and the pt was put on 20L of O2 which brought his oxygen saturation up to 89%. The pt admits to smoking. At the time of my exam, patient is on BIPAP and lethargic s/p Ativan. Will repeat ABG

## 2022-01-20 NOTE — SUBJECTIVE & OBJECTIVE
Past Medical History:   Diagnosis Date    Adrenal cortical adenoma     Anxiety     Arthritis     CKD (chronic kidney disease) stage 3, GFR 30-59 ml/min     Depression     Diabetes mellitus type II 2011    does not take    DM (diabetes mellitus) 2011     am 09/21/2017 Insulin x 1 1/2 year    GERD (gastroesophageal reflux disease) 7/9/2013    Hypertension     Migraine headache 7/22/2013    Schizophrenia     Severe obesity with body mass index of 36.0 to 36.9        Past Surgical History:   Procedure Laterality Date    BACK SURGERY      COLONOSCOPY N/A 12/3/2020    Procedure: COLONOSCOPY;  Surgeon: Christofer Palmer MD;  Location: [facility] ENDO;  Service: Endoscopy;  Laterality: N/A;    COLONOSCOPY N/A 12/4/2020    Procedure: COLONOSCOPY;  Surgeon: Frandy Stanton MD;  Location: [facility] ENDO;  Service: Endoscopy;  Laterality: N/A;    HERNIA REPAIR      UMBILICAL    left arm exfix      NASAL SEPTUM SURGERY Bilateral     TONSILLECTOMY      URETEROSCOPY         Review of patient's allergies indicates:  No Known Allergies    No current facility-administered medications on file prior to encounter.     Current Outpatient Medications on File Prior to Encounter   Medication Sig    albuterol (PROVENTIL/VENTOLIN HFA) 90 mcg/actuation inhaler Inhale 1-2 puffs into the lungs every 4 (four) hours as needed for Wheezing. Rescue    amLODIPine (NORVASC) 10 MG tablet Take 10 mg by mouth.    atorvastatin (LIPITOR) 20 MG tablet Take 20 mg by mouth once daily.    baclofen (LIORESAL) 20 MG tablet Take 20 mg by mouth every 8 (eight) hours as needed.    blood sugar diagnostic (PRECISION Q-I-D TEST) Strp 1 each.    carvedilol (COREG) 6.25 MG tablet Take 6.25 mg by mouth 2 (two) times daily.    cloNIDine (CATAPRES) 0.3 MG tablet Take 1 tablet (0.3 mg total) by mouth 3 (three) times daily.    docusate sodium (COLACE) 100 MG capsule Take 100 mg by mouth 2 (two) times daily.    DULoxetine (CYMBALTA) 60 MG capsule  Take 60 mg by mouth 2 (two) times daily.    empagliflozin (JARDIANCE) 25 mg tablet Take 25 mg by mouth.    ergocalciferol (ERGOCALCIFEROL) 50,000 unit Cap Take 50,000 Units by mouth every 7 days.    ferrous sulfate (FEOSOL) 325 mg (65 mg iron) Tab tablet Take 325 mg by mouth.    gabapentin (NEURONTIN) 800 MG tablet Take 800 mg by mouth 3 (three) times daily.    glucagon, human recombinant, (GLUCAGEN DIAGNOSTIC KIT) 1 mg/mL SolR Inject 1 mg into the muscle daily as needed.    hydroCHLOROthiazide (HYDRODIURIL) 12.5 MG Tab Take 12.5 mg by mouth once daily.    insulin lispro 100 unit/mL injection Inject under the skin with meals by sliding scale:  under 150 0 unit, 151-200 2 units, 201-250 4 units, 251-300 6 units, 301-350 8 units, 351-400 10 units, over 400 12 units.    lisinopril (PRINIVIL,ZESTRIL) 40 MG tablet Take 40 mg by mouth once daily.    metFORMIN (GLUCOPHAGE) 1000 MG tablet Take 500 mg by mouth 2 (two) times daily with meals.    methocarbamoL (ROBAXIN) 750 MG Tab Take 750 mg by mouth 3 (three) times daily.    nicotine (NICODERM CQ) 14 mg/24 hr Place 1 patch onto the skin once daily.    nicotine polacrilex 4 MG Lozg Take 1 lozenge (4 mg total) by mouth as needed. Not to exceed 10 lozenges per day.    omeprazole (PRILOSEC) 20 MG capsule Take 20 mg by mouth once daily.    tamsulosin (FLOMAX) 0.4 mg Cap Take 0.4 mg by mouth.    traZODone (DESYREL) 150 MG tablet Take 150 mg by mouth every evening.    [DISCONTINUED] acetaminophen (TYLENOL) 650 mg/20.3 mL Soln Take 650 mg by mouth every 6 (six) hours as needed.    [DISCONTINUED] bisacodyl (DULCOLAX) 10 mg Supp Place 10 mg rectally daily as needed.    [DISCONTINUED] blood sugar diagnostic Strp 1 each by Misc.(Non-Drug; Combo Route) route 3 (three) times daily before meals. For One Touch Verio flex    [DISCONTINUED] blood-glucose meter kit One Touch Verio Meter    [DISCONTINUED] clonazePAM (KLONOPIN) 1 MG tablet 1 mg 2 (two) times daily. Take 1.5mg  "twice daily    [DISCONTINUED] diclofenac sodium 1 % Gel Apply 2 g topically 4 (four) times daily. for 10 days    [DISCONTINUED] doxycycline (VIBRAMYCIN) 100 MG Cap Take 100 mg by mouth 2 (two) times daily.    [DISCONTINUED] gabapentin (NEURONTIN) 400 MG capsule Take 1 capsule (400 mg total) by mouth 3 (three) times daily. One capsule three times a day    [DISCONTINUED] HUMALOG MIX 50-50 INSULN U-100 100 unit/mL (50-50) Susp 76 units every morning, 36 units every night    [DISCONTINUED] hydroCHLOROthiazide (HYDRODIURIL) 25 MG tablet Take 1 tablet (25 mg total) by mouth once daily.    [DISCONTINUED] HYDROcodone-acetaminophen (NORCO) 5-325 mg per tablet TAKE 1 TABLET BY MOUTH EVERY 4 HOURS    [DISCONTINUED] ibuprofen (ADVIL,MOTRIN) 800 MG tablet TAKE 1 TABLET BY MOUTH EVERY 6 HOURS FOR UP TO 10 DAYS AS NEEDED FOR PAIN    [DISCONTINUED] inhalation spacing device (AEROCHAMBER WITH FLOWSIGNAL) Use with inhaler    [DISCONTINUED] insulin regular 100 unit/mL Inj injection Inject into the skin.    [DISCONTINUED] insulin syringe-needle U-100 1 mL 31 gauge x 5/16 Syrg 1 Syringe by Misc.(Non-Drug; Combo Route) route 3 (three) times daily.    [DISCONTINUED] lancets (ONETOUCH DELICA LANCETS) 33 gauge Misc 1 lancet by Misc.(Non-Drug; Combo Route) route 3 (three) times daily.    [DISCONTINUED] liraglutide 0.6 mg/0.1 mL, 18 mg/3 mL, subq PNIJ (VICTOZA 3-NEGRITA) 0.6 mg/0.1 mL (18 mg/3 mL) PnIj Inject 1.8 mg into the skin Daily.    [DISCONTINUED] metFORMIN (GLUCOPHAGE) 500 MG tablet Take 1 tablet (500 mg total) by mouth 2 (two) times daily with meals.    [DISCONTINUED] pen needle, diabetic 32 gauge x 5/32" Ndle 1 each by Misc.(Non-Drug; Combo Route) route once daily.    [DISCONTINUED] polyethylene glycol (GLYCOLAX) 17 gram/dose powder Take 17 g by mouth once daily.    [DISCONTINUED] polyethylene glycol (GOLYTELY,NULYTELY) 236-22.74-6.74 -5.86 gram suspension Drink 1 glass every 15 minutes starting at 5PM the night before " the procedure until the bottle is empty.    [DISCONTINUED] pravastatin (PRAVACHOL) 20 MG tablet Take 1 tablet (20 mg total) by mouth every evening.    [DISCONTINUED] prazosin (MINIPRESS) 2 MG Cap Take 1 capsule (2 mg total) by mouth every evening.    [DISCONTINUED] QUEtiapine (SEROQUEL) 100 MG Tab 100 mg at lunch and 300 at night    [DISCONTINUED] QUEtiapine (SEROQUEL) 200 MG Tab 200 mg once daily. 200mg Every morning    [DISCONTINUED] QUEtiapine (SEROQUEL) 300 MG Tab Take 300 mg by mouth nightly.    [DISCONTINUED] sertraline (ZOLOFT) 100 MG tablet Take 100 mg by mouth nightly.    [DISCONTINUED] silver sulfADIAZINE 1% (SILVADENE) 1 % cream Apply topically.    [DISCONTINUED] sodium,potassium,mag sulfates (SUPREP BOWEL PREP KIT) 17.5-3.13-1.6 gram SolR Take as instructed on prep sheet    [DISCONTINUED] topiramate (TROKENDI XR) 100 mg Cp24 Take 2 capsules by mouth.    [DISCONTINUED] traZODone (DESYREL) 150 MG tablet 300 mg nightly.     [DISCONTINUED] varenicline (CHANTIX) 1 mg Tab Take 1 tablet (1 mg total) by mouth 2 (two) times daily. CX     Family History     Problem Relation (Age of Onset)    Cancer Paternal Grandmother, Paternal Grandfather    Cataracts Mother    Diabetes Mother, Sister, Brother    Hypertension Mother, Sister, Brother        Tobacco Use    Smoking status: Former Smoker     Packs/day: 2.00     Years: 37.00     Pack years: 74.00     Types: Cigarettes     Quit date: 3/28/2021     Years since quittin.8    Smokeless tobacco: Never Used    Tobacco comment: instructed not to smoke after midnight after midnight prior to surgery   Substance and Sexual Activity    Alcohol use: No    Drug use: No    Sexual activity: Never     Partners: Female     Review of Systems   Unable to perform ROS: Severe respiratory distress     Objective:     Vital Signs (Most Recent):  Temp: 98.5 °F (36.9 °C) (22 1630)  Pulse: 95 (22 1730)  Resp: (!) 26 (22 1730)  BP: (!) 154/92 (22  1730)  SpO2: 99 % (01/19/22 1730) Vital Signs (24h Range):  Temp:  [98.5 °F (36.9 °C)-98.8 °F (37.1 °C)] 98.5 °F (36.9 °C)  Pulse:  [89-95] 95  Resp:  [20-30] 26  SpO2:  [74 %-100 %] 99 %  BP: (148-156)/() 154/92     Weight: 96.6 kg (212 lb 15.4 oz)  Body mass index is 28.88 kg/m².    Physical Exam  Constitutional:       General: He is not in acute distress.     Appearance: He is ill-appearing.   HENT:      Head: Normocephalic and atraumatic.   Cardiovascular:      Rate and Rhythm: Normal rate and regular rhythm.   Pulmonary:      Breath sounds: Rhonchi and rales present. No wheezing.      Comments: Decreased BS b/l  Abdominal:      General: Abdomen is flat. Bowel sounds are normal. There is no distension.      Palpations: Abdomen is soft.      Tenderness: There is no abdominal tenderness. There is no guarding.      Comments: large ventral wall hernia   Musculoskeletal:         General: Normal range of motion.      Right lower leg: No edema.      Left lower leg: No edema.   Skin:     General: Skin is warm and dry.   Neurological:      Mental Status: He is alert.      Comments: Lethargic and minimally responsive s/p Ativan             Significant Labs:   All pertinent labs within the past 24 hours have been reviewed.  CBC:   Recent Labs   Lab 01/19/22  1452   WBC 19.59*   HGB 14.3   HCT 44.1   *     CMP:   Recent Labs   Lab 01/19/22  1452      K 4.9      CO2 21*   *   BUN 39*   CREATININE 1.3   CALCIUM 10.2   PROT 7.3   ALBUMIN 3.3*   BILITOT 0.7   ALKPHOS 90   AST 30   ALT 21   ANIONGAP 14   EGFRNONAA 58*       Significant Imaging: I have reviewed all pertinent imaging results/findings within the past 24 hours.   Imaging Results          X-Ray Chest AP Portable (Final result)  Result time 01/19/22 15:32:58    Final result by Willie Grewal MD (01/19/22 15:32:58)                 Impression:      1.  Worsening reticular interstitial changes throughout the lungs with patchy  ground-glass/alveolar infiltrate within the mid lower left lung.  Findings most likely represent pneumonia, such as community-acquired pneumonia or atypical viral pneumonia.  Asymmetric mixed interstitial and alveolar pulmonary edema could have this appearance in the right clinical setting.  Clinical correlation is advised.    2.  Stable findings as noted above.      Electronically signed by: Willie Grewal MD  Date:    01/19/2022  Time:    15:32             Narrative:    EXAMINATION:  XR CHEST AP PORTABLE    CLINICAL HISTORY:  shortness of breath;    COMPARISON:  January 1, 2022    FINDINGS:  Two frontal views were obtained.  Worsening reticular interstitial changes/vascular congestion throughout the lungs with development of ground-glass/alveolar infiltrate within the mid lower right lung.  The upper lungs are clear. The cardiac silhouette size is on the upper limits of normal.  The trachea is midline and the mediastinal width is normal. Negative for obvious effusion or pneumothorax.  Negative for osseous abnormalities. Tortuous aorta.  Degenerative changes of the spine and both shoulder girdles.

## 2022-01-20 NOTE — EICU
New ABG discussed with RT     Hypercapnia resolved   Patient now agitated despite precedex     Will hold NIV   Monitor breathing   VBG at 4 am   Haldol / Ativan as needed.     Discussed with bedside and RT. Also AV equipment used.

## 2022-01-20 NOTE — ASSESSMENT & PLAN NOTE
This patient does have evidence of infective focus  My overall impression is sepsis. Vital signs were reviewed and noted in progress note.  Antibiotics given-   Antibiotics (From admission, onward)            Start     Stop Route Frequency Ordered    01/19/22 1535  azithromycin 500 mg in dextrose 5 % 250 mL IVPB (ready to mix system)         01/22 1544 IV Every 24 hours (non-standard times) 01/19/22 1532    01/19/22 1531  cefTRIAXone (ROCEPHIN) 1 g/50 mL D5W IVPB         01/24 1544 IV Every 24 hours (non-standard times) 01/19/22 1532        Cultures were taken-   Microbiology Results (last 7 days)     Procedure Component Value Units Date/Time    Blood Culture #2 **CANNOT BE ORDERED STAT** [[id number]] Collected: 01/19/22 1531    Order Status: Sent Specimen: Blood from Peripheral, Antecubital, Right Updated: 01/19/22 1532    Culture, Respiratory with Gram Stain [[id number]]     Order Status: No result Specimen: Respiratory     Blood Culture #1 **CANNOT BE ORDERED STAT** [[id number]] Collected: 01/19/22 1531    Order Status: Sent Specimen: Blood from Peripheral, Antecubital, Left Updated: 01/19/22 1532        Latest lactate reviewed, they are-  Recent Labs   Lab 01/19/22 1532   LACTATE 2.6*       Organ dysfunction indicated by Acute respiratory failure  Source- PNA    Source control Achieved by- IV abx    Sepsis s/t CAP w/ acute hypercapnic resp failure  Empiric Abx  BIPAP, titrate as tolerated

## 2022-01-21 LAB
ANION GAP SERPL CALC-SCNC: 18 MMOL/L (ref 8–16)
AORTIC ROOT ANNULUS: 3.65 CM
ASCENDING AORTA: 3.26 CM
AV INDEX (PROSTH): 0.61
AV MEAN GRADIENT: 3 MMHG
AV PEAK GRADIENT: 6 MMHG
AV VALVE AREA: 2.14 CM2
AV VELOCITY RATIO: 0.86
BASOPHILS # BLD AUTO: 0.02 K/UL (ref 0–0.2)
BASOPHILS NFR BLD: 0.2 % (ref 0–1.9)
BSA FOR ECHO PROCEDURE: 2.14 M2
BUN SERPL-MCNC: 40 MG/DL (ref 8–23)
CALCIUM SERPL-MCNC: 9.6 MG/DL (ref 8.7–10.5)
CHLORIDE SERPL-SCNC: 106 MMOL/L (ref 95–110)
CO2 SERPL-SCNC: 23 MMOL/L (ref 23–29)
CREAT SERPL-MCNC: 1.1 MG/DL (ref 0.5–1.4)
CV ECHO LV RWT: 0.6 CM
DIFFERENTIAL METHOD: ABNORMAL
DOP CALC AO PEAK VEL: 1.26 M/S
DOP CALC AO VTI: 27.8 CM
DOP CALC LVOT AREA: 3.5 CM2
DOP CALC LVOT DIAMETER: 2.11 CM
DOP CALC LVOT PEAK VEL: 1.08 M/S
DOP CALC LVOT STROKE VOLUME: 59.41 CM3
DOP CALC QP:QS RATIO: 0.53
DOP CALC RVOT AREA: 9.56 CM2
DOP CALC RVOT DIAMETER: 3.49 CM
DOP CALC RVOT PEAK VEL: 0.6 M/S
DOP CALC RVOT STROKE VOLUME: 112.82 CM3
DOP CALC RVOT VTI: 11.8 CM
DOP CALCLVOT PEAK VEL VTI: 17 CM
E WAVE DECELERATION TIME: 246.5 MSEC
E/A RATIO: 1
E/E' RATIO: 8.93 M/S
ECHO EF ESTIMATED: 12 %
ECHO LV POSTERIOR WALL: 1.39 CM (ref 0.6–1.1)
EJECTION FRACTION: 40 %
EOSINOPHIL # BLD AUTO: 0.1 K/UL (ref 0–0.5)
EOSINOPHIL NFR BLD: 0.6 % (ref 0–8)
ERYTHROCYTE [DISTWIDTH] IN BLOOD BY AUTOMATED COUNT: 16.8 % (ref 11.5–14.5)
EST. GFR  (AFRICAN AMERICAN): >60 ML/MIN/1.73 M^2
EST. GFR  (NON AFRICAN AMERICAN): >60 ML/MIN/1.73 M^2
FRACTIONAL SHORTENING: 5 % (ref 28–44)
GLUCOSE SERPL-MCNC: 154 MG/DL (ref 70–110)
HCT VFR BLD AUTO: 43.3 % (ref 40–54)
HGB BLD-MCNC: 13.1 G/DL (ref 14–18)
IMM GRANULOCYTES # BLD AUTO: 0.06 K/UL (ref 0–0.04)
IMM GRANULOCYTES NFR BLD AUTO: 0.6 % (ref 0–0.5)
INTERVENTRICULAR SEPTUM: 1.68 CM (ref 0.6–1.1)
IVC DIAMETER: 1.77 CM
IVRT: 105.61 MSEC
LA MAJOR: 4.88 CM
LA MINOR: 3.27 CM
LA WIDTH: 3.39 CM
LEFT ATRIUM SIZE: 3.28 CM
LEFT ATRIUM VOLUME INDEX: 17.5 ML/M2
LEFT ATRIUM VOLUME: 37.01 CM3
LEFT INTERNAL DIMENSION IN SYSTOLE: 4.37 CM (ref 2.1–4)
LEFT VENTRICLE DIASTOLIC VOLUME INDEX: 46.35 ML/M2
LEFT VENTRICLE DIASTOLIC VOLUME: 98.26 ML
LEFT VENTRICLE MASS INDEX: 140 G/M2
LEFT VENTRICLE SYSTOLIC VOLUME INDEX: 40.8 ML/M2
LEFT VENTRICLE SYSTOLIC VOLUME: 86.51 ML
LEFT VENTRICULAR INTERNAL DIMENSION IN DIASTOLE: 4.62 CM (ref 3.5–6)
LEFT VENTRICULAR MASS: 296.91 G
LV LATERAL E/E' RATIO: 8.38 M/S
LV SEPTAL E/E' RATIO: 9.57 M/S
LVOT MG: 2.28 MMHG
LVOT MV: 0.7 CM/S
LYMPHOCYTES # BLD AUTO: 1.8 K/UL (ref 1–4.8)
LYMPHOCYTES NFR BLD: 18.4 % (ref 18–48)
MAGNESIUM SERPL-MCNC: 1.7 MG/DL (ref 1.6–2.6)
MCH RBC QN AUTO: 27.6 PG (ref 27–31)
MCHC RBC AUTO-ENTMCNC: 30.3 G/DL (ref 32–36)
MCV RBC AUTO: 91 FL (ref 82–98)
MONOCYTES # BLD AUTO: 0.9 K/UL (ref 0.3–1)
MONOCYTES NFR BLD: 9.1 % (ref 4–15)
MV PEAK A VEL: 0.67 M/S
MV PEAK E VEL: 0.67 M/S
MV STENOSIS PRESSURE HALF TIME: 71.48 MS
MV VALVE AREA P 1/2 METHOD: 3.08 CM2
NEUTROPHILS # BLD AUTO: 6.9 K/UL (ref 1.8–7.7)
NEUTROPHILS NFR BLD: 71.1 % (ref 38–73)
NRBC BLD-RTO: 0 /100 WBC
PISA TR MAX VEL: 3.37 M/S
PLATELET # BLD AUTO: 346 K/UL (ref 150–450)
PMV BLD AUTO: 9.3 FL (ref 9.2–12.9)
POCT GLUCOSE: 177 MG/DL (ref 70–110)
POCT GLUCOSE: 200 MG/DL (ref 70–110)
POCT GLUCOSE: 210 MG/DL (ref 70–110)
POCT GLUCOSE: 317 MG/DL (ref 70–110)
POTASSIUM SERPL-SCNC: 3.2 MMOL/L (ref 3.5–5.1)
PV MEAN GRADIENT: 0.71 MMHG
RA MAJOR: 4.57 CM
RA PRESSURE: 8 MMHG
RA WIDTH: 3.59 CM
RBC # BLD AUTO: 4.75 M/UL (ref 4.6–6.2)
SINUS: 3.68 CM
SODIUM SERPL-SCNC: 147 MMOL/L (ref 136–145)
STJ: 3.5 CM
TDI LATERAL: 0.08 M/S
TDI SEPTAL: 0.07 M/S
TDI: 0.08 M/S
TR MAX PG: 45 MMHG
TRICUSPID ANNULAR PLANE SYSTOLIC EXCURSION: 2.45 CM
TV REST PULMONARY ARTERY PRESSURE: 53 MMHG
WBC # BLD AUTO: 9.7 K/UL (ref 3.9–12.7)

## 2022-01-21 PROCEDURE — 25000003 PHARM REV CODE 250: Performed by: NURSE PRACTITIONER

## 2022-01-21 PROCEDURE — S4991 NICOTINE PATCH NONLEGEND: HCPCS | Performed by: STUDENT IN AN ORGANIZED HEALTH CARE EDUCATION/TRAINING PROGRAM

## 2022-01-21 PROCEDURE — 94640 AIRWAY INHALATION TREATMENT: CPT

## 2022-01-21 PROCEDURE — 25000003 PHARM REV CODE 250: Performed by: INTERNAL MEDICINE

## 2022-01-21 PROCEDURE — 25000242 PHARM REV CODE 250 ALT 637 W/ HCPCS: Performed by: INTERNAL MEDICINE

## 2022-01-21 PROCEDURE — 85025 COMPLETE CBC W/AUTO DIFF WBC: CPT | Performed by: STUDENT IN AN ORGANIZED HEALTH CARE EDUCATION/TRAINING PROGRAM

## 2022-01-21 PROCEDURE — 63600175 PHARM REV CODE 636 W HCPCS: Performed by: INTERNAL MEDICINE

## 2022-01-21 PROCEDURE — 36415 COLL VENOUS BLD VENIPUNCTURE: CPT | Performed by: STUDENT IN AN ORGANIZED HEALTH CARE EDUCATION/TRAINING PROGRAM

## 2022-01-21 PROCEDURE — 99900035 HC TECH TIME PER 15 MIN (STAT)

## 2022-01-21 PROCEDURE — C9399 UNCLASSIFIED DRUGS OR BIOLOG: HCPCS | Performed by: INTERNAL MEDICINE

## 2022-01-21 PROCEDURE — 63600175 PHARM REV CODE 636 W HCPCS: Performed by: EMERGENCY MEDICINE

## 2022-01-21 PROCEDURE — 63600175 PHARM REV CODE 636 W HCPCS: Performed by: NURSE PRACTITIONER

## 2022-01-21 PROCEDURE — 83735 ASSAY OF MAGNESIUM: CPT | Performed by: STUDENT IN AN ORGANIZED HEALTH CARE EDUCATION/TRAINING PROGRAM

## 2022-01-21 PROCEDURE — 99291 CRITICAL CARE FIRST HOUR: CPT | Mod: ,,, | Performed by: NURSE PRACTITIONER

## 2022-01-21 PROCEDURE — 99291 PR CRITICAL CARE, E/M 30-74 MINUTES: ICD-10-PCS | Mod: ,,, | Performed by: NURSE PRACTITIONER

## 2022-01-21 PROCEDURE — 25000003 PHARM REV CODE 250: Performed by: STUDENT IN AN ORGANIZED HEALTH CARE EDUCATION/TRAINING PROGRAM

## 2022-01-21 PROCEDURE — 80048 BASIC METABOLIC PNL TOTAL CA: CPT | Performed by: STUDENT IN AN ORGANIZED HEALTH CARE EDUCATION/TRAINING PROGRAM

## 2022-01-21 PROCEDURE — 92610 EVALUATE SWALLOWING FUNCTION: CPT

## 2022-01-21 PROCEDURE — 63600175 PHARM REV CODE 636 W HCPCS: Performed by: STUDENT IN AN ORGANIZED HEALTH CARE EDUCATION/TRAINING PROGRAM

## 2022-01-21 PROCEDURE — 27000221 HC OXYGEN, UP TO 24 HOURS

## 2022-01-21 PROCEDURE — 20000000 HC ICU ROOM

## 2022-01-21 RX ORDER — AMLODIPINE BESYLATE 10 MG/1
10 TABLET ORAL DAILY
Status: DISCONTINUED | OUTPATIENT
Start: 2022-01-21 | End: 2022-01-23 | Stop reason: HOSPADM

## 2022-01-21 RX ORDER — POLYETHYLENE GLYCOL 3350 17 G/17G
17 POWDER, FOR SOLUTION ORAL DAILY PRN
Status: DISCONTINUED | OUTPATIENT
Start: 2022-01-21 | End: 2022-01-23 | Stop reason: HOSPADM

## 2022-01-21 RX ORDER — TRAZODONE HYDROCHLORIDE 50 MG/1
150 TABLET ORAL NIGHTLY
Status: DISCONTINUED | OUTPATIENT
Start: 2022-01-21 | End: 2022-01-23 | Stop reason: HOSPADM

## 2022-01-21 RX ORDER — LANOLIN ALCOHOL/MO/W.PET/CERES
800 CREAM (GRAM) TOPICAL
Status: DISCONTINUED | OUTPATIENT
Start: 2022-01-21 | End: 2022-01-23 | Stop reason: HOSPADM

## 2022-01-21 RX ORDER — METHOCARBAMOL 750 MG/1
750 TABLET, FILM COATED ORAL 3 TIMES DAILY
Status: DISCONTINUED | OUTPATIENT
Start: 2022-01-21 | End: 2022-01-23 | Stop reason: HOSPADM

## 2022-01-21 RX ORDER — AMOXICILLIN AND CLAVULANATE POTASSIUM 875; 125 MG/1; MG/1
1 TABLET, FILM COATED ORAL EVERY 12 HOURS
Status: DISCONTINUED | OUTPATIENT
Start: 2022-01-21 | End: 2022-01-23 | Stop reason: HOSPADM

## 2022-01-21 RX ORDER — LORAZEPAM 2 MG/ML
1 INJECTION INTRAMUSCULAR ONCE
Status: COMPLETED | OUTPATIENT
Start: 2022-01-21 | End: 2022-01-21

## 2022-01-21 RX ORDER — GABAPENTIN 400 MG/1
800 CAPSULE ORAL 3 TIMES DAILY
Status: DISCONTINUED | OUTPATIENT
Start: 2022-01-22 | End: 2022-01-23 | Stop reason: HOSPADM

## 2022-01-21 RX ORDER — TAMSULOSIN HYDROCHLORIDE 0.4 MG/1
0.4 CAPSULE ORAL DAILY
Status: DISCONTINUED | OUTPATIENT
Start: 2022-01-21 | End: 2022-01-23 | Stop reason: HOSPADM

## 2022-01-21 RX ORDER — POTASSIUM CHLORIDE 750 MG/1
40 TABLET, EXTENDED RELEASE ORAL ONCE
Status: COMPLETED | OUTPATIENT
Start: 2022-01-21 | End: 2022-01-21

## 2022-01-21 RX ORDER — FAMOTIDINE 20 MG/1
20 TABLET, FILM COATED ORAL 2 TIMES DAILY
Status: DISCONTINUED | OUTPATIENT
Start: 2022-01-21 | End: 2022-01-23 | Stop reason: HOSPADM

## 2022-01-21 RX ORDER — CARVEDILOL 6.25 MG/1
6.25 TABLET ORAL 2 TIMES DAILY
Status: DISCONTINUED | OUTPATIENT
Start: 2022-01-21 | End: 2022-01-23 | Stop reason: HOSPADM

## 2022-01-21 RX ORDER — CLONAZEPAM 0.5 MG/1
0.5 TABLET ORAL EVERY 6 HOURS PRN
Status: DISCONTINUED | OUTPATIENT
Start: 2022-01-21 | End: 2022-01-23 | Stop reason: HOSPADM

## 2022-01-21 RX ORDER — FUROSEMIDE 40 MG/1
40 TABLET ORAL DAILY
Status: DISCONTINUED | OUTPATIENT
Start: 2022-01-22 | End: 2022-01-23 | Stop reason: HOSPADM

## 2022-01-21 RX ORDER — DULOXETIN HYDROCHLORIDE 30 MG/1
60 CAPSULE, DELAYED RELEASE ORAL 2 TIMES DAILY
Status: DISCONTINUED | OUTPATIENT
Start: 2022-01-21 | End: 2022-01-23 | Stop reason: HOSPADM

## 2022-01-21 RX ORDER — ATORVASTATIN CALCIUM 10 MG/1
20 TABLET, FILM COATED ORAL DAILY
Status: DISCONTINUED | OUTPATIENT
Start: 2022-01-21 | End: 2022-01-23 | Stop reason: HOSPADM

## 2022-01-21 RX ORDER — AZITHROMYCIN 250 MG/1
500 TABLET, FILM COATED ORAL
Status: DISCONTINUED | OUTPATIENT
Start: 2022-01-21 | End: 2022-01-21

## 2022-01-21 RX ORDER — ACETAMINOPHEN 325 MG/1
650 TABLET ORAL EVERY 8 HOURS PRN
Status: DISCONTINUED | OUTPATIENT
Start: 2022-01-21 | End: 2022-01-23 | Stop reason: HOSPADM

## 2022-01-21 RX ORDER — IPRATROPIUM BROMIDE AND ALBUTEROL SULFATE 2.5; .5 MG/3ML; MG/3ML
3 SOLUTION RESPIRATORY (INHALATION) EVERY 6 HOURS
Status: DISCONTINUED | OUTPATIENT
Start: 2022-01-21 | End: 2022-01-23 | Stop reason: HOSPADM

## 2022-01-21 RX ORDER — HALOPERIDOL 5 MG/ML
5 INJECTION INTRAMUSCULAR ONCE
Status: COMPLETED | OUTPATIENT
Start: 2022-01-21 | End: 2022-01-21

## 2022-01-21 RX ORDER — IPRATROPIUM BROMIDE AND ALBUTEROL SULFATE 2.5; .5 MG/3ML; MG/3ML
3 SOLUTION RESPIRATORY (INHALATION) EVERY 6 HOURS
Status: DISCONTINUED | OUTPATIENT
Start: 2022-01-21 | End: 2022-01-21

## 2022-01-21 RX ORDER — GABAPENTIN 400 MG/1
800 CAPSULE ORAL 3 TIMES DAILY
Status: DISCONTINUED | OUTPATIENT
Start: 2022-01-21 | End: 2022-01-21

## 2022-01-21 RX ADMIN — AMOXICILLIN AND CLAVULANATE POTASSIUM 1 TABLET: 875; 125 TABLET, FILM COATED ORAL at 09:01

## 2022-01-21 RX ADMIN — AMLODIPINE BESYLATE 10 MG: 10 TABLET ORAL at 08:01

## 2022-01-21 RX ADMIN — DEXMEDETOMIDINE HYDROCHLORIDE 1.4 MCG/KG/HR: 4 INJECTION INTRAVENOUS at 02:01

## 2022-01-21 RX ADMIN — GABAPENTIN 800 MG: 400 CAPSULE ORAL at 08:01

## 2022-01-21 RX ADMIN — FAMOTIDINE 20 MG: 20 TABLET, FILM COATED ORAL at 09:01

## 2022-01-21 RX ADMIN — ATORVASTATIN CALCIUM 20 MG: 10 TABLET, FILM COATED ORAL at 08:01

## 2022-01-21 RX ADMIN — HYDRALAZINE HYDROCHLORIDE 20 MG: 20 INJECTION INTRAMUSCULAR; INTRAVENOUS at 01:01

## 2022-01-21 RX ADMIN — DULOXETINE 60 MG: 30 CAPSULE, DELAYED RELEASE ORAL at 09:01

## 2022-01-21 RX ADMIN — FUROSEMIDE 40 MG: 10 INJECTION, SOLUTION INTRAMUSCULAR; INTRAVENOUS at 08:01

## 2022-01-21 RX ADMIN — GABAPENTIN 800 MG: 400 CAPSULE ORAL at 02:01

## 2022-01-21 RX ADMIN — INSULIN ASPART 1 UNITS: 100 INJECTION, SOLUTION INTRAVENOUS; SUBCUTANEOUS at 12:01

## 2022-01-21 RX ADMIN — CARVEDILOL 6.25 MG: 6.25 TABLET, FILM COATED ORAL at 09:01

## 2022-01-21 RX ADMIN — INSULIN ASPART 4 UNITS: 100 INJECTION, SOLUTION INTRAVENOUS; SUBCUTANEOUS at 06:01

## 2022-01-21 RX ADMIN — TRAZODONE HYDROCHLORIDE 150 MG: 50 TABLET ORAL at 09:01

## 2022-01-21 RX ADMIN — HALOPERIDOL LACTATE 5 MG: 5 INJECTION, SOLUTION INTRAMUSCULAR at 04:01

## 2022-01-21 RX ADMIN — DULOXETINE 60 MG: 30 CAPSULE, DELAYED RELEASE ORAL at 08:01

## 2022-01-21 RX ADMIN — METHOCARBAMOL 750 MG: 750 TABLET ORAL at 09:01

## 2022-01-21 RX ADMIN — IPRATROPIUM BROMIDE AND ALBUTEROL SULFATE 3 ML: 2.5; .5 SOLUTION RESPIRATORY (INHALATION) at 08:01

## 2022-01-21 RX ADMIN — TAMSULOSIN HYDROCHLORIDE 0.4 MG: 0.4 CAPSULE ORAL at 08:01

## 2022-01-21 RX ADMIN — METHYLPREDNISOLONE SODIUM SUCCINATE 40 MG: 40 INJECTION, POWDER, FOR SOLUTION INTRAMUSCULAR; INTRAVENOUS at 11:01

## 2022-01-21 RX ADMIN — MUPIROCIN: 20 OINTMENT TOPICAL at 09:01

## 2022-01-21 RX ADMIN — DEXMEDETOMIDINE HYDROCHLORIDE 1.4 MCG/KG/HR: 4 INJECTION INTRAVENOUS at 05:01

## 2022-01-21 RX ADMIN — FAMOTIDINE 20 MG: 20 TABLET, FILM COATED ORAL at 08:01

## 2022-01-21 RX ADMIN — CARVEDILOL 6.25 MG: 6.25 TABLET, FILM COATED ORAL at 08:01

## 2022-01-21 RX ADMIN — POTASSIUM CHLORIDE 40 MEQ: 750 TABLET, EXTENDED RELEASE ORAL at 08:01

## 2022-01-21 RX ADMIN — HALOPERIDOL LACTATE 5 MG: 5 INJECTION, SOLUTION INTRAMUSCULAR at 06:01

## 2022-01-21 RX ADMIN — LORAZEPAM 1 MG: 2 INJECTION INTRAMUSCULAR; INTRAVENOUS at 02:01

## 2022-01-21 RX ADMIN — CLONAZEPAM 0.5 MG: 0.5 TABLET ORAL at 02:01

## 2022-01-21 RX ADMIN — Medication 800 MG: at 10:01

## 2022-01-21 RX ADMIN — INSULIN ASPART 8 UNITS: 100 INJECTION, SOLUTION INTRAVENOUS; SUBCUTANEOUS at 01:01

## 2022-01-21 RX ADMIN — NICOTINE 1 PATCH: 14 PATCH, EXTENDED RELEASE TRANSDERMAL at 08:01

## 2022-01-21 RX ADMIN — MUPIROCIN: 20 OINTMENT TOPICAL at 08:01

## 2022-01-21 RX ADMIN — AMOXICILLIN AND CLAVULANATE POTASSIUM 1 TABLET: 875; 125 TABLET, FILM COATED ORAL at 10:01

## 2022-01-21 RX ADMIN — ENOXAPARIN SODIUM 40 MG: 100 INJECTION SUBCUTANEOUS at 06:01

## 2022-01-21 RX ADMIN — METHOCARBAMOL 750 MG: 750 TABLET ORAL at 02:01

## 2022-01-21 RX ADMIN — Medication 800 MG: at 08:01

## 2022-01-21 RX ADMIN — LORAZEPAM 1 MG: 2 INJECTION INTRAMUSCULAR; INTRAVENOUS at 06:01

## 2022-01-21 RX ADMIN — IPRATROPIUM BROMIDE AND ALBUTEROL SULFATE 3 ML: 2.5; .5 SOLUTION RESPIRATORY (INHALATION) at 11:01

## 2022-01-21 NOTE — ASSESSMENT & PLAN NOTE
WBC down to 9 and Afeb since admit  Infiltrate on CXR  Blood cultures X 2 NGTD  Cont Antb  SARS neg

## 2022-01-21 NOTE — ASSESSMENT & PLAN NOTE
Patient with Hypercapnic and Hypoxic Respiratory failure which is Acute on chronic.  he is not on home oxygen. Supplemental oxygen was provided and noted- Oxygen Concentration (%):  [40] 40.   Signs/symptoms of respiratory failure include- increased work of breathing and respiratory distress. Contributing diagnoses includes - CHF, COPD and Pneumonia Labs and images were reviewed. Patient Has recent ABG, which has been reviewed. Will treat underlying causes and adjust management of respiratory failure as follows- IV abx, BIPAP    Improved with Bipap- now on NC  Expect improvement with diuresis    1/21  Patient says not on home O2  Currently on 5L, wean down as tolerated  Covid negative  Cont dialy oral Lasix  Round clock bronchodilator  Cont Antibiotics  Passed SLP eval

## 2022-01-21 NOTE — SUBJECTIVE & OBJECTIVE
Review of Systems   Unable to perform ROS: Mental acuity         Objective:     Vital Signs (Most Recent):  Temp: 98 °F (36.7 °C) (01/21/22 1130)  Pulse: 102 (01/21/22 1330)  Resp: (!) 37 (01/21/22 1330)  BP: (!) 143/71 (01/21/22 1300)  SpO2: (!) 91 % (01/21/22 1330) Vital Signs (24h Range):  Temp:  [97.1 °F (36.2 °C)-98.2 °F (36.8 °C)] 98 °F (36.7 °C)  Pulse:  [] 102  Resp:  [10-76] 37  SpO2:  [88 %-100 %] 91 %  BP: (121-175)/() 143/71     Weight: 89.8 kg (198 lb)  Body mass index is 26.85 kg/m².      Intake/Output Summary (Last 24 hours) at 1/21/2022 1415  Last data filed at 1/21/2022 1300  Gross per 24 hour   Intake 1621.79 ml   Output 2820 ml   Net -1198.21 ml       Physical Exam  Vitals and nursing note reviewed.   Constitutional:       General: He is not in acute distress.Vital signs are normal.      Appearance: He is well-developed and well-nourished. He is ill-appearing. He is not toxic-appearing, sickly-appearing or diaphoretic.      Interventions: He is not intubated.Nasal cannula in place.   HENT:      Head: Normocephalic and atraumatic.      Mouth/Throat:      Mouth: Oropharynx is clear and moist and mucous membranes are normal. Mucous membranes are moist.   Eyes:      General: Lids are normal.      Extraocular Movements: EOM normal.      Pupils: Pupils are equal, round, and reactive to light.   Neck:      Vascular: No carotid bruit.      Trachea: Trachea normal.   Cardiovascular:      Rate and Rhythm: Normal rate and regular rhythm.      Pulses: Normal pulses.           Radial pulses are 2+ on the right side and 2+ on the left side.        Dorsalis pedis pulses are 2+ on the right side and 2+ on the left side.   Pulmonary:      Effort: Pulmonary effort is normal. Tachypnea (intermittent with anxiety) present. No accessory muscle usage or respiratory distress. He is not intubated.      Breath sounds: Decreased breath sounds present.   Chest:      Chest wall: No deformity or tenderness.    Abdominal:      General: Bowel sounds are decreased.      Palpations: Abdomen is soft.      Tenderness: There is no abdominal tenderness.      Hernia: A hernia is present. Hernia is present in the umbilical area.       Genitourinary:     Comments: [place] in place  Musculoskeletal:         General: Normal range of motion.      Cervical back: Normal range of motion.      Right lower leg: No edema.      Left lower leg: No edema.      Right foot: No deformity.      Left foot: No deformity.   Lymphadenopathy:      Cervical: No cervical adenopathy.   Skin:     General: Skin is warm, dry and intact.      Capillary Refill: Capillary refill takes less than 2 seconds.      Findings: No rash.      Nails: There is no cyanosis.   Neurological:      General: No focal deficit present.      Mental Status: He is alert. He is confused.      GCS: GCS eye subscore is 4. GCS verbal subscore is 3. GCS motor subscore is 6.   Psychiatric:         Mood and Affect: Mood is anxious.         Behavior: Behavior is hyperactive.         Cognition and Memory: Cognition and memory normal.         Judgment: Judgment is impulsive.         Vents:  Oxygen Concentration (%): 40 (01/21/22 1149)    Lines/Drains/Airways     Drain                 Urethral Catheter 01/19/22 1445 16 Fr. 1 day          Peripheral Intravenous Line                 Peripheral IV - Single Lumen 01/20/22 1709 18 G Left Forearm <1 day         Peripheral IV - Single Lumen 01/20/22 1710 20 G Anterior;Left Wrist <1 day                Significant Labs:    CBC/Anemia Profile:  Recent Labs   Lab 01/19/22  1452 01/20/22  0449 01/20/22  1202 01/21/22  0448   WBC 19.59* 15.86*  --  9.70   HGB 14.3 12.0*  --  13.1*   HCT 44.1 38.6*  --  43.3   * 366  --  346   MCV 86 90  --  91   RDW 16.5* 16.6*  --  16.8*   FERRITIN  --   --  68  --         Chemistries:  Recent Labs   Lab 01/19/22  1452 01/20/22  0449 01/21/22  0448    145 147*   K 4.9 3.9 3.2*    110 106   CO2 21* 21* 23    BUN 39* 45* 40*   CREATININE 1.3 1.3 1.1   CALCIUM 10.2 9.3 9.6   ALBUMIN 3.3*  --   --    PROT 7.3  --   --    BILITOT 0.7  --   --    ALKPHOS 90  --   --    ALT 21  --   --    AST 30  --   --    MG  --  1.7 1.7       POCT Glucose:   Recent Labs   Lab 01/21/22  0011 01/21/22  0613 01/21/22  1308   POCTGLUCOSE 177* 200* 317*     All pertinent labs within the past 24 hours have been reviewed.    Significant Imaging:  I have reviewed all pertinent imaging results/findings within the past 24 hours.  TTE pending

## 2022-01-21 NOTE — SUBJECTIVE & OBJECTIVE
Interval History: no overnight events    Review of Systems   Reason unable to perform ROS: patient is not a consistent historian, although oriented hie cannot answer some questions and gives inconsistent answers to others.   Constitutional: Negative for chills and fever.     Objective:     Vital Signs (Most Recent):  Temp: 98 °F (36.7 °C) (01/21/22 1130)  Pulse: 95 (01/21/22 1430)  Resp: (!) 30 (01/21/22 1430)  BP: 137/85 (01/21/22 1400)  SpO2: 96 % (01/21/22 1430) Vital Signs (24h Range):  Temp:  [97.1 °F (36.2 °C)-98.2 °F (36.8 °C)] 98 °F (36.7 °C)  Pulse:  [] 95  Resp:  [10-64] 30  SpO2:  [90 %-100 %] 96 %  BP: (121-175)/() 137/85     Weight: 89.8 kg (198 lb)  Body mass index is 26.85 kg/m².    Intake/Output Summary (Last 24 hours) at 1/21/2022 1621  Last data filed at 1/21/2022 1300  Gross per 24 hour   Intake 1554.49 ml   Output 2560 ml   Net -1005.51 ml      Physical Exam  Constitutional:       General: He is not in acute distress.     Appearance: Normal appearance. He is ill-appearing.   HENT:      Head: Normocephalic.      Nose: Nose normal.      Mouth/Throat:      Mouth: Mucous membranes are moist.   Eyes:      General: No scleral icterus.     Pupils: Pupils are equal, round, and reactive to light.   Cardiovascular:      Rate and Rhythm: Normal rate and regular rhythm.      Pulses: Normal pulses.   Pulmonary:      Effort: Pulmonary effort is normal.      Breath sounds: Wheezing and rales present.   Abdominal:      General: Abdomen is flat. Bowel sounds are normal.   Musculoskeletal:      Right lower leg: No edema.      Left lower leg: No edema.   Skin:     General: Skin is warm and dry.      Capillary Refill: Capillary refill takes less than 2 seconds.   Neurological:      General: No focal deficit present.      Mental Status: He is alert and oriented to person, place, and time.      Comments: Oriented to self, place and time but still confused  No focal weakness noted         Significant Labs:    Blood Culture: No results for input(s): LABBLOO in the last 48 hours.  BMP:   Recent Labs   Lab 01/21/22 0448   *   *   K 3.2*      CO2 23   BUN 40*   CREATININE 1.1   CALCIUM 9.6   MG 1.7     CBC:   Recent Labs   Lab 01/20/22 0449 01/21/22 0448   WBC 15.86* 9.70   HGB 12.0* 13.1*   HCT 38.6* 43.3    346     CMP:   Recent Labs   Lab 01/20/22 0449 01/21/22 0448    147*   K 3.9 3.2*    106   CO2 21* 23   * 154*   BUN 45* 40*   CREATININE 1.3 1.1   CALCIUM 9.3 9.6   ANIONGAP 14 18*   EGFRNONAA 58* >60     Cardiac Markers: No results for input(s): CKMB, MYOGLOBIN, BNP, TROPISTAT in the last 48 hours.  Coagulation: No results for input(s): PT, INR, APTT in the last 48 hours.    Significant Imaging: I have reviewed all pertinent imaging results/findings within the past 24 hours.     ECHO  · The left ventricle is normal in size with concentric hypertrophy and mildly decreased systolic function.  · Grade I left ventricular diastolic dysfunction.  · Normal right ventricular size with normal right ventricular systolic function.  · There is pulmonary hypertension.  · Intermediate central venous pressure (8 mmHg).  · The estimated PA systolic pressure is 53 mmHg.  · The estimated ejection fraction is 40%.  · There are segmental left ventricular wall motion abnormalities.

## 2022-01-21 NOTE — ASSESSMENT & PLAN NOTE
Sedated on Precedex      1/21  Likely secondary to infection and hypoxemia  Improved  ?What his baseline is, although oriented now he remains somewhat confused. No focal weakness

## 2022-01-21 NOTE — PT/OT/SLP EVAL
Speech Language Pathology Evaluation  Bedside Swallow    Patient Name:  Shukri Rosales   MRN:  [id number]  Admitting Diagnosis: Acute respiratory failure with hypoxia and hypercarbia    Recommendations:                 General Recommendations:  Follow-up not indicated  Diet recommendations:  Mechanical soft, Thin   Aspiration Precautions: 1 bite/sip at a time, HOB to 90 degrees, Remain upright 30 minutes post meal and Small bites/sips   General Precautions: Standard, fall  Communication strategies:  none    History:     Past Medical History:   Diagnosis Date    Adrenal cortical adenoma     Anxiety     Arthritis     CKD (chronic kidney disease) stage 3, GFR 30-59 ml/min     Depression     Diabetes mellitus type II 2011    does not take    DM (diabetes mellitus) 2011     am 09/21/2017 Insulin x 1 1/2 year    GERD (gastroesophageal reflux disease) 7/9/2013    Hypertension     Migraine headache 7/22/2013    Schizophrenia     Severe obesity with body mass index of 36.0 to 36.9        Past Surgical History:   Procedure Laterality Date    BACK SURGERY      COLONOSCOPY N/A 12/3/2020    Procedure: COLONOSCOPY;  Surgeon: Christofer Palmer MD;  Location: [facility];  Service: Endoscopy;  Laterality: N/A;    COLONOSCOPY N/A 12/4/2020    Procedure: COLONOSCOPY;  Surgeon: Frandy Stanton MD;  Location: [facility];  Service: Endoscopy;  Laterality: N/A;    HERNIA REPAIR      UMBILICAL    left arm exfix      NASAL SEPTUM SURGERY Bilateral     TONSILLECTOMY      URETEROSCOPY         Subjective   Pt admitted to ICU with sepsis.  He is awake, alert, with minimal verbalizations.    Patient goals: none stated      Pain/Comfort:  Pain Rating 1: 0/10    Respiratory Status: Room air    Objective:     Oral Musculature Evaluation  Oral Musculature: WFL  Dentition: edentulous    Bedside Swallow Eval:   Consistencies Assessed:  · Thin liquids and solids   · Pt able to feed himself     Oral Phase:   · pt is  edentulous and states that he does have dentures that he eats with, however they are not present at bedside   · Mild oral residue    Pharyngeal Phase:   · no overt clinical signs/symptoms of aspiration  · no overt clinical signs/symptoms of pharyngeal dysphagia    Treatment: Swallowing assessed at bedside with pt exhibiting no overt signs of swallow difficulties.  He maintained O2 sats above 97% and exhibiting adequate swallowing timing and laryngeal elevation upon palpation.  Given that pt has no dentures at bedside, ST recommends a soft mechanical diet with thin liquids.  Should pt's dentures be brought to the hospital, pt can advance to regular consistencies.     Assessment:     Shukri Rosales is a 61 y.o. male with a diagnosis of The primary encounter diagnosis was Acute on chronic respiratory failure with hypoxia and hypercapnia. Diagnoses of Shortness of breath, Pneumonia of both lower lobes due to infectious organism, Agitation, Sepsis, due to unspecified organism, unspecified whether acute organ dysfunction present, Chronic heart failure, unspecified heart failure type, Elevated brain natriuretic peptide (BNP) level, CHF (congestive heart failure), and Severe sepsis were also pertinent to this visit.  He presents with a safe, functional swallow.  No further ST warranted at this time.     Plan:     · Plan of Care reviewed with:  patient   · SLP Follow-Up:  No         Time Tracking:     SLP Treatment Date:   01/21/22  Speech Start Time:  1125  Speech Stop Time:  1139     Speech Total Time (min):  14 min    Billable Minutes: Eval Swallow and Oral Function 14    01/21/2022

## 2022-01-21 NOTE — ASSESSMENT & PLAN NOTE
This patient does have evidence of infective focus  My overall impression is sepsis. Vital signs were reviewed and noted in progress note.  Antibiotics given-   Antibiotics (From admission, onward)            Start     Stop Route Frequency Ordered    01/21/22 1145  amoxicillin-clavulanate 875-125mg per tablet 1 tablet         01/26 0859 Oral Every 12 hours 01/21/22 1032    01/20/22 1000  mupirocin 2 % ointment         01/25 0859 Nasl 2 times daily 01/20/22 0851        Cultures were taken-   Microbiology Results (last 7 days)     Procedure Component Value Units Date/Time    Blood Culture #2 **CANNOT BE ORDERED STAT** [[id number]] Collected: 01/19/22 1531    Order Status: Completed Specimen: Blood from Peripheral, Antecubital, Right Updated: 01/20/22 2212     Blood Culture, Routine No Growth to date      No Growth to date    Blood Culture #1 **CANNOT BE ORDERED STAT** [[id number]] Collected: 01/19/22 1531    Order Status: Completed Specimen: Blood from Peripheral, Antecubital, Left Updated: 01/20/22 2212     Blood Culture, Routine No Growth to date      No Growth to date    Influenza A & B by Molecular [[id number]] Collected: 01/19/22 2215    Order Status: Completed Specimen: Nasopharyngeal Swab Updated: 01/19/22 2303     Influenza A, Molecular Negative     Influenza B, Molecular Negative     Flu A & B Source Nasal swab    Culture, Respiratory with Gram Stain [[id number]]     Order Status: No result Specimen: Respiratory         Latest lactate reviewed, they are-  Recent Labs   Lab 01/19/22  1532   LACTATE 2.6*       Organ dysfunction indicated by Acute respiratory failure  Source- PNA    Source control Achieved by- IV abx    Sepsis s/t CAP w/ acute hypercapnic resp failure  Empiric Abx  BIPAP, titrate as tolerated      Initially suspected of Sepsis due to elevated WBC but Leukocytosis likely reactive  Started on IV Lasix    1/21  Resolving/resolved

## 2022-01-21 NOTE — ASSESSMENT & PLAN NOTE
Bilateral Pneumonia  Blood culture NGTD  Cont PO Augmentin # 1  Wean down supplemental O2 as tolerated

## 2022-01-21 NOTE — ASSESSMENT & PLAN NOTE
Afeb and resolved leukocytosis  Change IVAB to Augmentin  Follow fever curve  CXR in AM  No aspiration per ST eval, mech soft diet due to no dentures  Cont DN

## 2022-01-21 NOTE — ASSESSMENT & PLAN NOTE
Sedated with Precedex, also getting Prn Haldol      1/21  Patient calm  Cont home Cymbalta  Will review home meds

## 2022-01-21 NOTE — EICU
RN reporting that pt is agitated.   Has been receiving Haldol 5mg IV q6h.     Adding Ativan 1mg IV x 1 and haldol 5mg IV x 1 now.   Pr res[onded well to ativan yesterday.     QTc was 464 as per 12 lead KISHOR done yesterday.  Further f/up requested.   Pt pulled off his NGT.     Watch for s.s respiratory depression.

## 2022-01-21 NOTE — PROGRESS NOTES
[facility] - Intensive Care ([facility])  Critical Care Medicine  Progress Note    Patient Name: Shukri Rosales  MRN: [id number]  Admission Date: 1/19/2022  Hospital Length of Stay: 2 days  Code Status: Full Code  Attending Provider: Lamberto Sandoval MD  Primary Care Provider: Cris Matias NP   Principal Problem: Acute metabolic encephalopathy    Subjective:     HPI:  61-year-old male patient admitted to the hospital for worsening shortness of breath for 1 week, brought to ED by EMS, was found to be severely hypoxemic needed BiPAP overnight and admitted to ICU.    PMHx of incisional abdominal hernia, peripheral vascular disease, DM2, adrenal cortical adenoma, CKD, carotid stenosis, schizophrenia, GERD, HTN, and HLD       Hospital/ICU Course:  O2 sat 96% on 5 liters/minute.  T-max 98°.  Chest x-ray reviewed.  Status post Medina placement.  Chest x-ray ABG VBG reviewed.  CT angio chest 01/02/2022 reviewed.  1/21 - still with periods of restlessness pulled NG overnight.  Awake and responsive but confused anxious and tachypnic again this afternoon but no hypoxia.      Review of Systems   Unable to perform ROS: Mental acuity         Objective:     Vital Signs (Most Recent):  Temp: 98 °F (36.7 °C) (01/21/22 1130)  Pulse: 102 (01/21/22 1330)  Resp: (!) 37 (01/21/22 1330)  BP: (!) 143/71 (01/21/22 1300)  SpO2: (!) 91 % (01/21/22 1330) Vital Signs (24h Range):  Temp:  [97.1 °F (36.2 °C)-98.2 °F (36.8 °C)] 98 °F (36.7 °C)  Pulse:  [] 102  Resp:  [10-76] 37  SpO2:  [88 %-100 %] 91 %  BP: (121-175)/() 143/71     Weight: 89.8 kg (198 lb)  Body mass index is 26.85 kg/m².      Intake/Output Summary (Last 24 hours) at 1/21/2022 1415  Last data filed at 1/21/2022 1300  Gross per 24 hour   Intake 1621.79 ml   Output 2820 ml   Net -1198.21 ml       Physical Exam  Vitals and nursing note reviewed.   Constitutional:       General: He is not in acute distress.Vital signs are normal.      Appearance: He is well-developed  and well-nourished. He is ill-appearing. He is not toxic-appearing, sickly-appearing or diaphoretic.      Interventions: He is not intubated.Nasal cannula in place.   HENT:      Head: Normocephalic and atraumatic.      Mouth/Throat:      Mouth: Oropharynx is clear and moist and mucous membranes are normal. Mucous membranes are moist.   Eyes:      General: Lids are normal.      Extraocular Movements: EOM normal.      Pupils: Pupils are equal, round, and reactive to light.   Neck:      Vascular: No carotid bruit.      Trachea: Trachea normal.   Cardiovascular:      Rate and Rhythm: Normal rate and regular rhythm.      Pulses: Normal pulses.           Radial pulses are 2+ on the right side and 2+ on the left side.        Dorsalis pedis pulses are 2+ on the right side and 2+ on the left side.   Pulmonary:      Effort: Pulmonary effort is normal. Tachypnea (intermittent with anxiety) present. No accessory muscle usage or respiratory distress. He is not intubated.      Breath sounds: Decreased breath sounds present.   Chest:      Chest wall: No deformity or tenderness.   Abdominal:      General: Bowel sounds are decreased.      Palpations: Abdomen is soft.      Tenderness: There is no abdominal tenderness.      Hernia: A hernia is present. Hernia is present in the umbilical area.       Genitourinary:     Comments: Medina in place  Musculoskeletal:         General: Normal range of motion.      Cervical back: Normal range of motion.      Right lower leg: No edema.      Left lower leg: No edema.      Right foot: No deformity.      Left foot: No deformity.   Lymphadenopathy:      Cervical: No cervical adenopathy.   Skin:     General: Skin is warm, dry and intact.      Capillary Refill: Capillary refill takes less than 2 seconds.      Findings: No rash.      Nails: There is no cyanosis.   Neurological:      General: No focal deficit present.      Mental Status: He is alert. He is confused.      GCS: GCS eye subscore is 4. GCS  verbal subscore is 3. GCS motor subscore is 6.   Psychiatric:         Mood and Affect: Mood is anxious.         Behavior: Behavior is hyperactive.         Cognition and Memory: Cognition and memory normal.         Judgment: Judgment is impulsive.         Vents:  Oxygen Concentration (%): 40 (01/21/22 1149)    Lines/Drains/Airways     Drain                 Urethral Catheter 01/19/22 1445 16 Fr. 1 day          Peripheral Intravenous Line                 Peripheral IV - Single Lumen 01/20/22 1709 18 G Left Forearm <1 day         Peripheral IV - Single Lumen 01/20/22 1710 20 G Anterior;Left Wrist <1 day                Significant Labs:    CBC/Anemia Profile:  Recent Labs   Lab 01/19/22  1452 01/20/22  0449 01/20/22  1202 01/21/22  0448   WBC 19.59* 15.86*  --  9.70   HGB 14.3 12.0*  --  13.1*   HCT 44.1 38.6*  --  43.3   * 366  --  346   MCV 86 90  --  91   RDW 16.5* 16.6*  --  16.8*   FERRITIN  --   --  68  --         Chemistries:  Recent Labs   Lab 01/19/22  1452 01/20/22  0449 01/21/22  0448    145 147*   K 4.9 3.9 3.2*    110 106   CO2 21* 21* 23   BUN 39* 45* 40*   CREATININE 1.3 1.3 1.1   CALCIUM 10.2 9.3 9.6   ALBUMIN 3.3*  --   --    PROT 7.3  --   --    BILITOT 0.7  --   --    ALKPHOS 90  --   --    ALT 21  --   --    AST 30  --   --    MG  --  1.7 1.7       POCT Glucose:   Recent Labs   Lab 01/21/22  0011 01/21/22  0613 01/21/22  1308   POCTGLUCOSE 177* 200* 317*     All pertinent labs within the past 24 hours have been reviewed.    Significant Imaging:  I have reviewed all pertinent imaging results/findings within the past 24 hours.  TTE pending      ABG  Recent Labs   Lab 01/20/22  0506   PH 7.412   PO2 29*   PCO2 46.6*   HCO3 29.7*   BE 5     Assessment/Plan:     Neuro  * Acute metabolic encephalopathy  Unclear etiology: sepsis with BiPolar D/O  Cont neuro monitoring  No focal deficits  Reportedly improved from yesterday  Rx sepsis and support oxygenation    Psychiatric  Schizoaffective  disorder, bipolar type  Cont Trazodone and Duloxetine  PRN Klonopin    Pulmonary  Acute respiratory failure with hypoxia and hypercarbia  SARS neg  Tolerating low flow NC O2 continue to wean      Pneumonia of left lower lobe due to infectious organism  Afeb and resolved leukocytosis  Change IVAB to Augmentin  Follow fever curve  CXR in AM  No aspiration per ST eval, mech soft diet due to no dentures  Cont DN    Cardiac/Vascular  Essential hypertension  Cont Coreg, Norvasc and Lasix  Change Lasix to PO  I/O balance - 1.6 L  TTE pending    Renal/  CKD (chronic kidney disease) stage 3, GFR 30-59 ml/min  Creatinine 1.1  Appears euvolumic    Electrolyte imbalance  Oral replacement protocol  AM labs    ID  Severe sepsis  WBC down to 9 and Afeb since admit  Infiltrate on CXR  Blood cultures X 2 NGTD  Cont Antb  SARS neg      Endocrine  Type 2 diabetes mellitus with stage 3 chronic kidney disease, with long-term current use of insulin  Glucose 154-229  Cont SSI and Detemir    Other  Tobacco abuse disorder  Smoking cessation.  Nicotine patches        Preventive Measures and Monitoring:   Stress Ulcer: Pepcid  Nutrition: mech soft diet  Glucose control: SSI  Bowel prophylaxis: PRN Miralax  DVT prophylaxis: LMWH/SCDs  Hx CAD on B-Blocker: Coreg  Head of Bed/Reposition: Elevate HOB and turn Q1-2 hours   Early Mobility: bed rest  Medina Day: #3  Code Status: Full    Counseling/Consultation:I have discussed the care of this patient in detail with the bedside nursing staff and Dr. Cain and Dr. Sandoval    Critical Care Time: 50 minutes  Critical secondary to Patient has a condition that poses threat to life and bodily function: Acute Encephalopathy and Severe Sepsis      Critical care was time spent personally by me on the following activities: development of treatment plan with patient or surrogate and bedside caregivers, discussions with consultants, evaluation of patient's response to treatment, examination of patient,  ordering and performing treatments and interventions, ordering and review of laboratory studies, ordering and review of radiographic studies, pulse oximetry, re-evaluation of patient's condition. This critical care time did not overlap with that of any other provider or involve time for any procedures.     Eamon Gamboa, NP  Critical Care Medicine  [facility] - Intensive Care ([facility])

## 2022-01-21 NOTE — ASSESSMENT & PLAN NOTE
Unclear etiology: sepsis with BiPolar D/O  Cont neuro monitoring  No focal deficits  Reportedly improved from yesterday  Rx sepsis and support oxygenation

## 2022-01-21 NOTE — ASSESSMENT & PLAN NOTE
Continue home meds amlodipine, clonidine  Restart lisinopril, HCTZ as tolerated, pt clinically volume deplete    BP under control    1/21 Cont current meds

## 2022-01-22 PROBLEM — J96.02 ACUTE RESPIRATORY FAILURE WITH HYPOXIA AND HYPERCARBIA: Status: RESOLVED | Noted: 2022-01-19 | Resolved: 2022-01-22

## 2022-01-22 PROBLEM — J96.01 ACUTE RESPIRATORY FAILURE WITH HYPOXIA AND HYPERCARBIA: Status: RESOLVED | Noted: 2022-01-19 | Resolved: 2022-01-22

## 2022-01-22 PROBLEM — I42.9 CARDIOMYOPATHY: Status: ACTIVE | Noted: 2022-01-22

## 2022-01-22 LAB
ANION GAP SERPL CALC-SCNC: 14 MMOL/L (ref 8–16)
BASOPHILS # BLD AUTO: 0.01 K/UL (ref 0–0.2)
BASOPHILS NFR BLD: 0.1 % (ref 0–1.9)
BUN SERPL-MCNC: 27 MG/DL (ref 8–23)
CALCIUM SERPL-MCNC: 9.1 MG/DL (ref 8.7–10.5)
CHLORIDE SERPL-SCNC: 102 MMOL/L (ref 95–110)
CO2 SERPL-SCNC: 26 MMOL/L (ref 23–29)
CREAT SERPL-MCNC: 1.2 MG/DL (ref 0.5–1.4)
DIFFERENTIAL METHOD: ABNORMAL
EOSINOPHIL # BLD AUTO: 0.1 K/UL (ref 0–0.5)
EOSINOPHIL NFR BLD: 0.9 % (ref 0–8)
ERYTHROCYTE [DISTWIDTH] IN BLOOD BY AUTOMATED COUNT: 16.3 % (ref 11.5–14.5)
EST. GFR  (AFRICAN AMERICAN): >60 ML/MIN/1.73 M^2
EST. GFR  (NON AFRICAN AMERICAN): >60 ML/MIN/1.73 M^2
GLUCOSE SERPL-MCNC: 115 MG/DL (ref 70–110)
HCT VFR BLD AUTO: 43.1 % (ref 40–54)
HGB BLD-MCNC: 13.4 G/DL (ref 14–18)
IMM GRANULOCYTES # BLD AUTO: 0.08 K/UL (ref 0–0.04)
IMM GRANULOCYTES NFR BLD AUTO: 0.6 % (ref 0–0.5)
LYMPHOCYTES # BLD AUTO: 2.1 K/UL (ref 1–4.8)
LYMPHOCYTES NFR BLD: 16.2 % (ref 18–48)
MAGNESIUM SERPL-MCNC: 1.8 MG/DL (ref 1.6–2.6)
MCH RBC QN AUTO: 27 PG (ref 27–31)
MCHC RBC AUTO-ENTMCNC: 31.1 G/DL (ref 32–36)
MCV RBC AUTO: 87 FL (ref 82–98)
MONOCYTES # BLD AUTO: 1.2 K/UL (ref 0.3–1)
MONOCYTES NFR BLD: 9.1 % (ref 4–15)
NEUTROPHILS # BLD AUTO: 9.3 K/UL (ref 1.8–7.7)
NEUTROPHILS NFR BLD: 73.1 % (ref 38–73)
NRBC BLD-RTO: 0 /100 WBC
PLATELET # BLD AUTO: 362 K/UL (ref 150–450)
PMV BLD AUTO: 9.3 FL (ref 9.2–12.9)
POCT GLUCOSE: 131 MG/DL (ref 70–110)
POCT GLUCOSE: 146 MG/DL (ref 70–110)
POCT GLUCOSE: 218 MG/DL (ref 70–110)
POCT GLUCOSE: 318 MG/DL (ref 70–110)
POTASSIUM SERPL-SCNC: 3.2 MMOL/L (ref 3.5–5.1)
RBC # BLD AUTO: 4.97 M/UL (ref 4.6–6.2)
SODIUM SERPL-SCNC: 142 MMOL/L (ref 136–145)
WBC # BLD AUTO: 12.72 K/UL (ref 3.9–12.7)

## 2022-01-22 PROCEDURE — 85025 COMPLETE CBC W/AUTO DIFF WBC: CPT | Performed by: STUDENT IN AN ORGANIZED HEALTH CARE EDUCATION/TRAINING PROGRAM

## 2022-01-22 PROCEDURE — 83735 ASSAY OF MAGNESIUM: CPT | Performed by: STUDENT IN AN ORGANIZED HEALTH CARE EDUCATION/TRAINING PROGRAM

## 2022-01-22 PROCEDURE — 36415 COLL VENOUS BLD VENIPUNCTURE: CPT | Performed by: STUDENT IN AN ORGANIZED HEALTH CARE EDUCATION/TRAINING PROGRAM

## 2022-01-22 PROCEDURE — 94640 AIRWAY INHALATION TREATMENT: CPT

## 2022-01-22 PROCEDURE — S4991 NICOTINE PATCH NONLEGEND: HCPCS | Performed by: STUDENT IN AN ORGANIZED HEALTH CARE EDUCATION/TRAINING PROGRAM

## 2022-01-22 PROCEDURE — 97162 PT EVAL MOD COMPLEX 30 MIN: CPT

## 2022-01-22 PROCEDURE — 80048 BASIC METABOLIC PNL TOTAL CA: CPT | Performed by: STUDENT IN AN ORGANIZED HEALTH CARE EDUCATION/TRAINING PROGRAM

## 2022-01-22 PROCEDURE — 25000003 PHARM REV CODE 250: Performed by: NURSE PRACTITIONER

## 2022-01-22 PROCEDURE — 63600175 PHARM REV CODE 636 W HCPCS: Performed by: EMERGENCY MEDICINE

## 2022-01-22 PROCEDURE — 99291 CRITICAL CARE FIRST HOUR: CPT | Mod: ,,, | Performed by: NURSE PRACTITIONER

## 2022-01-22 PROCEDURE — 99900035 HC TECH TIME PER 15 MIN (STAT)

## 2022-01-22 PROCEDURE — 25000003 PHARM REV CODE 250: Performed by: INTERNAL MEDICINE

## 2022-01-22 PROCEDURE — 25000242 PHARM REV CODE 250 ALT 637 W/ HCPCS: Performed by: INTERNAL MEDICINE

## 2022-01-22 PROCEDURE — 25000003 PHARM REV CODE 250: Performed by: STUDENT IN AN ORGANIZED HEALTH CARE EDUCATION/TRAINING PROGRAM

## 2022-01-22 PROCEDURE — 63600175 PHARM REV CODE 636 W HCPCS: Performed by: INTERNAL MEDICINE

## 2022-01-22 PROCEDURE — 20000000 HC ICU ROOM

## 2022-01-22 PROCEDURE — 25500020 PHARM REV CODE 255: Performed by: INTERNAL MEDICINE

## 2022-01-22 PROCEDURE — 99291 PR CRITICAL CARE, E/M 30-74 MINUTES: ICD-10-PCS | Mod: ,,, | Performed by: NURSE PRACTITIONER

## 2022-01-22 PROCEDURE — 94799 UNLISTED PULMONARY SVC/PX: CPT

## 2022-01-22 RX ORDER — ENOXAPARIN SODIUM 100 MG/ML
80 INJECTION SUBCUTANEOUS EVERY 12 HOURS
Status: DISCONTINUED | OUTPATIENT
Start: 2022-01-22 | End: 2022-01-22

## 2022-01-22 RX ORDER — POTASSIUM CHLORIDE 750 MG/1
30 TABLET, EXTENDED RELEASE ORAL EVERY 4 HOURS
Status: DISCONTINUED | OUTPATIENT
Start: 2022-01-22 | End: 2022-01-22

## 2022-01-22 RX ORDER — HYDROCODONE BITARTRATE AND ACETAMINOPHEN 5; 325 MG/1; MG/1
1 TABLET ORAL EVERY 6 HOURS PRN
Status: DISCONTINUED | OUTPATIENT
Start: 2022-01-22 | End: 2022-01-23 | Stop reason: HOSPADM

## 2022-01-22 RX ORDER — POTASSIUM CHLORIDE 750 MG/1
30 TABLET, EXTENDED RELEASE ORAL ONCE
Status: COMPLETED | OUTPATIENT
Start: 2022-01-22 | End: 2022-01-22

## 2022-01-22 RX ORDER — QUETIAPINE FUMARATE 100 MG/1
300 TABLET, FILM COATED ORAL NIGHTLY
Status: DISCONTINUED | OUTPATIENT
Start: 2022-01-22 | End: 2022-01-23 | Stop reason: HOSPADM

## 2022-01-22 RX ORDER — NAPROXEN SODIUM 220 MG/1
81 TABLET, FILM COATED ORAL DAILY
Status: DISCONTINUED | OUTPATIENT
Start: 2022-01-23 | End: 2022-01-23 | Stop reason: HOSPADM

## 2022-01-22 RX ORDER — LISINOPRIL 10 MG/1
10 TABLET ORAL DAILY
Status: DISCONTINUED | OUTPATIENT
Start: 2022-01-24 | End: 2022-01-23 | Stop reason: HOSPADM

## 2022-01-22 RX ORDER — ENOXAPARIN SODIUM 100 MG/ML
80 INJECTION SUBCUTANEOUS EVERY 12 HOURS
Status: DISCONTINUED | OUTPATIENT
Start: 2022-01-23 | End: 2022-01-23

## 2022-01-22 RX ADMIN — METHOCARBAMOL 750 MG: 750 TABLET ORAL at 03:01

## 2022-01-22 RX ADMIN — TAMSULOSIN HYDROCHLORIDE 0.4 MG: 0.4 CAPSULE ORAL at 08:01

## 2022-01-22 RX ADMIN — FAMOTIDINE 20 MG: 20 TABLET, FILM COATED ORAL at 08:01

## 2022-01-22 RX ADMIN — GABAPENTIN 800 MG: 400 CAPSULE ORAL at 08:01

## 2022-01-22 RX ADMIN — NICOTINE 1 PATCH: 14 PATCH, EXTENDED RELEASE TRANSDERMAL at 08:01

## 2022-01-22 RX ADMIN — CARVEDILOL 6.25 MG: 6.25 TABLET, FILM COATED ORAL at 08:01

## 2022-01-22 RX ADMIN — AMLODIPINE BESYLATE 10 MG: 10 TABLET ORAL at 08:01

## 2022-01-22 RX ADMIN — INSULIN ASPART 8 UNITS: 100 INJECTION, SOLUTION INTRAVENOUS; SUBCUTANEOUS at 05:01

## 2022-01-22 RX ADMIN — FUROSEMIDE 40 MG: 40 TABLET ORAL at 08:01

## 2022-01-22 RX ADMIN — DULOXETINE 60 MG: 30 CAPSULE, DELAYED RELEASE ORAL at 08:01

## 2022-01-22 RX ADMIN — IPRATROPIUM BROMIDE AND ALBUTEROL SULFATE 3 ML: 2.5; .5 SOLUTION RESPIRATORY (INHALATION) at 12:01

## 2022-01-22 RX ADMIN — QUETIAPINE FUMARATE 300 MG: 100 TABLET ORAL at 08:01

## 2022-01-22 RX ADMIN — METHOCARBAMOL 750 MG: 750 TABLET ORAL at 08:01

## 2022-01-22 RX ADMIN — AMOXICILLIN AND CLAVULANATE POTASSIUM 1 TABLET: 875; 125 TABLET, FILM COATED ORAL at 08:01

## 2022-01-22 RX ADMIN — IOHEXOL 100 ML: 350 INJECTION, SOLUTION INTRAVENOUS at 02:01

## 2022-01-22 RX ADMIN — ATORVASTATIN CALCIUM 20 MG: 10 TABLET, FILM COATED ORAL at 08:01

## 2022-01-22 RX ADMIN — MUPIROCIN: 20 OINTMENT TOPICAL at 08:01

## 2022-01-22 RX ADMIN — TRAZODONE HYDROCHLORIDE 150 MG: 50 TABLET ORAL at 08:01

## 2022-01-22 RX ADMIN — INSULIN ASPART 4 UNITS: 100 INJECTION, SOLUTION INTRAVENOUS; SUBCUTANEOUS at 12:01

## 2022-01-22 RX ADMIN — IPRATROPIUM BROMIDE AND ALBUTEROL SULFATE 3 ML: 2.5; .5 SOLUTION RESPIRATORY (INHALATION) at 07:01

## 2022-01-22 RX ADMIN — HALOPERIDOL LACTATE 5 MG: 5 INJECTION, SOLUTION INTRAMUSCULAR at 12:01

## 2022-01-22 RX ADMIN — POTASSIUM CHLORIDE 30 MEQ: 750 TABLET, EXTENDED RELEASE ORAL at 08:01

## 2022-01-22 RX ADMIN — HYDROCODONE BITARTRATE AND ACETAMINOPHEN 1 TABLET: 5; 325 TABLET ORAL at 07:01

## 2022-01-22 RX ADMIN — GABAPENTIN 800 MG: 400 CAPSULE ORAL at 03:01

## 2022-01-22 RX ADMIN — CLONAZEPAM 0.5 MG: 0.5 TABLET ORAL at 11:01

## 2022-01-22 RX ADMIN — ENOXAPARIN SODIUM 80 MG: 40 INJECTION, SOLUTION INTRAVENOUS; SUBCUTANEOUS at 03:01

## 2022-01-22 RX ADMIN — POTASSIUM CHLORIDE 30 MEQ: 750 TABLET, EXTENDED RELEASE ORAL at 12:01

## 2022-01-22 NOTE — PROGRESS NOTES
[facility] - Intensive Care ([facility])  Critical Care Medicine  Progress Note    Patient Name: Shukri Rosales  MRN: [id number]  Admission Date: 1/19/2022  Hospital Length of Stay: 3 days  Code Status: Full Code  Attending Provider: Lamberto Sandoval MD  Primary Care Provider: Cris Matias NP   Principal Problem: Acute metabolic encephalopathy    Subjective:     HPI:  61-year-old male patient admitted to the hospital for worsening shortness of breath for 1 week, brought to ED by EMS, was found to be severely hypoxemic needed BiPAP overnight and admitted to ICU.    PMHx of incisional abdominal hernia, peripheral vascular disease, DM2, adrenal cortical adenoma, CKD, carotid stenosis, schizophrenia, GERD, HTN, and HLD       Hospital/ICU Course:  O2 sat 96% on 5 liters/minute.  T-max 98°.  Chest x-ray reviewed.  Status post Weeks placement.  Chest x-ray ABG VBG reviewed.  CT angio chest 01/02/2022 reviewed.  1/21 - still with periods of restlessness pulled NG overnight.  Awake and responsive but confused anxious and tachypnic again this afternoon but no hypoxia.  1/22 - Patient pulled Weeks out yesterday.  This AM much more alert and awake and cooperative in no distress on RA SAT 95% with orientation X 3      Review of Systems   Constitutional: Positive for malaise/fatigue. Negative for chills and fever.   HENT: Negative for congestion.    Eyes: Negative for blurred vision.   Respiratory: Negative for cough, sputum production and shortness of breath.    Cardiovascular: Negative for chest pain and leg swelling.   Gastrointestinal: Negative for nausea and vomiting.   Genitourinary: Negative for dysuria.        Penile pain from patient pulling weeks yesterday   Musculoskeletal: Negative for myalgias.   Skin: Negative for rash.   Neurological: Negative for dizziness, weakness and headaches.   Endo/Heme/Allergies: Does not bruise/bleed easily.   Psychiatric/Behavioral: The patient is not nervous/anxious.           Objective:     Vital Signs (Most Recent):  Temp: 98.2 °F (36.8 °C) (01/22/22 0800)  Pulse: 82 (01/22/22 0845)  Resp: (!) 35 (01/22/22 0845)  BP: (!) 159/95 (01/22/22 0800)  SpO2: (!) 94 % (01/22/22 0845) Vital Signs (24h Range):  Temp:  [97.8 °F (36.6 °C)-98.2 °F (36.8 °C)] 98.2 °F (36.8 °C)  Pulse:  [] 82  Resp:  [16-46] 35  SpO2:  [90 %-99 %] 94 %  BP: (131-181)/() 159/95     Weight: 89.8 kg (198 lb)  Body mass index is 26.85 kg/m².      Intake/Output Summary (Last 24 hours) at 1/22/2022 0912  Last data filed at 1/22/2022 0800  Gross per 24 hour   Intake 1900 ml   Output 1180 ml   Net 720 ml       Physical Exam  Vitals and nursing note reviewed.   Constitutional:       General: He is awake. He is not in acute distress.     Appearance: He is well-developed. He is ill-appearing. He is not toxic-appearing or diaphoretic.      Interventions: He is not intubated.  HENT:      Head: Normocephalic and atraumatic.      Mouth/Throat:      Mouth: Mucous membranes are moist.   Eyes:      General: Lids are normal.      Pupils: Pupils are equal, round, and reactive to light.   Neck:      Vascular: No carotid bruit.      Trachea: Trachea normal.   Cardiovascular:      Rate and Rhythm: Normal rate and regular rhythm.      Pulses: Normal pulses.           Radial pulses are 2+ on the right side and 2+ on the left side.        Dorsalis pedis pulses are 2+ on the right side and 2+ on the left side.   Pulmonary:      Effort: Pulmonary effort is normal. No tachypnea, accessory muscle usage or respiratory distress. He is not intubated.      Breath sounds: Normal breath sounds.   Chest:      Chest wall: No deformity or tenderness.   Abdominal:      General: Bowel sounds are normal.      Palpations: Abdomen is soft.      Tenderness: There is no abdominal tenderness.      Hernia: A hernia is present. Hernia is present in the umbilical area.       Genitourinary:     Penis: Normal.    Musculoskeletal:         General:  Normal range of motion.      Cervical back: Normal range of motion.      Right lower leg: No edema.      Left lower leg: No edema.      Right foot: No deformity.      Left foot: No deformity.   Lymphadenopathy:      Cervical: No cervical adenopathy.   Skin:     General: Skin is warm and dry.      Capillary Refill: Capillary refill takes less than 2 seconds.      Findings: No rash.   Neurological:      General: No focal deficit present.      Mental Status: He is alert and oriented to person, place, and time.      GCS: GCS eye subscore is 4. GCS verbal subscore is 5. GCS motor subscore is 6.   Psychiatric:         Attention and Perception: Attention normal.         Mood and Affect: Mood normal.         Speech: Speech normal.         Behavior: Behavior normal. Behavior is cooperative.         Thought Content: Thought content normal.         Cognition and Memory: Cognition normal.         Judgment: Judgment normal.         Vents:  Oxygen Concentration (%): 21 (01/22/22 0010)    Lines/Drains/Airways     Peripheral Intravenous Line                 Peripheral IV - Single Lumen 01/22/22 0545 20 G Right Antecubital <1 day                Significant Labs:    CBC/Anemia Profile:  Recent Labs   Lab 01/20/22  1202 01/21/22  0448 01/22/22  0551   WBC  --  9.70 12.72*   HGB  --  13.1* 13.4*   HCT  --  43.3 43.1   PLT  --  346 362   MCV  --  91 87   RDW  --  16.8* 16.3*   FERRITIN 68  --   --         Chemistries:  Recent Labs   Lab 01/21/22  0448 01/22/22  0551   * 142   K 3.2* 3.2*    102   CO2 23 26   BUN 40* 27*   CREATININE 1.1 1.2   CALCIUM 9.6 9.1   MG 1.7 1.8       POCT Glucose:   Recent Labs   Lab 01/21/22  1821 01/22/22  0047 01/22/22  0543   POCTGLUCOSE 210* 146* 131*     All pertinent labs within the past 24 hours have been reviewed.        ABG  Recent Labs   Lab 01/20/22  0506   PH 7.412   PO2 29*   PCO2 46.6*   HCO3 29.7*   BE 5     Assessment/Plan:     Neuro  * Acute metabolic encephalopathy  Unclear  etiology: likely sepsis with BiPolar D/O  Resolved this AM  Cont neuro monitoring  No focal deficits  Rx sepsis    Psychiatric  Schizoaffective disorder, bipolar type  Cont Trazodone and Duloxetine  PRN Klonopin    Pulmonary  Bilateral pneumonia  Afeb   Change IVAB to Augmentin 1/21  Follow fever curve  CXR no change from prior film  No aspiration per ST eval, mech soft diet due to no dentures  Cont DN  Encourage OOB, C&DB    Cardiac/Vascular  Essential hypertension  Cont Coreg, Norvasc and Lasix  I/O balance - 1.3 L  TTE:  · The left ventricle is normal in size with concentric hypertrophy and mildly decreased systolic function.  · Grade I left ventricular diastolic dysfunction.  · Normal right ventricular size with normal right ventricular systolic function.  · There is pulmonary hypertension.  · Intermediate central venous pressure (8 mmHg).  · The estimated PA systolic pressure is 53 mmHg.  · The estimated ejection fraction is 40%.  · There are segmental left ventricular wall motion abnormalities.    Renal/  CKD (chronic kidney disease) stage 3, GFR 30-59 ml/min  Creatinine 1.2  Appears euvolumic    Electrolyte imbalance  Oral replacement protocol  AM labs    ID  Severe sepsis  WBC down to 9 and Afeb since admit  Infiltrate on CXR likely PNA  Blood cultures X 2 NGTD  Cont Antb  SARS neg  Afeb      Endocrine  Type 2 diabetes mellitus with stage 3 chronic kidney disease, with long-term current use of insulin  Glucose 115-317  Cont SSI and Detemir    Other  Tobacco abuse disorder  Smoking cessation.  Nicotine patches        Preventive Measures and Monitoring:   Stress Ulcer: Pepcid  Nutrition: mech soft diet  Glucose control: SSI  Bowel prophylaxis: PRN Miralax  DVT prophylaxis: LMWH/SCDs  Hx CAD on B-Blocker: Coreg  Head of Bed/Reposition: Elevate HOB and turn Q1-2 hours   Early Mobility: OOB today  Code Status: Full     Counseling/Consultation:I have discussed the care of this patient in detail with the bedside  nursing staff and Dr. Cain and Dr. Sandoval    Transfer orders placed per Dr. Sandoval.  Agree.  Will sign off, please call if needed     Critical Care Time: 47 minutes  Critical secondary to Patient has a condition that poses threat to life and bodily function: Acute Encephalopathy and Severe Sepsis      Critical care was time spent personally by me on the following activities: development of treatment plan with patient or surrogate and bedside caregivers, discussions with consultants, evaluation of patient's response to treatment, examination of patient, ordering and performing treatments and interventions, ordering and review of laboratory studies, ordering and review of radiographic studies, pulse oximetry, re-evaluation of patient's condition. This critical care time did not overlap with that of any other provider or involve time for any procedures.     Eamon Gamboa NP  Critical Care Medicine  [facility] - Intensive Care [facility])

## 2022-01-22 NOTE — ASSESSMENT & PLAN NOTE
WBC down to 9 and Afeb since admit  Infiltrate on CXR likely PNA  Blood cultures X 2 NGTD  Cont Antb  SARS neg  Afeb

## 2022-01-22 NOTE — ASSESSMENT & PLAN NOTE
Sedated on Precedex      1/21  Likely secondary to infection and hypoxemia  Improved  ?What his baseline is, although oriented now he remains somewhat confused. No focal weakness    1/22  Resolved

## 2022-01-22 NOTE — ASSESSMENT & PLAN NOTE
EF 40%  Grade I diastolic   Pulm HTN    Patient is euvolemic: cont PO Lasix  Cont current meds  Outpatient Cards

## 2022-01-22 NOTE — PROGRESS NOTES
[facility] - Intensive Care [address] [facility] Medicine  Progress Note    Patient Name: Shukri Rosales  MRN: [id number]  Patient Class: IP- Inpatient   Admission Date: 1/19/2022  Length of Stay: 3 days  Attending Physician: Lamberto Sandoval MD  Primary Care Provider: Cris Matias NP        Subjective:     Principal Problem:Acute metabolic encephalopathy        HPI:   61 y.o. male patient with a PMHx of incisional abdominal hernia, peripheral vascular disease, DM2, adrenal cortical adenoma, CKD, carotid stenosis, schizophrenia, GERD, HTN, and HLD who presents to the Emergency Department for SOB. According to [facility], the pt's family reports that the pt started to have trouble breathing at 0700 this morning, but the pt refused to go to a hospital. According to [facility], the pt then became altered, so the pt's family members called 911. When [facility] arrived on scene, the pt's oxygen saturation was 77% on RA, and the pt was cyanotic. En route to the ED, [facility] administered a partial duo-neb which was ripped off by the pt, and the pt was put on 20L of O2 which brought his oxygen saturation up to 89%. The pt admits to smoking. At the time of my exam, patient is on BIPAP and lethargic s/p Ativan. Will repeat ABG      Overview/Hospital Course:  61 y.o. male patient with Hx of incisional abdominal hernia, PAD, DM2, adrenal cortical adenoma, CKD, carotid stenosis, schizophrenia, GERD, HTN, and HLD brought to ER by EMS for SOB. According to [facility], the pt's family reports that the pt started to have trouble breathing at 0700 this morning, but the pt refused to go to a hospital. According to [facility], the pt then became altered, so the pt's family members called 911. When [facility] arrived on scene, the pt's oxygen saturation was 77% on RA, and the pt was cyanotic. En route to the ED, [facility] administered a partial duo-neb which was ripped off by the pt, and the pt was put on 20L of O2 which brought his oxygen saturation up to 89%. The pt  admits to smoking. Initial labs in the ER Show WBC 19, with 88% Segs, Bun 35, BNP 3500, Trop 0.199, ABG showed PC 7.31/57/419 on Bipap 100%. Pt had received a dose of lasix in the ER. Pt placed in ICU on Bipap. He was also on Precedex gtt for his agitation.    1/20- remains mildly sedated with Precedex on NC, off Bipap as Hypercapnia improved, still gets agitated, hence getting haldol plus Ativan prn, about to have NGT placed. WBC improved to 15.8, D dimer 1.1, Trop down to 0.191. continue present care. Started on IV lasix.  1/21 patient off Precedex gtt. Still restrained at wrist though per RN less confused today, off BIPAP and on 5L nasal canula  1/22 patient seen in Y, AAO x 3, confusion is resolved, patient is appropriate, qualifies for home O2 88% on RA with exertion. Had some dimer elevation on admit, do a CTA to r/o PE, empiric full lovenox for now, if PE negative then d/c Full Lovenox.  Anticipate d/c in 24-48 hours      Interval History: no overnight events    Review of Systems   Constitutional: Negative for fatigue and fever.   HENT: Negative for mouth sores, nosebleeds, postnasal drip, sinus pressure, sinus pain and sore throat.    Respiratory: Negative for cough, shortness of breath and stridor.    Cardiovascular: Negative for chest pain and leg swelling.   Genitourinary: Negative for flank pain, hematuria and urgency.   Musculoskeletal: Negative for arthralgias and back pain.   Skin: Negative for color change.   Allergic/Immunologic: Negative for immunocompromised state.   Neurological: Negative for seizures and speech difficulty.   Hematological: Negative for adenopathy.   Psychiatric/Behavioral: Negative for agitation, behavioral problems and confusion.     Objective:     Vital Signs (Most Recent):  Temp: 98.2 °F (36.8 °C) (01/22/22 1315)  Pulse: 77 (01/22/22 1300)  Resp: (!) 23 (01/22/22 1300)  BP: 139/84 (01/22/22 1300)  SpO2: (!) 93 % (01/22/22 1300) Vital Signs (24h Range):  Temp:  [97.8 °F  (36.6 °C)-98.2 °F (36.8 °C)] 98.2 °F (36.8 °C)  Pulse:  [] 77  Resp:  [17-43] 23  SpO2:  [90 %-97 %] 93 %  BP: (122-181)/() 139/84     Weight: 89.8 kg (198 lb)  Body mass index is 26.85 kg/m².    Intake/Output Summary (Last 24 hours) at 1/22/2022 1341  Last data filed at 1/22/2022 0800  Gross per 24 hour   Intake 1300 ml   Output 400 ml   Net 900 ml      Physical Exam  Vitals reviewed.   Constitutional:       General: He is not in acute distress.     Appearance: Normal appearance. He is not ill-appearing or toxic-appearing.   HENT:      Head: Normocephalic and atraumatic.      Mouth/Throat:      Mouth: Mucous membranes are moist.      Pharynx: Oropharynx is clear.   Eyes:      General: No scleral icterus.     Pupils: Pupils are equal, round, and reactive to light.   Cardiovascular:      Rate and Rhythm: Normal rate and regular rhythm.      Pulses: Normal pulses.   Pulmonary:      Effort: Pulmonary effort is normal. No respiratory distress.      Breath sounds: No wheezing or rales.   Abdominal:      General: Abdomen is flat. Bowel sounds are normal.      Palpations: Abdomen is soft.   Musculoskeletal:      Right lower leg: No edema.      Left lower leg: No edema.   Skin:     General: Skin is warm and dry.      Capillary Refill: Capillary refill takes less than 2 seconds.   Neurological:      General: No focal deficit present.      Mental Status: He is alert.   Psychiatric:         Mood and Affect: Mood normal.         Significant Labs:   Blood Culture: No results for input(s): LABBLOO in the last 48 hours.  BMP:   Recent Labs   Lab 01/22/22  0551   *      K 3.2*      CO2 26   BUN 27*   CREATININE 1.2   CALCIUM 9.1   MG 1.8     CBC:   Recent Labs   Lab 01/21/22 0448 01/22/22  0551   WBC 9.70 12.72*   HGB 13.1* 13.4*   HCT 43.3 43.1    362     CMP:   Recent Labs   Lab 01/21/22 0448 01/22/22  0551   * 142   K 3.2* 3.2*    102   CO2 23 26   * 115*   BUN 40* 27*    CREATININE 1.1 1.2   CALCIUM 9.6 9.1   ANIONGAP 18* 14   EGFRNONAA >60 >60     Coagulation: No results for input(s): PT, INR, APTT in the last 48 hours.    Significant Imaging: I have reviewed all pertinent imaging results/findings within the past 24 hours.      Assessment/Plan:      * Acute metabolic encephalopathy  Sedated on Precedex      1/21  Likely secondary to infection and hypoxemia  Improved  ?What his baseline is, although oriented now he remains somewhat confused. No focal weakness    1/22  Resolved    Bilateral pneumonia  Bilateral Pneumonia  Blood culture NGTD  Cont PO Augmentin # 1  Wean down supplemental O2 as tolerated    1/22  Cont PO Augmentin  RT eval for home O2 noted      Cardiomyopathy  EF 40%  Grade I diastolic   Pulm HTN    Patient is euvolemic: cont PO Lasix  Cont current meds  Outpatient Cards      Severe sepsis  This patient does have evidence of infective focus  My overall impression is sepsis. Vital signs were reviewed and noted in progress note.  Antibiotics given-   Antibiotics (From admission, onward)            Start     Stop Route Frequency Ordered    01/21/22 1145  amoxicillin-clavulanate 875-125mg per tablet 1 tablet         01/26 0859 Oral Every 12 hours 01/21/22 1032    01/20/22 1000  mupirocin 2 % ointment         01/25 0859 Nasl 2 times daily 01/20/22 0851        Cultures were taken-   Microbiology Results (last 7 days)     Procedure Component Value Units Date/Time    Blood Culture #1 **CANNOT BE ORDERED STAT** [[id number]] Collected: 01/19/22 1531    Order Status: Completed Specimen: Blood from Peripheral, Antecubital, Left Updated: 01/21/22 2212     Blood Culture, Routine No Growth to date      No Growth to date      No Growth to date    Blood Culture #2 **CANNOT BE ORDERED STAT** [[id number]] Collected: 01/19/22 1531    Order Status: Completed Specimen: Blood from Peripheral, Antecubital, Right Updated: 01/21/22 2212     Blood Culture, Routine No Growth to date      No  Growth to date      No Growth to date    Influenza A & B by Molecular [[id number]] Collected: 01/19/22 2215    Order Status: Completed Specimen: Nasopharyngeal Swab Updated: 01/19/22 2303     Influenza A, Molecular Negative     Influenza B, Molecular Negative     Flu A & B Source Nasal swab    Culture, Respiratory with Gram Stain [[id number]]     Order Status: No result Specimen: Respiratory         Latest lactate reviewed, they are-  Recent Labs   Lab 01/19/22  1532   LACTATE 2.6*       Organ dysfunction indicated by Acute respiratory failure  Source- PNA    Source control Achieved by- IV abx    Sepsis s/t CAP w/ acute hypercapnic resp failure  Empiric Abx  BIPAP, titrate as tolerated      Initially suspected of Sepsis due to elevated WBC but Leukocytosis likely reactive  Started on IV Lasix    1/21  Resolving/resolved    1/22  Resolved    Schizoaffective disorder, bipolar type    Patient calm  Cont home Cymbalta        Electrolyte imbalance  PRN Potassium replacement on board      Type 2 diabetes mellitus with stage 3 chronic kidney disease, with long-term current use of insulin    Diabetic diet  Levemir QHS  SSI      Tobacco abuse disorder  Smoking cessation counseling      Essential hypertension  Continue home meds amlodipine, clonidine  Restart lisinopril, HCTZ as tolerated, pt clinically volume deplete    BP under control    1/21  Cont current meds    1/22  Cont current meds      VTE Risk Mitigation (From admission, onward)         Ordered     enoxaparin injection 80 mg  Every 12 hours         01/22/22 1338     IP VTE HIGH RISK PATIENT  Once         01/19/22 1837     Place sequential compression device  Until discontinued         01/19/22 1837                Discharge Planning   ALICIA:      Code Status: Full Code   Is the patient medically ready for discharge?:     Reason for patient still in hospital (select all that apply): Treatment and Pending disposition  Discharge Plan A: Home            Critical care time  spent on the evaluation and treatment of severe organ dysfunction, review of pertinent labs and imaging studies, discussions with consulting providers and discussions with patient/family: 30 minutes.      Lamberto Sandoval MD  Department of Hospital Medicine   [facility] - Intensive Care ([facility])

## 2022-01-22 NOTE — PROGRESS NOTES
[facility] Home Oxygen Evaluation    Date Performed: 2022    1) Patient's Home O2 Sat on room air, while at rest: 93        If O2 sats on room air at rest are 88% or below, patient qualifies. No additional testing needed. Document N/A in steps 2 and 3. If 89% or above, complete steps 2.      2) Patient's O2 Sat on room air while exercisin        If O2 sats on room air while exercising remain 89% or above patient does not qualify, no further testing needed Document N/A in step 3. If O2 sats on room air while exercising are 88% or below, continue to step 3.      3) Patient's O2 Sat while exercising on O2:95  At 2 LPM         (Must show improvement from #2 for patients to qualify)    If O2 sats improve on oxygen, patient qualifies for portable oxygen. If not, the patient does not qualify.

## 2022-01-22 NOTE — ASSESSMENT & PLAN NOTE
Bilateral Pneumonia  Blood culture NGTD  Cont PO Augmentin # 1  Wean down supplemental O2 as tolerated    1/22  Cont PO Augmentin  RT eval for home O2 noted

## 2022-01-22 NOTE — PT/OT/SLP EVAL
Physical Therapy Evaluation    Patient Name:  Shukri Rosales   MRN:  [id number]    Recommendations:     Discharge Recommendations:  home health PT   Discharge Equipment Recommendations: none   Barriers to discharge: None    Assessment:     Shukri Rosales is a 61 y.o. male admitted with a medical diagnosis of Acute metabolic encephalopathy.  He presents with the following impairments/functional limitations:  weakness,impaired endurance,impaired balance,gait instability,impaired functional mobilty,decreased safety awareness.    Rehab Prognosis: Good; patient would benefit from acute skilled PT services to address these deficits and reach maximum level of function.    Recent Surgery: * No surgery found *     Plan:     During this hospitalization, patient to be seen 3 x/week to address the identified rehab impairments via gait training,therapeutic activities,therapeutic exercises and progress toward the following goals:    · Plan of Care Expires:  02/04/22    Subjective     Chief Complaint:   Patient/Family Comments/goals:   Pain/Comfort:  · Pain Rating 1: 0/10   · PT C/O DISCOMFORT TO PENIS DUE TO HIM PULLING ABARCA OUT    Patients cultural, spiritual, Yazdanism conflicts given the current situation:      Living Environment:  PT LIVES ALONE 1 STORY HOUSE NO STEPS, AMB WITH OR W/O RW LIMITED COMMUNITY DISTANCES, DOES NOT DRIVE, BROTHER OR SISTER DRIVE FOR HIM, INDEP WITH ADL'S  Prior to admission, patients level of function was TOÑO.  Equipment used at home: walker, rolling,bath bench,bedside commode (STANDING UPRIGHT RW).  DME owned (not currently used): none.  Upon discharge, patient will have assistance from SISTER OR BROTHER.    Objective:     Communicated with NURSE MCCARTHY prior to session.  Patient found supine with blood pressure cuff,telemetry,peripheral IV,pulse ox (continuous), BED ALARM  upon PT entry to room.    General Precautions: Standard, fall   Orthopedic Precautions:N/A   Braces:  N/A  Respiratory Status: Room air    Exams:  · Cognitive Exam:  Patient is oriented to Person, Place, Time and Situation  · Postural Exam:  Patient presented with the following abnormalities:    · -       Rounded shoulders  · Sensation:    · -       Impaired  light/touch BLE  · RLE ROM: WFL  · RLE Strength: WFL  · LLE ROM: WFL  · LLE Strength: WFL    Functional Mobility:  · Bed Mobility:     · Rolling Left:  stand by assistance  · Rolling Right: stand by assistance  · Scooting: stand by assistance  · Supine to Sit: stand by assistance  · Sit to Supine: stand by assistance  · Transfers:     · Sit to Stand:  contact guard assistance with rolling walker  · Bed to Chair: contact guard assistance with  rolling walker  using  Step Transfer  · Gait: PT ' WITH RW AND CGA, NO LOB, MINIMAL SOB ON ROOM AIR, O2 SATS DURING GAIT ON ROOM AIR 91%  · Balance: FAIR    Therapeutic Activities and Exercises:   PT EDUCATED IN ROLE OF P.T. AND POC, REVIEW RW USE AND SAFETY DURING TF'S AND GAIT, PT QUICK/IMPULSIVE AT TIME, STEADY WITH CUES, RETURN TO SUPINE WITH SBA    AM-PAC 6 CLICK MOBILITY  Total Score:18     Patient left HOB elevated with all lines intact, call button in reach, bed alarm on and NURSE notified.    GOALS:   Multidisciplinary Problems     Physical Therapy Goals        Problem: Physical Therapy Goal    Goal Priority Disciplines Outcome Goal Variances Interventions   Physical Therapy Goal     PT, PT/OT      Description: LTG'S TO BE MET IN 14 DAYS (2-5-22)  1. PT WILL BE TOÑO WITH BED MOBILITY  2. PT WILL BE TOÑO WITH TF'S  3. PT WILL ' WITH RW AND SPV                   History:     Past Medical History:   Diagnosis Date    Adrenal cortical adenoma     Anxiety     Arthritis     CKD (chronic kidney disease) stage 3, GFR 30-59 ml/min     Depression     Diabetes mellitus type II 2011    does not take    DM (diabetes mellitus) 2011     am 09/21/2017 Insulin x 1 1/2 year    GERD (gastroesophageal  reflux disease) 7/9/2013    Hypertension     Migraine headache 7/22/2013    Schizophrenia     Severe obesity with body mass index of 36.0 to 36.9        Past Surgical History:   Procedure Laterality Date    BACK SURGERY      COLONOSCOPY N/A 12/3/2020    Procedure: COLONOSCOPY;  Surgeon: Christofer Palmer MD;  Location: [facility];  Service: Endoscopy;  Laterality: N/A;    COLONOSCOPY N/A 12/4/2020    Procedure: COLONOSCOPY;  Surgeon: Frandy Stanton MD;  Location: [facility];  Service: Endoscopy;  Laterality: N/A;    HERNIA REPAIR      UMBILICAL    left arm exfix      NASAL SEPTUM SURGERY Bilateral     TONSILLECTOMY      URETEROSCOPY         Time Tracking:     PT Received On: 01/22/22  PT Start Time: 0805     PT Stop Time: 0820  PT Total Time (min): 15 min     Billable Minutes: Evaluation 15    01/22/2022

## 2022-01-22 NOTE — ASSESSMENT & PLAN NOTE
Afeb   Change IVAB to Augmentin 1/21  Follow fever curve  CXR no change from prior film  No aspiration per ST eval, mech soft diet due to no dentures  Cont DN  Encourage OOB, C&DB

## 2022-01-22 NOTE — PLAN OF CARE
P.T. EVAL COMPLETE, PT CURRENTLY REQUIRES SBA FOR BED MOBILITY, CGA FOR TF'S AND GAIT WITH RW.  P.T. RECOMMENDS HHPT

## 2022-01-22 NOTE — ASSESSMENT & PLAN NOTE
Unclear etiology: likely sepsis with BiPolar D/O  Resolved this AM  Cont neuro monitoring  No focal deficits  Rx sepsis

## 2022-01-22 NOTE — ASSESSMENT & PLAN NOTE
This patient does have evidence of infective focus  My overall impression is sepsis. Vital signs were reviewed and noted in progress note.  Antibiotics given-   Antibiotics (From admission, onward)            Start     Stop Route Frequency Ordered    01/21/22 1145  amoxicillin-clavulanate 875-125mg per tablet 1 tablet         01/26 0859 Oral Every 12 hours 01/21/22 1032    01/20/22 1000  mupirocin 2 % ointment         01/25 0859 Nasl 2 times daily 01/20/22 0851        Cultures were taken-   Microbiology Results (last 7 days)     Procedure Component Value Units Date/Time    Blood Culture #1 **CANNOT BE ORDERED STAT** [[id number]] Collected: 01/19/22 1531    Order Status: Completed Specimen: Blood from Peripheral, Antecubital, Left Updated: 01/21/22 2212     Blood Culture, Routine No Growth to date      No Growth to date      No Growth to date    Blood Culture #2 **CANNOT BE ORDERED STAT** [[id number]] Collected: 01/19/22 1531    Order Status: Completed Specimen: Blood from Peripheral, Antecubital, Right Updated: 01/21/22 2212     Blood Culture, Routine No Growth to date      No Growth to date      No Growth to date    Influenza A & B by Molecular [[id number]] Collected: 01/19/22 2215    Order Status: Completed Specimen: Nasopharyngeal Swab Updated: 01/19/22 2303     Influenza A, Molecular Negative     Influenza B, Molecular Negative     Flu A & B Source Nasal swab    Culture, Respiratory with Gram Stain [[id number]]     Order Status: No result Specimen: Respiratory         Latest lactate reviewed, they are-  Recent Labs   Lab 01/19/22  1532   LACTATE 2.6*       Organ dysfunction indicated by Acute respiratory failure  Source- PNA    Source control Achieved by- IV abx    Sepsis s/t CAP w/ acute hypercapnic resp failure  Empiric Abx  BIPAP, titrate as tolerated      Initially suspected of Sepsis due to elevated WBC but Leukocytosis likely reactive  Started on IV Lasix    1/21  Resolving/resolved    1/22  Resolved

## 2022-01-22 NOTE — SUBJECTIVE & OBJECTIVE
Interval History: no overnight events    Review of Systems   Constitutional: Negative for fatigue and fever.   HENT: Negative for mouth sores, nosebleeds, postnasal drip, sinus pressure, sinus pain and sore throat.    Respiratory: Negative for cough, shortness of breath and stridor.    Cardiovascular: Negative for chest pain and leg swelling.   Genitourinary: Negative for flank pain, hematuria and urgency.   Musculoskeletal: Negative for arthralgias and back pain.   Skin: Negative for color change.   Allergic/Immunologic: Negative for immunocompromised state.   Neurological: Negative for seizures and speech difficulty.   Hematological: Negative for adenopathy.   Psychiatric/Behavioral: Negative for agitation, behavioral problems and confusion.     Objective:     Vital Signs (Most Recent):  Temp: 98.2 °F (36.8 °C) (01/22/22 1315)  Pulse: 77 (01/22/22 1300)  Resp: (!) 23 (01/22/22 1300)  BP: 139/84 (01/22/22 1300)  SpO2: (!) 93 % (01/22/22 1300) Vital Signs (24h Range):  Temp:  [97.8 °F (36.6 °C)-98.2 °F (36.8 °C)] 98.2 °F (36.8 °C)  Pulse:  [] 77  Resp:  [17-43] 23  SpO2:  [90 %-97 %] 93 %  BP: (122-181)/() 139/84     Weight: 89.8 kg (198 lb)  Body mass index is 26.85 kg/m².    Intake/Output Summary (Last 24 hours) at 1/22/2022 1341  Last data filed at 1/22/2022 0800  Gross per 24 hour   Intake 1300 ml   Output 400 ml   Net 900 ml      Physical Exam  Vitals reviewed.   Constitutional:       General: He is not in acute distress.     Appearance: Normal appearance. He is not ill-appearing or toxic-appearing.   HENT:      Head: Normocephalic and atraumatic.      Mouth/Throat:      Mouth: Mucous membranes are moist.      Pharynx: Oropharynx is clear.   Eyes:      General: No scleral icterus.     Pupils: Pupils are equal, round, and reactive to light.   Cardiovascular:      Rate and Rhythm: Normal rate and regular rhythm.      Pulses: Normal pulses.   Pulmonary:      Effort: Pulmonary effort is normal. No  respiratory distress.      Breath sounds: No wheezing or rales.   Abdominal:      General: Abdomen is flat. Bowel sounds are normal.      Palpations: Abdomen is soft.   Musculoskeletal:      Right lower leg: No edema.      Left lower leg: No edema.   Skin:     General: Skin is warm and dry.      Capillary Refill: Capillary refill takes less than 2 seconds.   Neurological:      General: No focal deficit present.      Mental Status: He is alert.   Psychiatric:         Mood and Affect: Mood normal.         Significant Labs:   Blood Culture: No results for input(s): LABBLOO in the last 48 hours.  BMP:   Recent Labs   Lab 01/22/22  0551   *      K 3.2*      CO2 26   BUN 27*   CREATININE 1.2   CALCIUM 9.1   MG 1.8     CBC:   Recent Labs   Lab 01/21/22  0448 01/22/22  0551   WBC 9.70 12.72*   HGB 13.1* 13.4*   HCT 43.3 43.1    362     CMP:   Recent Labs   Lab 01/21/22 0448 01/22/22  0551   * 142   K 3.2* 3.2*    102   CO2 23 26   * 115*   BUN 40* 27*   CREATININE 1.1 1.2   CALCIUM 9.6 9.1   ANIONGAP 18* 14   EGFRNONAA >60 >60     Coagulation: No results for input(s): PT, INR, APTT in the last 48 hours.    Significant Imaging: I have reviewed all pertinent imaging results/findings within the past 24 hours.

## 2022-01-22 NOTE — SUBJECTIVE & OBJECTIVE
Review of Systems   Constitutional: Positive for malaise/fatigue. Negative for chills and fever.   HENT: Negative for congestion.    Eyes: Negative for blurred vision.   Respiratory: Negative for cough, sputum production and shortness of breath.    Cardiovascular: Negative for chest pain and leg swelling.   Gastrointestinal: Negative for nausea and vomiting.   Genitourinary: Negative for dysuria.        Penile pain from patient pulling weeks yesterday   Musculoskeletal: Negative for myalgias.   Skin: Negative for rash.   Neurological: Negative for dizziness, weakness and headaches.   Endo/Heme/Allergies: Does not bruise/bleed easily.   Psychiatric/Behavioral: The patient is not nervous/anxious.          Objective:     Vital Signs (Most Recent):  Temp: 98.2 °F (36.8 °C) (01/22/22 0800)  Pulse: 82 (01/22/22 0845)  Resp: (!) 35 (01/22/22 0845)  BP: (!) 159/95 (01/22/22 0800)  SpO2: (!) 94 % (01/22/22 0845) Vital Signs (24h Range):  Temp:  [97.8 °F (36.6 °C)-98.2 °F (36.8 °C)] 98.2 °F (36.8 °C)  Pulse:  [] 82  Resp:  [16-46] 35  SpO2:  [90 %-99 %] 94 %  BP: (131-181)/() 159/95     Weight: 89.8 kg (198 lb)  Body mass index is 26.85 kg/m².      Intake/Output Summary (Last 24 hours) at 1/22/2022 0912  Last data filed at 1/22/2022 0800  Gross per 24 hour   Intake 1900 ml   Output 1180 ml   Net 720 ml       Physical Exam  Vitals and nursing note reviewed.   Constitutional:       General: He is awake. He is not in acute distress.     Appearance: He is well-developed. He is ill-appearing. He is not toxic-appearing or diaphoretic.      Interventions: He is not intubated.  HENT:      Head: Normocephalic and atraumatic.      Mouth/Throat:      Mouth: Mucous membranes are moist.   Eyes:      General: Lids are normal.      Pupils: Pupils are equal, round, and reactive to light.   Neck:      Vascular: No carotid bruit.      Trachea: Trachea normal.   Cardiovascular:      Rate and Rhythm: Normal rate and regular rhythm.       Pulses: Normal pulses.           Radial pulses are 2+ on the right side and 2+ on the left side.        Dorsalis pedis pulses are 2+ on the right side and 2+ on the left side.   Pulmonary:      Effort: Pulmonary effort is normal. No tachypnea, accessory muscle usage or respiratory distress. He is not intubated.      Breath sounds: Normal breath sounds.   Chest:      Chest wall: No deformity or tenderness.   Abdominal:      General: Bowel sounds are normal.      Palpations: Abdomen is soft.      Tenderness: There is no abdominal tenderness.      Hernia: A hernia is present. Hernia is present in the umbilical area.       Genitourinary:     Penis: Normal.    Musculoskeletal:         General: Normal range of motion.      Cervical back: Normal range of motion.      Right lower leg: No edema.      Left lower leg: No edema.      Right foot: No deformity.      Left foot: No deformity.   Lymphadenopathy:      Cervical: No cervical adenopathy.   Skin:     General: Skin is warm and dry.      Capillary Refill: Capillary refill takes less than 2 seconds.      Findings: No rash.   Neurological:      General: No focal deficit present.      Mental Status: He is alert and oriented to person, place, and time.      GCS: GCS eye subscore is 4. GCS verbal subscore is 5. GCS motor subscore is 6.   Psychiatric:         Attention and Perception: Attention normal.         Mood and Affect: Mood normal.         Speech: Speech normal.         Behavior: Behavior normal. Behavior is cooperative.         Thought Content: Thought content normal.         Cognition and Memory: Cognition normal.         Judgment: Judgment normal.         Vents:  Oxygen Concentration (%): 21 (01/22/22 0010)    Lines/Drains/Airways     Peripheral Intravenous Line                 Peripheral IV - Single Lumen 01/22/22 0545 20 G Right Antecubital <1 day                Significant Labs:    CBC/Anemia Profile:  Recent Labs   Lab 01/20/22  1202 01/21/22  1131  01/22/22  0551   WBC  --  9.70 12.72*   HGB  --  13.1* 13.4*   HCT  --  43.3 43.1   PLT  --  346 362   MCV  --  91 87   RDW  --  16.8* 16.3*   FERRITIN 68  --   --         Chemistries:  Recent Labs   Lab 01/21/22  0448 01/22/22  0551   * 142   K 3.2* 3.2*    102   CO2 23 26   BUN 40* 27*   CREATININE 1.1 1.2   CALCIUM 9.6 9.1   MG 1.7 1.8       POCT Glucose:   Recent Labs   Lab 01/21/22  1821 01/22/22  0047 01/22/22  0543   POCTGLUCOSE 210* 146* 131*     All pertinent labs within the past 24 hours have been reviewed.     Undermining Type: Entire Wound

## 2022-01-22 NOTE — ASSESSMENT & PLAN NOTE
Continue home meds amlodipine, clonidine  Restart lisinopril, HCTZ as tolerated, pt clinically volume deplete    BP under control    1/21  Cont current meds    1/22  Cont current meds

## 2022-01-22 NOTE — PLAN OF CARE
Plan of care reviewed with pt. Pt AAOx4, very anxious to discharge. Requires repeated reminders on not pulling at lines or getting out of bed to get dressed to go home. Tech sitting at bedside to assist in pt safety. NSR on monitor. K replaced this shift. CTA chest completed, negative for PE. Home O2 eval completed, qualifies. Tolerating dysphagia diabetic diet. Accuchecks q6hr. Voids per urinal. Small amount of bleeding and discomfort noted to urethra secondary to trauma from pt removing weeks catheter on a previous shift. Bed alarm in use.

## 2022-01-22 NOTE — PLAN OF CARE
Patient VSS throughout shift. Precedex gtt off, PRN ativan and haldol given for restless and agitation. Patient follows commands and answers questions appropriately. Patient requires reinforcement to not pull on ECG leads. Patient did pull out weeks catheter, patient cleaned and linens changed. Patient independently moves in bed, constantly turning. Will monitor     Problem: Adult Inpatient Plan of Care  Goal: Plan of Care Review  Outcome: Ongoing, Progressing  Goal: Patient-Specific Goal (Individualized)  Outcome: Ongoing, Progressing  Goal: Absence of Hospital-Acquired Illness or Injury  Outcome: Ongoing, Progressing  Goal: Optimal Comfort and Wellbeing  Outcome: Ongoing, Progressing  Goal: Readiness for Transition of Care  Outcome: Ongoing, Progressing

## 2022-01-22 NOTE — ASSESSMENT & PLAN NOTE
Cont Coreg, Norvasc and Lasix  I/O balance - 1.3 L  TTE:  · The left ventricle is normal in size with concentric hypertrophy and mildly decreased systolic function.  · Grade I left ventricular diastolic dysfunction.  · Normal right ventricular size with normal right ventricular systolic function.  · There is pulmonary hypertension.  · Intermediate central venous pressure (8 mmHg).  · The estimated PA systolic pressure is 53 mmHg.  · The estimated ejection fraction is 40%.  · There are segmental left ventricular wall motion abnormalities.

## 2022-01-23 VITALS
HEIGHT: 72 IN | TEMPERATURE: 98 F | RESPIRATION RATE: 22 BRPM | BODY MASS INDEX: 26.82 KG/M2 | HEART RATE: 72 BPM | SYSTOLIC BLOOD PRESSURE: 113 MMHG | DIASTOLIC BLOOD PRESSURE: 71 MMHG | WEIGHT: 198 LBS | OXYGEN SATURATION: 92 %

## 2022-01-23 PROBLEM — J43.2 CENTRILOBULAR EMPHYSEMA: Status: ACTIVE | Noted: 2022-01-23

## 2022-01-23 LAB
ALBUMIN SERPL BCP-MCNC: 2.6 G/DL (ref 3.5–5.2)
ALP SERPL-CCNC: 72 U/L (ref 55–135)
ALT SERPL W/O P-5'-P-CCNC: 16 U/L (ref 10–44)
ANION GAP SERPL CALC-SCNC: 11 MMOL/L (ref 8–16)
AST SERPL-CCNC: 11 U/L (ref 10–40)
BASOPHILS # BLD AUTO: 0.02 K/UL (ref 0–0.2)
BASOPHILS NFR BLD: 0.2 % (ref 0–1.9)
BILIRUB SERPL-MCNC: 0.8 MG/DL (ref 0.1–1)
BUN SERPL-MCNC: 24 MG/DL (ref 8–23)
CALCIUM SERPL-MCNC: 8.6 MG/DL (ref 8.7–10.5)
CHLORIDE SERPL-SCNC: 103 MMOL/L (ref 95–110)
CO2 SERPL-SCNC: 25 MMOL/L (ref 23–29)
CREAT SERPL-MCNC: 1.1 MG/DL (ref 0.5–1.4)
DIFFERENTIAL METHOD: ABNORMAL
EOSINOPHIL # BLD AUTO: 0.4 K/UL (ref 0–0.5)
EOSINOPHIL NFR BLD: 4.2 % (ref 0–8)
ERYTHROCYTE [DISTWIDTH] IN BLOOD BY AUTOMATED COUNT: 16.3 % (ref 11.5–14.5)
EST. GFR  (AFRICAN AMERICAN): >60 ML/MIN/1.73 M^2
EST. GFR  (NON AFRICAN AMERICAN): >60 ML/MIN/1.73 M^2
GLUCOSE SERPL-MCNC: 96 MG/DL (ref 70–110)
HCT VFR BLD AUTO: 38.3 % (ref 40–54)
HGB BLD-MCNC: 12.1 G/DL (ref 14–18)
IMM GRANULOCYTES # BLD AUTO: 0.07 K/UL (ref 0–0.04)
IMM GRANULOCYTES NFR BLD AUTO: 0.8 % (ref 0–0.5)
LYMPHOCYTES # BLD AUTO: 2.2 K/UL (ref 1–4.8)
LYMPHOCYTES NFR BLD: 24.5 % (ref 18–48)
MCH RBC QN AUTO: 27.6 PG (ref 27–31)
MCHC RBC AUTO-ENTMCNC: 31.6 G/DL (ref 32–36)
MCV RBC AUTO: 87 FL (ref 82–98)
MONOCYTES # BLD AUTO: 0.9 K/UL (ref 0.3–1)
MONOCYTES NFR BLD: 9.6 % (ref 4–15)
NEUTROPHILS # BLD AUTO: 5.4 K/UL (ref 1.8–7.7)
NEUTROPHILS NFR BLD: 60.7 % (ref 38–73)
NRBC BLD-RTO: 0 /100 WBC
PLATELET # BLD AUTO: 292 K/UL (ref 150–450)
PMV BLD AUTO: 9.5 FL (ref 9.2–12.9)
POCT GLUCOSE: 101 MG/DL (ref 70–110)
POCT GLUCOSE: 147 MG/DL (ref 70–110)
POCT GLUCOSE: 221 MG/DL (ref 70–110)
POTASSIUM SERPL-SCNC: 3.3 MMOL/L (ref 3.5–5.1)
PROT SERPL-MCNC: 5.1 G/DL (ref 6–8.4)
RBC # BLD AUTO: 4.39 M/UL (ref 4.6–6.2)
SODIUM SERPL-SCNC: 139 MMOL/L (ref 136–145)
WBC # BLD AUTO: 8.87 K/UL (ref 3.9–12.7)

## 2022-01-23 PROCEDURE — 25000003 PHARM REV CODE 250: Performed by: NURSE PRACTITIONER

## 2022-01-23 PROCEDURE — 94640 AIRWAY INHALATION TREATMENT: CPT

## 2022-01-23 PROCEDURE — 85025 COMPLETE CBC W/AUTO DIFF WBC: CPT | Performed by: INTERNAL MEDICINE

## 2022-01-23 PROCEDURE — 80053 COMPREHEN METABOLIC PANEL: CPT | Performed by: INTERNAL MEDICINE

## 2022-01-23 PROCEDURE — 25000003 PHARM REV CODE 250: Performed by: INTERNAL MEDICINE

## 2022-01-23 PROCEDURE — 63600175 PHARM REV CODE 636 W HCPCS: Performed by: INTERNAL MEDICINE

## 2022-01-23 PROCEDURE — 99291 CRITICAL CARE FIRST HOUR: CPT | Mod: ,,, | Performed by: INTERNAL MEDICINE

## 2022-01-23 PROCEDURE — 25000242 PHARM REV CODE 250 ALT 637 W/ HCPCS: Performed by: INTERNAL MEDICINE

## 2022-01-23 PROCEDURE — 99291 PR CRITICAL CARE, E/M 30-74 MINUTES: ICD-10-PCS | Mod: ,,, | Performed by: INTERNAL MEDICINE

## 2022-01-23 PROCEDURE — 94799 UNLISTED PULMONARY SVC/PX: CPT

## 2022-01-23 PROCEDURE — 36415 COLL VENOUS BLD VENIPUNCTURE: CPT | Performed by: INTERNAL MEDICINE

## 2022-01-23 RX ORDER — POTASSIUM CHLORIDE 20 MEQ/1
20 TABLET, EXTENDED RELEASE ORAL 2 TIMES DAILY
Qty: 10 TABLET | Refills: 0 | Status: SHIPPED | OUTPATIENT
Start: 2022-01-24 | End: 2022-01-29

## 2022-01-23 RX ORDER — INSULIN LISPRO 100 [IU]/ML
INJECTION, SUSPENSION SUBCUTANEOUS
Qty: 3 EACH | Refills: 2
Start: 2022-01-23

## 2022-01-23 RX ORDER — FUROSEMIDE 40 MG/1
40 TABLET ORAL DAILY
Qty: 7 TABLET | Refills: 0 | Status: ON HOLD | OUTPATIENT
Start: 2022-01-24 | End: 2022-02-13

## 2022-01-23 RX ORDER — AMOXICILLIN AND CLAVULANATE POTASSIUM 875; 125 MG/1; MG/1
1 TABLET, FILM COATED ORAL EVERY 12 HOURS
Qty: 10 TABLET | Refills: 0 | Status: SHIPPED | OUTPATIENT
Start: 2022-01-23 | End: 2022-01-28

## 2022-01-23 RX ORDER — NAPROXEN SODIUM 220 MG/1
81 TABLET, FILM COATED ORAL DAILY
Qty: 60 TABLET | Refills: 0 | Status: SHIPPED | OUTPATIENT
Start: 2022-01-24 | End: 2023-01-24

## 2022-01-23 RX ORDER — CLONAZEPAM 1 MG/1
0.5 TABLET ORAL 2 TIMES DAILY PRN
Start: 2022-01-23

## 2022-01-23 RX ORDER — POTASSIUM CHLORIDE 20 MEQ/1
40 TABLET, EXTENDED RELEASE ORAL EVERY 4 HOURS
Status: COMPLETED | OUTPATIENT
Start: 2022-01-23 | End: 2022-01-23

## 2022-01-23 RX ORDER — QUETIAPINE FUMARATE 300 MG/1
300 TABLET, FILM COATED ORAL NIGHTLY
Status: ON HOLD
Start: 2022-01-23 | End: 2022-02-13 | Stop reason: HOSPADM

## 2022-01-23 RX ORDER — UMECLIDINIUM BROMIDE AND VILANTEROL TRIFENATATE 62.5; 25 UG/1; UG/1
1 POWDER RESPIRATORY (INHALATION) DAILY
Qty: 1 EACH | Refills: 1 | Status: SHIPPED | OUTPATIENT
Start: 2022-01-23

## 2022-01-23 RX ADMIN — CARVEDILOL 6.25 MG: 6.25 TABLET, FILM COATED ORAL at 08:01

## 2022-01-23 RX ADMIN — CLONAZEPAM 0.5 MG: 0.5 TABLET ORAL at 07:01

## 2022-01-23 RX ADMIN — METHOCARBAMOL 750 MG: 750 TABLET ORAL at 08:01

## 2022-01-23 RX ADMIN — POTASSIUM CHLORIDE 40 MEQ: 1500 TABLET, EXTENDED RELEASE ORAL at 01:01

## 2022-01-23 RX ADMIN — IPRATROPIUM BROMIDE AND ALBUTEROL SULFATE 3 ML: 2.5; .5 SOLUTION RESPIRATORY (INHALATION) at 12:01

## 2022-01-23 RX ADMIN — POTASSIUM CHLORIDE 40 MEQ: 1500 TABLET, EXTENDED RELEASE ORAL at 09:01

## 2022-01-23 RX ADMIN — FAMOTIDINE 20 MG: 20 TABLET, FILM COATED ORAL at 08:01

## 2022-01-23 RX ADMIN — ENOXAPARIN SODIUM 80 MG: 80 INJECTION SUBCUTANEOUS at 06:01

## 2022-01-23 RX ADMIN — METHOCARBAMOL 750 MG: 750 TABLET ORAL at 01:01

## 2022-01-23 RX ADMIN — ASPIRIN 81 MG CHEWABLE TABLET 81 MG: 81 TABLET CHEWABLE at 08:01

## 2022-01-23 RX ADMIN — AMOXICILLIN AND CLAVULANATE POTASSIUM 1 TABLET: 875; 125 TABLET, FILM COATED ORAL at 08:01

## 2022-01-23 RX ADMIN — HYDROCODONE BITARTRATE AND ACETAMINOPHEN 1 TABLET: 5; 325 TABLET ORAL at 03:01

## 2022-01-23 RX ADMIN — ACETAMINOPHEN 650 MG: 325 TABLET ORAL at 07:01

## 2022-01-23 RX ADMIN — INSULIN ASPART 4 UNITS: 100 INJECTION, SOLUTION INTRAVENOUS; SUBCUTANEOUS at 11:01

## 2022-01-23 RX ADMIN — FUROSEMIDE 40 MG: 40 TABLET ORAL at 08:01

## 2022-01-23 RX ADMIN — ATORVASTATIN CALCIUM 20 MG: 10 TABLET, FILM COATED ORAL at 08:01

## 2022-01-23 RX ADMIN — DULOXETINE 60 MG: 30 CAPSULE, DELAYED RELEASE ORAL at 08:01

## 2022-01-23 RX ADMIN — TAMSULOSIN HYDROCHLORIDE 0.4 MG: 0.4 CAPSULE ORAL at 08:01

## 2022-01-23 RX ADMIN — GABAPENTIN 800 MG: 400 CAPSULE ORAL at 01:01

## 2022-01-23 RX ADMIN — GABAPENTIN 800 MG: 400 CAPSULE ORAL at 08:01

## 2022-01-23 RX ADMIN — AMLODIPINE BESYLATE 10 MG: 10 TABLET ORAL at 08:01

## 2022-01-23 RX ADMIN — MUPIROCIN: 20 OINTMENT TOPICAL at 08:01

## 2022-01-23 RX ADMIN — IPRATROPIUM BROMIDE AND ALBUTEROL SULFATE 3 ML: 2.5; .5 SOLUTION RESPIRATORY (INHALATION) at 07:01

## 2022-01-23 RX ADMIN — HYDROCODONE BITARTRATE AND ACETAMINOPHEN 1 TABLET: 5; 325 TABLET ORAL at 09:01

## 2022-01-23 NOTE — DISCHARGE SUMMARY
[facility] ([facility])  [facility]  Discharge Summary      Patient Name: Shukri Rosales  MRN: [id number]  Patient Class: IP- Inpatient  Admission Date: 1/19/2022  Hospital Length of Stay: 4 days  Discharge Date and Time:  01/23/2022 12:37 PM  Attending Physician: Lamberto Sandoval MD   Discharging Provider: Lamberto Sandoval MD  Primary Care Provider: Cris Matias NP      HPI:    61 y.o. male patient with a PMHx of incisional abdominal hernia, peripheral vascular disease, DM2, adrenal cortical adenoma, CKD, carotid stenosis, schizophrenia, GERD, HTN, and HLD who presents to the Emergency Department for SOB. According to AASI, the pt's family reports that the pt started to have trouble breathing at 0700 this morning, but the pt refused to go to a hospital. According to AASI, the pt then became altered, so the pt's family members called 911. When [facility]I arrived on scene, the pt's oxygen saturation was 77% on RA, and the pt was cyanotic. En route to the ED, AASI administered a partial duo-neb which was ripped off by the pt, and the pt was put on 20L of O2 which brought his oxygen saturation up to 89%. The pt admits to smoking. At the time of my exam, patient is on BIPAP and lethargic s/p Ativan. Will repeat ABG      * No surgery found *      Hospital Course:   61 y.o. male patient with Hx of incisional abdominal hernia, PAD, DM2, adrenal cortical adenoma, CKD, carotid stenosis, schizophrenia, GERD, HTN, and HLD brought to ER by EMS for SOB. According to AASI, the pt's family reports that the pt started to have trouble breathing at 0700 this morning, but the pt refused to go to a hospital. According to AASI, the pt then became altered, so the pt's family members called 911. When [facility] arrived on scene, the pt's oxygen saturation was 77% on RA, and the pt was cyanotic. En route to the ED, AASI administered a partial duo-neb which was ripped off by the pt, and the pt was put on 20L of O2 which  brought his oxygen saturation up to 89%. The pt admits to smoking. Initial labs in the ER Show WBC 19, with 88% Segs, Bun 35, BNP 3500, Trop 0.199, ABG showed PC 7.31/57/419 on Bipap 100%. Pt had received a dose of lasix in the ER. Pt placed in ICU on Bipap. He was also on Precedex gtt for his agitation.    1/20- remains mildly sedated with Precedex on NC, off Bipap as Hypercapnia improved, still gets agitated, hence getting haldol plus Ativan prn, about to have NGT placed. WBC improved to 15.8, D dimer 1.1, Trop down to 0.191. continue present care. Started on IV lasix.  1/21 patient off Precedex gtt. Still restrained at wrist though per RN less confused today, off BIPAP and on 5L nasal canula  1/22 patient seen in ICY, AAO x 3, confusion is resolved, patient is appropriate, qualifies for home O2 88% on RA with exertion. Had some dimer elevation on admit, do a CTA to r/o PE, empiric full lovenox for now, if PE negative then d/c Full Lovenox.  Anticipate d/c in 24-48 hours  1/23 He is table for dischagre, on home O2, few days of oral antibiotics, changes have been made to his home antihypertensive and diabetes medications. Attempted to get the patient home health. He will follow up with [facility] Pulm as outpatient. Did not require require with [facility], d/w the pulm       Goals of Care Treatment Preferences:  Code Status: Full Code      Consults:   Consults (From admission, onward)        Status Ordering Provider     Inpatient consult to Social Work  Once        Provider:  (Not yet assigned)    Completed ANDREW WHITE     Inpatient consult to Pulmonology  Once        Provider:  (Not yet assigned)    Completed KHLOE STANTON          No new Assessment & Plan notes have been filed under this hospital service since the last note was generated.  Service: Hospital Medicine    Final Active Diagnoses:    Diagnosis Date Noted POA    PRINCIPAL PROBLEM:  Acute metabolic encephalopathy [G93.41] 04/08/2021 Yes     Bilateral pneumonia [J18.9] 01/20/2022 Yes    Centrilobular emphysema [J43.2] 01/23/2022 Unknown    Cardiomyopathy [I42.9] 01/22/2022 Unknown    Severe sepsis [A41.9, R65.20] 01/19/2022 Yes    Schizoaffective disorder, bipolar type [F25.0] 02/09/2018 Yes     Chronic    Electrolyte imbalance [E87.8] 02/08/2018 Yes    Type 2 diabetes mellitus with stage 3 chronic kidney disease, with long-term current use of insulin [E11.22, N18.30, Z79.4] 07/25/2016 Not Applicable     Chronic    Tobacco abuse disorder [Z72.0] 01/19/2016 Yes     Chronic    Essential hypertension [I10] 10/09/2014 Yes     Chronic    CKD (chronic kidney disease) stage 3, GFR 30-59 ml/min [N18.30] 08/26/2014 Yes     Chronic      Problems Resolved During this Admission:    Diagnosis Date Noted Date Resolved POA    Acute respiratory failure with hypoxia and hypercarbia [J96.01, J96.02] 01/19/2022 01/22/2022 Yes       Discharged Condition: stable    Disposition:     Follow Up:   Follow-up Information     Cris Matias NP In 1 week.    Specialty: Family Medicine  Contact information:  [address] [facility].  [address]  [facility] [address]  [phone]             Blanca Cain MD In 1 week.    Specialty: Pulmonary Disease  Contact information:  [id number] [place] [address] [address] Norah REES [address]  [phone]             Joel Frost Md, MD In 2 weeks.    Specialties: Cardiology, Internal Medicine  Contact information:  [address]  [place] [address]  [phone]                       Patient Instructions:      OXYGEN FOR HOME USE     Order Specific Question Answer Comments   Liter Flow 2    Duration With activity    Qualifying Test Performed at: Activity    Oxygen saturation at rest 93    Oxygen saturation with activity 88    Oxygen saturation with activity on oxygen 95    Portable mode: continuous    Route nasal cannula    Device: home concentrator with portable tanks    Length of need (in months): 99 mos    Patient condition with  qualifying saturation COPD    Height: 6' (1.829 m)    Weight: 89.8 kg (198 lb)    Alternative treatment measures have been tried or considered and deemed clinically ineffective. Yes        Significant Diagnostic Studies: n/a    Pending Diagnostic Studies:     None         Medications:  Reconciled Home Medications:      Medication List      START taking these medications    amoxicillin-clavulanate 875-125mg 875-125 mg per tablet  Commonly known as: AUGMENTIN  Take 1 tablet by mouth every 12 (twelve) hours. for 5 days     ANORO ELLIPTA 62.5-25 mcg/actuation Dsdv  Generic drug: umeclidinium-vilanteroL  Inhale 1 puff into the lungs once daily. Controller     aspirin 81 MG Chew  Take 1 tablet (81 mg total) by mouth once daily.  Start taking on: January 24, 2022     furosemide 40 MG tablet  Commonly known as: LASIX  Take 1 tablet (40 mg total) by mouth once daily. Hold if Systolic blood pressure less than 110 mmHG for 7 days  Start taking on: January 24, 2022     potassium chloride SA 20 MEQ tablet  Commonly known as: K-DUR,KLOR-CON  Take 1 tablet (20 mEq total) by mouth 2 (two) times daily. for 5 days  Start taking on: January 24, 2022        CHANGE how you take these medications    clonazePAM 1 MG tablet  Commonly known as: KlonoPIN  Take 0.5 tablets (0.5 mg total) by mouth 2 (two) times daily as needed for Anxiety. Take 1.5mg twice daily  What changed:   · how much to take  · how to take this  · when to take this  · reasons to take this     HumaLOG Mix 50-50 Insuln U-100 100 unit/mL (50-50) Susp  Generic drug: insulin lispro protamin-lispro  10 Units twice daily  What changed: additional instructions     QUEtiapine 300 MG Tab  Commonly known as: SEROQUEL  Take 1 tablet (300 mg total) by mouth nightly.  What changed: Another medication with the same name was removed. Continue taking this medication, and follow the directions you see here.        CONTINUE taking these medications    albuterol 90 mcg/actuation  inhaler  Commonly known as: PROVENTIL/VENTOLIN HFA  Inhale 1-2 puffs into the lungs every 4 (four) hours as needed for Wheezing. Rescue     amLODIPine 10 MG tablet  Commonly known as: NORVASC  Take 10 mg by mouth.     atorvastatin 20 MG tablet  Commonly known as: LIPITOR  Take 20 mg by mouth once daily.     baclofen 20 MG tablet  Commonly known as: LIORESAL  Take 20 mg by mouth every 8 (eight) hours as needed.     carvediloL 6.25 MG tablet  Commonly known as: COREG  Take 6.25 mg by mouth 2 (two) times daily.     docusate sodium 100 MG capsule  Commonly known as: COLACE  Take 100 mg by mouth 2 (two) times daily.     DULoxetine 60 MG capsule  Commonly known as: CYMBALTA  Take 60 mg by mouth 2 (two) times daily.     ergocalciferol 50,000 unit Cap  Commonly known as: ERGOCALCIFEROL  Take 50,000 Units by mouth every 7 days.     ferrous sulfate 325 mg (65 mg iron) Tab tablet  Commonly known as: FEOSOL  Take 325 mg by mouth.     gabapentin 800 MG tablet  Commonly known as: NEURONTIN  Take 800 mg by mouth 3 (three) times daily.     GLUCAGEN DIAGNOSTIC KIT 1 mg/mL Solr  Generic drug: glucagon  Inject 1 mg into the muscle daily as needed.     insulin lispro 100 unit/mL injection  Inject under the skin with meals by sliding scale:  under 150 0 unit, 151-200 2 units, 201-250 4 units, 251-300 6 units, 301-350 8 units, 351-400 10 units, over 400 12 units.     JARDIANCE 25 mg tablet  Generic drug: empagliflozin  Take 25 mg by mouth.     lisinopriL 40 MG tablet  Commonly known as: PRINIVIL,ZESTRIL  Take 40 mg by mouth once daily.     metFORMIN 1000 MG tablet  Commonly known as: GLUCOPHAGE  Take 500 mg by mouth 2 (two) times daily with meals.     methocarbamoL 750 MG Tab  Commonly known as: ROBAXIN  Take 750 mg by mouth 3 (three) times daily.     nicotine 14 mg/24 hr  Commonly known as: NICODERM CQ  Place 1 patch onto the skin once daily.     nicotine polacrilex 4 MG Lozg  Take 1 lozenge (4 mg total) by mouth as needed. Not to  exceed 10 lozenges per day.     omeprazole 20 MG capsule  Commonly known as: PRILOSEC  Take 20 mg by mouth once daily.     PRECISION Q-I-D TEST Strp  Generic drug: blood sugar diagnostic  1 each.     tamsulosin 0.4 mg Cap  Commonly known as: FLOMAX  Take 0.4 mg by mouth.     traZODone 150 MG tablet  Commonly known as: DESYREL  Take 150 mg by mouth every evening.        STOP taking these medications    CHANTIX 1 mg Tab  Generic drug: varenicline     cloNIDine 0.3 MG tablet  Commonly known as: CATAPRES     doxycycline 100 MG Cap  Commonly known as: VIBRAMYCIN     hydroCHLOROthiazide 12.5 MG Tab  Commonly known as: HYDRODIURIL     HYDROcodone-acetaminophen 5-325 mg per tablet  Commonly known as: NORCO     insulin regular 100 unit/mL injection     sertraline 100 MG tablet  Commonly known as: ZOLOFT     TROKENDI  mg Cp24  Generic drug: topiramate            Indwelling Lines/Drains at time of discharge:   Lines/Drains/Airways     None                 Time spent on the discharge of patient: 35 minutes    Critical care time spent on the evaluation and treatment of severe organ dysfunction, review of pertinent labs and imaging studies, discussions with consulting providers and discussions with patient/family: 35 minutes.     Lamberto Sandoval MD  Department of Hospital Medicine  [facility] ([facility])

## 2022-01-23 NOTE — PROGRESS NOTES
[facility] Care ([facility])  Critical Care Medicine  Consult Note    Patient Name: Shukri Rosales  MRN: [id number]  Admission Date: 1/19/2022  Hospital Length of Stay: 4 days  Code Status: Full Code  Attending Physician: Lamberto Sandoval MD   Primary Care Provider: Cris Matias NP   Principal Problem: Acute metabolic encephalopathy    [unfilled]  Subjective:     HPI:  61-year-old male patient admitted to the hospital for worsening shortness of breath for 1 week, brought to ED by EMS, was found to be severely hypoxemic needed BiPAP overnight and admitted to ICU.    PMHx of incisional abdominal hernia, peripheral vascular disease, DM2, adrenal cortical adenoma, CKD, carotid stenosis, schizophrenia, GERD, HTN, and HLD         Hospital/ICU Course:  O2 sat 96% on 5 liters/minute.  T-max 98°.  Chest x-ray reviewed.  Status post Medina placement.  Chest x-ray ABG VBG reviewed.  CT angio chest 01/02/2022 reviewed.  1/21 - still with periods of restlessness pulled NG overnight.  Awake and responsive but confused anxious and tachypnic again this afternoon but no hypoxia.  1/22 - Patient pulled Medina out yesterday.  This AM much more alert and awake and cooperative in no distress on RA SAT 95% with orientation X 3  1/23 seen and examined.  Awake alert x3.  O2 sat 93% on room air.  T-max 98°.  Afebrile.  Wearing BiPAP overnight.        1/23 seen and examined.  Awake alert x3.  O2 sat 93% on room air.  T-max 98°.  Afebrile.  Wearing BiPAP overnight.  CT angio of the chest rule out PE reviewed       Review of Systems   Constitutional: Positive for diaphoresis and fatigue. Negative for chills and fever.   HENT: Positive for congestion. Negative for nosebleeds.    Eyes: Negative for discharge.   Respiratory: Positive for cough. Negative for choking.    Cardiovascular: Positive for leg swelling.   Gastrointestinal: Negative for blood in stool.   Endocrine: Negative for cold intolerance.   Genitourinary: Negative  for hematuria.   Musculoskeletal: Positive for arthralgias. Negative for neck stiffness.   Skin: Negative for rash.   Allergic/Immunologic: Negative for immunocompromised state.   Neurological: Positive for weakness. Negative for seizures.   Hematological: Negative for adenopathy.   Psychiatric/Behavioral: Negative for behavioral problems.     Objective:     Vital Signs (Most Recent):  Temp: (P) 98.1 °F (36.7 °C) (01/23/22 0700)  Pulse: 83 (01/23/22 0800)  Resp: (!) 21 (01/23/22 0948)  BP: 111/84 (01/23/22 0818)  SpO2: (!) 93 % (01/23/22 0800) Vital Signs (24h Range):  Temp:  [97.5 °F (36.4 °C)-98.2 °F (36.8 °C)] (P) 98.1 °F (36.7 °C)  Pulse:  [66-94] 83  Resp:  [11-29] 21  SpO2:  [91 %-97 %] 93 %  BP: (102-153)/(59-97) 111/84     Weight: 89.8 kg (198 lb)  Body mass index is 26.85 kg/m².      Intake/Output Summary (Last 24 hours) at 1/23/2022 1048  Last data filed at 1/23/2022 0800  Gross per 24 hour   Intake 780 ml   Output 850 ml   Net -70 ml       Physical Exam  Vitals and nursing note reviewed.   Constitutional:       General: He is not in acute distress.     Appearance: He is well-developed. He is ill-appearing and toxic-appearing.   HENT:      Head: Normocephalic and atraumatic.   Cardiovascular:      Rate and Rhythm: Normal rate and regular rhythm.   Pulmonary:      Effort: Pulmonary effort is normal.      Breath sounds: Wheezing present. No rhonchi.   Abdominal:      Palpations: Abdomen is soft.      Tenderness: There is no abdominal tenderness.   Musculoskeletal:         General: No deformity.      Cervical back: Neck supple.   Skin:     General: Skin is warm.      Findings: Bruising and lesion present.   Neurological:      General: No focal deficit present.      Mental Status: He is disoriented.   Psychiatric:         Mood and Affect: Mood normal.         Thought Content: Thought content normal.         Judgment: Judgment normal.         Vents:  Oxygen Concentration (%): 21 (01/22/22  0010)    Lines/Drains/Airways     Peripheral Intravenous Line                 Peripheral IV - Single Lumen 01/22/22 0545 20 G Right Antecubital 1 day                Significant Labs:    CBC/Anemia Profile:  Recent Labs   Lab 01/22/22  0551 01/23/22 0522   WBC 12.72* 8.87   HGB 13.4* 12.1*   HCT 43.1 38.3*    292   MCV 87 87   RDW 16.3* 16.3*        Chemistries:  Recent Labs   Lab 01/22/22  0551 01/23/22 0522    139   K 3.2* 3.3*    103   CO2 26 25   BUN 27* 24*   CREATININE 1.2 1.1   CALCIUM 9.1 8.6*   ALBUMIN  --  2.6*   PROT  --  5.1*   BILITOT  --  0.8   ALKPHOS  --  72   ALT  --  16   AST  --  11   MG 1.8  --    Results for MERRILL WRIGHT (MRN [id number]) as of 1/20/2022 15:24   Ref. Range 1/19/2022 14:52 1/20/2022 04:49   BNP Latest Ref Range: 0 - 99 pg/mL 3,494 (H)    Troponin I Latest Ref Range: 0.000 - 0.026 ng/mL 0.199 (H) 0.191 (H)   Results for MERRILL WRIGHT (MRN [id number]) as of 1/20/2022 15:24   Ref. Range 1/20/2022 02:12 1/20/2022 05:06   POC PH Latest Ref Range: 7.35 - 7.45  7.510 (H) 7.412   POC PCO2 Latest Ref Range: 35 - 45 mmHg 38.7 46.6 (H)   POC PO2 Latest Ref Range: 40 - 60 mmHg 126 (H) 29 (L)   POC BE Latest Ref Range: -2 to 2 mmol/L 8 5   POC HCO3 Latest Ref Range: 24 - 28 mmol/L 30.9 (H) 29.7 (H)   POC SATURATED O2 Latest Ref Range: 95 - 100 % 99 56 (L)   FiO2 Unknown 40    Vt Unknown 450    Sample Unknown ARTERIAL VENOUS   DelSys Unknown CPAP/BiPAP Nasal Can   Allens Test Unknown Pass N/A   Site Unknown LR Other   Mode Unknown AVAPS    Rate Unknown 24      PFT 2016 trapping and low DLCO    EKG 01/19/2022    Sinus rhythm with Fusion complexes   Possible Left atrial enlargement   Possible Inferior infarct (cited on or before 01-JAN-2022)   Abnormal ECG     2D echo 01/20/2022    · The left ventricle is normal in size with concentric hypertrophy and mildly decreased systolic function.  · Grade I left ventricular diastolic dysfunction.  · Normal right  ventricular size with normal right ventricular systolic function.  · There is pulmonary hypertension.  · Intermediate central venous pressure (8 mmHg).  · The estimated PA systolic pressure is 53 mmHg.  · The estimated ejection fraction is 40%.  · There are segmental left ventricular wall motion abnormalities.    Significant Imaging:   I have reviewed all pertinent imaging results/findings within the past 24 hours.  CXR: I have reviewed all pertinent results/findings within the past 24 hours and my personal findings are:  COMPARISON:   COMPARISON:  January 1, 2022     FINDINGS:  Two frontal views were obtained.  Worsening reticular interstitial changes/vascular congestion throughout the lungs with development of ground-glass/alveolar infiltrate within the mid lower right lung.  The upper lungs are clear. The cardiac silhouette size is on the upper limits of normal.  The trachea is midline and the mediastinal width is normal. Negative for obvious effusion or pneumothorax.  Negative for osseous abnormalities. Tortuous aorta.  Degenerative changes of the spine and both shoulder girdles.     Impression:     1.  Worsening reticular interstitial changes throughout the lungs with patchy ground-glass/alveolar infiltrate within the mid lower left lung.  Findings most likely represent pneumonia, such as community-acquired pneumonia or atypical viral pneumonia.  Asymmetric mixed interstitial and alveolar pulmonary edema could have this appearance in the right clinical setting.      To      CT angio chest 01/22/2022    COMPARISON:  Chest x-ray 01/22/2022     FINDINGS  Heart: Cardiomegaly.  No pericardial effusions.  Small amounts of coronary arterial calcifications..     Mediastinum: No adenopathy.  Unremarkable.     Vasculature: No pulmonary emboli.  Mild aortic atherosclerosis.     Lungs: Patchy ground-glass infiltrates in the lungs the mid to lung upper lung predominance.  Small left pleural effusion.  Small amounts of  dependent subsegmental atelectasis.     Esophagus: Unremarkable.     Upper Abdomen: Epigastric midline hernia containing stomach.  Hyperplastic adrenals     Bones: No acute fracture. Mild-to-moderate degenerative changes in the thoracic spine.     Impression:     Negative for pulmonary embolus.     Patchy low-grade ground-glass infiltrates throughout the lungs more typical of pneumonia.  Mid to upper lung predominance.  Exclude COVID pneumonia.     Small left pleural effusion with small amounts of bibasilar subsegmental atelectasis..                   ABG  Recent Labs   Lab 01/20/22  0506   PH 7.412   PO2 29*   PCO2 46.6*   HCO3 29.7*   BE 5     Assessment/Plan:     Neuro  * Acute metabolic encephalopathy  Unclear etiology: likely sepsis with BiPolar D/O  Resolved this AM  Cont neuro monitoring  No focal deficits  Rx sepsis    Psychiatric  Schizoaffective disorder, bipolar type  Cont Trazodone and Duloxetine  PRN Klonopin    Pulmonary  Centrilobular emphysema  Home O2 evaluation.  Albuterol Atrovent.  Outpatient pulmonology follow-up    Bilateral pneumonia  Afeb   Change IVAB to Augmentin 1/21  Follow fever curve  CXR no change from prior film  No aspiration per ST eval, mech soft diet due to no dentures  Cont DN  Encourage OOB, C&DB  1/23 COVID negative x2.  P.o. Augmentin.    Cardiac/Vascular  Essential hypertension  Cont Coreg, Norvasc and Lasix  I/O balance - 1.3 L  TTE:  · The left ventricle is normal in size with concentric hypertrophy and mildly decreased systolic function.  · Grade I left ventricular diastolic dysfunction.  · Normal right ventricular size with normal right ventricular systolic function.  · There is pulmonary hypertension.  · Intermediate central venous pressure (8 mmHg).  · The estimated PA systolic pressure is 53 mmHg.  · The estimated ejection fraction is 40%.  · There are segmental left ventricular wall motion abnormalities.    Renal/  Electrolyte imbalance  Oral replacement  protocol  AM labs    CKD (chronic kidney disease) stage 3, GFR 30-59 ml/min  Creatinine 1.2  Appears euvolumic    ID  Severe sepsis  WBC down to 9 and Afeb since admit  Infiltrate on CXR likely PNA  Blood cultures X 2 NGTD  Cont Antb  SARS neg  Afeb  1/23 complete Augmentin course.      Endocrine  Type 2 diabetes mellitus with stage 3 chronic kidney disease, with long-term current use of insulin  Glucose 115-317  Cont SSI and Detemir  1/23 insulin detemir plus SSI fingerstick q.6 hours.  Target fingerstick 140-180    Other  Tobacco abuse disorder  Smoking cessation.  Nicotine patches      Critical Care Daily Checklist:    A: Awake: RASS Goal/Actual Goal: RASS Goal: 0-->alert and calm  Actual: Bhat Agitation Sedation Scale (RASS): Alert and calm   B: Spontaneous Breathing Trial Performed?     C: SAT & SBT Coordinated?  Not intubated                      D: Delirium: CAM-ICU Overall CAM-ICU: Negative   E: Early Mobility Performed? Yes   F: Feeding Goal:    Status:     Current Diet Order   Procedures    Diet diabetic [facility]; 2000 Calorie; Dysphagia Mechanical Soft (IDDSI Level 5), Cardiac (Low Na/Chol); Thin     Order Specific Question:   Indicate patient location for additional diet options:     Answer:   [facility]     Order Specific Question:   Total calories:     Answer:   2000 Calorie     Order Specific Question:   Additional Diet Options:     Answer:   Dysphagia Mechanical Soft (IDDSI Level 5)     Order Specific Question:   Additional Diet Options:     Answer:   Cardiac (Low Na/Chol)     Order Specific Question:   Fluid consistency:     Answer:   Thin      AS: Analgesia/Sedation No sedation   T: Thromboembolic Prophylaxis Lovenox 40 mg prophylactic dose   H: HOB > 300 Yes   U: Stress Ulcer Prophylaxis (if needed) Famotidine   G: Glucose Control INSULIN DETEMIR Fingerstick 6 hours SSI   B: Bowel Function Stool Occurrence: 0   I: Indwelling Catheter (Lines & Medina) Necessity Medina removed   D:  De-escalation of Antimicrobials/Pharmacotherapies Complete course of Augmentin for pneumonia    Plan for the day/ETD Y    Code Status:  Family/Goals of Care: Full Code     Discussed with hospital medicine attending  Critical Care Time: 35 minutes  Critical secondary to Patient has a condition that poses threat to life and bodily function: Severe Respiratory Distress COMPLICATING SEVERE CAP COMPLICATED BY ACUTE HYPERCAPNIC AND HYPOXEMIC RESPIRATORY FAILURE     Critical care was time spent personally by me on the following activities: development of treatment plan with patient or surrogate and bedside caregivers, discussions with consultants, evaluation of patient's response to treatment, examination of patient, ordering and performing treatments and interventions, ordering and review of laboratory studies, ordering and review of radiographic studies, pulse oximetry, re-evaluation of patient's condition. This critical care time did not overlap with that of any other provider or involve time for any procedures.      Blanca Cain MD  Critical Care Medicine  [facility] - Intensive Care [facility])

## 2022-01-23 NOTE — SUBJECTIVE & OBJECTIVE
1/23 seen and examined.  Awake alert x3.  O2 sat 93% on room air.  T-max 98°.  Afebrile.  Wearing BiPAP overnight.  CT angio of the chest rule out PE reviewed       Review of Systems   Constitutional: Positive for diaphoresis and fatigue. Negative for chills and fever.   HENT: Positive for congestion. Negative for nosebleeds.    Eyes: Negative for discharge.   Respiratory: Positive for cough. Negative for choking.    Cardiovascular: Positive for leg swelling.   Gastrointestinal: Negative for blood in stool.   Endocrine: Negative for cold intolerance.   Genitourinary: Negative for hematuria.   Musculoskeletal: Positive for arthralgias. Negative for neck stiffness.   Skin: Negative for rash.   Allergic/Immunologic: Negative for immunocompromised state.   Neurological: Positive for weakness. Negative for seizures.   Hematological: Negative for adenopathy.   Psychiatric/Behavioral: Negative for behavioral problems.     Objective:     Vital Signs (Most Recent):  Temp: (P) 98.1 °F (36.7 °C) (01/23/22 0700)  Pulse: 83 (01/23/22 0800)  Resp: (!) 21 (01/23/22 0948)  BP: 111/84 (01/23/22 0818)  SpO2: (!) 93 % (01/23/22 0800) Vital Signs (24h Range):  Temp:  [97.5 °F (36.4 °C)-98.2 °F (36.8 °C)] (P) 98.1 °F (36.7 °C)  Pulse:  [66-94] 83  Resp:  [11-29] 21  SpO2:  [91 %-97 %] 93 %  BP: (102-153)/(59-97) 111/84     Weight: 89.8 kg (198 lb)  Body mass index is 26.85 kg/m².      Intake/Output Summary (Last 24 hours) at 1/23/2022 1048  Last data filed at 1/23/2022 0800  Gross per 24 hour   Intake 780 ml   Output 850 ml   Net -70 ml       Physical Exam  Vitals and nursing note reviewed.   Constitutional:       General: He is not in acute distress.     Appearance: He is well-developed. He is ill-appearing and toxic-appearing.   HENT:      Head: Normocephalic and atraumatic.   Cardiovascular:      Rate and Rhythm: Normal rate and regular rhythm.   Pulmonary:      Effort: Pulmonary effort is normal.      Breath sounds: Wheezing  present. No rhonchi.   Abdominal:      Palpations: Abdomen is soft.      Tenderness: There is no abdominal tenderness.   Musculoskeletal:         General: No deformity.      Cervical back: Neck supple.   Skin:     General: Skin is warm.      Findings: Bruising and lesion present.   Neurological:      General: No focal deficit present.      Mental Status: He is disoriented.   Psychiatric:         Mood and Affect: Mood normal.         Thought Content: Thought content normal.         Judgment: Judgment normal.         Vents:  Oxygen Concentration (%): 21 (01/22/22 0010)    Lines/Drains/Airways     Peripheral Intravenous Line                 Peripheral IV - Single Lumen 01/22/22 0545 20 G Right Antecubital 1 day                Significant Labs:    CBC/Anemia Profile:  Recent Labs   Lab 01/22/22  0551 01/23/22 0522   WBC 12.72* 8.87   HGB 13.4* 12.1*   HCT 43.1 38.3*    292   MCV 87 87   RDW 16.3* 16.3*        Chemistries:  Recent Labs   Lab 01/22/22 0551 01/23/22 0522    139   K 3.2* 3.3*    103   CO2 26 25   BUN 27* 24*   CREATININE 1.2 1.1   CALCIUM 9.1 8.6*   ALBUMIN  --  2.6*   PROT  --  5.1*   BILITOT  --  0.8   ALKPHOS  --  72   ALT  --  16   AST  --  11   MG 1.8  --    Results for MERRILL WRIGHT (MRN [id number]) as of 1/20/2022 15:24   Ref. Range 1/19/2022 14:52 1/20/2022 04:49   BNP Latest Ref Range: 0 - 99 pg/mL 3,494 (H)    Troponin I Latest Ref Range: 0.000 - 0.026 ng/mL 0.199 (H) 0.191 (H)   Results for MERRILL WRIGHT (MRN [id number]) as of 1/20/2022 15:24   Ref. Range 1/20/2022 02:12 1/20/2022 05:06   POC PH Latest Ref Range: 7.35 - 7.45  7.510 (H) 7.412   POC PCO2 Latest Ref Range: 35 - 45 mmHg 38.7 46.6 (H)   POC PO2 Latest Ref Range: 40 - 60 mmHg 126 (H) 29 (L)   POC BE Latest Ref Range: -2 to 2 mmol/L 8 5   POC HCO3 Latest Ref Range: 24 - 28 mmol/L 30.9 (H) 29.7 (H)   POC SATURATED O2 Latest Ref Range: 95 - 100 % 99 56 (L)   FiO2 Unknown 40    Vt Unknown 450    Sample  Unknown ARTERIAL VENOUS   DelSys Unknown CPAP/BiPAP Nasal Can   Allens Test Unknown Pass N/A   Site Unknown LR Other   Mode Unknown AVAPS    Rate Unknown 24      PFT 2016 trapping and low DLCO    EKG 01/19/2022    Sinus rhythm with Fusion complexes   Possible Left atrial enlargement   Possible Inferior infarct (cited on or before 01-JAN-2022)   Abnormal ECG     2D echo 01/20/2022    · The left ventricle is normal in size with concentric hypertrophy and mildly decreased systolic function.  · Grade I left ventricular diastolic dysfunction.  · Normal right ventricular size with normal right ventricular systolic function.  · There is pulmonary hypertension.  · Intermediate central venous pressure (8 mmHg).  · The estimated PA systolic pressure is 53 mmHg.  · The estimated ejection fraction is 40%.  · There are segmental left ventricular wall motion abnormalities.    Significant Imaging:   I have reviewed all pertinent imaging results/findings within the past 24 hours.  CXR: I have reviewed all pertinent results/findings within the past 24 hours and my personal findings are:  COMPARISON:   COMPARISON:  January 1, 2022     FINDINGS:  Two frontal views were obtained.  Worsening reticular interstitial changes/vascular congestion throughout the lungs with development of ground-glass/alveolar infiltrate within the mid lower right lung.  The upper lungs are clear. The cardiac silhouette size is on the upper limits of normal.  The trachea is midline and the mediastinal width is normal. Negative for obvious effusion or pneumothorax.  Negative for osseous abnormalities. Tortuous aorta.  Degenerative changes of the spine and both shoulder girdles.     Impression:     1.  Worsening reticular interstitial changes throughout the lungs with patchy ground-glass/alveolar infiltrate within the mid lower left lung.  Findings most likely represent pneumonia, such as community-acquired pneumonia or atypical viral pneumonia.  Asymmetric  mixed interstitial and alveolar pulmonary edema could have this appearance in the right clinical setting.      To      CT angio chest 01/22/2022    COMPARISON:  Chest x-ray 01/22/2022     FINDINGS  Heart: Cardiomegaly.  No pericardial effusions.  Small amounts of coronary arterial calcifications..     Mediastinum: No adenopathy.  Unremarkable.     Vasculature: No pulmonary emboli.  Mild aortic atherosclerosis.     Lungs: Patchy ground-glass infiltrates in the lungs the mid to lung upper lung predominance.  Small left pleural effusion.  Small amounts of dependent subsegmental atelectasis.     Esophagus: Unremarkable.     Upper Abdomen: Epigastric midline hernia containing stomach.  Hyperplastic adrenals     Bones: No acute fracture. Mild-to-moderate degenerative changes in the thoracic spine.     Impression:     Negative for pulmonary embolus.     Patchy low-grade ground-glass infiltrates throughout the lungs more typical of pneumonia.  Mid to upper lung predominance.  Exclude COVID pneumonia.     Small left pleural effusion with small amounts of bibasilar subsegmental atelectasis..

## 2022-01-23 NOTE — CONSULTS
Pt was approved for home O2 by Nasra ROMAN. SW delivered home O2 setup to pt at bedside. SW will follow up as needed.

## 2022-01-23 NOTE — PLAN OF CARE
[facility] - Intensive Care (Hospital)  Discharge Final Note    Primary Care Provider: Cris Matias NP    Expected Discharge Date: 1/23/2022    Final Discharge Note (most recent)     Final Note - 01/23/22 1253        Final Note    Assessment Type Final Discharge Note     Anticipated Discharge Disposition Home or Self Care        Post-Acute Status    Discharge Delays None known at this time                 Important Message from Medicare             Contact Info     Cris Matias NP   Specialty: Family Medicine   Relationship: PCP - General    [address] [place] [address].  [address]  [facility] [address]   Phone: [phone]       Next Steps: Follow up in 1 week(s)    Blanca Cain MD   Specialty: Pulmonary Disease    [address] [place] [address] [address] [place] [address]   Phone: [phone]       Next Steps: Follow up in 1 week(s)    Joel Frost Md, MD   Specialty: Cardiology, Internal Medicine    [address] [facility]  [place] [address]   Phone: [phone]       Next Steps: Follow up in 2 week(s)        Pt has no discharge needs at the time of chart review.

## 2022-01-23 NOTE — DISCHARGE INSTRUCTIONS
1. See your PCP within 3-5 days    2. Your blood pressure here has been low-normal therefore we stopped some of your blood pressure medications. Make sure you check your blood pressure at least twice daily, around same time of the day      3. Your blood sugar here has been acceptable, records show that you are on Jardiance, Metformin, as well as mixed insulin 76 U in morning and  36 U at night, this may be too much insulin. We recommend 10 U twice a day with at least 3 times daily blood sugar checks, if your sugars remain greater than 180 with this regimen then your Mixed insulin should be increased preferably by your PCP.

## 2022-01-23 NOTE — PROGRESS NOTES
Pt IVs d/c and is waiting for sister to transport back to home. Patient received afternoon meds. Will be transferred by wheelchair

## 2022-01-23 NOTE — ASSESSMENT & PLAN NOTE
WBC down to 9 and Afeb since admit  Infiltrate on CXR likely PNA  Blood cultures X 2 NGTD  Cont Antb  SARS neg  Afeb  1/23 complete Augmentin course.

## 2022-01-23 NOTE — ASSESSMENT & PLAN NOTE
Glucose 115-317  Cont SSI and Detemir  1/23 insulin detemir plus SSI fingerstick q.6 hours.  Target fingerstick 140-180

## 2022-01-23 NOTE — ASSESSMENT & PLAN NOTE
Afeb   Change IVAB to Augmentin 1/21  Follow fever curve  CXR no change from prior film  No aspiration per ST eval, mech soft diet due to no dentures  Cont DN  Encourage OOB, C&DB  1/23 COVID negative x2.  P.o. Augmentin.

## 2022-01-24 LAB
BACTERIA BLD CULT: NORMAL
BACTERIA BLD CULT: NORMAL

## 2022-01-26 ENCOUNTER — PES CALL (OUTPATIENT)
Dept: HOME HEALTH SERVICES | Facility: CLINIC | Age: 62
End: 2022-01-26
Payer: MEDICAID

## 2022-02-03 ENCOUNTER — OFFICE VISIT (OUTPATIENT)
Dept: HOME HEALTH SERVICES | Facility: CLINIC | Age: 62
End: 2022-02-03
Payer: MEDICAID

## 2022-02-03 DIAGNOSIS — Z79.899 POLYPHARMACY: ICD-10-CM

## 2022-02-03 PROCEDURE — 3051F HG A1C>EQUAL 7.0%<8.0%: CPT | Mod: CPTII,S$GLB,, | Performed by: NURSE PRACTITIONER

## 2022-02-03 PROCEDURE — 4010F PR ACE/ARB THEARPY RXD/TAKEN: ICD-10-PCS | Mod: CPTII,S$GLB,, | Performed by: NURSE PRACTITIONER

## 2022-02-03 PROCEDURE — 3051F PR MOST RECENT HEMOGLOBIN A1C LEVEL 7.0 - < 8.0%: ICD-10-PCS | Mod: CPTII,S$GLB,, | Performed by: NURSE PRACTITIONER

## 2022-02-03 PROCEDURE — 99499 UNLISTED E&M SERVICE: CPT | Mod: S$GLB,,, | Performed by: NURSE PRACTITIONER

## 2022-02-03 PROCEDURE — 4010F ACE/ARB THERAPY RXD/TAKEN: CPT | Mod: CPTII,S$GLB,, | Performed by: NURSE PRACTITIONER

## 2022-02-03 PROCEDURE — 99499 NO LOS: ICD-10-PCS | Mod: S$GLB,,, | Performed by: NURSE PRACTITIONER

## 2022-02-07 ENCOUNTER — HOSPITAL ENCOUNTER (INPATIENT)
Facility: HOSPITAL | Age: 62
LOS: 6 days | Discharge: HOME | DRG: 291 | End: 2022-02-13
Attending: EMERGENCY MEDICINE | Admitting: INTERNAL MEDICINE
Payer: MEDICAID

## 2022-02-07 DIAGNOSIS — J81.0 ACUTE PULMONARY EDEMA: ICD-10-CM

## 2022-02-07 DIAGNOSIS — J44.1 COPD EXACERBATION: ICD-10-CM

## 2022-02-07 DIAGNOSIS — I50.9 CONGESTIVE HEART FAILURE, UNSPECIFIED HF CHRONICITY, UNSPECIFIED HEART FAILURE TYPE: ICD-10-CM

## 2022-02-07 DIAGNOSIS — Z01.818 ENCOUNTER FOR INTUBATION: ICD-10-CM

## 2022-02-07 DIAGNOSIS — F25.0 SCHIZOAFFECTIVE DISORDER, BIPOLAR TYPE: Primary | Chronic | ICD-10-CM

## 2022-02-07 DIAGNOSIS — J96.01 ACUTE HYPOXEMIC RESPIRATORY FAILURE: ICD-10-CM

## 2022-02-07 DIAGNOSIS — R06.02 SOB (SHORTNESS OF BREATH): ICD-10-CM

## 2022-02-07 DIAGNOSIS — J96.02 ACUTE HYPERCAPNIC RESPIRATORY FAILURE: ICD-10-CM

## 2022-02-07 DIAGNOSIS — J44.9 COPD (CHRONIC OBSTRUCTIVE PULMONARY DISEASE): Primary | ICD-10-CM

## 2022-02-07 PROBLEM — E88.09 HYPOALBUMINEMIA: Status: ACTIVE | Noted: 2022-02-07

## 2022-02-07 PROBLEM — I50.43 ACUTE ON CHRONIC COMBINED SYSTOLIC AND DIASTOLIC CONGESTIVE HEART FAILURE: Status: ACTIVE | Noted: 2022-02-07

## 2022-02-07 PROBLEM — D64.9 NORMOCYTIC ANEMIA: Status: ACTIVE | Noted: 2022-02-07

## 2022-02-07 PROBLEM — Z87.01 HISTORY OF RECENT PNEUMONIA: Status: ACTIVE | Noted: 2022-02-07

## 2022-02-07 PROBLEM — K92.2 GI BLEED: Status: ACTIVE | Noted: 2022-02-07

## 2022-02-07 PROBLEM — I27.20 PULMONARY HYPERTENSION: Status: ACTIVE | Noted: 2022-02-07

## 2022-02-07 LAB
ALBUMIN SERPL BCP-MCNC: 3.1 G/DL (ref 3.5–5.2)
ALLENS TEST: ABNORMAL
ALP SERPL-CCNC: 99 U/L (ref 55–135)
ALT SERPL W/O P-5'-P-CCNC: 13 U/L (ref 10–44)
AMPHET+METHAMPHET UR QL: NEGATIVE
ANION GAP SERPL CALC-SCNC: 13 MMOL/L (ref 8–16)
AST SERPL-CCNC: 10 U/L (ref 10–40)
BARBITURATES UR QL SCN>200 NG/ML: NEGATIVE
BASOPHILS # BLD AUTO: 0.06 K/UL (ref 0–0.2)
BASOPHILS NFR BLD: 0.4 % (ref 0–1.9)
BENZODIAZ UR QL SCN>200 NG/ML: NEGATIVE
BILIRUB SERPL-MCNC: 0.4 MG/DL (ref 0.1–1)
BILIRUB UR QL STRIP: NEGATIVE
BNP SERPL-MCNC: 1200 PG/ML (ref 0–99)
BUN SERPL-MCNC: 16 MG/DL (ref 8–23)
BZE UR QL SCN: NEGATIVE
CALCIUM SERPL-MCNC: 8.9 MG/DL (ref 8.7–10.5)
CANNABINOIDS UR QL SCN: NEGATIVE
CHLORIDE SERPL-SCNC: 99 MMOL/L (ref 95–110)
CLARITY UR: CLEAR
CO2 SERPL-SCNC: 25 MMOL/L (ref 23–29)
COLOR UR: YELLOW
CREAT SERPL-MCNC: 1.2 MG/DL (ref 0.5–1.4)
CREAT UR-MCNC: 78.4 MG/DL (ref 23–375)
CRP SERPL-MCNC: 44 MG/L (ref 0–8.2)
CTP QC/QA: YES
D DIMER PPP IA.FEU-MCNC: 0.67 MG/L FEU
DELSYS: ABNORMAL
DIFFERENTIAL METHOD: ABNORMAL
EOSINOPHIL # BLD AUTO: 0 K/UL (ref 0–0.5)
EOSINOPHIL NFR BLD: 0.2 % (ref 0–8)
EP: 5
ERYTHROCYTE [DISTWIDTH] IN BLOOD BY AUTOMATED COUNT: 16.5 % (ref 11.5–14.5)
ERYTHROCYTE [SEDIMENTATION RATE] IN BLOOD BY WESTERGREN METHOD: 17 MM/HR (ref 0–10)
ERYTHROCYTE [SEDIMENTATION RATE] IN BLOOD BY WESTERGREN METHOD: 20 MM/H
EST. GFR  (AFRICAN AMERICAN): >60 ML/MIN/1.73 M^2
EST. GFR  (NON AFRICAN AMERICAN): >60 ML/MIN/1.73 M^2
FIO2: 100
GLUCOSE SERPL-MCNC: 295 MG/DL (ref 70–110)
GLUCOSE UR QL STRIP: ABNORMAL
HCO3 UR-SCNC: 28.1 MMOL/L (ref 24–28)
HCT VFR BLD AUTO: 38 % (ref 40–54)
HGB BLD-MCNC: 11.8 G/DL (ref 14–18)
HGB UR QL STRIP: NEGATIVE
IMM GRANULOCYTES # BLD AUTO: 0.13 K/UL (ref 0–0.04)
IMM GRANULOCYTES NFR BLD AUTO: 0.9 % (ref 0–0.5)
IP: 15
KETONES UR QL STRIP: NEGATIVE
LACTATE SERPL-SCNC: 1.3 MMOL/L (ref 0.5–2.2)
LACTATE SERPL-SCNC: 1.9 MMOL/L (ref 0.5–2.2)
LEUKOCYTE ESTERASE UR QL STRIP: NEGATIVE
LYMPHOCYTES # BLD AUTO: 1.3 K/UL (ref 1–4.8)
LYMPHOCYTES NFR BLD: 8.8 % (ref 18–48)
MAGNESIUM SERPL-MCNC: 1.5 MG/DL (ref 1.6–2.6)
MCH RBC QN AUTO: 27.8 PG (ref 27–31)
MCHC RBC AUTO-ENTMCNC: 31.1 G/DL (ref 32–36)
MCV RBC AUTO: 89 FL (ref 82–98)
METHADONE UR QL SCN>300 NG/ML: NEGATIVE
MODE: ABNORMAL
MONOCYTES # BLD AUTO: 0.8 K/UL (ref 0.3–1)
MONOCYTES NFR BLD: 5.4 % (ref 4–15)
NEUTROPHILS # BLD AUTO: 12.4 K/UL (ref 1.8–7.7)
NEUTROPHILS NFR BLD: 84.3 % (ref 38–73)
NITRITE UR QL STRIP: NEGATIVE
NRBC BLD-RTO: 0 /100 WBC
OPIATES UR QL SCN: NEGATIVE
PCO2 BLDA: 57.9 MMHG (ref 35–45)
PCP UR QL SCN>25 NG/ML: NEGATIVE
PH SMN: 7.29 [PH] (ref 7.35–7.45)
PH UR STRIP: 6 [PH] (ref 5–8)
PHOSPHATE SERPL-MCNC: 3.9 MG/DL (ref 2.7–4.5)
PLATELET # BLD AUTO: 411 K/UL (ref 150–450)
PMV BLD AUTO: 9.1 FL (ref 9.2–12.9)
PO2 BLDA: 214 MMHG (ref 80–100)
POC BE: 2 MMOL/L
POC SATURATED O2: 100 % (ref 95–100)
POCT GLUCOSE: 133 MG/DL (ref 70–110)
POCT GLUCOSE: 327 MG/DL (ref 70–110)
POCT GLUCOSE: 403 MG/DL (ref 70–110)
POCT GLUCOSE: 419 MG/DL (ref 70–110)
POCT GLUCOSE: 447 MG/DL (ref 70–110)
POTASSIUM SERPL-SCNC: 4.3 MMOL/L (ref 3.5–5.1)
PROCALCITONIN SERPL IA-MCNC: 0.02 NG/ML
PROT SERPL-MCNC: 6.8 G/DL (ref 6–8.4)
PROT UR QL STRIP: ABNORMAL
RBC # BLD AUTO: 4.25 M/UL (ref 4.6–6.2)
SAMPLE: ABNORMAL
SARS-COV-2 RDRP RESP QL NAA+PROBE: NEGATIVE
SITE: ABNORMAL
SODIUM SERPL-SCNC: 137 MMOL/L (ref 136–145)
SP GR UR STRIP: 1.01 (ref 1–1.03)
SP02: 100
SPONT RATE: 20
TOXICOLOGY INFORMATION: NORMAL
TROPONIN I SERPL DL<=0.01 NG/ML-MCNC: 0.02 NG/ML (ref 0–0.03)
TROPONIN I SERPL DL<=0.01 NG/ML-MCNC: 0.02 NG/ML (ref 0–0.03)
TROPONIN I SERPL DL<=0.01 NG/ML-MCNC: 0.03 NG/ML (ref 0–0.03)
URN SPEC COLLECT METH UR: ABNORMAL
UROBILINOGEN UR STRIP-ACNC: NEGATIVE EU/DL
WBC # BLD AUTO: 14.7 K/UL (ref 3.9–12.7)

## 2022-02-07 PROCEDURE — 25000003 PHARM REV CODE 250: Performed by: INTERNAL MEDICINE

## 2022-02-07 PROCEDURE — 80307 DRUG TEST PRSMV CHEM ANLYZR: CPT | Performed by: INTERNAL MEDICINE

## 2022-02-07 PROCEDURE — 25000242 PHARM REV CODE 250 ALT 637 W/ HCPCS: Performed by: NURSE PRACTITIONER

## 2022-02-07 PROCEDURE — 20000000 HC ICU ROOM

## 2022-02-07 PROCEDURE — 99223 PR INITIAL HOSPITAL CARE,LEVL III: ICD-10-PCS | Mod: ,,, | Performed by: INTERNAL MEDICINE

## 2022-02-07 PROCEDURE — 36600 WITHDRAWAL OF ARTERIAL BLOOD: CPT

## 2022-02-07 PROCEDURE — 82785 ASSAY OF IGE: CPT | Performed by: INTERNAL MEDICINE

## 2022-02-07 PROCEDURE — 36569 INSJ PICC 5 YR+ W/O IMAGING: CPT

## 2022-02-07 PROCEDURE — 86140 C-REACTIVE PROTEIN: CPT | Performed by: INTERNAL MEDICINE

## 2022-02-07 PROCEDURE — 27000190 HC CPAP FULL FACE MASK W/VALVE

## 2022-02-07 PROCEDURE — 96361 HYDRATE IV INFUSION ADD-ON: CPT

## 2022-02-07 PROCEDURE — 87070 CULTURE OTHR SPECIMN AEROBIC: CPT | Performed by: NURSE PRACTITIONER

## 2022-02-07 PROCEDURE — 63600175 PHARM REV CODE 636 W HCPCS: Performed by: NURSE PRACTITIONER

## 2022-02-07 PROCEDURE — 82803 BLOOD GASES ANY COMBINATION: CPT

## 2022-02-07 PROCEDURE — 83735 ASSAY OF MAGNESIUM: CPT | Performed by: NURSE PRACTITIONER

## 2022-02-07 PROCEDURE — 87040 BLOOD CULTURE FOR BACTERIA: CPT | Mod: 59 | Performed by: EMERGENCY MEDICINE

## 2022-02-07 PROCEDURE — 94002 VENT MGMT INPAT INIT DAY: CPT

## 2022-02-07 PROCEDURE — 87205 SMEAR GRAM STAIN: CPT | Mod: 59 | Performed by: NURSE PRACTITIONER

## 2022-02-07 PROCEDURE — 63600175 PHARM REV CODE 636 W HCPCS: Performed by: INTERNAL MEDICINE

## 2022-02-07 PROCEDURE — 81003 URINALYSIS AUTO W/O SCOPE: CPT | Mod: 59 | Performed by: EMERGENCY MEDICINE

## 2022-02-07 PROCEDURE — 84100 ASSAY OF PHOSPHORUS: CPT | Performed by: NURSE PRACTITIONER

## 2022-02-07 PROCEDURE — 99900035 HC TECH TIME PER 15 MIN (STAT)

## 2022-02-07 PROCEDURE — 63600175 PHARM REV CODE 636 W HCPCS: Performed by: EMERGENCY MEDICINE

## 2022-02-07 PROCEDURE — 87205 SMEAR GRAM STAIN: CPT | Performed by: INTERNAL MEDICINE

## 2022-02-07 PROCEDURE — 85651 RBC SED RATE NONAUTOMATED: CPT | Performed by: INTERNAL MEDICINE

## 2022-02-07 PROCEDURE — 80053 COMPREHEN METABOLIC PANEL: CPT | Performed by: EMERGENCY MEDICINE

## 2022-02-07 PROCEDURE — 93010 ELECTROCARDIOGRAM REPORT: CPT | Mod: ,,, | Performed by: INTERNAL MEDICINE

## 2022-02-07 PROCEDURE — 84145 PROCALCITONIN (PCT): CPT | Performed by: EMERGENCY MEDICINE

## 2022-02-07 PROCEDURE — 84484 ASSAY OF TROPONIN QUANT: CPT | Performed by: EMERGENCY MEDICINE

## 2022-02-07 PROCEDURE — 87070 CULTURE OTHR SPECIMN AEROBIC: CPT | Mod: 59 | Performed by: INTERNAL MEDICINE

## 2022-02-07 PROCEDURE — 25000242 PHARM REV CODE 250 ALT 637 W/ HCPCS: Performed by: EMERGENCY MEDICINE

## 2022-02-07 PROCEDURE — 93005 ELECTROCARDIOGRAM TRACING: CPT

## 2022-02-07 PROCEDURE — 83605 ASSAY OF LACTIC ACID: CPT | Performed by: EMERGENCY MEDICINE

## 2022-02-07 PROCEDURE — 86038 ANTINUCLEAR ANTIBODIES: CPT | Performed by: INTERNAL MEDICINE

## 2022-02-07 PROCEDURE — C1751 CATH, INF, PER/CENT/MIDLINE: HCPCS

## 2022-02-07 PROCEDURE — 86635 COCCIDIOIDES ANTIBODY: CPT | Performed by: INTERNAL MEDICINE

## 2022-02-07 PROCEDURE — 83880 ASSAY OF NATRIURETIC PEPTIDE: CPT | Performed by: EMERGENCY MEDICINE

## 2022-02-07 PROCEDURE — U0002 COVID-19 LAB TEST NON-CDC: HCPCS | Performed by: EMERGENCY MEDICINE

## 2022-02-07 PROCEDURE — 93010 EKG 12-LEAD: ICD-10-PCS | Mod: ,,, | Performed by: INTERNAL MEDICINE

## 2022-02-07 PROCEDURE — C9113 INJ PANTOPRAZOLE SODIUM, VIA: HCPCS | Performed by: NURSE PRACTITIONER

## 2022-02-07 PROCEDURE — 99900026 HC AIRWAY MAINTENANCE (STAT)

## 2022-02-07 PROCEDURE — 94640 AIRWAY INHALATION TREATMENT: CPT

## 2022-02-07 PROCEDURE — C9399 UNCLASSIFIED DRUGS OR BIOLOG: HCPCS | Performed by: INTERNAL MEDICINE

## 2022-02-07 PROCEDURE — 85379 FIBRIN DEGRADATION QUANT: CPT | Performed by: EMERGENCY MEDICINE

## 2022-02-07 PROCEDURE — 27000221 HC OXYGEN, UP TO 24 HOURS

## 2022-02-07 PROCEDURE — 94660 CPAP INITIATION&MGMT: CPT

## 2022-02-07 PROCEDURE — 99223 1ST HOSP IP/OBS HIGH 75: CPT | Mod: ,,, | Performed by: INTERNAL MEDICINE

## 2022-02-07 PROCEDURE — 84484 ASSAY OF TROPONIN QUANT: CPT | Mod: 91 | Performed by: NURSE PRACTITIONER

## 2022-02-07 PROCEDURE — 96375 TX/PRO/DX INJ NEW DRUG ADDON: CPT

## 2022-02-07 PROCEDURE — 96374 THER/PROPH/DIAG INJ IV PUSH: CPT

## 2022-02-07 PROCEDURE — 85025 COMPLETE CBC W/AUTO DIFF WBC: CPT | Performed by: EMERGENCY MEDICINE

## 2022-02-07 PROCEDURE — 99291 CRITICAL CARE FIRST HOUR: CPT | Mod: 25

## 2022-02-07 PROCEDURE — 25000003 PHARM REV CODE 250: Performed by: NURSE PRACTITIONER

## 2022-02-07 PROCEDURE — 25000003 PHARM REV CODE 250: Performed by: EMERGENCY MEDICINE

## 2022-02-07 PROCEDURE — 94760 N-INVAS EAR/PLS OXIMETRY 1: CPT

## 2022-02-07 RX ORDER — SODIUM CHLORIDE 0.9 % (FLUSH) 0.9 %
10 SYRINGE (ML) INJECTION
Status: DISCONTINUED | OUTPATIENT
Start: 2022-02-07 | End: 2022-02-13 | Stop reason: HOSPADM

## 2022-02-07 RX ORDER — INSULIN ASPART 100 [IU]/ML
1-10 INJECTION, SOLUTION INTRAVENOUS; SUBCUTANEOUS EVERY 6 HOURS PRN
Status: DISCONTINUED | OUTPATIENT
Start: 2022-02-07 | End: 2022-02-07

## 2022-02-07 RX ORDER — SODIUM CHLORIDE 0.9 % (FLUSH) 0.9 %
10 SYRINGE (ML) INJECTION EVERY 6 HOURS
Status: DISCONTINUED | OUTPATIENT
Start: 2022-02-08 | End: 2022-02-13 | Stop reason: HOSPADM

## 2022-02-07 RX ORDER — IPRATROPIUM BROMIDE AND ALBUTEROL SULFATE 2.5; .5 MG/3ML; MG/3ML
3 SOLUTION RESPIRATORY (INHALATION)
Status: COMPLETED | OUTPATIENT
Start: 2022-02-07 | End: 2022-02-07

## 2022-02-07 RX ORDER — METHYLPREDNISOLONE SOD SUCC 125 MG
125 VIAL (EA) INJECTION
Status: COMPLETED | OUTPATIENT
Start: 2022-02-07 | End: 2022-02-07

## 2022-02-07 RX ORDER — ROCURONIUM BROMIDE 10 MG/ML
INJECTION, SOLUTION INTRAVENOUS
Status: DISPENSED
Start: 2022-02-07 | End: 2022-02-07

## 2022-02-07 RX ORDER — IPRATROPIUM BROMIDE AND ALBUTEROL SULFATE 2.5; .5 MG/3ML; MG/3ML
3 SOLUTION RESPIRATORY (INHALATION) EVERY 4 HOURS
Status: DISCONTINUED | OUTPATIENT
Start: 2022-02-07 | End: 2022-02-11

## 2022-02-07 RX ORDER — ACETAMINOPHEN 325 MG/1
650 TABLET ORAL EVERY 4 HOURS PRN
Status: DISCONTINUED | OUTPATIENT
Start: 2022-02-07 | End: 2022-02-11

## 2022-02-07 RX ORDER — QUETIAPINE FUMARATE 100 MG/1
100 TABLET, FILM COATED ORAL DAILY
Status: ON HOLD | COMMUNITY
End: 2022-02-13 | Stop reason: HOSPADM

## 2022-02-07 RX ORDER — ENOXAPARIN SODIUM 100 MG/ML
40 INJECTION SUBCUTANEOUS EVERY 24 HOURS
Status: DISCONTINUED | OUTPATIENT
Start: 2022-02-07 | End: 2022-02-07

## 2022-02-07 RX ORDER — ENOXAPARIN SODIUM 100 MG/ML
40 INJECTION SUBCUTANEOUS EVERY 12 HOURS
Status: DISCONTINUED | OUTPATIENT
Start: 2022-02-07 | End: 2022-02-07

## 2022-02-07 RX ORDER — POTASSIUM CHLORIDE 7.45 MG/ML
60 INJECTION INTRAVENOUS
Status: DISCONTINUED | OUTPATIENT
Start: 2022-02-07 | End: 2022-02-13 | Stop reason: HOSPADM

## 2022-02-07 RX ORDER — PROPOFOL 10 MG/ML
0-50 INJECTION, EMULSION INTRAVENOUS CONTINUOUS
Status: DISCONTINUED | OUTPATIENT
Start: 2022-02-07 | End: 2022-02-09

## 2022-02-07 RX ORDER — PROPOFOL 10 MG/ML
INJECTION, EMULSION INTRAVENOUS
Status: DISPENSED
Start: 2022-02-07 | End: 2022-02-07

## 2022-02-07 RX ORDER — MAGNESIUM SULFATE HEPTAHYDRATE 40 MG/ML
2 INJECTION, SOLUTION INTRAVENOUS
Status: DISCONTINUED | OUTPATIENT
Start: 2022-02-07 | End: 2022-02-13 | Stop reason: HOSPADM

## 2022-02-07 RX ORDER — POTASSIUM CHLORIDE 7.45 MG/ML
40 INJECTION INTRAVENOUS
Status: DISCONTINUED | OUTPATIENT
Start: 2022-02-07 | End: 2022-02-13 | Stop reason: HOSPADM

## 2022-02-07 RX ORDER — CHLORHEXIDINE GLUCONATE ORAL RINSE 1.2 MG/ML
15 SOLUTION DENTAL 2 TIMES DAILY
Status: DISCONTINUED | OUTPATIENT
Start: 2022-02-07 | End: 2022-02-11

## 2022-02-07 RX ORDER — MUPIROCIN 20 MG/G
OINTMENT TOPICAL 2 TIMES DAILY
Status: COMPLETED | OUTPATIENT
Start: 2022-02-07 | End: 2022-02-12

## 2022-02-07 RX ORDER — INSULIN ASPART 100 [IU]/ML
1-10 INJECTION, SOLUTION INTRAVENOUS; SUBCUTANEOUS EVERY 4 HOURS PRN
Status: DISCONTINUED | OUTPATIENT
Start: 2022-02-07 | End: 2022-02-08

## 2022-02-07 RX ORDER — ETOMIDATE 2 MG/ML
20 INJECTION INTRAVENOUS
Status: COMPLETED | OUTPATIENT
Start: 2022-02-07 | End: 2022-02-07

## 2022-02-07 RX ORDER — ROCURONIUM BROMIDE 10 MG/ML
70 INJECTION, SOLUTION INTRAVENOUS ONCE
Status: COMPLETED | OUTPATIENT
Start: 2022-02-07 | End: 2022-02-07

## 2022-02-07 RX ORDER — FUROSEMIDE 10 MG/ML
40 INJECTION INTRAMUSCULAR; INTRAVENOUS ONCE
Status: COMPLETED | OUTPATIENT
Start: 2022-02-07 | End: 2022-02-07

## 2022-02-07 RX ORDER — INSULIN ASPART 100 [IU]/ML
15 INJECTION, SOLUTION INTRAVENOUS; SUBCUTANEOUS ONCE
Status: COMPLETED | OUTPATIENT
Start: 2022-02-07 | End: 2022-02-07

## 2022-02-07 RX ORDER — BUDESONIDE 0.5 MG/2ML
0.5 INHALANT ORAL EVERY 12 HOURS
Status: DISCONTINUED | OUTPATIENT
Start: 2022-02-07 | End: 2022-02-13 | Stop reason: HOSPADM

## 2022-02-07 RX ORDER — PANTOPRAZOLE SODIUM 40 MG/10ML
40 INJECTION, POWDER, LYOPHILIZED, FOR SOLUTION INTRAVENOUS DAILY
Status: DISCONTINUED | OUTPATIENT
Start: 2022-02-07 | End: 2022-02-11

## 2022-02-07 RX ORDER — ETOMIDATE 2 MG/ML
INJECTION INTRAVENOUS
Status: DISPENSED
Start: 2022-02-07 | End: 2022-02-07

## 2022-02-07 RX ORDER — MAGNESIUM SULFATE HEPTAHYDRATE 40 MG/ML
4 INJECTION, SOLUTION INTRAVENOUS
Status: DISCONTINUED | OUTPATIENT
Start: 2022-02-07 | End: 2022-02-13 | Stop reason: HOSPADM

## 2022-02-07 RX ORDER — FUROSEMIDE 10 MG/ML
40 INJECTION INTRAMUSCULAR; INTRAVENOUS
Status: COMPLETED | OUTPATIENT
Start: 2022-02-07 | End: 2022-02-07

## 2022-02-07 RX ORDER — POTASSIUM CHLORIDE 7.45 MG/ML
80 INJECTION INTRAVENOUS
Status: DISCONTINUED | OUTPATIENT
Start: 2022-02-07 | End: 2022-02-13 | Stop reason: HOSPADM

## 2022-02-07 RX ORDER — SODIUM CHLORIDE 9 MG/ML
INJECTION, SOLUTION INTRAVENOUS CONTINUOUS
Status: DISCONTINUED | OUTPATIENT
Start: 2022-02-07 | End: 2022-02-08

## 2022-02-07 RX ORDER — ONDANSETRON 2 MG/ML
4 INJECTION INTRAMUSCULAR; INTRAVENOUS EVERY 6 HOURS PRN
Status: DISCONTINUED | OUTPATIENT
Start: 2022-02-07 | End: 2022-02-13 | Stop reason: HOSPADM

## 2022-02-07 RX ORDER — PROPOFOL 10 MG/ML
0-50 INJECTION, EMULSION INTRAVENOUS CONTINUOUS
Status: DISCONTINUED | OUTPATIENT
Start: 2022-02-07 | End: 2022-02-07

## 2022-02-07 RX ORDER — LORAZEPAM 2 MG/ML
1 INJECTION INTRAMUSCULAR
Status: COMPLETED | OUTPATIENT
Start: 2022-02-07 | End: 2022-02-07

## 2022-02-07 RX ORDER — FUROSEMIDE 10 MG/ML
40 INJECTION INTRAMUSCULAR; INTRAVENOUS
Status: DISCONTINUED | OUTPATIENT
Start: 2022-02-07 | End: 2022-02-09

## 2022-02-07 RX ORDER — INSULIN ASPART 100 [IU]/ML
1-15 INJECTION, SOLUTION INTRAVENOUS; SUBCUTANEOUS EVERY 4 HOURS PRN
Status: DISCONTINUED | OUTPATIENT
Start: 2022-02-07 | End: 2022-02-07

## 2022-02-07 RX ORDER — NOREPINEPHRINE BITARTRATE/D5W 4MG/250ML
0-3 PLASTIC BAG, INJECTION (ML) INTRAVENOUS CONTINUOUS
Status: DISCONTINUED | OUTPATIENT
Start: 2022-02-07 | End: 2022-02-09

## 2022-02-07 RX ORDER — GLUCAGON 1 MG
1 KIT INJECTION
Status: DISCONTINUED | OUTPATIENT
Start: 2022-02-07 | End: 2022-02-12

## 2022-02-07 RX ADMIN — IPRATROPIUM BROMIDE AND ALBUTEROL SULFATE 3 ML: 2.5; .5 SOLUTION RESPIRATORY (INHALATION) at 04:02

## 2022-02-07 RX ADMIN — LORAZEPAM 1 MG: 2 INJECTION INTRAMUSCULAR; INTRAVENOUS at 05:02

## 2022-02-07 RX ADMIN — MUPIROCIN: 20 OINTMENT TOPICAL at 08:02

## 2022-02-07 RX ADMIN — MAGNESIUM SULFATE HEPTAHYDRATE 2 G: 2 INJECTION, SOLUTION INTRAVENOUS at 02:02

## 2022-02-07 RX ADMIN — IPRATROPIUM BROMIDE AND ALBUTEROL SULFATE 3 ML: 2.5; .5 SOLUTION RESPIRATORY (INHALATION) at 07:02

## 2022-02-07 RX ADMIN — Medication 0.02 MCG/KG/MIN: at 05:02

## 2022-02-07 RX ADMIN — PROPOFOL 50 MCG/KG/MIN: 10 INJECTION, EMULSION INTRAVENOUS at 06:02

## 2022-02-07 RX ADMIN — PROPOFOL 30 MCG/KG/MIN: 10 INJECTION, EMULSION INTRAVENOUS at 06:02

## 2022-02-07 RX ADMIN — FUROSEMIDE 40 MG: 10 INJECTION, SOLUTION INTRAMUSCULAR; INTRAVENOUS at 08:02

## 2022-02-07 RX ADMIN — SODIUM CHLORIDE, PRESERVATIVE FREE 10 ML: 5 INJECTION INTRAVENOUS at 11:02

## 2022-02-07 RX ADMIN — SODIUM CHLORIDE: 0.9 INJECTION, SOLUTION INTRAVENOUS at 05:02

## 2022-02-07 RX ADMIN — PROPOFOL 45 MCG/KG/MIN: 10 INJECTION, EMULSION INTRAVENOUS at 09:02

## 2022-02-07 RX ADMIN — AZITHROMYCIN MONOHYDRATE 500 MG: 500 INJECTION, POWDER, LYOPHILIZED, FOR SOLUTION INTRAVENOUS at 11:02

## 2022-02-07 RX ADMIN — IPRATROPIUM BROMIDE AND ALBUTEROL SULFATE 3 ML: 2.5; .5 SOLUTION RESPIRATORY (INHALATION) at 11:02

## 2022-02-07 RX ADMIN — INSULIN DETEMIR 10 UNITS: 100 INJECTION, SOLUTION SUBCUTANEOUS at 08:02

## 2022-02-07 RX ADMIN — FUROSEMIDE 40 MG: 10 INJECTION, SOLUTION INTRAMUSCULAR; INTRAVENOUS at 10:02

## 2022-02-07 RX ADMIN — BUDESONIDE 0.5 MG: 0.5 INHALANT ORAL at 07:02

## 2022-02-07 RX ADMIN — PROPOFOL 35 MCG/KG/MIN: 10 INJECTION, EMULSION INTRAVENOUS at 02:02

## 2022-02-07 RX ADMIN — METHYLPREDNISOLONE SODIUM SUCCINATE 125 MG: 125 INJECTION, POWDER, FOR SOLUTION INTRAMUSCULAR; INTRAVENOUS at 04:02

## 2022-02-07 RX ADMIN — INSULIN ASPART 10 UNITS: 100 INJECTION, SOLUTION INTRAVENOUS; SUBCUTANEOUS at 10:02

## 2022-02-07 RX ADMIN — NITROGLYCERIN 1 INCH: 20 OINTMENT TOPICAL at 04:02

## 2022-02-07 RX ADMIN — FUROSEMIDE 40 MG: 10 INJECTION, SOLUTION INTRAMUSCULAR; INTRAVENOUS at 04:02

## 2022-02-07 RX ADMIN — INSULIN ASPART 4 UNITS: 100 INJECTION, SOLUTION INTRAVENOUS; SUBCUTANEOUS at 04:02

## 2022-02-07 RX ADMIN — INSULIN DETEMIR 10 UNITS: 100 INJECTION, SOLUTION SUBCUTANEOUS at 01:02

## 2022-02-07 RX ADMIN — METHYLPREDNISOLONE SODIUM SUCCINATE 20 MG: 40 INJECTION, POWDER, FOR SOLUTION INTRAMUSCULAR; INTRAVENOUS at 10:02

## 2022-02-07 RX ADMIN — ETOMIDATE 20 MG: 2 INJECTION INTRAVENOUS at 06:02

## 2022-02-07 RX ADMIN — ROCURONIUM BROMIDE 70 MG: 10 INJECTION INTRAVENOUS at 06:02

## 2022-02-07 RX ADMIN — CHLORHEXIDINE GLUCONATE 0.12% ORAL RINSE 15 ML: 1.2 LIQUID ORAL at 08:02

## 2022-02-07 RX ADMIN — SODIUM CHLORIDE, SODIUM LACTATE, POTASSIUM CHLORIDE, AND CALCIUM CHLORIDE 2769 ML: .6; .31; .03; .02 INJECTION, SOLUTION INTRAVENOUS at 04:02

## 2022-02-07 RX ADMIN — CEFTRIAXONE 2 G: 2 INJECTION, SOLUTION INTRAVENOUS at 10:02

## 2022-02-07 RX ADMIN — INSULIN ASPART 15 UNITS: 100 INJECTION, SOLUTION INTRAVENOUS; SUBCUTANEOUS at 12:02

## 2022-02-07 RX ADMIN — PROPOFOL 50 MCG/KG/MIN: 10 INJECTION, EMULSION INTRAVENOUS at 10:02

## 2022-02-07 RX ADMIN — PANTOPRAZOLE SODIUM 40 MG: 40 INJECTION, POWDER, FOR SOLUTION INTRAVENOUS at 10:02

## 2022-02-07 NOTE — ED NOTES
Secure chat message sent to hospital medicine and ICU treatment team to ask if they would like RN to do anything further to address current CBG. Awaiting response.

## 2022-02-07 NOTE — SUBJECTIVE & OBJECTIVE
Past Medical History:   Diagnosis Date    Adrenal cortical adenoma     Anxiety     Arthritis     CKD (chronic kidney disease) stage 3, GFR 30-59 ml/min     Depression     Diabetes mellitus type II 2011    does not take    DM (diabetes mellitus) 2011     am 09/21/2017 Insulin x 1 1/2 year    GERD (gastroesophageal reflux disease) 7/9/2013    Hypertension     Migraine headache 7/22/2013    Schizophrenia     Severe obesity with body mass index of 36.0 to 36.9        Past Surgical History:   Procedure Laterality Date    BACK SURGERY      COLONOSCOPY N/A 12/3/2020    Procedure: COLONOSCOPY;  Surgeon: Christofer Palmer MD;  Location: [facility] ENDO;  Service: Endoscopy;  Laterality: N/A;    COLONOSCOPY N/A 12/4/2020    Procedure: COLONOSCOPY;  Surgeon: Frnady Stanton MD;  Location: [facility] ENDO;  Service: Endoscopy;  Laterality: N/A;    HERNIA REPAIR      UMBILICAL    left arm exfix      NASAL SEPTUM SURGERY Bilateral     TONSILLECTOMY      URETEROSCOPY         Review of patient's allergies indicates:  No Known Allergies    No current facility-administered medications on file prior to encounter.     Current Outpatient Medications on File Prior to Encounter   Medication Sig    albuterol (PROVENTIL/VENTOLIN HFA) 90 mcg/actuation inhaler Inhale 1-2 puffs into the lungs every 4 (four) hours as needed for Wheezing. Rescue    amLODIPine (NORVASC) 10 MG tablet Take 10 mg by mouth.    aspirin 81 MG Chew Take 1 tablet (81 mg total) by mouth once daily.    blood sugar diagnostic Strp 1 each.    carvedilol (COREG) 6.25 MG tablet Take 6.25 mg by mouth 2 (two) times daily.    docusate sodium (COLACE) 100 MG capsule Take 100 mg by mouth 2 (two) times daily.    DULoxetine (CYMBALTA) 60 MG capsule Take 60 mg by mouth 2 (two) times daily.    empagliflozin (JARDIANCE) 25 mg tablet Take 25 mg by mouth.    ergocalciferol (ERGOCALCIFEROL) 50,000 unit Cap Take 50,000 Units by mouth every 7 days.    ferrous  sulfate (FEOSOL) 325 mg (65 mg iron) Tab tablet Take 325 mg by mouth.    furosemide (LASIX) 40 MG tablet Take 1 tablet (40 mg total) by mouth once daily. Hold if Systolic blood pressure less than 110 mmHG for 7 days    gabapentin (NEURONTIN) 800 MG tablet Take 800 mg by mouth 3 (three) times daily.    glucagon 1 mg/mL SolR Inject 1 mg into the muscle daily as needed.    HUMALOG MIX 50-50 INSULN U-100 100 unit/mL (50-50) Susp 10 Units twice daily    insulin lispro 100 unit/mL injection Inject under the skin with meals by sliding scale:  under 150 0 unit, 151-200 2 units, 201-250 4 units, 251-300 6 units, 301-350 8 units, 351-400 10 units, over 400 12 units.    metFORMIN (GLUCOPHAGE) 1000 MG tablet Take 500 mg by mouth 2 (two) times daily with meals.    methocarbamoL (ROBAXIN) 750 MG Tab Take 750 mg by mouth 3 (three) times daily.    omeprazole (PRILOSEC) 20 MG capsule Take 20 mg by mouth once daily.    QUEtiapine (SEROQUEL) 300 MG Tab Take 1 tablet (300 mg total) by mouth nightly.    tamsulosin (FLOMAX) 0.4 mg Cap Take 0.4 mg by mouth once daily.    traZODone (DESYREL) 150 MG tablet Take 150 mg by mouth every evening.    umeclidinium-vilanteroL (ANORO ELLIPTA) 62.5-25 mcg/actuation DsDv Inhale 1 puff into the lungs once daily. Controller    [DISCONTINUED] nicotine polacrilex 4 MG Lozg Take 1 lozenge (4 mg total) by mouth as needed. Not to exceed 10 lozenges per day.    atorvastatin (LIPITOR) 20 MG tablet Take 20 mg by mouth once daily.    baclofen (LIORESAL) 20 MG tablet Take 20 mg by mouth every 8 (eight) hours as needed.    clonazePAM (KLONOPIN) 1 MG tablet Take 0.5 tablets (0.5 mg total) by mouth 2 (two) times daily as needed for Anxiety. Take 1.5mg twice daily    lisinopril (PRINIVIL,ZESTRIL) 40 MG tablet Take 40 mg by mouth once daily.    [DISCONTINUED] nicotine (NICODERM CQ) 14 mg/24 hr Place 1 patch onto the skin once daily.     Family History     Problem Relation (Age of Onset)    Cancer  Paternal Grandmother, Paternal Grandfather    Cataracts Mother    Diabetes Mother, Sister, Brother    Hypertension Mother, Sister, Brother        Tobacco Use    Smoking status: Former Smoker     Packs/day: 1.00     Years: 37.00     Pack years: 37.00     Types: Cigarettes     Quit date: 3/28/2021     Years since quittin.8    Smokeless tobacco: Never Used    Tobacco comment: instructed not to smoke after midnight after midnight prior to surgery   Substance and Sexual Activity    Alcohol use: No    Drug use: No    Sexual activity: Never     Partners: Female     Review of Systems   Unable to perform ROS: intubated     Objective:     Vital Signs (Most Recent):  Temp: 97.2 °F (36.2 °C) (22 1157)  Pulse: 79 (22 1323)  Resp: 17 (22 1323)  BP: (!) 94/59 (22 1323)  SpO2: 98 % (22 1323) Vital Signs (24h Range):  Temp:  [96.9 °F (36.1 °C)-97.9 °F (36.6 °C)] 97.2 °F (36.2 °C)  Pulse:  [] 79  Resp:  [12-47] 17  SpO2:  [88 %-100 %] 98 %  BP: ()/() 94/59     Weight: 92.3 kg (203 lb 7.8 oz)  Body mass index is 27.6 kg/m².    SpO2: 98 %  O2 Device (Oxygen Therapy): ventilator      Intake/Output Summary (Last 24 hours) at 2022 1335  Last data filed at 2022 1256  Gross per 24 hour   Intake 3069 ml   Output 1630 ml   Net 1439 ml       Lines/Drains/Airways     Drain                 NG/OG Tube 22 0608 orogastric 16 Fr. Center mouth <1 day         Urethral Catheter 22 0623 Non-latex 16 Fr. <1 day          Airway                 Airway - Non-Surgical 22 <1 day          Peripheral Intravenous Line                 Peripheral IV - Single Lumen 22 0431 20 G Left Hand <1 day         Peripheral IV - Single Lumen 22 0605 18 G Right Hand <1 day                Physical Exam  Vitals and nursing note reviewed.   Constitutional:       General: He is not in acute distress.     Appearance: He is well-developed and well-nourished. He is ill-appearing. He is not  diaphoretic.      Comments: Intubated   HENT:      Head: Normocephalic and atraumatic.      Mouth/Throat:      Comments: Blood-tinged secretions noted from ET tube  Eyes:      General:         Right eye: No discharge.         Left eye: No discharge.      Pupils: Pupils are equal, round, and reactive to light.   Neck:      Thyroid: No thyromegaly.      Vascular: No JVD.      Trachea: No tracheal deviation.   Cardiovascular:      Rate and Rhythm: Normal rate and regular rhythm.      Heart sounds: Normal heart sounds, S1 normal and S2 normal. No murmur heard.      Pulmonary:      Effort: Pulmonary effort is normal. No respiratory distress.      Breath sounds: Wheezing present.      Comments: Diminished BS at bases  Abdominal:      General: There is no distension.      Tenderness: There is no rebound.   Musculoskeletal:      Cervical back: Neck supple.      Right lower leg: Edema present.      Left lower leg: Edema present.   Skin:     General: Skin is warm and dry.      Findings: No erythema.      Comments: Scattered scratches/sores RLE   Neurological:      Mental Status: He is alert.      Comments: Intubated but pulling at ET tubes at time during exam   Psychiatric:         Mood and Affect: Mood and affect normal.         Significant Labs:   CMP   Recent Labs   Lab 02/07/22  0434      K 4.3   CL 99   CO2 25   *   BUN 16   CREATININE 1.2   CALCIUM 8.9   PROT 6.8   ALBUMIN 3.1*   BILITOT 0.4   ALKPHOS 99   AST 10   ALT 13   ANIONGAP 13   ESTGFRAFRICA >60   EGFRNONAA >60   , CBC   Recent Labs   Lab 02/07/22  0434   WBC 14.70*   HGB 11.8*   HCT 38.0*      , Troponin   Recent Labs   Lab 02/07/22  0434 02/07/22  1015   TROPONINI 0.025 0.018  0.018    and All pertinent lab results from the last 24 hours have been reviewed.    Significant Imaging: Echocardiogram:   Transthoracic echo (TTE) complete (Cupid Only):   Results for orders placed or performed during the hospital encounter of 01/19/22   Echo    Result Value Ref Range    BSA 2.14 m2    TDI SEPTAL 0.07 m/s    LV LATERAL E/E' RATIO 8.38 m/s    LV SEPTAL E/E' RATIO 9.57 m/s    LA WIDTH 3.39 cm    IVC diameter 1.77 cm    Left Ventricular Outflow Tract Mean Velocity 0.78953528098947 cm/s    Left Ventricular Outflow Tract Mean Gradient 2.28 mmHg    TDI LATERAL 0.08 m/s    LVIDd 4.62 3.5 - 6.0 cm    IVS 1.68 (A) 0.6 - 1.1 cm    Posterior Wall 1.39 (A) 0.6 - 1.1 cm    Ao root annulus 3.65 cm    LVIDs 4.37 (A) 2.1 - 4.0 cm    FS 5 28 - 44 %    LA volume 37.01 cm3    Sinus 3.68 cm    STJ 3.50 cm    Ascending aorta 3.26 cm    LV mass 296.91 g    LA size 3.28 cm    TAPSE 2.45 cm    Left Ventricle Relative Wall Thickness 0.60 cm    AV mean gradient 3 mmHg    AV valve area 2.14 cm2    AV Velocity Ratio 0.86     AV index (prosthetic) 0.61     MV valve area p 1/2 method 3.08 cm2    E/A ratio 1.00     Mean e' 0.08 m/s    E wave deceleration time 246.305671466582273 msec    IVRT 105.030678416326748 msec    LVOT diameter 2.11 cm    LVOT area 3.5 cm2    LVOT peak frankie 1.08 m/s    LVOT peak VTI 17.00 cm    Ao peak frankie 1.26 m/s    Ao VTI 27.8 cm    RVOT prox diameter 3.49 cm    RVOT area 9.56 cm2    RVOT peak frankie 0.60 m/s    RVOT peak VTI 11.8 cm    Qp:Qs ratio 0.53     LVOT stroke volume 59.41 cm3    RVOT stroke volume 112.82 cm3    AV peak gradient 6 mmHg    PV mean gradient 0.71 mmHg    E/E' ratio 8.93 m/s    MV Peak E Frankie 0.67 m/s    TR Max Frankie 3.37 m/s    MV stenosis pressure 1/2 time 71.660317472316637 ms    MV Peak A Frankie 0.67 m/s    LV Systolic Volume 86.51 mL    LV Systolic Volume Index 40.8 mL/m2    LV Diastolic Volume 98.26 mL    LV Diastolic Volume Index 46.35 mL/m2    LA Volume Index 17.5 mL/m2    LV Mass Index 140 g/m2    Echo EF Estimated 12 %    RA Major Axis 4.57 cm    Left Atrium Minor Axis 3.27 cm    Left Atrium Major Axis 4.88 cm    Triscuspid Valve Regurgitation Peak Gradient 45 mmHg    RA Width 3.59 cm    Right Atrial Pressure (from IVC) 8 mmHg    EF 40 %     TV rest pulmonary artery pressure 53 mmHg    Narrative    · The left ventricle is normal in size with concentric hypertrophy and   mildly decreased systolic function.  · Grade I left ventricular diastolic dysfunction.  · Normal right ventricular size with normal right ventricular systolic   function.  · There is pulmonary hypertension.  · Intermediate central venous pressure (8 mmHg).  · The estimated PA systolic pressure is 53 mmHg.  · The estimated ejection fraction is 40%.  · There are segmental left ventricular wall motion abnormalities.      , EKG: Reviewed and X-Ray: CXR: X-Ray Chest 1 View (CXR): No results found for this visit on 02/07/22. and X-Ray Chest PA and Lateral (CXR): No results found for this visit on 02/07/22.

## 2022-02-07 NOTE — ED NOTES
Peak pressure alarm continuously going off on vent. All connections/tubing secure. Pt. Not biting on tube. Some foam/secretions noted in tubing going toward vent. Pt. In-line suctioned once by RN and RT called by RN. RT en route.

## 2022-02-07 NOTE — PROGRESS NOTES
Patient was on Bipap, pulled everything off, including IV.  Patient sedated and intubated with a 7.5 ETT marked at 25 at the lips. Vent settings noted.

## 2022-02-07 NOTE — ASSESSMENT & PLAN NOTE
Patient with Hypoxic Respiratory failure which is Acute.  he is not on home oxygen. Supplemental oxygen was provided and noted- Vent Mode: A/C  Oxygen Concentration (%):  [] 80  Resp Rate Total:  [20 br/min] 20 br/min  Vt Set:  [450 mL] 450 mL  PEEP/CPAP:  [5 cmH20] 5 cmH20  Mean Airway Pressure:  [14 cmH20-15 cmH20] 15 cmH20.   Signs/symptoms of respiratory failure include- tachypnea, increased work of breathing and respiratory distress. Contributing diagnoses includes - CHF Labs and images were reviewed. Patient Has recent ABG, which has been reviewed.      2/07 Patient intubated and ventilated and admitted to ICU

## 2022-02-07 NOTE — ASSESSMENT & PLAN NOTE
Patient is identified as having Combined Systolic and Diastolic heart failure that is Acute on chronic. CHF is currently uncontrolled due to Continued edema of extremities and Dyspnea     Echo    Interpretation Summary  · The left ventricle is normal in size with concentric hypertrophy and mildly decreased systolic function.  · Grade I left ventricular diastolic dysfunction.  · Normal right ventricular size with normal right ventricular systolic function.  · There is pulmonary hypertension.  · Intermediate central venous pressure (8 mmHg).  · The estimated PA systolic pressure is 53 mmHg.  · The estimated ejection fraction is 40%.  · There are segmental left ventricular wall motion abnormalities.    Telemetry  Add Iv lasix Bid  Cardiology consulted    Recent Labs   Lab 02/07/22  0434   BNP 1,200*   .

## 2022-02-07 NOTE — ED PROVIDER NOTES
SCRIBE #1 NOTE: I, Kary Henderson, am scribing for, and in the presence of, Kem Aponte MD. I have scribed the entire note.      History      Chief Complaint   Patient presents with    Shortness of Breath     Hx of COPD and recent dx of PNA. Pt received narcan for Kratom use.       Review of patient's allergies indicates:  No Known Allergies     HPI   HPI    2/7/2022, 4:22 AM   History obtained from the patient      History of Present Illness: Shukri Rosales is a 61 y.o. male patient with PMHx of HTN, CKD, PNA, COPD and DM who presents to the Emergency Department for SOB which onset one day ago. Symptoms are constant and moderate in severity. No mitigating or exacerbating factors reported. Associated sxs include wheeze. Patient denies  fever, chills, congestion, rhinorrhea, sore throat, cough, CP, leg swelling, N/V/D, dizziness, HA, and all other sxs at this time. Pt was seen in the ED a week ago with similar sxs. Pt was admit for PNA treatment. Pt states since d/c smoking a significant amount of cigarettes. Per EMS pt's SpO2 was in the low 70s at the scene. Pt was given two breathing treatments and placed on a nonrebreathing mask. Pt's SpO2 radha to 91 PTA. No further complaints or concerns at this time.        Arrival mode: EMS    PCP: Cris Matias NP       Past Medical History:  Past Medical History:   Diagnosis Date    Adrenal cortical adenoma     Anxiety     Arthritis     CKD (chronic kidney disease) stage 3, GFR 30-59 ml/min     Depression     Diabetes mellitus type II 2011    does not take    DM (diabetes mellitus) 2011     am 09/21/2017 Insulin x 1 1/2 year    GERD (gastroesophageal reflux disease) 7/9/2013    Hypertension     Migraine headache 7/22/2013    Schizophrenia     Severe obesity with body mass index of 36.0 to 36.9        Past Surgical History:  Past Surgical History:   Procedure Laterality Date    BACK SURGERY      COLONOSCOPY N/A 12/3/2020    Procedure:  COLONOSCOPY;  Surgeon: Christofer Palmer MD;  Location: [facility];  Service: Endoscopy;  Laterality: N/A;    COLONOSCOPY N/A 2020    Procedure: COLONOSCOPY;  Surgeon: Frandy Stanton MD;  Location: [facility] ENDO;  Service: Endoscopy;  Laterality: N/A;    HERNIA REPAIR      UMBILICAL    left arm exfix      NASAL SEPTUM SURGERY Bilateral     TONSILLECTOMY      URETEROSCOPY           Family History:  Family History   Problem Relation Age of Onset    Hypertension Mother     Diabetes Mother     Cataracts Mother     Hypertension Sister     Diabetes Sister     Hypertension Brother     Diabetes Brother     Cancer Paternal Grandmother     Cancer Paternal Grandfather        Social History:  Social History     Tobacco Use    Smoking status: Former Smoker     Packs/day: 1.00     Years: 37.00     Pack years: 37.00     Types: Cigarettes     Quit date: 3/28/2021     Years since quittin.8    Smokeless tobacco: Never Used    Tobacco comment: instructed not to smoke after midnight after midnight prior to surgery   Substance and Sexual Activity    Alcohol use: No    Drug use: No    Sexual activity: Never     Partners: Female       ROS   Review of Systems   Constitutional: Negative for chills and fever.   HENT: Negative for congestion, rhinorrhea and sore throat.    Eyes: Negative for visual disturbance.   Respiratory: Positive for shortness of breath and wheezing. Negative for cough.    Cardiovascular: Negative for chest pain and leg swelling.   Gastrointestinal: Negative for diarrhea, nausea and vomiting.   Genitourinary: Negative for dysuria.   Musculoskeletal: Negative for back pain.   Skin: Negative for rash.   Neurological: Negative for dizziness, weakness, numbness and headaches.   Hematological: Does not bruise/bleed easily.   All other systems reviewed and are negative.      Physical Exam      Initial Vitals   BP Pulse Resp Temp SpO2   22 0424 22 0423 22 0424 22 0424 22  0424   (!) 160/103 107 (!) 32 97.9 °F (36.6 °C) (!) 88 %      MAP       --                 Physical Exam  Nursing Notes and Vital Signs Reviewed.  Constitutional: Patient is in moderate distress. Well-developed and well-nourished.  Head: Atraumatic. Normocephalic.  Eyes: PERRL. EOM intact. Conjunctivae are not pale. No scleral icterus.  ENT: Mucous membranes are moist. Oropharynx is clear and symmetric.    Neck: Supple. Full ROM. No lymphadenopathy.  Cardiovascular: Regular rate. Regular rhythm. No murmurs, rubs, or gallops. Distal pulses are 2+ and symmetric.  Pulmonary/Chest: Mild respiratory distress. Decreased breath sound bilaterally. Faint expiratory wheeze.  Abdominal: Soft and non-distended.  There is no tenderness.  No rebound, guarding, or rigidity. Good bowel sounds.  Genitourinary: No CVA tenderness  Musculoskeletal: Moves all extremities. No obvious deformities. 3+ pretibia edema in BLE. No calf tenderness.  Skin: Warm and dry.  Neurological:  Alert, awake, and appropriate.  Normal speech.  No acute focal neurological deficits are appreciated.  Psychiatric: Normal affect. Good eye contact. Appropriate in content.    ED Course    Critical Care    Date/Time: 2/7/2022 6:02 AM  Performed by: Kem Aponte MD  Authorized by: Kem Aponte MD   Direct patient critical care time: 20 minutes  Additional history critical care time: 15 minutes  Ordering / reviewing critical care time: 10 minutes  Documentation critical care time: 5 minutes  Consulting other physicians critical care time: 5 minutes  Total critical care time (exclusive of procedural time) : 55 minutes  Critical care time was exclusive of separately billable procedures and treating other patients and teaching time.  Critical care was necessary to treat or prevent imminent or life-threatening deterioration of the following conditions: respiratory failure.  Critical care was time spent personally by me on the following activities: blood draw for  specimens, development of treatment plan with patient or surrogate, discussions with consultants, interpretation of cardiac output measurements, evaluation of patient's response to treatment, examination of patient, obtaining history from patient or surrogate, ordering and performing treatments and interventions, ordering and review of laboratory studies, ordering and review of radiographic studies, pulse oximetry, re-evaluation of patient's condition and review of old charts.    Intubation    Date/Time: 2/7/2022 6:12 AM  Location procedure was performed: [facility] EMERGENCY DEPARTMENT  Performed by: Kem Aponte MD  Authorized by: Kem Aponte MD   Consent Done: Emergent Situation  Indications: respiratory failure,  airway protection and  respiratory distress  Intubation method: direct  Patient status: sedated  Preoxygenation: nonrebreather mask  Sedatives: etomidate  Paralytic: succinylcholine  Tube size: 7.5 mm  Tube type: cuffed  Number of attempts: 1  Ventilation between attempts: BVM  Cricoid pressure: yes  Cords visualized: yes  Post-procedure assessment: chest rise  Breath sounds: clear and absent over the epigastrium  Cuff inflated: yes  ETT to teeth: 24 cm  Tube secured with: ETT alvares  Chest x-ray interpreted by me.  Chest x-ray findings: endotracheal tube in appropriate position  Patient tolerance: Patient tolerated the procedure well with no immediate complications  Complications: No  Specimens: No  Implants: No        ED Vital Signs:  Vitals:    02/08/22 0245 02/08/22 0300 02/08/22 0315 02/08/22 0330   BP: 124/75 116/72 117/73 120/74   Pulse: 75 72 71 71   Resp: 20 20 20 20   Temp:       TempSrc:       SpO2: 98% 98% 99% 98%   Weight:       Height:        02/08/22 0345 02/08/22 0400 02/08/22 0415 02/08/22 0424   BP: 121/76 121/76 118/75    Pulse: 73 75 70 70   Resp: 20 20 20 20   Temp:  97.8 °F (36.6 °C)     TempSrc:  Oral     SpO2: 98% 99% 97% 97%   Weight:       Height:        02/08/22 0430 02/08/22  0445 02/08/22 0454 02/08/22 0500   BP: 117/74 113/72  109/69   Pulse: 68 72 75 71   Resp: 20 20 20 20   Temp:       TempSrc:       SpO2: 99% 97% 98% 99%   Weight:       Height:        02/08/22 0515 02/08/22 0532 02/08/22 0548   BP: 124/69  114/67   Pulse: 71  71   Resp: 20  20   Temp:      TempSrc:      SpO2: 99%  99%   Weight:  98.6 kg (217 lb 6 oz)    Height:          Abnormal Lab Results:  Labs Reviewed   CBC W/ AUTO DIFFERENTIAL - Abnormal; Notable for the following components:       Result Value    WBC 14.70 (*)     RBC 4.25 (*)     Hemoglobin 11.8 (*)     Hematocrit 38.0 (*)     MCHC 31.1 (*)     RDW 16.5 (*)     MPV 9.1 (*)     Immature Granulocytes 0.9 (*)     Gran # (ANC) 12.4 (*)     Immature Grans (Abs) 0.13 (*)     Gran % 84.3 (*)     Lymph % 8.8 (*)     All other components within normal limits   COMPREHENSIVE METABOLIC PANEL - Abnormal; Notable for the following components:    Glucose 295 (*)     Albumin 3.1 (*)     All other components within normal limits   URINALYSIS, REFLEX TO URINE CULTURE - Abnormal; Notable for the following components:    Protein, UA Trace (*)     Glucose, UA 2+ (*)     All other components within normal limits    Narrative:     Specimen Source->Urine   B-TYPE NATRIURETIC PEPTIDE - Abnormal; Notable for the following components:    BNP 1,200 (*)     All other components within normal limits   D DIMER, QUANTITATIVE - Abnormal; Notable for the following components:    D-Dimer 0.67 (*)     All other components within normal limits   MAGNESIUM - Abnormal; Notable for the following components:    Magnesium 1.5 (*)     All other components within normal limits   SEDIMENTATION RATE - Abnormal; Notable for the following components:    Sed Rate 17 (*)     All other components within normal limits   C-REACTIVE PROTEIN - Abnormal; Notable for the following components:    CRP 44.0 (*)     All other components within normal limits   ISTAT PROCEDURE - Abnormal; Notable for the following  components:    POC PH 7.294 (*)     POC PCO2 57.9 (*)     POC PO2 214 (*)     POC HCO3 28.1 (*)     All other components within normal limits   POCT GLUCOSE - Abnormal; Notable for the following components:    POCT Glucose 419 (*)     All other components within normal limits   POCT GLUCOSE - Abnormal; Notable for the following components:    POCT Glucose 403 (*)     All other components within normal limits   POCT GLUCOSE - Abnormal; Notable for the following components:    POCT Glucose 447 (*)     All other components within normal limits   POCT GLUCOSE - Abnormal; Notable for the following components:    POCT Glucose 327 (*)     All other components within normal limits   CULTURE, BLOOD    Narrative:     Aerobic and anaerobic   CULTURE, BLOOD    Narrative:     Aerobic and anaerobic   CULTURE, RESPIRATORY   LACTIC ACID, PLASMA   PROCALCITONIN   TROPONIN I   LACTIC ACID, PLASMA   TROPONIN I   PHOSPHORUS   TROPONIN I   DRUG SCREEN PANEL, URINE EMERGENCY    Narrative:     Specimen Source->Urine   SARS-COV-2 RDRP GENE    Narrative:     This test utilizes isothermal nucleic acid amplification   technology to detect the SARS-CoV-2 RdRp nucleic acid segment.   The analytical sensitivity (limit of detection) is 125 genome   equivalents/mL.   A POSITIVE result implies infection with the SARS-CoV-2 virus;   the patient is presumed to be contagious.     A NEGATIVE result means that SARS-CoV-2 nucleic acids are not   present above the limit of detection. A NEGATIVE result should be   treated as presumptive. It does not rule out the possibility of   COVID-19 and should not be the sole basis for treatment decisions.   If COVID-19 is strongly suspected based on clinical and exposure   history, re-testing using an alternate molecular assay should be   considered.   This test is only for use under the Food and Drug   Administration s Emergency Use Authorization (EUA).   Commercial kits are provided by [employer].    Performance characteristics of the EUA have been independently   verified by [facility] Department of   Pathology and Laboratory Medicine.   _________________________________________________________________   The authorized Fact Sheet for Healthcare Providers and the authorized Fact   Sheet for Patients of the ID NOW COVID-19 are available on the FDA   website:     https://www.fda.gov/media/387488/download  https://www.fda.gov/media/122745/download         POCT GLUCOSE MONITORING CONTINUOUS   POCT GLUCOSE MONITORING CONTINUOUS   POCT GLUCOSE MONITORING CONTINUOUS   POCT GLUCOSE MONITORING CONTINUOUS   POCT GLUCOSE MONITORING CONTINUOUS        All Lab Results:  Results for orders placed or performed during the hospital encounter of 02/07/22   Blood culture x two cultures. Draw prior to antibiotics.    Specimen: Peripheral, Hand, Right; Blood   Result Value Ref Range    Blood Culture, Routine No Growth to date    Blood culture x two cultures. Draw prior to antibiotics.    Specimen: Peripheral, Hand, Left; Blood   Result Value Ref Range    Blood Culture, Routine No Growth to date    Culture, Respiratory with Gram Stain    Specimen: Sputum, Induced; Respiratory   Result Value Ref Range    Gram Stain (Respiratory) <10 epithelial cells per low power field.     Gram Stain (Respiratory) Rare WBC's     Gram Stain (Respiratory) No organisms seen    Culture, Respiratory with Gram Stain    Specimen: Endotracheal Aspirate; Respiratory   Result Value Ref Range    Gram Stain (Respiratory) <10 epithelial cells per low power field.     Gram Stain (Respiratory) Rare WBC's     Gram Stain (Respiratory) Rare Gram positive cocci    CBC auto differential   Result Value Ref Range    WBC 14.70 (H) 3.90 - 12.70 K/uL    RBC 4.25 (L) 4.60 - 6.20 M/uL    Hemoglobin 11.8 (L) 14.0 - 18.0 g/dL    Hematocrit 38.0 (L) 40.0 - 54.0 %    MCV 89 82 - 98 fL    MCH 27.8 27.0 - 31.0 pg    MCHC 31.1 (L) 32.0 - 36.0 g/dL    RDW 16.5 (H) 11.5 -  14.5 %    Platelets 411 150 - 450 K/uL    MPV 9.1 (L) 9.2 - 12.9 fL    Immature Granulocytes 0.9 (H) 0.0 - 0.5 %    Gran # (ANC) 12.4 (H) 1.8 - 7.7 K/uL    Immature Grans (Abs) 0.13 (H) 0.00 - 0.04 K/uL    Lymph # 1.3 1.0 - 4.8 K/uL    Mono # 0.8 0.3 - 1.0 K/uL    Eos # 0.0 0.0 - 0.5 K/uL    Baso # 0.06 0.00 - 0.20 K/uL    nRBC 0 0 /100 WBC    Gran % 84.3 (H) 38.0 - 73.0 %    Lymph % 8.8 (L) 18.0 - 48.0 %    Mono % 5.4 4.0 - 15.0 %    Eosinophil % 0.2 0.0 - 8.0 %    Basophil % 0.4 0.0 - 1.9 %    Differential Method Automated    Comprehensive metabolic panel   Result Value Ref Range    Sodium 137 136 - 145 mmol/L    Potassium 4.3 3.5 - 5.1 mmol/L    Chloride 99 95 - 110 mmol/L    CO2 25 23 - 29 mmol/L    Glucose 295 (H) 70 - 110 mg/dL    BUN 16 8 - 23 mg/dL    Creatinine 1.2 0.5 - 1.4 mg/dL    Calcium 8.9 8.7 - 10.5 mg/dL    Total Protein 6.8 6.0 - 8.4 g/dL    Albumin 3.1 (L) 3.5 - 5.2 g/dL    Total Bilirubin 0.4 0.1 - 1.0 mg/dL    Alkaline Phosphatase 99 55 - 135 U/L    AST 10 10 - 40 U/L    ALT 13 10 - 44 U/L    Anion Gap 13 8 - 16 mmol/L    eGFR if African American >60 >60 mL/min/1.73 m^2    eGFR if non African American >60 >60 mL/min/1.73 m^2   Lactic acid, plasma #1   Result Value Ref Range    Lactate (Lactic Acid) 1.9 0.5 - 2.2 mmol/L   Urinalysis, Reflex to Urine Culture Urine, Clean Catch    Specimen: Urine   Result Value Ref Range    Specimen UA Urine, Catheterized     Color, UA Yellow Yellow, Straw, Esha    Appearance, UA Clear Clear    pH, UA 6.0 5.0 - 8.0    Specific Gravity, UA 1.015 1.005 - 1.030    Protein, UA Trace (A) Negative    Glucose, UA 2+ (A) Negative    Ketones, UA Negative Negative    Bilirubin (UA) Negative Negative    Occult Blood UA Negative Negative    Nitrite, UA Negative Negative    Urobilinogen, UA Negative <2.0 EU/dL    Leukocytes, UA Negative Negative   Procalcitonin   Result Value Ref Range    Procalcitonin 0.02 <0.25 ng/mL   Troponin I   Result Value Ref Range    Troponin I 0.025  0.000 - 0.026 ng/mL   Brain natriuretic peptide   Result Value Ref Range    BNP 1,200 (H) 0 - 99 pg/mL   D dimer, quantitative   Result Value Ref Range    D-Dimer 0.67 (H) <0.50 mg/L FEU   Lactic acid, plasma #2   Result Value Ref Range    Lactate (Lactic Acid) 1.3 0.5 - 2.2 mmol/L   Troponin I   Result Value Ref Range    Troponin I 0.018 0.000 - 0.026 ng/mL   Magnesium   Result Value Ref Range    Magnesium 1.5 (L) 1.6 - 2.6 mg/dL   Phosphorus   Result Value Ref Range    Phosphorus 3.9 2.7 - 4.5 mg/dL   Troponin I   Result Value Ref Range    Troponin I 0.018 0.000 - 0.026 ng/mL   IgE   Result Value Ref Range    IgE 48 0 - 100 IU/mL   Sedimentation rate   Result Value Ref Range    Sed Rate 17 (H) 0 - 10 mm/Hr   C-reactive protein   Result Value Ref Range    CRP 44.0 (H) 0.0 - 8.2 mg/L   Drug screen panel, in-house   Result Value Ref Range    Benzodiazepines Negative Negative    Methadone metabolites Negative Negative    Cocaine (Metab.) Negative Negative    Opiate Scrn, Ur Negative Negative    Barbiturate Screen, Ur Negative Negative    Amphetamine Screen, Ur Negative Negative    THC Negative Negative    Phencyclidine Negative Negative    Creatinine, Urine 78.4 23.0 - 375.0 mg/dL    Toxicology Information SEE COMMENT    CBC Auto Differential   Result Value Ref Range    WBC 13.90 (H) 3.90 - 12.70 K/uL    RBC 3.72 (L) 4.60 - 6.20 M/uL    Hemoglobin 10.3 (L) 14.0 - 18.0 g/dL    Hematocrit 32.3 (L) 40.0 - 54.0 %    MCV 87 82 - 98 fL    MCH 27.7 27.0 - 31.0 pg    MCHC 31.9 (L) 32.0 - 36.0 g/dL    RDW 16.6 (H) 11.5 - 14.5 %    Platelets 343 150 - 450 K/uL    MPV 9.2 9.2 - 12.9 fL    Immature Granulocytes 0.6 (H) 0.0 - 0.5 %    Gran # (ANC) 12.0 (H) 1.8 - 7.7 K/uL    Immature Grans (Abs) 0.08 (H) 0.00 - 0.04 K/uL    Lymph # 1.2 1.0 - 4.8 K/uL    Mono # 0.6 0.3 - 1.0 K/uL    Eos # 0.0 0.0 - 0.5 K/uL    Baso # 0.01 0.00 - 0.20 K/uL    nRBC 0 0 /100 WBC    Gran % 86.3 (H) 38.0 - 73.0 %    Lymph % 8.5 (L) 18.0 - 48.0 %    Mono  % 4.5 4.0 - 15.0 %    Eosinophil % 0.0 0.0 - 8.0 %    Basophil % 0.1 0.0 - 1.9 %    Differential Method Automated    Comprehensive Metabolic Panel   Result Value Ref Range    Sodium 138 136 - 145 mmol/L    Potassium 4.4 3.5 - 5.1 mmol/L    Chloride 102 95 - 110 mmol/L    CO2 26 23 - 29 mmol/L    Glucose 196 (H) 70 - 110 mg/dL    BUN 20 8 - 23 mg/dL    Creatinine 1.4 0.5 - 1.4 mg/dL    Calcium 8.4 (L) 8.7 - 10.5 mg/dL    Total Protein 5.6 (L) 6.0 - 8.4 g/dL    Albumin 2.6 (L) 3.5 - 5.2 g/dL    Total Bilirubin 0.3 0.1 - 1.0 mg/dL    Alkaline Phosphatase 67 55 - 135 U/L    AST 9 (L) 10 - 40 U/L    ALT 11 10 - 44 U/L    Anion Gap 10 8 - 16 mmol/L    eGFR if African American >60 >60 mL/min/1.73 m^2    eGFR if non African American 53 (A) >60 mL/min/1.73 m^2   Magnesium   Result Value Ref Range    Magnesium 1.8 1.6 - 2.6 mg/dL   POCT COVID-19 Rapid Screening   Result Value Ref Range    POC Rapid COVID Negative Negative     Acceptable Yes    ISTAT PROCEDURE   Result Value Ref Range    POC PH 7.294 (L) 7.35 - 7.45    POC PCO2 57.9 (HH) 35 - 45 mmHg    POC PO2 214 (H) 80 - 100 mmHg    POC HCO3 28.1 (H) 24 - 28 mmol/L    POC BE 2 -2 to 2 mmol/L    POC SATURATED O2 100 95 - 100 %    Rate 20     Sample ARTERIAL     Site LR     Allens Test Pass     DelSys CPAP/BiPAP     Mode BiPAP     FiO2 100     Spont Rate 20     Sp02 100     IP 15     EP 5    POCT glucose   Result Value Ref Range    POCT Glucose 419 (H) 70 - 110 mg/dL   POCT glucose   Result Value Ref Range    POCT Glucose 403 (H) 70 - 110 mg/dL   POCT glucose   Result Value Ref Range    POCT Glucose 447 (H) 70 - 110 mg/dL   POCT glucose   Result Value Ref Range    POCT Glucose 327 (H) 70 - 110 mg/dL   POCT glucose   Result Value Ref Range    POCT Glucose 133 (H) 70 - 110 mg/dL   POCT glucose   Result Value Ref Range    POCT Glucose 186 (H) 70 - 110 mg/dL   ISTAT PROCEDURE   Result Value Ref Range    POC PH 7.354 7.35 - 7.45    POC PCO2 52.9 (H) 35 - 45 mmHg     POC PO2 79 (L) 80 - 100 mmHg    POC HCO3 29.5 (H) 24 - 28 mmol/L    POC BE 4 -2 to 2 mmol/L    POC SATURATED O2 95 95 - 100 %    Rate 20     Sample ARTERIAL     Site LR     Allens Test Pass     DelSys Adult Vent     Mode AC/PRVC     Vt 450     PEEP 5     FiO2 60      *Note: Due to a large number of results and/or encounters for the requested time period, some results have not been displayed. A complete set of results can be found in Results Review.         Imaging Results:  Imaging Results          X-Ray Chest AP Portable (Final result)  Result time 02/07/22 19:08:57    Final result by Willie Grewal MD (02/07/22 19:08:57)                 Impression:      1.  Worsening bilateral lower lobe infiltrates and adjacent effusions.    2.  Lines and tubes are in good position as described above.  Negative for pneumothorax.    3.  Follow-up radiographs recommended.      Electronically signed by: Willie Grewal MD  Date:    02/07/2022  Time:    19:08             Narrative:    EXAMINATION:  XR CHEST AP PORTABLE    CLINICAL HISTORY:  Shortness of breath    COMPARISON:  A study performed 12-1/2 hours earlier    FINDINGS:  Nasogastric tube is approximately 5.5 cm above the maye.  Nasogastric tube extends beyond the inferior margins of this radiograph.  Right arm PICC line is in place with the tip in the distal SVC.  Worsening bilateral lower lobe infiltrates and adjacent effusions.  Negative for pneumothorax.  The hilar and mediastinal contours and osseous structures are unchanged.                               X-Ray Chest AP Portable (Final result)  Result time 02/07/22 07:47:17    Final result by Frandy Bolton MD (02/07/22 07:47:17)                 Impression:      Scattered interstitial opacities are seen throughout the lungs which could reflect interstitial edema or interstitial pneumonia.    Satisfactory endotracheal nasogastric tube placement.      Electronically signed by: Frandy Bolton  MD  Date:    02/07/2022  Time:    07:47             Narrative:    EXAMINATION:  XR CHEST AP PORTABLE    CLINICAL HISTORY:  Encounter for other preprocedural examination    FINDINGS:  Single view of the chest.  Comparison 02/07/2022.    Endotracheal tube is satisfactory.  Nasogastric tube projects to the stomach with side hole just below the GE junction.    Cardiac silhouette is normal.  Scattered interstitial opacities are seen throughout the lungs which could reflect interstitial edema or interstitial pneumonia. No evidence of pleural effusion or pneumothorax.  Bones appear intact.                               X-Ray Chest AP Portable (Final result)  Result time 02/07/22 07:44:18    Final result by Frandy Bolton MD (02/07/22 07:44:18)                 Impression:      See above.      Electronically signed by: Frandy Bolton MD  Date:    02/07/2022  Time:    07:44             Narrative:    EXAMINATION:  XR CHEST AP PORTABLE    CLINICAL HISTORY:  Sepsis;    FINDINGS:  Single view of the chest.  Comparison 01/22/2022    Cardiac silhouette is normal.  Scattered interstitial infiltrates are seen throughout the lungs which could reflect mild interstitial edema or interstitial pneumonia. Trace left pleural effusion.  Mild subsegmental atelectasis..  No evidence of pneumothorax.  Bones appear intact.  Aorta demonstrates atherosclerotic disease.                                      The EKG was ordered, reviewed, and independently interpreted by the ED provider.  Interpretation time: 4:41  Rate: 106 BPM  Rhythm: sinus tachycardia  Interpretation: Nonspecific T wave abnormalities. No STEMI.      The Emergency Provider reviewed the vital signs and test results, which are outlined above.    ED Discussion     5:54 AM: Discussed case with Ai Miner NP ([facility]). Dr. Terrazas agrees with current care and management of pt and accepts admission.   Admitting Service: Hospital Medicine  Admitting Physician:   Camronmaciej  Admit to: ICU    5:59 AM: Re-evaluated pt. I have discussed test results, shared treatment plan, and the need for admission with patient and family at bedside. Pt and family express understanding at this time and agree with all information. All questions answered. Pt and family have no further questions or concerns at this time. Pt is ready for admit.    ED Course as of 02/08/22 0657   Mon Feb 07, 2022 0621 Notified by nursing patient was becoming agitated, pulling off BiPAP mask, difficult to re-direct. Was requiring precedex drip on last admission for simmilar per chart review. Ativan given, but patient still unable to tolerate BiPAP, was more sedated, BiPAP falling off with saturation drops. Decision made to intubate.  [BA]      ED Course User Index  [BA] Kem Aponte MD       ED Medication(s):  Medications   pantoprazole injection 40 mg (40 mg Intravenous Given 2/7/22 1039)   albuterol-ipratropium 2.5 mg-0.5 mg/3 mL nebulizer solution 3 mL (3 mLs Nebulization Given 2/8/22 0430)   budesonide nebulizer solution 0.5 mg (0.5 mg Nebulization Given 2/7/22 1910)   sodium chloride 0.9% flush 10 mL (has no administration in time range)   potassium chloride 10 mEq in 100 mL IVPB (has no administration in time range)     And   potassium chloride 10 mEq in 100 mL IVPB (has no administration in time range)     And   potassium chloride 10 mEq in 100 mL IVPB (has no administration in time range)   magnesium sulfate 2g in water 50mL IVPB (premix) (0 g Intravenous Stopped 2/7/22 1626)   magnesium sulfate 2g in water 50mL IVPB (premix) (has no administration in time range)   sodium phosphate 15 mmol in dextrose 5 % 250 mL IVPB (has no administration in time range)   sodium phosphate 20.01 mmol in dextrose 5 % 250 mL IVPB (has no administration in time range)   sodium phosphate 30 mmol in dextrose 5 % 250 mL IVPB (has no administration in time range)   calcium gluconate 1g in dextrose 5% 100mL (ready to mix system)  (has no administration in time range)   calcium gluconate 2 g in dextrose 5 % 100 mL IVPB (has no administration in time range)   calcium gluconate 3 g in dextrose 5 % 100 mL IVPB (has no administration in time range)   acetaminophen tablet 650 mg (has no administration in time range)   ondansetron injection 4 mg (has no administration in time range)   glucagon (human recombinant) injection 1 mg (has no administration in time range)   dextrose 10% bolus 125 mL (has no administration in time range)   cefTRIAXone (ROCEPHIN) 2 g/50 mL D5W IVPB (0 g Intravenous Stopped 2/7/22 1149)   azithromycin 500 mg in dextrose 5 % 250 mL IVPB (ready to mix system) (0 mg Intravenous Stopped 2/7/22 1256)   furosemide injection 40 mg (40 mg Intravenous Given 2/7/22 2023)   insulin detemir U-100 pen 10 Units (10 Units Subcutaneous Given 2/7/22 2023)   insulin aspart U-100 pen 1-10 Units (2 Units Subcutaneous Given 2/8/22 0346)   NORepinephrine 4 mg in dextrose 5% 250 mL infusion (premix) (titrating) (0.04 mcg/kg/min × 92.3 kg Intravenous Verify Only 2/8/22 0500)   mupirocin 2 % ointment ( Nasal Given 2/7/22 2023)   0.9%  NaCl infusion ( Intravenous New Bag 2/8/22 0540)   chlorhexidine 0.12 % solution 15 mL (15 mLs Mouth/Throat Given 2/7/22 2023)   propofol (DIPRIVAN) 10 mg/mL infusion (50 mcg/kg/min × 92.3 kg Intravenous Verify Only 2/8/22 0500)   methylPREDNISolone sodium succinate injection 20 mg (20 mg Intravenous Given 2/8/22 0539)   sodium chloride 0.9% flush 10 mL (10 mLs Intravenous Given 2/8/22 0543)     And   sodium chloride 0.9% flush 10 mL (has no administration in time range)   pneumoc 13-michael conj-dip cr(PF) (PREVNAR 13 (PF)) 0.5 mL (has no administration in time range)   influenza (QUADRIVALENT PF) vaccine 0.5 mL (has no administration in time range)   fentaNYL 2500 mcg in 0.9% sodium chloride 250 mL infusion premix (titrating) (150 mcg/hr Intravenous Verify Only 2/8/22 0500)   lactated ringers bolus 2,769 mL (0 mL/kg ×  92.3 kg Intravenous Stopped 2/7/22 0600)   albuterol-ipratropium 2.5 mg-0.5 mg/3 mL nebulizer solution 3 mL (3 mLs Nebulization Given 2/7/22 0435)   methylPREDNISolone sodium succinate injection 125 mg (125 mg Intravenous Given 2/7/22 0449)   nitroGLYCERIN 2% TD oint ointment 1 inch (1 inch Transdermal Given 2/7/22 0449)   furosemide injection 40 mg (40 mg Intravenous Given 2/7/22 0449)   lorazepam injection 1 mg (1 mg Intravenous Given 2/7/22 0529)   rocuronium injection 70 mg (70 mg Intravenous Given by Other 2/7/22 0607)   etomidate injection 20 mg (20 mg Intravenous Given by Other 2/7/22 0607)   furosemide injection 40 mg (40 mg Intravenous Given 2/7/22 1043)   insulin aspart U-100 pen 15 Units (15 Units Subcutaneous Given 2/7/22 1251)     Current Discharge Medication List               Medical Decision Making    Medical Decision Making:   Clinical Tests:   Lab Tests: Ordered and Reviewed  Radiological Study: Ordered and Reviewed  Medical Tests: Ordered and Reviewed           Scribe Attestation:   Scribe #1: I performed the above scribed service and the documentation accurately describes the services I performed. I attest to the accuracy of the note.    Attending:   Physician Attestation Statement for Scribe #1: I, Kem Aponte MD, personally performed the services described in this documentation, as scribed by Kary Henderson, in my presence, and it is both accurate and complete.          Clinical Impression       ICD-10-CM ICD-9-CM   1. Acute hypoxemic respiratory failure  J96.01 518.81   2. SOB (shortness of breath)  R06.02 786.05   3. Encounter for intubation  Z01.818 V72.83   4. Acute hypercapnic respiratory failure  J96.02 518.81   5. Acute pulmonary edema  J81.0 518.4   6. COPD exacerbation  J44.1 491.21       Disposition:   Disposition: Admitted (ICU)  Condition: Fair         Kem Aponte MD  02/08/22 0657

## 2022-02-07 NOTE — HPI
Shukri Piter Mitchellon is 61 y.o.  Asked to see in ER  Presented with respiratory distress, on CPAP, was found cyanotic and intubated  Smoker  Sats 70% at arrival  SOB cough Wheezing used nebs  Little improvement  Prior PFT 2018: normal except mild reduced DLCO.  On ANORO and Albuterol  Dimer mild elevated  pCO2 was 54

## 2022-02-07 NOTE — ED NOTES
Secure chat message sent to treatment team to notify of BP and MAP; current propofol rate. Asking if current BP/MAP is okay with team or if any further orders. Awaiting response.

## 2022-02-07 NOTE — ASSESSMENT & PLAN NOTE
-Presents with SOB, multifactorial in setting of COPD exacerbation/? PNA and combined CHF  -Echo 1/22 showed EF of 40%, DD, pulmonary HTN, + WMA  -Continue IV diuresis as tolerated  -Resume Coreg once BP permits  -Consider Entresto pending BP/creatinine trend  -Ischemic evaluation once recovered  -Strict I's/O's

## 2022-02-07 NOTE — ED NOTES
PT removed BiPaP mask and pulled out RAC IV.  Pt found in bed with very low O2 sats, confused and diaphoretic.  BVM in use at BS.  Dr Aponte at BS.

## 2022-02-07 NOTE — CONSULTS
[facility] - Emergency Dept.  Pulmonology  Consult Note    Patient Name: Shukri Rosales  MRN: [id number]  Admission Date: 2/7/2022  Hospital Length of Stay: 0 days  Code Status: Full Code  Attending Physician: Guillaume Urbina, *  Primary Care Provider: Cris Matias NP   Principal Problem: Acute hypoxemic respiratory failure    [unfilled]  Subjective:     HPI:  Shukri Rosales is 61 y.o.  Asked to see in ER  Presented with respiratory distress, on CPAP, was found cyanotic and intubated  Smoker  Sats 70% at arrival  SOB cough Wheezing used nebs  Little improvement  Prior PFT 2018: normal except mild reduced DLCO.  On ANORO and Albuterol  Dimer mild elevated  pCO2 was 54        Past Medical History:   Diagnosis Date    Adrenal cortical adenoma     Anxiety     Arthritis     CKD (chronic kidney disease) stage 3, GFR 30-59 ml/min     Depression     Diabetes mellitus type II 2011    does not take    DM (diabetes mellitus) 2011     am 09/21/2017 Insulin x 1 1/2 year    GERD (gastroesophageal reflux disease) 7/9/2013    Hypertension     Migraine headache 7/22/2013    Schizophrenia     Severe obesity with body mass index of 36.0 to 36.9        Past Surgical History:   Procedure Laterality Date    BACK SURGERY      COLONOSCOPY N/A 12/3/2020    Procedure: COLONOSCOPY;  Surgeon: Christofer Palmer MD;  Location: [facility] ENDO;  Service: Endoscopy;  Laterality: N/A;    COLONOSCOPY N/A 12/4/2020    Procedure: COLONOSCOPY;  Surgeon: Frandy Stanton MD;  Location: [facility];  Service: Endoscopy;  Laterality: N/A;    HERNIA REPAIR      UMBILICAL    left arm exfix      NASAL SEPTUM SURGERY Bilateral     TONSILLECTOMY      URETEROSCOPY         Review of patient's allergies indicates:  No Known Allergies    Family History     Problem Relation (Age of Onset)    Cancer Paternal Grandmother, Paternal Grandfather    Cataracts Mother    Diabetes Mother, Sister, Brother    Hypertension Mother,  Sister, Brother        Tobacco Use    Smoking status: Former Smoker     Packs/day: 1.00     Years: 37.00     Pack years: 37.00     Types: Cigarettes     Quit date: 3/28/2021     Years since quittin.8    Smokeless tobacco: Never Used    Tobacco comment: instructed not to smoke after midnight after midnight prior to surgery   Substance and Sexual Activity    Alcohol use: No    Drug use: No    Sexual activity: Never     Partners: Female         Review of Systems   Unable to perform ROS: Intubated     Objective:     Vital Signs (Most Recent):  Temp: 98 °F (36.7 °C) (22 1527)  Pulse: 81 (22 1642)  Resp: 16 (22 164)  BP: 101/67 (22 164)  SpO2: 96 % (22) Vital Signs (24h Range):  Temp:  [96.9 °F (36.1 °C)-98 °F (36.7 °C)] 98 °F (36.7 °C)  Pulse:  [] 81  Resp:  [12-47] 16  SpO2:  [88 %-100 %] 96 %  BP: ()/() 101/67     Weight: 92.3 kg (203 lb 7.8 oz)  Body mass index is 27.6 kg/m².      Intake/Output Summary (Last 24 hours) at 2022 1658  Last data filed at 2022 1626  Gross per 24 hour   Intake 3119 ml   Output 1630 ml   Net 1489 ml       Physical Exam  Vitals and nursing note reviewed.   Constitutional:       Appearance: He is ill-appearing.      Interventions: He is intubated.       HENT:      Head: Normocephalic.   Eyes:      Pupils: Pupils are equal, round, and reactive to light.   Cardiovascular:      Rate and Rhythm: Normal rate.      Pulses: Normal pulses.      Heart sounds: Normal heart sounds.   Pulmonary:      Effort: Pulmonary effort is normal. He is intubated.      Breath sounds: Normal breath sounds.   Abdominal:      General: Bowel sounds are normal.   Musculoskeletal:      Cervical back: Normal range of motion.      Right lower leg: Edema present.      Left lower leg: Edema present.   Skin:     General: Skin is warm.   Neurological:      General: No focal deficit present.      Mental Status: He is easily aroused.         Vents:  Vent  Mode: A/C (02/07/22 1114)  Ventilator Initiated: Yes (02/07/22 0634)  Set Rate: 20 BPM (02/07/22 1114)  Vt Set: 450 mL (02/07/22 1114)  PEEP/CPAP: 5 cmH20 (02/07/22 1114)  Oxygen Concentration (%): 60 (02/07/22 1642)  Peak Airway Pressure: 20 cmH2O (02/07/22 1114)  Plateau Pressure: 0 cmH20 (02/07/22 1114)  Total Ve: 9.27 mL (02/07/22 1114)  Negative Inspiratory Force (cm H2O): 0 (02/07/22 1114)  F/VT Ratio<105 (RSBI): (!) 42.83 (02/07/22 1114)    Lines/Drains/Airways     Drain                 NG/OG Tube 02/07/22 0608 orogastric 16 Fr. Center mouth <1 day         Urethral Catheter 02/07/22 0623 Non-latex 16 Fr. <1 day          Airway                 Airway - Non-Surgical 02/07/22 <1 day          Peripheral Intravenous Line                 Peripheral IV - Single Lumen 02/07/22 0431 20 G Left Hand <1 day         Peripheral IV - Single Lumen 02/07/22 0605 18 G Right Hand <1 day         Peripheral IV - Single Lumen 02/07/22 1550 20 G Right;Posterior Forearm <1 day                Significant Labs:    CBC/Anemia Profile:  Recent Labs   Lab 02/07/22 0434   WBC 14.70*   HGB 11.8*   HCT 38.0*      MCV 89   RDW 16.5*        Chemistries:  Recent Labs   Lab 02/07/22  0434 02/07/22  1015     --    K 4.3  --    CL 99  --    CO2 25  --    BUN 16  --    CREATININE 1.2  --    CALCIUM 8.9  --    ALBUMIN 3.1*  --    PROT 6.8  --    BILITOT 0.4  --    ALKPHOS 99  --    ALT 13  --    AST 10  --    MG  --  1.5*   PHOS  --  3.9       ABGs:   Recent Labs   Lab 02/07/22  0504   PH 7.294*   PCO2 57.9*   HCO3 28.1*   POCSATURATED 100   BE 2     Blood Culture:   Recent Labs   Lab 02/07/22  0434 02/07/22  0614   LABBLOO No Growth to date No Growth to date     Cardiac Markers: No results for input(s): CKMB, TROPONINT, MYOGLOBIN in the last 48 hours.  Lactic Acid:   Recent Labs   Lab 02/07/22  0434 02/07/22  0826   LACTATE 1.9 1.3     POCT Glucose:   Recent Labs   Lab 02/07/22  1213 02/07/22  1313 02/07/22  1414   POCTGLUCOSE 403*  447* 327*     Respiratory Culture:   Recent Labs   Lab 02/07/22  1037   GSRESP <10 epithelial cells per low power field.  Rare WBC's  No organisms seen     Troponin:   Recent Labs   Lab 02/07/22  0434 02/07/22  1015   TROPONINI 0.025 0.018  0.018     Urine Culture: No results for input(s): LABURIN in the last 48 hours.  All pertinent labs within the past 24 hours have been reviewed.    Significant Imaging:   I have reviewed all pertinent imaging results/findings within the past 24 hours.      ABG  Recent Labs   Lab 02/07/22  0504   PH 7.294*   PO2 214*   PCO2 57.9*   HCO3 28.1*   BE 2     Assessment/Plan:     GI bleed  Likely gastritis  NPO  PPI    Pulmonary hypertension  PA estimated 53  Multifactorial  Gentle diuresisi    Acute on chronic combined systolic and diastolic congestive heart failure  Patient is identified as having Combined Systolic and Diastolic heart failure that is Acute on chronic. CHF is currently controlled. Latest ECHO performed and demonstrates- Results for orders placed during the hospital encounter of 01/19/22    Echo    Interpretation Summary  · The left ventricle is normal in size with concentric hypertrophy and mildly decreased systolic function.  · Grade I left ventricular diastolic dysfunction.  · Normal right ventricular size with normal right ventricular systolic function.  · There is pulmonary hypertension.  · Intermediate central venous pressure (8 mmHg).  · The estimated PA systolic pressure is 53 mmHg.  · The estimated ejection fraction is 40%.  · There are segmental left ventricular wall motion abnormalities.  . Continue Furosemide and monitor clinical status closely. Monitor on telemetry. Patient is on CHF pathway.  Monitor strict Is&Os and daily weights.  Place on fluid restriction of 1.5 L. Continue to stress to patient importance of self efficacy and  on diet for CHF. Last BNP reviewed- and noted below   Recent Labs   Lab 02/07/22  0434   BNP 1,200*   .      Acute  respiratory failure with hypoxia and hypercarbia  Patient with Hypercapnic and Hypoxic Respiratory failure which is Acute on chronic.  he is on home oxygen at 2.0 LPM. Supplemental oxygen was provided and noted- Vent Mode: A/C  Oxygen Concentration (%):  [] 60  Resp Rate Total:  [20 br/min-30 br/min] 24 br/min  Vt Set:  [450 mL] 450 mL  PEEP/CPAP:  [5 cmH20] 5 cmH20  Mean Airway Pressure:  [8.7 cmH20-15 cmH20] 8.7 cmH20.   Signs/symptoms of respiratory failure include- tachypnea, increased work of breathing and respiratory distress. Contributing diagnoses includes - Interstitial lung disease and Pneumonia Labs and images were reviewed. Patient Has recent ABG, which has been reviewed. Will treat underlying causes and adjust management of respiratory failure as follows-     Serial ABG  Vent bundle  IV steriods  Sputum cullture  CEFTRIAXONE + AZITHRO      Schizoaffective disorder, bipolar type  On Seroquel    Type 2 diabetes mellitus with stage 3 chronic kidney disease  Sliding scale  Novalog Aspart and detemir    EMMANUEL on CPAP  Use home CPAP when feasible        Thank you for your consult. I will follow-up with patient. Please contact us if you have any additional questions.     Siddhartha Dueñas MD  Pulmonology  [facility] - Emergency Dept.

## 2022-02-07 NOTE — H&P
[facility], [facility] - Emergency Dept.  [facility]  History & Physical    Patient Name: Shukri Rosales  MRN: [id number]  Patient Class: IP- Inpatient  Admission Date: 2/7/2022  Attending Physician: Guillaume Urbina, *   Primary Care Provider: Cris Matias NP     Patient seen in ER. Currently no visitors/ family at bedside    Patient information was obtained from ER records.     Subjective:     Principal Problem:Acute hypoxemic respiratory failure    Chief Complaint:   Chief Complaint   Patient presents with    Shortness of Breath     Hx of COPD and recent dx of PNA. Pt received narcan for Kratom use.        HPI:   Shortness of Breath        Hx of COPD and recent dx of PNA. Pt received narcan for Kratom use.     Per ER- This  is a 61 y.o. male patient with PMHx of Adrenal cortical adenoma, arthritis, back surgery,  HTN, CKD stage 3, depression, GERD, migraine headache, Schizophrenia, Severe obesity, PNA, COPD, exsmoker, and DM who presents to the Emergency Department by EMS for SOB which onset one day ago. Symptoms are constant and moderate in severity. No mitigating or exacerbating factors reported. Associated sxs include wheeze. Patient denies  fever, chills, congestion, rhinorrhea, sore throat, cough, CP, leg swelling, N/V/D, dizziness, HA, and all other sxs at this time. Pt was seen in the ED a week ago with similar sxs. Pt was admit for PNA treatment. Pt states since d/c smoking a significant amount of cigarettes. Per EMS pt's SpO2 was in the low 70s at the scene. Pt was given two breathing treatments and placed on a nonrebreathing mask. Pt's SpO2 radha to 91 PTA. No further complaints or concerns at this time.      Patient was noted to be in Acute hypoxic respiratory failure in ER and was intubated and venitiated. Patient to be admitted to ICU.        Past Medical History:   Diagnosis Date    Adrenal cortical adenoma     Anxiety     Arthritis     CKD (chronic kidney disease) stage 3, GFR 30-59  ml/min     Depression     Diabetes mellitus type II 2011    does not take    DM (diabetes mellitus) 2011     am 09/21/2017 Insulin x 1 1/2 year    GERD (gastroesophageal reflux disease) 7/9/2013    Hypertension     Migraine headache 7/22/2013    Schizophrenia     Severe obesity with body mass index of 36.0 to 36.9        Past Surgical History:   Procedure Laterality Date    BACK SURGERY      COLONOSCOPY N/A 12/3/2020    Procedure: COLONOSCOPY;  Surgeon: Christofer Palmer MD;  Location: [facility] ENDO;  Service: Endoscopy;  Laterality: N/A;    COLONOSCOPY N/A 12/4/2020    Procedure: COLONOSCOPY;  Surgeon: Frandy Stanton MD;  Location: [facility] ENDO;  Service: Endoscopy;  Laterality: N/A;    HERNIA REPAIR      UMBILICAL    left arm exfix      NASAL SEPTUM SURGERY Bilateral     TONSILLECTOMY      URETEROSCOPY         Review of patient's allergies indicates:  No Known Allergies    No current facility-administered medications on file prior to encounter.     Current Outpatient Medications on File Prior to Encounter   Medication Sig    albuterol (PROVENTIL/VENTOLIN HFA) 90 mcg/actuation inhaler Inhale 1-2 puffs into the lungs every 4 (four) hours as needed for Wheezing. Rescue    amLODIPine (NORVASC) 10 MG tablet Take 10 mg by mouth.    aspirin 81 MG Chew Take 1 tablet (81 mg total) by mouth once daily.    blood sugar diagnostic Strp 1 each.    carvedilol (COREG) 6.25 MG tablet Take 6.25 mg by mouth 2 (two) times daily.    docusate sodium (COLACE) 100 MG capsule Take 100 mg by mouth 2 (two) times daily.    DULoxetine (CYMBALTA) 60 MG capsule Take 60 mg by mouth 2 (two) times daily.    empagliflozin (JARDIANCE) 25 mg tablet Take 25 mg by mouth.    ergocalciferol (ERGOCALCIFEROL) 50,000 unit Cap Take 50,000 Units by mouth every 7 days.    ferrous sulfate (FEOSOL) 325 mg (65 mg iron) Tab tablet Take 325 mg by mouth.    furosemide (LASIX) 40 MG tablet Take 1 tablet (40 mg total) by mouth once daily.  Hold if Systolic blood pressure less than 110 mmHG for 7 days    gabapentin (NEURONTIN) 800 MG tablet Take 800 mg by mouth 3 (three) times daily.    glucagon 1 mg/mL SolR Inject 1 mg into the muscle daily as needed.    HUMALOG MIX 50-50 INSULN U-100 100 unit/mL (50-50) Susp 10 Units twice daily    insulin lispro 100 unit/mL injection Inject under the skin with meals by sliding scale:  under 150 0 unit, 151-200 2 units, 201-250 4 units, 251-300 6 units, 301-350 8 units, 351-400 10 units, over 400 12 units.    metFORMIN (GLUCOPHAGE) 1000 MG tablet Take 500 mg by mouth 2 (two) times daily with meals.    methocarbamoL (ROBAXIN) 750 MG Tab Take 750 mg by mouth 3 (three) times daily.    omeprazole (PRILOSEC) 20 MG capsule Take 20 mg by mouth once daily.    QUEtiapine (SEROQUEL) 300 MG Tab Take 1 tablet (300 mg total) by mouth nightly.    tamsulosin (FLOMAX) 0.4 mg Cap Take 0.4 mg by mouth once daily.    traZODone (DESYREL) 150 MG tablet Take 150 mg by mouth every evening.    umeclidinium-vilanteroL (ANORO ELLIPTA) 62.5-25 mcg/actuation DsDv Inhale 1 puff into the lungs once daily. Controller    [DISCONTINUED] nicotine polacrilex 4 MG Lozg Take 1 lozenge (4 mg total) by mouth as needed. Not to exceed 10 lozenges per day.    atorvastatin (LIPITOR) 20 MG tablet Take 20 mg by mouth once daily.    baclofen (LIORESAL) 20 MG tablet Take 20 mg by mouth every 8 (eight) hours as needed.    clonazePAM (KLONOPIN) 1 MG tablet Take 0.5 tablets (0.5 mg total) by mouth 2 (two) times daily as needed for Anxiety. Take 1.5mg twice daily    lisinopril (PRINIVIL,ZESTRIL) 40 MG tablet Take 40 mg by mouth once daily.    [DISCONTINUED] nicotine (NICODERM CQ) 14 mg/24 hr Place 1 patch onto the skin once daily.     Family History     Problem Relation (Age of Onset)    Cancer Paternal Grandmother, Paternal Grandfather    Cataracts Mother    Diabetes Mother, Sister, Brother    Hypertension Mother, Sister, Brother        Tobacco Use     Smoking status: Former Smoker     Packs/day: 1.00     Years: 37.00     Pack years: 37.00     Types: Cigarettes     Quit date: 3/28/2021     Years since quittin.8    Smokeless tobacco: Never Used    Tobacco comment: instructed not to smoke after midnight after midnight prior to surgery   Substance and Sexual Activity    Alcohol use: No    Drug use: No    Sexual activity: Never     Partners: Female     Review of Systems   Unable to perform ROS: Intubated     Objective:     Vital Signs (Most Recent):  Temp: 96.9 °F (36.1 °C) (22 0858)  Pulse: 84 (22)  Resp: 18 (22)  BP: 118/72 (22)  SpO2: 100 % (22) Vital Signs (24h Range):  Temp:  [96.9 °F (36.1 °C)-97.9 °F (36.6 °C)] 96.9 °F (36.1 °C)  Pulse:  [] 84  Resp:  [12-47] 18  SpO2:  [88 %-100 %] 100 %  BP: (114-175)/() 118/72     Weight: 92.3 kg (203 lb 7.8 oz)  Body mass index is 27.6 kg/m².    Physical Exam  Constitutional:       Comments: Sedated, intubated and ventilated   HENT:      Head: Normocephalic. Head contusion:        Comments: ET tube and OG tube noted     Mouth/Throat:      Mouth: Mucous membranes are moist.   Eyes:      General:         Right eye: No discharge.         Left eye: No discharge.      Extraocular Movements: Extraocular movements intact.      Conjunctiva/sclera: Conjunctivae normal.      Pupils: Pupils are equal, round, and reactive to light.   Cardiovascular:      Rate and Rhythm: Normal rate and regular rhythm.      Heart sounds: Normal heart sounds. No murmur heard.      Pulmonary:      Breath sounds: Wheezing and rhonchi present.      Comments: Intubated    Abdominal:      General: Bowel sounds are normal. There is no distension.      Palpations: Abdomen is soft.      Tenderness: There is no abdominal tenderness. There is no guarding.      Comments: Obese      Genitourinary:     Comments: Medina catheter draining arely colored urine  Musculoskeletal:         General:  Normal range of motion.      Cervical back: Normal range of motion and neck supple. No rigidity or tenderness.      Right lower leg: Edema present.      Left lower leg: Edema present.      Comments: BLE 2 plus edema    2nd right toe partial amputation noted   Skin:     General: Skin is warm and dry.      Capillary Refill: Capillary refill takes 2 to 3 seconds.      Coloration: Skin is pale.      Comments: Some small scattered scabs noted to BLE- no signs of redness or drainage    Toe nails thick and brittle     Neurological:      Comments: Sedated on Diprivan           CRANIAL NERVES     CN III, IV, VI   Pupils are equal, round, and reactive to light.       Imaging Results          X-Ray Chest AP Portable (Final result)  Result time 02/07/22 07:47:17    Final result by Frandy Bolton MD (02/07/22 07:47:17)                 Impression:      Scattered interstitial opacities are seen throughout the lungs which could reflect interstitial edema or interstitial pneumonia.    Satisfactory endotracheal nasogastric tube placement.      Electronically signed by: Frandy Bolton MD  Date:    02/07/2022  Time:    07:47             Narrative:    EXAMINATION:  XR CHEST AP PORTABLE    CLINICAL HISTORY:  Encounter for other preprocedural examination    FINDINGS:  Single view of the chest.  Comparison 02/07/2022.    Endotracheal tube is satisfactory.  Nasogastric tube projects to the stomach with side hole just below the GE junction.    Cardiac silhouette is normal.  Scattered interstitial opacities are seen throughout the lungs which could reflect interstitial edema or interstitial pneumonia. No evidence of pleural effusion or pneumothorax.  Bones appear intact.                               X-Ray Chest AP Portable (Final result)  Result time 02/07/22 07:44:18    Final result by Frandy Bolton MD (02/07/22 07:44:18)                 Impression:      See above.      Electronically signed by: Frandy Bolton  MD  Date:    02/07/2022  Time:    07:44             Narrative:    EXAMINATION:  XR CHEST AP PORTABLE    CLINICAL HISTORY:  Sepsis;    FINDINGS:  Single view of the chest.  Comparison 01/22/2022    Cardiac silhouette is normal.  Scattered interstitial infiltrates are seen throughout the lungs which could reflect mild interstitial edema or interstitial pneumonia. Trace left pleural effusion.  Mild subsegmental atelectasis..  No evidence of pneumothorax.  Bones appear intact.  Aorta demonstrates atherosclerotic disease.                                Results for orders placed or performed during the hospital encounter of 02/07/22   CBC auto differential   Result Value Ref Range    WBC 14.70 (H) 3.90 - 12.70 K/uL    RBC 4.25 (L) 4.60 - 6.20 M/uL    Hemoglobin 11.8 (L) 14.0 - 18.0 g/dL    Hematocrit 38.0 (L) 40.0 - 54.0 %    MCV 89 82 - 98 fL    MCH 27.8 27.0 - 31.0 pg    MCHC 31.1 (L) 32.0 - 36.0 g/dL    RDW 16.5 (H) 11.5 - 14.5 %    Platelets 411 150 - 450 K/uL    MPV 9.1 (L) 9.2 - 12.9 fL    Immature Granulocytes 0.9 (H) 0.0 - 0.5 %    Gran # (ANC) 12.4 (H) 1.8 - 7.7 K/uL    Immature Grans (Abs) 0.13 (H) 0.00 - 0.04 K/uL    Lymph # 1.3 1.0 - 4.8 K/uL    Mono # 0.8 0.3 - 1.0 K/uL    Eos # 0.0 0.0 - 0.5 K/uL    Baso # 0.06 0.00 - 0.20 K/uL    nRBC 0 0 /100 WBC    Gran % 84.3 (H) 38.0 - 73.0 %    Lymph % 8.8 (L) 18.0 - 48.0 %    Mono % 5.4 4.0 - 15.0 %    Eosinophil % 0.2 0.0 - 8.0 %    Basophil % 0.4 0.0 - 1.9 %    Differential Method Automated    Comprehensive metabolic panel   Result Value Ref Range    Sodium 137 136 - 145 mmol/L    Potassium 4.3 3.5 - 5.1 mmol/L    Chloride 99 95 - 110 mmol/L    CO2 25 23 - 29 mmol/L    Glucose 295 (H) 70 - 110 mg/dL    BUN 16 8 - 23 mg/dL    Creatinine 1.2 0.5 - 1.4 mg/dL    Calcium 8.9 8.7 - 10.5 mg/dL    Total Protein 6.8 6.0 - 8.4 g/dL    Albumin 3.1 (L) 3.5 - 5.2 g/dL    Total Bilirubin 0.4 0.1 - 1.0 mg/dL    Alkaline Phosphatase 99 55 - 135 U/L    AST 10 10 - 40 U/L    ALT 13  10 - 44 U/L    Anion Gap 13 8 - 16 mmol/L    eGFR if African American >60 >60 mL/min/1.73 m^2    eGFR if non African American >60 >60 mL/min/1.73 m^2   Lactic acid, plasma #1   Result Value Ref Range    Lactate (Lactic Acid) 1.9 0.5 - 2.2 mmol/L   Urinalysis, Reflex to Urine Culture Urine, Clean Catch    Specimen: Urine   Result Value Ref Range    Specimen UA Urine, Catheterized     Color, UA Yellow Yellow, Straw, Esha    Appearance, UA Clear Clear    pH, UA 6.0 5.0 - 8.0    Specific Gravity, UA 1.015 1.005 - 1.030    Protein, UA Trace (A) Negative    Glucose, UA 2+ (A) Negative    Ketones, UA Negative Negative    Bilirubin (UA) Negative Negative    Occult Blood UA Negative Negative    Nitrite, UA Negative Negative    Urobilinogen, UA Negative <2.0 EU/dL    Leukocytes, UA Negative Negative   Procalcitonin   Result Value Ref Range    Procalcitonin 0.02 <0.25 ng/mL   Troponin I   Result Value Ref Range    Troponin I 0.025 0.000 - 0.026 ng/mL   Brain natriuretic peptide   Result Value Ref Range    BNP 1,200 (H) 0 - 99 pg/mL   D dimer, quantitative   Result Value Ref Range    D-Dimer 0.67 (H) <0.50 mg/L FEU   Lactic acid, plasma #2   Result Value Ref Range    Lactate (Lactic Acid) 1.3 0.5 - 2.2 mmol/L   POCT COVID-19 Rapid Screening   Result Value Ref Range    POC Rapid COVID Negative Negative     Acceptable Yes    ISTAT PROCEDURE   Result Value Ref Range    POC PH 7.294 (L) 7.35 - 7.45    POC PCO2 57.9 (HH) 35 - 45 mmHg    POC PO2 214 (H) 80 - 100 mmHg    POC HCO3 28.1 (H) 24 - 28 mmol/L    POC BE 2 -2 to 2 mmol/L    POC SATURATED O2 100 95 - 100 %    Rate 20     Sample ARTERIAL     Site LR     Allens Test Pass     DelSys CPAP/BiPAP     Mode BiPAP     FiO2 100     Spont Rate 20     Sp02 100     IP 15     EP 5          Assessment/Plan:     * Acute hypoxemic respiratory failure secondary to acute on chronic CHF   Patient with Hypoxic Respiratory failure which is Acute.  he is not on home oxygen.  Supplemental oxygen was provided and noted- Vent Mode: A/C  Oxygen Concentration (%):  [] 80  Resp Rate Total:  [20 br/min] 20 br/min  Vt Set:  [450 mL] 450 mL  PEEP/CPAP:  [5 cmH20] 5 cmH20  Mean Airway Pressure:  [14 cmH20-15 cmH20] 15 cmH20.   Signs/symptoms of respiratory failure include- tachypnea, increased work of breathing and respiratory distress. Contributing diagnoses includes - CHF Labs and images were reviewed. Patient Has recent ABG, which has been reviewed.      2/07 Patient intubated and ventilated and admitted to ICU    Pulmonary hypertension  History of- Noted      Acute on chronic combined systolic and diastolic congestive heart failure  Patient is identified as having Combined Systolic and Diastolic heart failure that is Acute on chronic. CHF is currently uncontrolled due to Continued edema of extremities and Dyspnea     Echo    Interpretation Summary  · The left ventricle is normal in size with concentric hypertrophy and mildly decreased systolic function.  · Grade I left ventricular diastolic dysfunction.  · Normal right ventricular size with normal right ventricular systolic function.  · There is pulmonary hypertension.  · Intermediate central venous pressure (8 mmHg).  · The estimated PA systolic pressure is 53 mmHg.  · The estimated ejection fraction is 40%.  · There are segmental left ventricular wall motion abnormalities.    Telemetry  Add Iv lasix Bid  Cardiology consulted    Recent Labs   Lab 02/07/22  0434   BNP 1,200*   .      History of recent pneumonia  Now with Acute respiratory failure  Add IV Abx for now till can be evaluated by Pulmonology  Check procalcitonin level  Check sputum culture    Hypoalbuminemia  Monitor      Normocytic anemia  Monitor      COPD exacerbation  Patient currently intubated and ventilated  Add Dounebs and pulmicort       Schizoaffective disorder, bipolar type  Home medications currently on hold (seroquel)      Type 2 diabetes mellitus with stage 3  chronic kidney disease  Monitor  Trend BMP  Renal dose all medications    Patient currently NPO  Accuchecks with correctional SSI        Obesity (BMI 30-39.9)  Noted      EMMANUEL on CPAP  Hx EMMANUEL noted      Hypertension associated with diabetes  Monitor  Treat Acute CHF and titrate medications as needed        VTE Risk Mitigation (From admission, onward)         Ordered     Place EDVIN hose  Until discontinued         02/07/22 1042     Place sequential compression device  Until discontinued         02/07/22 1042     Place EDVIN hose  Until discontinued         02/07/22 0955     IP VTE HIGH RISK PATIENT  Once         02/07/22 0955     Place sequential compression device  Until discontinued         02/07/22 0955                 Time spent seeing patient( greater than 1/2 spent in direct contact) : 88 minutes      BERNADETTE Walker  Department of Hospital Medicine   [facility] - Emergency Dept.

## 2022-02-07 NOTE — HOSPITAL COURSE
02/07: seen and examined:   02/08: seen and examined: peep 5, Fio2 50%, Sedated, Vent day #1, 2040 mls, T max:  98.1F, ABG reviewed, agitation when sedation vacated.Bolus lasix given  02/09: seen and examined: Agitation with SAT SBT,peep 6, Fio2 50%, Sedated, Vent day # 2,  UO 2350  mls, T max:  98.4F, ABG reviewed, agitation when sedation vacated. Added Haldol  02/10: seen and examined: SAT, SBT, vent day #3, UO  4930 mls, T max: 98.5F, ABG reviewed  02/11: seen and examined: extubated yesterday, Aax3, UO 9895 mls, T max: 99.7F

## 2022-02-07 NOTE — ED NOTES
Sputum sample collected just minutes ago by RN. Pt. Now coughing at tube frequently. Propofol rate increased. WCTM.

## 2022-02-07 NOTE — HPI
Shortness of Breath        Hx of COPD and recent dx of PNA. Pt received narcan for Kratom use.     Per ER- This  is a 61 y.o. male patient with PMHx of Adrenal cortical adenoma, arthritis, back surgery,  HTN, CKD stage 3, depression, GERD, migraine headache, Schizophrenia, Severe obesity, PNA, COPD, exsmoker, and DM who presents to the Emergency Department by EMS for SOB which onset one day ago. Symptoms are constant and moderate in severity. No mitigating or exacerbating factors reported. Associated sxs include wheeze. Patient denies  fever, chills, congestion, rhinorrhea, sore throat, cough, CP, leg swelling, N/V/D, dizziness, HA, and all other sxs at this time. Pt was seen in the ED a week ago with similar sxs. Pt was admit for PNA treatment. Pt states since d/c smoking a significant amount of cigarettes. Per EMS pt's SpO2 was in the low 70s at the scene. Pt was given two breathing treatments and placed on a nonrebreathing mask. Pt's SpO2 radha to 91 PTA. No further complaints or concerns at this time.      Patient was noted to be in Acute hypoxic respiratory failure in ER and was intubated and venitiated. Patient to be admitted to ICU.

## 2022-02-07 NOTE — ED NOTES
Pt. Repositioned to R sidelying position. Pillow placed between legs at level of knee. Pillows placed behind pt. And underneath head. HOB elevated.

## 2022-02-07 NOTE — ED NOTES
Received report from Frandy ANGEL RN    Pt is currently intubated with a 7.5 ET tube 25 ROSALBA with 80% O2 concentration. Pt O2 sat at 100%. Positive breath sounds heard bilaterally. Pt sedated with Propofol infusing at 30mcg/kg/hr. 16Fr OG in place and hooked up to intermittent suction. 16Fr weeks in place. 2+ edema noted to BLE. Abdomen is irregularly shaped with surgical scar.

## 2022-02-07 NOTE — ED NOTES
This RN was notified per YA Gonzalez that redraw was needed for d-dimer. RN to bedside and re-draw completed. RN then noticed that d-dimer was resulted in computer. Called lab and spoke with YA Tapia - confirmed that result is correct and re-draw no longer needed.

## 2022-02-07 NOTE — ASSESSMENT & PLAN NOTE
Patient is identified as having Combined Systolic and Diastolic heart failure that is Acute on chronic. CHF is currently controlled. Latest ECHO performed and demonstrates- Results for orders placed during the hospital encounter of 01/19/22    Echo    Interpretation Summary  · The left ventricle is normal in size with concentric hypertrophy and mildly decreased systolic function.  · Grade I left ventricular diastolic dysfunction.  · Normal right ventricular size with normal right ventricular systolic function.  · There is pulmonary hypertension.  · Intermediate central venous pressure (8 mmHg).  · The estimated PA systolic pressure is 53 mmHg.  · The estimated ejection fraction is 40%.  · There are segmental left ventricular wall motion abnormalities.  . Continue Furosemide and monitor clinical status closely. Monitor on telemetry. Patient is on CHF pathway.  Monitor strict Is&Os and daily weights.  Place on fluid restriction of 1.5 L. Continue to stress to patient importance of self efficacy and  on diet for CHF. Last BNP reviewed- and noted below   Recent Labs   Lab 02/07/22  0434   BNP 1,200*   .

## 2022-02-07 NOTE — SUBJECTIVE & OBJECTIVE
Past Medical History:   Diagnosis Date    Adrenal cortical adenoma     Anxiety     Arthritis     CKD (chronic kidney disease) stage 3, GFR 30-59 ml/min     Depression     Diabetes mellitus type II 2011    does not take    DM (diabetes mellitus)      am 2017 Insulin x 1 1/2 year    GERD (gastroesophageal reflux disease) 2013    Hypertension     Migraine headache 2013    Schizophrenia     Severe obesity with body mass index of 36.0 to 36.9        Past Surgical History:   Procedure Laterality Date    BACK SURGERY      COLONOSCOPY N/A 12/3/2020    Procedure: COLONOSCOPY;  Surgeon: Christofer Palmer MD;  Location: [facility] ENDO;  Service: Endoscopy;  Laterality: N/A;    COLONOSCOPY N/A 2020    Procedure: COLONOSCOPY;  Surgeon: Frandy Stanton MD;  Location: [facility] ENDO;  Service: Endoscopy;  Laterality: N/A;    HERNIA REPAIR      UMBILICAL    left arm exfix      NASAL SEPTUM SURGERY Bilateral     TONSILLECTOMY      URETEROSCOPY         Review of patient's allergies indicates:  No Known Allergies    Family History     Problem Relation (Age of Onset)    Cancer Paternal Grandmother, Paternal Grandfather    Cataracts Mother    Diabetes Mother, Sister, Brother    Hypertension Mother, Sister, Brother        Tobacco Use    Smoking status: Former Smoker     Packs/day: 1.00     Years: 37.00     Pack years: 37.00     Types: Cigarettes     Quit date: 3/28/2021     Years since quittin.8    Smokeless tobacco: Never Used    Tobacco comment: instructed not to smoke after midnight after midnight prior to surgery   Substance and Sexual Activity    Alcohol use: No    Drug use: No    Sexual activity: Never     Partners: Female         Review of Systems   Unable to perform ROS: Intubated     Objective:     Vital Signs (Most Recent):  Temp: 98 °F (36.7 °C) (22 1527)  Pulse: 81 (22 1642)  Resp: 16 (22 164)  BP: 101/67 (22 164)  SpO2: 96 % (22) Vital  Signs (24h Range):  Temp:  [96.9 °F (36.1 °C)-98 °F (36.7 °C)] 98 °F (36.7 °C)  Pulse:  [] 81  Resp:  [12-47] 16  SpO2:  [88 %-100 %] 96 %  BP: ()/() 101/67     Weight: 92.3 kg (203 lb 7.8 oz)  Body mass index is 27.6 kg/m².      Intake/Output Summary (Last 24 hours) at 2/7/2022 1658  Last data filed at 2/7/2022 1626  Gross per 24 hour   Intake 3119 ml   Output 1630 ml   Net 1489 ml       Physical Exam  Vitals and nursing note reviewed.   Constitutional:       Appearance: He is ill-appearing.      Interventions: He is intubated.       HENT:      Head: Normocephalic.   Eyes:      Pupils: Pupils are equal, round, and reactive to light.   Cardiovascular:      Rate and Rhythm: Normal rate.      Pulses: Normal pulses.      Heart sounds: Normal heart sounds.   Pulmonary:      Effort: Pulmonary effort is normal. He is intubated.      Breath sounds: Normal breath sounds.   Abdominal:      General: Bowel sounds are normal.   Musculoskeletal:      Cervical back: Normal range of motion.      Right lower leg: Edema present.      Left lower leg: Edema present.   Skin:     General: Skin is warm.   Neurological:      General: No focal deficit present.      Mental Status: He is easily aroused.         Vents:  Vent Mode: A/C (02/07/22 1114)  Ventilator Initiated: Yes (02/07/22 0634)  Set Rate: 20 BPM (02/07/22 1114)  Vt Set: 450 mL (02/07/22 1114)  PEEP/CPAP: 5 cmH20 (02/07/22 1114)  Oxygen Concentration (%): 60 (02/07/22 1642)  Peak Airway Pressure: 20 cmH2O (02/07/22 1114)  Plateau Pressure: 0 cmH20 (02/07/22 1114)  Total Ve: 9.27 mL (02/07/22 1114)  Negative Inspiratory Force (cm H2O): 0 (02/07/22 1114)  F/VT Ratio<105 (RSBI): (!) 42.83 (02/07/22 1114)    Lines/Drains/Airways     Drain                 NG/OG Tube 02/07/22 0608 orogastric 16 Fr. Center mouth <1 day         Urethral Catheter 02/07/22 0623 Non-latex 16 Fr. <1 day          Airway                 Airway - Non-Surgical 02/07/22 <1 day           Peripheral Intravenous Line                 Peripheral IV - Single Lumen 02/07/22 0431 20 G Left Hand <1 day         Peripheral IV - Single Lumen 02/07/22 0605 18 G Right Hand <1 day         Peripheral IV - Single Lumen 02/07/22 1550 20 G Right;Posterior Forearm <1 day                Significant Labs:    CBC/Anemia Profile:  Recent Labs   Lab 02/07/22  0434   WBC 14.70*   HGB 11.8*   HCT 38.0*      MCV 89   RDW 16.5*        Chemistries:  Recent Labs   Lab 02/07/22  0434 02/07/22  1015     --    K 4.3  --    CL 99  --    CO2 25  --    BUN 16  --    CREATININE 1.2  --    CALCIUM 8.9  --    ALBUMIN 3.1*  --    PROT 6.8  --    BILITOT 0.4  --    ALKPHOS 99  --    ALT 13  --    AST 10  --    MG  --  1.5*   PHOS  --  3.9       ABGs:   Recent Labs   Lab 02/07/22  0504   PH 7.294*   PCO2 57.9*   HCO3 28.1*   POCSATURATED 100   BE 2     Blood Culture:   Recent Labs   Lab 02/07/22  0434 02/07/22  0614   LABBLOO No Growth to date No Growth to date     Cardiac Markers: No results for input(s): CKMB, TROPONINT, MYOGLOBIN in the last 48 hours.  Lactic Acid:   Recent Labs   Lab 02/07/22  0434 02/07/22  0826   LACTATE 1.9 1.3     POCT Glucose:   Recent Labs   Lab 02/07/22  1213 02/07/22  1313 02/07/22  1414   POCTGLUCOSE 403* 447* 327*     Respiratory Culture:   Recent Labs   Lab 02/07/22  1037   GSRESP <10 epithelial cells per low power field.  Rare WBC's  No organisms seen     Troponin:   Recent Labs   Lab 02/07/22  0434 02/07/22  1015   TROPONINI 0.025 0.018  0.018     Urine Culture: No results for input(s): LABURIN in the last 48 hours.  All pertinent labs within the past 24 hours have been reviewed.    Significant Imaging:   I have reviewed all pertinent imaging results/findings within the past 24 hours.     X-Ray Chest AP Portable  Narrative: EXAMINATION:  XR CHEST AP PORTABLE    CLINICAL HISTORY:  Encounter for other preprocedural examination    FINDINGS:  Single view of the chest.  Comparison  02/07/2022.    Endotracheal tube is satisfactory.  Nasogastric tube projects to the stomach with side hole just below the GE junction.    Cardiac silhouette is normal.  Scattered interstitial opacities are seen throughout the lungs which could reflect interstitial edema or interstitial pneumonia. No evidence of pleural effusion or pneumothorax.  Bones appear intact.  Impression: Scattered interstitial opacities are seen throughout the lungs which could reflect interstitial edema or interstitial pneumonia.    Satisfactory endotracheal nasogastric tube placement.    Electronically signed by: Frandy Bolton MD  Date:    02/07/2022  Time:    07:47  X-Ray Chest AP Portable  Narrative: EXAMINATION:  XR CHEST AP PORTABLE    CLINICAL HISTORY:  Sepsis;    FINDINGS:  Single view of the chest.  Comparison 01/22/2022    Cardiac silhouette is normal.  Scattered interstitial infiltrates are seen throughout the lungs which could reflect mild interstitial edema or interstitial pneumonia. Trace left pleural effusion.  Mild subsegmental atelectasis..  No evidence of pneumothorax.  Bones appear intact.  Aorta demonstrates atherosclerotic disease.  Impression: See above.    Electronically signed by: Frandy Bolton MD  Date:    02/07/2022  Time:    07:44

## 2022-02-07 NOTE — ASSESSMENT & PLAN NOTE
Now with Acute respiratory failure  Add IV Abx for now till can be evaluated by Pulmonology  Check procalcitonin level  Check sputum culture

## 2022-02-07 NOTE — ED NOTES
IR does not have anyone who can do the PICC line insertion today per Dr. Dueñas. House supervisor made aware and to call PICC team out.

## 2022-02-07 NOTE — ED NOTES
Pt's forehead and arms noted to be mildly diaphoretic. Afebrile. . One (of two) blankets removed. WCTM.

## 2022-02-07 NOTE — CONSULTS
[facility] - Emergency Dept.  Cardiology  Consult Note    Patient Name: Shukri Rosales  MRN: [id number]  Admission Date: 2/7/2022  Hospital Length of Stay: 0 days  Code Status: Full Code   Attending Provider: Guillaume Urbina, *   Consulting Provider: Carmen Arellano PA-C  Primary Care Physician: Cris Matias NP  Principal Problem:Acute hypoxemic respiratory failure    Patient information was obtained from past medical records and ER records.     Inpatient consult to Cardiology  Consult performed by: Carmen Arellano PA-C  Consult ordered by: BERNADETTE Salter        Subjective:     Chief Complaint:  SOB    HPI:   HPI obtained from chart as patient was intubated during exam    Mr. Rosales is a 61 year old male patient whose current medical conditions include adrenal adenoma, HTN, CKD stage III, GERD, schizophrenia, COPD, former smoker, DM type II, and combined CHF who presented to [facility] ED this AM with a chief complaint of worsening SOB x 1 day. Associated symptoms included fever. Patient denied any associated denise chest pain, fever, chills, PND, orthopnea, leg swelling, palpitations, near syncope, or syncope. He was noted to be hypoxic upon EMS arrival with O2 sats in the 70's. Initial workup in ED revealed leukocytosis (WBC 14,000) and BNP of 1200. CXR showed scattered opacities in bilateral lungs-PNA vs edema and patient was subsequently intubated and admitted for further evaluation and treatment. Cardiology consulted to assist with management. Patient seen and examined today, intubated, but pulling at ET tube. BP borderline. Previously admitted to this facility in late January with a similar presentation and was treated with abx/diuresis and later discharged. Echo at that time showed EF of 40%, DD, segmental WMA, normal RVF, RWMA. Pulm HTN 53 mmHg. Chart reviewed. Serial troponin negative this admission.                     Past Medical History:   Diagnosis Date    Adrenal cortical  adenoma     Anxiety     Arthritis     CKD (chronic kidney disease) stage 3, GFR 30-59 ml/min     Depression     Diabetes mellitus type II 2011    does not take    DM (diabetes mellitus) 2011     am 09/21/2017 Insulin x 1 1/2 year    GERD (gastroesophageal reflux disease) 7/9/2013    Hypertension     Migraine headache 7/22/2013    Schizophrenia     Severe obesity with body mass index of 36.0 to 36.9        Past Surgical History:   Procedure Laterality Date    BACK SURGERY      COLONOSCOPY N/A 12/3/2020    Procedure: COLONOSCOPY;  Surgeon: Christofer Palmer MD;  Location: [facility] ENDO;  Service: Endoscopy;  Laterality: N/A;    COLONOSCOPY N/A 12/4/2020    Procedure: COLONOSCOPY;  Surgeon: Frandy Stanton MD;  Location: [facility] ENDO;  Service: Endoscopy;  Laterality: N/A;    HERNIA REPAIR      UMBILICAL    left arm exfix      NASAL SEPTUM SURGERY Bilateral     TONSILLECTOMY      URETEROSCOPY         Review of patient's allergies indicates:  No Known Allergies    No current facility-administered medications on file prior to encounter.     Current Outpatient Medications on File Prior to Encounter   Medication Sig    albuterol (PROVENTIL/VENTOLIN HFA) 90 mcg/actuation inhaler Inhale 1-2 puffs into the lungs every 4 (four) hours as needed for Wheezing. Rescue    amLODIPine (NORVASC) 10 MG tablet Take 10 mg by mouth.    aspirin 81 MG Chew Take 1 tablet (81 mg total) by mouth once daily.    blood sugar diagnostic Strp 1 each.    carvedilol (COREG) 6.25 MG tablet Take 6.25 mg by mouth 2 (two) times daily.    docusate sodium (COLACE) 100 MG capsule Take 100 mg by mouth 2 (two) times daily.    DULoxetine (CYMBALTA) 60 MG capsule Take 60 mg by mouth 2 (two) times daily.    empagliflozin (JARDIANCE) 25 mg tablet Take 25 mg by mouth.    ergocalciferol (ERGOCALCIFEROL) 50,000 unit Cap Take 50,000 Units by mouth every 7 days.    ferrous sulfate (FEOSOL) 325 mg (65 mg iron) Tab tablet Take 325 mg by  mouth.    furosemide (LASIX) 40 MG tablet Take 1 tablet (40 mg total) by mouth once daily. Hold if Systolic blood pressure less than 110 mmHG for 7 days    gabapentin (NEURONTIN) 800 MG tablet Take 800 mg by mouth 3 (three) times daily.    glucagon 1 mg/mL SolR Inject 1 mg into the muscle daily as needed.    HUMALOG MIX 50-50 INSULN U-100 100 unit/mL (50-50) Susp 10 Units twice daily    insulin lispro 100 unit/mL injection Inject under the skin with meals by sliding scale:  under 150 0 unit, 151-200 2 units, 201-250 4 units, 251-300 6 units, 301-350 8 units, 351-400 10 units, over 400 12 units.    metFORMIN (GLUCOPHAGE) 1000 MG tablet Take 500 mg by mouth 2 (two) times daily with meals.    methocarbamoL (ROBAXIN) 750 MG Tab Take 750 mg by mouth 3 (three) times daily.    omeprazole (PRILOSEC) 20 MG capsule Take 20 mg by mouth once daily.    QUEtiapine (SEROQUEL) 300 MG Tab Take 1 tablet (300 mg total) by mouth nightly.    tamsulosin (FLOMAX) 0.4 mg Cap Take 0.4 mg by mouth once daily.    traZODone (DESYREL) 150 MG tablet Take 150 mg by mouth every evening.    umeclidinium-vilanteroL (ANORO ELLIPTA) 62.5-25 mcg/actuation DsDv Inhale 1 puff into the lungs once daily. Controller    [DISCONTINUED] nicotine polacrilex 4 MG Lozg Take 1 lozenge (4 mg total) by mouth as needed. Not to exceed 10 lozenges per day.    atorvastatin (LIPITOR) 20 MG tablet Take 20 mg by mouth once daily.    baclofen (LIORESAL) 20 MG tablet Take 20 mg by mouth every 8 (eight) hours as needed.    clonazePAM (KLONOPIN) 1 MG tablet Take 0.5 tablets (0.5 mg total) by mouth 2 (two) times daily as needed for Anxiety. Take 1.5mg twice daily    lisinopril (PRINIVIL,ZESTRIL) 40 MG tablet Take 40 mg by mouth once daily.    [DISCONTINUED] nicotine (NICODERM CQ) 14 mg/24 hr Place 1 patch onto the skin once daily.     Family History     Problem Relation (Age of Onset)    Cancer Paternal Grandmother, Paternal Grandfather    Cataracts Mother     Diabetes Mother, Sister, Brother    Hypertension Mother, Sister, Brother        Tobacco Use    Smoking status: Former Smoker     Packs/day: 1.00     Years: 37.00     Pack years: 37.00     Types: Cigarettes     Quit date: 3/28/2021     Years since quittin.8    Smokeless tobacco: Never Used    Tobacco comment: instructed not to smoke after midnight after midnight prior to surgery   Substance and Sexual Activity    Alcohol use: No    Drug use: No    Sexual activity: Never     Partners: Female     Review of Systems   Unable to perform ROS: intubated     Objective:     Vital Signs (Most Recent):  Temp: 97.2 °F (36.2 °C) (22 1157)  Pulse: 79 (22 1323)  Resp: 17 (22 1323)  BP: (!) 94/59 (22 1323)  SpO2: 98 % (22 1323) Vital Signs (24h Range):  Temp:  [96.9 °F (36.1 °C)-97.9 °F (36.6 °C)] 97.2 °F (36.2 °C)  Pulse:  [] 79  Resp:  [12-47] 17  SpO2:  [88 %-100 %] 98 %  BP: ()/() 94/59     Weight: 92.3 kg (203 lb 7.8 oz)  Body mass index is 27.6 kg/m².    SpO2: 98 %  O2 Device (Oxygen Therapy): ventilator      Intake/Output Summary (Last 24 hours) at 2022 1335  Last data filed at 2022 1256  Gross per 24 hour   Intake 3069 ml   Output 1630 ml   Net 1439 ml       Lines/Drains/Airways     Drain                 NG/OG Tube 22 0608 orogastric 16 Fr. Center mouth <1 day         Urethral Catheter 22 0623 Non-latex 16 Fr. <1 day          Airway                 Airway - Non-Surgical 22 <1 day          Peripheral Intravenous Line                 Peripheral IV - Single Lumen 22 0431 20 G Left Hand <1 day         Peripheral IV - Single Lumen 22 0605 18 G Right Hand <1 day                Physical Exam  Vitals and nursing note reviewed.   Constitutional:       General: He is not in acute distress.     Appearance: He is well-developed and well-nourished. He is ill-appearing. He is not diaphoretic.      Comments: Intubated   HENT:      Head:  Normocephalic and atraumatic.      Mouth/Throat:      Comments: Blood-tinged secretions noted from ET tube  Eyes:      General:         Right eye: No discharge.         Left eye: No discharge.      Pupils: Pupils are equal, round, and reactive to light.   Neck:      Thyroid: No thyromegaly.      Vascular: No JVD.      Trachea: No tracheal deviation.   Cardiovascular:      Rate and Rhythm: Normal rate and regular rhythm.      Heart sounds: Normal heart sounds, S1 normal and S2 normal. No murmur heard.      Pulmonary:      Effort: Pulmonary effort is normal. No respiratory distress.      Breath sounds: Wheezing present.      Comments: Diminished BS at bases  Abdominal:      General: There is no distension.      Tenderness: There is no rebound.   Musculoskeletal:      Cervical back: Neck supple.      Right lower leg: Edema present.      Left lower leg: Edema present.   Skin:     General: Skin is warm and dry.      Findings: No erythema.      Comments: Scattered scratches/sores RLE   Neurological:      Mental Status: He is alert.      Comments: Intubated but pulling at ET tubes at time during exam   Psychiatric:         Mood and Affect: Mood and affect normal.         Significant Labs:   CMP   Recent Labs   Lab 02/07/22  0434      K 4.3   CL 99   CO2 25   *   BUN 16   CREATININE 1.2   CALCIUM 8.9   PROT 6.8   ALBUMIN 3.1*   BILITOT 0.4   ALKPHOS 99   AST 10   ALT 13   ANIONGAP 13   ESTGFRAFRICA >60   EGFRNONAA >60   , CBC   Recent Labs   Lab 02/07/22  0434   WBC 14.70*   HGB 11.8*   HCT 38.0*      , Troponin   Recent Labs   Lab 02/07/22  0434 02/07/22  1015   TROPONINI 0.025 0.018  0.018    and All pertinent lab results from the last 24 hours have been reviewed.    Significant Imaging: Echocardiogram:   Transthoracic echo (TTE) complete (Cupid Only):   Results for orders placed or performed during the hospital encounter of 01/19/22   Echo   Result Value Ref Range    BSA 2.14 m2    TDI SEPTAL 0.07  m/s    LV LATERAL E/E' RATIO 8.38 m/s    LV SEPTAL E/E' RATIO 9.57 m/s    LA WIDTH 3.39 cm    IVC diameter 1.77 cm    Left Ventricular Outflow Tract Mean Velocity 0.87259514251899 cm/s    Left Ventricular Outflow Tract Mean Gradient 2.28 mmHg    TDI LATERAL 0.08 m/s    LVIDd 4.62 3.5 - 6.0 cm    IVS 1.68 (A) 0.6 - 1.1 cm    Posterior Wall 1.39 (A) 0.6 - 1.1 cm    Ao root annulus 3.65 cm    LVIDs 4.37 (A) 2.1 - 4.0 cm    FS 5 28 - 44 %    LA volume 37.01 cm3    Sinus 3.68 cm    STJ 3.50 cm    Ascending aorta 3.26 cm    LV mass 296.91 g    LA size 3.28 cm    TAPSE 2.45 cm    Left Ventricle Relative Wall Thickness 0.60 cm    AV mean gradient 3 mmHg    AV valve area 2.14 cm2    AV Velocity Ratio 0.86     AV index (prosthetic) 0.61     MV valve area p 1/2 method 3.08 cm2    E/A ratio 1.00     Mean e' 0.08 m/s    E wave deceleration time 246.446120005502941 msec    IVRT 105.377027266829182 msec    LVOT diameter 2.11 cm    LVOT area 3.5 cm2    LVOT peak frankie 1.08 m/s    LVOT peak VTI 17.00 cm    Ao peak frankie 1.26 m/s    Ao VTI 27.8 cm    RVOT prox diameter 3.49 cm    RVOT area 9.56 cm2    RVOT peak frankie 0.60 m/s    RVOT peak VTI 11.8 cm    Qp:Qs ratio 0.53     LVOT stroke volume 59.41 cm3    RVOT stroke volume 112.82 cm3    AV peak gradient 6 mmHg    PV mean gradient 0.71 mmHg    E/E' ratio 8.93 m/s    MV Peak E Frankie 0.67 m/s    TR Max Frankie 3.37 m/s    MV stenosis pressure 1/2 time 71.636008742813054 ms    MV Peak A Frankie 0.67 m/s    LV Systolic Volume 86.51 mL    LV Systolic Volume Index 40.8 mL/m2    LV Diastolic Volume 98.26 mL    LV Diastolic Volume Index 46.35 mL/m2    LA Volume Index 17.5 mL/m2    LV Mass Index 140 g/m2    Echo EF Estimated 12 %    RA Major Axis 4.57 cm    Left Atrium Minor Axis 3.27 cm    Left Atrium Major Axis 4.88 cm    Triscuspid Valve Regurgitation Peak Gradient 45 mmHg    RA Width 3.59 cm    Right Atrial Pressure (from IVC) 8 mmHg    EF 40 %    TV rest pulmonary artery pressure 53 mmHg    Narrative     · The left ventricle is normal in size with concentric hypertrophy and   mildly decreased systolic function.  · Grade I left ventricular diastolic dysfunction.  · Normal right ventricular size with normal right ventricular systolic   function.  · There is pulmonary hypertension.  · Intermediate central venous pressure (8 mmHg).  · The estimated PA systolic pressure is 53 mmHg.  · The estimated ejection fraction is 40%.  · There are segmental left ventricular wall motion abnormalities.      , EKG: Reviewed and X-Ray: CXR: X-Ray Chest 1 View (CXR): No results found for this visit on 02/07/22. and X-Ray Chest PA and Lateral (CXR): No results found for this visit on 02/07/22.    Assessment and Plan:   Patient who presents with SOB/hypoxemic respiratory failure-multifactorial in setting of COPD/PNA and combined CHF. Assess response to IV diuresis. Will further optimize med regimen as clinical condition permits. Ischemic evaluation once recovered.    * Acute hypoxemic respiratory failure secondary to acute on chronic CHF   -Assess response to IV diuresis    Acute on chronic combined systolic and diastolic congestive heart failure  -Presents with SOB, multifactorial in setting of COPD exacerbation/? PNA and combined CHF  -Echo 1/22 showed EF of 40%, DD, pulmonary HTN, + WMA  -Continue IV diuresis as tolerated  -Resume Coreg once BP permits  -Consider Entresto pending BP/creatinine trend  -Ischemic evaluation once recovered  -Strict I's/O's      History of recent pneumonia  -On abx, awaiting pulmonary evaluation    COPD exacerbation  -Mgmt as per primary team    Type 2 diabetes mellitus with stage 3 chronic kidney disease  -Mgmt as per primary team        VTE Risk Mitigation (From admission, onward)         Ordered     Place EDVIN hose  Until discontinued         02/07/22 1042     Place sequential compression device  Until discontinued         02/07/22 1042     Place EDVIN hose  Until discontinued         02/07/22 0955     IP  VTE HIGH RISK PATIENT  Once         02/07/22 0955     Place sequential compression device  Until discontinued         02/07/22 0955                Thank you for your consult. I will follow-up with patient. Please contact us if you have any additional questions.    Carmen Arellano PA-C  Cardiology   [facility] - Emergency Dept.     04-Oct-2020 12:26

## 2022-02-07 NOTE — SUBJECTIVE & OBJECTIVE
Past Medical History:   Diagnosis Date    Adrenal cortical adenoma     Anxiety     Arthritis     CKD (chronic kidney disease) stage 3, GFR 30-59 ml/min     Depression     Diabetes mellitus type II 2011    does not take    DM (diabetes mellitus) 2011     am 09/21/2017 Insulin x 1 1/2 year    GERD (gastroesophageal reflux disease) 7/9/2013    Hypertension     Migraine headache 7/22/2013    Schizophrenia     Severe obesity with body mass index of 36.0 to 36.9        Past Surgical History:   Procedure Laterality Date    BACK SURGERY      COLONOSCOPY N/A 12/3/2020    Procedure: COLONOSCOPY;  Surgeon: Christofer Palmer MD;  Location: [facility] ENDO;  Service: Endoscopy;  Laterality: N/A;    COLONOSCOPY N/A 12/4/2020    Procedure: COLONOSCOPY;  Surgeon: Frandy Stanton MD;  Location: [facility] ENDO;  Service: Endoscopy;  Laterality: N/A;    HERNIA REPAIR      UMBILICAL    left arm exfix      NASAL SEPTUM SURGERY Bilateral     TONSILLECTOMY      URETEROSCOPY         Review of patient's allergies indicates:  No Known Allergies    No current facility-administered medications on file prior to encounter.     Current Outpatient Medications on File Prior to Encounter   Medication Sig    albuterol (PROVENTIL/VENTOLIN HFA) 90 mcg/actuation inhaler Inhale 1-2 puffs into the lungs every 4 (four) hours as needed for Wheezing. Rescue    amLODIPine (NORVASC) 10 MG tablet Take 10 mg by mouth.    aspirin 81 MG Chew Take 1 tablet (81 mg total) by mouth once daily.    blood sugar diagnostic Strp 1 each.    carvedilol (COREG) 6.25 MG tablet Take 6.25 mg by mouth 2 (two) times daily.    docusate sodium (COLACE) 100 MG capsule Take 100 mg by mouth 2 (two) times daily.    DULoxetine (CYMBALTA) 60 MG capsule Take 60 mg by mouth 2 (two) times daily.    empagliflozin (JARDIANCE) 25 mg tablet Take 25 mg by mouth.    ergocalciferol (ERGOCALCIFEROL) 50,000 unit Cap Take 50,000 Units by mouth every 7 days.    ferrous  sulfate (FEOSOL) 325 mg (65 mg iron) Tab tablet Take 325 mg by mouth.    furosemide (LASIX) 40 MG tablet Take 1 tablet (40 mg total) by mouth once daily. Hold if Systolic blood pressure less than 110 mmHG for 7 days    gabapentin (NEURONTIN) 800 MG tablet Take 800 mg by mouth 3 (three) times daily.    glucagon 1 mg/mL SolR Inject 1 mg into the muscle daily as needed.    HUMALOG MIX 50-50 INSULN U-100 100 unit/mL (50-50) Susp 10 Units twice daily    insulin lispro 100 unit/mL injection Inject under the skin with meals by sliding scale:  under 150 0 unit, 151-200 2 units, 201-250 4 units, 251-300 6 units, 301-350 8 units, 351-400 10 units, over 400 12 units.    metFORMIN (GLUCOPHAGE) 1000 MG tablet Take 500 mg by mouth 2 (two) times daily with meals.    methocarbamoL (ROBAXIN) 750 MG Tab Take 750 mg by mouth 3 (three) times daily.    omeprazole (PRILOSEC) 20 MG capsule Take 20 mg by mouth once daily.    QUEtiapine (SEROQUEL) 300 MG Tab Take 1 tablet (300 mg total) by mouth nightly.    tamsulosin (FLOMAX) 0.4 mg Cap Take 0.4 mg by mouth once daily.    traZODone (DESYREL) 150 MG tablet Take 150 mg by mouth every evening.    umeclidinium-vilanteroL (ANORO ELLIPTA) 62.5-25 mcg/actuation DsDv Inhale 1 puff into the lungs once daily. Controller    [DISCONTINUED] nicotine polacrilex 4 MG Lozg Take 1 lozenge (4 mg total) by mouth as needed. Not to exceed 10 lozenges per day.    atorvastatin (LIPITOR) 20 MG tablet Take 20 mg by mouth once daily.    baclofen (LIORESAL) 20 MG tablet Take 20 mg by mouth every 8 (eight) hours as needed.    clonazePAM (KLONOPIN) 1 MG tablet Take 0.5 tablets (0.5 mg total) by mouth 2 (two) times daily as needed for Anxiety. Take 1.5mg twice daily    lisinopril (PRINIVIL,ZESTRIL) 40 MG tablet Take 40 mg by mouth once daily.    [DISCONTINUED] nicotine (NICODERM CQ) 14 mg/24 hr Place 1 patch onto the skin once daily.     Family History     Problem Relation (Age of Onset)    Cancer  Paternal Grandmother, Paternal Grandfather    Cataracts Mother    Diabetes Mother, Sister, Brother    Hypertension Mother, Sister, Brother        Tobacco Use    Smoking status: Former Smoker     Packs/day: 1.00     Years: 37.00     Pack years: 37.00     Types: Cigarettes     Quit date: 3/28/2021     Years since quittin.8    Smokeless tobacco: Never Used    Tobacco comment: instructed not to smoke after midnight after midnight prior to surgery   Substance and Sexual Activity    Alcohol use: No    Drug use: No    Sexual activity: Never     Partners: Female     Review of Systems   Unable to perform ROS: Intubated     Objective:     Vital Signs (Most Recent):  Temp: 96.9 °F (36.1 °C) (22 08)  Pulse: 84 (22)  Resp: 18 (22)  BP: 118/72 (22)  SpO2: 100 % (22) Vital Signs (24h Range):  Temp:  [96.9 °F (36.1 °C)-97.9 °F (36.6 °C)] 96.9 °F (36.1 °C)  Pulse:  [] 84  Resp:  [12-47] 18  SpO2:  [88 %-100 %] 100 %  BP: (114-175)/() 118/72     Weight: 92.3 kg (203 lb 7.8 oz)  Body mass index is 27.6 kg/m².    Physical Exam  Constitutional:       Comments: Sedated, intubated and ventilated   HENT:      Head: Normocephalic. Head contusion:        Comments: ET tube and OG tube noted     Mouth/Throat:      Mouth: Mucous membranes are moist.   Eyes:      General:         Right eye: No discharge.         Left eye: No discharge.      Extraocular Movements: Extraocular movements intact.      Conjunctiva/sclera: Conjunctivae normal.      Pupils: Pupils are equal, round, and reactive to light.   Cardiovascular:      Rate and Rhythm: Normal rate and regular rhythm.      Heart sounds: Normal heart sounds. No murmur heard.      Pulmonary:      Breath sounds: Wheezing and rhonchi present.      Comments: Intubated    Abdominal:      General: Bowel sounds are normal. There is no distension.      Palpations: Abdomen is soft.      Tenderness: There is no abdominal tenderness.  There is no guarding.      Comments: Obese      Genitourinary:     Comments: Medina catheter draining arely colored urine  Musculoskeletal:         General: Normal range of motion.      Cervical back: Normal range of motion and neck supple. No rigidity or tenderness.      Right lower leg: Edema present.      Left lower leg: Edema present.      Comments: BLE 2 plus edema    2nd right toe partial amputation noted   Skin:     General: Skin is warm and dry.      Capillary Refill: Capillary refill takes 2 to 3 seconds.      Coloration: Skin is pale.      Comments: Some small scattered scabs noted to BLE- no signs of redness or drainage    Toe nails thick and brittle     Neurological:      Comments: Sedated on Diprivan           CRANIAL NERVES     CN III, IV, VI   Pupils are equal, round, and reactive to light.       Imaging Results          X-Ray Chest AP Portable (Final result)  Result time 02/07/22 07:47:17    Final result by Frandy Bolton MD (02/07/22 07:47:17)                 Impression:      Scattered interstitial opacities are seen throughout the lungs which could reflect interstitial edema or interstitial pneumonia.    Satisfactory endotracheal nasogastric tube placement.      Electronically signed by: Frandy Bolton MD  Date:    02/07/2022  Time:    07:47             Narrative:    EXAMINATION:  XR CHEST AP PORTABLE    CLINICAL HISTORY:  Encounter for other preprocedural examination    FINDINGS:  Single view of the chest.  Comparison 02/07/2022.    Endotracheal tube is satisfactory.  Nasogastric tube projects to the stomach with side hole just below the GE junction.    Cardiac silhouette is normal.  Scattered interstitial opacities are seen throughout the lungs which could reflect interstitial edema or interstitial pneumonia. No evidence of pleural effusion or pneumothorax.  Bones appear intact.                               X-Ray Chest AP Portable (Final result)  Result time 02/07/22 07:44:18    Final result by  Frandy Bolton MD (02/07/22 07:44:18)                 Impression:      See above.      Electronically signed by: Frandy Bolton MD  Date:    02/07/2022  Time:    07:44             Narrative:    EXAMINATION:  XR CHEST AP PORTABLE    CLINICAL HISTORY:  Sepsis;    FINDINGS:  Single view of the chest.  Comparison 01/22/2022    Cardiac silhouette is normal.  Scattered interstitial infiltrates are seen throughout the lungs which could reflect mild interstitial edema or interstitial pneumonia. Trace left pleural effusion.  Mild subsegmental atelectasis..  No evidence of pneumothorax.  Bones appear intact.  Aorta demonstrates atherosclerotic disease.                                Results for orders placed or performed during the hospital encounter of 02/07/22   CBC auto differential   Result Value Ref Range    WBC 14.70 (H) 3.90 - 12.70 K/uL    RBC 4.25 (L) 4.60 - 6.20 M/uL    Hemoglobin 11.8 (L) 14.0 - 18.0 g/dL    Hematocrit 38.0 (L) 40.0 - 54.0 %    MCV 89 82 - 98 fL    MCH 27.8 27.0 - 31.0 pg    MCHC 31.1 (L) 32.0 - 36.0 g/dL    RDW 16.5 (H) 11.5 - 14.5 %    Platelets 411 150 - 450 K/uL    MPV 9.1 (L) 9.2 - 12.9 fL    Immature Granulocytes 0.9 (H) 0.0 - 0.5 %    Gran # (ANC) 12.4 (H) 1.8 - 7.7 K/uL    Immature Grans (Abs) 0.13 (H) 0.00 - 0.04 K/uL    Lymph # 1.3 1.0 - 4.8 K/uL    Mono # 0.8 0.3 - 1.0 K/uL    Eos # 0.0 0.0 - 0.5 K/uL    Baso # 0.06 0.00 - 0.20 K/uL    nRBC 0 0 /100 WBC    Gran % 84.3 (H) 38.0 - 73.0 %    Lymph % 8.8 (L) 18.0 - 48.0 %    Mono % 5.4 4.0 - 15.0 %    Eosinophil % 0.2 0.0 - 8.0 %    Basophil % 0.4 0.0 - 1.9 %    Differential Method Automated    Comprehensive metabolic panel   Result Value Ref Range    Sodium 137 136 - 145 mmol/L    Potassium 4.3 3.5 - 5.1 mmol/L    Chloride 99 95 - 110 mmol/L    CO2 25 23 - 29 mmol/L    Glucose 295 (H) 70 - 110 mg/dL    BUN 16 8 - 23 mg/dL    Creatinine 1.2 0.5 - 1.4 mg/dL    Calcium 8.9 8.7 - 10.5 mg/dL    Total Protein 6.8 6.0 - 8.4 g/dL    Albumin 3.1  (L) 3.5 - 5.2 g/dL    Total Bilirubin 0.4 0.1 - 1.0 mg/dL    Alkaline Phosphatase 99 55 - 135 U/L    AST 10 10 - 40 U/L    ALT 13 10 - 44 U/L    Anion Gap 13 8 - 16 mmol/L    eGFR if African American >60 >60 mL/min/1.73 m^2    eGFR if non African American >60 >60 mL/min/1.73 m^2   Lactic acid, plasma #1   Result Value Ref Range    Lactate (Lactic Acid) 1.9 0.5 - 2.2 mmol/L   Urinalysis, Reflex to Urine Culture Urine, Clean Catch    Specimen: Urine   Result Value Ref Range    Specimen UA Urine, Catheterized     Color, UA Yellow Yellow, Straw, Esha    Appearance, UA Clear Clear    pH, UA 6.0 5.0 - 8.0    Specific Gravity, UA 1.015 1.005 - 1.030    Protein, UA Trace (A) Negative    Glucose, UA 2+ (A) Negative    Ketones, UA Negative Negative    Bilirubin (UA) Negative Negative    Occult Blood UA Negative Negative    Nitrite, UA Negative Negative    Urobilinogen, UA Negative <2.0 EU/dL    Leukocytes, UA Negative Negative   Procalcitonin   Result Value Ref Range    Procalcitonin 0.02 <0.25 ng/mL   Troponin I   Result Value Ref Range    Troponin I 0.025 0.000 - 0.026 ng/mL   Brain natriuretic peptide   Result Value Ref Range    BNP 1,200 (H) 0 - 99 pg/mL   D dimer, quantitative   Result Value Ref Range    D-Dimer 0.67 (H) <0.50 mg/L FEU   Lactic acid, plasma #2   Result Value Ref Range    Lactate (Lactic Acid) 1.3 0.5 - 2.2 mmol/L   POCT COVID-19 Rapid Screening   Result Value Ref Range    POC Rapid COVID Negative Negative     Acceptable Yes    ISTAT PROCEDURE   Result Value Ref Range    POC PH 7.294 (L) 7.35 - 7.45    POC PCO2 57.9 (HH) 35 - 45 mmHg    POC PO2 214 (H) 80 - 100 mmHg    POC HCO3 28.1 (H) 24 - 28 mmol/L    POC BE 2 -2 to 2 mmol/L    POC SATURATED O2 100 95 - 100 %    Rate 20     Sample ARTERIAL     Site LR     Allens Test Pass     DelSys CPAP/BiPAP     Mode BiPAP     FiO2 100     Spont Rate 20     Sp02 100     IP 15     EP 5

## 2022-02-07 NOTE — ASSESSMENT & PLAN NOTE
Patient with Hypercapnic and Hypoxic Respiratory failure which is Acute on chronic.  he is on home oxygen at 2.0 LPM. Supplemental oxygen was provided and noted- Vent Mode: A/C  Oxygen Concentration (%):  [] 60  Resp Rate Total:  [20 br/min-30 br/min] 24 br/min  Vt Set:  [450 mL] 450 mL  PEEP/CPAP:  [5 cmH20] 5 cmH20  Mean Airway Pressure:  [8.7 cmH20-15 cmH20] 8.7 cmH20.   Signs/symptoms of respiratory failure include- tachypnea, increased work of breathing and respiratory distress. Contributing diagnoses includes - Interstitial lung disease and Pneumonia Labs and images were reviewed. Patient Has recent ABG, which has been reviewed. Will treat underlying causes and adjust management of respiratory failure as follows-     Serial ABG  Vent bundle  IV steriods  Sputum cullture  CEFTRIAXONE + AZITHRO

## 2022-02-07 NOTE — HPI
HPI obtained from chart as patient was intubated during exam    Mr. Rosales is a 61 year old male patient whose current medical conditions include adrenal adenoma, HTN, CKD stage III, GERD, schizophrenia, COPD, former smoker, DM type II, and combined CHF who presented to [facility] ED this AM with a chief complaint of worsening SOB x 1 day. Associated symptoms included fever. Patient denied any associated denise chest pain, fever, chills, PND, orthopnea, leg swelling, palpitations, near syncope, or syncope. He was noted to be hypoxic upon EMS arrival with O2 sats in the 70's. Initial workup in ED revealed leukocytosis (WBC 14,000) and BNP of 1200. CXR showed scattered opacities in bilateral lungs-PNA vs edema and patient was subsequently intubated and admitted for further evaluation and treatment. Cardiology consulted to assist with management. Patient seen and examined today, intubated, but pulling at ET tube. BP borderline. Previously admitted to this facility in late January with a similar presentation and was treated with abx/diuresis and later discharged. Echo at that time showed EF of 40%, DD, segmental WMA, normal RVF, RWMA. Pulm HTN 53 mmHg. Chart reviewed. Serial troponin negative this admission.

## 2022-02-07 NOTE — ASSESSMENT & PLAN NOTE
Monitor  Trend BMP  Renal dose all medications    Patient currently NPO  Accuchecks with [employer] [employer]

## 2022-02-07 NOTE — ED NOTES
Cat, paramedic, at BS to attempt to place another IV access to use for norepinephrine if needed. Pt. Woke up during insertion attempt, opening eyes and coughing on tube. Propofol rate increased. First attempt unsuccessful. Cat to make second attempt. RN at BS.

## 2022-02-07 NOTE — ED NOTES
Pt. Woke up, had eyes opened, and was coughing on ETT. Pt. Did not squeeze RN's hand when asked/follow commands. Nevertheless, propofol increased for pt. Comfort. ETT in-line suctioned once by RN - pt. Tolerated well. WCTM.

## 2022-02-07 NOTE — ED NOTES
Third IV access initiated. Pt. Repositioned by RN and paramedic to left sidelying position with HOB elevated 30 degrees.

## 2022-02-07 NOTE — ED NOTES
OG tube output now dark red, bloody - has increased by approximately 30mL since last documented. Hospital team made aware via secure chat.

## 2022-02-08 LAB
ALBUMIN SERPL BCP-MCNC: 2.6 G/DL (ref 3.5–5.2)
ALLENS TEST: ABNORMAL
ALP SERPL-CCNC: 67 U/L (ref 55–135)
ALT SERPL W/O P-5'-P-CCNC: 11 U/L (ref 10–44)
ANA SER QL IF: NORMAL
ANION GAP SERPL CALC-SCNC: 10 MMOL/L (ref 8–16)
AST SERPL-CCNC: 9 U/L (ref 10–40)
BASOPHILS # BLD AUTO: 0.01 K/UL (ref 0–0.2)
BASOPHILS NFR BLD: 0.1 % (ref 0–1.9)
BILIRUB SERPL-MCNC: 0.3 MG/DL (ref 0.1–1)
BUN SERPL-MCNC: 20 MG/DL (ref 8–23)
CALCIUM SERPL-MCNC: 8.4 MG/DL (ref 8.7–10.5)
CHLORIDE SERPL-SCNC: 102 MMOL/L (ref 95–110)
CO2 SERPL-SCNC: 26 MMOL/L (ref 23–29)
CREAT SERPL-MCNC: 1.4 MG/DL (ref 0.5–1.4)
DELSYS: ABNORMAL
DIFFERENTIAL METHOD: ABNORMAL
EOSINOPHIL # BLD AUTO: 0 K/UL (ref 0–0.5)
EOSINOPHIL NFR BLD: 0 % (ref 0–8)
ERYTHROCYTE [DISTWIDTH] IN BLOOD BY AUTOMATED COUNT: 16.6 % (ref 11.5–14.5)
ERYTHROCYTE [SEDIMENTATION RATE] IN BLOOD BY WESTERGREN METHOD: 20 MM/H
EST. GFR  (AFRICAN AMERICAN): >60 ML/MIN/1.73 M^2
EST. GFR  (NON AFRICAN AMERICAN): 53 ML/MIN/1.73 M^2
FIO2: 60
GLUCOSE SERPL-MCNC: 196 MG/DL (ref 70–110)
HCO3 UR-SCNC: 29.5 MMOL/L (ref 24–28)
HCT VFR BLD AUTO: 32.3 % (ref 40–54)
HGB BLD-MCNC: 10.3 G/DL (ref 14–18)
IGE SERPL-ACNC: 48 IU/ML (ref 0–100)
IMM GRANULOCYTES # BLD AUTO: 0.08 K/UL (ref 0–0.04)
IMM GRANULOCYTES NFR BLD AUTO: 0.6 % (ref 0–0.5)
LYMPHOCYTES # BLD AUTO: 1.2 K/UL (ref 1–4.8)
LYMPHOCYTES NFR BLD: 8.5 % (ref 18–48)
MAGNESIUM SERPL-MCNC: 1.8 MG/DL (ref 1.6–2.6)
MCH RBC QN AUTO: 27.7 PG (ref 27–31)
MCHC RBC AUTO-ENTMCNC: 31.9 G/DL (ref 32–36)
MCV RBC AUTO: 87 FL (ref 82–98)
MODE: ABNORMAL
MONOCYTES # BLD AUTO: 0.6 K/UL (ref 0.3–1)
MONOCYTES NFR BLD: 4.5 % (ref 4–15)
NEUTROPHILS # BLD AUTO: 12 K/UL (ref 1.8–7.7)
NEUTROPHILS NFR BLD: 86.3 % (ref 38–73)
NRBC BLD-RTO: 0 /100 WBC
PCO2 BLDA: 52.9 MMHG (ref 35–45)
PEEP: 5
PH SMN: 7.35 [PH] (ref 7.35–7.45)
PLATELET # BLD AUTO: 343 K/UL (ref 150–450)
PMV BLD AUTO: 9.2 FL (ref 9.2–12.9)
PO2 BLDA: 79 MMHG (ref 80–100)
POC BE: 4 MMOL/L
POC SATURATED O2: 95 % (ref 95–100)
POCT GLUCOSE: 143 MG/DL (ref 70–110)
POCT GLUCOSE: 178 MG/DL (ref 70–110)
POCT GLUCOSE: 186 MG/DL (ref 70–110)
POCT GLUCOSE: 197 MG/DL (ref 70–110)
POCT GLUCOSE: 209 MG/DL (ref 70–110)
POCT GLUCOSE: 219 MG/DL (ref 70–110)
POCT GLUCOSE: 235 MG/DL (ref 70–110)
POCT GLUCOSE: 272 MG/DL (ref 70–110)
POTASSIUM SERPL-SCNC: 4.4 MMOL/L (ref 3.5–5.1)
PROT SERPL-MCNC: 5.6 G/DL (ref 6–8.4)
RBC # BLD AUTO: 3.72 M/UL (ref 4.6–6.2)
SAMPLE: ABNORMAL
SITE: ABNORMAL
SODIUM SERPL-SCNC: 138 MMOL/L (ref 136–145)
VT: 450
WBC # BLD AUTO: 13.9 K/UL (ref 3.9–12.7)

## 2022-02-08 PROCEDURE — 99233 SBSQ HOSP IP/OBS HIGH 50: CPT | Mod: ,,, | Performed by: INTERNAL MEDICINE

## 2022-02-08 PROCEDURE — 83735 ASSAY OF MAGNESIUM: CPT | Performed by: NURSE PRACTITIONER

## 2022-02-08 PROCEDURE — 80053 COMPREHEN METABOLIC PANEL: CPT | Performed by: NURSE PRACTITIONER

## 2022-02-08 PROCEDURE — 25000003 PHARM REV CODE 250: Performed by: INTERNAL MEDICINE

## 2022-02-08 PROCEDURE — 25000003 PHARM REV CODE 250: Performed by: NURSE PRACTITIONER

## 2022-02-08 PROCEDURE — C9399 UNCLASSIFIED DRUGS OR BIOLOG: HCPCS | Performed by: INTERNAL MEDICINE

## 2022-02-08 PROCEDURE — 25000003 PHARM REV CODE 250: Performed by: STUDENT IN AN ORGANIZED HEALTH CARE EDUCATION/TRAINING PROGRAM

## 2022-02-08 PROCEDURE — 99233 PR SUBSEQUENT HOSPITAL CARE,LEVL III: ICD-10-PCS | Mod: ,,, | Performed by: INTERNAL MEDICINE

## 2022-02-08 PROCEDURE — 20000000 HC ICU ROOM

## 2022-02-08 PROCEDURE — 99900035 HC TECH TIME PER 15 MIN (STAT)

## 2022-02-08 PROCEDURE — A4216 STERILE WATER/SALINE, 10 ML: HCPCS | Performed by: INTERNAL MEDICINE

## 2022-02-08 PROCEDURE — 36415 COLL VENOUS BLD VENIPUNCTURE: CPT | Performed by: NURSE PRACTITIONER

## 2022-02-08 PROCEDURE — 99291 CRITICAL CARE FIRST HOUR: CPT | Mod: ,,, | Performed by: INTERNAL MEDICINE

## 2022-02-08 PROCEDURE — 82803 BLOOD GASES ANY COMBINATION: CPT

## 2022-02-08 PROCEDURE — 94003 VENT MGMT INPAT SUBQ DAY: CPT

## 2022-02-08 PROCEDURE — 63600175 PHARM REV CODE 636 W HCPCS: Performed by: INTERNAL MEDICINE

## 2022-02-08 PROCEDURE — 63600175 PHARM REV CODE 636 W HCPCS: Performed by: STUDENT IN AN ORGANIZED HEALTH CARE EDUCATION/TRAINING PROGRAM

## 2022-02-08 PROCEDURE — 94640 AIRWAY INHALATION TREATMENT: CPT

## 2022-02-08 PROCEDURE — 63600175 PHARM REV CODE 636 W HCPCS: Performed by: NURSE PRACTITIONER

## 2022-02-08 PROCEDURE — 25000242 PHARM REV CODE 250 ALT 637 W/ HCPCS: Performed by: NURSE PRACTITIONER

## 2022-02-08 PROCEDURE — 99291 PR CRITICAL CARE, E/M 30-74 MINUTES: ICD-10-PCS | Mod: ,,, | Performed by: INTERNAL MEDICINE

## 2022-02-08 PROCEDURE — 27000221 HC OXYGEN, UP TO 24 HOURS

## 2022-02-08 PROCEDURE — 85025 COMPLETE CBC W/AUTO DIFF WBC: CPT | Performed by: NURSE PRACTITIONER

## 2022-02-08 PROCEDURE — 36600 WITHDRAWAL OF ARTERIAL BLOOD: CPT

## 2022-02-08 PROCEDURE — 99900026 HC AIRWAY MAINTENANCE (STAT)

## 2022-02-08 PROCEDURE — 82800 BLOOD PH: CPT

## 2022-02-08 PROCEDURE — C9113 INJ PANTOPRAZOLE SODIUM, VIA: HCPCS | Performed by: NURSE PRACTITIONER

## 2022-02-08 RX ORDER — FENTANYL CITRATE-0.9 % NACL/PF 10 MCG/ML
0-200 PLASTIC BAG, INJECTION (ML) INTRAVENOUS CONTINUOUS
Status: DISCONTINUED | OUTPATIENT
Start: 2022-02-08 | End: 2022-02-09

## 2022-02-08 RX ORDER — INSULIN ASPART 100 [IU]/ML
3 INJECTION, SOLUTION INTRAVENOUS; SUBCUTANEOUS EVERY 6 HOURS
Status: DISCONTINUED | OUTPATIENT
Start: 2022-02-08 | End: 2022-02-12

## 2022-02-08 RX ORDER — QUETIAPINE FUMARATE 100 MG/1
100 TABLET, FILM COATED ORAL DAILY
Status: DISCONTINUED | OUTPATIENT
Start: 2022-02-08 | End: 2022-02-09

## 2022-02-08 RX ORDER — FUROSEMIDE 10 MG/ML
60 INJECTION INTRAMUSCULAR; INTRAVENOUS ONCE
Status: COMPLETED | OUTPATIENT
Start: 2022-02-08 | End: 2022-02-08

## 2022-02-08 RX ORDER — LORAZEPAM 2 MG/ML
2 INJECTION INTRAMUSCULAR ONCE
Status: DISCONTINUED | OUTPATIENT
Start: 2022-02-08 | End: 2022-02-09

## 2022-02-08 RX ORDER — INSULIN ASPART 100 [IU]/ML
3 INJECTION, SOLUTION INTRAVENOUS; SUBCUTANEOUS
Status: DISCONTINUED | OUTPATIENT
Start: 2022-02-08 | End: 2022-02-08

## 2022-02-08 RX ORDER — INSULIN ASPART 100 [IU]/ML
1-10 INJECTION, SOLUTION INTRAVENOUS; SUBCUTANEOUS EVERY 6 HOURS
Status: DISCONTINUED | OUTPATIENT
Start: 2022-02-08 | End: 2022-02-12

## 2022-02-08 RX ADMIN — METHYLPREDNISOLONE SODIUM SUCCINATE 20 MG: 40 INJECTION, POWDER, FOR SOLUTION INTRAMUSCULAR; INTRAVENOUS at 10:02

## 2022-02-08 RX ADMIN — METHYLPREDNISOLONE SODIUM SUCCINATE 20 MG: 40 INJECTION, POWDER, FOR SOLUTION INTRAMUSCULAR; INTRAVENOUS at 05:02

## 2022-02-08 RX ADMIN — INSULIN ASPART 1 UNITS: 100 INJECTION, SOLUTION INTRAVENOUS; SUBCUTANEOUS at 11:02

## 2022-02-08 RX ADMIN — IPRATROPIUM BROMIDE AND ALBUTEROL SULFATE 3 ML: 2.5; .5 SOLUTION RESPIRATORY (INHALATION) at 04:02

## 2022-02-08 RX ADMIN — IPRATROPIUM BROMIDE AND ALBUTEROL SULFATE 3 ML: 2.5; .5 SOLUTION RESPIRATORY (INHALATION) at 11:02

## 2022-02-08 RX ADMIN — Medication 0.02 MCG/KG/MIN: at 02:02

## 2022-02-08 RX ADMIN — METHYLPREDNISOLONE SODIUM SUCCINATE 20 MG: 40 INJECTION, POWDER, FOR SOLUTION INTRAMUSCULAR; INTRAVENOUS at 01:02

## 2022-02-08 RX ADMIN — PROPOFOL 50 MCG/KG/MIN: 10 INJECTION, EMULSION INTRAVENOUS at 01:02

## 2022-02-08 RX ADMIN — QUETIAPINE FUMARATE 100 MG: 100 TABLET ORAL at 12:02

## 2022-02-08 RX ADMIN — BUDESONIDE 0.5 MG: 0.5 INHALANT ORAL at 07:02

## 2022-02-08 RX ADMIN — SODIUM CHLORIDE: 0.9 INJECTION, SOLUTION INTRAVENOUS at 05:02

## 2022-02-08 RX ADMIN — FENTANYL CITRATE 100 MCG/HR: 50 INJECTION INTRAVENOUS at 02:02

## 2022-02-08 RX ADMIN — INSULIN ASPART 6 UNITS: 100 INJECTION, SOLUTION INTRAVENOUS; SUBCUTANEOUS at 11:02

## 2022-02-08 RX ADMIN — SODIUM CHLORIDE, PRESERVATIVE FREE 10 ML: 5 INJECTION INTRAVENOUS at 11:02

## 2022-02-08 RX ADMIN — PROPOFOL 50 MCG/KG/MIN: 10 INJECTION, EMULSION INTRAVENOUS at 04:02

## 2022-02-08 RX ADMIN — CEFTRIAXONE 2 G: 2 INJECTION, SOLUTION INTRAVENOUS at 11:02

## 2022-02-08 RX ADMIN — PANTOPRAZOLE SODIUM 40 MG: 40 INJECTION, POWDER, FOR SOLUTION INTRAVENOUS at 08:02

## 2022-02-08 RX ADMIN — SODIUM CHLORIDE, PRESERVATIVE FREE 10 ML: 5 INJECTION INTRAVENOUS at 05:02

## 2022-02-08 RX ADMIN — INSULIN ASPART 3 UNITS: 100 INJECTION, SOLUTION INTRAVENOUS; SUBCUTANEOUS at 05:02

## 2022-02-08 RX ADMIN — CHLORHEXIDINE GLUCONATE 0.12% ORAL RINSE 15 ML: 1.2 LIQUID ORAL at 08:02

## 2022-02-08 RX ADMIN — INSULIN DETEMIR 10 UNITS: 100 INJECTION, SOLUTION SUBCUTANEOUS at 08:02

## 2022-02-08 RX ADMIN — INSULIN ASPART 3 UNITS: 100 INJECTION, SOLUTION INTRAVENOUS; SUBCUTANEOUS at 11:02

## 2022-02-08 RX ADMIN — BUDESONIDE 0.5 MG: 0.5 INHALANT ORAL at 08:02

## 2022-02-08 RX ADMIN — MUPIROCIN: 20 OINTMENT TOPICAL at 08:02

## 2022-02-08 RX ADMIN — INSULIN ASPART 2 UNITS: 100 INJECTION, SOLUTION INTRAVENOUS; SUBCUTANEOUS at 05:02

## 2022-02-08 RX ADMIN — FUROSEMIDE 40 MG: 10 INJECTION, SOLUTION INTRAMUSCULAR; INTRAVENOUS at 08:02

## 2022-02-08 RX ADMIN — FENTANYL CITRATE 200 MCG/HR: 50 INJECTION INTRAVENOUS at 04:02

## 2022-02-08 RX ADMIN — PROPOFOL 40 MCG/KG/MIN: 10 INJECTION, EMULSION INTRAVENOUS at 10:02

## 2022-02-08 RX ADMIN — IPRATROPIUM BROMIDE AND ALBUTEROL SULFATE 3 ML: 2.5; .5 SOLUTION RESPIRATORY (INHALATION) at 08:02

## 2022-02-08 RX ADMIN — PROPOFOL 50 MCG/KG/MIN: 10 INJECTION, EMULSION INTRAVENOUS at 11:02

## 2022-02-08 RX ADMIN — IPRATROPIUM BROMIDE AND ALBUTEROL SULFATE 3 ML: 2.5; .5 SOLUTION RESPIRATORY (INHALATION) at 07:02

## 2022-02-08 RX ADMIN — AZITHROMYCIN MONOHYDRATE 500 MG: 500 INJECTION, POWDER, LYOPHILIZED, FOR SOLUTION INTRAVENOUS at 12:02

## 2022-02-08 RX ADMIN — PROPOFOL 50 MCG/KG/MIN: 10 INJECTION, EMULSION INTRAVENOUS at 03:02

## 2022-02-08 RX ADMIN — IPRATROPIUM BROMIDE AND ALBUTEROL SULFATE 3 ML: 2.5; .5 SOLUTION RESPIRATORY (INHALATION) at 12:02

## 2022-02-08 RX ADMIN — FUROSEMIDE 60 MG: 10 INJECTION, SOLUTION INTRAVENOUS at 07:02

## 2022-02-08 RX ADMIN — PROPOFOL 35 MCG/KG/MIN: 10 INJECTION, EMULSION INTRAVENOUS at 07:02

## 2022-02-08 RX ADMIN — INSULIN ASPART 2 UNITS: 100 INJECTION, SOLUTION INTRAVENOUS; SUBCUTANEOUS at 03:02

## 2022-02-08 NOTE — HOSPITAL COURSE
62 y/o wm  admitted to ICU on mechanical ventilation  with a Dx of acute on chronic hypoxic and hypercapnic . Acute on chronic systolic and diastolic CHF exacerbation , PNA and  COPD exacerbation . Started on IV lasix , IV steroid  and IVAB . Develop hypotension most likely due to  IV sedation  and started on short course of Levophed to keep a MAP>65 .    2/9 Remain critically ill . Failed SBT today . Became very anxious and agitated .  Restarted on sedation . Home medication Seroquel and clonazepam . He has a good UP . NAEON     2/10 Extubated this am  , now on NRM .  He awake and alert  and following simple commands  . He has good UOP . NAEON . Sputum and blood cx NGTD .     2/11- Afebrile . O2 wean down to 4L/min. Sittting in the bedside chair . Awake , appears oriented to self , nods head but does not answer questions. Reports SOB is better. WBC 10K. Completed antibiotic today. Steroid transitioned to oral.  Noted BNP is further elevated 2609. Additional Lasix IV ordered per CC team. ASA, Amlodipine , Coreg and statin resumed.     2/12- Downgraded to Med tele yesterday . O2 wean down to RA. Pt was seen at  bedside . Somnolent during exam after receiving scheduled clonazepam and Seroquel this morning. Per Nursing , fully awake earlier today , ate breakfast and was getting agitated and climbing out of bed required sitter in the room. Episode of hypotension at noon today . Pt re examined at bedside . Still somnolent but arouses easily . Midodrine 10 mg x 1 dose given po. BP meds, home sedatives , antipsychotic were adjusted . Lasix transitioned to oral . Tried calling his sister but no answer.  Creatinine stable at 1.1    2/13- Pt is fully awake , alert, Ox 3 this morning . Remains on RA. Pt expressed that he feels perfectly fine and would like to be discharged . Adjustment made on home antihypertensives due to low BP yesterday . Amlodipine discontinued and lisinopril decreased to 5 mg daily. Seroquel dose lower to  25 mg qam and 200 mg at night and trazodone decreased to 25 mg due to pt exhibiting increased daytime somnolence after taking those medication. Pt is examined and deemed suitable for discharge to home to family care. Pt follow up with PCP and Cardiology in 1 to 2 weeks.

## 2022-02-08 NOTE — EICU
New Patient Evaluation    61 M former smoker, history of combined HF EF 40%, CKD, hypertension, DM (HbA1C 7.7), EMMANUEL on home CPAPschizoaffective disorder presents with shortness of breath. Hypoxic on presentation and was cyanotic. Intubated after failed BiPap. COVID negative.    He was admitted a week prior and was treated for pneumonia. Chest CT at that time with bilateral ground glass opacities peripheral in distribution. Tested negative for COVID as well. HIV negative.    Patient seen intubated 20/450/60%/5 PEEP peak pressure 16, MV 9  Sedated on propofol  /69  HR 68  O2 100% on norepinephrine    WBC 14.7  CRP 44  BNP 1200  ABG 7.29/58/314 on BiPap  CXR with scattered interstitial infiltrates more at the base    · Hypercapneic and hypoxic respiratory failure now intubated  · Started on ceftriaxone and azithromycin.   · Agree with diuresis as radiographic findings could represent pulmonary edema in a patient with low EF and elevated BNP  · Carries a diagnosis of COPD but PFT normal in 2016 with mildly low diffusion capacity. Prudent to start systemic steroids given elevated CRP but COPD exacerbation seems less likely as peak pressure not elevated.

## 2022-02-08 NOTE — PROGRESS NOTES
[facility] ([facility])  Critical Care Medicine  Progress Note    Patient Name: Shukri Rosales  MRN: [id number]  Admission Date: 2/7/2022  Hospital Length of Stay: 1 days  Code Status: Full Code  Attending Provider: Guillaume Urbina, *  Primary Care Provider: Cris Matias NP   Principal Problem: Acute hypoxemic respiratory failure    Subjective:     HPI:  Shukri Rosales is 61 y.o.  Asked to see in ER  Presented with respiratory distress, on CPAP, was found cyanotic and intubated  Smoker  Sats 70% at arrival  SOB cough Wheezing used nebs  Little improvement  Prior PFT 2018: normal except mild reduced DLCO.  On ANORO and Albuterol  Dimer mild elevated  pCO2 was 54        Hospital/ICU Course:  02/07: seen and examined:   02/08: seen and examined: peep 5, Fio2 50%, Sedated, Vent day #1, 2040 mls, T max:  98.1F, ABG reviewed, agitation when sedation vacated.Bolus lasix given      Interval History: *  02/08: seen and examined: peep 5, Fio2 50%, Sedated, Vent day #1, 2040 mls, T max:  98.1F, ABG reviewed, agitation when sedation vacated.Bolus lasix given    Vent Type:  (2/8/2022  5:48 AM)       Vent Mode: A/C  Oxygen Concentration (%):  [60-80] 60  Resp Rate Total:  [20 br/min-31 br/min] 20 br/min  Vt Set:  [450 mL] 450 mL  PEEP/CPAP:  [5 cmH20] 5 cmH20  Mean Airway Pressure:  [8.5 cmH20-15 cmH20] 9.3 cmH20      fentanyl 150 mcg/hr (02/08/22 0500)    NORepinephrine bitartrate-D5W 0.04 mcg/kg/min (02/08/22 0500)    propofoL 50 mcg/kg/min (02/08/22 0500)        Review of Systems   Unable to perform ROS: Intubated        Objective:     Vital Signs (Most Recent):  Temp: 97.8 °F (36.6 °C) (02/08/22 0400)  Pulse: 71 (02/08/22 0548)  Resp: 20 (02/08/22 0548)  BP: 114/67 (02/08/22 0548)  SpO2: 99 % (02/08/22 0548) Vital Signs (24h Range):  Temp:  [96.9 °F (36.1 °C)-98.1 °F (36.7 °C)] 97.8 °F (36.6 °C)  Pulse:  [67-97] 71  Resp:  [14-40] 20  SpO2:  [88 %-100 %] 99 %  BP:  ()/() 114/67     Weight: 98.6 kg (217 lb 6 oz)  Body mass index is 29.48 kg/m².      Intake/Output Summary (Last 24 hours) at 2/8/2022 0659  Last data filed at 2/8/2022 0500  Gross per 24 hour   Intake 1490.8 ml   Output 2350 ml   Net -859.2 ml       Physical Exam  Vitals and nursing note reviewed.   Constitutional:       Appearance: He is ill-appearing.      Interventions: He is intubated.       HENT:      Head: Normocephalic.   Eyes:      Pupils: Pupils are equal, round, and reactive to light.   Cardiovascular:      Rate and Rhythm: Normal rate.      Pulses: Normal pulses.      Heart sounds: Normal heart sounds.   Pulmonary:      Effort: Pulmonary effort is normal. He is intubated.      Breath sounds: Normal breath sounds.   Abdominal:      General: Bowel sounds are normal.   Musculoskeletal:      Cervical back: Normal range of motion.      Right lower leg: Edema present.      Left lower leg: Edema present.   Skin:     General: Skin is warm.   Neurological:      General: No focal deficit present.      Mental Status: He is easily aroused.         Vents:  Vent Mode: A/C (02/08/22 0548)  Ventilator Initiated: Yes (02/07/22 0634)  Set Rate: 20 BPM (02/08/22 0548)  Vt Set: 450 mL (02/08/22 0548)  PEEP/CPAP: 5 cmH20 (02/08/22 0548)  Oxygen Concentration (%): 60 (02/08/22 0548)  Peak Airway Pressure: 18 cmH2O (02/08/22 0548)  Plateau Pressure: 0 cmH20 (02/08/22 0548)  Total Ve: 9.23 mL (02/08/22 0548)  Negative Inspiratory Force (cm H2O): 0 (02/08/22 0548)  F/VT Ratio<105 (RSBI): (!) 39.53 (02/08/22 0548)    Lines/Drains/Airways     Peripherally Inserted Central Catheter Line            PICC Double Lumen 02/07/22 1830 right basilic <1 day          Drain                 NG/OG Tube 02/07/22 0608 orogastric 16 Fr. Center mouth 1 day         Urethral Catheter 02/07/22 0623 Non-latex 16 Fr. 1 day          Airway                 Airway - Non-Surgical 02/07/22 1 day          Peripheral Intravenous Line                  Peripheral IV - Single Lumen 02/07/22 0431 20 G Left Hand 1 day         Peripheral IV - Single Lumen 02/07/22 0605 18 G Right Hand 1 day                Significant Labs:    CBC/Anemia Profile:  Recent Labs   Lab 02/07/22  0434 02/08/22  0452   WBC 14.70* 13.90*   HGB 11.8* 10.3*   HCT 38.0* 32.3*    343   MCV 89 87   RDW 16.5* 16.6*        Chemistries:  Recent Labs   Lab 02/07/22  0434 02/07/22  1015 02/08/22  0452     --  138   K 4.3  --  4.4   CL 99  --  102   CO2 25  --  26   BUN 16  --  20   CREATININE 1.2  --  1.4   CALCIUM 8.9  --  8.4*   ALBUMIN 3.1*  --  2.6*   PROT 6.8  --  5.6*   BILITOT 0.4  --  0.3   ALKPHOS 99  --  67   ALT 13  --  11   AST 10  --  9*   MG  --  1.5* 1.8   PHOS  --  3.9  --        ABGs:   Recent Labs   Lab 02/08/22  0454   PH 7.354   PCO2 52.9*   HCO3 29.5*   POCSATURATED 95   BE 4     CMP:   Recent Labs   Lab 02/07/22  0434 02/08/22  0452    138   K 4.3 4.4   CL 99 102   CO2 25 26   * 196*   BUN 16 20   CREATININE 1.2 1.4   CALCIUM 8.9 8.4*   PROT 6.8 5.6*   ALBUMIN 3.1* 2.6*   BILITOT 0.4 0.3   ALKPHOS 99 67   AST 10 9*   ALT 13 11   ANIONGAP 13 10   EGFRNONAA >60 53*     Respiratory Culture:   Recent Labs   Lab 02/07/22  1037 02/07/22  1750   GSRESP <10 epithelial cells per low power field.  Rare WBC's  No organisms seen <10 epithelial cells per low power field.  Rare WBC's  Rare Gram positive cocci     All pertinent labs within the past 24 hours have been reviewed.    Significant Imaging:  I have reviewed all pertinent imaging results/findings within the past 24 hours.     X-Ray Chest AP Portable  Narrative: EXAMINATION:  XR CHEST AP PORTABLE    CLINICAL HISTORY:  Shortness of breath    COMPARISON:  A study performed 12-1/2 hours earlier    FINDINGS:  Nasogastric tube is approximately 5.5 cm above the maye.  Nasogastric tube extends beyond the inferior margins of this radiograph.  Right arm PICC line is in place with the tip in the distal SVC.   Worsening bilateral lower lobe infiltrates and adjacent effusions.  Negative for pneumothorax.  The hilar and mediastinal contours and osseous structures are unchanged.  Impression: 1.  Worsening bilateral lower lobe infiltrates and adjacent effusions.    2.  Lines and tubes are in good position as described above.  Negative for pneumothorax.    3.  Follow-up radiographs recommended.    Electronically signed by: Willie Grewal MD  Date:    02/07/2022  Time:    19:08  X-Ray Chest AP Portable  Narrative: EXAMINATION:  XR CHEST AP PORTABLE    CLINICAL HISTORY:  Encounter for other preprocedural examination    FINDINGS:  Single view of the chest.  Comparison 02/07/2022.    Endotracheal tube is satisfactory.  Nasogastric tube projects to the stomach with side hole just below the GE junction.    Cardiac silhouette is normal.  Scattered interstitial opacities are seen throughout the lungs which could reflect interstitial edema or interstitial pneumonia. No evidence of pleural effusion or pneumothorax.  Bones appear intact.  Impression: Scattered interstitial opacities are seen throughout the lungs which could reflect interstitial edema or interstitial pneumonia.    Satisfactory endotracheal nasogastric tube placement.    Electronically signed by: Frandy Bolton MD  Date:    02/07/2022  Time:    07:47  X-Ray Chest AP Portable  Narrative: EXAMINATION:  XR CHEST AP PORTABLE    CLINICAL HISTORY:  Sepsis;    FINDINGS:  Single view of the chest.  Comparison 01/22/2022    Cardiac silhouette is normal.  Scattered interstitial infiltrates are seen throughout the lungs which could reflect mild interstitial edema or interstitial pneumonia. Trace left pleural effusion.  Mild subsegmental atelectasis..  No evidence of pneumothorax.  Bones appear intact.  Aorta demonstrates atherosclerotic disease.  Impression: See above.    Electronically signed by: Frandy Bolton MD  Date:    02/07/2022  Time:    07:44         ABG  Recent Labs   Lab  02/08/22  0454   PH 7.354   PO2 79*   PCO2 52.9*   HCO3 29.5*   BE 4     Assessment/Plan:     Psychiatric  Schizoaffective disorder, bipolar type  On Seroquel    Pulmonary  Suction  Pul toilet  Bronchodilators      Pulmonary hypertension  PA estimated 53  Multifactorial  Gentle diuresis    Acute respiratory failure with hypoxia and hypercarbia  Patient with Hypercapnic and Hypoxic Respiratory failure which is Acute on chronic.  he is on home oxygen at 2.0 LPM. Supplemental oxygen was provided and noted- Vent Mode: A/C  Oxygen Concentration (%):  [50-70] 70  Resp Rate Total:  [16 br/min-31 br/min] 21 br/min  Vt Set:  [450 mL] 450 mL  PEEP/CPAP:  [5 cmH20] 5 cmH20  Mean Airway Pressure:  [8.5 cmH20-10 cmH20] 10 cmH20.   Signs/symptoms of respiratory failure include- tachypnea, increased work of breathing and respiratory distress. Contributing diagnoses includes - Interstitial lung disease and Pneumonia Labs and images were reviewed. Patient Has recent ABG, which has been reviewed. Will treat underlying causes and adjust management of respiratory failure as follows-     Serial ABG  Vent bundle  IV steriods  Sputum cullture  CEFTRIAXONE + AZITHRO      Cardiac/Vascular  Acute on chronic combined systolic and diastolic congestive heart failure  Patient is identified as having Combined Systolic and Diastolic heart failure that is Acute on chronic. CHF is currently controlled. Latest ECHO performed and demonstrates- Results for orders placed during the hospital encounter of 01/19/22    Echo    Interpretation Summary  · The left ventricle is normal in size with concentric hypertrophy and mildly decreased systolic function.  · Grade I left ventricular diastolic dysfunction.  · Normal right ventricular size with normal right ventricular systolic function.  · There is pulmonary hypertension.  · Intermediate central venous pressure (8 mmHg).  · The estimated PA systolic pressure is 53 mmHg.  · The estimated ejection fraction is  40%.  · There are segmental left ventricular wall motion abnormalities.  . Continue Furosemide and monitor clinical status closely. Monitor on telemetry. Patient is on CHF pathway.  Monitor strict Is&Os and daily weights.  Place on fluid restriction of 1.5 L. Continue to stress to patient importance of self efficacy and  on diet for CHF. Last BNP reviewed- and noted below   Recent Labs   Lab 22  0434   BNP 1,200*     LASIX BID    Endocrine  Type 2 diabetes mellitus with stage 3 chronic kidney disease  Sliding scale  Novalog Aspart and detemir    Obesity (BMI 30-39.9)  Weight loss    GI  Irrigate NGT    GI bleed  Likely gastritis  Start TF  Irrigate NG  PPI    Other  EMMANUEL on CPAP  Use home CPAP when feasible     Critical Care Daily Checklist:    A: Awake: RASS Goal/Actual Goal: RASS Goal: -2-->light sedation  Actual:     B: Spontaneous Breathing Trial Performed?     C: SAT & SBT Coordinated?  YES, aggitation                      D: Delirium: CAM-ICU     E: Early Mobility Performed? Yes   F: Feeding Goal:    Status:     Current Diet Order   Procedures    Diet NPO      AS: Analgesia/Sedation Propofol and Fentanyl   T: Thromboembolic Prophylaxis SCD   H: HOB > 300 Yes   U: Stress Ulcer Prophylaxis (if needed) PPI   G: Glucose Control SSI: B-196   B: Bowel Function     I: Indwelling Catheter (Lines & Abarca) Necessity PICC, ABARCA   D: De-escalation of Antimicrobials/Pharmacotherapies CEFTRAXONE+ AZITHRO    Plan for the day/ETD Conr on going care    Code Status:  Family/Goals of Care: Full Code  TBD     Critical Care Time: 30 minutes  Critical secondary to Patient has a condition that poses threat to life and bodily function: Severe Respiratory Distress and Acute respiratory failure      Critical care was time spent personally by me on the following activities: development of treatment plan with patient or surrogate and bedside caregivers, discussions with consultants, evaluation of patient's response to  treatment, examination of patient, ordering and performing treatments and interventions, ordering and review of laboratory studies, ordering and review of radiographic studies, pulse oximetry, re-evaluation of patient's condition. This critical care time did not overlap with that of any other provider or involve time for any procedures.     Siddhartha Dueñas MD  Critical Care Medicine  [facility])

## 2022-02-08 NOTE — HOSPITAL COURSE
2/8/22-Patient seen and examined today, intubated, sedated. Some hypotension noted overnight, required low dose pressor. Being diuresed, on abx. Labs reviewed. Creatinine 1.4. H/H 10.3/32.3.    2/9/22-Patient seen and examined today, failed SBT earlier today. Off pressors support. Labs reviewed. H/H stable. Creatinine 1.5. Will increase diuresis and assess response.     2/10/22-Patient seen and examined today, right after extubation. Agitated, BP and HR elevated. Good diuresis overnight. Labs reviewed. Creatinine stable. Continue same mgmt.    2/11/22-Patient seen and examined today, sitting up in bedside chair. Lethargic, on supplemental O2. Appears comfortable. BP elevated. Labs reviewed. Creatinine stable. BNP elevated.     2/12/2022 Cr 1.1 HGb 13 stable, BNP up to 2609, SOFT BP. Confused and on 1:1 sitter

## 2022-02-08 NOTE — SUBJECTIVE & OBJECTIVE
Review of Systems   Unable to perform ROS: intubated     Objective:     Vital Signs (Most Recent):  Temp: 97.8 °F (36.6 °C) (02/08/22 0700)  Pulse: 93 (02/08/22 0930)  Resp: (!) 24 (02/08/22 0930)  BP: (!) 107/59 (02/08/22 0930)  SpO2: 97 % (02/08/22 0933) Vital Signs (24h Range):  Temp:  [97.2 °F (36.2 °C)-98.1 °F (36.7 °C)] 97.8 °F (36.6 °C)  Pulse:  [] 93  Resp:  [14-40] 24  SpO2:  [88 %-100 %] 97 %  BP: ()/() 107/59     Weight: 98.6 kg (217 lb 6 oz)  Body mass index is 29.48 kg/m².     SpO2: 97 %  O2 Device (Oxygen Therapy): ventilator      Intake/Output Summary (Last 24 hours) at 2/8/2022 1057  Last data filed at 2/8/2022 1000  Gross per 24 hour   Intake 1858.47 ml   Output 1500 ml   Net 358.47 ml       Lines/Drains/Airways     Peripherally Inserted Central Catheter Line            PICC Double Lumen 02/07/22 1830 right basilic <1 day          Drain                 NG/OG Tube 02/07/22 0608 orogastric 16 Fr. Center mouth 1 day         Urethral Catheter 02/07/22 0623 Non-latex 16 Fr. 1 day          Airway                 Airway - Non-Surgical 02/07/22 1 day          Peripheral Intravenous Line                 Peripheral IV - Single Lumen 02/07/22 0431 20 G Left Hand 1 day         Peripheral IV - Single Lumen 02/07/22 0605 18 G Right Hand 1 day                Physical Exam  Vitals and nursing note reviewed.   Constitutional:       General: He is not in acute distress.     Appearance: He is well-developed and well-nourished. He is ill-appearing. He is not diaphoretic.      Comments: Intubated, sedated   HENT:      Head: Normocephalic and atraumatic.   Eyes:      General:         Right eye: No discharge.         Left eye: No discharge.      Pupils: Pupils are equal, round, and reactive to light.   Neck:      Thyroid: No thyromegaly.      Vascular: No JVD.      Trachea: No tracheal deviation.   Cardiovascular:      Rate and Rhythm: Normal rate and regular rhythm.      Heart sounds: Normal heart  sounds, S1 normal and S2 normal. No murmur heard.      Pulmonary:      Effort: Pulmonary effort is normal. No respiratory distress.      Breath sounds: No wheezing.      Comments: Diminished BS at bases  Abdominal:      General: There is no distension.      Tenderness: There is no rebound.   Musculoskeletal:      Cervical back: Neck supple.      Right lower leg: Edema present.      Left lower leg: Edema present.   Skin:     General: Skin is warm and dry.      Findings: No erythema.   Neurological:      General: No focal deficit present.      Mental Status: He is alert and oriented to person, place, and time.   Psychiatric:         Mood and Affect: Mood and affect and mood normal.         Behavior: Behavior normal.         Thought Content: Thought content normal.         Significant Labs:   CMP   Recent Labs   Lab 02/07/22  0434 02/08/22  0452    138   K 4.3 4.4   CL 99 102   CO2 25 26   * 196*   BUN 16 20   CREATININE 1.2 1.4   CALCIUM 8.9 8.4*   PROT 6.8 5.6*   ALBUMIN 3.1* 2.6*   BILITOT 0.4 0.3   ALKPHOS 99 67   AST 10 9*   ALT 13 11   ANIONGAP 13 10   ESTGFRAFRICA >60 >60   EGFRNONAA >60 53*   , CBC   Recent Labs   Lab 02/07/22  0434 02/07/22  0434 02/08/22  0452   WBC 14.70*  --  13.90*   HGB 11.8*  --  10.3*   HCT 38.0*   < > 32.3*     --  343    < > = values in this interval not displayed.   , Troponin   Recent Labs   Lab 02/07/22  0434 02/07/22  1015   TROPONINI 0.025 0.018  0.018    and All pertinent lab results from the last 24 hours have been reviewed.    Significant Imaging: Echocardiogram:   Transthoracic echo (TTE) complete (Cupid Only):   Results for orders placed or performed during the hospital encounter of 01/19/22   Echo   Result Value Ref Range    BSA 2.14 m2    TDI SEPTAL 0.07 m/s    LV LATERAL E/E' RATIO 8.38 m/s    LV SEPTAL E/E' RATIO 9.57 m/s    LA WIDTH 3.39 cm    IVC diameter 1.77 cm    Left Ventricular Outflow Tract Mean Velocity 0.83020986675029 cm/s    Left  Ventricular Outflow Tract Mean Gradient 2.28 mmHg    TDI LATERAL 0.08 m/s    LVIDd 4.62 3.5 - 6.0 cm    IVS 1.68 (A) 0.6 - 1.1 cm    Posterior Wall 1.39 (A) 0.6 - 1.1 cm    Ao root annulus 3.65 cm    LVIDs 4.37 (A) 2.1 - 4.0 cm    FS 5 28 - 44 %    LA volume 37.01 cm3    Sinus 3.68 cm    STJ 3.50 cm    Ascending aorta 3.26 cm    LV mass 296.91 g    LA size 3.28 cm    TAPSE 2.45 cm    Left Ventricle Relative Wall Thickness 0.60 cm    AV mean gradient 3 mmHg    AV valve area 2.14 cm2    AV Velocity Ratio 0.86     AV index (prosthetic) 0.61     MV valve area p 1/2 method 3.08 cm2    E/A ratio 1.00     Mean e' 0.08 m/s    E wave deceleration time 246.046262920622748 msec    IVRT [id number] msec    LVOT diameter 2.11 cm    LVOT area 3.5 cm2    LVOT peak frankie 1.08 m/s    LVOT peak VTI 17.00 cm    Ao peak frankie 1.26 m/s    Ao VTI 27.8 cm    RVOT prox diameter 3.49 cm    RVOT area 9.56 cm2    RVOT peak frankie 0.60 m/s    RVOT peak VTI 11.8 cm    Qp:Qs ratio 0.53     LVOT stroke volume 59.41 cm3    RVOT stroke volume 112.82 cm3    AV peak gradient 6 mmHg    PV mean gradient 0.71 mmHg    E/E' ratio 8.93 m/s    MV Peak E Frankie 0.67 m/s    TR Max Frankie 3.37 m/s    MV stenosis pressure 1/2 time [id number] ms    MV Peak A Frankie 0.67 m/s    LV Systolic Volume 86.51 mL    LV Systolic Volume Index 40.8 mL/m2    LV Diastolic Volume 98.26 mL    LV Diastolic Volume Index 46.35 mL/m2    LA Volume Index 17.5 mL/m2    LV Mass Index 140 g/m2    Echo EF Estimated 12 %    RA Major Axis 4.57 cm    Left Atrium Minor Axis 3.27 cm    Left Atrium Major Axis 4.88 cm    Triscuspid Valve Regurgitation Peak Gradient 45 mmHg    RA Width 3.59 cm    Right Atrial Pressure (from IVC) 8 mmHg    EF 40 %    TV rest pulmonary artery pressure 53 mmHg    Narrative    · The left ventricle is normal in size with concentric hypertrophy and   mildly decreased systolic function.  · Grade I left ventricular diastolic dysfunction.  · Normal right ventricular size  with normal right ventricular systolic   function.  · There is pulmonary hypertension.  · Intermediate central venous pressure (8 mmHg).  · The estimated PA systolic pressure is 53 mmHg.  · The estimated ejection fraction is 40%.  · There are segmental left ventricular wall motion abnormalities.      , EKG: Reviewed and X-Ray: CXR: X-Ray Chest 1 View (CXR): No results found for this visit on 02/07/22. and X-Ray Chest PA and Lateral (CXR): No results found for this visit on 02/07/22.

## 2022-02-08 NOTE — PROCEDURES
Shukri Rosales is a 61 y.o. male patient.    Temp: 98 °F (36.7 °C) (02/07/22 1527)  Pulse: 72 (02/07/22 1837)  Resp: 14 (02/07/22 1837)  BP: 97/65 (02/07/22 1837)  SpO2: 98 % (02/07/22 1837)  Weight: 92.3 kg (203 lb 7.8 oz) (02/07/22 0426)  Height: 6' (182.9 cm) (02/07/22 0424)    PICC  Date/Time: 2/7/2022 6:30 PM  Performed by: Jareth Kong RN  Consent Done: Yes  Time out: Immediately prior to procedure a time out was called to verify the correct patient, procedure, equipment, support staff and site/side marked as required  Indications: med administration and vascular access  Anesthesia: local infiltration  Local anesthetic: lidocaine 1% without epinephrine  Anesthetic Total (mL): 2  Preparation: skin prepped with chlorhexidine (without alcohol)  Skin prep agent dried: skin prep agent completely dried prior to procedure  Sterile barriers: all five maximum sterile barriers used - cap, mask, sterile gown, sterile gloves, and large sterile sheet  Hand hygiene: hand hygiene performed prior to central venous catheter insertion  Location details: right basilic  Catheter type: double lumen  Catheter size: 5 Fr  Catheter Length: 38cm    Ultrasound guidance: yes  Vessel Caliber: medium and patent, compressibility normal  Vascular Doppler: not done  Needle advanced into vessel with real time Ultrasound guidance.  Guidewire confirmed in vessel.  Sterile sheath used.  no esophageal manometryNumber of attempts: 1  Post-procedure: blood return through all ports, chlorhexidine patch and sterile dressing applied            Name BEAN PANDYA  2/7/2022

## 2022-02-08 NOTE — PROGRESS NOTES
[facility] ([facility] Medicine  Progress Note    Patient Name: Shukri Rosales  MRN: [id number]  Patient Class: IP- Inpatient   Admission Date: 2/7/2022  Length of Stay: 1 days  Attending Physician: Guillaume Urbina, *  Primary Care Provider: Cris Matias NP        Subjective:     Principal Problem:Acute hypoxemic respiratory failure        HPI:  Shortness of Breath        Hx of COPD and recent dx of PNA. Pt received narcan for Kratom use.     Per ER- This  is a 61 y.o. male patient with PMHx of Adrenal cortical adenoma, arthritis, back surgery,  HTN, CKD stage 3, depression, GERD, migraine headache, Schizophrenia, Severe obesity, PNA, COPD, exsmoker, and DM who presents to the Emergency Department by EMS for SOB which onset one day ago. Symptoms are constant and moderate in severity. No mitigating or exacerbating factors reported. Associated sxs include wheeze. Patient denies  fever, chills, congestion, rhinorrhea, sore throat, cough, CP, leg swelling, N/V/D, dizziness, HA, and all other sxs at this time. Pt was seen in the ED a week ago with similar sxs. Pt was admit for PNA treatment. Pt states since d/c smoking a significant amount of cigarettes. Per EMS pt's SpO2 was in the low 70s at the scene. Pt was given two breathing treatments and placed on a nonrebreathing mask. Pt's SpO2 radha to 91 PTA. No further complaints or concerns at this time.      Patient was noted to be in Acute hypoxic respiratory failure in ER and was intubated and venitiated. Patient to be admitted to ICU.                   Overview/Hospital Course:  62 y/o wm  admitted to ICU on mechanical ventilation  with a Dx of acute on chronic hypoxic and hypercapnic . Acute on chronic systolic and diastolic CHF exacerbation , PNA and  COPD exacerbation . Started on IV lasix , IV steroid  and IVAB . Develop hypotension most likely due to  IV sedation  and started on short course of Levophed to keep a MAP>65  .      Interval History:     Review of Systems   Unable to perform ROS: Intubated     Objective:     Vital Signs (Most Recent):  Temp: 97.8 °F (36.6 °C) (02/08/22 0400)  Pulse: 100 (02/08/22 0818)  Resp: (!) 25 (02/08/22 0818)  BP: 114/67 (02/08/22 0548)  SpO2: 95 % (02/08/22 0818) Vital Signs (24h Range):  Temp:  [97.2 °F (36.2 °C)-98.1 °F (36.7 °C)] 97.8 °F (36.6 °C)  Pulse:  [] 100  Resp:  [14-40] 25  SpO2:  [88 %-100 %] 95 %  BP: ()/() 114/67     Weight: 98.6 kg (217 lb 6 oz)  Body mass index is 29.48 kg/m².    Intake/Output Summary (Last 24 hours) at 2/8/2022 0907  Last data filed at 2/8/2022 0500  Gross per 24 hour   Intake 1490.8 ml   Output 2350 ml   Net -859.2 ml      Physical Exam  Vitals and nursing note reviewed.   Constitutional:       Appearance: He is ill-appearing.      Interventions: He is intubated.       HENT:      Head: Normocephalic.   Eyes:      Pupils: Pupils are equal, round, and reactive to light.   Cardiovascular:      Rate and Rhythm: Normal rate.      Pulses: Normal pulses.      Heart sounds: Normal heart sounds.   Pulmonary:      Effort: Pulmonary effort is normal. He is intubated.      Breath sounds: Normal breath sounds.   Abdominal:      General: Bowel sounds are normal.   Musculoskeletal:      Cervical back: Normal range of motion.      Right lower leg: Edema present.      Left lower leg: Edema present.   Skin:     General: Skin is warm.   Neurological:      General: No focal deficit present.      Mental Status: He is easily aroused.         Significant Labs: All pertinent labs within the past 24 hours have been reviewed.    Significant Imaging: I have reviewed all pertinent imaging results/findings within the past 24 hours.      Assessment/Plan:      * Acute hypoxemic respiratory failure secondary to acute on chronic CHF   Patient with Hypoxic Respiratory failure which is Acute.  he is not on home oxygen. Supplemental oxygen was provided and noted- Vent Mode:  A/C  Oxygen Concentration (%):  [60-80] 70  Resp Rate Total:  [20 br/min-31 br/min] 20 br/min  Vt Set:  [450 mL] 450 mL  PEEP/CPAP:  [5 cmH20] 5 cmH20  Mean Airway Pressure:  [8.5 cmH20-9.4 cmH20] 9.3 cmH20.   Signs/symptoms of respiratory failure include- tachypnea, increased work of breathing and respiratory distress. Contributing diagnoses includes - CHF Labs and images were reviewed. Patient Has recent ABG, which has been reviewed.      2/07 Patient intubated and ventilated and admitted to ICU    Cont IV steroid and IV lasix  Wean ventilation support  As tolerated     Pulmonary hypertension  History of- Noted      Acute on chronic combined systolic and diastolic congestive heart failure  Patient is identified as having Combined Systolic and Diastolic heart failure that is Acute on chronic. CHF is currently uncontrolled due to Continued edema of extremities and Dyspnea     Echo    Interpretation Summary  · The left ventricle is normal in size with concentric hypertrophy and mildly decreased systolic function.  · Grade I left ventricular diastolic dysfunction.  · Normal right ventricular size with normal right ventricular systolic function.  · There is pulmonary hypertension.  · Intermediate central venous pressure (8 mmHg).  · The estimated PA systolic pressure is 53 mmHg.  · The estimated ejection fraction is 40%.  · There are segmental left ventricular wall motion abnormalities.    Telemetry  Add Iv lasix Bid  Cardiology consulted    Recent Labs   Lab 02/07/22  0434   BNP 1,200*   .      History of recent pneumonia  Now with Acute respiratory failure   IV Abx       Hypoalbuminemia  Monitor      Normocytic anemia  Monitor      COPD exacerbation  Cont Breathing tx  Cont IV steroid       Schizoaffective disorder, bipolar type  Home medications currently on hold (seroquel)      Type 2 diabetes mellitus with stage 3 chronic kidney disease  Keep CBG<180  Cont long acting  And short acting insulin         Obesity (BMI  30-39.9)  Cont supportive tx       EMMANUEL on CPAP  Hx EMMANUEL noted      Hypertension associated with diabetes  On hold due to hypotension due to IV sedation   resume accordingly         VTE Risk Mitigation (From admission, onward)         Ordered     IP VTE HIGH RISK PATIENT  Once         02/07/22 1640     Place sequential compression device  Until discontinued         02/07/22 1640     Place EDVIN hose  Until discontinued         02/07/22 1640     Place EDVIN hose  Until discontinued         02/07/22 1042     Place sequential compression device  Until discontinued         02/07/22 1042     Place EDVIN hose  Until discontinued         02/07/22 0955     Place sequential compression device  Until discontinued         02/07/22 0955                Discharge Planning   ALICIA:      Code Status: Full Code   Is the patient medically ready for discharge?: No    Reason for patient still in hospital (select all that apply): Patient unstable and Treatment               Critical care time spent on the evaluation and treatment of severe organ dysfunction, review of pertinent labs and imaging studies, discussions with consulting providers and discussions with patient/family: 36 minutes.      Guillaume Urbina MD  Department of Hospital Medicine   [facility] ([facility])

## 2022-02-08 NOTE — EICU
Rounding (Video Assessment):  N/A    Intervention Initiated From:  Bedside    Abeba Communicated with Bedside Nurse regarding:  Medication and Other    Nurse Notified:  Yes    Doctor Notified:  Yes    Comments: RN called concerning inadequate sedation, called transferred to Dr Merino

## 2022-02-08 NOTE — PROGRESS NOTES
[facility] ([facility])  Cardiology  Progress Note    Patient Name: Shukri Rosales  MRN: [id number]  Admission Date: 2/7/2022  Hospital Length of Stay: 1 days  Code Status: Full Code   Attending Physician: Guillaume Urbina, *   Primary Care Physician: Cris Matias NP  Expected Discharge Date:   Principal Problem:Acute hypoxemic respiratory failure    Subjective:   HPI:    Per ER- This  is a 61 y.o. male patient with PMHx of Adrenal cortical adenoma, arthritis, back surgery,  HTN, CKD stage 3, depression, GERD, migraine headache, Schizophrenia, Severe obesity, PNA, COPD, exsmoker, and DM who presents to the Emergency Department by EMS for SOB which onset one day ago. Symptoms are constant and moderate in severity. No mitigating or exacerbating factors reported. Associated sxs include wheeze. Patient denies  fever, chills, congestion, rhinorrhea, sore throat, cough, CP, leg swelling, N/V/D, dizziness, HA, and all other sxs at this time. Pt was seen in the ED a week ago with similar sxs. Pt was admit for PNA treatment. Pt states since d/c smoking a significant amount of cigarettes. Per EMS pt's SpO2 was in the low 70s at the scene. Pt was given two breathing treatments and placed on a nonrebreathing mask. Pt's SpO2 radha to 91 PTA. No further complaints or concerns at this time.       Patient was noted to be in Acute hypoxic respiratory failure in ER and was intubated and venitiated. Patient to be admitted to ICU.        Hospital Course:   2/8/22-Patient seen and examined today, intubated, sedated. Some hypotension noted overnight, required low dose pressor. Being diuresed, on abx. Labs reviewed. Creatinine 1.4. H/H 10.3/32.3.          Review of Systems   Unable to perform ROS: intubated     Objective:     Vital Signs (Most Recent):  Temp: 97.8 °F (36.6 °C) (02/08/22 0700)  Pulse: 93 (02/08/22 0930)  Resp: (!) 24 (02/08/22 0930)  BP: (!) 107/59 (02/08/22 0930)  SpO2: 97 % (02/08/22 0933) Vital  Signs (24h Range):  Temp:  [97.2 °F (36.2 °C)-98.1 °F (36.7 °C)] 97.8 °F (36.6 °C)  Pulse:  [] 93  Resp:  [14-40] 24  SpO2:  [88 %-100 %] 97 %  BP: ()/() 107/59     Weight: 98.6 kg (217 lb 6 oz)  Body mass index is 29.48 kg/m².     SpO2: 97 %  O2 Device (Oxygen Therapy): ventilator      Intake/Output Summary (Last 24 hours) at 2/8/2022 1057  Last data filed at 2/8/2022 1000  Gross per 24 hour   Intake 1858.47 ml   Output 1500 ml   Net 358.47 ml       Lines/Drains/Airways     Peripherally Inserted Central Catheter Line            PICC Double Lumen 02/07/22 1830 right basilic <1 day          Drain                 NG/OG Tube 02/07/22 0608 orogastric 16 Fr. Center mouth 1 day         Urethral Catheter 02/07/22 0623 Non-latex 16 Fr. 1 day          Airway                 Airway - Non-Surgical 02/07/22 1 day          Peripheral Intravenous Line                 Peripheral IV - Single Lumen 02/07/22 0431 20 G Left Hand 1 day         Peripheral IV - Single Lumen 02/07/22 0605 18 G Right Hand 1 day                Physical Exam  Vitals and nursing note reviewed.   Constitutional:       General: He is not in acute distress.     Appearance: He is well-developed and well-nourished. He is ill-appearing. He is not diaphoretic.      Comments: Intubated, sedated   HENT:      Head: Normocephalic and atraumatic.   Eyes:      General:         Right eye: No discharge.         Left eye: No discharge.      Pupils: Pupils are equal, round, and reactive to light.   Neck:      Thyroid: No thyromegaly.      Vascular: No JVD.      Trachea: No tracheal deviation.   Cardiovascular:      Rate and Rhythm: Normal rate and regular rhythm.      Heart sounds: Normal heart sounds, S1 normal and S2 normal. No murmur heard.      Pulmonary:      Effort: Pulmonary effort is normal. No respiratory distress.      Breath sounds: No wheezing.      Comments: Diminished BS at bases  Abdominal:      General: There is no distension.       Tenderness: There is no rebound.   Musculoskeletal:      Cervical back: Neck supple.      Right lower leg: Edema present.      Left lower leg: Edema present.   Skin:     General: Skin is warm and dry.      Findings: No erythema.   Neurological:      General: No focal deficit present.      Mental Status: He is alert and oriented to person, place, and time.   Psychiatric:         Mood and Affect: Mood and affect and mood normal.         Behavior: Behavior normal.         Thought Content: Thought content normal.         Significant Labs:   CMP   Recent Labs   Lab 02/07/22 0434 02/08/22  0452    138   K 4.3 4.4   CL 99 102   CO2 25 26   * 196*   BUN 16 20   CREATININE 1.2 1.4   CALCIUM 8.9 8.4*   PROT 6.8 5.6*   ALBUMIN 3.1* 2.6*   BILITOT 0.4 0.3   ALKPHOS 99 67   AST 10 9*   ALT 13 11   ANIONGAP 13 10   ESTGFRAFRICA >60 >60   EGFRNONAA >60 53*   , CBC   Recent Labs   Lab 02/07/22 0434 02/07/22 0434 02/08/22  0452   WBC 14.70*  --  13.90*   HGB 11.8*  --  10.3*   HCT 38.0*   < > 32.3*     --  343    < > = values in this interval not displayed.   , Troponin   Recent Labs   Lab 02/07/22 0434 02/07/22  1015   TROPONINI 0.025 0.018  0.018    and All pertinent lab results from the last 24 hours have been reviewed.    Significant Imaging: Echocardiogram:   Transthoracic echo (TTE) complete ([facility] Only):   Results for orders placed or performed during the hospital encounter of 01/19/22   Echo   Result Value Ref Range    BSA 2.14 m2    TDI SEPTAL 0.07 m/s    LV LATERAL E/E' RATIO 8.38 m/s    LV SEPTAL E/E' RATIO 9.57 m/s    LA WIDTH 3.39 cm    IVC diameter 1.77 cm    Left Ventricular Outflow Tract Mean Velocity 0.74805382318757 cm/s    Left Ventricular Outflow Tract Mean Gradient 2.28 mmHg    TDI LATERAL 0.08 m/s    LVIDd 4.62 3.5 - 6.0 cm    IVS 1.68 (A) 0.6 - 1.1 cm    Posterior Wall 1.39 (A) 0.6 - 1.1 cm    Ao root annulus 3.65 cm    LVIDs 4.37 (A) 2.1 - 4.0 cm    FS 5 28 - 44 %    LA volume 37.01  cm3    Sinus 3.68 cm    STJ 3.50 cm    Ascending aorta 3.26 cm    LV mass 296.91 g    LA size 3.28 cm    TAPSE 2.45 cm    Left Ventricle Relative Wall Thickness 0.60 cm    AV mean gradient 3 mmHg    AV valve area 2.14 cm2    AV Velocity Ratio 0.86     AV index (prosthetic) 0.61     MV valve area p 1/2 method 3.08 cm2    E/A ratio 1.00     Mean e' 0.08 m/s    E wave deceleration time 246.913826810818657 msec    IVRT 105.162760122261837 msec    LVOT diameter 2.11 cm    LVOT area 3.5 cm2    LVOT peak frankie 1.08 m/s    LVOT peak VTI 17.00 cm    Ao peak frankie 1.26 m/s    Ao VTI 27.8 cm    RVOT prox diameter 3.49 cm    RVOT area 9.56 cm2    RVOT peak frankie 0.60 m/s    RVOT peak VTI 11.8 cm    Qp:Qs ratio 0.53     LVOT stroke volume 59.41 cm3    RVOT stroke volume 112.82 cm3    AV peak gradient 6 mmHg    PV mean gradient 0.71 mmHg    E/E' ratio 8.93 m/s    MV Peak E Frankie 0.67 m/s    TR Max Frankie 3.37 m/s    MV stenosis pressure 1/2 time 71.416103511666912 ms    MV Peak A Frankie 0.67 m/s    LV Systolic Volume 86.51 mL    LV Systolic Volume Index 40.8 mL/m2    LV Diastolic Volume 98.26 mL    LV Diastolic Volume Index 46.35 mL/m2    LA Volume Index 17.5 mL/m2    LV Mass Index 140 g/m2    Echo EF Estimated 12 %    RA Major Axis 4.57 cm    Left Atrium Minor Axis 3.27 cm    Left Atrium Major Axis 4.88 cm    Triscuspid Valve Regurgitation Peak Gradient 45 mmHg    RA Width 3.59 cm    Right Atrial Pressure (from IVC) 8 mmHg    EF 40 %    TV rest pulmonary artery pressure 53 mmHg    Narrative    · The left ventricle is normal in size with concentric hypertrophy and   mildly decreased systolic function.  · Grade I left ventricular diastolic dysfunction.  · Normal right ventricular size with normal right ventricular systolic   function.  · There is pulmonary hypertension.  · Intermediate central venous pressure (8 mmHg).  · The estimated PA systolic pressure is 53 mmHg.  · The estimated ejection fraction is 40%.  · There are segmental left  ventricular wall motion abnormalities.      , EKG: Reviewed and X-Ray: CXR: X-Ray Chest 1 View (CXR): No results found for this visit on 02/07/22. and X-Ray Chest PA and Lateral (CXR): No results found for this visit on 02/07/22.    Assessment and Plan:   Patient who presents with respiratory failure-multifactorial in setting of COPD/CHF. Continue abx and diuresis. Reassess in AM.    * Acute hypoxemic respiratory failure secondary to acute on chronic CHF   -Assess response to IV diuresis    Acute on chronic combined systolic and diastolic congestive heart failure  -Presents with SOB, multifactorial in setting of COPD exacerbation/? PNA and combined CHF  -Echo 1/22 showed EF of 40%, DD, pulmonary HTN, + WMA  -Continue IV diuresis as tolerated  -Resume Coreg once BP permits  -Consider Entresto pending BP/creatinine trend  -Ischemic evaluation once recovered  -Strict I's/O's    2/8/22  -Continue IV diuresis  -Resume BB as BP permits  -Strict I's/Os  -Reassess in AM      History of recent pneumonia  -On abx, awaiting pulmonary evaluation    COPD exacerbation  -Mgmt as per primary team    Type 2 diabetes mellitus with stage 3 chronic kidney disease  -Mgmt as per primary team        VTE Risk Mitigation (From admission, onward)         Ordered     IP VTE HIGH RISK PATIENT  Once         02/07/22 1640     Place sequential compression device  Until discontinued         02/07/22 1640     Place EDVIN hose  Until discontinued         02/07/22 1640     Place EDVIN hose  Until discontinued         02/07/22 1042     Place sequential compression device  Until discontinued         02/07/22 1042     Place EDVIN hose  Until discontinued         02/07/22 0955     Place sequential compression device  Until discontinued         02/07/22 0955                Carmen Arellano PA-C  Cardiology  [facility] ([facility])

## 2022-02-08 NOTE — PLAN OF CARE
Intubated and sedated.  Opens eyes spont.  Focuses, not tracking.  Not following commands. Restless and agitated when aroused.  Reaches for ETT.  Sats wnl on ventilator.  BBS CTA - diminished to bases.  Small amount of clear secretions.  OGT clamped per MD Dueñas.  Heart RRR.  Abd. Soft - BS +.  [place] CYU, adequate output.  No response to lasix - did not diurese.  + 3 edema to BLE.

## 2022-02-08 NOTE — ASSESSMENT & PLAN NOTE
Patient with Hypercapnic and Hypoxic Respiratory failure which is Acute on chronic.  he is on home oxygen at 2.0 LPM. Supplemental oxygen was provided and noted- Vent Mode: A/C  Oxygen Concentration (%):  [50-70] 70  Resp Rate Total:  [16 br/min-31 br/min] 21 br/min  Vt Set:  [450 mL] 450 mL  PEEP/CPAP:  [5 cmH20] 5 cmH20  Mean Airway Pressure:  [8.5 cmH20-10 cmH20] 10 cmH20.   Signs/symptoms of respiratory failure include- tachypnea, increased work of breathing and respiratory distress. Contributing diagnoses includes - Interstitial lung disease and Pneumonia Labs and images were reviewed. Patient Has recent ABG, which has been reviewed. Will treat underlying causes and adjust management of respiratory failure as follows-     Serial ABG  Vent bundle  IV steriods  Sputum cullture  CEFTRIAXONE + AZITHRO

## 2022-02-08 NOTE — ED NOTES
This RN and YA [place] (PICC team) spoke with pt's next of kin (sister - Breanna Aguila) to ask for permission on patient's behalf to insert PICC line. Permission given (witnessed by both RNs). Update provided to sister regarding pt. (sister was unaware that pt. Was in ED).

## 2022-02-08 NOTE — SUBJECTIVE & OBJECTIVE
Interval History:     Review of Systems   Unable to perform ROS: Intubated     Objective:     Vital Signs (Most Recent):  Temp: 97.8 °F (36.6 °C) (02/08/22 0400)  Pulse: 100 (02/08/22 0818)  Resp: (!) 25 (02/08/22 0818)  BP: 114/67 (02/08/22 0548)  SpO2: 95 % (02/08/22 0818) Vital Signs (24h Range):  Temp:  [97.2 °F (36.2 °C)-98.1 °F (36.7 °C)] 97.8 °F (36.6 °C)  Pulse:  [] 100  Resp:  [14-40] 25  SpO2:  [88 %-100 %] 95 %  BP: ()/() 114/67     Weight: 98.6 kg (217 lb 6 oz)  Body mass index is 29.48 kg/m².    Intake/Output Summary (Last 24 hours) at 2/8/2022 0907  Last data filed at 2/8/2022 0500  Gross per 24 hour   Intake 1490.8 ml   Output 2350 ml   Net -859.2 ml      Physical Exam  Vitals and nursing note reviewed.   Constitutional:       Appearance: He is ill-appearing.      Interventions: He is intubated.       HENT:      Head: Normocephalic.   Eyes:      Pupils: Pupils are equal, round, and reactive to light.   Cardiovascular:      Rate and Rhythm: Normal rate.      Pulses: Normal pulses.      Heart sounds: Normal heart sounds.   Pulmonary:      Effort: Pulmonary effort is normal. He is intubated.      Breath sounds: Normal breath sounds.   Abdominal:      General: Bowel sounds are normal.   Musculoskeletal:      Cervical back: Normal range of motion.      Right lower leg: Edema present.      Left lower leg: Edema present.   Skin:     General: Skin is warm.   Neurological:      General: No focal deficit present.      Mental Status: He is easily aroused.         Significant Labs: All pertinent labs within the past 24 hours have been reviewed.    Significant Imaging: I have reviewed all pertinent imaging results/findings within the past 24 hours.

## 2022-02-08 NOTE — ASSESSMENT & PLAN NOTE
Patient is identified as having Combined Systolic and Diastolic heart failure that is Acute on chronic. CHF is currently controlled. Latest ECHO performed and demonstrates- Results for orders placed during the hospital encounter of 01/19/22    Echo    Interpretation Summary  · The left ventricle is normal in size with concentric hypertrophy and mildly decreased systolic function.  · Grade I left ventricular diastolic dysfunction.  · Normal right ventricular size with normal right ventricular systolic function.  · There is pulmonary hypertension.  · Intermediate central venous pressure (8 mmHg).  · The estimated PA systolic pressure is 53 mmHg.  · The estimated ejection fraction is 40%.  · There are segmental left ventricular wall motion abnormalities.  . Continue Furosemide and monitor clinical status closely. Monitor on telemetry. Patient is on CHF pathway.  Monitor strict Is&Os and daily weights.  Place on fluid restriction of 1.5 L. Continue to stress to patient importance of self efficacy and  on diet for CHF. Last BNP reviewed- and noted below   Recent Labs   Lab 02/07/22  0434   BNP 1,200*     LASIX BID

## 2022-02-08 NOTE — SUBJECTIVE & OBJECTIVE
Interval History: *  02/08: seen and examined: peep 5, Fio2 50%, Sedated, Vent day #1, 2040 mls, T max:  98.1F, ABG reviewed, agitation when sedation vacated.Bolus lasix given    Vent Type:  (2/8/2022  5:48 AM)       Vent Mode: A/C  Oxygen Concentration (%):  [60-80] 60  Resp Rate Total:  [20 br/min-31 br/min] 20 br/min  Vt Set:  [450 mL] 450 mL  PEEP/CPAP:  [5 cmH20] 5 cmH20  Mean Airway Pressure:  [8.5 cmH20-15 cmH20] 9.3 cmH20      fentanyl 150 mcg/hr (02/08/22 0500)    NORepinephrine bitartrate-D5W 0.04 mcg/kg/min (02/08/22 0500)    propofoL 50 mcg/kg/min (02/08/22 0500)        Review of Systems   Unable to perform ROS: Intubated        Objective:     Vital Signs (Most Recent):  Temp: 97.8 °F (36.6 °C) (02/08/22 0400)  Pulse: 71 (02/08/22 0548)  Resp: 20 (02/08/22 0548)  BP: 114/67 (02/08/22 0548)  SpO2: 99 % (02/08/22 0548) Vital Signs (24h Range):  Temp:  [96.9 °F (36.1 °C)-98.1 °F (36.7 °C)] 97.8 °F (36.6 °C)  Pulse:  [67-97] 71  Resp:  [14-40] 20  SpO2:  [88 %-100 %] 99 %  BP: ()/() 114/67     Weight: 98.6 kg (217 lb 6 oz)  Body mass index is 29.48 kg/m².      Intake/Output Summary (Last 24 hours) at 2/8/2022 0659  Last data filed at 2/8/2022 0500  Gross per 24 hour   Intake 1490.8 ml   Output 2350 ml   Net -859.2 ml       Physical Exam  Vitals and nursing note reviewed.   Constitutional:       Appearance: He is ill-appearing.      Interventions: He is intubated.       HENT:      Head: Normocephalic.   Eyes:      Pupils: Pupils are equal, round, and reactive to light.   Cardiovascular:      Rate and Rhythm: Normal rate.      Pulses: Normal pulses.      Heart sounds: Normal heart sounds.   Pulmonary:      Effort: Pulmonary effort is normal. He is intubated.      Breath sounds: Normal breath sounds.   Abdominal:      General: Bowel sounds are normal.   Musculoskeletal:      Cervical back: Normal range of motion.      Right lower leg: Edema present.      Left lower leg: Edema present.    Skin:     General: Skin is warm.   Neurological:      General: No focal deficit present.      Mental Status: He is easily aroused.         Vents:  Vent Mode: A/C (02/08/22 0548)  Ventilator Initiated: Yes (02/07/22 0634)  Set Rate: 20 BPM (02/08/22 0548)  Vt Set: 450 mL (02/08/22 0548)  PEEP/CPAP: 5 cmH20 (02/08/22 0548)  Oxygen Concentration (%): 60 (02/08/22 0548)  Peak Airway Pressure: 18 cmH2O (02/08/22 0548)  Plateau Pressure: 0 cmH20 (02/08/22 0548)  Total Ve: 9.23 mL (02/08/22 0548)  Negative Inspiratory Force (cm H2O): 0 (02/08/22 0548)  F/VT Ratio<105 (RSBI): (!) 39.53 (02/08/22 0548)    Lines/Drains/Airways     Peripherally Inserted Central Catheter Line            PICC Double Lumen 02/07/22 1830 right basilic <1 day          Drain                 NG/OG Tube 02/07/22 0608 orogastric 16 Fr. Center mouth 1 day         Urethral Catheter 02/07/22 0623 Non-latex 16 Fr. 1 day          Airway                 Airway - Non-Surgical 02/07/22 1 day          Peripheral Intravenous Line                 Peripheral IV - Single Lumen 02/07/22 0431 20 G Left Hand 1 day         Peripheral IV - Single Lumen 02/07/22 0605 18 G Right Hand 1 day                Significant Labs:    CBC/Anemia Profile:  Recent Labs   Lab 02/07/22  0434 02/08/22  0452   WBC 14.70* 13.90*   HGB 11.8* 10.3*   HCT 38.0* 32.3*    343   MCV 89 87   RDW 16.5* 16.6*        Chemistries:  Recent Labs   Lab 02/07/22  0434 02/07/22  1015 02/08/22  0452     --  138   K 4.3  --  4.4   CL 99  --  102   CO2 25  --  26   BUN 16  --  20   CREATININE 1.2  --  1.4   CALCIUM 8.9  --  8.4*   ALBUMIN 3.1*  --  2.6*   PROT 6.8  --  5.6*   BILITOT 0.4  --  0.3   ALKPHOS 99  --  67   ALT 13  --  11   AST 10  --  9*   MG  --  1.5* 1.8   PHOS  --  3.9  --        ABGs:   Recent Labs   Lab 02/08/22 0454   PH 7.354   PCO2 52.9*   HCO3 29.5*   POCSATURATED 95   BE 4     CMP:   Recent Labs   Lab 02/07/22  0434 02/08/22  0452    138   K 4.3 4.4   CL 99 102    CO2 25 26   * 196*   BUN 16 20   CREATININE 1.2 1.4   CALCIUM 8.9 8.4*   PROT 6.8 5.6*   ALBUMIN 3.1* 2.6*   BILITOT 0.4 0.3   ALKPHOS 99 67   AST 10 9*   ALT 13 11   ANIONGAP 13 10   EGFRNONAA >60 53*     Respiratory Culture:   Recent Labs   Lab 02/07/22  1037 02/07/22  1750   GSRESP <10 epithelial cells per low power field.  Rare WBC's  No organisms seen <10 epithelial cells per low power field.  Rare WBC's  Rare Gram positive cocci     All pertinent labs within the past 24 hours have been reviewed.    Significant Imaging:  I have reviewed all pertinent imaging results/findings within the past 24 hours.     X-Ray Chest AP Portable  Narrative: EXAMINATION:  XR CHEST AP PORTABLE    CLINICAL HISTORY:  Shortness of breath    COMPARISON:  A study performed 12-1/2 hours earlier    FINDINGS:  Nasogastric tube is approximately 5.5 cm above the maye.  Nasogastric tube extends beyond the inferior margins of this radiograph.  Right arm PICC line is in place with the tip in the distal SVC.  Worsening bilateral lower lobe infiltrates and adjacent effusions.  Negative for pneumothorax.  The hilar and mediastinal contours and osseous structures are unchanged.  Impression: 1.  Worsening bilateral lower lobe infiltrates and adjacent effusions.    2.  Lines and tubes are in good position as described above.  Negative for pneumothorax.    3.  Follow-up radiographs recommended.    Electronically signed by: Willie Grewal MD  Date:    02/07/2022  Time:    19:08  X-Ray Chest AP Portable  Narrative: EXAMINATION:  XR CHEST AP PORTABLE    CLINICAL HISTORY:  Encounter for other preprocedural examination    FINDINGS:  Single view of the chest.  Comparison 02/07/2022.    Endotracheal tube is satisfactory.  Nasogastric tube projects to the stomach with side hole just below the GE junction.    Cardiac silhouette is normal.  Scattered interstitial opacities are seen throughout the lungs which could reflect interstitial edema or  interstitial pneumonia. No evidence of pleural effusion or pneumothorax.  Bones appear intact.  Impression: Scattered interstitial opacities are seen throughout the lungs which could reflect interstitial edema or interstitial pneumonia.    Satisfactory endotracheal nasogastric tube placement.    Electronically signed by: Frandy Bolton MD  Date:    02/07/2022  Time:    07:47  X-Ray Chest AP Portable  Narrative: EXAMINATION:  XR CHEST AP PORTABLE    CLINICAL HISTORY:  Sepsis;    FINDINGS:  Single view of the chest.  Comparison 01/22/2022    Cardiac silhouette is normal.  Scattered interstitial infiltrates are seen throughout the lungs which could reflect mild interstitial edema or interstitial pneumonia. Trace left pleural effusion.  Mild subsegmental atelectasis..  No evidence of pneumothorax.  Bones appear intact.  Aorta demonstrates atherosclerotic disease.  Impression: See above.    Electronically signed by: Frandy Bolton MD  Date:    02/07/2022  Time:    07:44

## 2022-02-08 NOTE — ASSESSMENT & PLAN NOTE
-Presents with SOB, multifactorial in setting of COPD exacerbation/? PNA and combined CHF  -Echo 1/22 showed EF of 40%, DD, pulmonary HTN, + WMA  -Continue IV diuresis as tolerated  -Resume Coreg once BP permits  -Consider Entresto pending BP/creatinine trend  -Ischemic evaluation once recovered  -Strict I's/O's    2/8/22  -Continue IV diuresis  -Resume BB as BP permits  -Strict I's/Os  -Reassess in AM

## 2022-02-08 NOTE — ASSESSMENT & PLAN NOTE
Patient with Hypoxic Respiratory failure which is Acute.  he is not on home oxygen. Supplemental oxygen was provided and noted- Vent Mode: A/C  Oxygen Concentration (%):  [60-80] 70  Resp Rate Total:  [20 br/min-31 br/min] 20 br/min  Vt Set:  [450 mL] 450 mL  PEEP/CPAP:  [5 cmH20] 5 cmH20  Mean Airway Pressure:  [8.5 cmH20-9.4 cmH20] 9.3 cmH20.   Signs/symptoms of respiratory failure include- tachypnea, increased work of breathing and respiratory distress. Contributing diagnoses includes - CHF Labs and images were reviewed. Patient Has recent ABG, which has been reviewed.      2/07 Patient intubated and ventilated and admitted to ICU    Cont IV steroid and IV lasix  Wean ventilation support  As tolerated

## 2022-02-08 NOTE — CONSULTS
[facility] - Intensive Care (Hospital)  Adult Nutrition  Consult Note    SUMMARY     Recommendations    Recommendation/Intervention:   1. When medically appropriate, Initiate Diabetisource:   -Goal rate @ 45mL/hr.   -Provides 1296 calories, 64g protein, 881mL H2O.   -Propofol provides 731 calories @ rate of 27.7.   -100mL H2O flush q 4-6 hours or per MD/NP.   -Check Mg, K+, Na, Phos, and Glucose before and during initiation;Correct as indicated.    2. Weekly weights per RD follow up.     Goals:   1. Enteral Nutrition initiation within 24 hours.    Nutrition Goal Status: new    Communication of RD Recs:  (Plan of care;Sticky Note)    Assessment and Plan    Nutrition Problem  Inadequate oral intake     Related to (etiology):   Decreased ability to consume sufficient energy     Signs and Symptoms (as evidenced by):   NPO, intubated     Interventions/Recommendations (treatment strategy):  Enteral Nutrition Initiation  Collaboration with Medical Providers   Weekly weights      Nutrition Diagnosis Status:   New         Malnutrition Assessment         Reason for Assessment    Reason For Assessment: consult  Diagnosis:  (Acute hypoxemic resp failure secondary to acute on chronic CHF)  Relevant Medical History: HTN, gerd, CKD3, DM2, adrenal cortical adenoma  Interdisciplinary Rounds: attended  General Information Comments:     2/8:RD consulted for TF rec's. Patient is NPO and intubated. Patient is receiving propofol. Patient was intubated yesterday. Patient has 3+ moderate edema to both legs. Patients last BM is unknown. Patients weights per past encounters have fluctuated. Will continue to monitor.    Nutrition Discharge Planning: pending medical course    Nutrition Risk Screen    Nutrition Risk Screen: tube feeding or parenteral nutrition    Nutrition/Diet History    Patient Reported Diet/Restrictions/Preferences:  (unknown)  Spiritual, Cultural Beliefs, Judaism Practices, Values that Affect Care: no  Food Allergies:  NKFA  Factors Affecting Nutritional Intake: NPO,on mechanical ventilation    Anthropometrics    Temp: 98 °F (36.7 °C)  Height Method: Estimated  Height: 6' (182.9 cm)  Height (inches): 72 in  Weight Method: Bed Scale  Weight: 98.6 kg (217 lb 6 oz)  Weight (lb): 217.38 lb  Ideal Body Weight (IBW), Male: 178 lb  % Ideal Body Weight, Male (lb): 122.12 %  BMI (Calculated): 29.5  BMI Grade: 25 - 29.9 - overweight       Lab/Procedures/Meds  BMP  Lab Results   Component Value Date     02/08/2022    K 4.4 02/08/2022     02/08/2022    CO2 26 02/08/2022    BUN 20 02/08/2022    CREATININE 1.4 02/08/2022    CALCIUM 8.4 (L) 02/08/2022    ANIONGAP 10 02/08/2022    ESTGFRAFRICA >60 02/08/2022    EGFRNONAA 53 (A) 02/08/2022     Lab Results   Component Value Date     02/08/2022    K 4.4 02/08/2022     02/08/2022    CO2 26 02/08/2022     Lab Results   Component Value Date    CALCIUM 8.4 (L) 02/08/2022    PHOS 3.9 02/07/2022     Lab Results   Component Value Date    LABPROT 11.1 01/19/2022    ALBUMIN 2.6 (L) 02/08/2022     Pertinent Labs Reviewed: reviewed  Pertinent Medications Reviewed: reviewed  Scheduled Meds:   albuterol-ipratropium  3 mL Nebulization Q4H    azithromycin  500 mg Intravenous Q24H    budesonide  0.5 mg Nebulization Q12H    cefTRIAXone (ROCEPHIN) IVPB  2 g Intravenous Q24H    chlorhexidine  15 mL Mouth/Throat BID    furosemide (LASIX) injection  40 mg Intravenous Q12H    insulin aspart U-100  3 Units Subcutaneous TIDWM    insulin detemir U-100  12 Units Subcutaneous BID    lorazepam  2 mg Intravenous Once    methylPREDNISolone sodium succinate  20 mg Intravenous Q8H    mupirocin   Nasal BID    pantoprazole  40 mg Intravenous Daily    QUEtiapine  100 mg Oral Daily    sodium chloride 0.9%  10 mL Intravenous Q6H     Continuous Infusions:   fentanyl 200 mcg/hr (02/08/22 1300)    NORepinephrine bitartrate-D5W 0.02 mcg/kg/min (02/08/22 1420)    propofoL 50 mcg/kg/min (02/08/22  1300)     PRN Meds:.acetaminophen, calcium gluconate IVPB, calcium gluconate IVPB, calcium gluconate IVPB, dextrose 10%, glucagon (human recombinant), influenza, insulin aspart U-100, magnesium sulfate IVPB, magnesium sulfate IVPB, ondansetron, pneumoc 13-michael conj-dip cr(PF), potassium chloride in water **AND** potassium chloride in water **AND** potassium chloride in water, sodium chloride 0.9%, Flushing PICC Protocol **AND** sodium chloride 0.9% **AND** sodium chloride 0.9%, sodium phosphate IVPB, sodium phosphate IVPB, sodium phosphate IVPB    Physical Findings/Assessment         Estimated/Assessed Needs    Weight Used For Calorie Calculations: 98.6 kg (217 lb 6 oz)  Energy Calorie Requirements (kcal): 1962 ([facility], ICU, vent, BMI <30)  Energy Need Method: [facility]  Protein Requirements: 79-98 (0.8-1.0g/kg, CKD3)  Weight Used For Protein Calculations: 98.6 kg (217 lb 6 oz)  Fluid Requirements (mL): 1962  Estimated Fluid Requirement Method: RDA Method  RDA Method (mL): 1962  CHO Requirement: 245      Nutrition Prescription Ordered    Current Diet Order: NPO    Evaluation of Received Nutrient/Fluid Intake    Lipid Calories (kcals): 731 kcals  % Kcal Needs: 37%  % Protein Needs: 0%  Energy Calories Required: not meeting needs  Protein Required: not meeting needs  Fluid Required: not meeting needs  Tolerance:  (Patient NPO)  % Intake of Estimated Energy Needs: 25 - 50 %  % Meal Intake: NPO    Nutrition Risk    Level of Risk/Frequency of Follow-up: moderate - high       Monitor and Evaluation    Food and Nutrient Intake: energy intake,enteral nutrition intake  Food and Nutrient Adminstration: enteral and parenteral nutrition administration  Anthropometric Measurements: weight,weight change,body mass index  Biochemical Data, Medical Tests and Procedures: electrolyte and renal panel,gastrointestinal profile,glucose/endocrine profile,inflammatory profile,lipid profile  Nutrition-Focused Physical Findings: overall  appearance       Nutrition Follow-Up    RD Follow-up?: Yes   Vivian Welsh RD,LDN

## 2022-02-08 NOTE — ED NOTES
Pt. Transported to ICU bed 6 via stretcher, escorted by RN and RT on portable vent; portable cardiac, BP, and SPO2 monitors. All belongings to ICU with pt. Pt. Transferred onto ICU bed without incident. ICU nursing staff at  and care relinquished to YA Theodore.

## 2022-02-08 NOTE — PLAN OF CARE
[facility] - Intensive Care (Hospital)  Initial Discharge Assessment       Primary Care Provider: Cris Matias NP    Admission Diagnosis: Acute pulmonary edema [J81.0]  SOB (shortness of breath) [R06.02]  COPD exacerbation [J44.1]  Encounter for intubation [Z01.818]  Acute hypercapnic respiratory failure [J96.02]  Acute hypoxemic respiratory failure [J96.01]    Admission Date: 2/7/2022  Expected Discharge Date:          Payor: MEDICAID / Plan: [facility] / Product Type: Managed Medicaid /     Extended Emergency Contact Information  Primary Emergency Contact: AylaBreanna   [place]  Mobile Phone: [phone]  Relation: Sister  Secondary Emergency Contact: Rosales Anais  Mobile Phone: [phone]  Relation: Sister    Discharge Plan A: Long-term acute care facility (LTAC)  Discharge Plan B: Skilled Nursing Facility      [facility] [place] - [address]  [address]  [place] [address]  Phone: [phone] Fax: [phone]    [facility]  [address]  [place] [address]  Phone: [phone] Fax: [phone]    [id number] - [place] - [address] AT [facility] [address]  [address]  [facility] [address]  Phone: [phone] Fax: [phone]    [id number] - [place] - [address] AT [facility] OF [facility]  [address]  [place] [address]  Phone: [phone] Fax: [phone]    [facility] - [place] - [address]  [address]  [place] [address]  Phone: [phone] Fax: [phone]      Initial Assessment (most recent)     Adult Discharge Assessment - 02/08/22 1043        Discharge Assessment    Assessment Type Discharge Planning Assessment     Confirmed/corrected address, phone number and insurance Yes     Confirmed Demographics  Correct on Facesheet     Source of Information family     Reason For Admission Acute hypoxemic respiratory failure secondary to acute on chronic CHF     Lives With alone     Facility Arrived From: home     Do you expect to return to your current living situation? Yes     Do you have help at home or someone to help you manage your care at home? Yes     Who are your caregiver(s) and their phone number(s)? Breanna siddiqui     Prior to hospitilization cognitive status: Alert/Oriented     Current cognitive status: Coma/Sedated/Intubated     Walking or Climbing Stairs Difficulty ambulation difficulty, requires equipment     Mobility Management rolling walker     Dressing/Bathing Difficulty none     Home Accessibility wheelchair accessible     Home Layout Able to live on 1st floor     Equipment Currently Used at Home glucometer;oxygen;walker, rolling     Readmission within 30 days? Yes     Patient currently being followed by outpatient case management? No     Do you currently have service(s) that help you manage your care at home? No     Do you take prescription medications? Yes     Do you have prescription coverage? Yes     Coverage medicaid     Do you have any problems affording any of your prescribed medications? No     Is the patient taking medications as prescribed? yes     Who is going to help you get home at discharge? Breanna siddiqui     How do you get to doctors appointments? family or friend will provide     Are you on dialysis? No     Do you take coumadin? No     Discharge Plan A Long-term acute care facility (LTAC)     Discharge Plan B Skilled Nursing Facility     Discharge Plan discussed with: Sibling                 Readmission Assessment (most recent)     Readmission Assessment - 02/08/22 1042        Readmission    Why were you readmitted? Alarmed about signs/symptoms     When you left the hospital where did you go? Home Alone     When did you start not feeling well? 1 day prior     Did you have  a follow-up  appointment on discharge? No     Was this a planned readmission? No             Anticipated DC dispo: SNF vs LTAC   Prior Level of Function: ambulates using equipment, lives alone   PCP: Cris Matias NP     Comments:  Readmission assessment completed. Patient discharged with home O2 with [facility] during prior admission.

## 2022-02-08 NOTE — PLAN OF CARE
Recommendation/Intervention: 2/8  1. When medically appropriate, Initiate Diabetisource:   -Goal rate @ 45mL/hr.   -Provides 1296 calories, 64g protein, 881mL H2O.   -Propofol provides 731 calories @ rate of 27.7.   -100mL H2O flush q 4-6 hours or per MD/NP.   -Check Mg, K+, Na, Phos, and Glucose before and during initiation;Correct as indicated.     2. Weekly weights per RD follow up.     Vivian Welsh RD,LDN

## 2022-02-08 NOTE — ED NOTES
PICC line insertion complete - pt. Tolerated well. STAT portable chest xray ordered to confirm placement.

## 2022-02-08 NOTE — PROGRESS NOTES
Propofol at max dose - frequently arouses spontaneously and is restless and agitated.  Reaching for ETT.  EICU MD notified - new orders noted to add Fentanyl gtt to maintain RASS goal.  See orders

## 2022-02-09 LAB
ALBUMIN SERPL BCP-MCNC: 2.6 G/DL (ref 3.5–5.2)
ALLENS TEST: ABNORMAL
ALP SERPL-CCNC: 67 U/L (ref 55–135)
ALT SERPL W/O P-5'-P-CCNC: 6 U/L (ref 10–44)
ANION GAP SERPL CALC-SCNC: 9 MMOL/L (ref 8–16)
AST SERPL-CCNC: 5 U/L (ref 10–40)
BACTERIA SPEC AEROBE CULT: NORMAL
BACTERIA SPEC AEROBE CULT: NORMAL
BASOPHILS # BLD AUTO: 0.01 K/UL (ref 0–0.2)
BASOPHILS NFR BLD: 0.1 % (ref 0–1.9)
BILIRUB SERPL-MCNC: 0.2 MG/DL (ref 0.1–1)
BNP SERPL-MCNC: 1047 PG/ML (ref 0–99)
BUN SERPL-MCNC: 22 MG/DL (ref 8–23)
CALCIUM SERPL-MCNC: 8.5 MG/DL (ref 8.7–10.5)
CHLORIDE SERPL-SCNC: 102 MMOL/L (ref 95–110)
CO2 SERPL-SCNC: 29 MMOL/L (ref 23–29)
CREAT SERPL-MCNC: 1.5 MG/DL (ref 0.5–1.4)
DELSYS: ABNORMAL
DIFFERENTIAL METHOD: ABNORMAL
EOSINOPHIL # BLD AUTO: 0 K/UL (ref 0–0.5)
EOSINOPHIL NFR BLD: 0 % (ref 0–8)
ERYTHROCYTE [DISTWIDTH] IN BLOOD BY AUTOMATED COUNT: 17.1 % (ref 11.5–14.5)
ERYTHROCYTE [SEDIMENTATION RATE] IN BLOOD BY WESTERGREN METHOD: 20 MM/H
EST. GFR  (AFRICAN AMERICAN): 56 ML/MIN/1.73 M^2
EST. GFR  (NON AFRICAN AMERICAN): 49 ML/MIN/1.73 M^2
FIO2: 40
GLUCOSE SERPL-MCNC: 183 MG/DL (ref 70–110)
GRAM STN SPEC: NORMAL
HCO3 UR-SCNC: 31.3 MMOL/L (ref 24–28)
HCT VFR BLD AUTO: 33.8 % (ref 40–54)
HGB BLD-MCNC: 10.7 G/DL (ref 14–18)
IMM GRANULOCYTES # BLD AUTO: 0.05 K/UL (ref 0–0.04)
IMM GRANULOCYTES NFR BLD AUTO: 0.5 % (ref 0–0.5)
LYMPHOCYTES # BLD AUTO: 1 K/UL (ref 1–4.8)
LYMPHOCYTES NFR BLD: 10.2 % (ref 18–48)
MCH RBC QN AUTO: 27.9 PG (ref 27–31)
MCHC RBC AUTO-ENTMCNC: 31.7 G/DL (ref 32–36)
MCV RBC AUTO: 88 FL (ref 82–98)
MODE: ABNORMAL
MONOCYTES # BLD AUTO: 0.6 K/UL (ref 0.3–1)
MONOCYTES NFR BLD: 5.5 % (ref 4–15)
NEUTROPHILS # BLD AUTO: 8.5 K/UL (ref 1.8–7.7)
NEUTROPHILS NFR BLD: 83.7 % (ref 38–73)
NRBC BLD-RTO: 0 /100 WBC
PCO2 BLDA: 52.7 MMHG (ref 35–45)
PEEP: 5
PH SMN: 7.38 [PH] (ref 7.35–7.45)
PLATELET # BLD AUTO: 351 K/UL (ref 150–450)
PMV BLD AUTO: 9.4 FL (ref 9.2–12.9)
PO2 BLDA: 56 MMHG (ref 80–100)
POC BE: 6 MMOL/L
POC SATURATED O2: 87 % (ref 95–100)
POCT GLUCOSE: 194 MG/DL (ref 70–110)
POCT GLUCOSE: 194 MG/DL (ref 70–110)
POCT GLUCOSE: 206 MG/DL (ref 70–110)
POCT GLUCOSE: 207 MG/DL (ref 70–110)
POCT GLUCOSE: 231 MG/DL (ref 70–110)
POTASSIUM SERPL-SCNC: 4.6 MMOL/L (ref 3.5–5.1)
PROT SERPL-MCNC: 5.6 G/DL (ref 6–8.4)
RBC # BLD AUTO: 3.84 M/UL (ref 4.6–6.2)
SAMPLE: ABNORMAL
SITE: ABNORMAL
SODIUM SERPL-SCNC: 140 MMOL/L (ref 136–145)
TRIGL SERPL-MCNC: 102 MG/DL (ref 30–150)
VT: 450
WBC # BLD AUTO: 10.11 K/UL (ref 3.9–12.7)

## 2022-02-09 PROCEDURE — C9399 UNCLASSIFIED DRUGS OR BIOLOG: HCPCS | Performed by: INTERNAL MEDICINE

## 2022-02-09 PROCEDURE — 63600175 PHARM REV CODE 636 W HCPCS: Performed by: INTERNAL MEDICINE

## 2022-02-09 PROCEDURE — 85025 COMPLETE CBC W/AUTO DIFF WBC: CPT | Performed by: INTERNAL MEDICINE

## 2022-02-09 PROCEDURE — 36415 COLL VENOUS BLD VENIPUNCTURE: CPT | Performed by: INTERNAL MEDICINE

## 2022-02-09 PROCEDURE — 25000003 PHARM REV CODE 250: Performed by: STUDENT IN AN ORGANIZED HEALTH CARE EDUCATION/TRAINING PROGRAM

## 2022-02-09 PROCEDURE — 25000003 PHARM REV CODE 250: Performed by: NURSE PRACTITIONER

## 2022-02-09 PROCEDURE — A4216 STERILE WATER/SALINE, 10 ML: HCPCS | Performed by: INTERNAL MEDICINE

## 2022-02-09 PROCEDURE — 63600175 PHARM REV CODE 636 W HCPCS: Performed by: STUDENT IN AN ORGANIZED HEALTH CARE EDUCATION/TRAINING PROGRAM

## 2022-02-09 PROCEDURE — 20000000 HC ICU ROOM

## 2022-02-09 PROCEDURE — 36600 WITHDRAWAL OF ARTERIAL BLOOD: CPT

## 2022-02-09 PROCEDURE — 63600175 PHARM REV CODE 636 W HCPCS: Performed by: PHYSICIAN ASSISTANT

## 2022-02-09 PROCEDURE — C9113 INJ PANTOPRAZOLE SODIUM, VIA: HCPCS | Performed by: NURSE PRACTITIONER

## 2022-02-09 PROCEDURE — 99291 CRITICAL CARE FIRST HOUR: CPT | Mod: ,,, | Performed by: INTERNAL MEDICINE

## 2022-02-09 PROCEDURE — 99291 PR CRITICAL CARE, E/M 30-74 MINUTES: ICD-10-PCS | Mod: ,,, | Performed by: INTERNAL MEDICINE

## 2022-02-09 PROCEDURE — 25000242 PHARM REV CODE 250 ALT 637 W/ HCPCS: Performed by: NURSE PRACTITIONER

## 2022-02-09 PROCEDURE — 99233 PR SUBSEQUENT HOSPITAL CARE,LEVL III: ICD-10-PCS | Mod: ,,, | Performed by: INTERNAL MEDICINE

## 2022-02-09 PROCEDURE — 25000003 PHARM REV CODE 250: Performed by: INTERNAL MEDICINE

## 2022-02-09 PROCEDURE — 84478 ASSAY OF TRIGLYCERIDES: CPT | Performed by: INTERNAL MEDICINE

## 2022-02-09 PROCEDURE — 99900035 HC TECH TIME PER 15 MIN (STAT)

## 2022-02-09 PROCEDURE — 80053 COMPREHEN METABOLIC PANEL: CPT | Performed by: INTERNAL MEDICINE

## 2022-02-09 PROCEDURE — 63600175 PHARM REV CODE 636 W HCPCS: Performed by: NURSE PRACTITIONER

## 2022-02-09 PROCEDURE — 94003 VENT MGMT INPAT SUBQ DAY: CPT

## 2022-02-09 PROCEDURE — 99233 SBSQ HOSP IP/OBS HIGH 50: CPT | Mod: ,,, | Performed by: INTERNAL MEDICINE

## 2022-02-09 PROCEDURE — 83880 ASSAY OF NATRIURETIC PEPTIDE: CPT | Performed by: INTERNAL MEDICINE

## 2022-02-09 PROCEDURE — 82803 BLOOD GASES ANY COMBINATION: CPT

## 2022-02-09 PROCEDURE — 94640 AIRWAY INHALATION TREATMENT: CPT

## 2022-02-09 RX ORDER — FUROSEMIDE 10 MG/ML
20 INJECTION INTRAMUSCULAR; INTRAVENOUS ONCE
Status: COMPLETED | OUTPATIENT
Start: 2022-02-09 | End: 2022-02-09

## 2022-02-09 RX ORDER — ENOXAPARIN SODIUM 100 MG/ML
40 INJECTION SUBCUTANEOUS EVERY 24 HOURS
Status: DISCONTINUED | OUTPATIENT
Start: 2022-02-09 | End: 2022-02-13 | Stop reason: HOSPADM

## 2022-02-09 RX ORDER — QUETIAPINE FUMARATE 100 MG/1
100 TABLET, FILM COATED ORAL DAILY
Status: DISCONTINUED | OUTPATIENT
Start: 2022-02-10 | End: 2022-02-09

## 2022-02-09 RX ORDER — QUETIAPINE FUMARATE 100 MG/1
100 TABLET, FILM COATED ORAL 2 TIMES DAILY
Status: DISCONTINUED | OUTPATIENT
Start: 2022-02-09 | End: 2022-02-11

## 2022-02-09 RX ORDER — CLONAZEPAM 1 MG/1
1 TABLET ORAL 2 TIMES DAILY
Status: DISCONTINUED | OUTPATIENT
Start: 2022-02-09 | End: 2022-02-11

## 2022-02-09 RX ORDER — FENTANYL CITRATE-0.9 % NACL/PF 10 MCG/ML
0-200 PLASTIC BAG, INJECTION (ML) INTRAVENOUS CONTINUOUS
Status: DISCONTINUED | OUTPATIENT
Start: 2022-02-09 | End: 2022-02-10

## 2022-02-09 RX ORDER — HALOPERIDOL 5 MG/ML
5 INJECTION INTRAMUSCULAR EVERY 6 HOURS PRN
Status: DISCONTINUED | OUTPATIENT
Start: 2022-02-09 | End: 2022-02-13 | Stop reason: HOSPADM

## 2022-02-09 RX ORDER — POLYETHYLENE GLYCOL 3350 17 G/17G
17 POWDER, FOR SOLUTION ORAL DAILY
Status: DISCONTINUED | OUTPATIENT
Start: 2022-02-10 | End: 2022-02-13 | Stop reason: HOSPADM

## 2022-02-09 RX ORDER — DEXMEDETOMIDINE HYDROCHLORIDE 4 UG/ML
0-1.4 INJECTION INTRAVENOUS CONTINUOUS
Status: DISCONTINUED | OUTPATIENT
Start: 2022-02-09 | End: 2022-02-09

## 2022-02-09 RX ORDER — FUROSEMIDE 10 MG/ML
60 INJECTION INTRAMUSCULAR; INTRAVENOUS
Status: DISCONTINUED | OUTPATIENT
Start: 2022-02-09 | End: 2022-02-11

## 2022-02-09 RX ORDER — NOREPINEPHRINE BITARTRATE/D5W 4MG/250ML
0-3 PLASTIC BAG, INJECTION (ML) INTRAVENOUS CONTINUOUS
Status: DISCONTINUED | OUTPATIENT
Start: 2022-02-09 | End: 2022-02-10

## 2022-02-09 RX ORDER — CLONAZEPAM 1 MG/1
1 TABLET ORAL 2 TIMES DAILY
Status: DISCONTINUED | OUTPATIENT
Start: 2022-02-09 | End: 2022-02-09

## 2022-02-09 RX ORDER — DEXMEDETOMIDINE HYDROCHLORIDE 4 UG/ML
0-1.4 INJECTION INTRAVENOUS CONTINUOUS
Status: DISCONTINUED | OUTPATIENT
Start: 2022-02-09 | End: 2022-02-10

## 2022-02-09 RX ORDER — PROPOFOL 10 MG/ML
0-50 INJECTION, EMULSION INTRAVENOUS CONTINUOUS
Status: DISCONTINUED | OUTPATIENT
Start: 2022-02-09 | End: 2022-02-10

## 2022-02-09 RX ORDER — NOREPINEPHRINE BITARTRATE/D5W 4MG/250ML
0-3 PLASTIC BAG, INJECTION (ML) INTRAVENOUS CONTINUOUS
Status: DISCONTINUED | OUTPATIENT
Start: 2022-02-09 | End: 2022-02-09

## 2022-02-09 RX ORDER — POLYETHYLENE GLYCOL 3350 17 G/17G
17 POWDER, FOR SOLUTION ORAL DAILY
Status: DISCONTINUED | OUTPATIENT
Start: 2022-02-09 | End: 2022-02-09

## 2022-02-09 RX ADMIN — DEXMEDETOMIDINE HYDROCHLORIDE 0.7 MCG/KG/HR: 4 INJECTION INTRAVENOUS at 03:02

## 2022-02-09 RX ADMIN — MUPIROCIN: 20 OINTMENT TOPICAL at 09:02

## 2022-02-09 RX ADMIN — BUDESONIDE 0.5 MG: 0.5 INHALANT ORAL at 07:02

## 2022-02-09 RX ADMIN — POLYETHYLENE GLYCOL 3350 17 G: 17 POWDER, FOR SOLUTION ORAL at 10:02

## 2022-02-09 RX ADMIN — SODIUM CHLORIDE, PRESERVATIVE FREE 10 ML: 5 INJECTION INTRAVENOUS at 05:02

## 2022-02-09 RX ADMIN — METHYLPREDNISOLONE SODIUM SUCCINATE 20 MG: 40 INJECTION, POWDER, FOR SOLUTION INTRAMUSCULAR; INTRAVENOUS at 02:02

## 2022-02-09 RX ADMIN — FUROSEMIDE 40 MG: 10 INJECTION, SOLUTION INTRAMUSCULAR; INTRAVENOUS at 09:02

## 2022-02-09 RX ADMIN — PROPOFOL 30 MCG/KG/MIN: 10 INJECTION, EMULSION INTRAVENOUS at 04:02

## 2022-02-09 RX ADMIN — FENTANYL CITRATE 100 MCG/HR: 50 INJECTION INTRAVENOUS at 09:02

## 2022-02-09 RX ADMIN — IPRATROPIUM BROMIDE AND ALBUTEROL SULFATE 3 ML: 2.5; .5 SOLUTION RESPIRATORY (INHALATION) at 04:02

## 2022-02-09 RX ADMIN — FUROSEMIDE 20 MG: 10 INJECTION, SOLUTION INTRAVENOUS at 02:02

## 2022-02-09 RX ADMIN — IPRATROPIUM BROMIDE AND ALBUTEROL SULFATE 3 ML: 2.5; .5 SOLUTION RESPIRATORY (INHALATION) at 03:02

## 2022-02-09 RX ADMIN — IPRATROPIUM BROMIDE AND ALBUTEROL SULFATE 3 ML: 2.5; .5 SOLUTION RESPIRATORY (INHALATION) at 01:02

## 2022-02-09 RX ADMIN — CHLORHEXIDINE GLUCONATE 0.12% ORAL RINSE 15 ML: 1.2 LIQUID ORAL at 08:02

## 2022-02-09 RX ADMIN — AZITHROMYCIN MONOHYDRATE 500 MG: 500 INJECTION, POWDER, LYOPHILIZED, FOR SOLUTION INTRAVENOUS at 12:02

## 2022-02-09 RX ADMIN — IPRATROPIUM BROMIDE AND ALBUTEROL SULFATE 3 ML: 2.5; .5 SOLUTION RESPIRATORY (INHALATION) at 07:02

## 2022-02-09 RX ADMIN — PROPOFOL 40 MCG/KG/MIN: 10 INJECTION, EMULSION INTRAVENOUS at 03:02

## 2022-02-09 RX ADMIN — CLONAZEPAM 1 MG: 1 TABLET ORAL at 08:02

## 2022-02-09 RX ADMIN — IPRATROPIUM BROMIDE AND ALBUTEROL SULFATE 3 ML: 2.5; .5 SOLUTION RESPIRATORY (INHALATION) at 11:02

## 2022-02-09 RX ADMIN — CLONAZEPAM 1 MG: 1 TABLET ORAL at 09:02

## 2022-02-09 RX ADMIN — QUETIAPINE FUMARATE 100 MG: 100 TABLET ORAL at 09:02

## 2022-02-09 RX ADMIN — PROPOFOL 35 MCG/KG/MIN: 10 INJECTION, EMULSION INTRAVENOUS at 09:02

## 2022-02-09 RX ADMIN — DEXMEDETOMIDINE HYDROCHLORIDE 0.7 MCG/KG/HR: 4 INJECTION INTRAVENOUS at 09:02

## 2022-02-09 RX ADMIN — CHLORHEXIDINE GLUCONATE 0.12% ORAL RINSE 15 ML: 1.2 LIQUID ORAL at 09:02

## 2022-02-09 RX ADMIN — INSULIN ASPART 3 UNITS: 100 INJECTION, SOLUTION INTRAVENOUS; SUBCUTANEOUS at 05:02

## 2022-02-09 RX ADMIN — INSULIN ASPART 4 UNITS: 100 INJECTION, SOLUTION INTRAVENOUS; SUBCUTANEOUS at 01:02

## 2022-02-09 RX ADMIN — ENOXAPARIN SODIUM 40 MG: 40 INJECTION SUBCUTANEOUS at 04:02

## 2022-02-09 RX ADMIN — FENTANYL CITRATE 200 MCG/HR: 50 INJECTION INTRAVENOUS at 09:02

## 2022-02-09 RX ADMIN — PROPOFOL 20 MCG/KG/MIN: 10 INJECTION, EMULSION INTRAVENOUS at 09:02

## 2022-02-09 RX ADMIN — INSULIN ASPART 4 UNITS: 100 INJECTION, SOLUTION INTRAVENOUS; SUBCUTANEOUS at 05:02

## 2022-02-09 RX ADMIN — CEFTRIAXONE 2 G: 2 INJECTION, SOLUTION INTRAVENOUS at 10:02

## 2022-02-09 RX ADMIN — METHYLPREDNISOLONE SODIUM SUCCINATE 20 MG: 40 INJECTION, POWDER, FOR SOLUTION INTRAMUSCULAR; INTRAVENOUS at 09:02

## 2022-02-09 RX ADMIN — MUPIROCIN: 20 OINTMENT TOPICAL at 08:02

## 2022-02-09 RX ADMIN — DEXMEDETOMIDINE HYDROCHLORIDE 1.4 MCG/KG/HR: 4 INJECTION INTRAVENOUS at 10:02

## 2022-02-09 RX ADMIN — FUROSEMIDE 60 MG: 10 INJECTION, SOLUTION INTRAVENOUS at 08:02

## 2022-02-09 RX ADMIN — DEXMEDETOMIDINE HYDROCHLORIDE 0.2 MCG/KG/HR: 4 INJECTION INTRAVENOUS at 08:02

## 2022-02-09 RX ADMIN — QUETIAPINE FUMARATE 100 MG: 100 TABLET ORAL at 08:02

## 2022-02-09 RX ADMIN — PROPOFOL 30 MCG/KG/MIN: 10 INJECTION, EMULSION INTRAVENOUS at 10:02

## 2022-02-09 RX ADMIN — METHYLPREDNISOLONE SODIUM SUCCINATE 20 MG: 40 INJECTION, POWDER, FOR SOLUTION INTRAMUSCULAR; INTRAVENOUS at 05:02

## 2022-02-09 RX ADMIN — Medication 0.02 MCG/KG/MIN: at 02:02

## 2022-02-09 RX ADMIN — FENTANYL CITRATE 200 MCG/HR: 50 INJECTION INTRAVENOUS at 06:02

## 2022-02-09 RX ADMIN — PANTOPRAZOLE SODIUM 40 MG: 40 INJECTION, POWDER, FOR SOLUTION INTRAVENOUS at 09:02

## 2022-02-09 RX ADMIN — Medication 0.02 MCG/KG/MIN: at 11:02

## 2022-02-09 NOTE — SUBJECTIVE & OBJECTIVE
Interval History: *  02/09: seen and examined: Agitation with SAT SBT,peep 6, Fio2 50%, Sedated, Vent day # 2,  UO 2350  mls, T max:  98.4F, ABG reviewed, agitation when sedation vacated. Added Haldol     dexmedetomidine (PRECEDEX) infusion 1.4 mcg/kg/hr (02/09/22 1028)    fentanyl 100 mcg/hr (02/09/22 0947)    NORepinephrine bitartrate-D5W      propofoL 30 mcg/kg/min (02/09/22 1008)      Vent Type:  (2/9/2022  9:19 AM)     Vent Mode: A/C  Oxygen Concentration (%):  [] 100  Resp Rate Total:  [13 br/min-36 br/min] 27 br/min  Vt Set:  [450 mL] 450 mL  PEEP/CPAP:  [5 cmH20-8 cmH20] 8 cmH20  Pressure Support:  [10 cmH20] 10 cmH20  Mean Airway Pressure:  [7.5 vqE86-17 cmH20] 19 cmH20     Review of Systems   Unable to perform ROS: Intubated         Objective:     Vital Signs (Most Recent):  Temp: 98.4 °F (36.9 °C) (02/09/22 0715)  Pulse: (!) 136 (02/09/22 0852)  Resp: (!) 29 (02/09/22 0852)  BP: (!) 180/106 (02/09/22 0800)  SpO2: (!) 90 % (02/09/22 0919) Vital Signs (24h Range):  Temp:  [97.8 °F (36.6 °C)-98.4 °F (36.9 °C)] 98.4 °F (36.9 °C)  Pulse:  [] 136  Resp:  [15-29] 29  SpO2:  [89 %-99 %] 90 %  BP: ()/() 180/106     Weight: 99.6 kg (219 lb 9.3 oz)  Body mass index is 29.78 kg/m².      Intake/Output Summary (Last 24 hours) at 2/9/2022 1107  Last data filed at 2/9/2022 1057  Gross per 24 hour   Intake 1655.86 ml   Output 2315 ml   Net -659.14 ml       Physical Exam  Vitals and nursing note reviewed.   Constitutional:       Appearance: He is ill-appearing.      Interventions: He is intubated.       HENT:      Head: Normocephalic.   Eyes:      Pupils: Pupils are equal, round, and reactive to light.   Cardiovascular:      Rate and Rhythm: Normal rate.      Pulses: Normal pulses.      Heart sounds: Normal heart sounds.   Pulmonary:      Effort: Pulmonary effort is normal. He is intubated.      Breath sounds: Normal breath sounds. No rales.   Abdominal:      General: Bowel sounds are  normal.   Musculoskeletal:      Cervical back: Normal range of motion.      Right lower leg: Edema present.      Left lower leg: Edema present.   Skin:     General: Skin is warm.   Neurological:      General: No focal deficit present.      Mental Status: He is easily aroused.         Vents:  Vent Mode: A/C (02/09/22 0919)  Ventilator Initiated: Yes (02/07/22 0634)  Set Rate: 20 BPM (02/09/22 0919)  Vt Set: 450 mL (02/09/22 0919)  Pressure Support: (S) 10 cmH20 (02/09/22 0743)  PEEP/CPAP: 8 cmH20 (02/09/22 0919)  Oxygen Concentration (%): 100 (02/09/22 0919)  Peak Airway Pressure: 37 cmH2O (02/09/22 0919)  Plateau Pressure: 15 cmH20 (02/09/22 0919)  Total Ve: 12.6 mL (02/09/22 0919)  Negative Inspiratory Force (cm H2O): 0 (02/09/22 0919)  F/VT Ratio<105 (RSBI): (!) 57.65 (02/09/22 0852)    Lines/Drains/Airways     Peripherally Inserted Central Catheter Line            PICC Double Lumen 02/07/22 1830 right basilic 1 day          Drain                 NG/OG Tube 02/07/22 0608 orogastric 16 Fr. Center mouth 2 days         Urethral Catheter 02/07/22 0623 Non-latex 16 Fr. 2 days          Airway                 Airway - Non-Surgical 02/07/22 2 days          Peripheral Intravenous Line                 Peripheral IV - Single Lumen 02/07/22 0431 20 G Left Hand 2 days         Peripheral IV - Single Lumen 02/07/22 0605 18 G Right Hand 2 days                Significant Labs:    CBC/Anemia Profile:  Recent Labs   Lab 02/08/22  0452 02/09/22  0331   WBC 13.90* 10.11   HGB 10.3* 10.7*   HCT 32.3* 33.8*    351   MCV 87 88   RDW 16.6* 17.1*        Chemistries:  Recent Labs   Lab 02/08/22  0452 02/09/22  0330    140   K 4.4 4.6    102   CO2 26 29   BUN 20 22   CREATININE 1.4 1.5*   CALCIUM 8.4* 8.5*   ALBUMIN 2.6* 2.6*   PROT 5.6* 5.6*   BILITOT 0.3 0.2   ALKPHOS 67 67   ALT 11 6*   AST 9* 5*   MG 1.8  --      Component      Latest Ref Rng & Units 2/9/2022   BNP      0 - 99 pg/mL 1,047 (H)   Triglycerides      30 -  150 mg/dL 102     ABGs:   Recent Labs   Lab 02/09/22  0430   PH 7.382   PCO2 52.7*   HCO3 31.3*   POCSATURATED 87*   BE 6     Blood Culture: No results for input(s): LABBLOO in the last 48 hours.  Cardiac Markers: No results for input(s): CKMB, TROPONINT, MYOGLOBIN in the last 48 hours.  Lipid Panel:   Recent Labs   Lab 02/09/22  0330   TRIG 102     POCT Glucose:   Recent Labs   Lab 02/08/22  2322 02/09/22  0524 02/09/22  1042   POCTGLUCOSE 197* 207* 194*     Respiratory Culture:   Recent Labs   Lab 02/07/22  1750   GSRESP <10 epithelial cells per low power field.  Rare WBC's  Rare Gram positive cocci   RESPIRATORYC Normal respiratory louise     All pertinent labs within the past 24 hours have been reviewed.    Significant Imaging:  I have reviewed all pertinent imaging results/findings within the past 24 hours.

## 2022-02-09 NOTE — ASSESSMENT & PLAN NOTE
Patient with Hypercapnic and Hypoxic Respiratory failure which is Acute on chronic.  he is on home oxygen at 2.0 LPM. Supplemental oxygen was provided and noted- Vent Mode: A/C  Oxygen Concentration (%):  [] 100  Resp Rate Total:  [13 br/min-36 br/min] 27 br/min  Vt Set:  [450 mL] 450 mL  PEEP/CPAP:  [5 cmH20-8 cmH20] 8 cmH20  Pressure Support:  [10 cmH20] 10 cmH20  Mean Airway Pressure:  [7.5 uuV34-75 cmH20] 19 cmH20.   Signs/symptoms of respiratory failure include- tachypnea, increased work of breathing and respiratory distress. Contributing diagnoses includes - Interstitial lung disease and Pneumonia Labs and images were reviewed. Patient Has recent ABG, which has been reviewed. Will treat underlying causes and adjust management of respiratory failure as follows-     Serial ABG  Vent bundle  IV steriods  Sputum cullture  CEFTRIAXONE + AZITHRO  Failed SAT, SBT: aggitation

## 2022-02-09 NOTE — ASSESSMENT & PLAN NOTE
-Presents with SOB, multifactorial in setting of COPD exacerbation/? PNA and combined CHF  -Echo 1/22 showed EF of 40%, DD, pulmonary HTN, + WMA  -Continue IV diuresis as tolerated  -Resume Coreg once BP permits  -Consider Entresto pending BP/creatinine trend  -Ischemic evaluation once recovered  -Strict I's/O's    2/8/22  -Continue IV diuresis  -Resume BB as BP permits  -Strict I's/Os  -Reassess in AM    2/9/22  -Lasix increased to 60 mg IV BID   -Resume BB as tolerated/BP permits  -Strict I's/O's  -Failed SBT this AM

## 2022-02-09 NOTE — PLAN OF CARE
Intubated and sedated on ventilator.  When sedation lightened becomes restless and agitated, sitting up in bed and attempts to remove medical devices.  BP and HR also increase markedly.  BBS diminished.  Small amt. Of secretions from ETT.  Heart RRR with occasional PVC's.  Hemodynamically stable on lowest possible dose of levophed gtt. Afebrile.  Tolerating TF's - advanced.  Urine output wnl, diuresed mildly from lasix - see I/O's.

## 2022-02-09 NOTE — SUBJECTIVE & OBJECTIVE
Interval History:     Review of Systems   Unable to perform ROS: Intubated     Objective:     Vital Signs (Most Recent):  Temp: 97.3 °F (36.3 °C) (02/09/22 1501)  Pulse: (!) 45 (02/09/22 1601)  Resp: (!) 23 (02/09/22 1601)  BP: (!) 116/59 (02/09/22 1601)  SpO2: (!) 94 % (02/09/22 1601) Vital Signs (24h Range):  Temp:  [97.3 °F (36.3 °C)-98.4 °F (36.9 °C)] 97.3 °F (36.3 °C)  Pulse:  [] 45  Resp:  [13-36] 23  SpO2:  [86 %-100 %] 94 %  BP: ()/() 116/59     Weight: 99.6 kg (219 lb 9.3 oz)  Body mass index is 29.78 kg/m².    Intake/Output Summary (Last 24 hours) at 2/9/2022 1619  Last data filed at 2/9/2022 1601  Gross per 24 hour   Intake 2091.56 ml   Output 3480 ml   Net -1388.44 ml      Physical Exam  Vitals and nursing note reviewed.   Constitutional:       Appearance: He is ill-appearing.      Interventions: He is intubated.       HENT:      Head: Normocephalic.   Eyes:      Pupils: Pupils are equal, round, and reactive to light.   Cardiovascular:      Rate and Rhythm: Normal rate.      Pulses: Normal pulses.      Heart sounds: Normal heart sounds.   Pulmonary:      Effort: Pulmonary effort is normal. He is intubated.      Breath sounds: Normal breath sounds. No rales.   Abdominal:      General: Bowel sounds are normal.   Musculoskeletal:      Cervical back: Normal range of motion.      Right lower leg: Edema present.      Left lower leg: Edema present.   Skin:     General: Skin is warm.   Neurological:      General: No focal deficit present.      Mental Status: He is easily aroused.         Significant Labs: All pertinent labs within the past 24 hours have been reviewed.    Significant Imaging: I have reviewed all pertinent imaging results/findings within the past 24 hours.

## 2022-02-09 NOTE — ASSESSMENT & PLAN NOTE
Patient is identified as having Combined Systolic and Diastolic heart failure that is Acute on chronic. CHF is currently uncontrolled due to Continued edema of extremities and Dyspnea     Echo    Interpretation Summary  · The left ventricle is normal in size with concentric hypertrophy and mildly decreased systolic function.  · Grade I left ventricular diastolic dysfunction.  · Normal right ventricular size with normal right ventricular systolic function.  · There is pulmonary hypertension.  · Intermediate central venous pressure (8 mmHg).  · The estimated PA systolic pressure is 53 mmHg.  · The estimated ejection fraction is 40%.  · There are segmental left ventricular wall motion abnormalities.    Telemetry  Add Iv lasix Bid  Cardiology consulted    Recent Labs   Lab 02/09/22  0331   BNP 1,047*   .

## 2022-02-09 NOTE — SUBJECTIVE & OBJECTIVE
Review of Systems   Unable to perform ROS: intubated     Objective:     Vital Signs (Most Recent):  Temp: 98.4 °F (36.9 °C) (02/09/22 0715)  Pulse: (!) 136 (02/09/22 0852)  Resp: (!) 29 (02/09/22 0852)  BP: (!) 180/106 (02/09/22 0800)  SpO2: (!) 90 % (02/09/22 0919) Vital Signs (24h Range):  Temp:  [97.8 °F (36.6 °C)-98.4 °F (36.9 °C)] 98.4 °F (36.9 °C)  Pulse:  [] 136  Resp:  [15-29] 29  SpO2:  [89 %-99 %] 90 %  BP: ()/() 180/106     Weight: 99.6 kg (219 lb 9.3 oz)  Body mass index is 29.78 kg/m².     SpO2: (!) 90 %  O2 Device (Oxygen Therapy): ventilator      Intake/Output Summary (Last 24 hours) at 2/9/2022 1118  Last data filed at 2/9/2022 1057  Gross per 24 hour   Intake 1655.86 ml   Output 2315 ml   Net -659.14 ml       Lines/Drains/Airways     Peripherally Inserted Central Catheter Line            PICC Double Lumen 02/07/22 1830 right basilic 1 day          Drain                 NG/OG Tube 02/07/22 0608 orogastric 16 Fr. Center mouth 2 days         Urethral Catheter 02/07/22 0623 Non-latex 16 Fr. 2 days          Airway                 Airway - Non-Surgical 02/07/22 2 days          Peripheral Intravenous Line                 Peripheral IV - Single Lumen 02/07/22 0431 20 G Left Hand 2 days         Peripheral IV - Single Lumen 02/07/22 0605 18 G Right Hand 2 days                Physical Exam  Vitals and nursing note reviewed.   Constitutional:       General: He is not in acute distress.     Appearance: He is well-developed and well-nourished. He is not diaphoretic.   HENT:      Head: Normocephalic and atraumatic.   Eyes:      General:         Right eye: No discharge.         Left eye: No discharge.      Pupils: Pupils are equal, round, and reactive to light.   Neck:      Thyroid: No thyromegaly.      Vascular: No JVD.      Trachea: No tracheal deviation.   Cardiovascular:      Rate and Rhythm: Normal rate and regular rhythm.      Heart sounds: Normal heart sounds, S1 normal and S2 normal. No  murmur heard.      Pulmonary:      Effort: Pulmonary effort is normal. No respiratory distress.      Breath sounds: No wheezing.      Comments: Diminished coarse BS  Abdominal:      General: There is no distension.      Tenderness: There is no rebound.   Musculoskeletal:         General: No edema.      Cervical back: Neck supple.      Right lower leg: No edema.      Left lower leg: No edema.   Skin:     General: Skin is warm and dry.      Findings: No erythema.   Neurological:      Mental Status: He is alert.      Comments: Intubated     Psychiatric:         Mood and Affect: Mood and affect normal.         Significant Labs:   CMP   Recent Labs   Lab 02/08/22  0452 02/09/22  0330    140   K 4.4 4.6    102   CO2 26 29   * 183*   BUN 20 22   CREATININE 1.4 1.5*   CALCIUM 8.4* 8.5*   PROT 5.6* 5.6*   ALBUMIN 2.6* 2.6*   BILITOT 0.3 0.2   ALKPHOS 67 67   AST 9* 5*   ALT 11 6*   ANIONGAP 10 9   ESTGFRAFRICA >60 56*   EGFRNONAA 53* 49*   , CBC   Recent Labs   Lab 02/08/22 0452 02/08/22  0452 02/09/22  0331   WBC 13.90*  --  10.11   HGB 10.3*  --  10.7*   HCT 32.3*   < > 33.8*     --  351    < > = values in this interval not displayed.   , Troponin No results for input(s): TROPONINI in the last 48 hours. and All pertinent lab results from the last 24 hours have been reviewed.    Significant Imaging: Echocardiogram:   Transthoracic echo (TTE) complete (Cupid Only):   Results for orders placed or performed during the hospital encounter of 01/19/22   Echo   Result Value Ref Range    BSA 2.14 m2    TDI SEPTAL 0.07 m/s    LV LATERAL E/E' RATIO 8.38 m/s    LV SEPTAL E/E' RATIO 9.57 m/s    LA WIDTH 3.39 cm    IVC diameter 1.77 cm    Left Ventricular Outflow Tract Mean Velocity 0.14567154320292 cm/s    Left Ventricular Outflow Tract Mean Gradient 2.28 mmHg    TDI LATERAL 0.08 m/s    LVIDd 4.62 3.5 - 6.0 cm    IVS 1.68 (A) 0.6 - 1.1 cm    Posterior Wall 1.39 (A) 0.6 - 1.1 cm    Ao root annulus 3.65 cm     LVIDs 4.37 (A) 2.1 - 4.0 cm    FS 5 28 - 44 %    LA volume 37.01 cm3    Sinus 3.68 cm    STJ 3.50 cm    Ascending aorta 3.26 cm    LV mass 296.91 g    LA size 3.28 cm    TAPSE 2.45 cm    Left Ventricle Relative Wall Thickness 0.60 cm    AV mean gradient 3 mmHg    AV valve area 2.14 cm2    AV Velocity Ratio 0.86     AV index (prosthetic) 0.61     MV valve area p 1/2 method 3.08 cm2    E/A ratio 1.00     Mean e' 0.08 m/s    E wave deceleration time 246.362557819501290 msec    IVRT 105.828984264746523 msec    LVOT diameter 2.11 cm    LVOT area 3.5 cm2    LVOT peak frankie 1.08 m/s    LVOT peak VTI 17.00 cm    Ao peak frankie 1.26 m/s    Ao VTI 27.8 cm    RVOT prox diameter 3.49 cm    RVOT area 9.56 cm2    RVOT peak frankie 0.60 m/s    RVOT peak VTI 11.8 cm    Qp:Qs ratio 0.53     LVOT stroke volume 59.41 cm3    RVOT stroke volume 112.82 cm3    AV peak gradient 6 mmHg    PV mean gradient 0.71 mmHg    E/E' ratio 8.93 m/s    MV Peak E Frankie 0.67 m/s    TR Max Frankie 3.37 m/s    MV stenosis pressure 1/2 time 71.036581227971350 ms    MV Peak A Frankie 0.67 m/s    LV Systolic Volume 86.51 mL    LV Systolic Volume Index 40.8 mL/m2    LV Diastolic Volume 98.26 mL    LV Diastolic Volume Index 46.35 mL/m2    LA Volume Index 17.5 mL/m2    LV Mass Index 140 g/m2    Echo EF Estimated 12 %    RA Major Axis 4.57 cm    Left Atrium Minor Axis 3.27 cm    Left Atrium Major Axis 4.88 cm    Triscuspid Valve Regurgitation Peak Gradient 45 mmHg    RA Width 3.59 cm    Right Atrial Pressure (from IVC) 8 mmHg    EF 40 %    TV rest pulmonary artery pressure 53 mmHg    Narrative    · The left ventricle is normal in size with concentric hypertrophy and   mildly decreased systolic function.  · Grade I left ventricular diastolic dysfunction.  · Normal right ventricular size with normal right ventricular systolic   function.  · There is pulmonary hypertension.  · Intermediate central venous pressure (8 mmHg).  · The estimated PA systolic pressure is 53 mmHg.  · The  estimated ejection fraction is 40%.  · There are segmental left ventricular wall motion abnormalities.      , EKG: Reviewed and X-Ray: CXR: X-Ray Chest 1 View (CXR): No results found for this visit on 02/07/22. and X-Ray Chest PA and Lateral (CXR): No results found for this visit on 02/07/22.

## 2022-02-09 NOTE — ASSESSMENT & PLAN NOTE
Patient with Hypoxic Respiratory failure which is Acute.  he is not on home oxygen. Supplemental oxygen was provided and noted- Vent Mode: A/C  Oxygen Concentration (%):  [] 40  Resp Rate Total:  [13 br/min-36 br/min] 23 br/min  Vt Set:  [450 mL] 450 mL  PEEP/CPAP:  [5 cmH20-8 cmH20] 8 cmH20  Pressure Support:  [10 cmH20] 10 cmH20  Mean Airway Pressure:  [7.5 pvL56-71 cmH20] 14 cmH20.   Signs/symptoms of respiratory failure include- tachypnea, increased work of breathing and respiratory distress. Contributing diagnoses includes - CHF Labs and images were reviewed. Patient Has recent ABG, which has been reviewed.      2/07 Patient intubated and ventilated and admitted to ICU    Cont IV steroid and IV lasix  Wean ventilation support  As tolerated

## 2022-02-09 NOTE — PROGRESS NOTES
[facility] - Intensive Care ([facility])  Cardiology  Progress Note    Patient Name: Shukri Rosales  MRN: [id number]  Admission Date: 2/7/2022  Hospital Length of Stay: 2 days  Code Status: Full Code   Attending Physician: Guillaume Urbina, *   Primary Care Physician: Cris Matias NP  Expected Discharge Date:   Principal Problem:Acute hypoxemic respiratory failure    Subjective:   HPI:  Per ER- This  is a 61 y.o. male patient with PMHx of Adrenal cortical adenoma, arthritis, back surgery,  HTN, CKD stage 3, depression, GERD, migraine headache, Schizophrenia, Severe obesity, PNA, COPD, exsmoker, and DM who presents to the Emergency Department by EMS for SOB which onset one day ago. Symptoms are constant and moderate in severity. No mitigating or exacerbating factors reported. Associated sxs include wheeze. Patient denies  fever, chills, congestion, rhinorrhea, sore throat, cough, CP, leg swelling, N/V/D, dizziness, HA, and all other sxs at this time. Pt was seen in the ED a week ago with similar sxs. Pt was admit for PNA treatment. Pt states since d/c smoking a significant amount of cigarettes. Per EMS pt's SpO2 was in the low 70s at the scene. Pt was given two breathing treatments and placed on a nonrebreathing mask. Pt's SpO2 radha to 91 PTA. No further complaints or concerns at this time.       Patient was noted to be in Acute hypoxic respiratory failure in ER and was intubated and venitiated. Patient to be admitted to ICU.     Hospital Course:   2/8/22-Patient seen and examined today, intubated, sedated. Some hypotension noted overnight, required low dose pressor. Being diuresed, on abx. Labs reviewed. Creatinine 1.4. H/H 10.3/32.3.    2/9/22-Patient seen and examined today, failed SBT earlier today. Off pressors support. Labs reviewed. H/H stable. Creatinine 1.5. Will increase diuresis and assess response.         Review of Systems   Unable to perform ROS: intubated     Objective:     Vital Signs (Most  Recent):  Temp: 98.4 °F (36.9 °C) (02/09/22 0715)  Pulse: (!) 136 (02/09/22 0852)  Resp: (!) 29 (02/09/22 0852)  BP: (!) 180/106 (02/09/22 0800)  SpO2: (!) 90 % (02/09/22 0919) Vital Signs (24h Range):  Temp:  [97.8 °F (36.6 °C)-98.4 °F (36.9 °C)] 98.4 °F (36.9 °C)  Pulse:  [] 136  Resp:  [15-29] 29  SpO2:  [89 %-99 %] 90 %  BP: ()/() 180/106     Weight: 99.6 kg (219 lb 9.3 oz)  Body mass index is 29.78 kg/m².     SpO2: (!) 90 %  O2 Device (Oxygen Therapy): ventilator      Intake/Output Summary (Last 24 hours) at 2/9/2022 1118  Last data filed at 2/9/2022 1057  Gross per 24 hour   Intake 1655.86 ml   Output 2315 ml   Net -659.14 ml       Lines/Drains/Airways     Peripherally Inserted Central Catheter Line            PICC Double Lumen 02/07/22 1830 right basilic 1 day          Drain                 NG/OG Tube 02/07/22 0608 orogastric 16 Fr. Center mouth 2 days         Urethral Catheter 02/07/22 0623 Non-latex 16 Fr. 2 days          Airway                 Airway - Non-Surgical 02/07/22 2 days          Peripheral Intravenous Line                 Peripheral IV - Single Lumen 02/07/22 0431 20 G Left Hand 2 days         Peripheral IV - Single Lumen 02/07/22 0605 18 G Right Hand 2 days                Physical Exam  Vitals and nursing note reviewed.   Constitutional:       General: He is not in acute distress.     Appearance: He is well-developed and well-nourished. He is not diaphoretic.   HENT:      Head: Normocephalic and atraumatic.   Eyes:      General:         Right eye: No discharge.         Left eye: No discharge.      Pupils: Pupils are equal, round, and reactive to light.   Neck:      Thyroid: No thyromegaly.      Vascular: No JVD.      Trachea: No tracheal deviation.   Cardiovascular:      Rate and Rhythm: Normal rate and regular rhythm.      Heart sounds: Normal heart sounds, S1 normal and S2 normal. No murmur heard.      Pulmonary:      Effort: Pulmonary effort is normal. No respiratory  distress.      Breath sounds: No wheezing.      Comments: Diminished coarse BS  Abdominal:      General: There is no distension.      Tenderness: There is no rebound.   Musculoskeletal:         General: No edema.      Cervical back: Neck supple.      Right lower leg: No edema.      Left lower leg: No edema.   Skin:     General: Skin is warm and dry.      Findings: No erythema.   Neurological:      Mental Status: He is alert.      Comments: Intubated     Psychiatric:         Mood and Affect: Mood and affect normal.         Significant Labs:   CMP   Recent Labs   Lab 02/08/22 0452 02/09/22  0330    140   K 4.4 4.6    102   CO2 26 29   * 183*   BUN 20 22   CREATININE 1.4 1.5*   CALCIUM 8.4* 8.5*   PROT 5.6* 5.6*   ALBUMIN 2.6* 2.6*   BILITOT 0.3 0.2   ALKPHOS 67 67   AST 9* 5*   ALT 11 6*   ANIONGAP 10 9   ESTGFRAFRICA >60 56*   EGFRNONAA 53* 49*   , CBC   Recent Labs   Lab 02/08/22 0452 02/08/22 0452 02/09/22  0331   WBC 13.90*  --  10.11   HGB 10.3*  --  10.7*   HCT 32.3*   < > 33.8*     --  351    < > = values in this interval not displayed.   , Troponin No results for input(s): TROPONINI in the last 48 hours. and All pertinent lab results from the last 24 hours have been reviewed.    Significant Imaging: Echocardiogram:   Transthoracic echo (TTE) complete ([facility] Only):   Results for orders placed or performed during the hospital encounter of 01/19/22   Echo   Result Value Ref Range    BSA 2.14 m2    TDI SEPTAL 0.07 m/s    LV LATERAL E/E' RATIO 8.38 m/s    LV SEPTAL E/E' RATIO 9.57 m/s    LA WIDTH 3.39 cm    IVC diameter 1.77 cm    Left Ventricular Outflow Tract Mean Velocity 0.21075047862668 cm/s    Left Ventricular Outflow Tract Mean Gradient 2.28 mmHg    TDI LATERAL 0.08 m/s    LVIDd 4.62 3.5 - 6.0 cm    IVS 1.68 (A) 0.6 - 1.1 cm    Posterior Wall 1.39 (A) 0.6 - 1.1 cm    Ao root annulus 3.65 cm    LVIDs 4.37 (A) 2.1 - 4.0 cm    FS 5 28 - 44 %    LA volume 37.01 cm3    Sinus 3.68 cm     STJ 3.50 cm    Ascending aorta 3.26 cm    LV mass 296.91 g    LA size 3.28 cm    TAPSE 2.45 cm    Left Ventricle Relative Wall Thickness 0.60 cm    AV mean gradient 3 mmHg    AV valve area 2.14 cm2    AV Velocity Ratio 0.86     AV index (prosthetic) 0.61     MV valve area p 1/2 method 3.08 cm2    E/A ratio 1.00     Mean e' 0.08 m/s    E wave deceleration time 246.869458237327126 msec    IVRT 105.002569002084276 msec    LVOT diameter 2.11 cm    LVOT area 3.5 cm2    LVOT peak frankie 1.08 m/s    LVOT peak VTI 17.00 cm    Ao peak frankie 1.26 m/s    Ao VTI 27.8 cm    RVOT prox diameter 3.49 cm    RVOT area 9.56 cm2    RVOT peak frankie 0.60 m/s    RVOT peak VTI 11.8 cm    Qp:Qs ratio 0.53     LVOT stroke volume 59.41 cm3    RVOT stroke volume 112.82 cm3    AV peak gradient 6 mmHg    PV mean gradient 0.71 mmHg    E/E' ratio 8.93 m/s    MV Peak E Frankie 0.67 m/s    TR Max Frankie 3.37 m/s    MV stenosis pressure 1/2 time 71.124940799836623 ms    MV Peak A Frankie 0.67 m/s    LV Systolic Volume 86.51 mL    LV Systolic Volume Index 40.8 mL/m2    LV Diastolic Volume 98.26 mL    LV Diastolic Volume Index 46.35 mL/m2    LA Volume Index 17.5 mL/m2    LV Mass Index 140 g/m2    Echo EF Estimated 12 %    RA Major Axis 4.57 cm    Left Atrium Minor Axis 3.27 cm    Left Atrium Major Axis 4.88 cm    Triscuspid Valve Regurgitation Peak Gradient 45 mmHg    RA Width 3.59 cm    Right Atrial Pressure (from IVC) 8 mmHg    EF 40 %    TV rest pulmonary artery pressure 53 mmHg    Narrative    · The left ventricle is normal in size with concentric hypertrophy and   mildly decreased systolic function.  · Grade I left ventricular diastolic dysfunction.  · Normal right ventricular size with normal right ventricular systolic   function.  · There is pulmonary hypertension.  · Intermediate central venous pressure (8 mmHg).  · The estimated PA systolic pressure is 53 mmHg.  · The estimated ejection fraction is 40%.  · There are segmental left ventricular wall motion  abnormalities.      , EKG: Reviewed and X-Ray: CXR: X-Ray Chest 1 View (CXR): No results found for this visit on 02/07/22. and X-Ray Chest PA and Lateral (CXR): No results found for this visit on 02/07/22.    Assessment and Plan:   Patient who presents with respiratory failure in setting of COPD exacerbation/decompensated CHF. Lasix increased assess response. On abx. Pulm following. Resume BB as tolerated. No ASA given bloody secretions noted in ET tube.    * Acute hypoxemic respiratory failure secondary to acute on chronic CHF   -Assess response to IV diuresis    Acute on chronic combined systolic and diastolic congestive heart failure  -Presents with SOB, multifactorial in setting of COPD exacerbation/? PNA and combined CHF  -Echo 1/22 showed EF of 40%, DD, pulmonary HTN, + WMA  -Continue IV diuresis as tolerated  -Resume Coreg once BP permits  -Consider Entresto pending BP/creatinine trend  -Ischemic evaluation once recovered  -Strict I's/O's    2/8/22  -Continue IV diuresis  -Resume BB as BP permits  -Strict I's/Os  -Reassess in AM    2/9/22  -Lasix increased to 60 mg IV BID   -Resume BB as tolerated/BP permits  -Strict I's/O's  -Failed SBT this AM      History of recent pneumonia  -On abx, awaiting pulmonary evaluation    COPD exacerbation  -Mgmt as per primary team    Type 2 diabetes mellitus with stage 3 chronic kidney disease  -Mgmt as per primary team        VTE Risk Mitigation (From admission, onward)         Ordered     enoxaparin injection 40 mg  Daily         02/09/22 1117     IP VTE HIGH RISK PATIENT  Once         02/07/22 1640     Place sequential compression device  Until discontinued         02/07/22 1640     Place EDVIN hose  Until discontinued         02/07/22 1640     Place EDVIN hose  Until discontinued         02/07/22 1042     Place sequential compression device  Until discontinued         02/07/22 1042     Place EDVIN hose  Until discontinued         02/07/22 0955     Place sequential compression  device  Until discontinued         02/07/22 0955                Carmen Arellano PA-C  Cardiology  [facility] - Intensive Care ([facility])

## 2022-02-09 NOTE — PROGRESS NOTES
[facility] - Intensive Care ([facility] Medicine  Progress Note    Patient Name: Shukri Rosales  MRN: [id number]  Patient Class: IP- Inpatient   Admission Date: 2/7/2022  Length of Stay: 2 days  Attending Physician: Guillaume Urbina, *  Primary Care Provider: Cris Matias NP        Subjective:     Principal Problem:Acute hypoxemic respiratory failure        HPI:  Shortness of Breath        Hx of COPD and recent dx of PNA. Pt received narcan for Kratom use.     Per ER- This  is a 61 y.o. male patient with PMHx of Adrenal cortical adenoma, arthritis, back surgery,  HTN, CKD stage 3, depression, GERD, migraine headache, Schizophrenia, Severe obesity, PNA, COPD, exsmoker, and DM who presents to the Emergency Department by EMS for SOB which onset one day ago. Symptoms are constant and moderate in severity. No mitigating or exacerbating factors reported. Associated sxs include wheeze. Patient denies  fever, chills, congestion, rhinorrhea, sore throat, cough, CP, leg swelling, N/V/D, dizziness, HA, and all other sxs at this time. Pt was seen in the ED a week ago with similar sxs. Pt was admit for PNA treatment. Pt states since d/c smoking a significant amount of cigarettes. Per EMS pt's SpO2 was in the low 70s at the scene. Pt was given two breathing treatments and placed on a nonrebreathing mask. Pt's SpO2 radha to 91 PTA. No further complaints or concerns at this time.      Patient was noted to be in Acute hypoxic respiratory failure in ER and was intubated and venitiated. Patient to be admitted to ICU.                   Overview/Hospital Course:  62 y/o wm  admitted to ICU on mechanical ventilation  with a Dx of acute on chronic hypoxic and hypercapnic . Acute on chronic systolic and diastolic CHF exacerbation , PNA and  COPD exacerbation . Started on IV lasix , IV steroid  and IVAB . Develop hypotension most likely due to  IV sedation  and started on short course of Levophed to keep a MAP>65 .  2/9  Remain critically ill . Failed SBT today . Became very anxious and agitated .  Restarted on sedation . Home medication Seroquel and clonazepam . He has a good UP . NAEON         Interval History:     Review of Systems   Unable to perform ROS: Intubated     Objective:     Vital Signs (Most Recent):  Temp: 97.3 °F (36.3 °C) (02/09/22 1501)  Pulse: (!) 45 (02/09/22 1601)  Resp: (!) 23 (02/09/22 1601)  BP: (!) 116/59 (02/09/22 1601)  SpO2: (!) 94 % (02/09/22 1601) Vital Signs (24h Range):  Temp:  [97.3 °F (36.3 °C)-98.4 °F (36.9 °C)] 97.3 °F (36.3 °C)  Pulse:  [] 45  Resp:  [13-36] 23  SpO2:  [86 %-100 %] 94 %  BP: ()/() 116/59     Weight: 99.6 kg (219 lb 9.3 oz)  Body mass index is 29.78 kg/m².    Intake/Output Summary (Last 24 hours) at 2/9/2022 1619  Last data filed at 2/9/2022 1601  Gross per 24 hour   Intake 2091.56 ml   Output 3480 ml   Net -1388.44 ml      Physical Exam  Vitals and nursing note reviewed.   Constitutional:       Appearance: He is ill-appearing.      Interventions: He is intubated.       HENT:      Head: Normocephalic.   Eyes:      Pupils: Pupils are equal, round, and reactive to light.   Cardiovascular:      Rate and Rhythm: Normal rate.      Pulses: Normal pulses.      Heart sounds: Normal heart sounds.   Pulmonary:      Effort: Pulmonary effort is normal. He is intubated.      Breath sounds: Normal breath sounds. No rales.   Abdominal:      General: Bowel sounds are normal.   Musculoskeletal:      Cervical back: Normal range of motion.      Right lower leg: Edema present.      Left lower leg: Edema present.   Skin:     General: Skin is warm.   Neurological:      General: No focal deficit present.      Mental Status: He is easily aroused.         Significant Labs: All pertinent labs within the past 24 hours have been reviewed.    Significant Imaging: I have reviewed all pertinent imaging results/findings within the past 24 hours.      Assessment/Plan:      * Acute hypoxemic  respiratory failure secondary to acute on chronic CHF   Patient with Hypoxic Respiratory failure which is Acute.  he is not on home oxygen. Supplemental oxygen was provided and noted- Vent Mode: A/C  Oxygen Concentration (%):  [] 40  Resp Rate Total:  [13 br/min-36 br/min] 23 br/min  Vt Set:  [450 mL] 450 mL  PEEP/CPAP:  [5 cmH20-8 cmH20] 8 cmH20  Pressure Support:  [10 cmH20] 10 cmH20  Mean Airway Pressure:  [7.5 qeV86-96 cmH20] 14 cmH20.   Signs/symptoms of respiratory failure include- tachypnea, increased work of breathing and respiratory distress. Contributing diagnoses includes - CHF Labs and images were reviewed. Patient Has recent ABG, which has been reviewed.      2/07 Patient intubated and ventilated and admitted to ICU    Cont IV steroid and IV lasix  Wean ventilation support  As tolerated     GI bleed  Cont PPI      Pulmonary hypertension  History of- Noted      Acute on chronic combined systolic and diastolic congestive heart failure  Patient is identified as having Combined Systolic and Diastolic heart failure that is Acute on chronic. CHF is currently uncontrolled due to Continued edema of extremities and Dyspnea     Echo    Interpretation Summary  · The left ventricle is normal in size with concentric hypertrophy and mildly decreased systolic function.  · Grade I left ventricular diastolic dysfunction.  · Normal right ventricular size with normal right ventricular systolic function.  · There is pulmonary hypertension.  · Intermediate central venous pressure (8 mmHg).  · The estimated PA systolic pressure is 53 mmHg.  · The estimated ejection fraction is 40%.  · There are segmental left ventricular wall motion abnormalities.    Telemetry  Add Iv lasix Bid  Cardiology consulted    Recent Labs   Lab 02/09/22  0331   BNP 1,047*   .      History of recent pneumonia  Now with Acute respiratory failure   IV Abx       Hypoalbuminemia  Monitor      Normocytic anemia  Monitor      Acute respiratory  failure with hypoxia and hypercarbia  Multifactorial  Due to ADHF ad COPD exacerbation   Wean off mechanical ventilation as need        COPD exacerbation  Cont Breathing tx  Cont IV steroid       Schizoaffective disorder, bipolar type  Resume Seroquel and clonazepam       Type 2 diabetes mellitus with stage 3 chronic kidney disease  Keep CBG<180  Cont long acting  And short acting insulin         Obesity (BMI 30-39.9)  Cont supportive tx       EMMANUEL on CPAP  Hx EMMANUEL noted      Hypertension associated with diabetes    Cont lasix   Stable         VTE Risk Mitigation (From admission, onward)         Ordered     enoxaparin injection 40 mg  Daily         02/09/22 1117     IP VTE HIGH RISK PATIENT  Once         02/07/22 1640     Place sequential compression device  Until discontinued         02/07/22 1640     Place EDVIN hose  Until discontinued         02/07/22 1640     Place EDVIN hose  Until discontinued         02/07/22 1042     Place sequential compression device  Until discontinued         02/07/22 1042     Place EVDIN hose  Until discontinued         02/07/22 0955     Place sequential compression device  Until discontinued         02/07/22 0955                Discharge Planning   ALICIA:      Code Status: Full Code   Is the patient medically ready for discharge?: No    Reason for patient still in hospital (select all that apply): Treatment  Discharge Plan A: Long-term acute care facility (LTAC)            Critical care time spent on the evaluation and treatment of severe organ dysfunction, review of pertinent labs and imaging studies, discussions with consulting providers and discussions with patient/family: 34 minutes.      Guillaume Urbina MD  Department of Hospital Medicine   [facility] - Intensive Care ([facility])

## 2022-02-09 NOTE — PROGRESS NOTES
[facility] ([facility])  Critical Care Medicine  Progress Note    Patient Name: Shukri Rosales  MRN: [id number]  Admission Date: 2/7/2022  Hospital Length of Stay: 2 days  Code Status: Full Code  Attending Provider: Guillaume Urbina, *  Primary Care Provider: Cris Matias NP   Principal Problem: Acute hypoxemic respiratory failure    Subjective:     HPI:  Shukri Rosales is 61 y.o.  Asked to see in ER  Presented with respiratory distress, on CPAP, was found cyanotic and intubated  Smoker  Sats 70% at arrival  SOB cough Wheezing used nebs  Little improvement  Prior PFT 2018: normal except mild reduced DLCO.  On ANORO and Albuterol  Dimer mild elevated  pCO2 was 54        Hospital/ICU Course:  02/07: seen and examined:   02/08: seen and examined: peep 5, Fio2 50%, Sedated, Vent day #1, 2040 mls, T max:  98.1F, ABG reviewed, agitation when sedation vacated.Bolus lasix given  02/09: seen and examined: Agitation with SAT SBT,peep 6, Fio2 50%, Sedated, Vent day # 2,  UO 2350  mls, T max:  98.4F, ABG reviewed, agitation when sedation vacated. Added Haldol      Interval History: *  02/09: seen and examined: Agitation with SAT SBT,peep 6, Fio2 50%, Sedated, Vent day # 2,  UO 2350  mls, T max:  98.4F, ABG reviewed, agitation when sedation vacated. Added Haldol     dexmedetomidine (PRECEDEX) infusion 1.4 mcg/kg/hr (02/09/22 1028)    fentanyl 100 mcg/hr (02/09/22 0947)    NORepinephrine bitartrate-D5W      propofoL 30 mcg/kg/min (02/09/22 1008)      Vent Type:  (2/9/2022  9:19 AM)     Vent Mode: A/C  Oxygen Concentration (%):  [] 100  Resp Rate Total:  [13 br/min-36 br/min] 27 br/min  Vt Set:  [450 mL] 450 mL  PEEP/CPAP:  [5 cmH20-8 cmH20] 8 cmH20  Pressure Support:  [10 cmH20] 10 cmH20  Mean Airway Pressure:  [7.5 fxC60-93 cmH20] 19 cmH20     Review of Systems   Unable to perform ROS: Intubated         Objective:     Vital Signs (Most Recent):  Temp: 98.4 °F (36.9 °C)  (02/09/22 0715)  Pulse: (!) 136 (02/09/22 0852)  Resp: (!) 29 (02/09/22 0852)  BP: (!) 180/106 (02/09/22 0800)  SpO2: (!) 90 % (02/09/22 0919) Vital Signs (24h Range):  Temp:  [97.8 °F (36.6 °C)-98.4 °F (36.9 °C)] 98.4 °F (36.9 °C)  Pulse:  [] 136  Resp:  [15-29] 29  SpO2:  [89 %-99 %] 90 %  BP: ()/() 180/106     Weight: 99.6 kg (219 lb 9.3 oz)  Body mass index is 29.78 kg/m².      Intake/Output Summary (Last 24 hours) at 2/9/2022 1107  Last data filed at 2/9/2022 1057  Gross per 24 hour   Intake 1655.86 ml   Output 2315 ml   Net -659.14 ml       Physical Exam  Vitals and nursing note reviewed.   Constitutional:       Appearance: He is ill-appearing.      Interventions: He is intubated.       HENT:      Head: Normocephalic.   Eyes:      Pupils: Pupils are equal, round, and reactive to light.   Cardiovascular:      Rate and Rhythm: Normal rate.      Pulses: Normal pulses.      Heart sounds: Normal heart sounds.   Pulmonary:      Effort: Pulmonary effort is normal. He is intubated.      Breath sounds: Normal breath sounds. No rales.   Abdominal:      General: Bowel sounds are normal.   Musculoskeletal:      Cervical back: Normal range of motion.      Right lower leg: Edema present.      Left lower leg: Edema present.   Skin:     General: Skin is warm.   Neurological:      General: No focal deficit present.      Mental Status: He is easily aroused.         Vents:  Vent Mode: A/C (02/09/22 0919)  Ventilator Initiated: Yes (02/07/22 0634)  Set Rate: 20 BPM (02/09/22 0919)  Vt Set: 450 mL (02/09/22 0919)  Pressure Support: (S) 10 cmH20 (02/09/22 0743)  PEEP/CPAP: 8 cmH20 (02/09/22 0919)  Oxygen Concentration (%): 100 (02/09/22 0919)  Peak Airway Pressure: 37 cmH2O (02/09/22 0919)  Plateau Pressure: 15 cmH20 (02/09/22 0919)  Total Ve: 12.6 mL (02/09/22 0919)  Negative Inspiratory Force (cm H2O): 0 (02/09/22 0919)  F/VT Ratio<105 (RSBI): (!) 57.65 (02/09/22 0852)    Lines/Drains/Airways     Peripherally  Inserted Central Catheter Line            PICC Double Lumen 02/07/22 1830 right basilic 1 day          Drain                 NG/OG Tube 02/07/22 0608 orogastric 16 Fr. Center mouth 2 days         Urethral Catheter 02/07/22 0623 Non-latex 16 Fr. 2 days          Airway                 Airway - Non-Surgical 02/07/22 2 days          Peripheral Intravenous Line                 Peripheral IV - Single Lumen 02/07/22 0431 20 G Left Hand 2 days         Peripheral IV - Single Lumen 02/07/22 0605 18 G Right Hand 2 days                Significant Labs:    CBC/Anemia Profile:  Recent Labs   Lab 02/08/22  0452 02/09/22  0331   WBC 13.90* 10.11   HGB 10.3* 10.7*   HCT 32.3* 33.8*    351   MCV 87 88   RDW 16.6* 17.1*        Chemistries:  Recent Labs   Lab 02/08/22  0452 02/09/22  0330    140   K 4.4 4.6    102   CO2 26 29   BUN 20 22   CREATININE 1.4 1.5*   CALCIUM 8.4* 8.5*   ALBUMIN 2.6* 2.6*   PROT 5.6* 5.6*   BILITOT 0.3 0.2   ALKPHOS 67 67   ALT 11 6*   AST 9* 5*   MG 1.8  --      Component      Latest Ref Rng & Units 2/9/2022   BNP      0 - 99 pg/mL 1,047 (H)   Triglycerides      30 - 150 mg/dL 102     ABGs:   Recent Labs   Lab 02/09/22  0430   PH 7.382   PCO2 52.7*   HCO3 31.3*   POCSATURATED 87*   BE 6     Blood Culture: No results for input(s): LABBLOO in the last 48 hours.  Cardiac Markers: No results for input(s): CKMB, TROPONINT, MYOGLOBIN in the last 48 hours.  Lipid Panel:   Recent Labs   Lab 02/09/22  0330   TRIG 102     POCT Glucose:   Recent Labs   Lab 02/08/22  2322 02/09/22  0524 02/09/22  1042   POCTGLUCOSE 197* 207* 194*     Respiratory Culture:   Recent Labs   Lab 02/07/22  1750   GSRESP <10 epithelial cells per low power field.  Rare WBC's  Rare Gram positive cocci   RESPIRATORYC Normal respiratory louise     All pertinent labs within the past 24 hours have been reviewed.    Significant Imaging:  I have reviewed all pertinent imaging results/findings within the past 24  hours.      ABG  Recent Labs   Lab 02/09/22  0430   PH 7.382   PO2 56*   PCO2 52.7*   HCO3 31.3*   BE 6     Assessment/Plan:     Psychiatric  Schizoaffective disorder, bipolar type  On Seroquel  Restart home Klonopin    Pulmonary  Suction  Pul toilet  Bronchodilators      Pulmonary hypertension  PA estimated 53  Multifactorial  Gentle diuresis BID lasix    Acute respiratory failure with hypoxia and hypercarbia  Patient with Hypercapnic and Hypoxic Respiratory failure which is Acute on chronic.  he is on home oxygen at 2.0 LPM. Supplemental oxygen was provided and noted- Vent Mode: A/C  Oxygen Concentration (%):  [] 100  Resp Rate Total:  [13 br/min-36 br/min] 27 br/min  Vt Set:  [450 mL] 450 mL  PEEP/CPAP:  [5 cmH20-8 cmH20] 8 cmH20  Pressure Support:  [10 cmH20] 10 cmH20  Mean Airway Pressure:  [7.5 omA27-69 cmH20] 19 cmH20.   Signs/symptoms of respiratory failure include- tachypnea, increased work of breathing and respiratory distress. Contributing diagnoses includes - Interstitial lung disease and Pneumonia Labs and images were reviewed. Patient Has recent ABG, which has been reviewed. Will treat underlying causes and adjust management of respiratory failure as follows-     Serial ABG  Vent bundle  IV steriods  Sputum cullture  CEFTRIAXONE + AZITHRO  Failed SAT, SBT: aggitation    Cardiac/Vascular  Acute on chronic combined systolic and diastolic congestive heart failure  Patient is identified as having Combined Systolic and Diastolic heart failure that is Acute on chronic. CHF is currently controlled. Latest ECHO performed and demonstrates- Results for orders placed during the hospital encounter of 01/19/22    Echo    Interpretation Summary  · The left ventricle is normal in size with concentric hypertrophy and mildly decreased systolic function.  · Grade I left ventricular diastolic dysfunction.  · Normal right ventricular size with normal right ventricular systolic function.  · There is pulmonary  hypertension.  · Intermediate central venous pressure (8 mmHg).  · The estimated PA systolic pressure is 53 mmHg.  · The estimated ejection fraction is 40%.  · There are segmental left ventricular wall motion abnormalities.  . Continue Furosemide and monitor clinical status closely. Monitor on telemetry. Patient is on CHF pathway.  Monitor strict Is&Os and daily weights.  Place on fluid restriction of 1.5 L. Continue to stress to patient importance of self efficacy and  on diet for CHF. Last BNP reviewed- and noted below   Recent Labs   Lab 02/09/22  0331   BNP 1,047*     LASIX BID    BNP trendingdown    Endocrine  Type 2 diabetes mellitus with stage 3 chronic kidney disease  Sliding scale  Novalog Aspart and detemir    Obesity (BMI 30-39.9)  Weight loss    GI  Irrigate NGT  Continue TF  Bowel regime    GI bleed  Likely gastritis  Cont  TF     PPI    Other  EMMANUEL on CPAP  Use home CPAP when feasible     Critical Care Daily Checklist:    A: Awake: RASS Goal/Actual Goal: RASS Goal: 0-->alert and calm  Actual: Bhat Agitation Sedation Scale (RASS): Agitated   B: Spontaneous Breathing Trial Performed? Spon. Breathing Trial Initiated?: (S) Initiated (02/09/22 0743)   C: SAT & SBT Coordinated?  YES: failed, aggitation                      D: Delirium: CAM-ICU     E: Early Mobility Performed? Yes   F: Feeding Goal: Goals: 1. Enteral Nutrition initiation within 24 hours.  Status: Nutrition Goal Status: new   Current Diet Order   Procedures    Diet NPO      AS: Analgesia/Sedation Yes: Precedec/Propofol/FEntanyl   T: Thromboembolic Prophylaxis SCD   H: HOB > 300 Yes   U: Stress Ulcer Prophylaxis (if needed) PPI   G: Glucose Control *-194: increased Long acting   B: Bowel Function     I: Indwelling Catheter (Lines & Medina) Necessity PICC   D: De-escalation of Antimicrobials/Pharmacotherapies Ceftri/Azithro    Plan for the day/ETD REsedated, added Haldol,     Code Status:  Family/Goals of Care: Full Code  Called  daughter left VM     Critical Care Time: 34 minutes  Critical secondary to Patient has a condition that poses threat to life and bodily function: Acute respiratory failure,CHF, COPDexacebation on Ventilator      Critical care was time spent personally by me on the following activities: development of treatment plan with patient or surrogate and bedside caregivers, discussions with consultants, evaluation of patient's response to treatment, examination of patient, ordering and performing treatments and interventions, ordering and review of laboratory studies, ordering and review of radiographic studies, pulse oximetry, re-evaluation of patient's condition. This critical care time did not overlap with that of any other provider or involve time for any procedures.     Siddhartha Dueñas MD  Critical Care Medicine  [facility] ([facility])

## 2022-02-09 NOTE — PLAN OF CARE
Pt sedated and intubated, on fentanyl and propofol gtt. Afebrile. Failed SBT today, r/t hypertension, increased WoB, and SpO2 dropped to low 80's on 100% fiO2. Per MD, teo in AM. L/s very coarse in all fields. Thick secretions cleared via inline suction. Able to tolerate being off levophed when agitated, but with increase in sedation bp dropped. Tube feed started today at 20/ will advance if tolerating. Voiding per weeks with adequate UO. Treated with lasix this am with minimal response. PICC and PIV x2 in place and wnl. Alarms appropriate and audible. Bed in lowest positon. Heel boots and scds in place.

## 2022-02-09 NOTE — ASSESSMENT & PLAN NOTE
Patient is identified as having Combined Systolic and Diastolic heart failure that is Acute on chronic. CHF is currently controlled. Latest ECHO performed and demonstrates- Results for orders placed during the hospital encounter of 01/19/22    Echo    Interpretation Summary  · The left ventricle is normal in size with concentric hypertrophy and mildly decreased systolic function.  · Grade I left ventricular diastolic dysfunction.  · Normal right ventricular size with normal right ventricular systolic function.  · There is pulmonary hypertension.  · Intermediate central venous pressure (8 mmHg).  · The estimated PA systolic pressure is 53 mmHg.  · The estimated ejection fraction is 40%.  · There are segmental left ventricular wall motion abnormalities.  . Continue Furosemide and monitor clinical status closely. Monitor on telemetry. Patient is on CHF pathway.  Monitor strict Is&Os and daily weights.  Place on fluid restriction of 1.5 L. Continue to stress to patient importance of self efficacy and  on diet for CHF. Last BNP reviewed- and noted below   Recent Labs   Lab 02/09/22  0331   BNP 1,047*     LASIX BID    BNP trendingdown

## 2022-02-10 LAB
ALBUMIN SERPL BCP-MCNC: 2.6 G/DL (ref 3.5–5.2)
ALLENS TEST: ABNORMAL
ALLENS TEST: ABNORMAL
ALP SERPL-CCNC: 64 U/L (ref 55–135)
ALT SERPL W/O P-5'-P-CCNC: 14 U/L (ref 10–44)
ANION GAP SERPL CALC-SCNC: 8 MMOL/L (ref 8–16)
AST SERPL-CCNC: 28 U/L (ref 10–40)
BACTERIA SPEC AEROBE CULT: NORMAL
BACTERIA SPEC AEROBE CULT: NORMAL
BASOPHILS # BLD AUTO: 0.01 K/UL (ref 0–0.2)
BASOPHILS NFR BLD: 0.1 % (ref 0–1.9)
BILIRUB SERPL-MCNC: 0.3 MG/DL (ref 0.1–1)
BUN SERPL-MCNC: 31 MG/DL (ref 8–23)
CALCIUM SERPL-MCNC: 8.9 MG/DL (ref 8.7–10.5)
CHLORIDE SERPL-SCNC: 99 MMOL/L (ref 95–110)
CO2 SERPL-SCNC: 33 MMOL/L (ref 23–29)
CREAT SERPL-MCNC: 1.4 MG/DL (ref 0.5–1.4)
DELSYS: ABNORMAL
DELSYS: ABNORMAL
DIFFERENTIAL METHOD: ABNORMAL
EOSINOPHIL # BLD AUTO: 0 K/UL (ref 0–0.5)
EOSINOPHIL NFR BLD: 0 % (ref 0–8)
ERYTHROCYTE [DISTWIDTH] IN BLOOD BY AUTOMATED COUNT: 17 % (ref 11.5–14.5)
ERYTHROCYTE [SEDIMENTATION RATE] IN BLOOD BY WESTERGREN METHOD: 20 MM/H
EST. GFR  (AFRICAN AMERICAN): >60 ML/MIN/1.73 M^2
EST. GFR  (NON AFRICAN AMERICAN): 53 ML/MIN/1.73 M^2
FIO2: 45
FIO2: 50
FLOW: 15
GLUCOSE SERPL-MCNC: 204 MG/DL (ref 70–110)
GRAM STN SPEC: NORMAL
HCO3 UR-SCNC: 34.5 MMOL/L (ref 24–28)
HCO3 UR-SCNC: 37.6 MMOL/L (ref 24–28)
HCT VFR BLD AUTO: 32.3 % (ref 40–54)
HGB BLD-MCNC: 10.4 G/DL (ref 14–18)
IMM GRANULOCYTES # BLD AUTO: 0.05 K/UL (ref 0–0.04)
IMM GRANULOCYTES NFR BLD AUTO: 0.7 % (ref 0–0.5)
LYMPHOCYTES # BLD AUTO: 1.1 K/UL (ref 1–4.8)
LYMPHOCYTES NFR BLD: 15.5 % (ref 18–48)
MCH RBC QN AUTO: 28.7 PG (ref 27–31)
MCHC RBC AUTO-ENTMCNC: 32.2 G/DL (ref 32–36)
MCV RBC AUTO: 89 FL (ref 82–98)
MODE: ABNORMAL
MODE: ABNORMAL
MONOCYTES # BLD AUTO: 0.6 K/UL (ref 0.3–1)
MONOCYTES NFR BLD: 8.8 % (ref 4–15)
NEUTROPHILS # BLD AUTO: 5.3 K/UL (ref 1.8–7.7)
NEUTROPHILS NFR BLD: 74.9 % (ref 38–73)
NRBC BLD-RTO: 0 /100 WBC
PCO2 BLDA: 34.2 MMHG (ref 35–45)
PCO2 BLDA: 45.4 MMHG (ref 35–45)
PEEP: 8
PH SMN: 7.49 [PH] (ref 7.35–7.45)
PH SMN: 7.65 [PH] (ref 7.35–7.45)
PLATELET # BLD AUTO: 285 K/UL (ref 150–450)
PMV BLD AUTO: 9.1 FL (ref 9.2–12.9)
PO2 BLDA: 61 MMHG (ref 80–100)
PO2 BLDA: 71 MMHG (ref 80–100)
POC BE: 11 MMOL/L
POC BE: 17 MMOL/L
POC SATURATED O2: 93 % (ref 95–100)
POC SATURATED O2: 97 % (ref 95–100)
POCT GLUCOSE: 166 MG/DL (ref 70–110)
POCT GLUCOSE: 201 MG/DL (ref 70–110)
POCT GLUCOSE: 205 MG/DL (ref 70–110)
POCT GLUCOSE: 233 MG/DL (ref 70–110)
POCT GLUCOSE: 98 MG/DL (ref 70–110)
POTASSIUM SERPL-SCNC: 4.3 MMOL/L (ref 3.5–5.1)
PROT SERPL-MCNC: 5.6 G/DL (ref 6–8.4)
RBC # BLD AUTO: 3.63 M/UL (ref 4.6–6.2)
SAMPLE: ABNORMAL
SAMPLE: ABNORMAL
SITE: ABNORMAL
SITE: ABNORMAL
SODIUM SERPL-SCNC: 140 MMOL/L (ref 136–145)
VT: 450
WBC # BLD AUTO: 7.05 K/UL (ref 3.9–12.7)

## 2022-02-10 PROCEDURE — 82803 BLOOD GASES ANY COMBINATION: CPT

## 2022-02-10 PROCEDURE — 20000000 HC ICU ROOM

## 2022-02-10 PROCEDURE — 94640 AIRWAY INHALATION TREATMENT: CPT

## 2022-02-10 PROCEDURE — 99233 SBSQ HOSP IP/OBS HIGH 50: CPT | Mod: ,,, | Performed by: INTERNAL MEDICINE

## 2022-02-10 PROCEDURE — 25000003 PHARM REV CODE 250: Performed by: INTERNAL MEDICINE

## 2022-02-10 PROCEDURE — 99900026 HC AIRWAY MAINTENANCE (STAT)

## 2022-02-10 PROCEDURE — 63600175 PHARM REV CODE 636 W HCPCS: Performed by: PHYSICIAN ASSISTANT

## 2022-02-10 PROCEDURE — C9399 UNCLASSIFIED DRUGS OR BIOLOG: HCPCS | Performed by: INTERNAL MEDICINE

## 2022-02-10 PROCEDURE — 25000242 PHARM REV CODE 250 ALT 637 W/ HCPCS: Performed by: NURSE PRACTITIONER

## 2022-02-10 PROCEDURE — 99291 PR CRITICAL CARE, E/M 30-74 MINUTES: ICD-10-PCS | Mod: ,,, | Performed by: INTERNAL MEDICINE

## 2022-02-10 PROCEDURE — A4216 STERILE WATER/SALINE, 10 ML: HCPCS | Performed by: INTERNAL MEDICINE

## 2022-02-10 PROCEDURE — 63600175 PHARM REV CODE 636 W HCPCS: Performed by: INTERNAL MEDICINE

## 2022-02-10 PROCEDURE — 43752 NASAL/OROGASTRIC W/TUBE PLMT: CPT

## 2022-02-10 PROCEDURE — 94003 VENT MGMT INPAT SUBQ DAY: CPT

## 2022-02-10 PROCEDURE — 85025 COMPLETE CBC W/AUTO DIFF WBC: CPT | Performed by: INTERNAL MEDICINE

## 2022-02-10 PROCEDURE — 27000190 HC CPAP FULL FACE MASK W/VALVE

## 2022-02-10 PROCEDURE — 99900035 HC TECH TIME PER 15 MIN (STAT)

## 2022-02-10 PROCEDURE — 99233 PR SUBSEQUENT HOSPITAL CARE,LEVL III: ICD-10-PCS | Mod: ,,, | Performed by: INTERNAL MEDICINE

## 2022-02-10 PROCEDURE — C9113 INJ PANTOPRAZOLE SODIUM, VIA: HCPCS | Performed by: NURSE PRACTITIONER

## 2022-02-10 PROCEDURE — 36600 WITHDRAWAL OF ARTERIAL BLOOD: CPT

## 2022-02-10 PROCEDURE — 99291 CRITICAL CARE FIRST HOUR: CPT | Mod: ,,, | Performed by: INTERNAL MEDICINE

## 2022-02-10 PROCEDURE — 94660 CPAP INITIATION&MGMT: CPT

## 2022-02-10 PROCEDURE — 27000221 HC OXYGEN, UP TO 24 HOURS

## 2022-02-10 PROCEDURE — 80053 COMPREHEN METABOLIC PANEL: CPT | Performed by: INTERNAL MEDICINE

## 2022-02-10 PROCEDURE — 63600175 PHARM REV CODE 636 W HCPCS: Performed by: NURSE PRACTITIONER

## 2022-02-10 RX ORDER — DEXMEDETOMIDINE HYDROCHLORIDE 4 UG/ML
0-1.4 INJECTION INTRAVENOUS CONTINUOUS
Status: DISCONTINUED | OUTPATIENT
Start: 2022-02-10 | End: 2022-02-11

## 2022-02-10 RX ORDER — HYDRALAZINE HYDROCHLORIDE 20 MG/ML
10 INJECTION INTRAMUSCULAR; INTRAVENOUS EVERY 6 HOURS PRN
Status: DISCONTINUED | OUTPATIENT
Start: 2022-02-10 | End: 2022-02-13 | Stop reason: HOSPADM

## 2022-02-10 RX ADMIN — FUROSEMIDE 60 MG: 10 INJECTION, SOLUTION INTRAVENOUS at 09:02

## 2022-02-10 RX ADMIN — ENOXAPARIN SODIUM 40 MG: 40 INJECTION SUBCUTANEOUS at 05:02

## 2022-02-10 RX ADMIN — PROPOFOL 25 MCG/KG/MIN: 10 INJECTION, EMULSION INTRAVENOUS at 03:02

## 2022-02-10 RX ADMIN — CHLORHEXIDINE GLUCONATE 0.12% ORAL RINSE 15 ML: 1.2 LIQUID ORAL at 09:02

## 2022-02-10 RX ADMIN — METHYLPREDNISOLONE SODIUM SUCCINATE 20 MG: 40 INJECTION, POWDER, FOR SOLUTION INTRAMUSCULAR; INTRAVENOUS at 09:02

## 2022-02-10 RX ADMIN — SODIUM CHLORIDE, PRESERVATIVE FREE 10 ML: 5 INJECTION INTRAVENOUS at 12:02

## 2022-02-10 RX ADMIN — BUDESONIDE 0.5 MG: 0.5 INHALANT ORAL at 07:02

## 2022-02-10 RX ADMIN — SODIUM CHLORIDE, PRESERVATIVE FREE 10 ML: 5 INJECTION INTRAVENOUS at 05:02

## 2022-02-10 RX ADMIN — METHYLPREDNISOLONE SODIUM SUCCINATE 20 MG: 40 INJECTION, POWDER, FOR SOLUTION INTRAMUSCULAR; INTRAVENOUS at 05:02

## 2022-02-10 RX ADMIN — IPRATROPIUM BROMIDE AND ALBUTEROL SULFATE 3 ML: 2.5; .5 SOLUTION RESPIRATORY (INHALATION) at 11:02

## 2022-02-10 RX ADMIN — INSULIN ASPART 3 UNITS: 100 INJECTION, SOLUTION INTRAVENOUS; SUBCUTANEOUS at 05:02

## 2022-02-10 RX ADMIN — CEFTRIAXONE 2 G: 2 INJECTION, SOLUTION INTRAVENOUS at 10:02

## 2022-02-10 RX ADMIN — DEXMEDETOMIDINE HYDROCHLORIDE 1.4 MCG/KG/HR: 4 INJECTION INTRAVENOUS at 11:02

## 2022-02-10 RX ADMIN — HALOPERIDOL LACTATE 5 MG: 5 INJECTION, SOLUTION INTRAMUSCULAR at 03:02

## 2022-02-10 RX ADMIN — PANTOPRAZOLE SODIUM 40 MG: 40 INJECTION, POWDER, FOR SOLUTION INTRAVENOUS at 09:02

## 2022-02-10 RX ADMIN — DEXMEDETOMIDINE HYDROCHLORIDE 1.3 MCG/KG/HR: 4 INJECTION INTRAVENOUS at 08:02

## 2022-02-10 RX ADMIN — INSULIN ASPART 4 UNITS: 100 INJECTION, SOLUTION INTRAVENOUS; SUBCUTANEOUS at 12:02

## 2022-02-10 RX ADMIN — IPRATROPIUM BROMIDE AND ALBUTEROL SULFATE 3 ML: 2.5; .5 SOLUTION RESPIRATORY (INHALATION) at 07:02

## 2022-02-10 RX ADMIN — DEXMEDETOMIDINE HYDROCHLORIDE 1.4 MCG/KG/HR: 4 INJECTION INTRAVENOUS at 02:02

## 2022-02-10 RX ADMIN — CLONAZEPAM 1 MG: 1 TABLET ORAL at 09:02

## 2022-02-10 RX ADMIN — INSULIN ASPART 3 UNITS: 100 INJECTION, SOLUTION INTRAVENOUS; SUBCUTANEOUS at 12:02

## 2022-02-10 RX ADMIN — HYDRALAZINE HYDROCHLORIDE 10 MG: 20 INJECTION INTRAMUSCULAR; INTRAVENOUS at 04:02

## 2022-02-10 RX ADMIN — QUETIAPINE FUMARATE 100 MG: 100 TABLET ORAL at 09:02

## 2022-02-10 RX ADMIN — DEXMEDETOMIDINE HYDROCHLORIDE 1.4 MCG/KG/HR: 4 INJECTION INTRAVENOUS at 05:02

## 2022-02-10 RX ADMIN — METHYLPREDNISOLONE SODIUM SUCCINATE 20 MG: 40 INJECTION, POWDER, FOR SOLUTION INTRAMUSCULAR; INTRAVENOUS at 02:02

## 2022-02-10 RX ADMIN — MUPIROCIN: 20 OINTMENT TOPICAL at 09:02

## 2022-02-10 RX ADMIN — MUPIROCIN: 20 OINTMENT TOPICAL at 10:02

## 2022-02-10 RX ADMIN — DEXMEDETOMIDINE HYDROCHLORIDE 1.4 MCG/KG/HR: 4 INJECTION INTRAVENOUS at 08:02

## 2022-02-10 RX ADMIN — IPRATROPIUM BROMIDE AND ALBUTEROL SULFATE 3 ML: 2.5; .5 SOLUTION RESPIRATORY (INHALATION) at 05:02

## 2022-02-10 RX ADMIN — IPRATROPIUM BROMIDE AND ALBUTEROL SULFATE 3 ML: 2.5; .5 SOLUTION RESPIRATORY (INHALATION) at 03:02

## 2022-02-10 RX ADMIN — HYDRALAZINE HYDROCHLORIDE 10 MG: 20 INJECTION INTRAMUSCULAR; INTRAVENOUS at 08:02

## 2022-02-10 RX ADMIN — DEXMEDETOMIDINE HYDROCHLORIDE 0.8 MCG/KG/HR: 4 INJECTION INTRAVENOUS at 03:02

## 2022-02-10 RX ADMIN — INSULIN ASPART 4 UNITS: 100 INJECTION, SOLUTION INTRAVENOUS; SUBCUTANEOUS at 05:02

## 2022-02-10 NOTE — PROGRESS NOTES
[facility] ([facility])  Critical Care Medicine  Progress Note    Patient Name: Shukri Rosales  MRN: [id number]  Admission Date: 2/7/2022  Hospital Length of Stay: 3 days  Code Status: Full Code  Attending Provider: Guillaume Urbina, *  Primary Care Provider: Cris Matias NP   Principal Problem: Acute hypoxemic respiratory failure    Subjective:     HPI:  Shukri Rosales is 61 y.o.  Asked to see in ER  Presented with respiratory distress, on CPAP, was found cyanotic and intubated  Smoker  Sats 70% at arrival  SOB cough Wheezing used nebs  Little improvement  Prior PFT 2018: normal except mild reduced DLCO.  On ANORO and Albuterol  Dimer mild elevated  pCO2 was 54        Hospital/ICU Course:  02/07: seen and examined:   02/08: seen and examined: peep 5, Fio2 50%, Sedated, Vent day #1, 2040 mls, T max:  98.1F, ABG reviewed, agitation when sedation vacated.Bolus lasix given  02/09: seen and examined: Agitation with SAT SBT,peep 6, Fio2 50%, Sedated, Vent day # 2,  UO 2350  mls, T max:  98.4F, ABG reviewed, agitation when sedation vacated. Added Haldol  02/10: seen and examined: SAT, SBT, vent day #3, UO  4930 mls, T max: 98.5F, ABG reviewed      Interval History: *  02/10: seen and examined: SAT, SBT, vent day #3, UO  4930 mls, T max: 98.5F, ABG reviewed    Vent Type:  (2/10/2022  7:28 AM)    Vent Mode: Spont  Oxygen Concentration (%):  [] 50  Resp Rate Total:  [14 br/min-41 br/min] 41 br/min  Vt Set:  [450 mL] 450 mL  PEEP/CPAP:  [5 cmH20-8 cmH20] 5 cmH20  Pressure Support:  [10 cmH20] 10 cmH20  Mean Airway Pressure:  [8.1 ryW85-00 cmH20] 8.1 cmH20     dexmedetomidine (PRECEDEX) infusion 1.4 mcg/kg/hr (02/10/22 0833)    fentanyl Stopped (02/10/22 0655)    NORepinephrine bitartrate-D5W Stopped (02/09/22 2857)    propofoL 10 mcg/kg/min (02/10/22 2131)       Review of Systems   Unable to perform ROS: Critical illness       Objective:     Vital Signs (Most Recent):  Temp:  97.8 °F (36.6 °C) (02/10/22 0815)  Pulse: 84 (02/10/22 1016)  Resp: (!) 22 (02/10/22 1016)  BP: (!) 154/88 (02/10/22 1016)  SpO2: (!) 93 % (02/10/22 1016) Vital Signs (24h Range):  Temp:  [97.3 °F (36.3 °C)-98.5 °F (36.9 °C)] 97.8 °F (36.6 °C)  Pulse:  [] 84  Resp:  [14-49] 22  SpO2:  [88 %-100 %] 93 %  BP: ()/() 154/88     Weight: 96.9 kg (213 lb 10 oz)  Body mass index is 28.97 kg/m².      Intake/Output Summary (Last 24 hours) at 2/10/2022 1053  Last data filed at 2/10/2022 1001  Gross per 24 hour   Intake 2780.11 ml   Output 5155 ml   Net -2374.89 ml       Physical Exam  Vitals and nursing note reviewed.   Constitutional:       Appearance: He is ill-appearing.      Interventions: He is intubated.       HENT:      Head: Normocephalic.   Eyes:      Pupils: Pupils are equal, round, and reactive to light.   Cardiovascular:      Rate and Rhythm: Normal rate.      Pulses: Normal pulses.      Heart sounds: Normal heart sounds.   Pulmonary:      Effort: Pulmonary effort is normal. He is intubated.      Breath sounds: Normal breath sounds. No rales.   Abdominal:      General: Bowel sounds are normal.   Musculoskeletal:      Cervical back: Normal range of motion.      Right lower leg: Edema present.      Left lower leg: Edema present.   Skin:     General: Skin is warm.   Neurological:      General: No focal deficit present.      Mental Status: He is easily aroused.         Vents:  Vent Mode: Spont (02/10/22 0728)  Ventilator Initiated: Yes (02/07/22 0634)  Set Rate: 20 BPM (02/10/22 0508)  Vt Set: 450 mL (02/10/22 0508)  Pressure Support: 10 cmH20 (02/10/22 0728)  PEEP/CPAP: 5 cmH20 (02/10/22 0728)  Oxygen Concentration (%): 50 (02/10/22 0935)  Peak Airway Pressure: 17 cmH2O (02/10/22 0728)  Plateau Pressure: 15 cmH20 (02/10/22 0728)  Total Ve: 15.5 mL (02/10/22 0728)  Negative Inspiratory Force (cm H2O): 0 (02/10/22 0728)  F/VT Ratio<105 (RSBI): (!) 18.3 (02/10/22 0728)    Lines/Drains/Airways      Peripherally Inserted Central Catheter Line            PICC Double Lumen 02/07/22 1830 right basilic 2 days          Drain                 NG/OG Tube 02/07/22 0608 orogastric 16 Fr. Center mouth 3 days         Urethral Catheter 02/07/22 0623 Non-latex 16 Fr. 3 days          Peripheral Intravenous Line                 Peripheral IV - Single Lumen 02/07/22 0431 20 G Left Hand 3 days         Peripheral IV - Single Lumen 02/07/22 0605 18 G Right Hand 3 days                Significant Labs:    CBC/Anemia Profile:  Recent Labs   Lab 02/09/22  0331 02/10/22  0329   WBC 10.11 7.05   HGB 10.7* 10.4*   HCT 33.8* 32.3*    285   MCV 88 89   RDW 17.1* 17.0*        Chemistries:  Recent Labs   Lab 02/09/22  0330 02/10/22  0329    140   K 4.6 4.3    99   CO2 29 33*   BUN 22 31*   CREATININE 1.5* 1.4   CALCIUM 8.5* 8.9   ALBUMIN 2.6* 2.6*   PROT 5.6* 5.6*   BILITOT 0.2 0.3   ALKPHOS 67 64   ALT 6* 14   AST 5* 28       ABGs:   Recent Labs   Lab 02/10/22  0414   PH 7.488*   PCO2 45.4*   HCO3 34.5*   POCSATURATED 93*   BE 11     Blood Culture: No results for input(s): LABBLOO in the last 48 hours.  Cardiac Markers: No results for input(s): CKMB, TROPONINT, MYOGLOBIN in the last 48 hours.  POCT Glucose:   Recent Labs   Lab 02/09/22  2059 02/10/22  0026 02/10/22  0505   POCTGLUCOSE 194* 201* 233*     Urine Culture: No results for input(s): LABURIN in the last 48 hours.  All pertinent labs within the past 24 hours have been reviewed.    Significant Imaging:  I have reviewed all pertinent imaging results/findings within the past 24 hours.     X-Ray Chest AP Portable  Narrative: EXAMINATION:  XR CHEST AP PORTABLE    CLINICAL HISTORY:  evaluate Scattered interstitial opacities;    TECHNIQUE:  Single frontal view of the chest was performed.    COMPARISON:  02/07/2022.    FINDINGS:  Tubes and lines are stable. Overall improved aeration noted.  Interstitial and alveolar opacities seen within the perihilar regions and lower  lobes thought to reflect edema.  Small bilateral pleural effusions.  No pneumothorax.  In comparison to the prior study, there is no adverse interval changes.  Impression: In comparison to the prior study, there is no adverse interval changes    Electronically signed by: Frandy Bolton MD  Date:    02/08/2022  Time:    07:52          ABG  Recent Labs   Lab 02/10/22  0414   PH 7.488*   PO2 61*   PCO2 45.4*   HCO3 34.5*   BE 11     Assessment/Plan:     Psychiatric  Schizoaffective disorder, bipolar type  On Seroquel  Cont Klonopin    Pulmonary  Suction  Pul toilet  Bronchodilators      Pulmonary hypertension  PA estimated 53  Multifactorial  Gentle diuresis BID lasix    Acute respiratory failure with hypoxia and hypercarbia  Patient with Hypercapnic and Hypoxic Respiratory failure which is Acute on chronic.  he is on home oxygen at 2.0 LPM. Supplemental oxygen was provided and noted- Vent Mode: Spont  Oxygen Concentration (%):  [] 50  Resp Rate Total:  [14 br/min-41 br/min] 41 br/min  Vt Set:  [450 mL] 450 mL  PEEP/CPAP:  [5 cmH20-8 cmH20] 5 cmH20  Pressure Support:  [10 cmH20] 10 cmH20  Mean Airway Pressure:  [8.1 sqM25-69 cmH20] 8.1 cmH20.   Signs/symptoms of respiratory failure include- tachypnea, increased work of breathing and respiratory distress. Contributing diagnoses includes - Interstitial lung disease and Pneumonia Labs and images were reviewed. Patient Has recent ABG, which has been reviewed. Will treat underlying causes and adjust management of respiratory failure as follows-     Serial ABG  Vent bundle  IV steriods  Sputum cullture  CEFTRIAXONE + AZITHRO  Meets criteria SAT, SBT: No aggitation  For extubation    Cardiac/Vascular  Acute on chronic combined systolic and diastolic congestive heart failure  Patient is identified as having Combined Systolic and Diastolic heart failure that is Acute on chronic. CHF is currently controlled. Latest ECHO performed and demonstrates- Results for orders placed  during the hospital encounter of 01/19/22    Echo    Interpretation Summary  · The left ventricle is normal in size with concentric hypertrophy and mildly decreased systolic function.  · Grade I left ventricular diastolic dysfunction.  · Normal right ventricular size with normal right ventricular systolic function.  · There is pulmonary hypertension.  · Intermediate central venous pressure (8 mmHg).  · The estimated PA systolic pressure is 53 mmHg.  · The estimated ejection fraction is 40%.  · There are segmental left ventricular wall motion abnormalities.  . Continue Furosemide and monitor clinical status closely. Monitor on telemetry. Patient is on CHF pathway.  Monitor strict Is&Os and daily weights.  Place on fluid restriction of 1.5 L. Continue to stress to patient importance of self efficacy and  on diet for CHF. Last BNP reviewed- and noted below   Recent Labs   Lab 02/09/22  0331   BNP 1,047*     LASIX BID    BNP trending down    Endocrine  Type 2 diabetes mellitus with stage 3 chronic kidney disease  Sliding scale  Novalog Aspart and detemir    Obesity (BMI 30-39.9)  Weight loss    GI  Irrigate NGT  Continue TF  Bowel regime    GI bleed  Likely gastritis  Cont  TF  RESOLVED  PPI    Other  EMMANUEL on CPAP  Use home CPAP when feasible       Critical Care Daily Checklist:    A: Awake: RASS Goal/Actual Goal: RASS Goal: -3-->moderate sedation  Actual: Bhat Agitation Sedation Scale (RASS): Drowsy   B: Spontaneous Breathing Trial Performed? Spon. Breathing Trial Initiated?: Initiated (02/10/22 0657)   C: SAT & SBT Coordinated?  YES: extubate                      D: Delirium: CAM-ICU Overall CAM-ICU: Positive   E: Early Mobility Performed? Yes   F: Feeding Goal: Goals: 1. Enteral Nutrition initiation within 24 hours.  Status: Nutrition Goal Status: new   Current Diet Order   No orders of the defined types were placed in this encounter.      AS: Analgesia/Sedation FEntanyl   T: Thromboembolic Prophylaxis LMWH    H: HOB > 300 Yes   U: Stress Ulcer Prophylaxis (if needed) PPI   G: Glucose Control SSI: -233   B: Bowel Function Stool Occurrence: 0   I: Indwelling Catheter (Lines & Medina) Necessity PICC   D: De-escalation of Antimicrobials/Pharmacotherapies CEFTRAXONE + AZITHRO    Plan for the day/ETD Extubation  BIPAP at night    Code Status:  Family/Goals of Care: Full Code  TBD     Critical Care Time: 30 minutes  Critical secondary to Patient has a condition that poses threat to life and bodily function: AECOPD, Acute respiratory failure Decomp CHF, Mechanical ventilation      Critical care was time spent personally by me on the following activities: development of treatment plan with patient or surrogate and bedside caregivers, discussions with consultants, evaluation of patient's response to treatment, examination of patient, ordering and performing treatments and interventions, ordering and review of laboratory studies, ordering and review of radiographic studies, pulse oximetry, re-evaluation of patient's condition. This critical care time did not overlap with that of any other provider or involve time for any procedures.     Siddhartha Dueñas MD  Critical Care Medicine  [facility] - Intensive Care [facility])

## 2022-02-10 NOTE — PLAN OF CARE
Recommendation/Intervention: 2/10  1. Recommend to continue Diabetisource:   -Goal rate @ 45mL/hr.   -Provides 1296 calories, 64g protein, 881mL H2O.   -Propofol provides 145 calories @ rate of 5.5.   -100mL H2O flush q 4-6 hours or per MD/NP.      2. Weekly weights per RD follow up.      Vivian Welsh RD,LDN

## 2022-02-10 NOTE — SUBJECTIVE & OBJECTIVE
Interval History:     Review of Systems   Unable to perform ROS: Acuity of condition     Objective:     Vital Signs (Most Recent):  Temp: 97.8 °F (36.6 °C) (02/10/22 0815)  Pulse: 84 (02/10/22 1016)  Resp: (!) 22 (02/10/22 1016)  BP: (!) 154/88 (02/10/22 1016)  SpO2: (!) 93 % (02/10/22 1016) Vital Signs (24h Range):  Temp:  [97.3 °F (36.3 °C)-98.5 °F (36.9 °C)] 97.8 °F (36.6 °C)  Pulse:  [] 84  Resp:  [14-49] 22  SpO2:  [88 %-100 %] 93 %  BP: ()/() 154/88     Weight: 96.9 kg (213 lb 10 oz)  Body mass index is 28.97 kg/m².    Intake/Output Summary (Last 24 hours) at 2/10/2022 1109  Last data filed at 2/10/2022 1001  Gross per 24 hour   Intake 2735.11 ml   Output 4455 ml   Net -1719.89 ml      Physical Exam  Vitals and nursing note reviewed.   Constitutional:       Appearance: He is obese. He is ill-appearing.   HENT:      Head: Normocephalic and atraumatic.      Nose: Nose normal. No congestion or rhinorrhea.   Eyes:      Extraocular Movements: Extraocular movements intact.      Conjunctiva/sclera: Conjunctivae normal.      Pupils: Pupils are equal, round, and reactive to light.   Cardiovascular:      Rate and Rhythm: Normal rate and regular rhythm.      Pulses: Normal pulses.      Heart sounds: Normal heart sounds.   Pulmonary:      Breath sounds: Rales present.   Abdominal:      General: Abdomen is flat. There is distension.      Palpations: There is no mass.      Tenderness: There is no abdominal tenderness. There is no guarding or rebound.      Hernia: No hernia is present.   Musculoskeletal:         General: No swelling, tenderness, deformity or signs of injury.      Cervical back: Normal range of motion and neck supple.      Right lower leg: Edema present.      Left lower leg: Edema present.   Skin:     General: Skin is warm.      Coloration: Skin is not jaundiced or pale.      Findings: No bruising.   Neurological:      Mental Status: He is disoriented.      Cranial Nerves: No cranial nerve  deficit.      Sensory: Sensory deficit present.      Motor: Weakness present.         Significant Labs: All pertinent labs within the past 24 hours have been reviewed.    Significant Imaging: I have reviewed all pertinent imaging results/findings within the past 24 hours.

## 2022-02-10 NOTE — PLAN OF CARE
Pt remains intubated/sedated on vent 45% FiO2 8 Peep, Fent gtt, Prop gtt, and precedex gtt titrated for RASS goal -3. NSR with frequent PVC's on monitor. Levo gtt off since 2148. [place] in place with adequate urine output. Tolerating TF @ goal 45 mL/hr. Afebrile , VSS. No acute events over night.

## 2022-02-10 NOTE — PLAN OF CARE
Patient resting quietly in bed with safety and fall prevention measures in place. Plan of care discussed with his sister. Remains intubated. SAT/SBT unsuccessful . Resumed home psych medications. Lasix increased per cardiology. Current vital signs are stable. Will monitor for needs/changes.

## 2022-02-10 NOTE — PROGRESS NOTES
[facility] ([facility] Medicine  Progress Note    Patient Name: Shukri Rosales  MRN: [id number]  Patient Class: IP- Inpatient   Admission Date: 2/7/2022  Length of Stay: 3 days  Attending Physician: Guillaume Urbina, *  Primary Care Provider: Cris Matias NP        Subjective:     Principal Problem:Acute hypoxemic respiratory failure        HPI:  Shortness of Breath        Hx of COPD and recent dx of PNA. Pt received narcan for Kratom use.     Per ER- This  is a 61 y.o. male patient with PMHx of Adrenal cortical adenoma, arthritis, back surgery,  HTN, CKD stage 3, depression, GERD, migraine headache, Schizophrenia, Severe obesity, PNA, COPD, exsmoker, and DM who presents to the Emergency Department by EMS for SOB which onset one day ago. Symptoms are constant and moderate in severity. No mitigating or exacerbating factors reported. Associated sxs include wheeze. Patient denies  fever, chills, congestion, rhinorrhea, sore throat, cough, CP, leg swelling, N/V/D, dizziness, HA, and all other sxs at this time. Pt was seen in the ED a week ago with similar sxs. Pt was admit for PNA treatment. Pt states since d/c smoking a significant amount of cigarettes. Per EMS pt's SpO2 was in the low 70s at the scene. Pt was given two breathing treatments and placed on a nonrebreathing mask. Pt's SpO2 radha to 91 PTA. No further complaints or concerns at this time.      Patient was noted to be in Acute hypoxic respiratory failure in ER and was intubated and venitiated. Patient to be admitted to ICU.                   Overview/Hospital Course:  60 y/o wm  admitted to ICU on mechanical ventilation  with a Dx of acute on chronic hypoxic and hypercapnic . Acute on chronic systolic and diastolic CHF exacerbation , PNA and  COPD exacerbation . Started on IV lasix , IV steroid  and IVAB . Develop hypotension most likely due to  IV sedation  and started on short course of Levophed to keep a MAP>65 .  2/9  Remain critically ill . Failed SBT today . Became very anxious and agitated .  Restarted on sedation . Home medication Seroquel and clonazepam . He has a good UP . NAEON   2/10 Extubated this am  , now on NRM .  He awake and alert  and following simple commands  . He has good UOP . NAEON . Sputum and blood cx NGTD .       Interval History:     Review of Systems   Unable to perform ROS: Acuity of condition     Objective:     Vital Signs (Most Recent):  Temp: 97.8 °F (36.6 °C) (02/10/22 0815)  Pulse: 84 (02/10/22 1016)  Resp: (!) 22 (02/10/22 1016)  BP: (!) 154/88 (02/10/22 1016)  SpO2: (!) 93 % (02/10/22 1016) Vital Signs (24h Range):  Temp:  [97.3 °F (36.3 °C)-98.5 °F (36.9 °C)] 97.8 °F (36.6 °C)  Pulse:  [] 84  Resp:  [14-49] 22  SpO2:  [88 %-100 %] 93 %  BP: ()/() 154/88     Weight: 96.9 kg (213 lb 10 oz)  Body mass index is 28.97 kg/m².    Intake/Output Summary (Last 24 hours) at 2/10/2022 1109  Last data filed at 2/10/2022 1001  Gross per 24 hour   Intake 2735.11 ml   Output 4455 ml   Net -1719.89 ml      Physical Exam  Vitals and nursing note reviewed.   Constitutional:       Appearance: He is obese. He is ill-appearing.   HENT:      Head: Normocephalic and atraumatic.      Nose: Nose normal. No congestion or rhinorrhea.   Eyes:      Extraocular Movements: Extraocular movements intact.      Conjunctiva/sclera: Conjunctivae normal.      Pupils: Pupils are equal, round, and reactive to light.   Cardiovascular:      Rate and Rhythm: Normal rate and regular rhythm.      Pulses: Normal pulses.      Heart sounds: Normal heart sounds.   Pulmonary:      Breath sounds: Rales present.   Abdominal:      General: Abdomen is flat. There is distension.      Palpations: There is no mass.      Tenderness: There is no abdominal tenderness. There is no guarding or rebound.      Hernia: No hernia is present.   Musculoskeletal:         General: No swelling, tenderness, deformity or signs of injury.      Cervical  back: Normal range of motion and neck supple.      Right lower leg: Edema present.      Left lower leg: Edema present.   Skin:     General: Skin is warm.      Coloration: Skin is not jaundiced or pale.      Findings: No bruising.   Neurological:      Mental Status: He is disoriented.      Cranial Nerves: No cranial nerve deficit.      Sensory: Sensory deficit present.      Motor: Weakness present.         Significant Labs: All pertinent labs within the past 24 hours have been reviewed.    Significant Imaging: I have reviewed all pertinent imaging results/findings within the past 24 hours.      Assessment/Plan:      * Acute hypoxemic respiratory failure secondary to acute on chronic CHF   Patient with Hypoxic Respiratory failure which is Acute.  he is not on home oxygen. Supplemental oxygen was provided and noted- Vent Mode: Spont  Oxygen Concentration (%):  [] 50  Resp Rate Total:  [14 br/min-41 br/min] 41 br/min  Vt Set:  [450 mL] 450 mL  PEEP/CPAP:  [5 cmH20-8 cmH20] 5 cmH20  Pressure Support:  [10 cmH20] 10 cmH20  Mean Airway Pressure:  [8.1 bsQ53-45 cmH20] 8.1 cmH20.   Signs/symptoms of respiratory failure include- tachypnea, increased work of breathing and respiratory distress. Contributing diagnoses includes - CHF Labs and images were reviewed. Patient Has recent ABG, which has been reviewed.      2/07 Patient intubated and ventilated and admitted to ICU    Cont IV steroid and IV lasix  Wean ventilation support  As tolerated     GI bleed  Cont PPI      Pulmonary hypertension  History of- Noted      Acute on chronic combined systolic and diastolic congestive heart failure  Patient is identified as having Combined Systolic and Diastolic heart failure that is Acute on chronic. CHF is currently uncontrolled due to Continued edema of extremities and Dyspnea     Echo    Interpretation Summary  · The left ventricle is normal in size with concentric hypertrophy and mildly decreased systolic function.  · Grade I  left ventricular diastolic dysfunction.  · Normal right ventricular size with normal right ventricular systolic function.  · There is pulmonary hypertension.  · Intermediate central venous pressure (8 mmHg).  · The estimated PA systolic pressure is 53 mmHg.  · The estimated ejection fraction is 40%.  · There are segmental left ventricular wall motion abnormalities.    Telemetry  Add Iv lasix Bid  Cardiology consulted    Recent Labs   Lab 02/09/22  0331   BNP 1,047*   .      History of recent pneumonia  Now with Acute respiratory failure   IV Abx       Hypoalbuminemia  Monitor      Normocytic anemia  Monitor      Acute respiratory failure with hypoxia and hypercarbia  Multifactorial  Due to ADHF ad COPD exacerbation   S/p mechanical ventilation . Extubated on 2/10         COPD exacerbation  Cont Breathing tx  Cont IV steroid       Schizoaffective disorder, bipolar type  Resume Seroquel and clonazepam       Type 2 diabetes mellitus with stage 3 chronic kidney disease  Keep CBG<180  Cont long acting  And short acting insulin         Obesity (BMI 30-39.9)  Cont supportive tx       EMMANUEL on CPAP  Hx EMMANUEL noted      Hypertension associated with diabetes   Cont lasix   Stable         VTE Risk Mitigation (From admission, onward)         Ordered     enoxaparin injection 40 mg  Daily         02/09/22 1117     IP VTE HIGH RISK PATIENT  Once         02/07/22 1640     Place sequential compression device  Until discontinued         02/07/22 1640     Place EDVIN hose  Until discontinued         02/07/22 1640     Place EDVIN hose  Until discontinued         02/07/22 1042     Place sequential compression device  Until discontinued         02/07/22 1042     Place EDVIN hose  Until discontinued         02/07/22 0955     Place sequential compression device  Until discontinued         02/07/22 0955                Discharge Planning   ALICIA:      Code Status: Full Code   Is the patient medically ready for discharge?: No    Reason for patient still  in hospital (select all that apply): Patient unstable and Treatment  Discharge Plan A: Long-term acute care facility (LTAC)            Critical care time spent on the evaluation and treatment of severe organ dysfunction, review of pertinent labs and imaging studies, discussions with consulting providers and discussions with patient/family: 37 minutes.      Guillaume Urbina MD  Department of Hospital Medicine   '[place] - Intensive Care ([facility])

## 2022-02-10 NOTE — SUBJECTIVE & OBJECTIVE
Interval History: *  02/10: seen and examined: SAT, SBT, vent day #3, UO  4930 mls, T max: 98.5F, ABG reviewed    Vent Type:  (2/10/2022  7:28 AM)    Vent Mode: Spont  Oxygen Concentration (%):  [] 50  Resp Rate Total:  [14 br/min-41 br/min] 41 br/min  Vt Set:  [450 mL] 450 mL  PEEP/CPAP:  [5 cmH20-8 cmH20] 5 cmH20  Pressure Support:  [10 cmH20] 10 cmH20  Mean Airway Pressure:  [8.1 eeM28-46 cmH20] 8.1 cmH20     dexmedetomidine (PRECEDEX) infusion 1.4 mcg/kg/hr (02/10/22 0833)    fentanyl Stopped (02/10/22 0655)    NORepinephrine bitartrate-D5W Stopped (02/09/22 2148)    propofoL 10 mcg/kg/min (02/10/22 0640)       Review of Systems   Unable to perform ROS: Critical illness       Objective:     Vital Signs (Most Recent):  Temp: 97.8 °F (36.6 °C) (02/10/22 0815)  Pulse: 84 (02/10/22 1016)  Resp: (!) 22 (02/10/22 1016)  BP: (!) 154/88 (02/10/22 1016)  SpO2: (!) 93 % (02/10/22 1016) Vital Signs (24h Range):  Temp:  [97.3 °F (36.3 °C)-98.5 °F (36.9 °C)] 97.8 °F (36.6 °C)  Pulse:  [] 84  Resp:  [14-49] 22  SpO2:  [88 %-100 %] 93 %  BP: ()/() 154/88     Weight: 96.9 kg (213 lb 10 oz)  Body mass index is 28.97 kg/m².      Intake/Output Summary (Last 24 hours) at 2/10/2022 1053  Last data filed at 2/10/2022 1001  Gross per 24 hour   Intake 2780.11 ml   Output 5155 ml   Net -2374.89 ml       Physical Exam  Vitals and nursing note reviewed.   Constitutional:       Appearance: He is ill-appearing.      Interventions: He is intubated.       HENT:      Head: Normocephalic.   Eyes:      Pupils: Pupils are equal, round, and reactive to light.   Cardiovascular:      Rate and Rhythm: Normal rate.      Pulses: Normal pulses.      Heart sounds: Normal heart sounds.   Pulmonary:      Effort: Pulmonary effort is normal. He is intubated.      Breath sounds: Normal breath sounds. No rales.   Abdominal:      General: Bowel sounds are normal.   Musculoskeletal:      Cervical back: Normal range of motion.       Right lower leg: Edema present.      Left lower leg: Edema present.   Skin:     General: Skin is warm.   Neurological:      General: No focal deficit present.      Mental Status: He is easily aroused.         Vents:  Vent Mode: Spont (02/10/22 0728)  Ventilator Initiated: Yes (02/07/22 0634)  Set Rate: 20 BPM (02/10/22 0508)  Vt Set: 450 mL (02/10/22 0508)  Pressure Support: 10 cmH20 (02/10/22 0728)  PEEP/CPAP: 5 cmH20 (02/10/22 0728)  Oxygen Concentration (%): 50 (02/10/22 0935)  Peak Airway Pressure: 17 cmH2O (02/10/22 0728)  Plateau Pressure: 15 cmH20 (02/10/22 0728)  Total Ve: 15.5 mL (02/10/22 0728)  Negative Inspiratory Force (cm H2O): 0 (02/10/22 0728)  F/VT Ratio<105 (RSBI): (!) 18.3 (02/10/22 0728)    Lines/Drains/Airways     Peripherally Inserted Central Catheter Line            PICC Double Lumen 02/07/22 1830 right basilic 2 days          Drain                 NG/OG Tube 02/07/22 0608 orogastric 16 Fr. Center mouth 3 days         Urethral Catheter 02/07/22 0623 Non-latex 16 Fr. 3 days          Peripheral Intravenous Line                 Peripheral IV - Single Lumen 02/07/22 0431 20 G Left Hand 3 days         Peripheral IV - Single Lumen 02/07/22 0605 18 G Right Hand 3 days                Significant Labs:    CBC/Anemia Profile:  Recent Labs   Lab 02/09/22  0331 02/10/22  0329   WBC 10.11 7.05   HGB 10.7* 10.4*   HCT 33.8* 32.3*    285   MCV 88 89   RDW 17.1* 17.0*        Chemistries:  Recent Labs   Lab 02/09/22  0330 02/10/22  0329    140   K 4.6 4.3    99   CO2 29 33*   BUN 22 31*   CREATININE 1.5* 1.4   CALCIUM 8.5* 8.9   ALBUMIN 2.6* 2.6*   PROT 5.6* 5.6*   BILITOT 0.2 0.3   ALKPHOS 67 64   ALT 6* 14   AST 5* 28       ABGs:   Recent Labs   Lab 02/10/22  0414   PH 7.488*   PCO2 45.4*   HCO3 34.5*   POCSATURATED 93*   BE 11     Blood Culture: No results for input(s): LABBLOO in the last 48 hours.  Cardiac Markers: No results for input(s): CKMB, TROPONINT, MYOGLOBIN in the last 48  hours.  POCT Glucose:   Recent Labs   Lab 02/09/22  2059 02/10/22  0026 02/10/22  0505   POCTGLUCOSE 194* 201* 233*     Urine Culture: No results for input(s): LABURIN in the last 48 hours.  All pertinent labs within the past 24 hours have been reviewed.    Significant Imaging:  I have reviewed all pertinent imaging results/findings within the past 24 hours.     X-Ray Chest AP Portable  Narrative: EXAMINATION:  XR CHEST AP PORTABLE    CLINICAL HISTORY:  evaluate Scattered interstitial opacities;    TECHNIQUE:  Single frontal view of the chest was performed.    COMPARISON:  02/07/2022.    FINDINGS:  Tubes and lines are stable. Overall improved aeration noted.  Interstitial and alveolar opacities seen within the perihilar regions and lower lobes thought to reflect edema.  Small bilateral pleural effusions.  No pneumothorax.  In comparison to the prior study, there is no adverse interval changes.  Impression: In comparison to the prior study, there is no adverse interval changes    Electronically signed by: Frandy Bolton MD  Date:    02/08/2022  Time:    07:52

## 2022-02-10 NOTE — SUBJECTIVE & OBJECTIVE
Review of Systems   Unable to perform ROS: acuity of condition     Objective:     Vital Signs (Most Recent):  Temp: 97.8 °F (36.6 °C) (02/10/22 0815)  Pulse: 74 (02/10/22 1123)  Resp: (!) 22 (02/10/22 1123)  BP: (!) 151/88 (02/10/22 1116)  SpO2: (!) 94 % (02/10/22 1123) Vital Signs (24h Range):  Temp:  [97.3 °F (36.3 °C)-98.5 °F (36.9 °C)] 97.8 °F (36.6 °C)  Pulse:  [] 74  Resp:  [14-49] 22  SpO2:  [88 %-100 %] 94 %  BP: ()/() 151/88     Weight: 96.9 kg (213 lb 10 oz)  Body mass index is 28.97 kg/m².     SpO2: (!) 94 %  O2 Device (Oxygen Therapy): venti mask      Intake/Output Summary (Last 24 hours) at 2/10/2022 1202  Last data filed at 2/10/2022 1100  Gross per 24 hour   Intake 2690.11 ml   Output 6055 ml   Net -3364.89 ml       Lines/Drains/Airways     Peripherally Inserted Central Catheter Line            PICC Double Lumen 02/07/22 1830 right basilic 2 days          Drain                 NG/OG Tube 02/07/22 0608 orogastric 16 Fr. Center mouth 3 days         Urethral Catheter 02/07/22 0623 Non-latex 16 Fr. 3 days          Peripheral Intravenous Line                 Peripheral IV - Single Lumen 02/07/22 0431 20 G Left Hand 3 days         Peripheral IV - Single Lumen 02/07/22 0605 18 G Right Hand 3 days                Physical Exam  Vitals and nursing note reviewed.   Constitutional:       General: He is not in acute distress.     Appearance: He is well-developed and well-nourished. He is ill-appearing. He is not diaphoretic.      Comments: On supplemental O2, recently extubated   HENT:      Head: Normocephalic and atraumatic.   Eyes:      General:         Right eye: No discharge.         Left eye: No discharge.      Pupils: Pupils are equal, round, and reactive to light.   Neck:      Thyroid: No thyromegaly.      Vascular: No JVD.      Trachea: No tracheal deviation.   Cardiovascular:      Rate and Rhythm: Normal rate and regular rhythm.  Extrasystoles (PVC's) are present.     Heart sounds:  Normal heart sounds, S1 normal and S2 normal. No murmur heard.      Pulmonary:      Effort: Pulmonary effort is normal. No respiratory distress.      Breath sounds: No wheezing.      Comments: Diminished BS at bases, coarse  Abdominal:      General: There is no distension.      Palpations: Abdomen is soft.      Tenderness: There is no rebound.   Musculoskeletal:         General: No edema.      Cervical back: Neck supple.      Right lower leg: No edema.      Left lower leg: No edema.   Skin:     General: Skin is warm and dry.      Findings: No erythema.   Neurological:      Mental Status: He is alert.      Comments: Recently extubated, agitated during exam   Psychiatric:         Mood and Affect: Mood and affect normal.      Comments: Agitated         Significant Labs:   CMP   Recent Labs   Lab 02/09/22  0330 02/10/22  0329    140   K 4.6 4.3    99   CO2 29 33*   * 204*   BUN 22 31*   CREATININE 1.5* 1.4   CALCIUM 8.5* 8.9   PROT 5.6* 5.6*   ALBUMIN 2.6* 2.6*   BILITOT 0.2 0.3   ALKPHOS 67 64   AST 5* 28   ALT 6* 14   ANIONGAP 9 8   ESTGFRAFRICA 56* >60   EGFRNONAA 49* 53*   , CBC   Recent Labs   Lab 02/09/22  0331 02/09/22  0331 02/10/22  0329   WBC 10.11  --  7.05   HGB 10.7*  --  10.4*   HCT 33.8*   < > 32.3*     --  285    < > = values in this interval not displayed.   , Troponin No results for input(s): TROPONINI in the last 48 hours. and All pertinent lab results from the last 24 hours have been reviewed.    Significant Imaging: Echocardiogram:   Transthoracic echo (TTE) complete ([facility] Only):   Results for orders placed or performed during the hospital encounter of 01/19/22   Echo   Result Value Ref Range    BSA 2.14 m2    TDI SEPTAL 0.07 m/s    LV LATERAL E/E' RATIO 8.38 m/s    LV SEPTAL E/E' RATIO 9.57 m/s    LA WIDTH 3.39 cm    IVC diameter 1.77 cm    Left Ventricular Outflow Tract Mean Velocity 0.92283799924237 cm/s    Left Ventricular Outflow Tract Mean Gradient 2.28 mmHg    TDI  LATERAL 0.08 m/s    LVIDd 4.62 3.5 - 6.0 cm    IVS 1.68 (A) 0.6 - 1.1 cm    Posterior Wall 1.39 (A) 0.6 - 1.1 cm    Ao root annulus 3.65 cm    LVIDs 4.37 (A) 2.1 - 4.0 cm    FS 5 28 - 44 %    LA volume 37.01 cm3    Sinus 3.68 cm    STJ 3.50 cm    Ascending aorta 3.26 cm    LV mass 296.91 g    LA size 3.28 cm    TAPSE 2.45 cm    Left Ventricle Relative Wall Thickness 0.60 cm    AV mean gradient 3 mmHg    AV valve area 2.14 cm2    AV Velocity Ratio 0.86     AV index (prosthetic) 0.61     MV valve area p 1/2 method 3.08 cm2    E/A ratio 1.00     Mean e' 0.08 m/s    E wave deceleration time 246.189818748449703 msec    IVRT 105.270560037037076 msec    LVOT diameter 2.11 cm    LVOT area 3.5 cm2    LVOT peak frankie 1.08 m/s    LVOT peak VTI 17.00 cm    Ao peak frankie 1.26 m/s    Ao VTI 27.8 cm    RVOT prox diameter 3.49 cm    RVOT area 9.56 cm2    RVOT peak frankie 0.60 m/s    RVOT peak VTI 11.8 cm    Qp:Qs ratio 0.53     LVOT stroke volume 59.41 cm3    RVOT stroke volume 112.82 cm3    AV peak gradient 6 mmHg    PV mean gradient 0.71 mmHg    E/E' ratio 8.93 m/s    MV Peak E Frankie 0.67 m/s    TR Max Frankie 3.37 m/s    MV stenosis pressure 1/2 time [id number] ms    MV Peak A Frankie 0.67 m/s    LV Systolic Volume 86.51 mL    LV Systolic Volume Index 40.8 mL/m2    LV Diastolic Volume 98.26 mL    LV Diastolic Volume Index 46.35 mL/m2    LA Volume Index 17.5 mL/m2    LV Mass Index 140 g/m2    Echo EF Estimated 12 %    RA Major Axis 4.57 cm    Left Atrium Minor Axis 3.27 cm    Left Atrium Major Axis 4.88 cm    Triscuspid Valve Regurgitation Peak Gradient 45 mmHg    RA Width 3.59 cm    Right Atrial Pressure (from IVC) 8 mmHg    EF 40 %    TV rest pulmonary artery pressure 53 mmHg    Narrative    · The left ventricle is normal in size with concentric hypertrophy and   mildly decreased systolic function.  · Grade I left ventricular diastolic dysfunction.  · Normal right ventricular size with normal right ventricular systolic   function.  ·  There is pulmonary hypertension.  · Intermediate central venous pressure (8 mmHg).  · The estimated PA systolic pressure is 53 mmHg.  · The estimated ejection fraction is 40%.  · There are segmental left ventricular wall motion abnormalities.      , EKG: Reviewed and X-Ray: CXR: X-Ray Chest 1 View (CXR): No results found for this visit on 02/07/22. and X-Ray Chest PA and Lateral (CXR): No results found for this visit on 02/07/22.

## 2022-02-10 NOTE — ASSESSMENT & PLAN NOTE
Patient with Hypoxic Respiratory failure which is Acute.  he is not on home oxygen. Supplemental oxygen was provided and noted- Vent Mode: Spont  Oxygen Concentration (%):  [] 50  Resp Rate Total:  [14 br/min-41 br/min] 41 br/min  Vt Set:  [450 mL] 450 mL  PEEP/CPAP:  [5 cmH20-8 cmH20] 5 cmH20  Pressure Support:  [10 cmH20] 10 cmH20  Mean Airway Pressure:  [8.1 uxE84-08 cmH20] 8.1 cmH20.   Signs/symptoms of respiratory failure include- tachypnea, increased work of breathing and respiratory distress. Contributing diagnoses includes - CHF Labs and images were reviewed. Patient Has recent ABG, which has been reviewed.      2/07 Patient intubated and ventilated and admitted to ICU    Cont IV steroid and IV lasix  Wean ventilation support  As tolerated

## 2022-02-10 NOTE — PROGRESS NOTES
[facility] ([facility])  Adult Nutrition  Progress Note    SUMMARY     Recommendations    Recommendation/Intervention:   1. Recommend to continue Diabetisource:   -Goal rate @ 45mL/hr.   -Provides 1296 calories, 64g protein, 881mL H2O.   -Propofol provides 145 calories @ rate of 5.5.   -100mL H2O flush q 4-6 hours or per MD/NP.     2. Weekly weights per RD follow up.     Goals:   1. Patient will continue to tolerate TF with no s/s of intolerance by RD follow up.     Nutrition Goal Status: goal met     Communication of RD Recs: (Plan of care;Sticky Note)    Assessment and Plan    Nutrition Problem  Inadequate oral intake     Related to (etiology):   Decreased ability to consume sufficient energy     Signs and Symptoms (as evidenced by):   NPO, intubated     Interventions/Recommendations (treatment strategy):  Enteral Nutrition   Collaboration with Medical Providers   Weekly weights      Nutrition Diagnosis Status:   Continues         Malnutrition Assessment         Reason for Assessment    Reason For Assessment: consult  Diagnosis:  (Acute hypoxemic resp failure secondary to acute on chronic CHF)  Relevant Medical History: HTN, gerd, CKD3, DM2, adrenal cortical adenoma  Interdisciplinary Rounds: attended  General Information Comments:     2/8:RD consulted for TF rec's. Patient is NPO and intubated. Patient is receiving propofol. Patient was intubated yesterday. Patient has 3+ moderate edema to both legs. Patients last BM is unknown. Patients weights per past encounters have fluctuated. Will continue to monitor.    2/10: Patient seen for follow up. Patient is still NPO and intubated. Patient is receiving propofol. Patient failed his SBT on 2/9. Patient is tolerating TF @ goal rate. Patients last BM 1/20. Will continue to monitor.     Nutrition Discharge Planning: pending medical course    Nutrition Risk Screen    Nutrition Risk Screen: tube feeding or parenteral nutrition    Nutrition/Diet  History    Patient Reported Diet/Restrictions/Preferences:  (unknown)  Spiritual, Cultural Beliefs, Jainism Practices, Values that Affect Care: no  Food Allergies: NKFA  Factors Affecting Nutritional Intake: NPO,on mechanical ventilation    Anthropometrics    Temp: 97.8 °F (36.6 °C)  Height Method: Estimated  Height: 6' (182.9 cm)  Height (inches): 72 in  Weight Method: Bed Scale  Weight: 96.9 kg (213 lb 10 oz)  Weight (lb): 213.63 lb  Ideal Body Weight (IBW), Male: 178 lb  % Ideal Body Weight, Male (lb): 122.12 %  BMI (Calculated): 29  BMI Grade: 25 - 29.9 - overweight       Lab/Procedures/Meds  BMP  Lab Results   Component Value Date     02/10/2022    K 4.3 02/10/2022    CL 99 02/10/2022    CO2 33 (H) 02/10/2022    BUN 31 (H) 02/10/2022    CREATININE 1.4 02/10/2022    CALCIUM 8.9 02/10/2022    ANIONGAP 8 02/10/2022    ESTGFRAFRICA >60 02/10/2022    EGFRNONAA 53 (A) 02/10/2022     Lab Results   Component Value Date     02/10/2022    K 4.3 02/10/2022    CL 99 02/10/2022    CO2 33 (H) 02/10/2022     Lab Results   Component Value Date    CALCIUM 8.9 02/10/2022    PHOS 3.9 02/07/2022     Lab Results   Component Value Date    LABPROT 11.1 01/19/2022    ALBUMIN 2.6 (L) 02/10/2022     Pertinent Labs Reviewed: reviewed  Pertinent Medications Reviewed: reviewed  Scheduled Meds:   albuterol-ipratropium  3 mL Nebulization Q4H    budesonide  0.5 mg Nebulization Q12H    cefTRIAXone (ROCEPHIN) IVPB  2 g Intravenous Q24H    chlorhexidine  15 mL Mouth/Throat BID    clonazePAM  1 mg Per OG tube BID    enoxaparin  40 mg Subcutaneous Daily    furosemide (LASIX) injection  60 mg Intravenous Q12H    insulin aspart U-100  1-10 Units Subcutaneous Q6H    insulin aspart U-100  3 Units Subcutaneous Q6H    insulin detemir U-100  15 Units Subcutaneous BID    methylPREDNISolone sodium succinate  20 mg Intravenous Q8H    mupirocin   Nasal BID    pantoprazole  40 mg Intravenous Daily    polyethylene glycol  17 g Per G  Tube Daily    QUEtiapine  100 mg Per OG tube BID    sodium chloride 0.9%  10 mL Intravenous Q6H     Continuous Infusions:   dexmedetomidine (PRECEDEX) infusion 1.4 mcg/kg/hr (02/10/22 0833)    fentanyl Stopped (02/10/22 0655)    NORepinephrine bitartrate-D5W Stopped (02/09/22 2148)    propofoL 10 mcg/kg/min (02/10/22 0640)     PRN Meds:.acetaminophen, calcium gluconate IVPB, calcium gluconate IVPB, calcium gluconate IVPB, dextrose 10%, glucagon (human recombinant), haloperidol lactate, hydrALAZINE, influenza, magnesium sulfate IVPB, magnesium sulfate IVPB, ondansetron, pneumoc 13-michael conj-dip cr(PF), potassium chloride in water **AND** potassium chloride in water **AND** potassium chloride in water, sodium chloride 0.9%, Flushing PICC Protocol **AND** sodium chloride 0.9% **AND** sodium chloride 0.9%, sodium phosphate IVPB, sodium phosphate IVPB, sodium phosphate IVPB    Physical Findings/Assessment         Estimated/Assessed Needs    Weight Used For Calorie Calculations: 98.6 kg (217 lb 6 oz)  Energy Calorie Requirements (kcal): 1962 ([facility], ICU, vent, BMI <30)  Energy Need Method: [facility]  Protein Requirements: 79-98 (0.8-1.0g/kg, CKD3)  Weight Used For Protein Calculations: 98.6 kg (217 lb 6 oz)  Fluid Requirements (mL): 1962  Estimated Fluid Requirement Method: RDA Method  RDA Method (mL): 1962  CHO Requirement: 245      Nutrition Prescription Ordered    Current Diet Order: NPO    Evaluation of Received Nutrient/Fluid Intake    Enteral Calories: 1296  Enteral Protein: 64g  Lipid Calories (kcals): 145 kcals  % Kcal Needs: 73%  % Protein Needs: 81%  Energy Calories Required: meeting needs  Protein Required:  meeting needs  Fluid Required:  meeting needs  Tolerance:  Tolerating   % Intake of Estimated Energy Needs: 50 - 75 %  % Meal Intake: NPO    Nutrition Risk    Level of Risk/Frequency of Follow-up: moderate        Monitor and Evaluation    Food and Nutrient Intake: energy intake,enteral nutrition  intake  Food and Nutrient Adminstration: enteral and parenteral nutrition administration  Anthropometric Measurements: weight,weight change,body mass index  Biochemical Data, Medical Tests and Procedures: electrolyte and renal panel,gastrointestinal profile,glucose/endocrine profile,inflammatory profile,lipid profile  Nutrition-Focused Physical Findings: overall appearance       Nutrition Follow-Up    RD Follow-up?: Yes   Vivian Welsh RD,FERN

## 2022-02-10 NOTE — ASSESSMENT & PLAN NOTE
Patient with Hypercapnic and Hypoxic Respiratory failure which is Acute on chronic.  he is on home oxygen at 2.0 LPM. Supplemental oxygen was provided and noted- Vent Mode: Spont  Oxygen Concentration (%):  [] 50  Resp Rate Total:  [14 br/min-41 br/min] 41 br/min  Vt Set:  [450 mL] 450 mL  PEEP/CPAP:  [5 cmH20-8 cmH20] 5 cmH20  Pressure Support:  [10 cmH20] 10 cmH20  Mean Airway Pressure:  [8.1 nkV86-29 cmH20] 8.1 cmH20.   Signs/symptoms of respiratory failure include- tachypnea, increased work of breathing and respiratory distress. Contributing diagnoses includes - Interstitial lung disease and Pneumonia Labs and images were reviewed. Patient Has recent ABG, which has been reviewed. Will treat underlying causes and adjust management of respiratory failure as follows-     Serial ABG  Vent bundle  IV steriods  Sputum cullture  CEFTRIAXONE + AZITHRO  Meets criteria SAT, SBT: No aggitation  For extubation

## 2022-02-10 NOTE — ASSESSMENT & PLAN NOTE
-Presents with SOB, multifactorial in setting of COPD exacerbation/? PNA and combined CHF  -Echo 1/22 showed EF of 40%, DD, pulmonary HTN, + WMA  -Continue IV diuresis as tolerated  -Resume Coreg once BP permits  -Consider Entresto pending BP/creatinine trend  -Ischemic evaluation once recovered  -Strict I's/O's    2/8/22  -Continue IV diuresis  -Resume BB as BP permits  -Strict I's/Os  -Reassess in AM    2/9/22  -Lasix increased to 60 mg IV BID   -Resume BB as tolerated/BP permits  -Strict I's/O's  -Failed SBT this AM    2/10/22  -Diuresed well overnight, extubated this AM  -Continue IV Lasix 60 mg BID  -Resume BB as tolerated  -Strict I's/O's

## 2022-02-10 NOTE — PROGRESS NOTES
[facility] ([facility])  Cardiology  Progress Note    Patient Name: Shukri Rosales  MRN: [id number]  Admission Date: 2/7/2022  Hospital Length of Stay: 3 days  Code Status: Full Code   Attending Physician: Guillaume Urbina, *   Primary Care Physician: Cris Matias NP  Expected Discharge Date:   Principal Problem:Acute hypoxemic respiratory failure    Subjective:   HPI:  Per ER- This  is a 61 y.o. male patient with PMHx of Adrenal cortical adenoma, arthritis, back surgery,  HTN, CKD stage 3, depression, GERD, migraine headache, Schizophrenia, Severe obesity, PNA, COPD, exsmoker, and DM who presents to the Emergency Department by EMS for SOB which onset one day ago. Symptoms are constant and moderate in severity. No mitigating or exacerbating factors reported. Associated sxs include wheeze. Patient denies  fever, chills, congestion, rhinorrhea, sore throat, cough, CP, leg swelling, N/V/D, dizziness, HA, and all other sxs at this time. Pt was seen in the ED a week ago with similar sxs. Pt was admit for PNA treatment. Pt states since d/c smoking a significant amount of cigarettes. Per EMS pt's SpO2 was in the low 70s at the scene. Pt was given two breathing treatments and placed on a nonrebreathing mask. Pt's SpO2 radha to 91 PTA. No further complaints or concerns at this time.       Patient was noted to be in Acute hypoxic respiratory failure in ER and was intubated and venitiated. Patient to be admitted to ICU.     Hospital Course:   2/8/22-Patient seen and examined today, intubated, sedated. Some hypotension noted overnight, required low dose pressor. Being diuresed, on abx. Labs reviewed. Creatinine 1.4. H/H 10.3/32.3.    2/9/22-Patient seen and examined today, failed SBT earlier today. Off pressors support. Labs reviewed. H/H stable. Creatinine 1.5. Will increase diuresis and assess response.     2/10/22-Patient seen and examined today, right after extubation. Agitated, BP and HR elevated.  Good diuresis overnight. Labs reviewed. Creatinine stable. Continue same mgmt.          Review of Systems   Unable to perform ROS: acuity of condition     Objective:     Vital Signs (Most Recent):  Temp: 97.8 °F (36.6 °C) (02/10/22 0815)  Pulse: 74 (02/10/22 1123)  Resp: (!) 22 (02/10/22 1123)  BP: (!) 151/88 (02/10/22 1116)  SpO2: (!) 94 % (02/10/22 1123) Vital Signs (24h Range):  Temp:  [97.3 °F (36.3 °C)-98.5 °F (36.9 °C)] 97.8 °F (36.6 °C)  Pulse:  [] 74  Resp:  [14-49] 22  SpO2:  [88 %-100 %] 94 %  BP: ()/() 151/88     Weight: 96.9 kg (213 lb 10 oz)  Body mass index is 28.97 kg/m².     SpO2: (!) 94 %  O2 Device (Oxygen Therapy): venti mask      Intake/Output Summary (Last 24 hours) at 2/10/2022 1202  Last data filed at 2/10/2022 1100  Gross per 24 hour   Intake 2690.11 ml   Output 6055 ml   Net -3364.89 ml       Lines/Drains/Airways     Peripherally Inserted Central Catheter Line            PICC Double Lumen 02/07/22 1830 right basilic 2 days          Drain                 NG/OG Tube 02/07/22 0608 orogastric 16 Fr. Center mouth 3 days         Urethral Catheter 02/07/22 0623 Non-latex 16 Fr. 3 days          Peripheral Intravenous Line                 Peripheral IV - Single Lumen 02/07/22 0431 20 G Left Hand 3 days         Peripheral IV - Single Lumen 02/07/22 0605 18 G Right Hand 3 days                Physical Exam  Vitals and nursing note reviewed.   Constitutional:       General: He is not in acute distress.     Appearance: He is well-developed and well-nourished. He is ill-appearing. He is not diaphoretic.      Comments: On supplemental O2, recently extubated   HENT:      Head: Normocephalic and atraumatic.   Eyes:      General:         Right eye: No discharge.         Left eye: No discharge.      Pupils: Pupils are equal, round, and reactive to light.   Neck:      Thyroid: No thyromegaly.      Vascular: No JVD.      Trachea: No tracheal deviation.   Cardiovascular:      Rate and Rhythm:  Normal rate and regular rhythm.  Extrasystoles (PVC's) are present.     Heart sounds: Normal heart sounds, S1 normal and S2 normal. No murmur heard.      Pulmonary:      Effort: Pulmonary effort is normal. No respiratory distress.      Breath sounds: No wheezing.      Comments: Diminished BS at bases, coarse  Abdominal:      General: There is no distension.      Palpations: Abdomen is soft.      Tenderness: There is no rebound.   Musculoskeletal:         General: No edema.      Cervical back: Neck supple.      Right lower leg: No edema.      Left lower leg: No edema.   Skin:     General: Skin is warm and dry.      Findings: No erythema.   Neurological:      Mental Status: He is alert.      Comments: Recently extubated, agitated during exam   Psychiatric:         Mood and Affect: Mood and affect normal.      Comments: Agitated         Significant Labs:   CMP   Recent Labs   Lab 02/09/22  0330 02/10/22  0329    140   K 4.6 4.3    99   CO2 29 33*   * 204*   BUN 22 31*   CREATININE 1.5* 1.4   CALCIUM 8.5* 8.9   PROT 5.6* 5.6*   ALBUMIN 2.6* 2.6*   BILITOT 0.2 0.3   ALKPHOS 67 64   AST 5* 28   ALT 6* 14   ANIONGAP 9 8   ESTGFRAFRICA 56* >60   EGFRNONAA 49* 53*   , CBC   Recent Labs   Lab 02/09/22  0331 02/09/22  0331 02/10/22  0329   WBC 10.11  --  7.05   HGB 10.7*  --  10.4*   HCT 33.8*   < > 32.3*     --  285    < > = values in this interval not displayed.   , Troponin No results for input(s): TROPONINI in the last 48 hours. and All pertinent lab results from the last 24 hours have been reviewed.    Significant Imaging: Echocardiogram:   Transthoracic echo (TTE) complete ([facility] Only):   Results for orders placed or performed during the hospital encounter of 01/19/22   Echo   Result Value Ref Range    BSA 2.14 m2    TDI SEPTAL 0.07 m/s    LV LATERAL E/E' RATIO 8.38 m/s    LV SEPTAL E/E' RATIO 9.57 m/s    LA WIDTH 3.39 cm    IVC diameter 1.77 cm    Left Ventricular Outflow Tract Mean Velocity  0.82753079909626 cm/s    Left Ventricular Outflow Tract Mean Gradient 2.28 mmHg    TDI LATERAL 0.08 m/s    LVIDd 4.62 3.5 - 6.0 cm    IVS 1.68 (A) 0.6 - 1.1 cm    Posterior Wall 1.39 (A) 0.6 - 1.1 cm    Ao root annulus 3.65 cm    LVIDs 4.37 (A) 2.1 - 4.0 cm    FS 5 28 - 44 %    LA volume 37.01 cm3    Sinus 3.68 cm    STJ 3.50 cm    Ascending aorta 3.26 cm    LV mass 296.91 g    LA size 3.28 cm    TAPSE 2.45 cm    Left Ventricle Relative Wall Thickness 0.60 cm    AV mean gradient 3 mmHg    AV valve area 2.14 cm2    AV Velocity Ratio 0.86     AV index (prosthetic) 0.61     MV valve area p 1/2 method 3.08 cm2    E/A ratio 1.00     Mean e' 0.08 m/s    E wave deceleration time 246.250628731285433 msec    IVRT 105.187707406184712 msec    LVOT diameter 2.11 cm    LVOT area 3.5 cm2    LVOT peak frankie 1.08 m/s    LVOT peak VTI 17.00 cm    Ao peak frankie 1.26 m/s    Ao VTI 27.8 cm    RVOT prox diameter 3.49 cm    RVOT area 9.56 cm2    RVOT peak frankie 0.60 m/s    RVOT peak VTI 11.8 cm    Qp:Qs ratio 0.53     LVOT stroke volume 59.41 cm3    RVOT stroke volume 112.82 cm3    AV peak gradient 6 mmHg    PV mean gradient 0.71 mmHg    E/E' ratio 8.93 m/s    MV Peak E Frankie 0.67 m/s    TR Max Frankie 3.37 m/s    MV stenosis pressure 1/2 time 71.162590331656266 ms    MV Peak A Frankie 0.67 m/s    LV Systolic Volume 86.51 mL    LV Systolic Volume Index 40.8 mL/m2    LV Diastolic Volume 98.26 mL    LV Diastolic Volume Index 46.35 mL/m2    LA Volume Index 17.5 mL/m2    LV Mass Index 140 g/m2    Echo EF Estimated 12 %    RA Major Axis 4.57 cm    Left Atrium Minor Axis 3.27 cm    Left Atrium Major Axis 4.88 cm    Triscuspid Valve Regurgitation Peak Gradient 45 mmHg    RA Width 3.59 cm    Right Atrial Pressure (from IVC) 8 mmHg    EF 40 %    TV rest pulmonary artery pressure 53 mmHg    Narrative    · The left ventricle is normal in size with concentric hypertrophy and   mildly decreased systolic function.  · Grade I left ventricular diastolic dysfunction.  ·  Normal right ventricular size with normal right ventricular systolic   function.  · There is pulmonary hypertension.  · Intermediate central venous pressure (8 mmHg).  · The estimated PA systolic pressure is 53 mmHg.  · The estimated ejection fraction is 40%.  · There are segmental left ventricular wall motion abnormalities.      , EKG: Reviewed and X-Ray: CXR: X-Ray Chest 1 View (CXR): No results found for this visit on 02/07/22. and X-Ray Chest PA and Lateral (CXR): No results found for this visit on 02/07/22.    Assessment and Plan:   Patient who presents with respiratory failure in setting of combined CHF/COPD exacerbation. Extubated this AM. Continue IV diuresis, steroids, abx.    * Acute hypoxemic respiratory failure secondary to acute on chronic CHF   -Assess response to IV diuresis    Acute on chronic combined systolic and diastolic congestive heart failure  -Presents with SOB, multifactorial in setting of COPD exacerbation/? PNA and combined CHF  -Echo 1/22 showed EF of 40%, DD, pulmonary HTN, + WMA  -Continue IV diuresis as tolerated  -Resume Coreg once BP permits  -Consider Entresto pending BP/creatinine trend  -Ischemic evaluation once recovered  -Strict I's/O's    2/8/22  -Continue IV diuresis  -Resume BB as BP permits  -Strict I's/Os  -Reassess in AM    2/9/22  -Lasix increased to 60 mg IV BID   -Resume BB as tolerated/BP permits  -Strict I's/O's  -Failed SBT this AM    2/10/22  -Diuresed well overnight, extubated this AM  -Continue IV Lasix 60 mg BID  -Resume BB as tolerated  -Strict I's/O's    History of recent pneumonia  -On abx, awaiting pulmonary evaluation    COPD exacerbation  -Mgmt as per primary team    Type 2 diabetes mellitus with stage 3 chronic kidney disease  -Mgmt as per primary team        VTE Risk Mitigation (From admission, onward)         Ordered     enoxaparin injection 40 mg  Daily         02/09/22 1117     IP VTE HIGH RISK PATIENT  Once         02/07/22 1640     Place sequential  compression device  Until discontinued         02/07/22 1640     Place EDVIN hose  Until discontinued         02/07/22 1640     Place EDVIN hose  Until discontinued         02/07/22 1042     Place sequential compression device  Until discontinued         02/07/22 1042     Place EDVIN hose  Until discontinued         02/07/22 0955     Place sequential compression device  Until discontinued         02/07/22 0955                Carmen Arellano PA-C  Cardiology  [facility] ([facility])

## 2022-02-10 NOTE — ASSESSMENT & PLAN NOTE
Patient is identified as having Combined Systolic and Diastolic heart failure that is Acute on chronic. CHF is currently controlled. Latest ECHO performed and demonstrates- Results for orders placed during the hospital encounter of 01/19/22    Echo    Interpretation Summary  · The left ventricle is normal in size with concentric hypertrophy and mildly decreased systolic function.  · Grade I left ventricular diastolic dysfunction.  · Normal right ventricular size with normal right ventricular systolic function.  · There is pulmonary hypertension.  · Intermediate central venous pressure (8 mmHg).  · The estimated PA systolic pressure is 53 mmHg.  · The estimated ejection fraction is 40%.  · There are segmental left ventricular wall motion abnormalities.  . Continue Furosemide and monitor clinical status closely. Monitor on telemetry. Patient is on CHF pathway.  Monitor strict Is&Os and daily weights.  Place on fluid restriction of 1.5 L. Continue to stress to patient importance of self efficacy and  on diet for CHF. Last BNP reviewed- and noted below   Recent Labs   Lab 02/09/22  0331   BNP 1,047*     LASIX BID    BNP trending down

## 2022-02-10 NOTE — ASSESSMENT & PLAN NOTE
Multifactorial  Due to ADHF ad COPD exacerbation   S/p mechanical ventilation . Extubated on 2/10

## 2022-02-11 PROBLEM — K92.2 GI BLEED: Status: RESOLVED | Noted: 2022-02-07 | Resolved: 2022-02-11

## 2022-02-11 LAB
ANION GAP SERPL CALC-SCNC: 14 MMOL/L (ref 8–16)
BASOPHILS # BLD AUTO: 0.02 K/UL (ref 0–0.2)
BASOPHILS NFR BLD: 0.2 % (ref 0–1.9)
BNP SERPL-MCNC: 2609 PG/ML (ref 0–99)
BUN SERPL-MCNC: 33 MG/DL (ref 8–23)
CALCIUM SERPL-MCNC: 9.6 MG/DL (ref 8.7–10.5)
CHLORIDE SERPL-SCNC: 94 MMOL/L (ref 95–110)
CO2 SERPL-SCNC: 36 MMOL/L (ref 23–29)
CREAT SERPL-MCNC: 1.2 MG/DL (ref 0.5–1.4)
DIFFERENTIAL METHOD: ABNORMAL
EOSINOPHIL # BLD AUTO: 0 K/UL (ref 0–0.5)
EOSINOPHIL NFR BLD: 0 % (ref 0–8)
ERYTHROCYTE [DISTWIDTH] IN BLOOD BY AUTOMATED COUNT: 16.5 % (ref 11.5–14.5)
EST. GFR  (AFRICAN AMERICAN): >60 ML/MIN/1.73 M^2
EST. GFR  (NON AFRICAN AMERICAN): >60 ML/MIN/1.73 M^2
GLUCOSE SERPL-MCNC: 174 MG/DL (ref 70–110)
HCT VFR BLD AUTO: 39.2 % (ref 40–54)
HGB BLD-MCNC: 13 G/DL (ref 14–18)
IMM GRANULOCYTES # BLD AUTO: 0.27 K/UL (ref 0–0.04)
IMM GRANULOCYTES NFR BLD AUTO: 2.7 % (ref 0–0.5)
LYMPHOCYTES # BLD AUTO: 1.5 K/UL (ref 1–4.8)
LYMPHOCYTES NFR BLD: 14.6 % (ref 18–48)
MAGNESIUM SERPL-MCNC: 1.7 MG/DL (ref 1.6–2.6)
MCH RBC QN AUTO: 28.1 PG (ref 27–31)
MCHC RBC AUTO-ENTMCNC: 33.2 G/DL (ref 32–36)
MCV RBC AUTO: 85 FL (ref 82–98)
MONOCYTES # BLD AUTO: 1 K/UL (ref 0.3–1)
MONOCYTES NFR BLD: 9.4 % (ref 4–15)
NEUTROPHILS # BLD AUTO: 7.4 K/UL (ref 1.8–7.7)
NEUTROPHILS NFR BLD: 73.1 % (ref 38–73)
NRBC BLD-RTO: 0 /100 WBC
PLATELET # BLD AUTO: 358 K/UL (ref 150–450)
PMV BLD AUTO: 8.9 FL (ref 9.2–12.9)
POCT GLUCOSE: 126 MG/DL (ref 70–110)
POCT GLUCOSE: 142 MG/DL (ref 70–110)
POCT GLUCOSE: 190 MG/DL (ref 70–110)
POCT GLUCOSE: 223 MG/DL (ref 70–110)
POCT GLUCOSE: 315 MG/DL (ref 70–110)
POTASSIUM SERPL-SCNC: 3.5 MMOL/L (ref 3.5–5.1)
RBC # BLD AUTO: 4.63 M/UL (ref 4.6–6.2)
SODIUM SERPL-SCNC: 144 MMOL/L (ref 136–145)
WBC # BLD AUTO: 10.16 K/UL (ref 3.9–12.7)

## 2022-02-11 PROCEDURE — 63600175 PHARM REV CODE 636 W HCPCS: Performed by: INTERNAL MEDICINE

## 2022-02-11 PROCEDURE — 99233 PR SUBSEQUENT HOSPITAL CARE,LEVL III: ICD-10-PCS | Mod: ,,, | Performed by: INTERNAL MEDICINE

## 2022-02-11 PROCEDURE — 85025 COMPLETE CBC W/AUTO DIFF WBC: CPT | Performed by: INTERNAL MEDICINE

## 2022-02-11 PROCEDURE — 25000003 PHARM REV CODE 250: Performed by: INTERNAL MEDICINE

## 2022-02-11 PROCEDURE — 83735 ASSAY OF MAGNESIUM: CPT | Performed by: INTERNAL MEDICINE

## 2022-02-11 PROCEDURE — C9399 UNCLASSIFIED DRUGS OR BIOLOG: HCPCS | Performed by: INTERNAL MEDICINE

## 2022-02-11 PROCEDURE — 80048 BASIC METABOLIC PNL TOTAL CA: CPT | Performed by: INTERNAL MEDICINE

## 2022-02-11 PROCEDURE — 21400001 HC TELEMETRY ROOM

## 2022-02-11 PROCEDURE — 97162 PT EVAL MOD COMPLEX 30 MIN: CPT

## 2022-02-11 PROCEDURE — 99900035 HC TECH TIME PER 15 MIN (STAT)

## 2022-02-11 PROCEDURE — 83880 ASSAY OF NATRIURETIC PEPTIDE: CPT | Performed by: INTERNAL MEDICINE

## 2022-02-11 PROCEDURE — 97166 OT EVAL MOD COMPLEX 45 MIN: CPT

## 2022-02-11 PROCEDURE — 99233 SBSQ HOSP IP/OBS HIGH 50: CPT | Mod: ,,, | Performed by: INTERNAL MEDICINE

## 2022-02-11 PROCEDURE — 25000242 PHARM REV CODE 250 ALT 637 W/ HCPCS: Performed by: NURSE PRACTITIONER

## 2022-02-11 PROCEDURE — 99232 PR SUBSEQUENT HOSPITAL CARE,LEVL II: ICD-10-PCS | Mod: ,,, | Performed by: INTERNAL MEDICINE

## 2022-02-11 PROCEDURE — 63600175 PHARM REV CODE 636 W HCPCS: Performed by: NURSE PRACTITIONER

## 2022-02-11 PROCEDURE — 99232 SBSQ HOSP IP/OBS MODERATE 35: CPT | Mod: ,,, | Performed by: INTERNAL MEDICINE

## 2022-02-11 PROCEDURE — 94640 AIRWAY INHALATION TREATMENT: CPT

## 2022-02-11 PROCEDURE — A4216 STERILE WATER/SALINE, 10 ML: HCPCS | Performed by: INTERNAL MEDICINE

## 2022-02-11 PROCEDURE — C9113 INJ PANTOPRAZOLE SODIUM, VIA: HCPCS | Performed by: NURSE PRACTITIONER

## 2022-02-11 PROCEDURE — 97530 THERAPEUTIC ACTIVITIES: CPT

## 2022-02-11 PROCEDURE — 27000221 HC OXYGEN, UP TO 24 HOURS

## 2022-02-11 PROCEDURE — 11000001 HC ACUTE MED/SURG PRIVATE ROOM

## 2022-02-11 RX ORDER — QUETIAPINE FUMARATE 100 MG/1
100 TABLET, FILM COATED ORAL 2 TIMES DAILY
Status: DISCONTINUED | OUTPATIENT
Start: 2022-02-11 | End: 2022-02-11

## 2022-02-11 RX ORDER — LANOLIN ALCOHOL/MO/W.PET/CERES
400 CREAM (GRAM) TOPICAL 2 TIMES DAILY
Status: DISCONTINUED | OUTPATIENT
Start: 2022-02-11 | End: 2022-02-13 | Stop reason: HOSPADM

## 2022-02-11 RX ORDER — DULOXETIN HYDROCHLORIDE 30 MG/1
60 CAPSULE, DELAYED RELEASE ORAL 2 TIMES DAILY
Status: DISCONTINUED | OUTPATIENT
Start: 2022-02-11 | End: 2022-02-13 | Stop reason: HOSPADM

## 2022-02-11 RX ORDER — PANTOPRAZOLE SODIUM 40 MG/1
40 TABLET, DELAYED RELEASE ORAL DAILY
Status: DISCONTINUED | OUTPATIENT
Start: 2022-02-12 | End: 2022-02-13 | Stop reason: HOSPADM

## 2022-02-11 RX ORDER — TAMSULOSIN HYDROCHLORIDE 0.4 MG/1
0.4 CAPSULE ORAL DAILY
Status: DISCONTINUED | OUTPATIENT
Start: 2022-02-11 | End: 2022-02-13 | Stop reason: HOSPADM

## 2022-02-11 RX ORDER — IPRATROPIUM BROMIDE AND ALBUTEROL SULFATE 2.5; .5 MG/3ML; MG/3ML
3 SOLUTION RESPIRATORY (INHALATION) EVERY 4 HOURS PRN
Status: DISCONTINUED | OUTPATIENT
Start: 2022-02-11 | End: 2022-02-13 | Stop reason: HOSPADM

## 2022-02-11 RX ORDER — CLONAZEPAM 1 MG/1
1 TABLET ORAL 2 TIMES DAILY
Status: DISCONTINUED | OUTPATIENT
Start: 2022-02-11 | End: 2022-02-12

## 2022-02-11 RX ORDER — NAPROXEN SODIUM 220 MG/1
81 TABLET, FILM COATED ORAL DAILY
Status: DISCONTINUED | OUTPATIENT
Start: 2022-02-11 | End: 2022-02-13 | Stop reason: HOSPADM

## 2022-02-11 RX ORDER — ATORVASTATIN CALCIUM 10 MG/1
20 TABLET, FILM COATED ORAL DAILY
Status: DISCONTINUED | OUTPATIENT
Start: 2022-02-11 | End: 2022-02-13 | Stop reason: HOSPADM

## 2022-02-11 RX ORDER — MAGNESIUM SULFATE HEPTAHYDRATE 40 MG/ML
2 INJECTION, SOLUTION INTRAVENOUS ONCE
Status: COMPLETED | OUTPATIENT
Start: 2022-02-11 | End: 2022-02-11

## 2022-02-11 RX ORDER — PREDNISONE 20 MG/1
20 TABLET ORAL DAILY
Status: DISCONTINUED | OUTPATIENT
Start: 2022-02-11 | End: 2022-02-13 | Stop reason: HOSPADM

## 2022-02-11 RX ORDER — GABAPENTIN 100 MG/1
100 CAPSULE ORAL 3 TIMES DAILY
Status: DISCONTINUED | OUTPATIENT
Start: 2022-02-11 | End: 2022-02-12

## 2022-02-11 RX ORDER — FUROSEMIDE 10 MG/ML
40 INJECTION INTRAMUSCULAR; INTRAVENOUS ONCE
Status: COMPLETED | OUTPATIENT
Start: 2022-02-11 | End: 2022-02-11

## 2022-02-11 RX ORDER — FUROSEMIDE 10 MG/ML
40 INJECTION INTRAMUSCULAR; INTRAVENOUS
Status: DISCONTINUED | OUTPATIENT
Start: 2022-02-11 | End: 2022-02-12

## 2022-02-11 RX ORDER — CARVEDILOL 6.25 MG/1
6.25 TABLET ORAL 2 TIMES DAILY
Status: DISCONTINUED | OUTPATIENT
Start: 2022-02-11 | End: 2022-02-13 | Stop reason: HOSPADM

## 2022-02-11 RX ORDER — AMLODIPINE BESYLATE 10 MG/1
10 TABLET ORAL DAILY
Status: DISCONTINUED | OUTPATIENT
Start: 2022-02-11 | End: 2022-02-12

## 2022-02-11 RX ORDER — ACETAMINOPHEN 325 MG/1
650 TABLET ORAL EVERY 4 HOURS PRN
Status: DISCONTINUED | OUTPATIENT
Start: 2022-02-11 | End: 2022-02-13 | Stop reason: HOSPADM

## 2022-02-11 RX ORDER — QUETIAPINE FUMARATE 100 MG/1
100 TABLET, FILM COATED ORAL DAILY
Status: DISCONTINUED | OUTPATIENT
Start: 2022-02-12 | End: 2022-02-12

## 2022-02-11 RX ORDER — TRAZODONE HYDROCHLORIDE 50 MG/1
50 TABLET ORAL NIGHTLY
Status: DISCONTINUED | OUTPATIENT
Start: 2022-02-11 | End: 2022-02-12

## 2022-02-11 RX ORDER — LISINOPRIL 20 MG/1
20 TABLET ORAL DAILY
Status: DISCONTINUED | OUTPATIENT
Start: 2022-02-11 | End: 2022-02-12

## 2022-02-11 RX ORDER — QUETIAPINE FUMARATE 100 MG/1
200 TABLET, FILM COATED ORAL NIGHTLY
Status: DISCONTINUED | OUTPATIENT
Start: 2022-02-11 | End: 2022-02-13 | Stop reason: HOSPADM

## 2022-02-11 RX ADMIN — DEXMEDETOMIDINE HYDROCHLORIDE 0.4 MCG/KG/HR: 4 INJECTION INTRAVENOUS at 12:02

## 2022-02-11 RX ADMIN — DULOXETINE 60 MG: 30 CAPSULE, DELAYED RELEASE ORAL at 10:02

## 2022-02-11 RX ADMIN — CLONAZEPAM 1 MG: 1 TABLET ORAL at 10:02

## 2022-02-11 RX ADMIN — MAGNESIUM SULFATE HEPTAHYDRATE 2 G: 2 INJECTION, SOLUTION INTRAVENOUS at 02:02

## 2022-02-11 RX ADMIN — METHYLPREDNISOLONE SODIUM SUCCINATE 20 MG: 40 INJECTION, POWDER, FOR SOLUTION INTRAMUSCULAR; INTRAVENOUS at 05:02

## 2022-02-11 RX ADMIN — MUPIROCIN: 20 OINTMENT TOPICAL at 09:02

## 2022-02-11 RX ADMIN — SODIUM CHLORIDE, PRESERVATIVE FREE 10 ML: 5 INJECTION INTRAVENOUS at 11:02

## 2022-02-11 RX ADMIN — BUDESONIDE 0.5 MG: 0.5 INHALANT ORAL at 07:02

## 2022-02-11 RX ADMIN — IPRATROPIUM BROMIDE AND ALBUTEROL SULFATE 3 ML: 2.5; .5 SOLUTION RESPIRATORY (INHALATION) at 07:02

## 2022-02-11 RX ADMIN — Medication 400 MG: at 10:02

## 2022-02-11 RX ADMIN — LISINOPRIL 20 MG: 20 TABLET ORAL at 02:02

## 2022-02-11 RX ADMIN — HALOPERIDOL LACTATE 5 MG: 5 INJECTION, SOLUTION INTRAMUSCULAR at 06:02

## 2022-02-11 RX ADMIN — MUPIROCIN: 20 OINTMENT TOPICAL at 10:02

## 2022-02-11 RX ADMIN — SODIUM CHLORIDE, PRESERVATIVE FREE 10 ML: 5 INJECTION INTRAVENOUS at 05:02

## 2022-02-11 RX ADMIN — ATORVASTATIN CALCIUM 20 MG: 10 TABLET, FILM COATED ORAL at 02:02

## 2022-02-11 RX ADMIN — FUROSEMIDE 40 MG: 10 INJECTION INTRAMUSCULAR; INTRAVENOUS at 10:02

## 2022-02-11 RX ADMIN — IPRATROPIUM BROMIDE AND ALBUTEROL SULFATE 3 ML: 2.5; .5 SOLUTION RESPIRATORY (INHALATION) at 12:02

## 2022-02-11 RX ADMIN — INSULIN ASPART 3 UNITS: 100 INJECTION, SOLUTION INTRAVENOUS; SUBCUTANEOUS at 11:02

## 2022-02-11 RX ADMIN — IPRATROPIUM BROMIDE AND ALBUTEROL SULFATE 3 ML: 2.5; .5 SOLUTION RESPIRATORY (INHALATION) at 04:02

## 2022-02-11 RX ADMIN — INSULIN ASPART 8 UNITS: 100 INJECTION, SOLUTION INTRAVENOUS; SUBCUTANEOUS at 11:02

## 2022-02-11 RX ADMIN — TAMSULOSIN HYDROCHLORIDE 0.4 MG: 0.4 CAPSULE ORAL at 02:02

## 2022-02-11 RX ADMIN — PANTOPRAZOLE SODIUM 40 MG: 40 INJECTION, POWDER, FOR SOLUTION INTRAVENOUS at 09:02

## 2022-02-11 RX ADMIN — TRAZODONE HYDROCHLORIDE 50 MG: 50 TABLET ORAL at 10:02

## 2022-02-11 RX ADMIN — CLONAZEPAM 1 MG: 1 TABLET ORAL at 09:02

## 2022-02-11 RX ADMIN — HYDRALAZINE HYDROCHLORIDE 10 MG: 20 INJECTION INTRAMUSCULAR; INTRAVENOUS at 04:02

## 2022-02-11 RX ADMIN — PREDNISONE 20 MG: 20 TABLET ORAL at 11:02

## 2022-02-11 RX ADMIN — ASPIRIN 81 MG CHEWABLE TABLET 81 MG: 81 TABLET CHEWABLE at 02:02

## 2022-02-11 RX ADMIN — AMLODIPINE BESYLATE 10 MG: 10 TABLET ORAL at 02:02

## 2022-02-11 RX ADMIN — INSULIN ASPART 2 UNITS: 100 INJECTION, SOLUTION INTRAVENOUS; SUBCUTANEOUS at 12:02

## 2022-02-11 RX ADMIN — SODIUM CHLORIDE, PRESERVATIVE FREE 10 ML: 5 INJECTION INTRAVENOUS at 12:02

## 2022-02-11 RX ADMIN — POLYETHYLENE GLYCOL 3350 17 G: 17 POWDER, FOR SOLUTION ORAL at 09:02

## 2022-02-11 RX ADMIN — INSULIN DETEMIR 20 UNITS: 100 INJECTION, SOLUTION SUBCUTANEOUS at 10:02

## 2022-02-11 RX ADMIN — QUETIAPINE FUMARATE 200 MG: 100 TABLET ORAL at 10:02

## 2022-02-11 RX ADMIN — FUROSEMIDE 40 MG: 40 INJECTION, SOLUTION INTRAMUSCULAR; INTRAVENOUS at 02:02

## 2022-02-11 RX ADMIN — CARVEDILOL 6.25 MG: 6.25 TABLET, FILM COATED ORAL at 10:02

## 2022-02-11 RX ADMIN — POTASSIUM CHLORIDE 40 MEQ: 10 INJECTION, SOLUTION INTRAVENOUS at 06:02

## 2022-02-11 RX ADMIN — GABAPENTIN 100 MG: 100 CAPSULE ORAL at 10:02

## 2022-02-11 RX ADMIN — CEFTRIAXONE 2 G: 2 INJECTION, SOLUTION INTRAVENOUS at 11:02

## 2022-02-11 RX ADMIN — INSULIN ASPART 2 UNITS: 100 INJECTION, SOLUTION INTRAVENOUS; SUBCUTANEOUS at 05:02

## 2022-02-11 RX ADMIN — QUETIAPINE FUMARATE 100 MG: 100 TABLET ORAL at 09:02

## 2022-02-11 RX ADMIN — GABAPENTIN 100 MG: 100 CAPSULE ORAL at 02:02

## 2022-02-11 RX ADMIN — INSULIN ASPART 3 UNITS: 100 INJECTION, SOLUTION INTRAVENOUS; SUBCUTANEOUS at 05:02

## 2022-02-11 RX ADMIN — ENOXAPARIN SODIUM 40 MG: 40 INJECTION SUBCUTANEOUS at 05:02

## 2022-02-11 NOTE — ASSESSMENT & PLAN NOTE
-Presents with SOB, multifactorial in setting of COPD exacerbation/? PNA and combined CHF  -Echo 1/22 showed EF of 40%, DD, pulmonary HTN, + WMA  -Continue IV diuresis as tolerated  -Resume Coreg once BP permits  -Consider Entresto pending BP/creatinine trend  -Ischemic evaluation once recovered  -Strict I's/O's    2/8/22  -Continue IV diuresis  -Resume BB as BP permits  -Strict I's/Os  -Reassess in AM    2/9/22  -Lasix increased to 60 mg IV BID   -Resume BB as tolerated/BP permits  -Strict I's/O's  -Failed SBT this AM    2/10/22  -Diuresed well overnight, extubated this AM  -Continue IV Lasix 60 mg BID  -Resume BB as tolerated  -Strict I's/O's    2/11/22  -Continue diuresis as needed  -Resume Coreg   -Strict I's/O's

## 2022-02-11 NOTE — SUBJECTIVE & OBJECTIVE
Interval History: *  02/11: seen and examined: extubated yesterday, Aax3, UO 9895 mls, T max: 99.7F    Review of Systems   All other systems reviewed and are negative.       Objective:     Vital Signs (Most Recent):  Temp: 99.7 °F (37.6 °C) (02/11/22 0700)  Pulse: (!) 115 (02/11/22 0725)  Resp: (!) 28 (02/11/22 0725)  BP: (!) 153/84 (02/11/22 0700)  SpO2: 95 % (02/11/22 0725) Vital Signs (24h Range):  Temp:  [97.4 °F (36.3 °C)-99.7 °F (37.6 °C)] 99.7 °F (37.6 °C)  Pulse:  [] 115  Resp:  [14-55] 28  SpO2:  [89 %-100 %] 95 %  BP: (113-214)/() 153/84     Weight: 83.9 kg (184 lb 15.5 oz)  Body mass index is 25.09 kg/m².      Intake/Output Summary (Last 24 hours) at 2/11/2022 0835  Last data filed at 2/11/2022 0700  Gross per 24 hour   Intake 1476.71 ml   Output 9620 ml   Net -8143.29 ml       Physical Exam  Vitals and nursing note reviewed.   HENT:      Head: Normocephalic and atraumatic.   Eyes:      Pupils: Pupils are equal, round, and reactive to light.   Cardiovascular:      Rate and Rhythm: Normal rate.      Pulses: Normal pulses.      Heart sounds: Normal heart sounds.   Pulmonary:      Effort: Pulmonary effort is normal.      Breath sounds: Normal breath sounds.   Abdominal:      General: Bowel sounds are normal.      Palpations: Abdomen is soft.   Musculoskeletal:      Cervical back: Normal range of motion.   Neurological:      Mental Status: He is alert and oriented to person, place, and time.         Vents:  Vent Mode: Spont (02/10/22 0728)  Ventilator Initiated: Yes (02/07/22 0634)  Set Rate: 20 BPM (02/10/22 0508)  Vt Set: 450 mL (02/10/22 0508)  Pressure Support: 10 cmH20 (02/10/22 0728)  PEEP/CPAP: 5 cmH20 (02/10/22 0728)  Oxygen Concentration (%): 36 (02/11/22 0725)  Peak Airway Pressure: 17 cmH2O (02/10/22 0728)  Plateau Pressure: 15 cmH20 (02/10/22 0728)  Total Ve: 15.5 mL (02/10/22 0728)  Negative Inspiratory Force (cm H2O): 0 (02/10/22 0728)  F/VT Ratio<105 (RSBI): (!) 18.3 (02/10/22  0728)    Lines/Drains/Airways     Peripherally Inserted Central Catheter Line            PICC Double Lumen 02/07/22 1830 right basilic 3 days          Drain                 Urethral Catheter 02/07/22 0623 Non-latex 16 Fr. 4 days                Significant Labs:    CBC/Anemia Profile:  Recent Labs   Lab 02/10/22  0329 02/11/22  0350   WBC 7.05 10.16   HGB 10.4* 13.0*   HCT 32.3* 39.2*    358   MCV 89 85   RDW 17.0* 16.5*        Chemistries:  Recent Labs   Lab 02/10/22  0329 02/11/22  0350    144   K 4.3 3.5   CL 99 94*   CO2 33* 36*   BUN 31* 33*   CREATININE 1.4 1.2   CALCIUM 8.9 9.6   ALBUMIN 2.6*  --    PROT 5.6*  --    BILITOT 0.3  --    ALKPHOS 64  --    ALT 14  --    AST 28  --        Blood Culture: No results for input(s): LABBLOO in the last 48 hours.  Respiratory Culture: No results for input(s): GSRESP, RESPIRATORYC in the last 48 hours.  All pertinent labs within the past 24 hours have been reviewed.    Significant Imaging:  I have reviewed all pertinent imaging results/findings within the past 24 hours.

## 2022-02-11 NOTE — ASSESSMENT & PLAN NOTE
Patient is identified as having Combined Systolic and Diastolic heart failure that is Acute on chronic. CHF is currently controlled. Latest ECHO performed and demonstrates- Results for orders placed during the hospital encounter of 01/19/22    Echo    Interpretation Summary  · The left ventricle is normal in size with concentric hypertrophy and mildly decreased systolic function.  · Grade I left ventricular diastolic dysfunction.  · Normal right ventricular size with normal right ventricular systolic function.  · There is pulmonary hypertension.  · Intermediate central venous pressure (8 mmHg).  · The estimated PA systolic pressure is 53 mmHg.  · The estimated ejection fraction is 40%.  · There are segmental left ventricular wall motion abnormalities.  . Continue Furosemide and monitor clinical status closely. Monitor on telemetry. Patient is on CHF pathway.  Monitor strict Is&Os and daily weights.  Place on fluid restriction of 1.5 L. Continue to stress to patient importance of self efficacy and  on diet for CHF. Last BNP reviewed- and noted below   Recent Labs   Lab 02/11/22  0941   BNP 2,609*     LASIX BID    BNP trending down

## 2022-02-11 NOTE — NURSING
Patient remained safe and stable for the duration of the shift. Laying in bed with safety and fall prevention measures in place. Soft bilateral wrist restraints remain in use. Extubated to ventimask @ 50%. Current vital signs are stable. Will monitor for needs/changes. See flowsheets for more details.

## 2022-02-11 NOTE — SUBJECTIVE & OBJECTIVE
Review of Systems   Reason unable to perform ROS: limited due to patient's lethargy.     Objective:     Vital Signs (Most Recent):  Temp: 99.3 °F (37.4 °C) (02/11/22 1100)  Pulse: (!) 115 (02/11/22 1100)  Resp: (!) 23 (02/11/22 1100)  BP: (!) 160/86 (02/11/22 1100)  SpO2: 97 % (02/11/22 1100) Vital Signs (24h Range):  Temp:  [97.4 °F (36.3 °C)-99.7 °F (37.6 °C)] 99.3 °F (37.4 °C)  Pulse:  [] 115  Resp:  [21-55] 23  SpO2:  [89 %-100 %] 97 %  BP: (113-183)/() 160/86     Weight: 83.9 kg (184 lb 15.5 oz)  Body mass index is 25.09 kg/m².     SpO2: 97 %  O2 Device (Oxygen Therapy): room air      Intake/Output Summary (Last 24 hours) at 2/11/2022 1239  Last data filed at 2/11/2022 1100  Gross per 24 hour   Intake 1764.94 ml   Output 7185 ml   Net -5420.06 ml       Lines/Drains/Airways     Peripherally Inserted Central Catheter Line            PICC Double Lumen 02/07/22 1830 right basilic 3 days          Drain                 Urethral Catheter 02/07/22 0623 Non-latex 16 Fr. 4 days                Physical Exam  Vitals and nursing note reviewed.   Constitutional:       General: He is not in acute distress.     Appearance: He is well-developed and well-nourished. He is ill-appearing. He is not diaphoretic.      Comments: On supplemental O2   HENT:      Head: Normocephalic and atraumatic.   Eyes:      General:         Right eye: No discharge.         Left eye: No discharge.      Pupils: Pupils are equal, round, and reactive to light.   Neck:      Thyroid: No thyromegaly.      Vascular: No JVD.      Trachea: No tracheal deviation.   Cardiovascular:      Rate and Rhythm: Regular rhythm. Tachycardia present.      Heart sounds: Normal heart sounds, S1 normal and S2 normal. No murmur heard.      Pulmonary:      Effort: Pulmonary effort is normal. No respiratory distress.      Breath sounds: No wheezing.      Comments: Diminished BS at bases  Abdominal:      General: There is no distension.      Palpations: Abdomen is  soft.      Tenderness: There is no rebound.   Musculoskeletal:         General: No edema.      Cervical back: Neck supple.      Right lower leg: No edema.      Left lower leg: No edema.   Skin:     General: Skin is warm and dry.      Findings: No erythema.   Neurological:      Mental Status: He is alert.      Comments: Lethargic   Psychiatric:         Mood and Affect: Mood and affect normal.         Significant Labs:   CMP   Recent Labs   Lab 02/10/22  0329 02/11/22  0350    144   K 4.3 3.5   CL 99 94*   CO2 33* 36*   * 174*   BUN 31* 33*   CREATININE 1.4 1.2   CALCIUM 8.9 9.6   PROT 5.6*  --    ALBUMIN 2.6*  --    BILITOT 0.3  --    ALKPHOS 64  --    AST 28  --    ALT 14  --    ANIONGAP 8 14   ESTGFRAFRICA >60 >60   EGFRNONAA 53* >60   , CBC   Recent Labs   Lab 02/10/22  0329 02/10/22  0329 02/11/22  0350   WBC 7.05  --  10.16   HGB 10.4*  --  13.0*   HCT 32.3*   < > 39.2*     --  358    < > = values in this interval not displayed.   , Troponin No results for input(s): TROPONINI in the last 48 hours. and All pertinent lab results from the last 24 hours have been reviewed.    Significant Imaging: Echocardiogram:   Transthoracic echo (TTE) complete (Cupid Only):   Results for orders placed or performed during the hospital encounter of 01/19/22   Echo   Result Value Ref Range    BSA 2.14 m2    TDI SEPTAL 0.07 m/s    LV LATERAL E/E' RATIO 8.38 m/s    LV SEPTAL E/E' RATIO 9.57 m/s    LA WIDTH 3.39 cm    IVC diameter 1.77 cm    Left Ventricular Outflow Tract Mean Velocity 0.15974581730272 cm/s    Left Ventricular Outflow Tract Mean Gradient 2.28 mmHg    TDI LATERAL 0.08 m/s    LVIDd 4.62 3.5 - 6.0 cm    IVS 1.68 (A) 0.6 - 1.1 cm    Posterior Wall 1.39 (A) 0.6 - 1.1 cm    Ao root annulus 3.65 cm    LVIDs 4.37 (A) 2.1 - 4.0 cm    FS 5 28 - 44 %    LA volume 37.01 cm3    Sinus 3.68 cm    STJ 3.50 cm    Ascending aorta 3.26 cm    LV mass 296.91 g    LA size 3.28 cm    TAPSE 2.45 cm    Left Ventricle  Relative Wall Thickness 0.60 cm    AV mean gradient 3 mmHg    AV valve area 2.14 cm2    AV Velocity Ratio 0.86     AV index (prosthetic) 0.61     MV valve area p 1/2 method 3.08 cm2    E/A ratio 1.00     Mean e' 0.08 m/s    E wave deceleration time 246.222638026915946 msec    IVRT 105.258858566480525 msec    LVOT diameter 2.11 cm    LVOT area 3.5 cm2    LVOT peak frankie 1.08 m/s    LVOT peak VTI 17.00 cm    Ao peak frankie 1.26 m/s    Ao VTI 27.8 cm    RVOT prox diameter 3.49 cm    RVOT area 9.56 cm2    RVOT peak frankie 0.60 m/s    RVOT peak VTI 11.8 cm    Qp:Qs ratio 0.53     LVOT stroke volume 59.41 cm3    RVOT stroke volume 112.82 cm3    AV peak gradient 6 mmHg    PV mean gradient 0.71 mmHg    E/E' ratio 8.93 m/s    MV Peak E Frankie 0.67 m/s    TR Max Frankie 3.37 m/s    MV stenosis pressure 1/2 time 71.617514601511752 ms    MV Peak A Frankie 0.67 m/s    LV Systolic Volume 86.51 mL    LV Systolic Volume Index 40.8 mL/m2    LV Diastolic Volume 98.26 mL    LV Diastolic Volume Index 46.35 mL/m2    LA Volume Index 17.5 mL/m2    LV Mass Index 140 g/m2    Echo EF Estimated 12 %    RA Major Axis 4.57 cm    Left Atrium Minor Axis 3.27 cm    Left Atrium Major Axis 4.88 cm    Triscuspid Valve Regurgitation Peak Gradient 45 mmHg    RA Width 3.59 cm    Right Atrial Pressure (from IVC) 8 mmHg    EF 40 %    TV rest pulmonary artery pressure 53 mmHg    Narrative    · The left ventricle is normal in size with concentric hypertrophy and   mildly decreased systolic function.  · Grade I left ventricular diastolic dysfunction.  · Normal right ventricular size with normal right ventricular systolic   function.  · There is pulmonary hypertension.  · Intermediate central venous pressure (8 mmHg).  · The estimated PA systolic pressure is 53 mmHg.  · The estimated ejection fraction is 40%.  · There are segmental left ventricular wall motion abnormalities.      , EKG: Reviewed and X-Ray: CXR: X-Ray Chest PA and Lateral (CXR): No results found for this visit  on 02/07/22.

## 2022-02-11 NOTE — ASSESSMENT & PLAN NOTE
PA estimated 53  Multifactorial  Gentle diuresis BID lasix  Net-ve 8 lit    PH work up as out patient  Sleep study  RHC  Home O2 eval

## 2022-02-11 NOTE — PT/OT/SLP EVAL
Physical Therapy Evaluation    Patient Name:  Shukri Rosales   MRN:  [id number]    Recommendations:     Discharge Recommendations:  nursing facility, skilled,home health PT (DEPENDING ON PROGRESS WITH POC)   Discharge Equipment Recommendations: none   Barriers to discharge: UNKNOWN, PT IS CURRENTLY POOR HISTORIAN    Assessment:     Shukri Rosales is a 61 y.o. male admitted with a medical diagnosis of Acute hypoxemic respiratory failure.  He presents with the following impairments/functional limitations:  weakness,impaired endurance,impaired cognition,impaired functional mobilty,gait instability,impaired balance,decreased safety awareness,decreased lower extremity function,decreased coordination.    Rehab Prognosis: Good; patient would benefit from acute skilled PT services to address these deficits and reach maximum level of function.    Recent Surgery: * No surgery found *      Plan:     During this hospitalization, patient to be seen 3 x/week to address the identified rehab impairments via gait training,therapeutic activities,therapeutic exercises and progress toward the following goals:    · Plan of Care Expires:  02/25/22    Subjective     Chief Complaint:   Patient/Family Comments/goals:   Pain/Comfort:  · Pain Rating 1: 0/10    Patients cultural, spiritual, Episcopal conflicts given the current situation:      Living Environment:  PT IS POOR HISTORIAN SO PLOF/HISTORY OBTAINED FROM CHART FROM RECENT STAY:  PT LIVES ALONE 1 STORY HOUSE NO STEPS, AMB WITH OR WITHOUT RW LIMITED COMMUNITY DISTANCES, DOES NOT DRIVE, BROTHER OR SISTER DRIVES FOR HIM, INDEP WITH ADL'S  Prior to admission, patients level of function was TOÑO.  Equipment used at home: walker, rolling,bedside commode,bath bench (STANDING UPRIGHT WALKER).  DME owned (not currently used): none.  Upon discharge, patient will have assistance from ?.    Objective:     Communicated with NURSE GUY prior to session.  Patient found supine with  telemetry,blood pressure cuff,pulse ox (continuous),peripheral IV,oxygen (O2 DONNED PER NURSE REQUEST BUT NO DONNED UPON ARRIVAL)  upon PT entry to room.    General Precautions: Standard, fall,respiratory   Orthopedic Precautions:N/A   Braces: N/A  Respiratory Status: Nasal cannula, flow 3 L/min    Exams:  · Cognitive Exam:  Patient is oriented to Person and PT CONFUSED/DISORIENTED, VERY MINIMAL VERBALIZATION, FLAT AFFECT  · Postural Exam:  Patient presented with the following abnormalities:    · -       Rounded shoulders  · Sensation:    · -       Intact  · RLE ROM: WFL  · RLE Strength: GROSSLY 3+/5  · LLE ROM: WFL  · LLE Strength: GROSSLY 3+/5    Functional Mobility:  · Bed Mobility:     · Rolling Left:  minimum assistance  · Scooting: minimum assistance  · Supine to Sit: minimum assistance  · Transfers:     · Sit to Stand:  minimum assistance with no AD  · Bed to Chair: minimum assistance with  no AD  using  Step Transfer  · Gait: PT TOOK APPROX. 4 SMALL STEPS WITH MIN/HHA TO TF BED TO CHAIR, CUES FOR UPRIGHT POSTURE, TACTILE CUES FOR PT TO STAY ON TASK  · Balance: POOR    Therapeutic Activities and Exercises:   PT EDUCATED IN ROLE OF P.T. AND POC, PT ENCOURAGED TO INCREASE TIME OOB IN CHAIR, PT EDUCATED IN BLE THERE EX TO PERFORM WHILE SEATED IN CHAIR, PT REQUIRED CONSTANT VERBAL AND TACTILE CUES TO STAY ON TASK WITH ALL MOBILITY    AM-PAC 6 CLICK MOBILITY  Total Score:16     Patient left up in chair with all lines intact, call button in reach, chair alarm on and NURSE notified.    GOALS:   Multidisciplinary Problems     Physical Therapy Goals        Problem: Physical Therapy Goal    Goal Priority Disciplines Outcome Goal Variances Interventions   Physical Therapy Goal     PT, PT/OT      Description: LTG'S TO BE MET IN 14 DAYS (2-25-22)  1. PT WILL REQUIRE SBA FOR BED MOBILITY  2. PT WILL REQUIRES SBA FOR TF'S  3. PT WILL ' WITH RW AND SBA                   History:     Past Medical History:   Diagnosis  Date    Adrenal cortical adenoma     Anxiety     Arthritis     CKD (chronic kidney disease) stage 3, GFR 30-59 ml/min     Depression     Diabetes mellitus type II 2011    does not take    DM (diabetes mellitus) 2011     am 09/21/2017 Insulin x 1 1/2 year    GERD (gastroesophageal reflux disease) 7/9/2013    Hypertension     Migraine headache 7/22/2013    Schizophrenia     Severe obesity with body mass index of 36.0 to 36.9        Past Surgical History:   Procedure Laterality Date    BACK SURGERY      COLONOSCOPY N/A 12/3/2020    Procedure: COLONOSCOPY;  Surgeon: Christofer Palmer MD;  Location: [facility];  Service: Endoscopy;  Laterality: N/A;    COLONOSCOPY N/A 12/4/2020    Procedure: COLONOSCOPY;  Surgeon: Frandy Stanton MD;  Location: [facility];  Service: Endoscopy;  Laterality: N/A;    HERNIA REPAIR      UMBILICAL    left arm exfix      NASAL SEPTUM SURGERY Bilateral     TONSILLECTOMY      URETEROSCOPY         Time Tracking:     PT Received On: 02/11/22  PT Start Time: 0900     PT Stop Time: 0923  PT Total Time (min): 23 min     Billable Minutes: Evaluation 15 and Therapeutic Activity 8    02/11/2022

## 2022-02-11 NOTE — ASSESSMENT & PLAN NOTE
Patient with Hypoxic Respiratory failure which is Acute.  he is not on home oxygen. Supplemental oxygen was provided and noted- Oxygen Concentration (%):  [36-50] 36.   Signs/symptoms of respiratory failure include- tachypnea, increased work of breathing and respiratory distress. Contributing diagnoses includes - CHF Labs and images were reviewed. Patient Has recent ABG, which has been reviewed.      2/07 Patient intubated and ventilated and admitted to ICU    Cont IV steroid and IV lasix  Wean ventilation support  As tolerated   2/11-  Extubated yesterday   Wean fiO2 as tolerated   Continue CHF and COPD exacerbation treatment

## 2022-02-11 NOTE — ASSESSMENT & PLAN NOTE
Patient is identified as having Combined Systolic and Diastolic heart failure that is Acute on chronic. CHF is currently uncontrolled due to Continued edema of extremities and Dyspnea     Echo    Interpretation Summary  · The left ventricle is normal in size with concentric hypertrophy and mildly decreased systolic function.  · Grade I left ventricular diastolic dysfunction.  · Normal right ventricular size with normal right ventricular systolic function.  · There is pulmonary hypertension.  · Intermediate central venous pressure (8 mmHg).  · The estimated PA systolic pressure is 53 mmHg.  · The estimated ejection fraction is 40%.  · There are segmental left ventricular wall motion abnormalities.    Telemetry  Add Iv lasix Bid  Cardiology consulted    Recent Labs   Lab 02/11/22  0941   BNP 2,609*   .  2/11-  Continue IV diuresis   Resume BB, ACEi

## 2022-02-11 NOTE — SUBJECTIVE & OBJECTIVE
Interval History:      O2 wean down to 4L/min. Completed antibiotic today. Steroid transitioned to oral.  Noted BNP is further elevated 2609. Additional Lasix IV ordered per CC team. ASA, Amlodipine , Coreg and statin resumed.     Review of Systems   Unable to perform ROS: Other   Pt is awake but minimally verbal.   Objective:     Vital Signs (Most Recent):  Temp: 99.3 °F (37.4 °C) (02/11/22 1100)  Pulse: 110 (02/11/22 1400)  Resp: (!) 23 (02/11/22 1400)  BP: (!) 153/79 (02/11/22 1400)  SpO2: 96 % (02/11/22 1400) Vital Signs (24h Range):  Temp:  [97.4 °F (36.3 °C)-99.7 °F (37.6 °C)] 99.3 °F (37.4 °C)  Pulse:  [] 110  Resp:  [20-55] 23  SpO2:  [89 %-100 %] 96 %  BP: (113-183)/() 153/79     Weight: 83.9 kg (184 lb 15.5 oz)  Body mass index is 25.09 kg/m².    Intake/Output Summary (Last 24 hours) at 2/11/2022 1436  Last data filed at 2/11/2022 1400  Gross per 24 hour   Intake 1935.33 ml   Output 5380 ml   Net -3444.67 ml      Physical Exam  Constitutional:       General: He is not in acute distress.     Appearance: He is well-developed and well-nourished. He is ill-appearing. He is not diaphoretic.      Comments: Pt is minimally verbal. Does not answer questions    HENT:      Head: Normocephalic and atraumatic.      Mouth/Throat:      Pharynx: No oropharyngeal exudate.   Eyes:      Extraocular Movements: EOM normal.      Conjunctiva/sclera: Conjunctivae normal.      Pupils: Pupils are equal, round, and reactive to light.   Neck:      Thyroid: No thyromegaly.      Vascular: No JVD.   Cardiovascular:      Rate and Rhythm: Regular rhythm. Tachycardia present.      Heart sounds: Normal heart sounds. No murmur heard.      Pulmonary:      Effort: Pulmonary effort is normal. Tachypnea (mild) present. No respiratory distress.      Breath sounds: Normal breath sounds. No wheezing or rales.   Chest:      Chest wall: No tenderness.   Abdominal:      General: Bowel sounds are normal. There is no distension.       Palpations: Abdomen is soft.      Tenderness: There is no abdominal tenderness. There is no guarding or rebound.   Musculoskeletal:         General: No edema. Normal range of motion.      Cervical back: Normal range of motion and neck supple.   Lymphadenopathy:      Cervical: No cervical adenopathy.   Skin:     General: Skin is warm and dry.      Findings: No rash.   Neurological:      Mental Status: He is alert.      Comments: Pt is wake , minimally verbal. Follows commands.    Psychiatric:         Mood and Affect: Mood and affect normal.         Significant Labs:   All pertinent labs within the past 24 hours have been reviewed.  BMP:   Recent Labs   Lab 02/11/22 0350 02/11/22 0941   *  --      --    K 3.5  --    CL 94*  --    CO2 36*  --    BUN 33*  --    CREATININE 1.2  --    CALCIUM 9.6  --    MG  --  1.7     CBC:   Recent Labs   Lab 02/10/22  0329 02/11/22  0350   WBC 7.05 10.16   HGB 10.4* 13.0*   HCT 32.3* 39.2*    358     CMP:   Recent Labs   Lab 02/10/22  0329 02/11/22  0350    144   K 4.3 3.5   CL 99 94*   CO2 33* 36*   * 174*   BUN 31* 33*   CREATININE 1.4 1.2   CALCIUM 8.9 9.6   PROT 5.6*  --    ALBUMIN 2.6*  --    BILITOT 0.3  --    ALKPHOS 64  --    AST 28  --    ALT 14  --    ANIONGAP 8 14   EGFRNONAA 53* >60     Cardiac Markers:   Recent Labs   Lab 02/11/22 0941   BNP 2,609*       Significant Imaging:

## 2022-02-11 NOTE — EICU
Called with pt having apneic episodes without obivous desaturation on 50 % venti mask. He was extubated today.  Check abg, hold TF, place on cpap or bipap based on abg.  D/w YA

## 2022-02-11 NOTE — PROGRESS NOTES
[facility] ([facility])  Cardiology  Progress Note    Patient Name: Shukri Rosales  MRN: [id number]  Admission Date: 2/7/2022  Hospital Length of Stay: 4 days  Code Status: Full Code   Attending Physician: Dora Baeza MD   Primary Care Physician: Cris Matias NP  Expected Discharge Date:   Principal Problem:Acute hypoxemic respiratory failure    Subjective:   HPI:  Per ER- This  is a 61 y.o. male patient with PMHx of Adrenal cortical adenoma, arthritis, back surgery,  HTN, CKD stage 3, depression, GERD, migraine headache, Schizophrenia, Severe obesity, PNA, COPD, exsmoker, and DM who presents to the Emergency Department by EMS for SOB which onset one day ago. Symptoms are constant and moderate in severity. No mitigating or exacerbating factors reported. Associated sxs include wheeze. Patient denies  fever, chills, congestion, rhinorrhea, sore throat, cough, CP, leg swelling, N/V/D, dizziness, HA, and all other sxs at this time. Pt was seen in the ED a week ago with similar sxs. Pt was admit for PNA treatment. Pt states since d/c smoking a significant amount of cigarettes. Per EMS pt's SpO2 was in the low 70s at the scene. Pt was given two breathing treatments and placed on a nonrebreathing mask. Pt's SpO2 radha to 91 PTA. No further complaints or concerns at this time.       Patient was noted to be in Acute hypoxic respiratory failure in ER and was intubated and venitiated. Patient to be admitted to ICU.     Hospital Course:   2/8/22-Patient seen and examined today, intubated, sedated. Some hypotension noted overnight, required low dose pressor. Being diuresed, on abx. Labs reviewed. Creatinine 1.4. H/H 10.3/32.3.    2/9/22-Patient seen and examined today, failed SBT earlier today. Off pressors support. Labs reviewed. H/H stable. Creatinine 1.5. Will increase diuresis and assess response.     2/10/22-Patient seen and examined today, right after extubation. Agitated, BP and HR elevated. Good  diuresis overnight. Labs reviewed. Creatinine stable. Continue same mgmt.    2/11/22-Patient seen and examined today, sitting up in bedside chair. Lethargic, on supplemental O2. Appears comfortable. BP elevated. Labs reviewed. Creatinine stable. BNP elevated.         Review of Systems   Reason unable to perform ROS: limited due to patient's lethargy.     Objective:     Vital Signs (Most Recent):  Temp: 99.3 °F (37.4 °C) (02/11/22 1100)  Pulse: (!) 115 (02/11/22 1100)  Resp: (!) 23 (02/11/22 1100)  BP: (!) 160/86 (02/11/22 1100)  SpO2: 97 % (02/11/22 1100) Vital Signs (24h Range):  Temp:  [97.4 °F (36.3 °C)-99.7 °F (37.6 °C)] 99.3 °F (37.4 °C)  Pulse:  [] 115  Resp:  [21-55] 23  SpO2:  [89 %-100 %] 97 %  BP: (113-183)/() 160/86     Weight: 83.9 kg (184 lb 15.5 oz)  Body mass index is 25.09 kg/m².     SpO2: 97 %  O2 Device (Oxygen Therapy): room air      Intake/Output Summary (Last 24 hours) at 2/11/2022 1239  Last data filed at 2/11/2022 1100  Gross per 24 hour   Intake 1764.94 ml   Output 7185 ml   Net -5420.06 ml       Lines/Drains/Airways     Peripherally Inserted Central Catheter Line            PICC Double Lumen 02/07/22 1830 right basilic 3 days          Drain                 Urethral Catheter 02/07/22 0623 Non-latex 16 Fr. 4 days                Physical Exam  Vitals and nursing note reviewed.   Constitutional:       General: He is not in acute distress.     Appearance: He is well-developed and well-nourished. He is ill-appearing. He is not diaphoretic.      Comments: On supplemental O2   HENT:      Head: Normocephalic and atraumatic.   Eyes:      General:         Right eye: No discharge.         Left eye: No discharge.      Pupils: Pupils are equal, round, and reactive to light.   Neck:      Thyroid: No thyromegaly.      Vascular: No JVD.      Trachea: No tracheal deviation.   Cardiovascular:      Rate and Rhythm: Regular rhythm. Tachycardia present.      Heart sounds: Normal heart sounds, S1  normal and S2 normal. No murmur heard.      Pulmonary:      Effort: Pulmonary effort is normal. No respiratory distress.      Breath sounds: No wheezing.      Comments: Diminished BS at bases  Abdominal:      General: There is no distension.      Palpations: Abdomen is soft.      Tenderness: There is no rebound.   Musculoskeletal:         General: No edema.      Cervical back: Neck supple.      Right lower leg: No edema.      Left lower leg: No edema.   Skin:     General: Skin is warm and dry.      Findings: No erythema.   Neurological:      Mental Status: He is alert.      Comments: Lethargic   Psychiatric:         Mood and Affect: Mood and affect normal.         Significant Labs:   CMP   Recent Labs   Lab 02/10/22  0329 02/11/22  0350    144   K 4.3 3.5   CL 99 94*   CO2 33* 36*   * 174*   BUN 31* 33*   CREATININE 1.4 1.2   CALCIUM 8.9 9.6   PROT 5.6*  --    ALBUMIN 2.6*  --    BILITOT 0.3  --    ALKPHOS 64  --    AST 28  --    ALT 14  --    ANIONGAP 8 14   ESTGFRAFRICA >60 >60   EGFRNONAA 53* >60   , CBC   Recent Labs   Lab 02/10/22  0329 02/10/22  0329 02/11/22  0350   WBC 7.05  --  10.16   HGB 10.4*  --  13.0*   HCT 32.3*   < > 39.2*     --  358    < > = values in this interval not displayed.   , Troponin No results for input(s): TROPONINI in the last 48 hours. and All pertinent lab results from the last 24 hours have been reviewed.    Significant Imaging: Echocardiogram:   Transthoracic echo (TTE) complete ([facility] Only):   Results for orders placed or performed during the hospital encounter of 01/19/22   Echo   Result Value Ref Range    BSA 2.14 m2    TDI SEPTAL 0.07 m/s    LV LATERAL E/E' RATIO 8.38 m/s    LV SEPTAL E/E' RATIO 9.57 m/s    LA WIDTH 3.39 cm    IVC diameter 1.77 cm    Left Ventricular Outflow Tract Mean Velocity 0.51329116183937 cm/s    Left Ventricular Outflow Tract Mean Gradient 2.28 mmHg    TDI LATERAL 0.08 m/s    LVIDd 4.62 3.5 - 6.0 cm    IVS 1.68 (A) 0.6 - 1.1 cm     Posterior Wall 1.39 (A) 0.6 - 1.1 cm    Ao root annulus 3.65 cm    LVIDs 4.37 (A) 2.1 - 4.0 cm    FS 5 28 - 44 %    LA volume 37.01 cm3    Sinus 3.68 cm    STJ 3.50 cm    Ascending aorta 3.26 cm    LV mass 296.91 g    LA size 3.28 cm    TAPSE 2.45 cm    Left Ventricle Relative Wall Thickness 0.60 cm    AV mean gradient 3 mmHg    AV valve area 2.14 cm2    AV Velocity Ratio 0.86     AV index (prosthetic) 0.61     MV valve area p 1/2 method 3.08 cm2    E/A ratio 1.00     Mean e' 0.08 m/s    E wave deceleration time 246.970068379902046 msec    IVRT 105.039447093909161 msec    LVOT diameter 2.11 cm    LVOT area 3.5 cm2    LVOT peak frankie 1.08 m/s    LVOT peak VTI 17.00 cm    Ao peak frankie 1.26 m/s    Ao VTI 27.8 cm    RVOT prox diameter 3.49 cm    RVOT area 9.56 cm2    RVOT peak frankie 0.60 m/s    RVOT peak VTI 11.8 cm    Qp:Qs ratio 0.53     LVOT stroke volume 59.41 cm3    RVOT stroke volume 112.82 cm3    AV peak gradient 6 mmHg    PV mean gradient 0.71 mmHg    E/E' ratio 8.93 m/s    MV Peak E Frankie 0.67 m/s    TR Max Frankie 3.37 m/s    MV stenosis pressure 1/2 time 71.232636479691277 ms    MV Peak A Frankie 0.67 m/s    LV Systolic Volume 86.51 mL    LV Systolic Volume Index 40.8 mL/m2    LV Diastolic Volume 98.26 mL    LV Diastolic Volume Index 46.35 mL/m2    LA Volume Index 17.5 mL/m2    LV Mass Index 140 g/m2    Echo EF Estimated 12 %    RA Major Axis 4.57 cm    Left Atrium Minor Axis 3.27 cm    Left Atrium Major Axis 4.88 cm    Triscuspid Valve Regurgitation Peak Gradient 45 mmHg    RA Width 3.59 cm    Right Atrial Pressure (from IVC) 8 mmHg    EF 40 %    TV rest pulmonary artery pressure 53 mmHg    Narrative    · The left ventricle is normal in size with concentric hypertrophy and   mildly decreased systolic function.  · Grade I left ventricular diastolic dysfunction.  · Normal right ventricular size with normal right ventricular systolic   function.  · There is pulmonary hypertension.  · Intermediate central venous pressure (8  mmHg).  · The estimated PA systolic pressure is 53 mmHg.  · The estimated ejection fraction is 40%.  · There are segmental left ventricular wall motion abnormalities.      , EKG: Reviewed and X-Ray: CXR: X-Ray Chest PA and Lateral (CXR): No results found for this visit on 02/07/22.    Assessment and Plan:   Patient who presents with respiratory failure, multifactorial in setting of COPD exacerbation and decompensated CHF. Improved clinically. Continue IV diuresis as needed. Resume BB. Add ARB if BP and creatinine trend permit. Ischemic evaluation once compensated/lethargy improved.    * Acute hypoxemic respiratory failure secondary to acute on chronic CHF   -Assess response to IV diuresis    Acute on chronic combined systolic and diastolic congestive heart failure  -Presents with SOB, multifactorial in setting of COPD exacerbation/? PNA and combined CHF  -Echo 1/22 showed EF of 40%, DD, pulmonary HTN, + WMA  -Continue IV diuresis as tolerated  -Resume Coreg once BP permits  -Consider Entresto pending BP/creatinine trend  -Ischemic evaluation once recovered  -Strict I's/O's    2/8/22  -Continue IV diuresis  -Resume BB as BP permits  -Strict I's/Os  -Reassess in AM    2/9/22  -Lasix increased to 60 mg IV BID   -Resume BB as tolerated/BP permits  -Strict I's/O's  -Failed SBT this AM    2/10/22  -Diuresed well overnight, extubated this AM  -Continue IV Lasix 60 mg BID  -Resume BB as tolerated  -Strict I's/O's    2/11/22  -Continue diuresis as needed  -Resume Coreg   -Strict I's/O's      History of recent pneumonia  -On abx, awaiting pulmonary evaluation    COPD exacerbation  -Mgmt as per primary team    Schizoaffective disorder, bipolar type  -Mgmt as per primary team    Type 2 diabetes mellitus with stage 3 chronic kidney disease  -Mgmt as per primary team        VTE Risk Mitigation (From admission, onward)         Ordered     enoxaparin injection 40 mg  Daily         02/09/22 1117     IP VTE HIGH RISK PATIENT  Once          02/07/22 1640     Place sequential compression device  Until discontinued         02/07/22 1640     Place EDVIN hose  Until discontinued         02/07/22 1640                Carmen Arellano PA-C  Cardiology  [facility] ([facility])

## 2022-02-11 NOTE — PROGRESS NOTES
[facility] - Intensive Care ([facility])  Pulmonology  Progress Note    Patient Name: Shukri Rosales  MRN: [id number]  Admission Date: 2/7/2022  Hospital Length of Stay: 4 days  Code Status: Full Code  Attending Provider: Dora Baeza MD  Primary Care Provider: Cris Matias NP   Principal Problem: Acute hypoxemic respiratory failure    Subjective:     Interval History: *  02/11: seen and examined: extubated yesterday, Aax3, UO 9895 mls, T max: 99.7F    Review of Systems   All other systems reviewed and are negative.       Objective:     Vital Signs (Most Recent):  Temp: 99.7 °F (37.6 °C) (02/11/22 0700)  Pulse: (!) 115 (02/11/22 0725)  Resp: (!) 28 (02/11/22 0725)  BP: (!) 153/84 (02/11/22 0700)  SpO2: 95 % (02/11/22 0725) Vital Signs (24h Range):  Temp:  [97.4 °F (36.3 °C)-99.7 °F (37.6 °C)] 99.7 °F (37.6 °C)  Pulse:  [] 115  Resp:  [14-55] 28  SpO2:  [89 %-100 %] 95 %  BP: (113-214)/() 153/84     Weight: 83.9 kg (184 lb 15.5 oz)  Body mass index is 25.09 kg/m².      Intake/Output Summary (Last 24 hours) at 2/11/2022 0835  Last data filed at 2/11/2022 0700  Gross per 24 hour   Intake 1476.71 ml   Output 9620 ml   Net -8143.29 ml       Physical Exam  Vitals and nursing note reviewed.   HENT:      Head: Normocephalic and atraumatic.   Eyes:      Pupils: Pupils are equal, round, and reactive to light.   Cardiovascular:      Rate and Rhythm: Normal rate.      Pulses: Normal pulses.      Heart sounds: Normal heart sounds.   Pulmonary:      Effort: Pulmonary effort is normal.      Breath sounds: Normal breath sounds.   Abdominal:      General: Bowel sounds are normal.      Palpations: Abdomen is soft.   Musculoskeletal:      Cervical back: Normal range of motion.   Neurological:      Mental Status: He is alert and oriented to person, place, and time.         Vents:  Vent Mode: Spont (02/10/22 0728)  Ventilator Initiated: Yes (02/07/22 0634)  Set Rate: 20 BPM (02/10/22 0508)  Vt Set: 450 mL (02/10/22  0508)  Pressure Support: 10 cmH20 (02/10/22 0728)  PEEP/CPAP: 5 cmH20 (02/10/22 0728)  Oxygen Concentration (%): 36 (02/11/22 0725)  Peak Airway Pressure: 17 cmH2O (02/10/22 0728)  Plateau Pressure: 15 cmH20 (02/10/22 0728)  Total Ve: 15.5 mL (02/10/22 0728)  Negative Inspiratory Force (cm H2O): 0 (02/10/22 0728)  F/VT Ratio<105 (RSBI): (!) 18.3 (02/10/22 0728)    Lines/Drains/Airways     Peripherally Inserted Central Catheter Line            PICC Double Lumen 02/07/22 1830 right basilic 3 days          Drain                 Urethral Catheter 02/07/22 0623 Non-latex 16 Fr. 4 days                Significant Labs:    CBC/Anemia Profile:  Recent Labs   Lab 02/10/22  0329 02/11/22  0350   WBC 7.05 10.16   HGB 10.4* 13.0*   HCT 32.3* 39.2*    358   MCV 89 85   RDW 17.0* 16.5*        Chemistries:  Recent Labs   Lab 02/10/22  0329 02/11/22  0350    144   K 4.3 3.5   CL 99 94*   CO2 33* 36*   BUN 31* 33*   CREATININE 1.4 1.2   CALCIUM 8.9 9.6   ALBUMIN 2.6*  --    PROT 5.6*  --    BILITOT 0.3  --    ALKPHOS 64  --    ALT 14  --    AST 28  --        Blood Culture: No results for input(s): LABBLOO in the last 48 hours.  Respiratory Culture: No results for input(s): GSRESP, RESPIRATORYC in the last 48 hours.  All pertinent labs within the past 24 hours have been reviewed.    Significant Imaging:  I have reviewed all pertinent imaging results/findings within the past 24 hours.      ABG  Recent Labs   Lab 02/10/22  2257   PH 7.649*   PO2 71*   PCO2 34.2*   HCO3 37.6*   BE 17     Assessment/Plan:     Pulmonary hypertension  PA estimated 53  Multifactorial  Gentle diuresis BID lasix  Net-ve 8 lit    PH work up as out patient  Sleep study  RHC  Home O2 eval    Acute on chronic combined systolic and diastolic congestive heart failure  Patient is identified as having Combined Systolic and Diastolic heart failure that is Acute on chronic. CHF is currently controlled. Latest ECHO performed and demonstrates- Results for  orders placed during the hospital encounter of 01/19/22    Echo    Interpretation Summary  · The left ventricle is normal in size with concentric hypertrophy and mildly decreased systolic function.  · Grade I left ventricular diastolic dysfunction.  · Normal right ventricular size with normal right ventricular systolic function.  · There is pulmonary hypertension.  · Intermediate central venous pressure (8 mmHg).  · The estimated PA systolic pressure is 53 mmHg.  · The estimated ejection fraction is 40%.  · There are segmental left ventricular wall motion abnormalities.  . Continue Furosemide and monitor clinical status closely. Monitor on telemetry. Patient is on CHF pathway.  Monitor strict Is&Os and daily weights.  Place on fluid restriction of 1.5 L. Continue to stress to patient importance of self efficacy and  on diet for CHF. Last BNP reviewed- and noted below   Recent Labs   Lab 02/11/22  0941   BNP 2,609*     LASIX BID    BNP trending down    Acute respiratory failure with hypoxia and hypercarbia  Patient with Hypercapnic and Hypoxic Respiratory failure which is Acute on chronic.  he is on home oxygen at 2.0 LPM. Supplemental oxygen was provided and noted- Oxygen Concentration (%):  [36-50] 36.   Signs/symptoms of respiratory failure include- tachypnea, increased work of breathing and respiratory distress. Contributing diagnoses includes - Interstitial lung disease and Pneumonia Labs and images were reviewed. Patient Has recent ABG, which has been reviewed. Will treat underlying causes and adjust management of respiratory failure as follows-     SP extubation  IV steriods  Nasal canula O2  Sputum cullture  CEFTRIAXONE + AZITHRO       Schizoaffective disorder, bipolar type  On Seroquel  Cont Klonopin    Type 2 diabetes mellitus with stage 3 chronic kidney disease  Sliding scale  Novalog Aspart and detemir    Obesity (BMI 30-39.9)  Weight loss    EMMANUEL on CPAP  Use home CPAP when  feasible    Pulmonary  Suction  Pul toilet  Bronchodilators      GI  PO diet      Okay transfer to floor   Sign off    Siddhartha Dueñas MD  Pulmonology  [facility] - [facility] ([facility])

## 2022-02-11 NOTE — PLAN OF CARE
P.T. EVAL COMPLETE, PT CURRENTLY REQUIRES [facility] FOR BED MOBILITY AND TF'S.  P.T. RECOMMENDS SNF VS HHPT DEPENDING ON PROGRESS WITH POC AND COGNITION.

## 2022-02-11 NOTE — ASSESSMENT & PLAN NOTE
Patient with Hypercapnic and Hypoxic Respiratory failure which is Acute on chronic.  he is on home oxygen at 2.0 LPM. Supplemental oxygen was provided and noted- Oxygen Concentration (%):  [36-50] 36.   Signs/symptoms of respiratory failure include- tachypnea, increased work of breathing and respiratory distress. Contributing diagnoses includes - Interstitial lung disease and Pneumonia Labs and images were reviewed. Patient Has recent ABG, which has been reviewed. Will treat underlying causes and adjust management of respiratory failure as follows-     SP extubation  IV steriods  Nasal canula O2  Sputum cullture  CEFTRIAXONE + AZITHRO

## 2022-02-11 NOTE — PT/OT/SLP EVAL
Occupational Therapy   Evaluation    Name: Shukri Rosales  MRN: [id number]  Admitting Diagnosis:  Acute hypoxemic respiratory failure  Recent Surgery: * No surgery found *      Recommendations:     Discharge Recommendations: home health OT,nursing facility, skilled  Discharge Equipment Recommendations:  none  Barriers to discharge:  None    Assessment:     Shukri Rosales is a 61 y.o. male with a medical diagnosis of Acute hypoxemic respiratory failure.  He presents with the following performance deficits affecting function: weakness,impaired endurance,impaired self care skills,impaired functional mobilty,impaired balance,impaired cognition,decreased safety awareness,impaired cardiopulmonary response to activity.      Rehab Prognosis: Good; patient would benefit from acute skilled OT services to address these deficits and reach maximum level of function.       Plan:     Patient to be seen 2 x/week to address the above listed problems via self-care/home management,therapeutic activities,therapeutic exercises  · Plan of Care Expires: 02/25/22  · Plan of Care Reviewed with: patient    Subjective     Chief Complaint: none reported  Patient/Family Comments/goals: none reported    Patient is a poor historian. PLOF obtained from [facility] chart review (previous admission).    Occupational Profile:  Living Environment: Patient lives in a one story home with no steps to enter, alone.  Previous level of function: Patient was mod I with ambulation (with PRN use of RW) and mod I with ADLs. He does not drive, transportation via brother or sister.  Roles and Routines: n/a  Equipment Used at Home:  walker, rolling,bath bench,bedside commode (standing upright RW)  Assistance upon Discharge: ? Level of assistance of family    Pain/Comfort:  · Pain Rating 1: 0/10 (no nonverbal indicators of pain)  · Pain Addressed 1:  (activity pacing)    Objective:     Communicated with: NurseFrandy, prior to session.  Patient found  supine with telemetry,blood pressure cuff,pulse ox (continuous) (oxygen on floor of room @ 3L) upon OT entry to room.    General Precautions: Standard, fall,respiratory   Orthopedic Precautions:N/A   Braces: N/A  Respiratory Status: Room air upon entry. Replaced 3L of O2 found on floor after transfer to chair due to desat to 88%l.    Bed Mobility:    · Patient completed Supine to Sit with minimum assistance and with side rail    Functional Mobility/Transfers:  · Patient completed Sit <> Stand Transfer with minimum assistance  with  hand-held assist   · Patient completed Bed <> Chair Transfer using Step Transfer technique with minimum assistance with hand-held assist  · Functional Mobility: Patient took steps to bedside chair ~3ft with min HHA to promote OOB activity.    Activities of Daily Living:  · Grooming: minimum assistance .  · Upper Body Dressing: minimum assistance .  · Lower Body Dressing: maximal assistance .    Cognitive/Visual Perceptual:  Cognitive/Psychosocial Skills:     -       Oriented to: Person   -       Follows Commands/attention:Easily distracted and Follows one-step commands  -       Communication: minimal verbalizations  Delayed processing requiring repetitive commands throughout.    Physical Exam:  Balance:    -       sitting: good, static standing: fair, dynamic standing: poor +  Upper Extremity Range of Motion:     -       Right Upper Extremity: WFL  -       Left Upper Extremity: WFL  Upper Extremity Strength:    -       Right Upper Extremity: Deficits: grossly 4/5  -       Left Upper Extremity: Deficits: grossly 4/5   Strength:    -       Right Upper Extremity: Deficits: fair  -       Left Upper Extremity: Deficits: fair    AMPAC 6 Click ADL:  AMPAC Total Score: 16    Treatment & Education:  Patient educated on role of OT in acute setting and benefits of OOB activity. Noted to have restless legs in bed that improved with mobility. Provided with education on safe mobility techniques to  use to increase active engagement and to decrease fall risk. Stated understanding.    Educated to call for A for all transfers.  Education:    Patient left up in chair with all lines intact, call button in reach, chair alarm on and nurse notified    GOALS:   Multidisciplinary Problems     Occupational Therapy Goals        Problem: Occupational Therapy Goal    Goal Priority Disciplines Outcome Interventions   Occupational Therapy Goal     OT, PT/OT     Description: Goals to be met by: 2/25/22     Patient will increase functional independence with ADLs by performing:    UE Dressing with Set-up Assistance.  Toileting from toilet with Supervision for hygiene and clothing management.   Toilet transfer to toilet with Supervision.  Increased functional strength in B UE to grossly WFL to increase independence with ADLs.                     History:     Past Medical History:   Diagnosis Date    Adrenal cortical adenoma     Anxiety     Arthritis     CKD (chronic kidney disease) stage 3, GFR 30-59 ml/min     Depression     Diabetes mellitus type II 2011    does not take    DM (diabetes mellitus) 2011     am 09/21/2017 Insulin x 1 1/2 year    GERD (gastroesophageal reflux disease) 7/9/2013    Hypertension     Migraine headache 7/22/2013    Schizophrenia     Severe obesity with body mass index of 36.0 to 36.9        Past Surgical History:   Procedure Laterality Date    BACK SURGERY      COLONOSCOPY N/A 12/3/2020    Procedure: COLONOSCOPY;  Surgeon: Christofer Palmer MD;  Location: [facility] ENDO;  Service: Endoscopy;  Laterality: N/A;    COLONOSCOPY N/A 12/4/2020    Procedure: COLONOSCOPY;  Surgeon: Frandy Stanton MD;  Location: [facility] ENDO;  Service: Endoscopy;  Laterality: N/A;    HERNIA REPAIR      UMBILICAL    left arm exfix      NASAL SEPTUM SURGERY Bilateral     TONSILLECTOMY      URETEROSCOPY         Time Tracking:     OT Date of Treatment: 02/11/22  OT Start Time: 0910  OT Stop Time: 0935  OT Total  Time (min): 25 min    Billable Minutes:Evaluation 15  Therapeutic Activity 10    2/11/2022

## 2022-02-11 NOTE — PROGRESS NOTES
[facility] - Intensive Care ([facility] Medicine  Progress Note    Patient Name: Shukri Rosales  MRN: [id number]  Patient Class: IP- Inpatient   Admission Date: 2/7/2022  Length of Stay: 4 days  Attending Physician: Dora Baeza MD  Primary Care Provider: Cris Matias NP        Subjective:     Principal Problem:Acute hypoxemic respiratory failure        HPI:  Shortness of Breath        Hx of COPD and recent dx of PNA. Pt received narcan for Kratom use.     Per ER- This  is a 61 y.o. male patient with PMHx of Adrenal cortical adenoma, arthritis, back surgery,  HTN, CKD stage 3, depression, GERD, migraine headache, Schizophrenia, Severe obesity, PNA, COPD, exsmoker, and DM who presents to the Emergency Department by EMS for SOB which onset one day ago. Symptoms are constant and moderate in severity. No mitigating or exacerbating factors reported. Associated sxs include wheeze. Patient denies  fever, chills, congestion, rhinorrhea, sore throat, cough, CP, leg swelling, N/V/D, dizziness, HA, and all other sxs at this time. Pt was seen in the ED a week ago with similar sxs. Pt was admit for PNA treatment. Pt states since d/c smoking a significant amount of cigarettes. Per EMS pt's SpO2 was in the low 70s at the scene. Pt was given two breathing treatments and placed on a nonrebreathing mask. Pt's SpO2 radha to 91 PTA. No further complaints or concerns at this time.      Patient was noted to be in Acute hypoxic respiratory failure in ER and was intubated and venitiated. Patient to be admitted to ICU.                   Overview/Hospital Course:  62 y/o wm  admitted to ICU on mechanical ventilation  with a Dx of acute on chronic hypoxic and hypercapnic . Acute on chronic systolic and diastolic CHF exacerbation , PNA and  COPD exacerbation . Started on IV lasix , IV steroid  and IVAB . Develop hypotension most likely due to  IV sedation  and started on short course of Levophed to keep a MAP>65 .    2/9 Remain  critically ill . Failed SBT today . Became very anxious and agitated .  Restarted on sedation . Home medication Seroquel and clonazepam . He has a good UP . NAEON     2/10 Extubated this am  , now on NRM .  He awake and alert  and following simple commands  . He has good UOP . NAEON . Sputum and blood cx NGTD .     2/11- Afebrile . O2 wean down to 4L/min. Sittting in the bedside chair . Awake , appears oriented to self , nods head but does not answer questions. Reports SOB is better. WBC 10K. Completed antibiotic today. Steroid transitioned to oral.  Noted BNP is further elevated 2609. Additional Lasix IV ordered per CC team. ASA, Amlodipine , Coreg and statin resumed.       Interval History:      O2 wean down to 4L/min. Completed antibiotic today. Steroid transitioned to oral.  Noted BNP is further elevated 2609. Additional Lasix IV ordered per CC team. ASA, Amlodipine , Coreg and statin resumed.     Review of Systems   Unable to perform ROS: Other   Pt is awake but minimally verbal.   Objective:     Vital Signs (Most Recent):  Temp: 99.3 °F (37.4 °C) (02/11/22 1100)  Pulse: 110 (02/11/22 1400)  Resp: (!) 23 (02/11/22 1400)  BP: (!) 153/79 (02/11/22 1400)  SpO2: 96 % (02/11/22 1400) Vital Signs (24h Range):  Temp:  [97.4 °F (36.3 °C)-99.7 °F (37.6 °C)] 99.3 °F (37.4 °C)  Pulse:  [] 110  Resp:  [20-55] 23  SpO2:  [89 %-100 %] 96 %  BP: (113-183)/() 153/79     Weight: 83.9 kg (184 lb 15.5 oz)  Body mass index is 25.09 kg/m².    Intake/Output Summary (Last 24 hours) at 2/11/2022 1436  Last data filed at 2/11/2022 1400  Gross per 24 hour   Intake 1935.33 ml   Output 5380 ml   Net -3444.67 ml      Physical Exam  Constitutional:       General: He is not in acute distress.     Appearance: He is well-developed and well-nourished. He is ill-appearing. He is not diaphoretic.      Comments: Pt is minimally verbal. Does not answer questions    HENT:      Head: Normocephalic and atraumatic.      Mouth/Throat:       Pharynx: No oropharyngeal exudate.   Eyes:      Extraocular Movements: EOM normal.      Conjunctiva/sclera: Conjunctivae normal.      Pupils: Pupils are equal, round, and reactive to light.   Neck:      Thyroid: No thyromegaly.      Vascular: No JVD.   Cardiovascular:      Rate and Rhythm: Regular rhythm. Tachycardia present.      Heart sounds: Normal heart sounds. No murmur heard.      Pulmonary:      Effort: Pulmonary effort is normal. Tachypnea (mild) present. No respiratory distress.      Breath sounds: Normal breath sounds. No wheezing or rales.   Chest:      Chest wall: No tenderness.   Abdominal:      General: Bowel sounds are normal. There is no distension.      Palpations: Abdomen is soft.      Tenderness: There is no abdominal tenderness. There is no guarding or rebound.   Musculoskeletal:         General: No edema. Normal range of motion.      Cervical back: Normal range of motion and neck supple.   Lymphadenopathy:      Cervical: No cervical adenopathy.   Skin:     General: Skin is warm and dry.      Findings: No rash.   Neurological:      Mental Status: He is alert.      Comments: Pt is wake , minimally verbal. Follows commands.    Psychiatric:         Mood and Affect: Mood and affect normal.         Significant Labs:   All pertinent labs within the past 24 hours have been reviewed.  BMP:   Recent Labs   Lab 02/11/22  0350 02/11/22  0941   *  --      --    K 3.5  --    CL 94*  --    CO2 36*  --    BUN 33*  --    CREATININE 1.2  --    CALCIUM 9.6  --    MG  --  1.7     CBC:   Recent Labs   Lab 02/10/22  0329 02/11/22  0350   WBC 7.05 10.16   HGB 10.4* 13.0*   HCT 32.3* 39.2*    358     CMP:   Recent Labs   Lab 02/10/22  0329 02/11/22  0350    144   K 4.3 3.5   CL 99 94*   CO2 33* 36*   * 174*   BUN 31* 33*   CREATININE 1.4 1.2   CALCIUM 8.9 9.6   PROT 5.6*  --    ALBUMIN 2.6*  --    BILITOT 0.3  --    ALKPHOS 64  --    AST 28  --    ALT 14  --    ANIONGAP 8 14   EGFRNONAA  53* >60     Cardiac Markers:   Recent Labs   Lab 02/11/22  0941   BNP 2,609*       Significant Imaging:       Assessment/Plan:      * Acute hypoxemic and hypercapnic respiratory failure   Patient with Hypoxic Respiratory failure which is Acute.  he is not on home oxygen. Supplemental oxygen was provided and noted- Oxygen Concentration (%):  [36-50] 36.   Signs/symptoms of respiratory failure include- tachypnea, increased work of breathing and respiratory distress. Contributing diagnoses includes - CHF Labs and images were reviewed. Patient Has recent ABG, which has been reviewed.      2/07 Patient intubated and ventilated and admitted to ICU    Cont IV steroid and IV lasix  Wean ventilation support  As tolerated   2/11-  Extubated yesterday   Wean fiO2 as tolerated   Continue CHF and COPD exacerbation treatment     Acute on chronic combined systolic and diastolic congestive heart failure  Patient is identified as having Combined Systolic and Diastolic heart failure that is Acute on chronic. CHF is currently uncontrolled due to Continued edema of extremities and Dyspnea     Echo    Interpretation Summary  · The left ventricle is normal in size with concentric hypertrophy and mildly decreased systolic function.  · Grade I left ventricular diastolic dysfunction.  · Normal right ventricular size with normal right ventricular systolic function.  · There is pulmonary hypertension.  · Intermediate central venous pressure (8 mmHg).  · The estimated PA systolic pressure is 53 mmHg.  · The estimated ejection fraction is 40%.  · There are segmental left ventricular wall motion abnormalities.    Telemetry  Add Iv lasix Bid  Cardiology consulted    Recent Labs   Lab 02/11/22  0941   BNP 2,609*   .  2/11-  Continue IV diuresis   Resume BB, ACEi     COPD exacerbation  Cont Inhaled bronchodilators   Cont IV steroid transitioned to oral       Schizoaffective disorder, bipolar type  Resume home med  Seroquel, Cymbalta  and clonazepam        Pulmonary hypertension  -Likely secondary in the setting LV failure , COPD and EMMANUEL  -Treat primary cause       History of recent pneumonia  Now with Acute respiratory failure   IV Abx -Rocephin and Azithromycin initiated - completed today 2/11/22      Normocytic anemia  Monitor H/H and signs of active bleeding   Transfuse P-RBC for Hgb 7 or under       Type 2 diabetes mellitus with stage 3 chronic kidney disease  Keep CBG<180  Cont long acting  and short acting insulin and adjust dose accordingly         Obesity (BMI 30-39.9)  Cont supportive tx       EMMANUEL on CPAP  Hx EMMANUEL noted      Hypertension    Cont lasix   Resume home Amlodipine, BB, ACEi         VTE Risk Mitigation (From admission, onward)         Ordered     enoxaparin injection 40 mg  Daily         02/09/22 1117     IP VTE HIGH RISK PATIENT  Once         02/07/22 1640     Place sequential compression device  Until discontinued         02/07/22 1640     Place EDVIN hose  Until discontinued         02/07/22 1640                Discharge Planning   ALICIA:      Code Status: Full Code   Is the patient medically ready for discharge?: No    Reason for patient still in hospital (select all that apply): Patient trending condition  Discharge Plan A: Long-term acute care facility (LTAC)            Critical care time spent on the evaluation and treatment of severe organ dysfunction, review of pertinent labs and imaging studies, discussions with consulting providers and discussions with patient/family: 30  minutes.      Dora Baeza MD  Department of Hospital Medicine   '[place] - Intensive Care ([facility])

## 2022-02-11 NOTE — ASSESSMENT & PLAN NOTE
Now with Acute respiratory failure   IV Abx -Rocephin and Azithromycin initiated - completed today 2/11/22

## 2022-02-11 NOTE — PLAN OF CARE
Problem: Infection  Goal: Absence of Infection Signs and Symptoms  Outcome: Ongoing, Progressing     Problem: Adult Inpatient Plan of Care  Goal: Plan of Care Review  Outcome: Ongoing, Progressing  Goal: Patient-Specific Goal (Individualized)  Outcome: Ongoing, Progressing  Goal: Absence of Hospital-Acquired Illness or Injury  Outcome: Ongoing, Progressing  Goal: Optimal Comfort and Wellbeing  Outcome: Ongoing, Progressing  Goal: Readiness for Transition of Care  Outcome: Ongoing, Progressing     Problem: Noninvasive Ventilation Acute  Goal: Effective Unassisted Ventilation and Oxygenation  Outcome: Ongoing, Progressing     Problem: Fall Injury Risk  Goal: Absence of Fall and Fall-Related Injury  Outcome: Ongoing, Progressing     Problem: Restraint, Nonbehavioral (Nonviolent)  Goal: Absence of Harm or Injury  Outcome: Ongoing, Progressing     Problem: Skin Injury Risk Increased  Goal: Skin Health and Integrity  Outcome: Ongoing, Progressing     Problem: Diabetes Comorbidity  Goal: Blood Glucose Level Within Targeted Range  Outcome: Ongoing, Progressing     Problem: Adjustment to Illness (Sepsis/Septic Shock)  Goal: Optimal Coping  Outcome: Ongoing, Progressing     Problem: Bleeding (Sepsis/Septic Shock)  Goal: Absence of Bleeding  Outcome: Ongoing, Progressing     Problem: Glycemic Control Impaired (Sepsis/Septic Shock)  Goal: Blood Glucose Level Within Desired Range  Outcome: Ongoing, Progressing     Problem: Infection Progression (Sepsis/Septic Shock)  Goal: Absence of Infection Signs and Symptoms  Outcome: Ongoing, Progressing     Problem: Nutrition Impaired (Sepsis/Septic Shock)  Goal: Optimal Nutrition Intake  Outcome: Ongoing, Progressing     Problem: Fluid Imbalance (Pneumonia)  Goal: Fluid Balance  Outcome: Ongoing, Progressing     Problem: Infection (Pneumonia)  Goal: Resolution of Infection Signs and Symptoms  Outcome: Ongoing, Progressing     Problem: Respiratory Compromise (Pneumonia)  Goal: Effective  Oxygenation and Ventilation  Outcome: Ongoing, Progressing

## 2022-02-11 NOTE — PLAN OF CARE
OT guerrero completed. Patient completed sup>sit with min A, sit>stand with min A, step>pivot to bedside chair with min A. Recommending SNF vs HHOT at d/c.

## 2022-02-12 LAB
ANION GAP SERPL CALC-SCNC: 11 MMOL/L (ref 8–16)
BACTERIA BLD CULT: NORMAL
BACTERIA BLD CULT: NORMAL
BUN SERPL-MCNC: 38 MG/DL (ref 8–23)
CALCIUM SERPL-MCNC: 8.8 MG/DL (ref 8.7–10.5)
CHLORIDE SERPL-SCNC: 95 MMOL/L (ref 95–110)
CO2 SERPL-SCNC: 33 MMOL/L (ref 23–29)
CREAT SERPL-MCNC: 1.1 MG/DL (ref 0.5–1.4)
EST. GFR  (AFRICAN AMERICAN): >60 ML/MIN/1.73 M^2
EST. GFR  (NON AFRICAN AMERICAN): >60 ML/MIN/1.73 M^2
GLUCOSE SERPL-MCNC: 191 MG/DL (ref 70–110)
POCT GLUCOSE: 168 MG/DL (ref 70–110)
POCT GLUCOSE: 172 MG/DL (ref 70–110)
POCT GLUCOSE: 232 MG/DL (ref 70–110)
POTASSIUM SERPL-SCNC: 3.1 MMOL/L (ref 3.5–5.1)
SODIUM SERPL-SCNC: 139 MMOL/L (ref 136–145)

## 2022-02-12 PROCEDURE — 25000003 PHARM REV CODE 250: Performed by: INTERNAL MEDICINE

## 2022-02-12 PROCEDURE — 21400001 HC TELEMETRY ROOM

## 2022-02-12 PROCEDURE — 80048 BASIC METABOLIC PNL TOTAL CA: CPT | Performed by: INTERNAL MEDICINE

## 2022-02-12 PROCEDURE — 96372 THER/PROPH/DIAG INJ SC/IM: CPT

## 2022-02-12 PROCEDURE — 99900035 HC TECH TIME PER 15 MIN (STAT)

## 2022-02-12 PROCEDURE — A4216 STERILE WATER/SALINE, 10 ML: HCPCS | Performed by: INTERNAL MEDICINE

## 2022-02-12 PROCEDURE — 25000242 PHARM REV CODE 250 ALT 637 W/ HCPCS: Performed by: NURSE PRACTITIONER

## 2022-02-12 PROCEDURE — 63600175 PHARM REV CODE 636 W HCPCS: Performed by: INTERNAL MEDICINE

## 2022-02-12 PROCEDURE — 94640 AIRWAY INHALATION TREATMENT: CPT

## 2022-02-12 PROCEDURE — 99233 PR SUBSEQUENT HOSPITAL CARE,LEVL III: ICD-10-PCS | Mod: ,,, | Performed by: INTERNAL MEDICINE

## 2022-02-12 PROCEDURE — 11000001 HC ACUTE MED/SURG PRIVATE ROOM

## 2022-02-12 PROCEDURE — 99233 SBSQ HOSP IP/OBS HIGH 50: CPT | Mod: ,,, | Performed by: INTERNAL MEDICINE

## 2022-02-12 PROCEDURE — C9399 UNCLASSIFIED DRUGS OR BIOLOG: HCPCS | Performed by: INTERNAL MEDICINE

## 2022-02-12 RX ORDER — INSULIN ASPART 100 [IU]/ML
1-10 INJECTION, SOLUTION INTRAVENOUS; SUBCUTANEOUS
Status: DISCONTINUED | OUTPATIENT
Start: 2022-02-12 | End: 2022-02-13 | Stop reason: HOSPADM

## 2022-02-12 RX ORDER — QUETIAPINE FUMARATE 25 MG/1
25 TABLET, FILM COATED ORAL DAILY
Status: DISCONTINUED | OUTPATIENT
Start: 2022-02-13 | End: 2022-02-13 | Stop reason: HOSPADM

## 2022-02-12 RX ORDER — POTASSIUM CHLORIDE 20 MEQ/1
40 TABLET, EXTENDED RELEASE ORAL ONCE
Status: COMPLETED | OUTPATIENT
Start: 2022-02-12 | End: 2022-02-12

## 2022-02-12 RX ORDER — IBUPROFEN 200 MG
24 TABLET ORAL
Status: DISCONTINUED | OUTPATIENT
Start: 2022-02-12 | End: 2022-02-13 | Stop reason: HOSPADM

## 2022-02-12 RX ORDER — MIDODRINE HYDROCHLORIDE 5 MG/1
10 TABLET ORAL ONCE
Status: COMPLETED | OUTPATIENT
Start: 2022-02-12 | End: 2022-02-12

## 2022-02-12 RX ORDER — INSULIN ASPART 100 [IU]/ML
3 INJECTION, SOLUTION INTRAVENOUS; SUBCUTANEOUS
Status: DISCONTINUED | OUTPATIENT
Start: 2022-02-13 | End: 2022-02-13 | Stop reason: HOSPADM

## 2022-02-12 RX ORDER — CLONAZEPAM 0.5 MG/1
0.5 TABLET ORAL 2 TIMES DAILY PRN
Status: DISCONTINUED | OUTPATIENT
Start: 2022-02-12 | End: 2022-02-13 | Stop reason: HOSPADM

## 2022-02-12 RX ORDER — FUROSEMIDE 40 MG/1
40 TABLET ORAL DAILY
Status: DISCONTINUED | OUTPATIENT
Start: 2022-02-13 | End: 2022-02-12

## 2022-02-12 RX ORDER — IBUPROFEN 200 MG
16 TABLET ORAL
Status: DISCONTINUED | OUTPATIENT
Start: 2022-02-12 | End: 2022-02-13 | Stop reason: HOSPADM

## 2022-02-12 RX ORDER — GLUCAGON 1 MG
1 KIT INJECTION
Status: DISCONTINUED | OUTPATIENT
Start: 2022-02-12 | End: 2022-02-13 | Stop reason: HOSPADM

## 2022-02-12 RX ORDER — LISINOPRIL 20 MG/1
20 TABLET ORAL DAILY PRN
Status: DISCONTINUED | OUTPATIENT
Start: 2022-02-12 | End: 2022-02-13 | Stop reason: HOSPADM

## 2022-02-12 RX ORDER — FUROSEMIDE 10 MG/ML
60 INJECTION INTRAMUSCULAR; INTRAVENOUS
Status: DISCONTINUED | OUTPATIENT
Start: 2022-02-12 | End: 2022-02-13 | Stop reason: HOSPADM

## 2022-02-12 RX ORDER — AMLODIPINE BESYLATE 5 MG/1
5 TABLET ORAL DAILY
Status: DISCONTINUED | OUTPATIENT
Start: 2022-02-13 | End: 2022-02-12

## 2022-02-12 RX ADMIN — INSULIN ASPART 3 UNITS: 100 INJECTION, SOLUTION INTRAVENOUS; SUBCUTANEOUS at 06:02

## 2022-02-12 RX ADMIN — INSULIN ASPART 4 UNITS: 100 INJECTION, SOLUTION INTRAVENOUS; SUBCUTANEOUS at 06:02

## 2022-02-12 RX ADMIN — QUETIAPINE FUMARATE 100 MG: 100 TABLET ORAL at 10:02

## 2022-02-12 RX ADMIN — CARVEDILOL 6.25 MG: 6.25 TABLET, FILM COATED ORAL at 10:02

## 2022-02-12 RX ADMIN — ENOXAPARIN SODIUM 40 MG: 40 INJECTION SUBCUTANEOUS at 05:02

## 2022-02-12 RX ADMIN — BUDESONIDE 0.5 MG: 0.5 INHALANT ORAL at 08:02

## 2022-02-12 RX ADMIN — BUDESONIDE 0.5 MG: 0.5 INHALANT ORAL at 07:02

## 2022-02-12 RX ADMIN — FUROSEMIDE 60 MG: 10 INJECTION, SOLUTION INTRAVENOUS at 07:02

## 2022-02-12 RX ADMIN — MIDODRINE HYDROCHLORIDE 10 MG: 5 TABLET ORAL at 01:02

## 2022-02-12 RX ADMIN — ATORVASTATIN CALCIUM 20 MG: 10 TABLET, FILM COATED ORAL at 10:02

## 2022-02-12 RX ADMIN — CARVEDILOL 6.25 MG: 6.25 TABLET, FILM COATED ORAL at 09:02

## 2022-02-12 RX ADMIN — DULOXETINE 60 MG: 30 CAPSULE, DELAYED RELEASE ORAL at 10:02

## 2022-02-12 RX ADMIN — CLONAZEPAM 1 MG: 1 TABLET ORAL at 10:02

## 2022-02-12 RX ADMIN — INSULIN DETEMIR 20 UNITS: 100 INJECTION, SOLUTION SUBCUTANEOUS at 10:02

## 2022-02-12 RX ADMIN — INSULIN ASPART 3 UNITS: 100 INJECTION, SOLUTION INTRAVENOUS; SUBCUTANEOUS at 05:02

## 2022-02-12 RX ADMIN — TRAZODONE HYDROCHLORIDE 25 MG: 50 TABLET ORAL at 09:02

## 2022-02-12 RX ADMIN — AMLODIPINE BESYLATE 10 MG: 10 TABLET ORAL at 10:02

## 2022-02-12 RX ADMIN — QUETIAPINE FUMARATE 200 MG: 100 TABLET ORAL at 09:02

## 2022-02-12 RX ADMIN — Medication 400 MG: at 09:02

## 2022-02-12 RX ADMIN — INSULIN ASPART 2 UNITS: 100 INJECTION, SOLUTION INTRAVENOUS; SUBCUTANEOUS at 01:02

## 2022-02-12 RX ADMIN — PANTOPRAZOLE SODIUM 40 MG: 40 TABLET, DELAYED RELEASE ORAL at 10:02

## 2022-02-12 RX ADMIN — MUPIROCIN: 20 OINTMENT TOPICAL at 10:02

## 2022-02-12 RX ADMIN — Medication 400 MG: at 10:02

## 2022-02-12 RX ADMIN — INSULIN ASPART 2 UNITS: 100 INJECTION, SOLUTION INTRAVENOUS; SUBCUTANEOUS at 05:02

## 2022-02-12 RX ADMIN — SODIUM CHLORIDE, PRESERVATIVE FREE 10 ML: 5 INJECTION INTRAVENOUS at 06:02

## 2022-02-12 RX ADMIN — DULOXETINE 60 MG: 30 CAPSULE, DELAYED RELEASE ORAL at 09:02

## 2022-02-12 RX ADMIN — FUROSEMIDE 40 MG: 10 INJECTION INTRAMUSCULAR; INTRAVENOUS at 10:02

## 2022-02-12 RX ADMIN — LISINOPRIL 20 MG: 20 TABLET ORAL at 10:02

## 2022-02-12 RX ADMIN — POLYETHYLENE GLYCOL 3350 17 G: 17 POWDER, FOR SOLUTION ORAL at 10:02

## 2022-02-12 RX ADMIN — ASPIRIN 81 MG CHEWABLE TABLET 81 MG: 81 TABLET CHEWABLE at 10:02

## 2022-02-12 RX ADMIN — INSULIN DETEMIR 20 UNITS: 100 INJECTION, SOLUTION SUBCUTANEOUS at 09:02

## 2022-02-12 RX ADMIN — ACETAMINOPHEN 650 MG: 325 TABLET ORAL at 05:02

## 2022-02-12 RX ADMIN — POTASSIUM CHLORIDE 40 MEQ: 1500 TABLET, EXTENDED RELEASE ORAL at 10:02

## 2022-02-12 RX ADMIN — GABAPENTIN 100 MG: 100 CAPSULE ORAL at 10:02

## 2022-02-12 RX ADMIN — PREDNISONE 20 MG: 20 TABLET ORAL at 10:02

## 2022-02-12 RX ADMIN — HALOPERIDOL LACTATE 5 MG: 5 INJECTION, SOLUTION INTRAMUSCULAR at 07:02

## 2022-02-12 RX ADMIN — INSULIN ASPART 3 UNITS: 100 INJECTION, SOLUTION INTRAVENOUS; SUBCUTANEOUS at 01:02

## 2022-02-12 RX ADMIN — TAMSULOSIN HYDROCHLORIDE 0.4 MG: 0.4 CAPSULE ORAL at 10:02

## 2022-02-12 RX ADMIN — SODIUM CHLORIDE, PRESERVATIVE FREE 10 ML: 5 INJECTION INTRAVENOUS at 01:02

## 2022-02-12 NOTE — PLAN OF CARE
Problem: Infection  Goal: Absence of Infection Signs and Symptoms  Outcome: Ongoing, Progressing     Problem: Adult Inpatient Plan of Care  Goal: Plan of Care Review  Outcome: Ongoing, Progressing  Goal: Patient-Specific Goal (Individualized)  Outcome: Ongoing, Progressing  Goal: Absence of Hospital-Acquired Illness or Injury  Outcome: Ongoing, Progressing  Goal: Optimal Comfort and Wellbeing  Outcome: Ongoing, Progressing  Goal: Readiness for Transition of Care  Outcome: Ongoing, Progressing     Problem: Fall Injury Risk  Goal: Absence of Fall and Fall-Related Injury  Outcome: Ongoing, Progressing     Problem: Skin Injury Risk Increased  Goal: Skin Health and Integrity  Outcome: Ongoing, Progressing     Problem: Diabetes Comorbidity  Goal: Blood Glucose Level Within Targeted Range  Outcome: Ongoing, Progressing     Problem: Glycemic Control Impaired (Sepsis/Septic Shock)  Goal: Blood Glucose Level Within Desired Range  Outcome: Ongoing, Progressing     Problem: Infection Progression (Sepsis/Septic Shock)  Goal: Absence of Infection Signs and Symptoms  Outcome: Ongoing, Progressing     Problem: Infection (Pneumonia)  Goal: Resolution of Infection Signs and Symptoms  Outcome: Ongoing, Progressing

## 2022-02-12 NOTE — ASSESSMENT & PLAN NOTE
Patient with Hypoxic Respiratory failure which is Acute.  he is not on home oxygen. Supplemental oxygen was provided and noted- Oxygen Concentration (%):  [21] 21.   Signs/symptoms of respiratory failure include- tachypnea, increased work of breathing and respiratory distress. Contributing diagnoses includes - CHF Labs and images were reviewed. Patient Has recent ABG, which has been reviewed.      2/07 Patient intubated and ventilated and admitted to ICU    Cont IV steroid and IV lasix  Wean ventilation support  As tolerated   2/11-  Extubated yesterday   Wean fiO2 as tolerated   Continue CHF and COPD exacerbation treatment   2/12-  O2 wean down to RA

## 2022-02-12 NOTE — ASSESSMENT & PLAN NOTE
-Presents with SOB, multifactorial in setting of COPD exacerbation/? PNA and combined CHF  -Echo 1/22 showed EF of 40%, DD, pulmonary HTN, + WMA  -Continue IV diuresis as tolerated  -Resume Coreg once BP permits  -Consider Entresto pending BP/creatinine trend  -Ischemic evaluation once recovered  -Strict I's/O's    2/8/22  -Continue IV diuresis  -Resume BB as BP permits  -Strict I's/Os  -Reassess in AM    2/9/22  -Lasix increased to 60 mg IV BID   -Resume BB as tolerated/BP permits  -Strict I's/O's  -Failed SBT this AM    2/10/22  -Diuresed well overnight, extubated this AM  -Continue IV Lasix 60 mg BID  -Resume BB as tolerated  -Strict I's/O's    2/11/22  -Continue diuresis as needed  -Resume Coreg   -Strict I's/O's    2/12/2022   SWITCH TO LASIX 60 MG IVP BID  D/C AMLODIPINE DUE TO SOFT BP

## 2022-02-12 NOTE — SUBJECTIVE & OBJECTIVE
Interval History:   Lasix transitioned to oral .BP meds, home sedatives , antipsychotic were adjusted      Review of Systems   Unable to perform ROS: Other   Somnolent after receiving scheduled clonazepam and Seroquel.   Objective:     Vital Signs (Most Recent):  Temp: 98.2 °F (36.8 °C) (02/12/22 1208)  Pulse: 77 (02/12/22 1208)  Resp: 18 (02/12/22 1208)  BP: (!) 86/48 (02/12/22 1208)  SpO2: 95 % (02/12/22 1208) Vital Signs (24h Range):  Temp:  [98 °F (36.7 °C)-98.8 °F (37.1 °C)] 98.2 °F (36.8 °C)  Pulse:  [] 77  Resp:  [16-31] 18  SpO2:  [89 %-96 %] 95 %  BP: ()/(48-98) 86/48     Weight: 81.4 kg (179 lb 7.3 oz)  Body mass index is 24.34 kg/m².    Intake/Output Summary (Last 24 hours) at 2/12/2022 1304  Last data filed at 2/12/2022 0400  Gross per 24 hour   Intake --   Output 2370 ml   Net -2370 ml      Physical Exam  Constitutional:       General: He is not in acute distress.     Appearance: He is well-developed. He is ill-appearing. He is not diaphoretic.      Comments: Somnolent but arouses easily    HENT:      Head: Normocephalic and atraumatic.      Mouth/Throat:      Pharynx: No oropharyngeal exudate.   Eyes:      Conjunctiva/sclera: Conjunctivae normal.      Pupils: Pupils are equal, round, and reactive to light.   Neck:      Thyroid: No thyromegaly.      Vascular: No JVD.   Cardiovascular:      Rate and Rhythm: Regular rhythm. Tachycardia present.      Heart sounds: Normal heart sounds. No murmur heard.      Pulmonary:      Effort: Pulmonary effort is normal. Tachypnea (mild) present. No respiratory distress.      Breath sounds: Normal breath sounds. No wheezing or rales.   Chest:      Chest wall: No tenderness.   Abdominal:      General: Bowel sounds are normal. There is no distension.      Palpations: Abdomen is soft.      Tenderness: There is no abdominal tenderness. There is no guarding or rebound.   Musculoskeletal:         General: Normal range of motion.      Cervical back: Normal range of  motion and neck supple.   Lymphadenopathy:      Cervical: No cervical adenopathy.   Skin:     General: Skin is warm and dry.      Findings: No rash.   Neurological:      Mental Status: He is alert.      Comments: Somnolent , but arouses easily. Speech is not clear.          Significant Labs:   All pertinent labs within the past 24 hours have been reviewed.  BMP:   Recent Labs   Lab 02/11/22 0350 02/11/22  0941 02/12/22  0630   GLU   < >  --  191*   NA   < >  --  139   K   < >  --  3.1*   CL   < >  --  95   CO2   < >  --  33*   BUN   < >  --  38*   CREATININE   < >  --  1.1   CALCIUM   < >  --  8.8   MG  --  1.7  --     < > = values in this interval not displayed.     CBC:   Recent Labs   Lab 02/11/22 0350   WBC 10.16   HGB 13.0*   HCT 39.2*        CMP:   Recent Labs   Lab 02/11/22 0350 02/12/22  0630    139   K 3.5 3.1*   CL 94* 95   CO2 36* 33*   * 191*   BUN 33* 38*   CREATININE 1.2 1.1   CALCIUM 9.6 8.8   ANIONGAP 14 11   EGFRNONAA >60 >60     Cardiac Markers:   Recent Labs   Lab 02/11/22  0941   BNP 2,609*       Significant Imaging:

## 2022-02-12 NOTE — PROGRESS NOTES
[facility] Medicine  Progress Note    Patient Name: Shukri Rosales  MRN: [id number]  Patient Class: IP- Inpatient   Admission Date: 2/7/2022  Length of Stay: 5 days  Attending Physician: Dora Baeza MD  Primary Care Provider: Cris Matias NP        Subjective:     Principal Problem:Acute hypoxemic respiratory failure        HPI:  Shortness of Breath        Hx of COPD and recent dx of PNA. Pt received narcan for Kratom use.     Per ER- This  is a 61 y.o. male patient with PMHx of Adrenal cortical adenoma, arthritis, back surgery,  HTN, CKD stage 3, depression, GERD, migraine headache, Schizophrenia, Severe obesity, PNA, COPD, exsmoker, and DM who presents to the Emergency Department by EMS for SOB which onset one day ago. Symptoms are constant and moderate in severity. No mitigating or exacerbating factors reported. Associated sxs include wheeze. Patient denies  fever, chills, congestion, rhinorrhea, sore throat, cough, CP, leg swelling, N/V/D, dizziness, HA, and all other sxs at this time. Pt was seen in the ED a week ago with similar sxs. Pt was admit for PNA treatment. Pt states since d/c smoking a significant amount of cigarettes. Per EMS pt's SpO2 was in the low 70s at the scene. Pt was given two breathing treatments and placed on a nonrebreathing mask. Pt's SpO2 radha to 91 PTA. No further complaints or concerns at this time.      Patient was noted to be in Acute hypoxic respiratory failure in ER and was intubated and venitiated. Patient to be admitted to ICU.                   Overview/Hospital Course:  62 y/o wm  admitted to ICU on mechanical ventilation  with a Dx of acute on chronic hypoxic and hypercapnic . Acute on chronic systolic and diastolic CHF exacerbation , PNA and  COPD exacerbation . Started on IV lasix , IV steroid  and IVAB . Develop hypotension most likely due to  IV sedation  and started on short course of Levophed to keep a MAP>65 .    2/9 Remain critically ill .  Failed SBT today . Became very anxious and agitated .  Restarted on sedation . Home medication Seroquel and clonazepam . He has a good UP . NAEON     2/10 Extubated this am  , now on NRM .  He awake and alert  and following simple commands  . He has good UOP . NAEON . Sputum and blood cx NGTD .     2/11- Afebrile . O2 wean down to 4L/min. Sittting in the bedside chair . Awake , appears oriented to self , nods head but does not answer questions. Reports SOB is better. WBC 10K. Completed antibiotic today. Steroid transitioned to oral.  Noted BNP is further elevated 2609. Additional Lasix IV ordered per CC team. ASA, Amlodipine , Coreg and statin resumed.     2/12- Downgraded to Med tele yesterday . O2 wean down to RA. Pt was seen at  bedside . Somnolent during exam after receiving scheduled clonazepam and Seroquel this morning. Per Nursing , fully awake earlier today , ate breakfast and was getting agitated and climbing out of bed required sitter in the room. Episode of hypotension at noon today . Pt re examined at bedside . Still somnolent but arouses easily . Midodrine 10 mg x 1 dose given po. BP meds, home sedatives , antipsychotic were adjusted . Lasix transitioned to oral . Tried calling his sister but no answer.  Creatinine stable at 1.1        Interval History:   Lasix transitioned to oral .BP meds, home sedatives , antipsychotic were adjusted      Review of Systems   Unable to perform ROS: Other   Somnolent after receiving scheduled clonazepam and Seroquel.   Objective:     Vital Signs (Most Recent):  Temp: 98.2 °F (36.8 °C) (02/12/22 1208)  Pulse: 77 (02/12/22 1208)  Resp: 18 (02/12/22 1208)  BP: (!) 86/48 (02/12/22 1208)  SpO2: 95 % (02/12/22 1208) Vital Signs (24h Range):  Temp:  [98 °F (36.7 °C)-98.8 °F (37.1 °C)] 98.2 °F (36.8 °C)  Pulse:  [] 77  Resp:  [16-31] 18  SpO2:  [89 %-96 %] 95 %  BP: ()/(48-98) 86/48     Weight: 81.4 kg (179 lb 7.3 oz)  Body mass index is 24.34  kg/m².    Intake/Output Summary (Last 24 hours) at 2/12/2022 1304  Last data filed at 2/12/2022 0400  Gross per 24 hour   Intake --   Output 2370 ml   Net -2370 ml      Physical Exam  Constitutional:       General: He is not in acute distress.     Appearance: He is well-developed. He is ill-appearing. He is not diaphoretic.      Comments: Somnolent but arouses easily    HENT:      Head: Normocephalic and atraumatic.      Mouth/Throat:      Pharynx: No oropharyngeal exudate.   Eyes:      Conjunctiva/sclera: Conjunctivae normal.      Pupils: Pupils are equal, round, and reactive to light.   Neck:      Thyroid: No thyromegaly.      Vascular: No JVD.   Cardiovascular:      Rate and Rhythm: Regular rhythm. Tachycardia present.      Heart sounds: Normal heart sounds. No murmur heard.      Pulmonary:      Effort: Pulmonary effort is normal. Tachypnea (mild) present. No respiratory distress.      Breath sounds: Normal breath sounds. No wheezing or rales.   Chest:      Chest wall: No tenderness.   Abdominal:      General: Bowel sounds are normal. There is no distension.      Palpations: Abdomen is soft.      Tenderness: There is no abdominal tenderness. There is no guarding or rebound.   Musculoskeletal:         General: Normal range of motion.      Cervical back: Normal range of motion and neck supple.   Lymphadenopathy:      Cervical: No cervical adenopathy.   Skin:     General: Skin is warm and dry.      Findings: No rash.   Neurological:      Mental Status: He is alert.      Comments: Somnolent , but arouses easily. Speech is not clear.          Significant Labs:   All pertinent labs within the past 24 hours have been reviewed.  BMP:   Recent Labs   Lab 02/11/22  0350 02/11/22  0941 02/12/22  0630   GLU   < >  --  191*   NA   < >  --  139   K   < >  --  3.1*   CL   < >  --  95   CO2   < >  --  33*   BUN   < >  --  38*   CREATININE   < >  --  1.1   CALCIUM   < >  --  8.8   MG  --  1.7  --     < > = values in this interval  not displayed.     CBC:   Recent Labs   Lab 02/11/22  0350   WBC 10.16   HGB 13.0*   HCT 39.2*        CMP:   Recent Labs   Lab 02/11/22  0350 02/12/22  0630    139   K 3.5 3.1*   CL 94* 95   CO2 36* 33*   * 191*   BUN 33* 38*   CREATININE 1.2 1.1   CALCIUM 9.6 8.8   ANIONGAP 14 11   EGFRNONAA >60 >60     Cardiac Markers:   Recent Labs   Lab 02/11/22  0941   BNP 2,609*       Significant Imaging:       Assessment/Plan:      * Acute hypoxemic and hypercapnic respiratory failure   Patient with Hypoxic Respiratory failure which is Acute.  he is not on home oxygen. Supplemental oxygen was provided and noted- Oxygen Concentration (%):  [21] 21.   Signs/symptoms of respiratory failure include- tachypnea, increased work of breathing and respiratory distress. Contributing diagnoses includes - CHF Labs and images were reviewed. Patient Has recent ABG, which has been reviewed.      2/07 Patient intubated and ventilated and admitted to ICU    Cont IV steroid and IV lasix  Wean ventilation support  As tolerated   2/11-  Extubated yesterday   Wean fiO2 as tolerated   Continue CHF and COPD exacerbation treatment   2/12-  O2 wean down to RA    Acute on chronic combined systolic and diastolic congestive heart failure  Patient is identified as having Combined Systolic and Diastolic heart failure that is Acute on chronic. CHF is currently uncontrolled due to Continued edema of extremities and Dyspnea     Echo    Interpretation Summary  · The left ventricle is normal in size with concentric hypertrophy and mildly decreased systolic function.  · Grade I left ventricular diastolic dysfunction.  · Normal right ventricular size with normal right ventricular systolic function.  · There is pulmonary hypertension.  · Intermediate central venous pressure (8 mmHg).  · The estimated PA systolic pressure is 53 mmHg.  · The estimated ejection fraction is 40%.  · There are segmental left ventricular wall motion  abnormalities.    Telemetry  Add Iv lasix Bid  Cardiology consulted    Recent Labs   Lab 02/11/22  0941   BNP 2,609*   .  2/11-  Continue IV diuresis   Resume BB, ACEi   2/12-  Lasix transitioned to oral   ACEi chnaged to prn due to episode of hypotension     COPD exacerbation  Cont Inhaled bronchodilators   Cont IV steroid transitioned to oral       Schizoaffective disorder, bipolar type  Resume home med  Seroquel, Cymbalta  and clonazepam       Pulmonary hypertension  -Likely secondary in the setting LV failure , COPD and EMMANUEL  -Treat primary cause       History of recent pneumonia  Now with Acute respiratory failure   IV Abx -Rocephin and Azithromycin initiated - completed today 2/11/22      Normocytic anemia  Monitor H/H and signs of active bleeding   Transfuse P-RBC for Hgb 7 or under       Type 2 diabetes mellitus with stage 3 chronic kidney disease  Keep CBG<180  Cont long acting  and short acting insulin and adjust dose accordingly         EMMANUEL on CPAP  Hx EMMANUEL noted      Hypertension    Cont lasix   Resume home Amlodipine, BB, ACEi   2/12-  Meds were adjusted with BP trend         VTE Risk Mitigation (From admission, onward)         Ordered     enoxaparin injection 40 mg  Daily         02/09/22 1117     IP VTE HIGH RISK PATIENT  Once         02/07/22 1640     Place sequential compression device  Until discontinued         02/07/22 1640     Place EDVIN hose  Until discontinued         02/07/22 1640                Discharge Planning   ALICIA:      Code Status: Full Code   Is the patient medically ready for discharge?: No    Reason for patient still in hospital (select all that apply): Patient trending condition  Discharge Plan A: Long-term acute care facility (LTAC)                  Dora Baeza MD  Department of Hospital Medicine   [facility] - Med Surg

## 2022-02-12 NOTE — PROGRESS NOTES
[facility]  Cardiology  Progress Note    Patient Name: Shukri Rosales  MRN: [id number]  Admission Date: 2/7/2022  Hospital Length of Stay: 5 days  Code Status: Full Code   Attending Physician: Dora Baeza MD   Primary Care Physician: Cris Matias NP  Expected Discharge Date:   Principal Problem:Acute hypoxemic respiratory failure    Subjective:     Hospital Course:   2/8/22-Patient seen and examined today, intubated, sedated. Some hypotension noted overnight, required low dose pressor. Being diuresed, on abx. Labs reviewed. Creatinine 1.4. H/H 10.3/32.3.    2/9/22-Patient seen and examined today, failed SBT earlier today. Off pressors support. Labs reviewed. H/H stable. Creatinine 1.5. Will increase diuresis and assess response.     2/10/22-Patient seen and examined today, right after extubation. Agitated, BP and HR elevated. Good diuresis overnight. Labs reviewed. Creatinine stable. Continue same mgmt.    2/11/22-Patient seen and examined today, sitting up in bedside chair. Lethargic, on supplemental O2. Appears comfortable. BP elevated. Labs reviewed. Creatinine stable. BNP elevated.     2/12/2022 Cr 1.1 HGb 13 stable, BNP up to 2609, SOFT BP. Confused and on 1:1 sitter          Review of Systems   Unable to perform ROS: mental status change     Objective:     Vital Signs (Most Recent):  Temp: 97.5 °F (36.4 °C) (02/12/22 1629)  Pulse: 90 (02/12/22 1629)  Resp: 18 (02/12/22 1629)  BP: (!) 140/83 (02/12/22 1629)  SpO2: (!) 93 % (02/12/22 1629) Vital Signs (24h Range):  Temp:  [97.5 °F (36.4 °C)-98.8 °F (37.1 °C)] 97.5 °F (36.4 °C)  Pulse:  [] 90  Resp:  [16-31] 18  SpO2:  [89 %-96 %] 93 %  BP: ()/(48-98) 140/83     Weight: 81.4 kg (179 lb 7.3 oz)  Body mass index is 24.34 kg/m².     SpO2: (!) 93 %  O2 Device (Oxygen Therapy): room air      Intake/Output Summary (Last 24 hours) at 2/12/2022 1708  Last data filed at 2/12/2022 1300  Gross per 24 hour   Intake 120 ml   Output 1190 ml    Net -1070 ml       Lines/Drains/Airways     Peripherally Inserted Central Catheter Line            PICC Double Lumen 02/07/22 1830 right basilic 4 days          Drain                 Urethral Catheter 02/07/22 0623 Non-latex 16 Fr. 5 days                Physical Exam  Vitals and nursing note reviewed.   Constitutional:       General: He is not in acute distress.     Appearance: He is well-developed. He is ill-appearing. He is not diaphoretic.      Comments: On supplemental O2   HENT:      Head: Normocephalic and atraumatic.   Eyes:      General:         Right eye: No discharge.         Left eye: No discharge.      Pupils: Pupils are equal, round, and reactive to light.   Neck:      Thyroid: No thyromegaly.      Vascular: No JVD.      Trachea: No tracheal deviation.   Cardiovascular:      Rate and Rhythm: Regular rhythm. Tachycardia present.      Heart sounds: Normal heart sounds, S1 normal and S2 normal. No murmur heard.      Pulmonary:      Effort: Pulmonary effort is normal. No respiratory distress.      Breath sounds: No wheezing.      Comments: Diminished BS at bases  Abdominal:      General: There is no distension.      Palpations: Abdomen is soft.      Tenderness: There is no rebound.   Musculoskeletal:      Cervical back: Neck supple.      Right lower leg: No edema.      Left lower leg: No edema.   Skin:     General: Skin is warm and dry.      Findings: No erythema.   Neurological:      Mental Status: He is alert.      Comments: Lethargic         Significant Labs:   ABG:   Recent Labs   Lab 02/10/22  2257   PH 7.649*   PCO2 34.2*   HCO3 37.6*   POCSATURATED 97   BE 17   , Blood Culture: No results for input(s): LABBLOO in the last 48 hours., BMP:   Recent Labs   Lab 02/11/22  0350 02/11/22  0941 02/12/22  0630   *  --  191*     --  139   K 3.5  --  3.1*   CL 94*  --  95   CO2 36*  --  33*   BUN 33*  --  38*   CREATININE 1.2  --  1.1   CALCIUM 9.6  --  8.8   MG  --  1.7  --    , CMP   Recent  Labs   Lab 02/11/22  0350 02/12/22  0630    139   K 3.5 3.1*   CL 94* 95   CO2 36* 33*   * 191*   BUN 33* 38*   CREATININE 1.2 1.1   CALCIUM 9.6 8.8   ANIONGAP 14 11   ESTGFRAFRICA >60 >60   EGFRNONAA >60 >60   , CBC   Recent Labs   Lab 02/11/22  0350   WBC 10.16   HGB 13.0*   HCT 39.2*      , INR No results for input(s): INR, PROTIME in the last 48 hours., Lipid Panel No results for input(s): CHOL, HDL, LDLCALC, TRIG, CHOLHDL in the last 48 hours. and Troponin No results for input(s): TROPONINI in the last 48 hours.    Significant Imaging: EKG:      Assessment and Plan:     * Acute hypoxemic and hypercapnic respiratory failure   -Assess response to IV diuresis    Acute on chronic combined systolic and diastolic congestive heart failure  -Presents with SOB, multifactorial in setting of COPD exacerbation/? PNA and combined CHF  -Echo 1/22 showed EF of 40%, DD, pulmonary HTN, + WMA  -Continue IV diuresis as tolerated  -Resume Coreg once BP permits  -Consider Entresto pending BP/creatinine trend  -Ischemic evaluation once recovered  -Strict I's/O's    2/8/22  -Continue IV diuresis  -Resume BB as BP permits  -Strict I's/Os  -Reassess in AM    2/9/22  -Lasix increased to 60 mg IV BID   -Resume BB as tolerated/BP permits  -Strict I's/O's  -Failed SBT this AM    2/10/22  -Diuresed well overnight, extubated this AM  -Continue IV Lasix 60 mg BID  -Resume BB as tolerated  -Strict I's/O's    2/11/22  -Continue diuresis as needed  -Resume Coreg   -Strict I's/O's    2/12/2022   SWITCH TO LASIX 60 MG IVP BID  D/C AMLODIPINE DUE TO SOFT BP    History of recent pneumonia  -On abx, awaiting pulmonary evaluation    COPD exacerbation  -Mgmt as per primary team    Schizoaffective disorder, bipolar type  -Mgmt as per primary team    Type 2 diabetes mellitus with stage 3 chronic kidney disease  -Mgmt as per primary team        VTE Risk Mitigation (From admission, onward)         Ordered     enoxaparin injection 40 mg   Daily         02/09/22 1117     IP VTE HIGH RISK PATIENT  Once         02/07/22 1640     Place sequential compression device  Until discontinued         02/07/22 1640     Place EDVIN hose  Until discontinued         02/07/22 1640                Chidi Reeves MD  Cardiology  [facility]

## 2022-02-12 NOTE — SUBJECTIVE & OBJECTIVE
Review of Systems   Unable to perform ROS: mental status change     Objective:     Vital Signs (Most Recent):  Temp: 97.5 °F (36.4 °C) (02/12/22 1629)  Pulse: 90 (02/12/22 1629)  Resp: 18 (02/12/22 1629)  BP: (!) 140/83 (02/12/22 1629)  SpO2: (!) 93 % (02/12/22 1629) Vital Signs (24h Range):  Temp:  [97.5 °F (36.4 °C)-98.8 °F (37.1 °C)] 97.5 °F (36.4 °C)  Pulse:  [] 90  Resp:  [16-31] 18  SpO2:  [89 %-96 %] 93 %  BP: ()/(48-98) 140/83     Weight: 81.4 kg (179 lb 7.3 oz)  Body mass index is 24.34 kg/m².     SpO2: (!) 93 %  O2 Device (Oxygen Therapy): room air      Intake/Output Summary (Last 24 hours) at 2/12/2022 1708  Last data filed at 2/12/2022 1300  Gross per 24 hour   Intake 120 ml   Output 1190 ml   Net -1070 ml       Lines/Drains/Airways     Peripherally Inserted Central Catheter Line            PICC Double Lumen 02/07/22 1830 right basilic 4 days          Drain                 Urethral Catheter 02/07/22 0623 Non-latex 16 Fr. 5 days                Physical Exam  Vitals and nursing note reviewed.   Constitutional:       General: He is not in acute distress.     Appearance: He is well-developed. He is ill-appearing. He is not diaphoretic.      Comments: On supplemental O2   HENT:      Head: Normocephalic and atraumatic.   Eyes:      General:         Right eye: No discharge.         Left eye: No discharge.      Pupils: Pupils are equal, round, and reactive to light.   Neck:      Thyroid: No thyromegaly.      Vascular: No JVD.      Trachea: No tracheal deviation.   Cardiovascular:      Rate and Rhythm: Regular rhythm. Tachycardia present.      Heart sounds: Normal heart sounds, S1 normal and S2 normal. No murmur heard.      Pulmonary:      Effort: Pulmonary effort is normal. No respiratory distress.      Breath sounds: No wheezing.      Comments: Diminished BS at bases  Abdominal:      General: There is no distension.      Palpations: Abdomen is soft.      Tenderness: There is no rebound.    Musculoskeletal:      Cervical back: Neck supple.      Right lower leg: No edema.      Left lower leg: No edema.   Skin:     General: Skin is warm and dry.      Findings: No erythema.   Neurological:      Mental Status: He is alert.      Comments: Lethargic         Significant Labs:   ABG:   Recent Labs   Lab 02/10/22  2257   PH 7.649*   PCO2 34.2*   HCO3 37.6*   POCSATURATED 97   BE 17   , Blood Culture: No results for input(s): LABBLOO in the last 48 hours., BMP:   Recent Labs   Lab 02/11/22  0350 02/11/22  0941 02/12/22  0630   *  --  191*     --  139   K 3.5  --  3.1*   CL 94*  --  95   CO2 36*  --  33*   BUN 33*  --  38*   CREATININE 1.2  --  1.1   CALCIUM 9.6  --  8.8   MG  --  1.7  --    , CMP   Recent Labs   Lab 02/11/22  0350 02/12/22  0630    139   K 3.5 3.1*   CL 94* 95   CO2 36* 33*   * 191*   BUN 33* 38*   CREATININE 1.2 1.1   CALCIUM 9.6 8.8   ANIONGAP 14 11   ESTGFRAFRICA >60 >60   EGFRNONAA >60 >60   , CBC   Recent Labs   Lab 02/11/22  0350   WBC 10.16   HGB 13.0*   HCT 39.2*      , INR No results for input(s): INR, PROTIME in the last 48 hours., Lipid Panel No results for input(s): CHOL, HDL, LDLCALC, TRIG, CHOLHDL in the last 48 hours. and Troponin No results for input(s): TROPONINI in the last 48 hours.    Significant Imaging: EKG:

## 2022-02-12 NOTE — PT/OT/SLP PROGRESS
Physical Therapy      Patient Name:  Shukri Rosales   MRN:  [id number]    Patient not seen today secondary to NSG REQUEST TO ALLOW PT TO REST TODAY  . Will follow-up TOMORROW.

## 2022-02-12 NOTE — ASSESSMENT & PLAN NOTE
Patient is identified as having Combined Systolic and Diastolic heart failure that is Acute on chronic. CHF is currently uncontrolled due to Continued edema of extremities and Dyspnea     Echo    Interpretation Summary  · The left ventricle is normal in size with concentric hypertrophy and mildly decreased systolic function.  · Grade I left ventricular diastolic dysfunction.  · Normal right ventricular size with normal right ventricular systolic function.  · There is pulmonary hypertension.  · Intermediate central venous pressure (8 mmHg).  · The estimated PA systolic pressure is 53 mmHg.  · The estimated ejection fraction is 40%.  · There are segmental left ventricular wall motion abnormalities.    Telemetry  Add Iv lasix Bid  Cardiology consulted    Recent Labs   Lab 02/11/22  0941   BNP 2,609*   .  2/11-  Continue IV diuresis   Resume BB, ACEi   2/12-  Lasix transitioned to oral   ACEi chnaged to prn due to episode of hypotension

## 2022-02-13 VITALS
RESPIRATION RATE: 17 BRPM | HEART RATE: 91 BPM | OXYGEN SATURATION: 98 % | SYSTOLIC BLOOD PRESSURE: 105 MMHG | TEMPERATURE: 98 F | DIASTOLIC BLOOD PRESSURE: 81 MMHG | BODY MASS INDEX: 24.61 KG/M2 | HEIGHT: 72 IN | WEIGHT: 181.69 LBS

## 2022-02-13 LAB
ANION GAP SERPL CALC-SCNC: 12 MMOL/L (ref 8–16)
ASPERGILLUS AB SER QL ID: NORMAL
B DERMAT AB SER QL ID: NORMAL
BUN SERPL-MCNC: 38 MG/DL (ref 8–23)
C IMMITIS AB SER QL ID: NORMAL
CALCIUM SERPL-MCNC: 8.7 MG/DL (ref 8.7–10.5)
CHLORIDE SERPL-SCNC: 93 MMOL/L (ref 95–110)
CO2 SERPL-SCNC: 31 MMOL/L (ref 23–29)
CREAT SERPL-MCNC: 1.4 MG/DL (ref 0.5–1.4)
EST. GFR  (AFRICAN AMERICAN): >60 ML/MIN/1.73 M^2
EST. GFR  (NON AFRICAN AMERICAN): 53 ML/MIN/1.73 M^2
GLUCOSE SERPL-MCNC: 305 MG/DL (ref 70–110)
H CAPSUL AB SER QL ID: NORMAL
POCT GLUCOSE: 100 MG/DL (ref 70–110)
POCT GLUCOSE: 339 MG/DL (ref 70–110)
POTASSIUM SERPL-SCNC: 3.5 MMOL/L (ref 3.5–5.1)
SODIUM SERPL-SCNC: 136 MMOL/L (ref 136–145)

## 2022-02-13 PROCEDURE — A4216 STERILE WATER/SALINE, 10 ML: HCPCS | Performed by: INTERNAL MEDICINE

## 2022-02-13 PROCEDURE — 63600175 PHARM REV CODE 636 W HCPCS: Performed by: INTERNAL MEDICINE

## 2022-02-13 PROCEDURE — 94761 N-INVAS EAR/PLS OXIMETRY MLT: CPT

## 2022-02-13 PROCEDURE — 25000003 PHARM REV CODE 250: Performed by: INTERNAL MEDICINE

## 2022-02-13 PROCEDURE — 80048 BASIC METABOLIC PNL TOTAL CA: CPT | Performed by: INTERNAL MEDICINE

## 2022-02-13 RX ORDER — LISINOPRIL 5 MG/1
5 TABLET ORAL DAILY
Qty: 30 TABLET | Refills: 0 | Status: SHIPPED | OUTPATIENT
Start: 2022-02-13 | End: 2022-03-15

## 2022-02-13 RX ORDER — QUETIAPINE FUMARATE 200 MG/1
200 TABLET, FILM COATED ORAL NIGHTLY
Qty: 30 TABLET | Refills: 0 | Status: SHIPPED | OUTPATIENT
Start: 2022-02-13 | End: 2022-03-15

## 2022-02-13 RX ORDER — FUROSEMIDE 40 MG/1
40 TABLET ORAL DAILY
Qty: 30 TABLET | Refills: 0 | Status: SHIPPED | OUTPATIENT
Start: 2022-02-13 | End: 2022-03-15

## 2022-02-13 RX ORDER — QUETIAPINE FUMARATE 25 MG/1
25 TABLET, FILM COATED ORAL DAILY
Qty: 30 TABLET | Refills: 0 | Status: SHIPPED | OUTPATIENT
Start: 2022-02-13 | End: 2022-03-15

## 2022-02-13 RX ORDER — TRAZODONE HYDROCHLORIDE 50 MG/1
25 TABLET ORAL NIGHTLY
Qty: 15 TABLET | Refills: 0 | Status: SHIPPED | OUTPATIENT
Start: 2022-02-13 | End: 2022-03-15

## 2022-02-13 RX ADMIN — INSULIN DETEMIR 20 UNITS: 100 INJECTION, SOLUTION SUBCUTANEOUS at 09:02

## 2022-02-13 RX ADMIN — SODIUM CHLORIDE, PRESERVATIVE FREE 10 ML: 5 INJECTION INTRAVENOUS at 12:02

## 2022-02-13 RX ADMIN — ACETAMINOPHEN 650 MG: 325 TABLET ORAL at 02:02

## 2022-02-13 RX ADMIN — PREDNISONE 20 MG: 20 TABLET ORAL at 09:02

## 2022-02-13 RX ADMIN — INSULIN ASPART 4 UNITS: 100 INJECTION, SOLUTION INTRAVENOUS; SUBCUTANEOUS at 09:02

## 2022-02-13 RX ADMIN — Medication 400 MG: at 09:02

## 2022-02-13 RX ADMIN — INSULIN ASPART 3 UNITS: 100 INJECTION, SOLUTION INTRAVENOUS; SUBCUTANEOUS at 09:02

## 2022-02-13 RX ADMIN — POLYETHYLENE GLYCOL 3350 17 G: 17 POWDER, FOR SOLUTION ORAL at 09:02

## 2022-02-13 RX ADMIN — DULOXETINE 60 MG: 30 CAPSULE, DELAYED RELEASE ORAL at 08:02

## 2022-02-13 RX ADMIN — FUROSEMIDE 60 MG: 10 INJECTION, SOLUTION INTRAVENOUS at 06:02

## 2022-02-13 RX ADMIN — ATORVASTATIN CALCIUM 20 MG: 10 TABLET, FILM COATED ORAL at 08:02

## 2022-02-13 RX ADMIN — ACETAMINOPHEN 650 MG: 325 TABLET ORAL at 09:02

## 2022-02-13 RX ADMIN — INSULIN ASPART 8 UNITS: 100 INJECTION, SOLUTION INTRAVENOUS; SUBCUTANEOUS at 06:02

## 2022-02-13 RX ADMIN — PANTOPRAZOLE SODIUM 40 MG: 40 TABLET, DELAYED RELEASE ORAL at 08:02

## 2022-02-13 RX ADMIN — ASPIRIN 81 MG CHEWABLE TABLET 81 MG: 81 TABLET CHEWABLE at 08:02

## 2022-02-13 RX ADMIN — TAMSULOSIN HYDROCHLORIDE 0.4 MG: 0.4 CAPSULE ORAL at 08:02

## 2022-02-13 RX ADMIN — HALOPERIDOL LACTATE 5 MG: 5 INJECTION, SOLUTION INTRAMUSCULAR at 02:02

## 2022-02-13 NOTE — PT/OT/SLP PROGRESS
Physical Therapy      Patient Name:  Shukri Rosales   MRN:  [id number]    [id number] Patient not seen today secondary to pt refusal of tx. P.T. met pt in room with one on one present. Pt requesting to go home and refused therapy. P.T. to cont tx next visit. Thank you. Radha Boo, PT

## 2022-02-13 NOTE — PLAN OF CARE
[facility] - Med [facility]  Discharge Final Note    Primary Care Provider: Cris Matias NP    Expected Discharge Date: 2/13/2022    Final Discharge Note (most recent)     Final Note - 02/13/22 1043        Final Note    Assessment Type Final Discharge Note     Anticipated Discharge Disposition Home or Self Care        Post-Acute Status    Discharge Delays None known at this time                 Important Message from Medicare             Contact Info     Cris Matias NP   Specialty: Family Medicine   Relationship: PCP - General    [address]  [address]  [place] [address]   Phone: [phone]       Next Steps: Schedule an appointment as soon as possible for a visit in 3 day(s)    Instructions: Discharge follow up     Chidi Reeves MD   Specialty: Cardiology, Cardiovascular Disease    [address]  [place] [address]   Phone: [phone]       Next Steps: Schedule an appointment as soon as possible for a visit in 1 week(s)    Instructions: Cardiology follow up         Pt has no discharge needs at the time of chart review.

## 2022-02-13 NOTE — CONSULTS
Sw spoke with pt over the phone. SW explained role. Pt stated stated he does not want to go to a SNF. Pt requested to go home with HH. SW informed him with his insurance he may not be able to home health. Pt stated if that's okay he will this discharge home. SW will follow up with HH option.

## 2022-02-13 NOTE — PLAN OF CARE
Problem: Fall Injury Risk  Goal: Absence of Fall and Fall-Related Injury  Outcome: Ongoing, Progressing     Problem: Skin Injury Risk Increased  Goal: Skin Health and Integrity  Outcome: Ongoing, Progressing     Problem: Diabetes Comorbidity  Goal: Blood Glucose Level Within Targeted Range  Outcome: Ongoing, Progressing     Problem: Glycemic Control Impaired (Sepsis/Septic Shock)  Goal: Blood Glucose Level Within Desired Range  Outcome: Ongoing, Progressing     Problem: Infection Progression (Sepsis/Septic Shock)  Goal: Absence of Infection Signs and Symptoms  Outcome: Ongoing, Progressing     Problem: Nutrition Impaired (Sepsis/Septic Shock)  Goal: Optimal Nutrition Intake  Outcome: Ongoing, Progressing     Problem: Fluid Imbalance (Pneumonia)  Goal: Fluid Balance  Outcome: Ongoing, Progressing     Problem: Respiratory Compromise (Pneumonia)  Goal: Effective Oxygenation and Ventilation  Outcome: Ongoing, Progressing     Problem: Infection (Pneumonia)  Goal: Resolution of Infection Signs and Symptoms  Outcome: Ongoing, Progressing

## 2022-02-14 ENCOUNTER — HOSPITAL ENCOUNTER (OUTPATIENT)
Facility: HOSPITAL | Age: 62
Discharge: LEFT BEFORE DISCHARGE | End: 2022-02-16
Attending: EMERGENCY MEDICINE | Admitting: INTERNAL MEDICINE
Payer: MEDICAID

## 2022-02-14 DIAGNOSIS — R07.9 CHEST PAIN: ICD-10-CM

## 2022-02-14 DIAGNOSIS — R79.89 TROPONIN LEVEL ELEVATED: Primary | ICD-10-CM

## 2022-02-14 DIAGNOSIS — M54.6 RIGHT-SIDED THORACIC BACK PAIN: ICD-10-CM

## 2022-02-14 LAB
ALBUMIN SERPL BCP-MCNC: 3.2 G/DL (ref 3.5–5.2)
ALP SERPL-CCNC: 77 U/L (ref 55–135)
ALT SERPL W/O P-5'-P-CCNC: 28 U/L (ref 10–44)
ANION GAP SERPL CALC-SCNC: 13 MMOL/L (ref 8–16)
AST SERPL-CCNC: 22 U/L (ref 10–40)
BASOPHILS # BLD AUTO: 0.04 K/UL (ref 0–0.2)
BASOPHILS NFR BLD: 0.3 % (ref 0–1.9)
BILIRUB SERPL-MCNC: 0.4 MG/DL (ref 0.1–1)
BNP SERPL-MCNC: 445 PG/ML (ref 0–99)
BUN SERPL-MCNC: 41 MG/DL (ref 8–23)
CALCIUM SERPL-MCNC: 8.9 MG/DL (ref 8.7–10.5)
CHLORIDE SERPL-SCNC: 100 MMOL/L (ref 95–110)
CO2 SERPL-SCNC: 28 MMOL/L (ref 23–29)
CREAT SERPL-MCNC: 1.9 MG/DL (ref 0.5–1.4)
DIFFERENTIAL METHOD: ABNORMAL
EOSINOPHIL # BLD AUTO: 0.6 K/UL (ref 0–0.5)
EOSINOPHIL NFR BLD: 4 % (ref 0–8)
ERYTHROCYTE [DISTWIDTH] IN BLOOD BY AUTOMATED COUNT: 15.9 % (ref 11.5–14.5)
EST. GFR  (AFRICAN AMERICAN): 42 ML/MIN/1.73 M^2
EST. GFR  (NON AFRICAN AMERICAN): 37 ML/MIN/1.73 M^2
GLUCOSE SERPL-MCNC: 131 MG/DL (ref 70–110)
HCT VFR BLD AUTO: 40.4 % (ref 40–54)
HGB BLD-MCNC: 12.9 G/DL (ref 14–18)
IMM GRANULOCYTES # BLD AUTO: 0.22 K/UL (ref 0–0.04)
IMM GRANULOCYTES NFR BLD AUTO: 1.6 % (ref 0–0.5)
LYMPHOCYTES # BLD AUTO: 2.5 K/UL (ref 1–4.8)
LYMPHOCYTES NFR BLD: 18 % (ref 18–48)
MCH RBC QN AUTO: 27.6 PG (ref 27–31)
MCHC RBC AUTO-ENTMCNC: 31.9 G/DL (ref 32–36)
MCV RBC AUTO: 86 FL (ref 82–98)
MONOCYTES # BLD AUTO: 0.9 K/UL (ref 0.3–1)
MONOCYTES NFR BLD: 6.8 % (ref 4–15)
NEUTROPHILS # BLD AUTO: 9.6 K/UL (ref 1.8–7.7)
NEUTROPHILS NFR BLD: 69.3 % (ref 38–73)
NRBC BLD-RTO: 0 /100 WBC
PLATELET # BLD AUTO: 382 K/UL (ref 150–450)
PMV BLD AUTO: 9.6 FL (ref 9.2–12.9)
POTASSIUM SERPL-SCNC: 3.6 MMOL/L (ref 3.5–5.1)
PROT SERPL-MCNC: 6.5 G/DL (ref 6–8.4)
RBC # BLD AUTO: 4.68 M/UL (ref 4.6–6.2)
SODIUM SERPL-SCNC: 141 MMOL/L (ref 136–145)
TROPONIN I SERPL DL<=0.01 NG/ML-MCNC: 0.12 NG/ML (ref 0–0.03)
WBC # BLD AUTO: 13.78 K/UL (ref 3.9–12.7)

## 2022-02-14 PROCEDURE — 83880 ASSAY OF NATRIURETIC PEPTIDE: CPT | Performed by: EMERGENCY MEDICINE

## 2022-02-14 PROCEDURE — 93005 ELECTROCARDIOGRAM TRACING: CPT

## 2022-02-14 PROCEDURE — 80053 COMPREHEN METABOLIC PANEL: CPT | Performed by: EMERGENCY MEDICINE

## 2022-02-14 PROCEDURE — 85025 COMPLETE CBC W/AUTO DIFF WBC: CPT | Performed by: EMERGENCY MEDICINE

## 2022-02-14 PROCEDURE — 93010 EKG 12-LEAD: ICD-10-PCS | Mod: ,,, | Performed by: INTERNAL MEDICINE

## 2022-02-14 PROCEDURE — G0378 HOSPITAL OBSERVATION PER HR: HCPCS

## 2022-02-14 PROCEDURE — 96374 THER/PROPH/DIAG INJ IV PUSH: CPT

## 2022-02-14 PROCEDURE — 84484 ASSAY OF TROPONIN QUANT: CPT | Performed by: EMERGENCY MEDICINE

## 2022-02-14 PROCEDURE — 93010 ELECTROCARDIOGRAM REPORT: CPT | Mod: ,,, | Performed by: INTERNAL MEDICINE

## 2022-02-14 PROCEDURE — 25000003 PHARM REV CODE 250: Performed by: EMERGENCY MEDICINE

## 2022-02-14 PROCEDURE — 82962 GLUCOSE BLOOD TEST: CPT

## 2022-02-14 PROCEDURE — 63600175 PHARM REV CODE 636 W HCPCS: Performed by: EMERGENCY MEDICINE

## 2022-02-14 PROCEDURE — 99285 EMERGENCY DEPT VISIT HI MDM: CPT | Mod: 25

## 2022-02-14 RX ORDER — KETOROLAC TROMETHAMINE 30 MG/ML
30 INJECTION, SOLUTION INTRAMUSCULAR; INTRAVENOUS
Status: COMPLETED | OUTPATIENT
Start: 2022-02-14 | End: 2022-02-14

## 2022-02-14 RX ORDER — ASPIRIN 325 MG
325 TABLET ORAL
Status: COMPLETED | OUTPATIENT
Start: 2022-02-14 | End: 2022-02-14

## 2022-02-14 RX ADMIN — ASPIRIN 325 MG ORAL TABLET 325 MG: 325 PILL ORAL at 10:02

## 2022-02-14 RX ADMIN — KETOROLAC TROMETHAMINE 30 MG: 30 INJECTION, SOLUTION INTRAMUSCULAR at 09:02

## 2022-02-15 ENCOUNTER — TELEPHONE (OUTPATIENT)
Dept: TRANSPLANT | Facility: CLINIC | Age: 62
End: 2022-02-15
Payer: MEDICAID

## 2022-02-15 ENCOUNTER — PATIENT OUTREACH (OUTPATIENT)
Dept: ADMINISTRATIVE | Facility: CLINIC | Age: 62
End: 2022-02-15
Payer: MEDICAID

## 2022-02-15 PROBLEM — M54.9 BACK PAIN: Status: ACTIVE | Noted: 2022-02-15

## 2022-02-15 LAB
BILIRUB UR QL STRIP: NEGATIVE
CLARITY UR: CLEAR
COLOR UR: YELLOW
CTP QC/QA: YES
GLUCOSE UR QL STRIP: NEGATIVE
HGB UR QL STRIP: NEGATIVE
KETONES UR QL STRIP: NEGATIVE
LEUKOCYTE ESTERASE UR QL STRIP: NEGATIVE
NITRITE UR QL STRIP: NEGATIVE
PH UR STRIP: 6 [PH] (ref 5–8)
POCT GLUCOSE: 172 MG/DL (ref 70–110)
POCT GLUCOSE: 196 MG/DL (ref 70–110)
POCT GLUCOSE: 196 MG/DL (ref 70–110)
POCT GLUCOSE: 202 MG/DL (ref 70–110)
POCT GLUCOSE: 257 MG/DL (ref 70–110)
PROT UR QL STRIP: ABNORMAL
SARS-COV-2 RDRP RESP QL NAA+PROBE: NEGATIVE
SP GR UR STRIP: 1.02 (ref 1–1.03)
TROPONIN I SERPL DL<=0.01 NG/ML-MCNC: 0.08 NG/ML (ref 0–0.03)
TROPONIN I SERPL DL<=0.01 NG/ML-MCNC: 0.1 NG/ML (ref 0–0.03)
URN SPEC COLLECT METH UR: ABNORMAL
UROBILINOGEN UR STRIP-ACNC: NEGATIVE EU/DL

## 2022-02-15 PROCEDURE — G0378 HOSPITAL OBSERVATION PER HR: HCPCS

## 2022-02-15 PROCEDURE — 25000003 PHARM REV CODE 250: Performed by: INTERNAL MEDICINE

## 2022-02-15 PROCEDURE — 63600175 PHARM REV CODE 636 W HCPCS: Performed by: INTERNAL MEDICINE

## 2022-02-15 PROCEDURE — 96372 THER/PROPH/DIAG INJ SC/IM: CPT | Performed by: INTERNAL MEDICINE

## 2022-02-15 PROCEDURE — U0002 COVID-19 LAB TEST NON-CDC: HCPCS | Performed by: EMERGENCY MEDICINE

## 2022-02-15 PROCEDURE — 84484 ASSAY OF TROPONIN QUANT: CPT | Performed by: INTERNAL MEDICINE

## 2022-02-15 PROCEDURE — 63600175 PHARM REV CODE 636 W HCPCS: Performed by: EMERGENCY MEDICINE

## 2022-02-15 PROCEDURE — A4216 STERILE WATER/SALINE, 10 ML: HCPCS | Performed by: INTERNAL MEDICINE

## 2022-02-15 PROCEDURE — 36415 COLL VENOUS BLD VENIPUNCTURE: CPT | Performed by: INTERNAL MEDICINE

## 2022-02-15 PROCEDURE — 96375 TX/PRO/DX INJ NEW DRUG ADDON: CPT

## 2022-02-15 PROCEDURE — 81003 URINALYSIS AUTO W/O SCOPE: CPT | Performed by: INTERNAL MEDICINE

## 2022-02-15 PROCEDURE — 99215 OFFICE O/P EST HI 40 MIN: CPT | Mod: ,,, | Performed by: INTERNAL MEDICINE

## 2022-02-15 PROCEDURE — 99215 PR OFFICE/OUTPT VISIT, EST, LEVL V, 40-54 MIN: ICD-10-PCS | Mod: ,,, | Performed by: INTERNAL MEDICINE

## 2022-02-15 RX ORDER — IBUPROFEN 200 MG
24 TABLET ORAL
Status: DISCONTINUED | OUTPATIENT
Start: 2022-02-15 | End: 2022-02-16 | Stop reason: HOSPADM

## 2022-02-15 RX ORDER — TAMSULOSIN HYDROCHLORIDE 0.4 MG/1
0.4 CAPSULE ORAL DAILY
Status: DISCONTINUED | OUTPATIENT
Start: 2022-02-15 | End: 2022-02-16 | Stop reason: HOSPADM

## 2022-02-15 RX ORDER — QUETIAPINE FUMARATE 100 MG/1
200 TABLET, FILM COATED ORAL NIGHTLY
Status: DISCONTINUED | OUTPATIENT
Start: 2022-02-15 | End: 2022-02-16 | Stop reason: HOSPADM

## 2022-02-15 RX ORDER — SODIUM CHLORIDE 0.9 % (FLUSH) 0.9 %
10 SYRINGE (ML) INJECTION EVERY 8 HOURS
Status: DISCONTINUED | OUTPATIENT
Start: 2022-02-15 | End: 2022-02-16 | Stop reason: HOSPADM

## 2022-02-15 RX ORDER — SODIUM,POTASSIUM PHOSPHATES 280-250MG
2 POWDER IN PACKET (EA) ORAL
Status: DISCONTINUED | OUTPATIENT
Start: 2022-02-15 | End: 2022-02-16 | Stop reason: HOSPADM

## 2022-02-15 RX ORDER — IBUPROFEN 200 MG
16 TABLET ORAL
Status: DISCONTINUED | OUTPATIENT
Start: 2022-02-15 | End: 2022-02-16 | Stop reason: HOSPADM

## 2022-02-15 RX ORDER — HYDROCODONE BITARTRATE AND ACETAMINOPHEN 5; 325 MG/1; MG/1
1 TABLET ORAL EVERY 6 HOURS PRN
Status: DISCONTINUED | OUTPATIENT
Start: 2022-02-15 | End: 2022-02-16 | Stop reason: HOSPADM

## 2022-02-15 RX ORDER — MORPHINE SULFATE 4 MG/ML
2 INJECTION, SOLUTION INTRAMUSCULAR; INTRAVENOUS
Status: COMPLETED | OUTPATIENT
Start: 2022-02-15 | End: 2022-02-15

## 2022-02-15 RX ORDER — INSULIN ASPART 100 [IU]/ML
0-5 INJECTION, SOLUTION INTRAVENOUS; SUBCUTANEOUS
Status: DISCONTINUED | OUTPATIENT
Start: 2022-02-15 | End: 2022-02-16 | Stop reason: HOSPADM

## 2022-02-15 RX ORDER — LISINOPRIL 5 MG/1
5 TABLET ORAL DAILY
Status: DISCONTINUED | OUTPATIENT
Start: 2022-02-15 | End: 2022-02-16 | Stop reason: HOSPADM

## 2022-02-15 RX ORDER — TALC
6 POWDER (GRAM) TOPICAL NIGHTLY PRN
Status: DISCONTINUED | OUTPATIENT
Start: 2022-02-15 | End: 2022-02-16 | Stop reason: HOSPADM

## 2022-02-15 RX ORDER — ACETAMINOPHEN 325 MG/1
650 TABLET ORAL EVERY 4 HOURS PRN
Status: DISCONTINUED | OUTPATIENT
Start: 2022-02-15 | End: 2022-02-16 | Stop reason: HOSPADM

## 2022-02-15 RX ORDER — CLONAZEPAM 0.5 MG/1
0.5 TABLET ORAL 2 TIMES DAILY PRN
Status: DISCONTINUED | OUTPATIENT
Start: 2022-02-15 | End: 2022-02-16 | Stop reason: HOSPADM

## 2022-02-15 RX ORDER — CARVEDILOL 6.25 MG/1
6.25 TABLET ORAL 2 TIMES DAILY
Status: DISCONTINUED | OUTPATIENT
Start: 2022-02-15 | End: 2022-02-16 | Stop reason: HOSPADM

## 2022-02-15 RX ORDER — LANOLIN ALCOHOL/MO/W.PET/CERES
800 CREAM (GRAM) TOPICAL
Status: DISCONTINUED | OUTPATIENT
Start: 2022-02-15 | End: 2022-02-16 | Stop reason: HOSPADM

## 2022-02-15 RX ORDER — NAPROXEN SODIUM 220 MG/1
81 TABLET, FILM COATED ORAL DAILY
Status: DISCONTINUED | OUTPATIENT
Start: 2022-02-15 | End: 2022-02-16 | Stop reason: HOSPADM

## 2022-02-15 RX ORDER — GLUCAGON 1 MG
1 KIT INJECTION
Status: DISCONTINUED | OUTPATIENT
Start: 2022-02-15 | End: 2022-02-16 | Stop reason: HOSPADM

## 2022-02-15 RX ORDER — ATORVASTATIN CALCIUM 10 MG/1
20 TABLET, FILM COATED ORAL DAILY
Status: DISCONTINUED | OUTPATIENT
Start: 2022-02-15 | End: 2022-02-16 | Stop reason: HOSPADM

## 2022-02-15 RX ORDER — NALOXONE HCL 0.4 MG/ML
0.02 VIAL (ML) INJECTION
Status: DISCONTINUED | OUTPATIENT
Start: 2022-02-15 | End: 2022-02-16 | Stop reason: HOSPADM

## 2022-02-15 RX ORDER — POLYETHYLENE GLYCOL 3350 17 G/17G
17 POWDER, FOR SOLUTION ORAL DAILY PRN
Status: DISCONTINUED | OUTPATIENT
Start: 2022-02-15 | End: 2022-02-16 | Stop reason: HOSPADM

## 2022-02-15 RX ADMIN — LISINOPRIL 5 MG: 5 TABLET ORAL at 10:02

## 2022-02-15 RX ADMIN — Medication 10 ML: at 09:02

## 2022-02-15 RX ADMIN — QUETIAPINE FUMARATE 200 MG: 100 TABLET ORAL at 09:02

## 2022-02-15 RX ADMIN — CARVEDILOL 6.25 MG: 6.25 TABLET, FILM COATED ORAL at 09:02

## 2022-02-15 RX ADMIN — Medication 10 ML: at 03:02

## 2022-02-15 RX ADMIN — ACETAMINOPHEN 650 MG: 325 TABLET ORAL at 03:02

## 2022-02-15 RX ADMIN — MORPHINE SULFATE 2 MG: 4 INJECTION INTRAVENOUS at 01:02

## 2022-02-15 RX ADMIN — HYDROCODONE BITARTRATE AND ACETAMINOPHEN 1 TABLET: 5; 325 TABLET ORAL at 11:02

## 2022-02-15 RX ADMIN — CLONAZEPAM 0.5 MG: 0.5 TABLET ORAL at 09:02

## 2022-02-15 RX ADMIN — Medication 10 ML: at 10:02

## 2022-02-15 RX ADMIN — INSULIN ASPART 1 UNITS: 100 INJECTION, SOLUTION INTRAVENOUS; SUBCUTANEOUS at 11:02

## 2022-02-15 RX ADMIN — ATORVASTATIN CALCIUM 20 MG: 10 TABLET, FILM COATED ORAL at 10:02

## 2022-02-15 RX ADMIN — ASPIRIN 81 MG CHEWABLE TABLET 81 MG: 81 TABLET CHEWABLE at 10:02

## 2022-02-15 RX ADMIN — TAMSULOSIN HYDROCHLORIDE 0.4 MG: 0.4 CAPSULE ORAL at 10:02

## 2022-02-15 RX ADMIN — HYDROCODONE BITARTRATE AND ACETAMINOPHEN 1 TABLET: 5; 325 TABLET ORAL at 05:02

## 2022-02-15 NOTE — ED PROVIDER NOTES
Encounter Date: 2/14/2022    SCRIBE #1 NOTE: I, Vivi Dietz, am scribing for, and in the presence of,  Ean Morton Jr., MD. I have scribed the following portions of the note - Other sections scribed: lab results, EKG, and ED discussion.       History     Chief Complaint   Patient presents with    Shortness of Breath    Chest Pain    Weakness     Pt complains of CP weakness and SOB seen 2 days ago for same issue     HPI   This is a 61-year-old white male presenting complaining of right sided rib pain for 2 days.  Patient was recently admitted to the hospital and intubated for respiratory failure.  The patient since discharge has had a vague right-sided upper back pain.  This is lateral in his axilla.  It is constant does not come and go.  Denies any calf pain or swelling.  He denies any shortness of breath cough fever chills hemoptysis.  He states he had been contacted by inpatient rehab at [facility] but may have given on the wrong number.  Desires admission there at this time if possible.  Patient has been on scheduled muscle relaxers without improvement in his chest wall pain.  This is worse with movement.  He denies any pleuritic symptoms of pain with deep respiration or improvement with leaning forward.    Review of patient's allergies indicates:  No Known Allergies  Past Medical History:   Diagnosis Date    Adrenal cortical adenoma     Anxiety     Arthritis     CKD (chronic kidney disease) stage 3, GFR 30-59 ml/min     Depression     Diabetes mellitus type II 2011    does not take    DM (diabetes mellitus) 2011     am 09/21/2017 Insulin x 1 1/2 year    GERD (gastroesophageal reflux disease) 7/9/2013    Hypertension     Migraine headache 7/22/2013    Schizophrenia     Severe obesity with body mass index of 36.0 to 36.9      Past Surgical History:   Procedure Laterality Date    BACK SURGERY      COLONOSCOPY N/A 12/3/2020    Procedure: COLONOSCOPY;  Surgeon: Christofer Palmer,  MD;  Location: [facility];  Service: Endoscopy;  Laterality: N/A;    COLONOSCOPY N/A 2020    Procedure: COLONOSCOPY;  Surgeon: Frandy Stanton MD;  Location: [facility];  Service: Endoscopy;  Laterality: N/A;    HERNIA REPAIR      UMBILICAL    left arm exfix      NASAL SEPTUM SURGERY Bilateral     TONSILLECTOMY      URETEROSCOPY       Family History   Problem Relation Age of Onset    Hypertension Mother     Diabetes Mother     Cataracts Mother     Hypertension Sister     Diabetes Sister     Hypertension Brother     Diabetes Brother     Cancer Paternal Grandmother     Cancer Paternal Grandfather      Social History     Tobacco Use    Smoking status: Former Smoker     Packs/day: 1.00     Years: 37.00     Pack years: 37.00     Types: Cigarettes     Quit date: 3/28/2021     Years since quittin.8    Smokeless tobacco: Never Used    Tobacco comment: instructed not to smoke after midnight after midnight prior to surgery   Substance Use Topics    Alcohol use: No    Drug use: No     Review of Systems   Constitutional: Negative for chills and fever.   HENT: Negative for congestion and sore throat.    Respiratory: Negative for cough, choking, shortness of breath and wheezing.    Cardiovascular: Positive for chest pain. Negative for palpitations and leg swelling.   Gastrointestinal: Negative for diarrhea, nausea and vomiting.   Genitourinary: Negative for dysuria, flank pain, frequency and urgency.   Musculoskeletal: Positive for arthralgias and back pain. Negative for myalgias and neck pain.   Skin: Negative for rash and wound.   All other systems reviewed and are negative.      Physical Exam     Initial Vitals [22 1826]   BP Pulse Resp Temp SpO2   (!) 93/49 68 16 97.4 °F (36.3 °C) 97 %      MAP       --         Physical Exam  Nursing Notes and Vital Signs Reviewed.  Constitutional: Patient is in no acute distress. Well-developed and well-nourished.  Head: Atraumatic. Normocephalic.  Eyes:   EOM intact.  No scleral icterus.  ENT: Mucous membranes are moist.  Nares clear   Neck:  Full ROM. No JVD.  Cardiovascular: Regular rate. Regular rhythm No murmurs, rubs, or gallops. Distal pulses are 2+ and symmetric  Pulmonary/Chest: No respiratory distress. Clear to auscultation bilaterally. No wheezing or rales.  Equal chest wall rise bilaterally  Abdominal: Soft and non-distended.  There is no tenderness.  No rebound, guarding, or rigidity. Good bowel sounds.  Genitourinary: No CVA tenderness.  No suprapubic tenderness  Musculoskeletal: Moves all extremities. No obvious deformities.  5 x 5 strength in all extremities .  There is mild tenderness to the right lateral chest wall there is no step-off or crepitus.  No calf pain or swelling.  Negative Homans.  No palpable cord.  Skin: Warm and dry.  There is no rash to the chest wall.  There is no redness to the legs.  Neurological:  Alert, awake, and appropriate.  Normal speech.  No acute focal neurological deficits are appreciated.  Two through 12 intact bilaterally.  Psychiatric: Normal affect. Good eye contact. Appropriate in content.      ED Course   Procedures  Labs Reviewed   CBC W/ AUTO DIFFERENTIAL - Abnormal; Notable for the following components:       Result Value    WBC 13.78 (*)     Hemoglobin 12.9 (*)     MCHC 31.9 (*)     RDW 15.9 (*)     Immature Granulocytes 1.6 (*)     Gran # (ANC) 9.6 (*)     Immature Grans (Abs) 0.22 (*)     Eos # 0.6 (*)     All other components within normal limits   COMPREHENSIVE METABOLIC PANEL - Abnormal; Notable for the following components:    Glucose 131 (*)     BUN 41 (*)     Creatinine 1.9 (*)     Albumin 3.2 (*)     eGFR if  42 (*)     eGFR if non  37 (*)     All other components within normal limits   TROPONIN I - Abnormal; Notable for the following components:    Troponin I 0.123 (*)     All other components within normal limits   B-TYPE NATRIURETIC PEPTIDE - Abnormal; Notable for the  following components:     (*)     All other components within normal limits       All Lab Results  Results for orders placed or performed during the hospital encounter of 02/14/22   CBC auto differential   Result Value Ref Range    WBC 13.78 (H) 3.90 - 12.70 K/uL    RBC 4.68 4.60 - 6.20 M/uL    Hemoglobin 12.9 (L) 14.0 - 18.0 g/dL    Hematocrit 40.4 40.0 - 54.0 %    MCV 86 82 - 98 fL    MCH 27.6 27.0 - 31.0 pg    MCHC 31.9 (L) 32.0 - 36.0 g/dL    RDW 15.9 (H) 11.5 - 14.5 %    Platelets 382 150 - 450 K/uL    MPV 9.6 9.2 - 12.9 fL    Immature Granulocytes 1.6 (H) 0.0 - 0.5 %    Gran # (ANC) 9.6 (H) 1.8 - 7.7 K/uL    Immature Grans (Abs) 0.22 (H) 0.00 - 0.04 K/uL    Lymph # 2.5 1.0 - 4.8 K/uL    Mono # 0.9 0.3 - 1.0 K/uL    Eos # 0.6 (H) 0.0 - 0.5 K/uL    Baso # 0.04 0.00 - 0.20 K/uL    nRBC 0 0 /100 WBC    Gran % 69.3 38.0 - 73.0 %    Lymph % 18.0 18.0 - 48.0 %    Mono % 6.8 4.0 - 15.0 %    Eosinophil % 4.0 0.0 - 8.0 %    Basophil % 0.3 0.0 - 1.9 %    Differential Method Automated    Comprehensive metabolic panel   Result Value Ref Range    Sodium 141 136 - 145 mmol/L    Potassium 3.6 3.5 - 5.1 mmol/L    Chloride 100 95 - 110 mmol/L    CO2 28 23 - 29 mmol/L    Glucose 131 (H) 70 - 110 mg/dL    BUN 41 (H) 8 - 23 mg/dL    Creatinine 1.9 (H) 0.5 - 1.4 mg/dL    Calcium 8.9 8.7 - 10.5 mg/dL    Total Protein 6.5 6.0 - 8.4 g/dL    Albumin 3.2 (L) 3.5 - 5.2 g/dL    Total Bilirubin 0.4 0.1 - 1.0 mg/dL    Alkaline Phosphatase 77 55 - 135 U/L    AST 22 10 - 40 U/L    ALT 28 10 - 44 U/L    Anion Gap 13 8 - 16 mmol/L    eGFR if African American 42 (A) >60 mL/min/1.73 m^2    eGFR if non African American 37 (A) >60 mL/min/1.73 m^2   Troponin I   Result Value Ref Range    Troponin I 0.123 (H) 0.000 - 0.026 ng/mL   Brain natriuretic peptide   Result Value Ref Range     (H) 0 - 99 pg/mL     *Note: Due to a large number of results and/or encounters for the requested time period, some results have not been displayed. A  complete set of results can be found in Results Review.          Imaging Results          X-Ray Chest AP Portable (Final result)  Result time 02/14/22 22:51:23    Final result by Jairo Saravia MD (02/14/22 22:51:23)                 Impression:      No acute abnormality.      Electronically signed by: Manjit Gill  Date:    02/14/2022  Time:    22:51             Narrative:    EXAMINATION:  XR CHEST AP PORTABLE    CLINICAL HISTORY:  chest pain;    TECHNIQUE:  Single frontal view of the chest was performed.    COMPARISON:  None    FINDINGS:  Central pulmonary vascular crowding.  Tortuous aortathe lungs are clear, with normal appearance of pulmonary vasculature and no pleural effusion or pneumothorax.    The cardiac silhouette is normal in size. Tortuous aorta    Bones are intact.                                  The EKG was ordered, reviewed, and independently interpreted by the ED provider.  Interpretation time: 2046  Rate: 72 BPM  Rhythm: normal sinus rhythm  Interpretation: Prolonged QT. No STEMI.       Medications   ketorolac injection 30 mg (30 mg Intravenous Given 2/14/22 2110)   aspirin tablet 325 mg (325 mg Oral Given 2/14/22 2234)       ED Discussion    11:10 PM: Discussed case with Cece Kaplan NP ([facility]). Dr. Mitchell agrees with current care and management of pt and accepts admission.   Admitting Service: Hospital Medicine  Admitting Physician: Dr. Mitchell  Admit to: Obs Tele    11:10 PM: Re-evaluated pt. I have discussed test results, shared treatment plan, and the need for admission with patient and family at bedside. Pt and family express understanding at this time and agree with all information. All questions answered. Pt and family have no further questions or concerns at this time. Pt is ready for admit.    11:15 PM  Patient is stable nontoxic.  Patient was recently intubated and watch the ICU for region respiratory failure CHF.  Patient with elevated troponin from baseline today with  chest pain on the right side.  There is likely musculoskeletal however have with his prior history, patient being admitted to Hospital Medicine for further evaluation treatment.    Medical Decision Making:   Clinical Tests:   Lab Tests: Ordered and Reviewed  Radiological Study: Ordered and Reviewed  Medical Tests: Ordered and Reviewed          Scribe Attestation:   Scribe #1: I performed the above scribed service and the documentation accurately describes the services I performed. I attest to the accuracy of the note.    Attending Attestation:           Physician Attestation for Scribe:  Physician Attestation Statement for Scribe #1: I, Ean Morton Jr., MD, reviewed documentation, as scribed by Vivi Dietz in my presence, and it is both accurate and complete.                      Clinical Impression:   Final diagnoses:  [M54.6] Right-sided thoracic back pain  [R77.8] Troponin level elevated (Primary)          ED Disposition Condition    Observation               Ean Morton Jr., MD  02/14/22 [id number]

## 2022-02-15 NOTE — TELEPHONE ENCOUNTER
----- Message from Kaitlin Mckeon RN sent at 2022  1:01 PM CST -----  Regardin hour discharge call

## 2022-02-15 NOTE — CONSULTS
[facility] Tele[facility] ([facility])  Cardiology  Consult Note    Patient Name: Shukri Rosales  MRN: [id number]  Admission Date: 2/14/2022  Hospital Length of Stay: 0 days  Code Status: Full Code   Attending Provider: Eamon Mitchell MD   Consulting Provider: Shukri Leonardo MD  Primary Care Physician: Cris Matias NP  Principal Problem:Chest pain    Patient information was obtained from patient and ER records.     Inpatient consult to Cardiology  Consult performed by: Shukri Leonardo MD  Consult ordered by: Orlando Stahl MD        Subjective:     Chief Complaint:  Chest pain and shortness of breath     HPI:   61-year-old PMHx of incisional abdominal hernia, peripheral vascular disease, DM2, adrenal cortical adenoma, CKD, carotid stenosis, schizophrenia, GERD, HTN, and HLD . He presented with back pain -lower back pain  There is associated history of right pleuritic pain .  He was recently admitted to the Hospital - 1/19/2022 to 1/23/2022 for SOB .   Patient with lower LV function 40% by last echo.  Patient with known significant vascular disease. Plan admit for chest pain and schedule nuclear stress test.      Past Medical History:   Diagnosis Date    Adrenal cortical adenoma     Anxiety     Arthritis     CKD (chronic kidney disease) stage 3, GFR 30-59 ml/min     Depression     Diabetes mellitus type II 2011    does not take    DM (diabetes mellitus) 2011     am 09/21/2017 Insulin x 1 1/2 year    GERD (gastroesophageal reflux disease) 7/9/2013    Hypertension     Migraine headache 7/22/2013    Schizophrenia     Severe obesity with body mass index of 36.0 to 36.9        Past Surgical History:   Procedure Laterality Date    BACK SURGERY      COLONOSCOPY N/A 12/3/2020    Procedure: COLONOSCOPY;  Surgeon: Christofer Palmer MD;  Location: [facility] ENDO;  Service: Endoscopy;  Laterality: N/A;    COLONOSCOPY N/A 12/4/2020    Procedure: COLONOSCOPY;  Surgeon: Frandy Stanton MD;  Location: [facility]  ENDO;  Service: Endoscopy;  Laterality: N/A;    HERNIA REPAIR      UMBILICAL    left arm exfix      NASAL SEPTUM SURGERY Bilateral     TONSILLECTOMY      URETEROSCOPY         Review of patient's allergies indicates:  No Known Allergies    No current facility-administered medications on file prior to encounter.     Current Outpatient Medications on File Prior to Encounter   Medication Sig    albuterol (PROVENTIL/VENTOLIN HFA) 90 mcg/actuation inhaler Inhale 1-2 puffs into the lungs every 4 (four) hours as needed for Wheezing. Rescue    aspirin 81 MG Chew Take 1 tablet (81 mg total) by mouth once daily.    atorvastatin (LIPITOR) 20 MG tablet Take 20 mg by mouth once daily.    blood sugar diagnostic Strp 1 each.    carvedilol (COREG) 6.25 MG tablet Take 6.25 mg by mouth 2 (two) times daily.    clonazePAM (KLONOPIN) 1 MG tablet Take 0.5 tablets (0.5 mg total) by mouth 2 (two) times daily as needed for Anxiety. Take 1.5mg twice daily    docusate sodium (COLACE) 100 MG capsule Take 100 mg by mouth 2 (two) times daily.    DULoxetine (CYMBALTA) 60 MG capsule Take 60 mg by mouth 2 (two) times daily.    empagliflozin (JARDIANCE) 25 mg tablet Take 25 mg by mouth.    ergocalciferol (ERGOCALCIFEROL) 50,000 unit Cap Take 50,000 Units by mouth every 7 days.    ferrous sulfate (FEOSOL) 325 mg (65 mg iron) Tab tablet Take 325 mg by mouth.    furosemide (LASIX) 40 MG tablet Take 1 tablet (40 mg total) by mouth once daily.    glucagon 1 mg/mL SolR Inject 1 mg into the muscle daily as needed.    HUMALOG MIX 50-50 INSULN U-100 100 unit/mL (50-50) Susp 10 Units twice daily    insulin lispro 100 unit/mL injection Inject under the skin with meals by sliding scale:  under 150 0 unit, 151-200 2 units, 201-250 4 units, 251-300 6 units, 301-350 8 units, 351-400 10 units, over 400 12 units.    lisinopriL (PRINIVIL,ZESTRIL) 5 MG tablet Take 1 tablet (5 mg total) by mouth once daily.    metFORMIN (GLUCOPHAGE) 1000 MG tablet  Take 500 mg by mouth 2 (two) times daily with meals.    omeprazole (PRILOSEC) 20 MG capsule Take 20 mg by mouth once daily.    QUEtiapine (SEROQUEL) 200 MG Tab Take 1 tablet (200 mg total) by mouth every evening.    QUEtiapine (SEROQUEL) 25 MG Tab Take 1 tablet (25 mg total) by mouth once daily. Take at noon    tamsulosin (FLOMAX) 0.4 mg Cap Take 0.4 mg by mouth once daily.    traZODone (DESYREL) 50 MG tablet Take 0.5 tablets (25 mg total) by mouth every evening.    umeclidinium-vilanteroL (ANORO ELLIPTA) 62.5-25 mcg/actuation DsDv Inhale 1 puff into the lungs once daily. Controller     Family History     Problem Relation (Age of Onset)    Cancer Paternal Grandmother, Paternal Grandfather    Cataracts Mother    Diabetes Mother, Sister, Brother    Hypertension Mother, Sister, Brother        Tobacco Use    Smoking status: Former Smoker     Packs/day: 1.00     Years: 37.00     Pack years: 37.00     Types: Cigarettes     Quit date: 3/28/2021     Years since quittin.8    Smokeless tobacco: Never Used    Tobacco comment: instructed not to smoke after midnight after midnight prior to surgery   Substance and Sexual Activity    Alcohol use: No    Drug use: No    Sexual activity: Never     Partners: Female     Review of Systems   Constitutional: Positive for malaise/fatigue. Negative for chills, diaphoresis, night sweats, weight gain and weight loss.   HENT: Negative for congestion, hoarse voice, sore throat and stridor.    Eyes: Negative for double vision and pain.   Cardiovascular: Positive for chest pain. Negative for claudication, cyanosis, dyspnea on exertion, irregular heartbeat, leg swelling, near-syncope, orthopnea, palpitations, paroxysmal nocturnal dyspnea and syncope.   Respiratory: Negative for cough, hemoptysis, shortness of breath, sleep disturbances due to breathing, snoring, sputum production and wheezing.    Endocrine: Negative for cold intolerance, heat intolerance and polydipsia.    Hematologic/Lymphatic: Negative for bleeding problem. Does not bruise/bleed easily.   Skin: Negative for color change, dry skin and rash.   Musculoskeletal: Negative for joint swelling and muscle cramps.   Gastrointestinal: Negative for bloating, abdominal pain, constipation, diarrhea, dysphagia, melena, nausea and vomiting.   Genitourinary: Negative for flank pain and urgency.   Neurological: Negative for dizziness, focal weakness, headaches, light-headedness, loss of balance, seizures and weakness.   Psychiatric/Behavioral: Negative for altered mental status and memory loss. The patient is not nervous/anxious.      Objective:     Vital Signs (Most Recent):  Temp: 97.6 °F (36.4 °C) (02/15/22 1141)  Pulse: 72 (02/15/22 1141)  Resp: 20 (02/15/22 1141)  BP: 137/75 (02/15/22 1141)  SpO2: 98 % (02/15/22 1141) Vital Signs (24h Range):  Temp:  [97.4 °F (36.3 °C)-97.9 °F (36.6 °C)] 97.6 °F (36.4 °C)  Pulse:  [68-87] 72  Resp:  [13-24] 20  SpO2:  [93 %-98 %] 98 %  BP: ()/(49-77) 137/75     Weight: 85.2 kg (187 lb 13.3 oz)  Body mass index is 25.47 kg/m².    SpO2: 98 %  O2 Device (Oxygen Therapy): room air    No intake or output data in the 24 hours ending 02/15/22 1220    Lines/Drains/Airways     Peripheral Intravenous Line                 Peripheral IV - Single Lumen 02/14/22 2040 20 G Right Antecubital <1 day                Physical Exam  Eyes:      Pupils: Pupils are equal, round, and reactive to light.   Neck:      Trachea: No tracheal deviation.   Cardiovascular:      Rate and Rhythm: Normal rate and regular rhythm.      Pulses: Intact distal pulses.           Carotid pulses are 2+ on the right side and 2+ on the left side.       Radial pulses are 2+ on the right side and 2+ on the left side.        Femoral pulses are 2+ on the right side and 2+ on the left side.       Popliteal pulses are 2+ on the right side and 2+ on the left side.        Dorsalis pedis pulses are 2+ on the right side and 2+ on the left  side.        Posterior tibial pulses are 2+ on the right side and 2+ on the left side.      Heart sounds: Normal heart sounds. No murmur heard.  No friction rub. No gallop.    Pulmonary:      Effort: Pulmonary effort is normal. No respiratory distress.      Breath sounds: Normal breath sounds. No stridor. No wheezing or rales.   Chest:      Chest wall: No tenderness.   Abdominal:      General: There is no distension.      Tenderness: There is no abdominal tenderness. There is no rebound.   Musculoskeletal:         General: No tenderness or edema.      Cervical back: Normal range of motion.   Skin:     General: Skin is warm and dry.   Neurological:      Mental Status: He is alert and oriented to person, place, and time.         Significant Labs:   CMP   Recent Labs   Lab 02/14/22 2045      K 3.6      CO2 28   *   BUN 41*   CREATININE 1.9*   CALCIUM 8.9   PROT 6.5   ALBUMIN 3.2*   BILITOT 0.4   ALKPHOS 77   AST 22   ALT 28   ANIONGAP 13   ESTGFRAFRICA 42*   EGFRNONAA 37*   , CBC   Recent Labs   Lab 02/14/22 2045   WBC 13.78*   HGB 12.9*   HCT 40.4       and Troponin   Recent Labs   Lab 02/14/22  2045 02/15/22  0939   TROPONINI 0.123* 0.078*       Significant Imaging: Echocardiogram:   Transthoracic echo (TTE) complete ([facility] Only):   Results for orders placed or performed during the hospital encounter of 01/19/22   Echo   Result Value Ref Range    BSA 2.14 m2    TDI SEPTAL 0.07 m/s    LV LATERAL E/E' RATIO 8.38 m/s    LV SEPTAL E/E' RATIO 9.57 m/s    LA WIDTH 3.39 cm    IVC diameter 1.77 cm    Left Ventricular Outflow Tract Mean Velocity 0.53994931366433 cm/s    Left Ventricular Outflow Tract Mean Gradient 2.28 mmHg    TDI LATERAL 0.08 m/s    LVIDd 4.62 3.5 - 6.0 cm    IVS 1.68 (A) 0.6 - 1.1 cm    Posterior Wall 1.39 (A) 0.6 - 1.1 cm    Ao root annulus 3.65 cm    LVIDs 4.37 (A) 2.1 - 4.0 cm    FS 5 28 - 44 %    LA volume 37.01 cm3    Sinus 3.68 cm    STJ 3.50 cm    Ascending aorta 3.26 cm     LV mass 296.91 g    LA size 3.28 cm    TAPSE 2.45 cm    Left Ventricle Relative Wall Thickness 0.60 cm    AV mean gradient 3 mmHg    AV valve area 2.14 cm2    AV Velocity Ratio 0.86     AV index (prosthetic) 0.61     MV valve area p 1/2 method 3.08 cm2    E/A ratio 1.00     Mean e' 0.08 m/s    E wave deceleration time 246.319192941991567 msec    IVRT 105.351013994265990 msec    LVOT diameter 2.11 cm    LVOT area 3.5 cm2    LVOT peak frankie 1.08 m/s    LVOT peak VTI 17.00 cm    Ao peak frankie 1.26 m/s    Ao VTI 27.8 cm    RVOT prox diameter 3.49 cm    RVOT area 9.56 cm2    RVOT peak frankie 0.60 m/s    RVOT peak VTI 11.8 cm    Qp:Qs ratio 0.53     LVOT stroke volume 59.41 cm3    RVOT stroke volume 112.82 cm3    AV peak gradient 6 mmHg    PV mean gradient 0.71 mmHg    E/E' ratio 8.93 m/s    MV Peak E Frankie 0.67 m/s    TR Max Frankie 3.37 m/s    MV stenosis pressure 1/2 time 71.793931465708694 ms    MV Peak A Frankie 0.67 m/s    LV Systolic Volume 86.51 mL    LV Systolic Volume Index 40.8 mL/m2    LV Diastolic Volume 98.26 mL    LV Diastolic Volume Index 46.35 mL/m2    LA Volume Index 17.5 mL/m2    LV Mass Index 140 g/m2    Echo EF Estimated 12 %    RA Major Axis 4.57 cm    Left Atrium Minor Axis 3.27 cm    Left Atrium Major Axis 4.88 cm    Triscuspid Valve Regurgitation Peak Gradient 45 mmHg    RA Width 3.59 cm    Right Atrial Pressure (from IVC) 8 mmHg    EF 40 %    TV rest pulmonary artery pressure 53 mmHg    Narrative    · The left ventricle is normal in size with concentric hypertrophy and   mildly decreased systolic function.  · Grade I left ventricular diastolic dysfunction.  · Normal right ventricular size with normal right ventricular systolic   function.  · There is pulmonary hypertension.  · Intermediate central venous pressure (8 mmHg).  · The estimated PA systolic pressure is 53 mmHg.  · The estimated ejection fraction is 40%.  · There are segmental left ventricular wall motion abnormalities.        Assessment and Plan:      * Chest pain  Continue carvedilol and lisinopril  Plan nuclear stress test tomorrow    Acute on chronic combined systolic and diastolic congestive heart failure  Patient is identified as having Combined Systolic and Diastolic heart failure that is Acute on chronic. CHF is currently controlled. Latest ECHO performed and demonstrates- Results for orders placed during the hospital encounter of 01/19/22    Echo    Interpretation Summary  · The left ventricle is normal in size with concentric hypertrophy and mildly decreased systolic function.  · Grade I left ventricular diastolic dysfunction.  · Normal right ventricular size with normal right ventricular systolic function.  · There is pulmonary hypertension.  · Intermediate central venous pressure (8 mmHg).  · The estimated PA systolic pressure is 53 mmHg.  · The estimated ejection fraction is 40%.  · There are segmental left ventricular wall motion abnormalities.  . Continue ACE/ARB and monitor clinical status closely. Monitor on telemetry. Patient is on CHF pathway.  Monitor strict Is&Os and daily weights.  Place on fluid restriction of 2 L. Continue to stress to patient importance of self efficacy and  on diet for CHF. Last BNP reviewed- and noted below   Recent Labs   Lab 02/14/22 2045   *   .      CKD (chronic kidney disease) stage 3, GFR 30-59 ml/min  Per hospital medicine        VTE Risk Mitigation (From admission, onward)         Ordered     IP VTE HIGH RISK PATIENT  Once         02/15/22 0652     Place sequential compression device  Until discontinued         02/15/22 0652                Thank you for your consult. I will follow-up with patient. Please contact us if you have any additional questions.    Shukri Leonardo MD  Cardiology   [facility] - Telemetry ([facility])

## 2022-02-15 NOTE — HPI
61-year-old PMHx of incisional abdominal hernia, peripheral vascular disease, DM2, adrenal cortical adenoma, CKD, carotid stenosis, schizophrenia, GERD, HTN, and HLD . He presented with back pain -lower back pain  There is associated history of right pleuritic pain .  He was recently admitted to the Hospital - 1/19/2022 to 1/23/2022 for SOB .   Patient with lower LV function 40% by last echo.  Patient with known significant vascular disease. Plan admit for chest pain and schedule nuclear stress test.

## 2022-02-15 NOTE — SUBJECTIVE & OBJECTIVE
Past Medical History:   Diagnosis Date    Adrenal cortical adenoma     Anxiety     Arthritis     CKD (chronic kidney disease) stage 3, GFR 30-59 ml/min     Depression     Diabetes mellitus type II 2011    does not take    DM (diabetes mellitus) 2011     am 09/21/2017 Insulin x 1 1/2 year    GERD (gastroesophageal reflux disease) 7/9/2013    Hypertension     Migraine headache 7/22/2013    Schizophrenia     Severe obesity with body mass index of 36.0 to 36.9        Past Surgical History:   Procedure Laterality Date    BACK SURGERY      COLONOSCOPY N/A 12/3/2020    Procedure: COLONOSCOPY;  Surgeon: Christofer Palmer MD;  Location: [facility] ENDO;  Service: Endoscopy;  Laterality: N/A;    COLONOSCOPY N/A 12/4/2020    Procedure: COLONOSCOPY;  Surgeon: Frandy Stanton MD;  Location: [facility] ENDO;  Service: Endoscopy;  Laterality: N/A;    HERNIA REPAIR      UMBILICAL    left arm exfix      NASAL SEPTUM SURGERY Bilateral     TONSILLECTOMY      URETEROSCOPY         Review of patient's allergies indicates:  No Known Allergies    No current facility-administered medications on file prior to encounter.     Current Outpatient Medications on File Prior to Encounter   Medication Sig    albuterol (PROVENTIL/VENTOLIN HFA) 90 mcg/actuation inhaler Inhale 1-2 puffs into the lungs every 4 (four) hours as needed for Wheezing. Rescue    aspirin 81 MG Chew Take 1 tablet (81 mg total) by mouth once daily.    atorvastatin (LIPITOR) 20 MG tablet Take 20 mg by mouth once daily.    blood sugar diagnostic Strp 1 each.    carvedilol (COREG) 6.25 MG tablet Take 6.25 mg by mouth 2 (two) times daily.    clonazePAM (KLONOPIN) 1 MG tablet Take 0.5 tablets (0.5 mg total) by mouth 2 (two) times daily as needed for Anxiety. Take 1.5mg twice daily    docusate sodium (COLACE) 100 MG capsule Take 100 mg by mouth 2 (two) times daily.    DULoxetine (CYMBALTA) 60 MG capsule Take 60 mg by mouth 2 (two) times daily.    empagliflozin  (JARDIANCE) 25 mg tablet Take 25 mg by mouth.    ergocalciferol (ERGOCALCIFEROL) 50,000 unit Cap Take 50,000 Units by mouth every 7 days.    ferrous sulfate (FEOSOL) 325 mg (65 mg iron) Tab tablet Take 325 mg by mouth.    furosemide (LASIX) 40 MG tablet Take 1 tablet (40 mg total) by mouth once daily.    glucagon 1 mg/mL SolR Inject 1 mg into the muscle daily as needed.    HUMALOG MIX 50-50 INSULN U-100 100 unit/mL (50-50) Susp 10 Units twice daily    insulin lispro 100 unit/mL injection Inject under the skin with meals by sliding scale:  under 150 0 unit, 151-200 2 units, 201-250 4 units, 251-300 6 units, 301-350 8 units, 351-400 10 units, over 400 12 units.    lisinopriL (PRINIVIL,ZESTRIL) 5 MG tablet Take 1 tablet (5 mg total) by mouth once daily.    metFORMIN (GLUCOPHAGE) 1000 MG tablet Take 500 mg by mouth 2 (two) times daily with meals.    omeprazole (PRILOSEC) 20 MG capsule Take 20 mg by mouth once daily.    QUEtiapine (SEROQUEL) 200 MG Tab Take 1 tablet (200 mg total) by mouth every evening.    QUEtiapine (SEROQUEL) 25 MG Tab Take 1 tablet (25 mg total) by mouth once daily. Take at noon    tamsulosin (FLOMAX) 0.4 mg Cap Take 0.4 mg by mouth once daily.    traZODone (DESYREL) 50 MG tablet Take 0.5 tablets (25 mg total) by mouth every evening.    umeclidinium-vilanteroL (ANORO ELLIPTA) 62.5-25 mcg/actuation DsDv Inhale 1 puff into the lungs once daily. Controller     Family History     Problem Relation (Age of Onset)    Cancer Paternal Grandmother, Paternal Grandfather    Cataracts Mother    Diabetes Mother, Sister, Brother    Hypertension Mother, Sister, Brother        Tobacco Use    Smoking status: Former Smoker     Packs/day: 1.00     Years: 37.00     Pack years: 37.00     Types: Cigarettes     Quit date: 3/28/2021     Years since quittin.8    Smokeless tobacco: Never Used    Tobacco comment: instructed not to smoke after midnight after midnight prior to surgery   Substance and Sexual  Activity    Alcohol use: No    Drug use: No    Sexual activity: Never     Partners: Female     Review of Systems   Constitutional: Positive for activity change and appetite change. Negative for chills, diaphoresis and fatigue.   HENT: Negative for congestion, dental problem, ear discharge, ear pain and facial swelling.    Eyes: Negative for pain, discharge and itching.   Respiratory: Negative for apnea, cough, chest tightness and shortness of breath.    Cardiovascular: Negative for chest pain and leg swelling.   Gastrointestinal: Negative for abdominal distention and abdominal pain.   Endocrine: Negative for cold intolerance, heat intolerance and polydipsia.   Genitourinary: Negative for difficulty urinating, dysuria and enuresis.   Musculoskeletal: Positive for back pain. Negative for arthralgias.   Skin: Negative for color change and pallor.   Allergic/Immunologic: Negative for environmental allergies and food allergies.   Neurological: Negative for dizziness, facial asymmetry, light-headedness and headaches.   Hematological: Negative for adenopathy. Does not bruise/bleed easily.   Psychiatric/Behavioral: Negative for agitation and behavioral problems.     Objective:     Vital Signs (Most Recent):  Temp: 97.4 °F (36.3 °C) (02/14/22 1826)  Pulse: 79 (02/15/22 0605)  Resp: 19 (02/15/22 0142)  BP: 121/62 (02/15/22 0605)  SpO2: 97 % (02/15/22 0605) Vital Signs (24h Range):  Temp:  [97.4 °F (36.3 °C)] 97.4 °F (36.3 °C)  Pulse:  [68-87] 79  Resp:  [13-24] 19  SpO2:  [93 %-97 %] 97 %  BP: ()/(49-73) 121/62        There is no height or weight on file to calculate BMI.    Physical Exam  Vitals and nursing note reviewed.   Constitutional:       Appearance: He is well-developed.   HENT:      Head: Normocephalic and atraumatic.      Nose: Nose normal.   Eyes:      Comments: PERRL   Cardiovascular:      Rate and Rhythm: Normal rate and regular rhythm.      Heart sounds: Normal heart sounds.   Pulmonary:      Effort:  Pulmonary effort is normal. No respiratory distress.      Breath sounds: Normal breath sounds. No wheezing or rales.   Abdominal:      Tenderness: There is no abdominal tenderness.   Musculoskeletal:         General: Normal range of motion.      Cervical back: Normal range of motion and neck supple.   Skin:     General: Skin is dry.   Neurological:      Mental Status: He is alert and oriented to person, place, and time.             Significant Labs:   All pertinent labs within the past 24 hours have been reviewed.  Blood Culture: No results for input(s): LABBLOO in the last 48 hours.  BMP:   Recent Labs   Lab 02/14/22 2045   *      K 3.6      CO2 28   BUN 41*   CREATININE 1.9*   CALCIUM 8.9     CBC:   Recent Labs   Lab 02/14/22 2045   WBC 13.78*   HGB 12.9*   HCT 40.4        CMP:   Recent Labs   Lab 02/14/22 2045      K 3.6      CO2 28   *   BUN 41*   CREATININE 1.9*   CALCIUM 8.9   PROT 6.5   ALBUMIN 3.2*   BILITOT 0.4   ALKPHOS 77   AST 22   ALT 28   ANIONGAP 13   EGFRNONAA 37*       Significant Imaging: I have reviewed all pertinent imaging results/findings within the past 24 hours.   0

## 2022-02-15 NOTE — SUBJECTIVE & OBJECTIVE
Past Medical History:   Diagnosis Date    Adrenal cortical adenoma     Anxiety     Arthritis     CKD (chronic kidney disease) stage 3, GFR 30-59 ml/min     Depression     Diabetes mellitus type II 2011    does not take    DM (diabetes mellitus) 2011     am 09/21/2017 Insulin x 1 1/2 year    GERD (gastroesophageal reflux disease) 7/9/2013    Hypertension     Migraine headache 7/22/2013    Schizophrenia     Severe obesity with body mass index of 36.0 to 36.9        Past Surgical History:   Procedure Laterality Date    BACK SURGERY      COLONOSCOPY N/A 12/3/2020    Procedure: COLONOSCOPY;  Surgeon: Christofer Palmer MD;  Location: [facility] ENDO;  Service: Endoscopy;  Laterality: N/A;    COLONOSCOPY N/A 12/4/2020    Procedure: COLONOSCOPY;  Surgeon: Frandy Stanton MD;  Location: [facility] ENDO;  Service: Endoscopy;  Laterality: N/A;    HERNIA REPAIR      UMBILICAL    left arm exfix      NASAL SEPTUM SURGERY Bilateral     TONSILLECTOMY      URETEROSCOPY         Review of patient's allergies indicates:  No Known Allergies    No current facility-administered medications on file prior to encounter.     Current Outpatient Medications on File Prior to Encounter   Medication Sig    albuterol (PROVENTIL/VENTOLIN HFA) 90 mcg/actuation inhaler Inhale 1-2 puffs into the lungs every 4 (four) hours as needed for Wheezing. Rescue    aspirin 81 MG Chew Take 1 tablet (81 mg total) by mouth once daily.    atorvastatin (LIPITOR) 20 MG tablet Take 20 mg by mouth once daily.    blood sugar diagnostic Strp 1 each.    carvedilol (COREG) 6.25 MG tablet Take 6.25 mg by mouth 2 (two) times daily.    clonazePAM (KLONOPIN) 1 MG tablet Take 0.5 tablets (0.5 mg total) by mouth 2 (two) times daily as needed for Anxiety. Take 1.5mg twice daily    docusate sodium (COLACE) 100 MG capsule Take 100 mg by mouth 2 (two) times daily.    DULoxetine (CYMBALTA) 60 MG capsule Take 60 mg by mouth 2 (two) times daily.    empagliflozin  (JARDIANCE) 25 mg tablet Take 25 mg by mouth.    ergocalciferol (ERGOCALCIFEROL) 50,000 unit Cap Take 50,000 Units by mouth every 7 days.    ferrous sulfate (FEOSOL) 325 mg (65 mg iron) Tab tablet Take 325 mg by mouth.    furosemide (LASIX) 40 MG tablet Take 1 tablet (40 mg total) by mouth once daily.    glucagon 1 mg/mL SolR Inject 1 mg into the muscle daily as needed.    HUMALOG MIX 50-50 INSULN U-100 100 unit/mL (50-50) Susp 10 Units twice daily    insulin lispro 100 unit/mL injection Inject under the skin with meals by sliding scale:  under 150 0 unit, 151-200 2 units, 201-250 4 units, 251-300 6 units, 301-350 8 units, 351-400 10 units, over 400 12 units.    lisinopriL (PRINIVIL,ZESTRIL) 5 MG tablet Take 1 tablet (5 mg total) by mouth once daily.    metFORMIN (GLUCOPHAGE) 1000 MG tablet Take 500 mg by mouth 2 (two) times daily with meals.    omeprazole (PRILOSEC) 20 MG capsule Take 20 mg by mouth once daily.    QUEtiapine (SEROQUEL) 200 MG Tab Take 1 tablet (200 mg total) by mouth every evening.    QUEtiapine (SEROQUEL) 25 MG Tab Take 1 tablet (25 mg total) by mouth once daily. Take at noon    tamsulosin (FLOMAX) 0.4 mg Cap Take 0.4 mg by mouth once daily.    traZODone (DESYREL) 50 MG tablet Take 0.5 tablets (25 mg total) by mouth every evening.    umeclidinium-vilanteroL (ANORO ELLIPTA) 62.5-25 mcg/actuation DsDv Inhale 1 puff into the lungs once daily. Controller     Family History     Problem Relation (Age of Onset)    Cancer Paternal Grandmother, Paternal Grandfather    Cataracts Mother    Diabetes Mother, Sister, Brother    Hypertension Mother, Sister, Brother        Tobacco Use    Smoking status: Former Smoker     Packs/day: 1.00     Years: 37.00     Pack years: 37.00     Types: Cigarettes     Quit date: 3/28/2021     Years since quittin.8    Smokeless tobacco: Never Used    Tobacco comment: instructed not to smoke after midnight after midnight prior to surgery   Substance and Sexual  Activity    Alcohol use: No    Drug use: No    Sexual activity: Never     Partners: Female     Review of Systems   Constitutional: Positive for malaise/fatigue. Negative for chills, diaphoresis, night sweats, weight gain and weight loss.   HENT: Negative for congestion, hoarse voice, sore throat and stridor.    Eyes: Negative for double vision and pain.   Cardiovascular: Positive for chest pain. Negative for claudication, cyanosis, dyspnea on exertion, irregular heartbeat, leg swelling, near-syncope, orthopnea, palpitations, paroxysmal nocturnal dyspnea and syncope.   Respiratory: Negative for cough, hemoptysis, shortness of breath, sleep disturbances due to breathing, snoring, sputum production and wheezing.    Endocrine: Negative for cold intolerance, heat intolerance and polydipsia.   Hematologic/Lymphatic: Negative for bleeding problem. Does not bruise/bleed easily.   Skin: Negative for color change, dry skin and rash.   Musculoskeletal: Negative for joint swelling and muscle cramps.   Gastrointestinal: Negative for bloating, abdominal pain, constipation, diarrhea, dysphagia, melena, nausea and vomiting.   Genitourinary: Negative for flank pain and urgency.   Neurological: Negative for dizziness, focal weakness, headaches, light-headedness, loss of balance, seizures and weakness.   Psychiatric/Behavioral: Negative for altered mental status and memory loss. The patient is not nervous/anxious.      Objective:     Vital Signs (Most Recent):  Temp: 97.6 °F (36.4 °C) (02/15/22 1141)  Pulse: 72 (02/15/22 1141)  Resp: 20 (02/15/22 1141)  BP: 137/75 (02/15/22 1141)  SpO2: 98 % (02/15/22 1141) Vital Signs (24h Range):  Temp:  [97.4 °F (36.3 °C)-97.9 °F (36.6 °C)] 97.6 °F (36.4 °C)  Pulse:  [68-87] 72  Resp:  [13-24] 20  SpO2:  [93 %-98 %] 98 %  BP: ()/(49-77) 137/75     Weight: 85.2 kg (187 lb 13.3 oz)  Body mass index is 25.47 kg/m².    SpO2: 98 %  O2 Device (Oxygen Therapy): room air    No intake or output  data in the 24 hours ending 02/15/22 1220    Lines/Drains/Airways     Peripheral Intravenous Line                 Peripheral IV - Single Lumen 02/14/22 2040 20 G Right Antecubital <1 day                Physical Exam  Eyes:      Pupils: Pupils are equal, round, and reactive to light.   Neck:      Trachea: No tracheal deviation.   Cardiovascular:      Rate and Rhythm: Normal rate and regular rhythm.      Pulses: Intact distal pulses.           Carotid pulses are 2+ on the right side and 2+ on the left side.       Radial pulses are 2+ on the right side and 2+ on the left side.        Femoral pulses are 2+ on the right side and 2+ on the left side.       Popliteal pulses are 2+ on the right side and 2+ on the left side.        Dorsalis pedis pulses are 2+ on the right side and 2+ on the left side.        Posterior tibial pulses are 2+ on the right side and 2+ on the left side.      Heart sounds: Normal heart sounds. No murmur heard.  No friction rub. No gallop.    Pulmonary:      Effort: Pulmonary effort is normal. No respiratory distress.      Breath sounds: Normal breath sounds. No stridor. No wheezing or rales.   Chest:      Chest wall: No tenderness.   Abdominal:      General: There is no distension.      Tenderness: There is no abdominal tenderness. There is no rebound.   Musculoskeletal:         General: No tenderness or edema.      Cervical back: Normal range of motion.   Skin:     General: Skin is warm and dry.   Neurological:      Mental Status: He is alert and oriented to person, place, and time.         Significant Labs:   CMP   Recent Labs   Lab 02/14/22 2045      K 3.6      CO2 28   *   BUN 41*   CREATININE 1.9*   CALCIUM 8.9   PROT 6.5   ALBUMIN 3.2*   BILITOT 0.4   ALKPHOS 77   AST 22   ALT 28   ANIONGAP 13   ESTGFRAFRICA 42*   EGFRNONAA 37*   , CBC   Recent Labs   Lab 02/14/22 2045   WBC 13.78*   HGB 12.9*   HCT 40.4       and Troponin   Recent Labs   Lab 02/14/22 2045  02/15/22  0939   TROPONINI 0.123* 0.078*       Significant Imaging: Echocardiogram:   Transthoracic echo (TTE) complete (Cupid Only):   Results for orders placed or performed during the hospital encounter of 01/19/22   Echo   Result Value Ref Range    BSA 2.14 m2    TDI SEPTAL 0.07 m/s    LV LATERAL E/E' RATIO 8.38 m/s    LV SEPTAL E/E' RATIO 9.57 m/s    LA WIDTH 3.39 cm    IVC diameter 1.77 cm    Left Ventricular Outflow Tract Mean Velocity 0.07084983022677 cm/s    Left Ventricular Outflow Tract Mean Gradient 2.28 mmHg    TDI LATERAL 0.08 m/s    LVIDd 4.62 3.5 - 6.0 cm    IVS 1.68 (A) 0.6 - 1.1 cm    Posterior Wall 1.39 (A) 0.6 - 1.1 cm    Ao root annulus 3.65 cm    LVIDs 4.37 (A) 2.1 - 4.0 cm    FS 5 28 - 44 %    LA volume 37.01 cm3    Sinus 3.68 cm    STJ 3.50 cm    Ascending aorta 3.26 cm    LV mass 296.91 g    LA size 3.28 cm    TAPSE 2.45 cm    Left Ventricle Relative Wall Thickness 0.60 cm    AV mean gradient 3 mmHg    AV valve area 2.14 cm2    AV Velocity Ratio 0.86     AV index (prosthetic) 0.61     MV valve area p 1/2 method 3.08 cm2    E/A ratio 1.00     Mean e' 0.08 m/s    E wave deceleration time 246.798811959929305 msec    IVRT 105.022040266325524 msec    LVOT diameter 2.11 cm    LVOT area 3.5 cm2    LVOT peak frankie 1.08 m/s    LVOT peak VTI 17.00 cm    Ao peak frankie 1.26 m/s    Ao VTI 27.8 cm    RVOT prox diameter 3.49 cm    RVOT area 9.56 cm2    RVOT peak frankie 0.60 m/s    RVOT peak VTI 11.8 cm    Qp:Qs ratio 0.53     LVOT stroke volume 59.41 cm3    RVOT stroke volume 112.82 cm3    AV peak gradient 6 mmHg    PV mean gradient 0.71 mmHg    E/E' ratio 8.93 m/s    MV Peak E Frankie 0.67 m/s    TR Max Frankie 3.37 m/s    MV stenosis pressure 1/2 time 71.334746612092045 ms    MV Peak A Frankie 0.67 m/s    LV Systolic Volume 86.51 mL    LV Systolic Volume Index 40.8 mL/m2    LV Diastolic Volume 98.26 mL    LV Diastolic Volume Index 46.35 mL/m2    LA Volume Index 17.5 mL/m2    LV Mass Index 140 g/m2    Echo EF Estimated 12 %     RA Major Axis 4.57 cm    Left Atrium Minor Axis 3.27 cm    Left Atrium Major Axis 4.88 cm    Triscuspid Valve Regurgitation Peak Gradient 45 mmHg    RA Width 3.59 cm    Right Atrial Pressure (from IVC) 8 mmHg    EF 40 %    TV rest pulmonary artery pressure 53 mmHg    Narrative    · The left ventricle is normal in size with concentric hypertrophy and   mildly decreased systolic function.  · Grade I left ventricular diastolic dysfunction.  · Normal right ventricular size with normal right ventricular systolic   function.  · There is pulmonary hypertension.  · Intermediate central venous pressure (8 mmHg).  · The estimated PA systolic pressure is 53 mmHg.  · The estimated ejection fraction is 40%.  · There are segmental left ventricular wall motion abnormalities.

## 2022-02-15 NOTE — H&P
[facility] - Emergency Dept.  [facility] Medicine  History & Physical    Patient Name: Shukri Rosales  MRN: [id number]  Patient Class: OP- Observation  Admission Date: 2/14/2022  Attending Physician: Orlando Stahl MD   Primary Care Provider: Cris Matias NP         Patient information was obtained from patient, past medical records and ER records.     Subjective:     Principal Problem:Chest pain    Chief Complaint:   Chief Complaint   Patient presents with    Shortness of Breath    Chest Pain    Weakness     Pt complains of CP weakness and SOB seen 2 days ago for same issue        HPI:   61-year-old PMHx of incisional abdominal hernia, peripheral vascular disease, DM2, adrenal cortical adenoma, CKD, carotid stenosis, schizophrenia, GERD, HTN, and HLD . He presented with back pain -lower back pain  There is associated history of right pleuritic pain .  He was recently admitted to the Hospital - 1/19/2022 to 1/23/2022 for SOB .   Labs and imaging test reviewed.  Component      Latest Ref Rng & Units 2/14/2022 2/13/2022              Creatinine      0.5 - 1.4 mg/dL 1.9 (H) 1.4     Chest x-ray is clear   EKG -  Normal sinus rhythm  Prolonged QT  He will be admitted to rule out MI      Past Medical History:   Diagnosis Date    Adrenal cortical adenoma     Anxiety     Arthritis     CKD (chronic kidney disease) stage 3, GFR 30-59 ml/min     Depression     Diabetes mellitus type II 2011    does not take    DM (diabetes mellitus) 2011     am 09/21/2017 Insulin x 1 1/2 year    GERD (gastroesophageal reflux disease) 7/9/2013    Hypertension     Migraine headache 7/22/2013    Schizophrenia     Severe obesity with body mass index of 36.0 to 36.9        Past Surgical History:   Procedure Laterality Date    BACK SURGERY      COLONOSCOPY N/A 12/3/2020    Procedure: COLONOSCOPY;  Surgeon: Christofer Palmer MD;  Location: [facility];  Service: Endoscopy;  Laterality: N/A;    COLONOSCOPY N/A 12/4/2020     Procedure: COLONOSCOPY;  Surgeon: Frandy Stanton MD;  Location: [facility];  Service: Endoscopy;  Laterality: N/A;    HERNIA REPAIR      UMBILICAL    left arm exfix      NASAL SEPTUM SURGERY Bilateral     TONSILLECTOMY      URETEROSCOPY         Review of patient's allergies indicates:  No Known Allergies    No current facility-administered medications on file prior to encounter.     Current Outpatient Medications on File Prior to Encounter   Medication Sig    albuterol (PROVENTIL/VENTOLIN HFA) 90 mcg/actuation inhaler Inhale 1-2 puffs into the lungs every 4 (four) hours as needed for Wheezing. Rescue    aspirin 81 MG Chew Take 1 tablet (81 mg total) by mouth once daily.    atorvastatin (LIPITOR) 20 MG tablet Take 20 mg by mouth once daily.    blood sugar diagnostic Strp 1 each.    carvedilol (COREG) 6.25 MG tablet Take 6.25 mg by mouth 2 (two) times daily.    clonazePAM (KLONOPIN) 1 MG tablet Take 0.5 tablets (0.5 mg total) by mouth 2 (two) times daily as needed for Anxiety. Take 1.5mg twice daily    docusate sodium (COLACE) 100 MG capsule Take 100 mg by mouth 2 (two) times daily.    DULoxetine (CYMBALTA) 60 MG capsule Take 60 mg by mouth 2 (two) times daily.    empagliflozin (JARDIANCE) 25 mg tablet Take 25 mg by mouth.    ergocalciferol (ERGOCALCIFEROL) 50,000 unit Cap Take 50,000 Units by mouth every 7 days.    ferrous sulfate (FEOSOL) 325 mg (65 mg iron) Tab tablet Take 325 mg by mouth.    furosemide (LASIX) 40 MG tablet Take 1 tablet (40 mg total) by mouth once daily.    glucagon 1 mg/mL SolR Inject 1 mg into the muscle daily as needed.    HUMALOG MIX 50-50 INSULN U-100 100 unit/mL (50-50) Susp 10 Units twice daily    insulin lispro 100 unit/mL injection Inject under the skin with meals by sliding scale:  under 150 0 unit, 151-200 2 units, 201-250 4 units, 251-300 6 units, 301-350 8 units, 351-400 10 units, over 400 12 units.    lisinopriL (PRINIVIL,ZESTRIL) 5 MG tablet Take 1 tablet (5  mg total) by mouth once daily.    metFORMIN (GLUCOPHAGE) 1000 MG tablet Take 500 mg by mouth 2 (two) times daily with meals.    omeprazole (PRILOSEC) 20 MG capsule Take 20 mg by mouth once daily.    QUEtiapine (SEROQUEL) 200 MG Tab Take 1 tablet (200 mg total) by mouth every evening.    QUEtiapine (SEROQUEL) 25 MG Tab Take 1 tablet (25 mg total) by mouth once daily. Take at noon    tamsulosin (FLOMAX) 0.4 mg Cap Take 0.4 mg by mouth once daily.    traZODone (DESYREL) 50 MG tablet Take 0.5 tablets (25 mg total) by mouth every evening.    umeclidinium-vilanteroL (ANORO ELLIPTA) 62.5-25 mcg/actuation DsDv Inhale 1 puff into the lungs once daily. Controller     Family History     Problem Relation (Age of Onset)    Cancer Paternal Grandmother, Paternal Grandfather    Cataracts Mother    Diabetes Mother, Sister, Brother    Hypertension Mother, Sister, Brother        Tobacco Use    Smoking status: Former Smoker     Packs/day: 1.00     Years: 37.00     Pack years: 37.00     Types: Cigarettes     Quit date: 3/28/2021     Years since quittin.8    Smokeless tobacco: Never Used    Tobacco comment: instructed not to smoke after midnight after midnight prior to surgery   Substance and Sexual Activity    Alcohol use: No    Drug use: No    Sexual activity: Never     Partners: Female     Review of Systems   Constitutional: Positive for activity change and appetite change. Negative for chills, diaphoresis and fatigue.   HENT: Negative for congestion, dental problem, ear discharge, ear pain and facial swelling.    Eyes: Negative for pain, discharge and itching.   Respiratory: Negative for apnea, cough, chest tightness and shortness of breath.    Cardiovascular: Negative for chest pain and leg swelling.   Gastrointestinal: Negative for abdominal distention and abdominal pain.   Endocrine: Negative for cold intolerance, heat intolerance and polydipsia.   Genitourinary: Negative for difficulty urinating, dysuria and  enuresis.   Musculoskeletal: Positive for back pain. Negative for arthralgias.   Skin: Negative for color change and pallor.   Allergic/Immunologic: Negative for environmental allergies and food allergies.   Neurological: Negative for dizziness, facial asymmetry, light-headedness and headaches.   Hematological: Negative for adenopathy. Does not bruise/bleed easily.   Psychiatric/Behavioral: Negative for agitation and behavioral problems.     Objective:     Vital Signs (Most Recent):  Temp: 97.4 °F (36.3 °C) (02/14/22 1826)  Pulse: 79 (02/15/22 0605)  Resp: 19 (02/15/22 0142)  BP: 121/62 (02/15/22 0605)  SpO2: 97 % (02/15/22 0605) Vital Signs (24h Range):  Temp:  [97.4 °F (36.3 °C)] 97.4 °F (36.3 °C)  Pulse:  [68-87] 79  Resp:  [13-24] 19  SpO2:  [93 %-97 %] 97 %  BP: ()/(49-73) 121/62        There is no height or weight on file to calculate BMI.    Physical Exam  Vitals and nursing note reviewed.   Constitutional:       Appearance: He is well-developed.   HENT:      Head: Normocephalic and atraumatic.      Nose: Nose normal.   Eyes:      Comments: PERRL   Cardiovascular:      Rate and Rhythm: Normal rate and regular rhythm.      Heart sounds: Normal heart sounds.   Pulmonary:      Effort: Pulmonary effort is normal. No respiratory distress.      Breath sounds: Normal breath sounds. No wheezing or rales.   Abdominal:      Tenderness: There is no abdominal tenderness.   Musculoskeletal:         General: Normal range of motion.      Cervical back: Normal range of motion and neck supple.   Skin:     General: Skin is dry.   Neurological:      Mental Status: He is alert and oriented to person, place, and time.             Significant Labs:   All pertinent labs within the past 24 hours have been reviewed.  Blood Culture: No results for input(s): LABBLOO in the last 48 hours.  BMP:   Recent Labs   Lab 02/14/22 2045   *      K 3.6      CO2 28   BUN 41*   CREATININE 1.9*   CALCIUM 8.9     CBC:    Recent Labs   Lab 02/14/22 2045   WBC 13.78*   HGB 12.9*   HCT 40.4        CMP:   Recent Labs   Lab 02/14/22 2045      K 3.6      CO2 28   *   BUN 41*   CREATININE 1.9*   CALCIUM 8.9   PROT 6.5   ALBUMIN 3.2*   BILITOT 0.4   ALKPHOS 77   AST 22   ALT 28   ANIONGAP 13   EGFRNONAA 37*       Significant Imaging: I have reviewed all pertinent imaging results/findings within the past 24 hours.    Assessment/Plan:     * Chest pain    Will do serial troponin, rule out MI  'add aspirin    Back pain    Analgesia as tolerated, do CT lumbar spine    Bipolar 2 disorder    Resume home meds, seroquel    CKD (chronic kidney disease) stage 3, GFR 30-59 ml/min    Monitor serum creatine , avoid nephrotoxic meds    Type 2 diabetes mellitus with diabetic neuropathy    Diabetic diet , insulin sliding scale       VTE Risk Mitigation (From admission, onward)         Ordered     IP VTE HIGH RISK PATIENT  Once         02/15/22 0652     Place sequential compression device  Until discontinued         02/15/22 0652                   Eamon Mitchell MD  Department of Hospital Medicine   '[place] - Emergency Dept.

## 2022-02-15 NOTE — DISCHARGE SUMMARY
[facility] Medicine  Discharge Summary      Patient Name: Shukri Rosales  MRN: [id number]  Patient Class: IP- Inpatient  Admission Date: 2/7/2022  Hospital Length of Stay: 6 days  Discharge Date and Time: 2/13/2022  1:24 PM  Attending Physician: No att. providers found   Discharging Provider: Dora Baeza MD  Primary Care Provider: Cris Matias NP      HPI:   Shortness of Breath        Hx of COPD and recent dx of PNA. Pt received narcan for Kratom use.     Per ER- This  is a 61 y.o. male patient with PMHx of Adrenal cortical adenoma, arthritis, back surgery,  HTN, CKD stage 3, depression, GERD, migraine headache, Schizophrenia, Severe obesity, PNA, COPD, exsmoker, and DM who presents to the Emergency Department by EMS for SOB which onset one day ago. Symptoms are constant and moderate in severity. No mitigating or exacerbating factors reported. Associated sxs include wheeze. Patient denies  fever, chills, congestion, rhinorrhea, sore throat, cough, CP, leg swelling, N/V/D, dizziness, HA, and all other sxs at this time. Pt was seen in the ED a week ago with similar sxs. Pt was admit for PNA treatment. Pt states since d/c smoking a significant amount of cigarettes. Per EMS pt's SpO2 was in the low 70s at the scene. Pt was given two breathing treatments and placed on a nonrebreathing mask. Pt's SpO2 radha to 91 PTA. No further complaints or concerns at this time.      Patient was noted to be in Acute hypoxic respiratory failure in ER and was intubated and venitiated. Patient to be admitted to ICU.                   * No surgery found *      Hospital Course:   62 y/o wm  admitted to ICU on mechanical ventilation  with a Dx of acute on chronic hypoxic and hypercapnic . Acute on chronic systolic and diastolic CHF exacerbation , PNA and  COPD exacerbation . Started on IV lasix , IV steroid  and IVAB . Develop hypotension most likely due to  IV sedation  and started on short course of Levophed to  keep a MAP>65 .    2/9 Remain critically ill . Failed SBT today . Became very anxious and agitated .  Restarted on sedation . Home medication Seroquel and clonazepam . He has a good UP . NAEON     2/10 Extubated this am  , now on NRM .  He awake and alert  and following simple commands  . He has good UOP . NAEON . Sputum and blood cx NGTD .     2/11- Afebrile . O2 wean down to 4L/min. Sittting in the bedside chair . Awake , appears oriented to self , nods head but does not answer questions. Reports SOB is better. WBC 10K. Completed antibiotic today. Steroid transitioned to oral.  Noted BNP is further elevated 2609. Additional Lasix IV ordered per CC team. ASA, Amlodipine , Coreg and statin resumed.     2/12- Downgraded to Med tele yesterday . O2 wean down to RA. Pt was seen at  bedside . Somnolent during exam after receiving scheduled clonazepam and Seroquel this morning. Per Nursing , fully awake earlier today , ate breakfast and was getting agitated and climbing out of bed required sitter in the room. Episode of hypotension at noon today . Pt re examined at bedside . Still somnolent but arouses easily . Midodrine 10 mg x 1 dose given po. BP meds, home sedatives , antipsychotic were adjusted . Lasix transitioned to oral . Tried calling his sister but no answer.  Creatinine stable at 1.1    2/13- Pt is fully awake , alert, Ox 3 this morning . Remains on RA. Pt expressed that he feels perfectly fine and would like to be discharged . Adjustment made on home antihypertensives due to low BP yesterday . Amlodipine discontinued and lisinopril decreased to 5 mg daily. Seroquel dose lower to 25 mg qam and 200 mg at night and trazodone decreased to 25 mg due to pt exhibiting increased daytime somnolence after taking those medication. Pt is examined and deemed suitable for discharge to home to family care. Pt follow up with PCP and Cardiology in 1 to 2 weeks.        Goals of Care Treatment Preferences:  Code Status: Full  Code      Consults:   Consults (From admission, onward)        Status Ordering Provider     Inpatient consult to Social Work  Once        Provider:  (Not yet assigned)    Completed SARAH LAUGHLIN     Inpatient consult to Registered Dietitian/Nutritionist  Once        Provider:  (Not yet assigned)    Completed STEFFI CRUZ     IP consult to case management  Once        Provider:  (Not yet assigned)    Completed STEFFI CRUZ     Inpatient consult to Registered Dietitian/Nutritionist  Once        Provider:  (Not yet assigned)    Completed AMERICA VALDOVINOS     Inpatient consult to Cardiology  Once        Provider:  (Not yet assigned)    Completed SETH CHA     Inpatient consult to Pulmonology  Once        Provider:  (Not yet assigned)    Completed SETH CHA          No new Assessment & Plan notes have been filed under this hospital service since the last note was generated.  Service: Hospital Medicine    Final Active Diagnoses:    Diagnosis Date Noted POA    PRINCIPAL PROBLEM:  Acute hypoxemic and hypercapnic respiratory failure  [J96.01] 02/07/2022 Yes    Acute on chronic combined systolic and diastolic congestive heart failure [I50.43] 02/07/2022 Yes    COPD exacerbation [J44.1] 01/02/2022 Yes    Schizoaffective disorder, bipolar type [F25.0] 02/09/2018 Yes     Chronic    Normocytic anemia [D64.9] 02/07/2022 Yes    History of recent pneumonia [Z87.01] 02/07/2022 Not Applicable    Pulmonary hypertension [I27.20] 02/07/2022 Yes    Type 2 diabetes mellitus with stage 3 chronic kidney disease [E11.22, N18.30] 07/25/2016 Yes    EMMANUEL on CPAP [G47.33, Z99.89] 01/19/2016 Not Applicable    Hypertension  [E11.59, I15.2] 10/09/2014 Yes      Problems Resolved During this Admission:    Diagnosis Date Noted Date Resolved POA    GI bleed [K92.2] 02/07/2022 02/11/2022 Yes    Obesity (BMI 30-39.9) [E66.9] 01/26/2016 02/12/2022 Yes       Discharged Condition: stable    Disposition:  Home or Self Care    Follow Up:   Follow-up Information     Cris Matias NP. Schedule an appointment as soon as possible for a visit in 3 days.    Specialty: Family Medicine  Why: Discharge follow up   Contact information:  [address]  [address]  [facility] [address]  [phone]             Chidi Reeves MD. Schedule an appointment as soon as possible for a visit in 1 week.    Specialties: Cardiology, Cardiovascular Disease  Why: Cardiology follow up   Contact information:  [address]  [place] [address]  [phone]                       Patient Instructions:      Diet diabetic     Activity as tolerated       Significant Diagnostic Studies: Labs:   BMP:   Recent Labs   Lab 02/14/22 2045   *      K 3.6      CO2 28   BUN 41*   CREATININE 1.9*   CALCIUM 8.9   , CMP   Recent Labs   Lab 02/14/22 2045      K 3.6      CO2 28   *   BUN 41*   CREATININE 1.9*   CALCIUM 8.9   PROT 6.5   ALBUMIN 3.2*   BILITOT 0.4   ALKPHOS 77   AST 22   ALT 28   ANIONGAP 13   ESTGFRAFRICA 42*   EGFRNONAA 37*    and CBC   Recent Labs   Lab 02/14/22 2045   WBC 13.78*   HGB 12.9*   HCT 40.4          Pending Diagnostic Studies:     None         Medications:  Reconciled Home Medications:      Medication List      CHANGE how you take these medications    furosemide 40 MG tablet  Commonly known as: LASIX  Take 1 tablet (40 mg total) by mouth once daily.  What changed: additional instructions     lisinopriL 5 MG tablet  Commonly known as: PRINIVIL,ZESTRIL  Take 1 tablet (5 mg total) by mouth once daily.  What changed:   · medication strength  · how much to take     * QUEtiapine 25 MG Tab  Commonly known as: SEROQUEL  Take 1 tablet (25 mg total) by mouth once daily. Take at noon  What changed:   · medication strength  · how much to take  · additional instructions     * QUEtiapine 200 MG Tab  Commonly known as: SEROQUEL  Take 1 tablet (200 mg total) by mouth every evening.  What  changed:   · medication strength  · how much to take  · when to take this     traZODone 50 MG tablet  Commonly known as: DESYREL  Take 0.5 tablets (25 mg total) by mouth every evening.  What changed:   · medication strength  · how much to take         * This list has 2 medication(s) that are the same as other medications prescribed for you. Read the directions carefully, and ask your doctor or other care provider to review them with you.            CONTINUE taking these medications    albuterol 90 mcg/actuation inhaler  Commonly known as: PROVENTIL/VENTOLIN HFA  Inhale 1-2 puffs into the lungs every 4 (four) hours as needed for Wheezing. Rescue     ANORO ELLIPTA 62.5-25 mcg/actuation Dsdv  Generic drug: umeclidinium-vilanteroL  Inhale 1 puff into the lungs once daily. Controller     aspirin 81 MG Chew  Take 1 tablet (81 mg total) by mouth once daily.     atorvastatin 20 MG tablet  Commonly known as: LIPITOR  Take 20 mg by mouth once daily.     blood sugar diagnostic Strp  1 each.     carvediloL 6.25 MG tablet  Commonly known as: COREG  Take 6.25 mg by mouth 2 (two) times daily.     clonazePAM 1 MG tablet  Commonly known as: KlonoPIN  Take 0.5 tablets (0.5 mg total) by mouth 2 (two) times daily as needed for Anxiety. Take 1.5mg twice daily     docusate sodium 100 MG capsule  Commonly known as: COLACE  Take 100 mg by mouth 2 (two) times daily.     DULoxetine 60 MG capsule  Commonly known as: CYMBALTA  Take 60 mg by mouth 2 (two) times daily.     ergocalciferol 50,000 unit Cap  Commonly known as: ERGOCALCIFEROL  Take 50,000 Units by mouth every 7 days.     ferrous sulfate 325 mg (65 mg iron) Tab tablet  Commonly known as: FEOSOL  Take 325 mg by mouth.     glucagon 1 mg/mL Solr  Inject 1 mg into the muscle daily as needed.     HumaLOG Mix 50-50 Insuln U-100 100 unit/mL (50-50) Susp  Generic drug: insulin lispro protamin-lispro  10 Units twice daily     insulin lispro 100 unit/mL injection  Inject under the skin with  meals by sliding scale:  under 150 0 unit, 151-200 2 units, 201-250 4 units, 251-300 6 units, 301-350 8 units, 351-400 10 units, over 400 12 units.     JARDIANCE 25 mg tablet  Generic drug: empagliflozin  Take 25 mg by mouth.     metFORMIN 1000 MG tablet  Commonly known as: GLUCOPHAGE  Take 500 mg by mouth 2 (two) times daily with meals.     omeprazole 20 MG capsule  Commonly known as: PRILOSEC  Take 20 mg by mouth once daily.     tamsulosin 0.4 mg Cap  Commonly known as: FLOMAX  Take 0.4 mg by mouth once daily.        STOP taking these medications    amLODIPine 10 MG tablet  Commonly known as: NORVASC     baclofen 20 MG tablet  Commonly known as: LIORESAL     gabapentin 800 MG tablet  Commonly known as: NEURONTIN     methocarbamoL 750 MG Tab  Commonly known as: ROBAXIN            Indwelling Lines/Drains at time of discharge:   Lines/Drains/Airways     None                 Time spent on the discharge of patient: 30  minutes         Dora Baeza MD  Department of Hospital Medicine  [facility]

## 2022-02-15 NOTE — HPI
61-year-old PMHx of incisional abdominal hernia, peripheral vascular disease, DM2, adrenal cortical adenoma, CKD, carotid stenosis, schizophrenia, GERD, HTN, and HLD . He presented with back pain -lower back pain  There is associated history of right pleuritic pain .  He was recently admitted to the Hospital - 1/19/2022 to 1/23/2022 for SOB .   Labs and imaging test reviewed.  Component      Latest Ref Rng & Units 2/14/2022 2/13/2022              Creatinine      0.5 - 1.4 mg/dL 1.9 (H) 1.4     Chest x-ray is clear   EKG -  Normal sinus rhythm  Prolonged QT  He will be admitted to rule out MI

## 2022-02-15 NOTE — ASSESSMENT & PLAN NOTE
Patient is identified as having Combined Systolic and Diastolic heart failure that is Acute on chronic. CHF is currently controlled. Latest ECHO performed and demonstrates- Results for orders placed during the hospital encounter of 01/19/22    Echo    Interpretation Summary  · The left ventricle is normal in size with concentric hypertrophy and mildly decreased systolic function.  · Grade I left ventricular diastolic dysfunction.  · Normal right ventricular size with normal right ventricular systolic function.  · There is pulmonary hypertension.  · Intermediate central venous pressure (8 mmHg).  · The estimated PA systolic pressure is 53 mmHg.  · The estimated ejection fraction is 40%.  · There are segmental left ventricular wall motion abnormalities.  . Continue ACE/ARB and monitor clinical status closely. Monitor on telemetry. Patient is on CHF pathway.  Monitor strict Is&Os and daily weights.  Place on fluid restriction of 2 L. Continue to stress to patient importance of self efficacy and  on diet for CHF. Last BNP reviewed- and noted below   Recent Labs   Lab 02/14/22 2045   *   .

## 2022-02-16 VITALS
OXYGEN SATURATION: 97 % | DIASTOLIC BLOOD PRESSURE: 90 MMHG | BODY MASS INDEX: 25.33 KG/M2 | HEIGHT: 72 IN | SYSTOLIC BLOOD PRESSURE: 151 MMHG | HEART RATE: 81 BPM | RESPIRATION RATE: 18 BRPM | TEMPERATURE: 99 F | WEIGHT: 187 LBS

## 2022-02-16 LAB
CV STRESS BASE HR: 66 BPM
DIASTOLIC BLOOD PRESSURE: 105 MMHG
EJECTION FRACTION- HIGH: 73 %
END DIASTOLIC INDEX-HIGH: 165 ML/M2
END DIASTOLIC INDEX-LOW: 101 ML/M2
END SYSTOLIC INDEX-HIGH: 64 ML/M2
END SYSTOLIC INDEX-LOW: 28 ML/M2
NUC REST EJECTION FRACTION: 39
OHS CV CPX 85 PERCENT MAX PREDICTED HEART RATE MALE: 133
OHS CV CPX MAX PREDICTED HEART RATE: 157
OHS CV CPX PATIENT IS FEMALE: 0
OHS CV CPX PATIENT IS MALE: 1
OHS CV CPX PEAK DIASTOLIC BLOOD PRESSURE: 105 MMHG
OHS CV CPX PEAK HEAR RATE: 100 BPM
OHS CV CPX PEAK RATE PRESSURE PRODUCT: NORMAL
OHS CV CPX PEAK SYSTOLIC BLOOD PRESSURE: 156 MMHG
OHS CV CPX PERCENT MAX PREDICTED HEART RATE ACHIEVED: 64
OHS CV CPX RATE PRESSURE PRODUCT PRESENTING: NORMAL
POCT GLUCOSE: 180 MG/DL (ref 70–110)
RETIRED EF AND QEF - SEE NOTES: 59 %
SYSTOLIC BLOOD PRESSURE: 156 MMHG

## 2022-02-16 PROCEDURE — 63600175 PHARM REV CODE 636 W HCPCS: Performed by: FAMILY MEDICINE

## 2022-02-16 PROCEDURE — 25000003 PHARM REV CODE 250: Performed by: INTERNAL MEDICINE

## 2022-02-16 PROCEDURE — G0378 HOSPITAL OBSERVATION PER HR: HCPCS

## 2022-02-16 PROCEDURE — A4216 STERILE WATER/SALINE, 10 ML: HCPCS | Performed by: INTERNAL MEDICINE

## 2022-02-16 PROCEDURE — 96372 THER/PROPH/DIAG INJ SC/IM: CPT

## 2022-02-16 RX ORDER — REGADENOSON 0.08 MG/ML
0.4 INJECTION, SOLUTION INTRAVENOUS ONCE
Status: COMPLETED | OUTPATIENT
Start: 2022-02-16 | End: 2022-02-16

## 2022-02-16 RX ADMIN — HYDROCODONE BITARTRATE AND ACETAMINOPHEN 1 TABLET: 5; 325 TABLET ORAL at 03:02

## 2022-02-16 RX ADMIN — TAMSULOSIN HYDROCHLORIDE 0.4 MG: 0.4 CAPSULE ORAL at 08:02

## 2022-02-16 RX ADMIN — Medication 10 ML: at 06:02

## 2022-02-16 RX ADMIN — REGADENOSON 0.4 MG: 0.08 INJECTION, SOLUTION INTRAVENOUS at 02:02

## 2022-02-16 RX ADMIN — ASPIRIN 81 MG CHEWABLE TABLET 81 MG: 81 TABLET CHEWABLE at 08:02

## 2022-02-16 RX ADMIN — CARVEDILOL 6.25 MG: 6.25 TABLET, FILM COATED ORAL at 08:02

## 2022-02-16 RX ADMIN — CLONAZEPAM 0.5 MG: 0.5 TABLET ORAL at 09:02

## 2022-02-16 RX ADMIN — ATORVASTATIN CALCIUM 20 MG: 10 TABLET, FILM COATED ORAL at 08:02

## 2022-02-16 RX ADMIN — LISINOPRIL 5 MG: 5 TABLET ORAL at 08:02

## 2022-02-16 RX ADMIN — HYDROCODONE BITARTRATE AND ACETAMINOPHEN 1 TABLET: 5; 325 TABLET ORAL at 09:02

## 2022-02-16 NOTE — HOSPITAL COURSE
2/16/22-Patient seen and examined today, sitting up in bedside chair. Feels well overall. No chest pain or SOB. Troponin trending down. Stress test pending.

## 2022-02-16 NOTE — PLAN OF CARE
Discussed Plan of Care with patient and verbalized understanding - Patient remains AAOx4 - remains free of falls, accidents and trauma during the day shift. Bed is in the low position and the call light is within reach. Patient to be NPO after midnight for Stress Test on Wednesday - Cardiology consult completed. Will continue to monitor

## 2022-02-16 NOTE — NURSING
Patient is not in room, belongings gone, no IV catheter noted in receptacle containers. Called phone number listed on face sheet, no answer. Sister notified and stated she has not spoke to patient. Reported to charge nurse, [place]. MD aware.

## 2022-02-16 NOTE — ASSESSMENT & PLAN NOTE
Continue carvedilol and lisinopril  Plan nuclear stress test tomorrow    2/16/22  -Stress test pending  -Continue ASA, statin, Coreg  -Continue ACEi if creatinine stable  -Patient apparently eloped prior to completion of stress test

## 2022-02-16 NOTE — PROGRESS NOTES
'[facility] - Tele[facility])  Cardiology  Progress Note    Patient Name: Shukri Rosales  MRN: [id number]  Admission Date: 2/14/2022  Hospital Length of Stay: 0 days  Code Status: Full Code   Attending Physician: No att. providers found   Primary Care Physician: Cris Matias NP  Expected Discharge Date: 2/16/2022  Principal Problem:Chest pain    Subjective:   HPI:  61-year-old PMHx of incisional abdominal hernia, peripheral vascular disease, DM2, adrenal cortical adenoma, CKD, carotid stenosis, schizophrenia, GERD, HTN, and HLD . He presented with back pain -lower back pain  There is associated history of right pleuritic pain .  He was recently admitted to the Hospital - 1/19/2022 to 1/23/2022 for SOB .   Patient with lower LV function 40% by last echo.  Patient with known significant vascular disease. Plan admit for chest pain and schedule nuclear stress test.       Hospital Course:   2/16/22-Patient seen and examined today, sitting up in bedside chair. Feels well overall. No chest pain or SOB. Troponin trending down. Stress test pending.           Review of Systems   Constitutional: Positive for malaise/fatigue.   HENT: Negative.    Eyes: Negative.    Cardiovascular: Negative.    Respiratory: Negative.    Endocrine: Negative.    Hematologic/Lymphatic: Negative.    Skin: Negative.    Musculoskeletal: Negative.    Gastrointestinal: Negative.    Genitourinary: Negative.    Neurological: Negative.    Psychiatric/Behavioral: Negative.    Allergic/Immunologic: Negative.      Objective:     Vital Signs (Most Recent):  Temp: 98.5 °F (36.9 °C) (02/16/22 1222)  Pulse: 81 (02/16/22 1222)  Resp: 18 (02/16/22 1222)  BP: (!) 151/90 (02/16/22 1222)  SpO2: 97 % (02/16/22 1222) Vital Signs (24h Range):  Temp:  [97.1 °F (36.2 °C)-98.5 °F (36.9 °C)] 98.5 °F (36.9 °C)  Pulse:  [63-86] 81  Resp:  [18-20] 18  SpO2:  [96 %-97 %] 97 %  BP: (124-156)/() 151/90     Weight: 84.8 kg (187 lb)  Body mass index is 25.36 kg/m².      SpO2: 97 %  O2 Device (Oxygen Therapy): room air    No intake or output data in the 24 hours ending 02/16/22 1412    Lines/Drains/Airways     Peripheral Intravenous Line                 Peripheral IV - Single Lumen 02/16/22 1000 22 G Anterior;Right Forearm <1 day                Physical Exam  Vitals and nursing note reviewed.   Constitutional:       General: He is not in acute distress.     Appearance: Normal appearance. He is well-developed and well-nourished. He is not diaphoretic.   HENT:      Head: Normocephalic and atraumatic.   Eyes:      General:         Right eye: No discharge.         Left eye: No discharge.      Pupils: Pupils are equal, round, and reactive to light.   Neck:      Thyroid: No thyromegaly.      Vascular: No JVD.      Trachea: No tracheal deviation.   Cardiovascular:      Rate and Rhythm: Normal rate and regular rhythm.      Heart sounds: Normal heart sounds, S1 normal and S2 normal. No murmur heard.      Pulmonary:      Effort: Pulmonary effort is normal. No respiratory distress.      Breath sounds: Normal breath sounds. No wheezing or rales.   Abdominal:      General: There is no distension.      Palpations: Abdomen is soft.      Tenderness: There is no rebound.   Musculoskeletal:      Cervical back: Neck supple.      Right lower leg: No edema.      Left lower leg: No edema.   Skin:     General: Skin is warm and dry.      Findings: No erythema.   Neurological:      Mental Status: He is alert and oriented to person, place, and time.   Psychiatric:         Mood and Affect: Mood and affect and mood normal.         Behavior: Behavior normal.         Thought Content: Thought content normal.         Significant Labs:   CMP   Recent Labs   Lab 02/14/22  2045      K 3.6      CO2 28   *   BUN 41*   CREATININE 1.9*   CALCIUM 8.9   PROT 6.5   ALBUMIN 3.2*   BILITOT 0.4   ALKPHOS 77   AST 22   ALT 28   ANIONGAP 13   ESTGFRAFRICA 42*   EGFRNONAA 37*   , CBC   Recent Labs   Lab  02/14/22 2045   WBC 13.78*   HGB 12.9*   HCT 40.4      , Troponin   Recent Labs   Lab 02/14/22  2045 02/15/22  0939 02/15/22  1235   TROPONINI 0.123* 0.078* 0.097*    and All pertinent lab results from the last 24 hours have been reviewed.    Significant Imaging: Echocardiogram:   Transthoracic echo (TTE) complete ([facility] Only):   Results for orders placed or performed during the hospital encounter of 01/19/22   Echo   Result Value Ref Range    BSA 2.14 m2    TDI SEPTAL 0.07 m/s    LV LATERAL E/E' RATIO 8.38 m/s    LV SEPTAL E/E' RATIO 9.57 m/s    LA WIDTH 3.39 cm    IVC diameter 1.77 cm    Left Ventricular Outflow Tract Mean Velocity 0.52912924291429 cm/s    Left Ventricular Outflow Tract Mean Gradient 2.28 mmHg    TDI LATERAL 0.08 m/s    LVIDd 4.62 3.5 - 6.0 cm    IVS 1.68 (A) 0.6 - 1.1 cm    Posterior Wall 1.39 (A) 0.6 - 1.1 cm    Ao root annulus 3.65 cm    LVIDs 4.37 (A) 2.1 - 4.0 cm    FS 5 28 - 44 %    LA volume 37.01 cm3    Sinus 3.68 cm    STJ 3.50 cm    Ascending aorta 3.26 cm    LV mass 296.91 g    LA size 3.28 cm    TAPSE 2.45 cm    Left Ventricle Relative Wall Thickness 0.60 cm    AV mean gradient 3 mmHg    AV valve area 2.14 cm2    AV Velocity Ratio 0.86     AV index (prosthetic) 0.61     MV valve area p 1/2 method 3.08 cm2    E/A ratio 1.00     Mean e' 0.08 m/s    E wave deceleration time 246.835352944232288 msec    IVRT 105.018381635084410 msec    LVOT diameter 2.11 cm    LVOT area 3.5 cm2    LVOT peak frankie 1.08 m/s    LVOT peak VTI 17.00 cm    Ao peak frankie 1.26 m/s    Ao VTI 27.8 cm    RVOT prox diameter 3.49 cm    RVOT area 9.56 cm2    RVOT peak frankie 0.60 m/s    RVOT peak VTI 11.8 cm    Qp:Qs ratio 0.53     LVOT stroke volume 59.41 cm3    RVOT stroke volume 112.82 cm3    AV peak gradient 6 mmHg    PV mean gradient 0.71 mmHg    E/E' ratio 8.93 m/s    MV Peak E Frankie 0.67 m/s    TR Max Frankie 3.37 m/s    MV stenosis pressure 1/2 time 71.246506478844278 ms    MV Peak A Frankie 0.67 m/s    LV Systolic Volume  86.51 mL    LV Systolic Volume Index 40.8 mL/m2    LV Diastolic Volume 98.26 mL    LV Diastolic Volume Index 46.35 mL/m2    LA Volume Index 17.5 mL/m2    LV Mass Index 140 g/m2    Echo EF Estimated 12 %    RA Major Axis 4.57 cm    Left Atrium Minor Axis 3.27 cm    Left Atrium Major Axis 4.88 cm    Triscuspid Valve Regurgitation Peak Gradient 45 mmHg    RA Width 3.59 cm    Right Atrial Pressure (from IVC) 8 mmHg    EF 40 %    TV rest pulmonary artery pressure 53 mmHg    Narrative    · The left ventricle is normal in size with concentric hypertrophy and   mildly decreased systolic function.  · Grade I left ventricular diastolic dysfunction.  · Normal right ventricular size with normal right ventricular systolic   function.  · There is pulmonary hypertension.  · Intermediate central venous pressure (8 mmHg).  · The estimated PA systolic pressure is 53 mmHg.  · The estimated ejection fraction is 40%.  · There are segmental left ventricular wall motion abnormalities.      , EKG: Reviewed and X-Ray: CXR: X-Ray Chest 1 View (CXR): No results found for this visit on 02/14/22. and X-Ray Chest PA and Lateral (CXR): No results found for this visit on 02/14/22.    Assessment and Plan:   Patient who presents with chest pain/mildly bumped troponin. Stable since admission. Stress test pending HOWEVER PATIENT ELOPED PRIOR TO COMPLETION.    * Chest pain  Continue carvedilol and lisinopril  Plan nuclear stress test tomorrow    2/16/22  -Stress test pending  -Continue ASA, statin, Coreg  -Continue ACEi if creatinine stable  -Patient apparently eloped prior to completion of stress test    Acute on chronic combined systolic and diastolic congestive heart failure  Patient is identified as having Combined Systolic and Diastolic heart failure that is Acute on chronic. CHF is currently controlled. Latest ECHO performed and demonstrates- Results for orders placed during the hospital encounter of 01/19/22    Echo    Interpretation Summary  ·  The left ventricle is normal in size with concentric hypertrophy and mildly decreased systolic function.  · Grade I left ventricular diastolic dysfunction.  · Normal right ventricular size with normal right ventricular systolic function.  · There is pulmonary hypertension.  · Intermediate central venous pressure (8 mmHg).  · The estimated PA systolic pressure is 53 mmHg.  · The estimated ejection fraction is 40%.  · There are segmental left ventricular wall motion abnormalities.  . Continue ACE/ARB and monitor clinical status closely. Monitor on telemetry. Patient is on CHF pathway.  Monitor strict Is&Os and daily weights.  Place on fluid restriction of 2 L. Continue to stress to patient importance of self efficacy and  on diet for CHF. Last BNP reviewed- and noted below   Recent Labs   Lab 02/14/22 2045   *       2/16/22  -Clinically compensated  -Continue Coreg, ACEi  -Resume Lasix once creatinine stabilizes      Bipolar 2 disorder  -Mgmt as per primary team    CKD (chronic kidney disease) stage 3, GFR 30-59 ml/min  Per hospital medicine        VTE Risk Mitigation (From admission, onward)         Ordered     IP VTE HIGH RISK PATIENT  Once         02/15/22 0652     Place sequential compression device  Until discontinued         02/15/22 0652                Carmen Arellano PA-C  Cardiology  [facility] - Tele[facility] ([facility])

## 2022-02-16 NOTE — DISCHARGE SUMMARY
[facility] Medicine  Discharge Summary      Patient Name: Shukri Rosales  MRN: [id number]  Patient Class: OP- Observation  Admission Date: 2/14/2022  Hospital Length of Stay: 0 days  Discharge Date and Time: 2/16/2022  2:02 PM  Attending Physician: Dr. Orlando Stahl    Discharging Provider: Ladonna Reveles NP  Primary Care Provider: Cris Matias NP      HPI:   61-year-old PMHx of incisional abdominal hernia, peripheral vascular disease, DM2, adrenal cortical adenoma, CKD, carotid stenosis, schizophrenia, GERD, HTN, and HLD . He presented with back pain -lower back pain  There is associated history of right pleuritic pain .  He was recently admitted to the Hospital - 1/19/2022 to 1/23/2022 for SOB .   Labs and imaging test reviewed.  Component      Latest Ref Rng & Units 2/14/2022 2/13/2022              Creatinine      0.5 - 1.4 mg/dL 1.9 (H) 1.4     Chest x-ray is clear   EKG -  Normal sinus rhythm  Prolonged QT  He will be admitted to rule out MI      * No surgery found *      Hospital Course:   Pt admitted to Observation for Chest Pain with troponin and BNP elevated.  Cardiology consulted.  Previous Echo results revealed EF 40%. Stress test partially completed showing abnormal myocardial perfusion scan.moderate sized, severe intensity defect  in the inferior and apical wall(s) and EF of 39% at rest; hypokinesis of inferior wall noted and EKG portion of this study is negative for ischemia. Pt eloped prior to stress test completion with only resting images performed. Pt unable to be located by staff and discharged against medical advice secondary to elopement.            Goals of Care Treatment Preferences:  Code Status: Full Code      Consults:   Consults (From admission, onward)        Status Ordering Provider     Inpatient consult to Cardiology  Once        Provider:  (Not yet assigned)    Completed ORLANDO STAHL          Final Active Diagnoses:    Diagnosis Date Noted POA     PRINCIPAL PROBLEM:  Chest pain [R07.9] 08/27/2014 Yes    Back pain [M54.9] 02/15/2022 Yes    Acute on chronic combined systolic and diastolic congestive heart failure [I50.43] 02/07/2022 Yes    Bipolar 2 disorder [F31.81] 09/18/2014 Yes    CKD (chronic kidney disease) stage 3, GFR 30-59 ml/min [N18.30] 08/26/2014 Yes     Chronic    Type 2 diabetes mellitus with diabetic neuropathy [E11.40] 07/09/2013 Yes      Problems Resolved During this Admission:       Discharged Condition: against medical advice    Disposition: Eloped    Follow Up:    Patient Instructions:   No discharge procedures on file.    Significant Diagnostic Studies: Eloped     Pending Diagnostic Studies:     None         Medications:  None- Pt eloped     Indwelling Lines/Drains at time of discharge:   Lines/Drains/Airways     None                 Time spent on the discharge of patient: > 32 minutes         Ladonna Reveles NP  Department of Hospital Medicine  [facility] - Telemetry ([facility])

## 2022-02-16 NOTE — SUBJECTIVE & OBJECTIVE
Review of Systems   Constitutional: Positive for malaise/fatigue.   HENT: Negative.    Eyes: Negative.    Cardiovascular: Negative.    Respiratory: Negative.    Endocrine: Negative.    Hematologic/Lymphatic: Negative.    Skin: Negative.    Musculoskeletal: Negative.    Gastrointestinal: Negative.    Genitourinary: Negative.    Neurological: Negative.    Psychiatric/Behavioral: Negative.    Allergic/Immunologic: Negative.      Objective:     Vital Signs (Most Recent):  Temp: 98.5 °F (36.9 °C) (02/16/22 1222)  Pulse: 81 (02/16/22 1222)  Resp: 18 (02/16/22 1222)  BP: (!) 151/90 (02/16/22 1222)  SpO2: 97 % (02/16/22 1222) Vital Signs (24h Range):  Temp:  [97.1 °F (36.2 °C)-98.5 °F (36.9 °C)] 98.5 °F (36.9 °C)  Pulse:  [63-86] 81  Resp:  [18-20] 18  SpO2:  [96 %-97 %] 97 %  BP: (124-156)/() 151/90     Weight: 84.8 kg (187 lb)  Body mass index is 25.36 kg/m².     SpO2: 97 %  O2 Device (Oxygen Therapy): room air    No intake or output data in the 24 hours ending 02/16/22 1412    Lines/Drains/Airways     Peripheral Intravenous Line                 Peripheral IV - Single Lumen 02/16/22 1000 22 G Anterior;Right Forearm <1 day                Physical Exam  Vitals and nursing note reviewed.   Constitutional:       General: He is not in acute distress.     Appearance: Normal appearance. He is well-developed and well-nourished. He is not diaphoretic.   HENT:      Head: Normocephalic and atraumatic.   Eyes:      General:         Right eye: No discharge.         Left eye: No discharge.      Pupils: Pupils are equal, round, and reactive to light.   Neck:      Thyroid: No thyromegaly.      Vascular: No JVD.      Trachea: No tracheal deviation.   Cardiovascular:      Rate and Rhythm: Normal rate and regular rhythm.      Heart sounds: Normal heart sounds, S1 normal and S2 normal. No murmur heard.      Pulmonary:      Effort: Pulmonary effort is normal. No respiratory distress.      Breath sounds: Normal breath sounds. No  wheezing or rales.   Abdominal:      General: There is no distension.      Palpations: Abdomen is soft.      Tenderness: There is no rebound.   Musculoskeletal:      Cervical back: Neck supple.      Right lower leg: No edema.      Left lower leg: No edema.   Skin:     General: Skin is warm and dry.      Findings: No erythema.   Neurological:      Mental Status: He is alert and oriented to person, place, and time.   Psychiatric:         Mood and Affect: Mood and affect and mood normal.         Behavior: Behavior normal.         Thought Content: Thought content normal.         Significant Labs:   CMP   Recent Labs   Lab 02/14/22 2045      K 3.6      CO2 28   *   BUN 41*   CREATININE 1.9*   CALCIUM 8.9   PROT 6.5   ALBUMIN 3.2*   BILITOT 0.4   ALKPHOS 77   AST 22   ALT 28   ANIONGAP 13   ESTGFRAFRICA 42*   EGFRNONAA 37*   , CBC   Recent Labs   Lab 02/14/22 2045   WBC 13.78*   HGB 12.9*   HCT 40.4      , Troponin   Recent Labs   Lab 02/14/22  2045 02/15/22  0939 02/15/22  1235   TROPONINI 0.123* 0.078* 0.097*    and All pertinent lab results from the last 24 hours have been reviewed.    Significant Imaging: Echocardiogram:   Transthoracic echo (TTE) complete ([facility] Only):   Results for orders placed or performed during the hospital encounter of 01/19/22   Echo   Result Value Ref Range    BSA 2.14 m2    TDI SEPTAL 0.07 m/s    LV LATERAL E/E' RATIO 8.38 m/s    LV SEPTAL E/E' RATIO 9.57 m/s    LA WIDTH 3.39 cm    IVC diameter 1.77 cm    Left Ventricular Outflow Tract Mean Velocity 0.72131846922222 cm/s    Left Ventricular Outflow Tract Mean Gradient 2.28 mmHg    TDI LATERAL 0.08 m/s    LVIDd 4.62 3.5 - 6.0 cm    IVS 1.68 (A) 0.6 - 1.1 cm    Posterior Wall 1.39 (A) 0.6 - 1.1 cm    Ao root annulus 3.65 cm    LVIDs 4.37 (A) 2.1 - 4.0 cm    FS 5 28 - 44 %    LA volume 37.01 cm3    Sinus 3.68 cm    STJ 3.50 cm    Ascending aorta 3.26 cm    LV mass 296.91 g    LA size 3.28 cm    TAPSE 2.45 cm    Left  Ventricle Relative Wall Thickness 0.60 cm    AV mean gradient 3 mmHg    AV valve area 2.14 cm2    AV Velocity Ratio 0.86     AV index (prosthetic) 0.61     MV valve area p 1/2 method 3.08 cm2    E/A ratio 1.00     Mean e' 0.08 m/s    E wave deceleration time 246.487823997480196 msec    IVRT 105.399229904672125 msec    LVOT diameter 2.11 cm    LVOT area 3.5 cm2    LVOT peak frankie 1.08 m/s    LVOT peak VTI 17.00 cm    Ao peak frankie 1.26 m/s    Ao VTI 27.8 cm    RVOT prox diameter 3.49 cm    RVOT area 9.56 cm2    RVOT peak frankie 0.60 m/s    RVOT peak VTI 11.8 cm    Qp:Qs ratio 0.53     LVOT stroke volume 59.41 cm3    RVOT stroke volume 112.82 cm3    AV peak gradient 6 mmHg    PV mean gradient 0.71 mmHg    E/E' ratio 8.93 m/s    MV Peak E Frankie 0.67 m/s    TR Max Frankie 3.37 m/s    MV stenosis pressure 1/2 time 71.574937519673489 ms    MV Peak A Frankie 0.67 m/s    LV Systolic Volume 86.51 mL    LV Systolic Volume Index 40.8 mL/m2    LV Diastolic Volume 98.26 mL    LV Diastolic Volume Index 46.35 mL/m2    LA Volume Index 17.5 mL/m2    LV Mass Index 140 g/m2    Echo EF Estimated 12 %    RA Major Axis 4.57 cm    Left Atrium Minor Axis 3.27 cm    Left Atrium Major Axis 4.88 cm    Triscuspid Valve Regurgitation Peak Gradient 45 mmHg    RA Width 3.59 cm    Right Atrial Pressure (from IVC) 8 mmHg    EF 40 %    TV rest pulmonary artery pressure 53 mmHg    Narrative    · The left ventricle is normal in size with concentric hypertrophy and   mildly decreased systolic function.  · Grade I left ventricular diastolic dysfunction.  · Normal right ventricular size with normal right ventricular systolic   function.  · There is pulmonary hypertension.  · Intermediate central venous pressure (8 mmHg).  · The estimated PA systolic pressure is 53 mmHg.  · The estimated ejection fraction is 40%.  · There are segmental left ventricular wall motion abnormalities.      , EKG: Reviewed and X-Ray: CXR: X-Ray Chest 1 View (CXR): No results found for this visit  on 02/14/22. and X-Ray Chest PA and Lateral (CXR): No results found for this visit on 02/14/22.

## 2022-02-16 NOTE — ASSESSMENT & PLAN NOTE
Patient is identified as having Combined Systolic and Diastolic heart failure that is Acute on chronic. CHF is currently controlled. Latest ECHO performed and demonstrates- Results for orders placed during the hospital encounter of 01/19/22    Echo    Interpretation Summary  · The left ventricle is normal in size with concentric hypertrophy and mildly decreased systolic function.  · Grade I left ventricular diastolic dysfunction.  · Normal right ventricular size with normal right ventricular systolic function.  · There is pulmonary hypertension.  · Intermediate central venous pressure (8 mmHg).  · The estimated PA systolic pressure is 53 mmHg.  · The estimated ejection fraction is 40%.  · There are segmental left ventricular wall motion abnormalities.  . Continue ACE/ARB and monitor clinical status closely. Monitor on telemetry. Patient is on CHF pathway.  Monitor strict Is&Os and daily weights.  Place on fluid restriction of 2 L. Continue to stress to patient importance of self efficacy and  on diet for CHF. Last BNP reviewed- and noted below   Recent Labs   Lab 02/14/22 2045   *       2/16/22  -Clinically compensated  -Continue Coreg, ACEi  -Resume Lasix once creatinine stabilizes

## 2022-02-18 ENCOUNTER — TELEPHONE (OUTPATIENT)
Dept: TRANSPLANT | Facility: CLINIC | Age: 62
End: 2022-02-18
Payer: MEDICAID

## 2022-02-21 ENCOUNTER — TELEPHONE (OUTPATIENT)
Dept: PULMONOLOGY | Facility: CLINIC | Age: 62
End: 2022-02-21
Payer: MEDICAID

## 2022-02-25 NOTE — HOSPITAL COURSE
Pt admitted to Observation for Chest Pain with troponin and BNP elevated.  Cardiology consulted.  Previous Echo results revealed EF 40%. Stress test partially completed showing abnormal myocardial perfusion scan.moderate sized, severe intensity defect  in the inferior and apical wall(s) and EF of 39% at rest; hypokinesis of inferior wall noted and EKG portion of this study is negative for ischemia. Pt eloped prior to stress test completion with only resting images performed. Pt unable to be located by staff and discharged against medical advice.

## 2022-11-09 ENCOUNTER — TELEPHONE (OUTPATIENT)
Dept: PAIN MEDICINE | Facility: CLINIC | Age: 62
End: 2022-11-09
Payer: MEDICAID

## 2022-11-09 NOTE — TELEPHONE ENCOUNTER
Call placed to Pt to cancel appt on 11-22-22 and reschedule for the next avail d/t him being out at that time. No answer. V/M left.

## 2022-11-11 ENCOUNTER — TELEPHONE (OUTPATIENT)
Dept: PAIN MEDICINE | Facility: CLINIC | Age: 62
End: 2022-11-11
Payer: MEDICAID

## 2022-11-11 NOTE — TELEPHONE ENCOUNTER
Call placed to Pt to cancel and reschedule 11-22-22 appt with Dr. Echols d/t him beiing out of the office.  No answer. V/M left.

## 2022-11-14 ENCOUNTER — TELEPHONE (OUTPATIENT)
Dept: PAIN MEDICINE | Facility: CLINIC | Age: 62
End: 2022-11-14
Payer: MEDICAID

## 2022-11-14 NOTE — TELEPHONE ENCOUNTER
2nd call placed to Pt to cancel and reschedule 11-22-22 appt with Dr. Echols d/t him beiing out of the office.  No answer. V/M left.

## 2023-06-12 ENCOUNTER — HOSPITAL ENCOUNTER (EMERGENCY)
Facility: HOSPITAL | Age: 63
Discharge: HOME | End: 2023-06-12
Attending: EMERGENCY MEDICINE
Payer: MEDICAID

## 2023-06-12 VITALS
HEART RATE: 74 BPM | HEIGHT: 71 IN | DIASTOLIC BLOOD PRESSURE: 80 MMHG | SYSTOLIC BLOOD PRESSURE: 156 MMHG | BODY MASS INDEX: 26.08 KG/M2 | TEMPERATURE: 99 F | RESPIRATION RATE: 16 BRPM | OXYGEN SATURATION: 100 %

## 2023-06-12 DIAGNOSIS — M54.2 CHRONIC NECK PAIN: Primary | ICD-10-CM

## 2023-06-12 DIAGNOSIS — R30.0 DYSURIA: ICD-10-CM

## 2023-06-12 DIAGNOSIS — G89.29 CHRONIC NECK PAIN: Primary | ICD-10-CM

## 2023-06-12 LAB
ALBUMIN SERPL BCP-MCNC: 3.5 G/DL (ref 3.5–5.2)
ALP SERPL-CCNC: 94 U/L (ref 55–135)
ALT SERPL W/O P-5'-P-CCNC: 12 U/L (ref 10–44)
ANION GAP SERPL CALC-SCNC: 13 MMOL/L (ref 8–16)
AST SERPL-CCNC: 16 U/L (ref 10–40)
BACTERIA #/AREA URNS HPF: ABNORMAL /HPF
BASOPHILS # BLD AUTO: 0.05 K/UL (ref 0–0.2)
BASOPHILS NFR BLD: 0.4 % (ref 0–1.9)
BILIRUB SERPL-MCNC: 0.3 MG/DL (ref 0.1–1)
BILIRUB UR QL STRIP: NEGATIVE
BUN SERPL-MCNC: 18 MG/DL (ref 8–23)
CALCIUM SERPL-MCNC: 8.9 MG/DL (ref 8.7–10.5)
CHLORIDE SERPL-SCNC: 96 MMOL/L (ref 95–110)
CLARITY UR: CLEAR
CO2 SERPL-SCNC: 29 MMOL/L (ref 23–29)
COLOR UR: YELLOW
CREAT SERPL-MCNC: 1.5 MG/DL (ref 0.5–1.4)
DIFFERENTIAL METHOD: ABNORMAL
EOSINOPHIL # BLD AUTO: 0 K/UL (ref 0–0.5)
EOSINOPHIL NFR BLD: 0.2 % (ref 0–8)
ERYTHROCYTE [DISTWIDTH] IN BLOOD BY AUTOMATED COUNT: 17.6 % (ref 11.5–14.5)
EST. GFR  (NO RACE VARIABLE): 52 ML/MIN/1.73 M^2
GLUCOSE SERPL-MCNC: 193 MG/DL (ref 70–110)
GLUCOSE UR QL STRIP: ABNORMAL
HCT VFR BLD AUTO: 41.6 % (ref 40–54)
HGB BLD-MCNC: 13.5 G/DL (ref 14–18)
HGB UR QL STRIP: NEGATIVE
HYALINE CASTS #/AREA URNS LPF: 0 /LPF
IMM GRANULOCYTES # BLD AUTO: 0.06 K/UL (ref 0–0.04)
IMM GRANULOCYTES NFR BLD AUTO: 0.5 % (ref 0–0.5)
KETONES UR QL STRIP: NEGATIVE
LEUKOCYTE ESTERASE UR QL STRIP: ABNORMAL
LIPASE SERPL-CCNC: 18 U/L (ref 4–60)
LYMPHOCYTES # BLD AUTO: 1.3 K/UL (ref 1–4.8)
LYMPHOCYTES NFR BLD: 11.1 % (ref 18–48)
MCH RBC QN AUTO: 26.4 PG (ref 27–31)
MCHC RBC AUTO-ENTMCNC: 32.5 G/DL (ref 32–36)
MCV RBC AUTO: 81 FL (ref 82–98)
MICROSCOPIC COMMENT: ABNORMAL
MONOCYTES # BLD AUTO: 1 K/UL (ref 0.3–1)
MONOCYTES NFR BLD: 9 % (ref 4–15)
NEUTROPHILS # BLD AUTO: 9.1 K/UL (ref 1.8–7.7)
NEUTROPHILS NFR BLD: 78.8 % (ref 38–73)
NITRITE UR QL STRIP: NEGATIVE
NRBC BLD-RTO: 0 /100 WBC
PH UR STRIP: 6 [PH] (ref 5–8)
PLATELET # BLD AUTO: 475 K/UL (ref 150–450)
PMV BLD AUTO: 8.5 FL (ref 9.2–12.9)
POTASSIUM SERPL-SCNC: 3.4 MMOL/L (ref 3.5–5.1)
PROT SERPL-MCNC: 7.5 G/DL (ref 6–8.4)
PROT UR QL STRIP: ABNORMAL
RBC # BLD AUTO: 5.12 M/UL (ref 4.6–6.2)
RBC #/AREA URNS HPF: 0 /HPF (ref 0–4)
SODIUM SERPL-SCNC: 138 MMOL/L (ref 136–145)
SP GR UR STRIP: 1.03 (ref 1–1.03)
URN SPEC COLLECT METH UR: ABNORMAL
UROBILINOGEN UR STRIP-ACNC: NEGATIVE EU/DL
WBC # BLD AUTO: 11.54 K/UL (ref 3.9–12.7)
WBC #/AREA URNS HPF: 3 /HPF (ref 0–5)
YEAST URNS QL MICRO: ABNORMAL

## 2023-06-12 PROCEDURE — 83690 ASSAY OF LIPASE: CPT | Performed by: PHYSICIAN ASSISTANT

## 2023-06-12 PROCEDURE — 63600175 PHARM REV CODE 636 W HCPCS: Performed by: PHYSICIAN ASSISTANT

## 2023-06-12 PROCEDURE — 81000 URINALYSIS NONAUTO W/SCOPE: CPT | Performed by: PHYSICIAN ASSISTANT

## 2023-06-12 PROCEDURE — 96374 THER/PROPH/DIAG INJ IV PUSH: CPT

## 2023-06-12 PROCEDURE — 85025 COMPLETE CBC W/AUTO DIFF WBC: CPT | Performed by: PHYSICIAN ASSISTANT

## 2023-06-12 PROCEDURE — 25000003 PHARM REV CODE 250: Performed by: EMERGENCY MEDICINE

## 2023-06-12 PROCEDURE — 25000003 PHARM REV CODE 250: Performed by: PHYSICIAN ASSISTANT

## 2023-06-12 PROCEDURE — 80053 COMPREHEN METABOLIC PANEL: CPT | Performed by: PHYSICIAN ASSISTANT

## 2023-06-12 PROCEDURE — 99284 EMERGENCY DEPT VISIT MOD MDM: CPT | Mod: 25

## 2023-06-12 RX ORDER — RIVAROXABAN 20 MG/1
20 TABLET, FILM COATED ORAL
COMMUNITY
Start: 2023-05-22

## 2023-06-12 RX ORDER — METHOCARBAMOL 500 MG/1
500 TABLET, FILM COATED ORAL 2 TIMES DAILY
COMMUNITY
Start: 2023-02-16

## 2023-06-12 RX ORDER — METOPROLOL SUCCINATE 25 MG/1
25 TABLET, EXTENDED RELEASE ORAL
COMMUNITY
Start: 2023-01-25

## 2023-06-12 RX ORDER — ONDANSETRON 2 MG/ML
4 INJECTION INTRAMUSCULAR; INTRAVENOUS
Status: COMPLETED | OUTPATIENT
Start: 2023-06-12 | End: 2023-06-12

## 2023-06-12 RX ORDER — AMLODIPINE BESYLATE 10 MG/1
10 TABLET ORAL
COMMUNITY
Start: 2023-05-22

## 2023-06-12 RX ORDER — CEFDINIR 300 MG/1
300 CAPSULE ORAL 2 TIMES DAILY
Qty: 14 CAPSULE | Refills: 0 | Status: SHIPPED | OUTPATIENT
Start: 2023-06-12 | End: 2023-06-19

## 2023-06-12 RX ORDER — GABAPENTIN 300 MG/1
300 CAPSULE ORAL
COMMUNITY
Start: 2023-05-17

## 2023-06-12 RX ORDER — LOSARTAN POTASSIUM 25 MG/1
25 TABLET ORAL
COMMUNITY

## 2023-06-12 RX ORDER — VALSARTAN 80 MG/1
80 TABLET ORAL
COMMUNITY
Start: 2023-05-22

## 2023-06-12 RX ORDER — HYDROCODONE BITARTRATE AND ACETAMINOPHEN 7.5; 325 MG/1; MG/1
1 TABLET ORAL EVERY 6 HOURS PRN
COMMUNITY
Start: 2023-04-03

## 2023-06-12 RX ORDER — HYDROCODONE BITARTRATE AND ACETAMINOPHEN 10; 325 MG/1; MG/1
1 TABLET ORAL
Status: COMPLETED | OUTPATIENT
Start: 2023-06-12 | End: 2023-06-12

## 2023-06-12 RX ORDER — QUETIAPINE 300 MG/1
TABLET, FILM COATED, EXTENDED RELEASE ORAL DAILY
COMMUNITY

## 2023-06-12 RX ORDER — TRAZODONE HYDROCHLORIDE 150 MG/1
150 TABLET ORAL NIGHTLY
COMMUNITY

## 2023-06-12 RX ADMIN — ONDANSETRON 4 MG: 2 INJECTION INTRAMUSCULAR; INTRAVENOUS at 02:06

## 2023-06-12 RX ADMIN — HYDROCODONE BITARTRATE AND ACETAMINOPHEN 1 TABLET: 10; 325 TABLET ORAL at 06:06

## 2023-06-12 RX ADMIN — SODIUM CHLORIDE 1000 ML: 9 INJECTION, SOLUTION INTRAVENOUS at 02:06

## 2023-06-12 NOTE — FIRST PROVIDER EVALUATION
Emergency Department TeleTriage Encounter Note      CHIEF COMPLAINT    Chief Complaint   Patient presents with    Vomiting     Vomiting x 1 week, sent by MD for eval and treat       VITAL SIGNS   Initial Vitals [06/12/23 1328]   BP Pulse Resp Temp SpO2   (!) 151/77 75 16 99 °F (37.2 °C) 97 %      MAP       --            ALLERGIES    Review of patient's allergies indicates:  No Known Allergies    PROVIDER TRIAGE NOTE  Patient presents with intermittent vomiting for 1 week. PCP sent him for evaluation.       ORDERS  Labs Reviewed - No data to display    ED Orders (720h ago, onward)      None              Virtual Visit Note: The provider triage portion of this emergency department evaluation and documentation was performed via [employer], a HIPAA-compliant telemedicine application, in concert with a tele-presenter in the room. A face to face patient evaluation with one of my colleagues will occur once the patient is placed in an emergency department room.      DISCLAIMER: This note was prepared with [employer] voice recognition transcription software. Garbled syntax, mangled pronouns, and other bizarre constructions may be attributed to that software system.

## 2023-06-12 NOTE — ED PROVIDER NOTES
SCRIBE #1 NOTE: I, Cora Garcia and Eduardo Baeza, am scribing for, and in the presence of, Kem Aponte MD. I have scribed the entire note.      History      Chief Complaint   Patient presents with    Vomiting     Vomiting x 1 week, sent by MD for eval and treat       Review of patient's allergies indicates:  No Known Allergies     HPI   HPI    6/12/2023, 6:13 PM   History obtained from the patient and the pt's sister at bedside      History of Present Illness: Shukri Rosales is a 62 y.o. male patient with a PMHx of adrenal cortical adenoma, arthritis, CKD, DM2, GERD, HTN, migraine headache, and schizophrenia who presents to the Emergency Department for chronic neck pain. Pt reports that he has had neck pain for 1 year and is out of his pain medications. Symptoms are constant and moderate in severity. No mitigating or exacerbating factors reported. Associated sxs include chronic back pain. Pt also c/o dysuria. Patient denies any fever, chills, CP, SOB, N/V/D (despite triage note), abdominal pain, b/b dysfunction, and all other sxs at this time. No prior Tx reported. No further complaints or concerns at this time.       Arrival mode: Personal vehicle      PCP: Cris Matias NP       Past Medical History:  Past Medical History:   Diagnosis Date    Adrenal cortical adenoma     Anxiety     Arthritis     CKD (chronic kidney disease) stage 3, GFR 30-59 ml/min     Depression     Diabetes mellitus type II 2011    does not take    DM (diabetes mellitus) 2011     am 09/21/2017 Insulin x 1 1/2 year    GERD (gastroesophageal reflux disease) 7/9/2013    Hypertension     Migraine headache 7/22/2013    Schizophrenia     Severe obesity with body mass index of 36.0 to 36.9        Past Surgical History:  Past Surgical History:   Procedure Laterality Date    BACK SURGERY      COLONOSCOPY N/A 12/3/2020    Procedure: COLONOSCOPY;  Surgeon: Christofer Palmer MD;  Location: [facility];  Service: Endoscopy;   Laterality: N/A;    COLONOSCOPY N/A 2020    Procedure: COLONOSCOPY;  Surgeon: Frandy Stanton MD;  Location: [facility];  Service: Endoscopy;  Laterality: N/A;    HERNIA REPAIR      UMBILICAL    left arm exfix      NASAL SEPTUM SURGERY Bilateral     TONSILLECTOMY      URETEROSCOPY           Family History:  Family History   Problem Relation Age of Onset    Hypertension Mother     Diabetes Mother     Cataracts Mother     Hypertension Sister     Diabetes Sister     Hypertension Brother     Diabetes Brother     Cancer Paternal Grandmother     Cancer Paternal Grandfather        Social History:  Social History     Tobacco Use    Smoking status: Former     Packs/day: 1.00     Years: 37.00     Pack years: 37.00     Types: Cigarettes     Quit date: 3/28/2021     Years since quittin.2    Smokeless tobacco: Never    Tobacco comments:     instructed not to smoke after midnight after midnight prior to surgery   Substance and Sexual Activity    Alcohol use: No    Drug use: No    Sexual activity: Never     Partners: Female       ROS   Review of Systems   Constitutional:  Negative for chills and fever.   HENT:  Negative for sore throat.    Respiratory:  Negative for shortness of breath.    Cardiovascular:  Negative for chest pain.   Gastrointestinal:  Negative for abdominal pain, diarrhea, nausea and vomiting.   Genitourinary:  Positive for dysuria.   Musculoskeletal:  Positive for back pain and neck pain.   Skin:  Negative for rash.   Neurological:  Negative for weakness.   Hematological:  Does not bruise/bleed easily.   All other systems reviewed and are negative.    Physical Exam      Initial Vitals [23 1328]   BP Pulse Resp Temp SpO2   (!) 151/77 75 16 99 °F (37.2 °C) 97 %      MAP       --          Physical Exam  Nursing Notes and Vital Signs Reviewed.  Constitutional: Patient is in no acute distress. Well-developed and well-nourished.  Head: Atraumatic. Normocephalic.  Eyes: PERRL. EOM intact. Conjunctivae  "are not pale. No scleral icterus.  ENT: Mucous membranes are moist. Oropharynx is clear and symmetric.    Neck: Supple. Full ROM. No lymphadenopathy.   Cardiovascular: Regular rate. Regular rhythm. No murmurs, rubs, or gallops. Distal pulses are 2+ and symmetric.  Pulmonary/Chest: No respiratory distress. Clear to auscultation bilaterally. No wheezing or rales.  Abdominal: There is a large anterior abdominal wall hernia with a well-healing scar. There is no tenderness.  No rebound, guarding, or rigidity.   Musculoskeletal: Moves all extremities. No obvious deformities. No edema. No midline spinal tenderness. There is paraspinal C spine tenderness.   Skin: Warm and dry.  Neurological:  Alert, awake, and appropriate.  Normal speech.  No acute focal neurological deficits are appreciated.  Psychiatric: Normal affect. Good eye contact. Appropriate in content.    ED Course    Procedures  ED Vital Signs:  Vitals:    06/12/23 1328 06/12/23 1656 06/12/23 1658 06/12/23 1802   BP: (!) 151/77  (!) 174/96 (!) 156/80   Pulse: 75 76  74   Resp: 16      Temp: 99 °F (37.2 °C)      TempSrc: Oral      SpO2: 97% 99%  100%   Height: 5' 11" (1.803 m)          Abnormal Lab Results:  Labs Reviewed   CBC W/ AUTO DIFFERENTIAL - Abnormal; Notable for the following components:       Result Value    Hemoglobin 13.5 (*)     MCV 81 (*)     MCH 26.4 (*)     RDW 17.6 (*)     Platelets 475 (*)     MPV 8.5 (*)     Gran # (ANC) 9.1 (*)     Immature Grans (Abs) 0.06 (*)     Gran % 78.8 (*)     Lymph % 11.1 (*)     All other components within normal limits   COMPREHENSIVE METABOLIC PANEL - Abnormal; Notable for the following components:    Potassium 3.4 (*)     Glucose 193 (*)     Creatinine 1.5 (*)     eGFR 52 (*)     All other components within normal limits   URINALYSIS, REFLEX TO URINE CULTURE - Abnormal; Notable for the following components:    Protein, UA 2+ (*)     Glucose, UA 4+ (*)     Leukocytes, UA Trace (*)     All other components within " normal limits    Narrative:     Specimen Source->Urine   URINALYSIS MICROSCOPIC - Abnormal; Notable for the following components:    Bacteria Many (*)     All other components within normal limits    Narrative:     Specimen Source->Urine   LIPASE        All Lab Results:  Results for orders placed or performed during the hospital encounter of 06/12/23   CBC W/ AUTO DIFFERENTIAL   Result Value Ref Range    WBC 11.54 3.90 - 12.70 K/uL    RBC 5.12 4.60 - 6.20 M/uL    Hemoglobin 13.5 (L) 14.0 - 18.0 g/dL    Hematocrit 41.6 40.0 - 54.0 %    MCV 81 (L) 82 - 98 fL    MCH 26.4 (L) 27.0 - 31.0 pg    MCHC 32.5 32.0 - 36.0 g/dL    RDW 17.6 (H) 11.5 - 14.5 %    Platelets 475 (H) 150 - 450 K/uL    MPV 8.5 (L) 9.2 - 12.9 fL    Immature Granulocytes 0.5 0.0 - 0.5 %    Gran # (ANC) 9.1 (H) 1.8 - 7.7 K/uL    Immature Grans (Abs) 0.06 (H) 0.00 - 0.04 K/uL    Lymph # 1.3 1.0 - 4.8 K/uL    Mono # 1.0 0.3 - 1.0 K/uL    Eos # 0.0 0.0 - 0.5 K/uL    Baso # 0.05 0.00 - 0.20 K/uL    nRBC 0 0 /100 WBC    Gran % 78.8 (H) 38.0 - 73.0 %    Lymph % 11.1 (L) 18.0 - 48.0 %    Mono % 9.0 4.0 - 15.0 %    Eosinophil % 0.2 0.0 - 8.0 %    Basophil % 0.4 0.0 - 1.9 %    Differential Method Automated    Comp. Metabolic Panel   Result Value Ref Range    Sodium 138 136 - 145 mmol/L    Potassium 3.4 (L) 3.5 - 5.1 mmol/L    Chloride 96 95 - 110 mmol/L    CO2 29 23 - 29 mmol/L    Glucose 193 (H) 70 - 110 mg/dL    BUN 18 8 - 23 mg/dL    Creatinine 1.5 (H) 0.5 - 1.4 mg/dL    Calcium 8.9 8.7 - 10.5 mg/dL    Total Protein 7.5 6.0 - 8.4 g/dL    Albumin 3.5 3.5 - 5.2 g/dL    Total Bilirubin 0.3 0.1 - 1.0 mg/dL    Alkaline Phosphatase 94 55 - 135 U/L    AST 16 10 - 40 U/L    ALT 12 10 - 44 U/L    Anion Gap 13 8 - 16 mmol/L    eGFR 52 (A) >60 mL/min/1.73 m^2   Lipase   Result Value Ref Range    Lipase 18 4 - 60 U/L   Urinalysis, Reflex to Urine Culture Urine, Clean Catch    Specimen: Urine   Result Value Ref Range    Specimen UA Urine, Clean Catch     Color, UA Yellow  Yellow, Straw, Esha    Appearance, UA Clear Clear    pH, UA 6.0 5.0 - 8.0    Specific Gravity, UA 1.030 1.005 - 1.030    Protein, UA 2+ (A) Negative    Glucose, UA 4+ (A) Negative    Ketones, UA Negative Negative    Bilirubin (UA) Negative Negative    Occult Blood UA Negative Negative    Nitrite, UA Negative Negative    Urobilinogen, UA Negative <2.0 EU/dL    Leukocytes, UA Trace (A) Negative   Urinalysis Microscopic   Result Value Ref Range    RBC, UA 0 0 - 4 /hpf    WBC, UA 3 0 - 5 /hpf    Bacteria Many (A) None-Occ /hpf    Yeast, UA None None    Hyaline Casts, UA 0 0-1/lpf /lpf    Microscopic Comment SEE COMMENT      *Note: Due to a large number of results and/or encounters for the requested time period, some results have not been displayed. A complete set of results can be found in Results Review.         Imaging Results:  Imaging Results    None                 The Emergency Provider reviewed the vital signs and test results, which are outlined above.    ED Discussion     6:18 PM: Reassessed pt at this time. Discussed with pt all pertinent ED information and results. Discussed pt dx and plan of tx. Gave pt all f/u and return to the ED instructions. All questions and concerns were addressed at this time. Pt expresses understanding of information and instructions, and is comfortable with plan to discharge. Pt is stable for discharge.    I discussed with patient and/or family/caretaker that evaluation in the ED does not suggest any emergent or life threatening medical conditions requiring immediate intervention beyond what was provided in the ED, and I believe patient is safe for discharge.  Regardless, an unremarkable evaluation in the ED does not preclude the development or presence of a serious of life threatening condition. As such, patient was instructed to return immediately for any worsening or change in current symptoms.           ED Medication(s):  Medications   HYDROcodone-acetaminophen  mg per  tablet 1 tablet (has no administration in time range)   sodium chloride 0.9% bolus 1,000 mL 1,000 mL (1,000 mLs Intravenous New Bag 6/12/23 1452)   ondansetron injection 4 mg (4 mg Intravenous Given 6/12/23 1459)        Follow-up Information       Cris Matias NP. Schedule an appointment as soon as possible for a visit in 2 days.    Specialty: Family Medicine  Why: For re-evaluation and further treatment  Contact information:  [address]  [address]  [facility] [address] [phone]               [facility] - Emergency Dept.. Go today.    Specialty: Emergency Medicine  Why: If symptoms worsen, For re-evaluation and further treatment, As needed  Contact information:  [address] [facility] [address] [phone]                           New Prescriptions    CEFDINIR (OMNICEF) 300 MG CAPSULE    Take 1 capsule (300 mg total) by mouth 2 (two) times daily. for 7 days         Medical Decision Making    Medical Decision Making:   Clinical Tests:   Lab Tests: Ordered and Reviewed         Scribe Attestation:   Scribe #1: I performed the above scribed service and the documentation accurately describes the services I performed. I attest to the accuracy of the note.    Attending:   Physician Attestation Statement for Scribe #1: I, Kem Aponte MD, personally performed the services described in this documentation, as scribed by Cora Garcia and Eduardo Baeza, in my presence, and it is both accurate and complete.          Clinical Impression       ICD-10-CM ICD-9-CM   1. Chronic neck pain  M54.2 723.1    G89.29 338.29   2. Dysuria  R30.0 788.1       Disposition:   Disposition: Discharged  Condition: Stable       Kem Aponte MD  06/12/23 4942

## 2023-06-12 NOTE — ED NOTES
Pt resting in ER stretcher, aaox4, rr e/u, NAD noted. Vss noted. Bed low and locked, call light in reach, side rails up x2. Sister at the bedside.  Pt verbalized understanding of status and POC; denies further needs. Will continue to monitor.

## 2023-10-19 NOTE — TELEPHONE ENCOUNTER
Called to schedule pulmonary disease management appointment. No answer.   Stable and controlled. Continue current treatment plan as previously prescribed with your PCP.

## 2025-04-20 NOTE — ANESTHESIA PREPROCEDURE EVALUATION
12/04/2020  Shukri Rosales is a 60 y.o., male.    Pre-op Assessment    I have reviewed the Patient Summary Reports.     I have reviewed the Nursing Notes. I have reviewed the NPO Status.   I have reviewed the Medications.     Review of Systems  Anesthesia Hx:  No problems with previous Anesthesia  History of prior surgery of interest to airway management or planning: Denies Family Hx of Anesthesia complications.   Denies Personal Hx of Anesthesia complications.   Social:  Smoker, No Alcohol Use 1 ppd X 45 years   Hematology/Oncology:  Hematology Normal   Oncology Normal     EENT/Dental:EENT/Dental Normal   Cardiovascular:   Exercise tolerance: good Hypertension ESPINAL    Pulmonary:   Shortness of breath Sleep Apnea, CPAP    Education provided regarding risk of obstructive sleep apnea     Renal/:   Chronic Renal Disease, CRI    Hepatic/GI:   GERD, poorly controlled Denies Liver Disease.    Musculoskeletal:   Arthritis     Neurological:   Headaches    Endocrine:   Diabetes, well controlled, type 2    Psych:   Psychiatric History anxiety depression          Physical Exam  General:  Obesity, Well nourished    Airway/Jaw/Neck:  Airway Findings: Mouth Opening: Normal Tongue: Normal  General Airway Assessment: Adult  Mallampati: II  TM Distance: Normal, at least 6 cm      Dental:  Dental Findings: Edentulous   Chest/Lungs:  Chest/Lungs Findings: Clear to auscultation     Heart/Vascular:  Heart Findings: Rate: Normal  Rhythm: Regular Rhythm        Mental Status:  Mental Status Findings:  Cooperative         Anesthesia Plan  Type of Anesthesia, risks & benefits discussed:  Anesthesia Type:  MAC  Patient's Preference:   Intra-op Monitoring Plan: standard ASA monitors  Intra-op Monitoring Plan Comments:   Post Op Pain Control Plan: multimodal analgesia  Post Op Pain Control Plan Comments:   Induction:    Patient to restroom to provide urine sample in waiting room.   IV  Beta Blocker:  Patient is on a Beta-Blocker and has received one dose within the past 24 hours (No further documentation required).       Informed Consent: Patient understands risks and agrees with Anesthesia plan.  Questions answered. Anesthesia consent signed with patient.  ASA Score: 3     Day of Surgery Review of History & Physical: I have interviewed and examined the patient. I have reviewed the patient's H&P dated:  There are no significant changes.          Ready For Surgery From Anesthesia Perspective.
